# Patient Record
Sex: FEMALE | Race: WHITE | NOT HISPANIC OR LATINO | Employment: OTHER | URBAN - METROPOLITAN AREA
[De-identification: names, ages, dates, MRNs, and addresses within clinical notes are randomized per-mention and may not be internally consistent; named-entity substitution may affect disease eponyms.]

---

## 2017-03-03 ENCOUNTER — ALLSCRIPTS OFFICE VISIT (OUTPATIENT)
Dept: OTHER | Facility: OTHER | Age: 82
End: 2017-03-03

## 2017-03-03 DIAGNOSIS — E11.39 TYPE 2 DIABETES MELLITUS WITH OTHER DIABETIC OPHTHALMIC COMPLICATION (HCC): ICD-10-CM

## 2017-03-03 DIAGNOSIS — E11.65 TYPE 2 DIABETES MELLITUS WITH HYPERGLYCEMIA (HCC): ICD-10-CM

## 2017-03-04 ENCOUNTER — TRANSCRIBE ORDERS (OUTPATIENT)
Dept: LAB | Facility: CLINIC | Age: 82
End: 2017-03-04

## 2017-03-04 ENCOUNTER — APPOINTMENT (OUTPATIENT)
Dept: LAB | Facility: HOSPITAL | Age: 82
End: 2017-03-04
Payer: COMMERCIAL

## 2017-03-04 ENCOUNTER — TRANSCRIBE ORDERS (OUTPATIENT)
Dept: LAB | Facility: HOSPITAL | Age: 82
End: 2017-03-04

## 2017-03-04 ENCOUNTER — APPOINTMENT (OUTPATIENT)
Dept: LAB | Facility: CLINIC | Age: 82
End: 2017-03-04
Payer: COMMERCIAL

## 2017-03-04 DIAGNOSIS — E11.39 DIABETIC OCULOPATHY (HCC): ICD-10-CM

## 2017-03-04 DIAGNOSIS — E11.39 DIABETIC OCULOPATHY (HCC): Primary | ICD-10-CM

## 2017-03-04 DIAGNOSIS — E11.39 TYPE 2 DIABETES MELLITUS WITH OTHER DIABETIC OPHTHALMIC COMPLICATION (HCC): ICD-10-CM

## 2017-03-04 DIAGNOSIS — E11.65 TYPE 2 DIABETES MELLITUS WITH HYPERGLYCEMIA (HCC): ICD-10-CM

## 2017-03-04 DIAGNOSIS — I10 UNSPECIFIED ESSENTIAL HYPERTENSION: Primary | ICD-10-CM

## 2017-03-04 DIAGNOSIS — IMO0001 UNCONTROLLED DIABETES MELLITUS TYPE 2 WITHOUT COMPLICATIONS, UNSPECIFIED LONG TERM INSULIN USE STATUS: ICD-10-CM

## 2017-03-04 DIAGNOSIS — I10 UNSPECIFIED ESSENTIAL HYPERTENSION: ICD-10-CM

## 2017-03-04 LAB
ANION GAP SERPL CALCULATED.3IONS-SCNC: 5 MMOL/L (ref 4–13)
BUN SERPL-MCNC: 26 MG/DL (ref 5–25)
CALCIUM SERPL-MCNC: 8.8 MG/DL (ref 8.3–10.1)
CHLORIDE SERPL-SCNC: 105 MMOL/L (ref 100–108)
CHOLEST SERPL-MCNC: 111 MG/DL (ref 50–200)
CO2 SERPL-SCNC: 29 MMOL/L (ref 21–32)
CREAT SERPL-MCNC: 1.06 MG/DL (ref 0.6–1.3)
CREAT UR-MCNC: 74.3 MG/DL
EST. AVERAGE GLUCOSE BLD GHB EST-MCNC: 186 MG/DL
GFR SERPL CREATININE-BSD FRML MDRD: 50 ML/MIN/1.73SQ M
GLUCOSE SERPL-MCNC: 122 MG/DL (ref 65–140)
HBA1C MFR BLD: 8.1 % (ref 4.2–6.3)
HDLC SERPL-MCNC: 67 MG/DL (ref 40–60)
LDLC SERPL CALC-MCNC: 35 MG/DL (ref 0–100)
MICROALBUMIN UR-MCNC: 83 MG/L (ref 0–20)
MICROALBUMIN/CREAT 24H UR: 112 MG/G CREATININE (ref 0–30)
POTASSIUM SERPL-SCNC: 4.2 MMOL/L (ref 3.5–5.3)
SODIUM SERPL-SCNC: 139 MMOL/L (ref 136–145)
T4 FREE SERPL-MCNC: 1 NG/DL (ref 0.76–1.46)
TRIGL SERPL-MCNC: 44 MG/DL
TSH SERPL DL<=0.05 MIU/L-ACNC: 4.07 UIU/ML (ref 0.36–3.74)

## 2017-03-04 PROCEDURE — 84443 ASSAY THYROID STIM HORMONE: CPT

## 2017-03-04 PROCEDURE — 82043 UR ALBUMIN QUANTITATIVE: CPT | Performed by: PHYSICIAN ASSISTANT

## 2017-03-04 PROCEDURE — 82570 ASSAY OF URINE CREATININE: CPT | Performed by: PHYSICIAN ASSISTANT

## 2017-03-04 PROCEDURE — 80048 BASIC METABOLIC PNL TOTAL CA: CPT

## 2017-03-04 PROCEDURE — 83036 HEMOGLOBIN GLYCOSYLATED A1C: CPT

## 2017-03-04 PROCEDURE — 84439 ASSAY OF FREE THYROXINE: CPT

## 2017-03-04 PROCEDURE — 36415 COLL VENOUS BLD VENIPUNCTURE: CPT

## 2017-03-04 PROCEDURE — 80061 LIPID PANEL: CPT

## 2017-03-05 ENCOUNTER — GENERIC CONVERSION - ENCOUNTER (OUTPATIENT)
Dept: OTHER | Facility: OTHER | Age: 82
End: 2017-03-05

## 2017-03-06 ENCOUNTER — GENERIC CONVERSION - ENCOUNTER (OUTPATIENT)
Dept: OTHER | Facility: OTHER | Age: 82
End: 2017-03-06

## 2017-03-21 ENCOUNTER — ALLSCRIPTS OFFICE VISIT (OUTPATIENT)
Dept: OTHER | Facility: OTHER | Age: 82
End: 2017-03-21

## 2017-04-06 ENCOUNTER — ALLSCRIPTS OFFICE VISIT (OUTPATIENT)
Dept: OTHER | Facility: OTHER | Age: 82
End: 2017-04-06

## 2017-04-10 ENCOUNTER — GENERIC CONVERSION - ENCOUNTER (OUTPATIENT)
Dept: OTHER | Facility: OTHER | Age: 82
End: 2017-04-10

## 2017-05-04 ENCOUNTER — GENERIC CONVERSION - ENCOUNTER (OUTPATIENT)
Dept: FAMILY MEDICINE CLINIC | Facility: CLINIC | Age: 82
End: 2017-05-04

## 2017-05-04 LAB
LEFT EYE DIABETIC RETINOPATHY: NORMAL
RIGHT EYE DIABETIC RETINOPATHY: NORMAL

## 2017-05-08 DIAGNOSIS — E11.65 TYPE 2 DIABETES MELLITUS WITH HYPERGLYCEMIA (HCC): ICD-10-CM

## 2017-05-08 DIAGNOSIS — E11.39 TYPE 2 DIABETES MELLITUS WITH OTHER DIABETIC OPHTHALMIC COMPLICATION (HCC): ICD-10-CM

## 2017-05-16 ENCOUNTER — APPOINTMENT (OUTPATIENT)
Dept: LAB | Facility: MEDICAL CENTER | Age: 82
End: 2017-05-16
Payer: COMMERCIAL

## 2017-05-16 ENCOUNTER — HOSPITAL ENCOUNTER (OUTPATIENT)
Dept: BONE DENSITY | Facility: MEDICAL CENTER | Age: 82
Discharge: HOME | End: 2017-05-16
Payer: COMMERCIAL

## 2017-05-16 ENCOUNTER — TRANSCRIBE ORDERS (OUTPATIENT)
Dept: ADMINISTRATIVE | Facility: HOSPITAL | Age: 82
End: 2017-05-16

## 2017-05-16 DIAGNOSIS — Z13.820 ENCOUNTER FOR SCREENING FOR OSTEOPOROSIS: ICD-10-CM

## 2017-05-16 DIAGNOSIS — E11.39 TYPE 2 DIABETES MELLITUS WITH OTHER DIABETIC OPHTHALMIC COMPLICATION (HCC): ICD-10-CM

## 2017-05-16 DIAGNOSIS — E11.65 TYPE 2 DIABETES MELLITUS WITH HYPERGLYCEMIA (HCC): ICD-10-CM

## 2017-05-16 DIAGNOSIS — I10 ESSENTIAL (PRIMARY) HYPERTENSION: ICD-10-CM

## 2017-05-16 LAB
ALBUMIN SERPL BCP-MCNC: 3.6 G/DL (ref 3.5–5)
ALP SERPL-CCNC: 94 U/L (ref 46–116)
ALT SERPL W P-5'-P-CCNC: 26 U/L (ref 12–78)
ANION GAP SERPL CALCULATED.3IONS-SCNC: 5 MMOL/L (ref 4–13)
AST SERPL W P-5'-P-CCNC: 12 U/L (ref 5–45)
BILIRUB SERPL-MCNC: 0.36 MG/DL (ref 0.2–1)
BUN SERPL-MCNC: 31 MG/DL (ref 5–25)
CALCIUM SERPL-MCNC: 9.2 MG/DL (ref 8.3–10.1)
CHLORIDE SERPL-SCNC: 104 MMOL/L (ref 100–108)
CHOLEST SERPL-MCNC: 112 MG/DL (ref 50–200)
CK SERPL-CCNC: 104 U/L (ref 26–192)
CO2 SERPL-SCNC: 31 MMOL/L (ref 21–32)
CREAT SERPL-MCNC: 1.12 MG/DL (ref 0.6–1.3)
ERYTHROCYTE [DISTWIDTH] IN BLOOD BY AUTOMATED COUNT: 13.9 % (ref 11.6–15.1)
GFR SERPL CREATININE-BSD FRML MDRD: 46.9 ML/MIN/1.73SQ M
GLUCOSE P FAST SERPL-MCNC: 168 MG/DL (ref 65–99)
HCT VFR BLD AUTO: 40.7 % (ref 34.8–46.1)
HDLC SERPL-MCNC: 54 MG/DL (ref 40–60)
HGB BLD-MCNC: 13.5 G/DL (ref 11.5–15.4)
LDLC SERPL CALC-MCNC: 44 MG/DL (ref 0–100)
MCH RBC QN AUTO: 30.8 PG (ref 26.8–34.3)
MCHC RBC AUTO-ENTMCNC: 33.2 G/DL (ref 31.4–37.4)
MCV RBC AUTO: 93 FL (ref 82–98)
PLATELET # BLD AUTO: 177 THOUSANDS/UL (ref 149–390)
PMV BLD AUTO: 13 FL (ref 8.9–12.7)
POTASSIUM SERPL-SCNC: 4 MMOL/L (ref 3.5–5.3)
PROT SERPL-MCNC: 6.4 G/DL (ref 6.4–8.2)
RBC # BLD AUTO: 4.39 MILLION/UL (ref 3.81–5.12)
SODIUM SERPL-SCNC: 140 MMOL/L (ref 136–145)
T4 FREE SERPL-MCNC: 0.97 NG/DL (ref 0.76–1.46)
TRIGL SERPL-MCNC: 72 MG/DL
TSH SERPL DL<=0.05 MIU/L-ACNC: 3.98 UIU/ML (ref 0.36–3.74)
WBC # BLD AUTO: 6.53 THOUSAND/UL (ref 4.31–10.16)

## 2017-05-16 PROCEDURE — 80053 COMPREHEN METABOLIC PANEL: CPT

## 2017-05-16 PROCEDURE — 80061 LIPID PANEL: CPT

## 2017-05-16 PROCEDURE — 84439 ASSAY OF FREE THYROXINE: CPT

## 2017-05-16 PROCEDURE — 84443 ASSAY THYROID STIM HORMONE: CPT

## 2017-05-16 PROCEDURE — 85027 COMPLETE CBC AUTOMATED: CPT

## 2017-05-16 PROCEDURE — 77080 DXA BONE DENSITY AXIAL: CPT

## 2017-05-16 PROCEDURE — 36415 COLL VENOUS BLD VENIPUNCTURE: CPT

## 2017-05-16 PROCEDURE — 82550 ASSAY OF CK (CPK): CPT

## 2017-05-17 ENCOUNTER — GENERIC CONVERSION - ENCOUNTER (OUTPATIENT)
Dept: OTHER | Facility: OTHER | Age: 82
End: 2017-05-17

## 2017-05-18 ENCOUNTER — GENERIC CONVERSION - ENCOUNTER (OUTPATIENT)
Dept: OTHER | Facility: OTHER | Age: 82
End: 2017-05-18

## 2017-06-21 ENCOUNTER — ALLSCRIPTS OFFICE VISIT (OUTPATIENT)
Dept: OTHER | Facility: OTHER | Age: 82
End: 2017-06-21

## 2017-07-25 ENCOUNTER — ALLSCRIPTS OFFICE VISIT (OUTPATIENT)
Dept: OTHER | Facility: OTHER | Age: 82
End: 2017-07-25

## 2017-07-25 DIAGNOSIS — E11.65 TYPE 2 DIABETES MELLITUS WITH HYPERGLYCEMIA (HCC): ICD-10-CM

## 2017-07-25 DIAGNOSIS — E11.39 TYPE 2 DIABETES MELLITUS WITH OTHER DIABETIC OPHTHALMIC COMPLICATION (HCC): ICD-10-CM

## 2017-09-10 ENCOUNTER — HOSPITAL ENCOUNTER (EMERGENCY)
Facility: HOSPITAL | Age: 82
Discharge: HOME | End: 2017-09-10
Attending: EMERGENCY MEDICINE | Admitting: EMERGENCY MEDICINE
Payer: COMMERCIAL

## 2017-09-10 ENCOUNTER — APPOINTMENT (EMERGENCY)
Dept: RADIOLOGY | Facility: HOSPITAL | Age: 82
End: 2017-09-10
Payer: COMMERCIAL

## 2017-09-10 VITALS
TEMPERATURE: 97.8 F | RESPIRATION RATE: 18 BRPM | OXYGEN SATURATION: 100 % | WEIGHT: 190 LBS | SYSTOLIC BLOOD PRESSURE: 137 MMHG | HEART RATE: 62 BPM | DIASTOLIC BLOOD PRESSURE: 64 MMHG

## 2017-09-10 DIAGNOSIS — L03.032 CELLULITIS OF TOE OF LEFT FOOT: Primary | ICD-10-CM

## 2017-09-10 PROCEDURE — 73660 X-RAY EXAM OF TOE(S): CPT

## 2017-09-10 PROCEDURE — 99283 EMERGENCY DEPT VISIT LOW MDM: CPT

## 2017-09-10 RX ORDER — NITROGLYCERIN 0.4 MG/1
TABLET SUBLINGUAL
COMMUNITY
End: 2019-03-30 | Stop reason: HOSPADM

## 2017-09-10 RX ORDER — ISOSORBIDE MONONITRATE 60 MG/1
TABLET, EXTENDED RELEASE ORAL
COMMUNITY
Start: 2017-07-31 | End: 2018-10-26 | Stop reason: SDUPTHER

## 2017-09-10 RX ORDER — CEPHALEXIN 500 MG/1
500 CAPSULE ORAL 4 TIMES DAILY
Qty: 40 CAPSULE | Refills: 0 | Status: SHIPPED | OUTPATIENT
Start: 2017-09-10 | End: 2017-09-20

## 2017-09-10 RX ORDER — PANTOPRAZOLE SODIUM 40 MG/1
TABLET, DELAYED RELEASE ORAL
COMMUNITY
Start: 2017-07-31 | End: 2018-12-05 | Stop reason: ALTCHOICE

## 2017-09-10 RX ORDER — SULFAMETHOXAZOLE AND TRIMETHOPRIM 800; 160 MG/1; MG/1
1 TABLET ORAL 2 TIMES DAILY
Qty: 14 TABLET | Refills: 0 | Status: SHIPPED | OUTPATIENT
Start: 2017-09-10 | End: 2017-09-17

## 2017-09-10 RX ORDER — LOSARTAN POTASSIUM 100 MG/1
TABLET ORAL
COMMUNITY
End: 2019-04-08

## 2017-09-10 RX ORDER — SUB-Q INSULIN DEVICE, 40 UNIT
EACH MISCELLANEOUS
COMMUNITY
Start: 2016-01-14 | End: 2018-11-16 | Stop reason: SDUPTHER

## 2017-09-10 RX ORDER — ASPIRIN 325 MG
TABLET ORAL
COMMUNITY
Start: 2013-07-16 | End: 2018-04-28 | Stop reason: HOSPADM

## 2017-09-10 RX ORDER — AMLODIPINE BESYLATE 5 MG/1
TABLET ORAL
COMMUNITY
Start: 2014-05-17 | End: 2018-05-16 | Stop reason: SDUPTHER

## 2017-09-10 RX ORDER — ATORVASTATIN CALCIUM 40 MG/1
TABLET, FILM COATED ORAL
COMMUNITY
Start: 2011-12-07 | End: 2018-04-11 | Stop reason: SDUPTHER

## 2017-09-24 ENCOUNTER — HOSPITAL ENCOUNTER (EMERGENCY)
Facility: HOSPITAL | Age: 82
Discharge: HOME | End: 2017-09-24
Attending: EMERGENCY MEDICINE | Admitting: EMERGENCY MEDICINE
Payer: COMMERCIAL

## 2017-09-24 ENCOUNTER — APPOINTMENT (EMERGENCY)
Dept: RADIOLOGY | Facility: HOSPITAL | Age: 82
End: 2017-09-24
Payer: COMMERCIAL

## 2017-09-24 VITALS
HEIGHT: 67 IN | DIASTOLIC BLOOD PRESSURE: 81 MMHG | TEMPERATURE: 98.1 F | WEIGHT: 190 LBS | SYSTOLIC BLOOD PRESSURE: 180 MMHG | BODY MASS INDEX: 29.82 KG/M2 | HEART RATE: 75 BPM | RESPIRATION RATE: 18 BRPM | OXYGEN SATURATION: 97 %

## 2017-09-24 DIAGNOSIS — S09.90XA CLOSED HEAD INJURY, INITIAL ENCOUNTER: Primary | ICD-10-CM

## 2017-09-24 PROCEDURE — 70450 CT HEAD/BRAIN W/O DYE: CPT

## 2017-09-24 PROCEDURE — 99284 EMERGENCY DEPT VISIT MOD MDM: CPT

## 2017-09-24 PROCEDURE — 72125 CT NECK SPINE W/O DYE: CPT

## 2017-09-27 ENCOUNTER — ALLSCRIPTS OFFICE VISIT (OUTPATIENT)
Dept: OTHER | Facility: OTHER | Age: 82
End: 2017-09-27

## 2017-09-28 ENCOUNTER — APPOINTMENT (OUTPATIENT)
Dept: LAB | Facility: HOSPITAL | Age: 82
End: 2017-09-28
Payer: COMMERCIAL

## 2017-09-28 ENCOUNTER — GENERIC CONVERSION - ENCOUNTER (OUTPATIENT)
Dept: OTHER | Facility: OTHER | Age: 82
End: 2017-09-28

## 2017-09-28 ENCOUNTER — TRANSCRIBE ORDERS (OUTPATIENT)
Dept: ADMINISTRATIVE | Facility: HOSPITAL | Age: 82
End: 2017-09-28

## 2017-09-28 DIAGNOSIS — E11.65 TYPE 2 DIABETES MELLITUS WITH HYPERGLYCEMIA (HCC): ICD-10-CM

## 2017-09-28 DIAGNOSIS — E11.39 TYPE 2 DIABETES MELLITUS WITH OTHER DIABETIC OPHTHALMIC COMPLICATION (HCC): ICD-10-CM

## 2017-09-28 LAB
ALBUMIN SERPL BCP-MCNC: 3.5 G/DL (ref 3.5–5)
ALP SERPL-CCNC: 92 U/L (ref 46–116)
ALT SERPL W P-5'-P-CCNC: 27 U/L (ref 12–78)
ANION GAP SERPL CALCULATED.3IONS-SCNC: 5 MMOL/L (ref 4–13)
AST SERPL W P-5'-P-CCNC: 20 U/L (ref 5–45)
BILIRUB SERPL-MCNC: 0.45 MG/DL (ref 0.2–1)
BUN SERPL-MCNC: 34 MG/DL (ref 5–25)
CALCIUM SERPL-MCNC: 9.8 MG/DL (ref 8.3–10.1)
CHLORIDE SERPL-SCNC: 104 MMOL/L (ref 100–108)
CO2 SERPL-SCNC: 31 MMOL/L (ref 21–32)
CREAT SERPL-MCNC: 1.01 MG/DL (ref 0.6–1.3)
EST. AVERAGE GLUCOSE BLD GHB EST-MCNC: 169 MG/DL
GFR SERPL CREATININE-BSD FRML MDRD: 53 ML/MIN/1.73SQ M
GLUCOSE P FAST SERPL-MCNC: 108 MG/DL (ref 65–99)
HBA1C MFR BLD: 7.5 % (ref 4.2–6.3)
POTASSIUM SERPL-SCNC: 4.2 MMOL/L (ref 3.5–5.3)
PROT SERPL-MCNC: 7.3 G/DL (ref 6.4–8.2)
SODIUM SERPL-SCNC: 140 MMOL/L (ref 136–145)

## 2017-09-28 PROCEDURE — 36415 COLL VENOUS BLD VENIPUNCTURE: CPT

## 2017-09-28 PROCEDURE — 80053 COMPREHEN METABOLIC PANEL: CPT

## 2017-09-28 PROCEDURE — 83036 HEMOGLOBIN GLYCOSYLATED A1C: CPT

## 2017-10-31 ENCOUNTER — ALLSCRIPTS OFFICE VISIT (OUTPATIENT)
Dept: OTHER | Facility: OTHER | Age: 82
End: 2017-10-31

## 2017-10-31 DIAGNOSIS — R94.6 ABNORMAL RESULTS OF THYROID FUNCTION STUDIES: ICD-10-CM

## 2017-11-01 NOTE — PROGRESS NOTES
Assessment  1  DM (diabetes mellitus), type 2, uncontrolled w/ophthalmic complication (004 87)   (E11 39,E11 65)   2  Diabetic retinopathy (250 50,362 01) (E11 319)   3  Hypertension (401 9) (I10)   4  Hyperlipidemia (272 4) (E78 5)   5  Abnormal TSH (790 6) (R94 6)    Plan  3-vessel coronary artery disease    · Ranexa 500 MG Oral Tablet Extended Release 12 Hour   Rx By: Aby Leach; Dispense: 30 Days ; #:60 TAB; Refill: 5;For: 3-vessel coronary artery disease; SSUANA = N; Verified Transmission to [facility] #[id number]; Last Updated By: Maris Concepcion; 10/31/2017 10:47:53 AM  Abnormal TSH    · (1) T4, FREE; Status:Active; Requested [id number];    Perform:[facility]; [id number]; Ordered; For:Abnormal TSH; Ordered By:Edd Shankar;   · (1) TSH; Status:Active; Requested FPD:94TSE8850;    Perform:[facility]; CAITY:00QIA7631; Ordered; For:Abnormal TSH; Ordered By:Edd Shankar;  DM (diabetes mellitus), type 2, uncontrolled w/ophthalmic complication    · HumaLOG 100 UNIT/ML Subcutaneous Solution; USE AS DIRECTED   Rx By: Joon Grullon; Dispense: 0 Days ; #:1 X 10 ML Vial; Refill: 6;For: DM (diabetes mellitus), type 2, uncontrolled w/ophthalmic complication; SUSANA = N; Verified Transmission to [address] [place] [address],  [address]; Last Updated By: System, [employer]; 10/31/2017 10:57:40 AM   · Follow-up visit in 4 Months Evaluation and Treatment  Follow-up  Status: Hold For -  Scheduling  Requested for: 11PWX6131   Ordered; For: DM (diabetes mellitus), type 2, uncontrolled w/ophthalmic complication; Ordered By: Joon Grullon Performed:  Due: 13LKA9344   · Follow-Up With Advanced Practitioner Evaluation and Treatment  Follow-up  Status: Hold  For - Scheduling  Requested for: 06HNT6017   Ordered; For: DM (diabetes mellitus), type 2, uncontrolled w/ophthalmic complication; Ordered By: Joon Grullon Performed:  Due: 2018  DMII (diabetes mellitus, type 2)    · Insulin Syringe 30G X 5/16 1 ML Miscellaneous   Rx By: Kassie Devries; Dispense: 0 Days ; #:3 X 100 Miscellaneous Box; Refill: 1;For: DMII (diabetes mellitus, type 2); SUSANA = Y; Verified Transmission to [address] [facility],   [address]; Last Updated By: Jarod Lim; 10/31/2017 10:48:08 AM   · V-Go 20 KIT; use daily as directed   Rx By: Linh Jack; Dispense: 30 Days ; #:1 X 30 Kit Box; Refill: 11; For: DMII (diabetes mellitus, type 2); SUSANA = N; Verified Transmission to [address] [facility],   [address]; Last Updated By: Systeme-Tag; 10/31/2017 10:57:40 AM  Unlinked    · Nitrostat 0 4 MG Sublingual Tablet Sublingual (Nitroglycerin)   Dispense: 0 Days ; #:0 SUBL; Refill: 0; SUSANA = N; Record; Last Updated By: Jarod Lim; 10/31/2017 10:47:25 AM    Discussion/Summary  Discussion Summary:   1  Type 2 diabetes with hyperglycemia-the blood sugar is under reasonable control based on hemoglobin A1c  Continue current regimen  Abnormal TSH-check TSH and free T4  Hypertension-the blood pressure is at target  Hyperlipidemia-continue statin  Counseling Documentation With Imm: The patient, patient's caretaker was counseled regarding diagnostic results,-- instructions for management,-- impressions  Medication SE Review and Pt Understands Tx: The treatment plan was reviewed with the patient/guardian  The patient/guardian understands and agrees with the treatment plan      Chief Complaint  Chief Complaint Free Text Note Form: Follow Up      History of Present Illness  Diabetes: The patient is being seen for routine follow-up of Diabetes Mellitus 2  The HbA1c was 7 5% performed on 9-28-17  See Medication List for current medication(s)  See Medication List for dosage(s)  By report, there is good compliance with treatment,-- good tolerance of treatment-- and-- good symptom control  Current pertinent lifestyle factors include obesity,-- a sedentary lifestyle-- and-- inactivity  The patient is currently asymptomatic   Disease Course and Complications:  The last dilated fundus exam showed moderate nonproliferative retinopathy  Hyperlipidemia (Follow-Up): The patient states her hyperlipidemia has been stable since the last visit  Comorbid Illnesses: diabetes mellitus-- and-- hypertension  She has no significant interval events  Symptoms: The patient is currently asymptomatic  Associated symptoms include no focal neurologic deficits  Hypertension (Follow-Up): The patient presents for follow-up of essential hypertension  The patient states she has been doing well with her blood pressure control since the last visit  She has no comorbid illnesses  She has no significant interval events  Symptoms: The patient is currently asymptomatic  Review of Systems  ROS Reviewed:   ROS reviewed  Endo Adult ROS Female Established v2 Update - St ke:   Constitutional/General: no recent weight gain,-- no recent weight loss,-- no poor energy/fatigue,-- no increased energy level,-- no insomnia/sleep problems,-- no fever-- and-- no feeling weak  Breasts: no nipple discharge  Heart: no high blood pressure,-- no chest pain/tightness,-- no rapid/racing heart rate-- and-- no palpitations  Genitourinary - Urinary: no frequent urination,-- no excess urination-- and-- no urinating during the night  Eyes: no blurred vision,-- no double vision,-- no bulging eyes,-- no gritty/scratchy eyes-- and-- no excessive tearing  Mouth / Throat: no hoarseness-- and-- no difficulty swallowing  Neck: no lumps,-- no swollen glands,-- no neck pain,-- no neck stiffness-- and-- no enlarged thyroid  Respiratory: no wheezing,-- no asthma-- and-- no persistent cough  Musculoskeletal: no muscle aches/pain,-- no joint aches/pain-- and-- muscle weakness  Skin & Hair: no dry skin,-- no acne,-- the hair texture was not oily,-- no hair loss,-- excessive hair growth-- and-- Excess hair growth  Gastrointestinal: no constipation,-- no diarrhea,-- no waking at night to drink-- and-- no stomach ache     Neurological: no blackouts,-- no weakness-- and-- no tremors  Reproductive: no discomfort with periods,-- no excessive bleeding with periods-- and-- no mood swings--   regular periods are not applicable  Endocrine: no feeling hot frequently,-- no feeling cold frequently,-- no shifts between feeling hot and cold,-- no cold hands or feet,-- no excessive sweating,-- no thyroid problems,-- no blood sugar problems,-- no excessive thirst,-- no excessive hunger,-- no change in shoe size,-- no nausea or vomiting-- and-- no shaky hands  Active Problems  1  3-vessel coronary artery disease (414 00) (I25 10)   2  Ankle pain, left (719 47) (M25 572)   3  Asthma (493 90) (J45 909)   4  Cellulitis of ankle (682 6) (L03 119)   5  Cellulitis of foot, left (682 7) (L03 116)   6  Dementia without behavioral disturbance (294 20) (F03 90)   7  Depression (311) (F32 9)   8  Dermatitis (692 9) (L30 9)   9  Diabetic retinopathy (250 50,362 01) (E11 319)   10  DM (diabetes mellitus), type 2, uncontrolled w/ophthalmic complication (810 44)    (E11 39,E11 65)   11  Dysuria (788 1) (R30 0)   12  Gastroesophageal reflux disease with hiatal hernia (797 59,924  3) (K21 9,K44 9)   13  Glaucoma (365 9) (H40 9)   14  Hyperlipidemia (272 4) (E78 5)   15  Hypertension (401 9) (I10)   16  Hypoglycemia associated with diabetes (250 80) (E11 649)   17  Ischemic Stroke (434 91)   18  Left ankle swelling (719 07) (M25 472)   19  Medicare annual wellness visit, initial (V70 0) (Z00 00)   20  Memory loss (780 93) (R41 3)   21  Need for immunization against influenza (V04 81) (Z23)   22  Need for prophylactic vaccination and inoculation against influenza (V04 81) (Z23)   23  Need for vaccination with 13-polyvalent pneumococcal conjugate vaccine (V03 82) (Z23)   24  Obesity (278 00) (E66 9)   25  Obstructive sleep apnea (327 23) (G47 33)   26  Oral thrush (112 0) (B37 0)   27  Osteoarthritis of knee (715 36) (M17 10)   28   Screening for osteoporosis (V82 81) (T36 558)    Past Medical History  1  History of Acute myocardial infarction (410 90) (I21 9)   2  History of Arthritis (V13 4)   3  History of Cardiac Cath Procedures Performed   4  History of Diabetes Mellitus (250 00)   5  History of hyperlipidemia (V12 29) (Z86 39)   6  History of hypertension (V12 59) (Z86 79)   7  History of transient cerebral ischemia (V12 54) (Z86 73)   8  History of Peptic Ulcer (V12 71)  Active Problems And Past Medical History Reviewed: The active problems and past medical history were reviewed and updated today  Surgical History  1  History of Appendectomy   2  History of CABG   3  History of Cataract Surgery   4  History of Cath Stent Placement   5  History of  Section   6  History of Complete Colonoscopy   7  History of Dental Surgery   8  History of Diagnostic Esophagogastroduodenoscopy   9  History of Elbow Surgery   10  History of PTCA  Surgical History Reviewed: The surgical history was reviewed and updated today  Family History  Father    1  Family history of Cancer  Sister    2  Family history of Cancer   3  Family history of Cancer   4  Family history of Heart Disease (V17 49)   5  Family history of Thyroid Disorder (V18 19)  Family History    6  Family history of Colon Cancer (V16 0)   7  Family history of Diabetes Mellitus (V18 0)   8  Family history of Mitral Valve Disorder   9  Family history of Thyroid Cancer  Family History Reviewed: The family history was reviewed and updated today  Social History   · Denied: History of Alcohol Use (History)   · Caffeine Use   · Denied: History of Drug Use   · Marital History - Currently    · Never A Smoker  Social History Reviewed: The social history was reviewed and updated today  The social history was reviewed and is unchanged  Current Meds   1  AmLODIPine Besylate 5 MG Oral Tablet; Take 1 tablet twice daily;    Therapy: 79PGY3066 to (Evaluate:81Lci3009)  Requested for: 96OEQ9118; Last Rx:39Gjb8372 Ordered   2  Aspirin 325 MG Oral Tablet; Therapy: 02DTI9424 to Recorded   3  Atorvastatin Calcium 40 MG Oral Tablet; take 1 tablet by mouth every day; Therapy: 17DTL8819 to (Evaluate:04Apr2018)  Requested for: 34FOO4587; Last   Rx:86Yol7160 Ordered   4  Insulin Syringe 30G X 5/16 1 ML Miscellaneous; USE AS DIRECTED; Therapy: 52Pff4403 to (Last Rx:16Jun2015)  Requested for: 16Jun2015 Ordered   5  Isosorbide Mononitrate ER 60 MG Oral Tablet Extended Release 24 Hour; TAKE 1   TABLET BY MOUTH EVERY DAY AS DIRECTED; Therapy: 01CYS3304 to (Evaluate:27Jan2018)  Requested for: 94ONS7061; Last   Rx:52Mil4337 Ordered   6  Losartan Potassium 100 MG Oral Tablet; TAKE 1 TABLET DAILY; Therapy: (Recorded:12Iad7036) to Recorded   7  Metoprolol Tartrate 25 MG Oral Tablet; TAKE 1 TABLET TWICE DAILY PRN DEPENDING   ON BP;   Therapy: (Recorded:38Sxp1113) to Recorded   8  Nitrostat 0 4 MG Sublingual Tablet Sublingual;   Therapy: (Recorded:11Nov2014) to Recorded   9  OneTouch Ultra Blue In Vitro Strip; CHECK BLOOD SSUGAR 3 TIMES A DAY; Therapy: 16IXK7609 to (BTESADNV:40TNF6060)  Requested for: 75Fsl2865; Last   Rx:56For4933 Ordered   10  Pantoprazole Sodium 40 MG Oral Tablet Delayed Release; take one tablet by mouth    every day; Therapy: 32MIJ4811 to (BUUPGGKF:75ZIS8431)  Requested for: 32GQO2947; Last    Rx:42Inc0156 Ordered   11  Ranexa 500 MG Oral Tablet Extended Release 12 Hour; TAKE 2 TABLETS BY MOUTH    EVERY DAY; Therapy: 72ZPZ0684 to (Evaluate:63Znc2766)  Requested for: 34YEU6966; Last    Rx:25Nov2016 Ordered   12  V-Go 20 KIT; use daily as directed; Therapy: 02PNO3036 to 21 )  Requested for: 38RJX9749; Last    MT:09TCU6494 Ordered  Medication List Reviewed: The medication list was reviewed and updated today  Allergies  1  ACE Inhibitors   2  Darvon CAPS   3  Lisinopril TABS   4  Namenda   5  Penicillins   6  Prinivil TABS   7  Stadol SOLN   8  Thorazine SOLN   9   Zestril TABS  10  Latex    Vitals  Vital Signs    Recorded: 31Oct2017 10:45AM   Heart Rate 80   Systolic 369   Diastolic 78   Height 5 ft 5 in   Weight 196 lb 6 08 oz   BMI Calculated 32 68   BSA Calculated 1 97     Physical Exam    Constitutional   General appearance: No acute distress, well appearing and well nourished  Eyes   Conjunctiva and lids: No swelling, erythema, or discharge  Pupils: Equal, round and reactive to light  The sclera are anicteric  Extraocular movements are intact  Ears, Nose, Mouth, and Throat   External inspection of ears, nose and lips: Normal     Oropharynx: Normal with no erythema, edema, exudate or lesions  Exam of Head: The head is atraumatic and normocephalic  Neck: The neck is supple  The thyroid is normal in size with no palpable nodules  Pulmonary   Auscultation of lungs: Clear to auscultation bilaterally with normal chest expansion  Cardiovascular   Auscultation of heart: Normal rate and rhythm with no murmurs, gallops or rubs  Examination of pulses: Dorsalis pedal pulses are +2 and equal bilaterally  Examination of carotids: No bruit    Abdomen   Abdomen: Abdomen is soft, non-tender with normal bowel sounds  Lymphatic   Palpation of lymph nodes: No supraclavicular or suboccipital lymphadenopathy  Musculoskeletal   Inspection/palpation of joints, bones, and muscles: Muscle bulk and tone is normal     Skin   Skin and subcutaneous tissue: Normal skin temperature and color  Neurologic   Reflexes: 2+ and symmetric  Motor Strength: Strength is 5/5 bilaterally  Psychiatric   Orientation to person, place and time: Normal     Mood and affect: Affect and attention span are normal     Additional Exam:  Walks with a walker  Results/Data  (1) HEMOGLOBIN A1C 99Yco5708 08:25AM Amina Brown Order Number: [id number]     Test Name Result Flag Reference   HEMOGLOBIN A1C 7 5 % H 4 2-6 3   EST  AVG   GLUCOSE 169 mg/dl       (1) COMPREHENSIVE METABOLIC PANEL 92IXN9248 16:04NZ Yara Hwang Order Number: [id number]     Test Name Result Flag Reference   SODIUM 140 mmol/L  136-145   POTASSIUM 4 2 mmol/L  3 5-5 3   CHLORIDE 104 mmol/L  100-108   CARBON DIOXIDE 31 mmol/L  21-32   ANION GAP (CALC) 5 mmol/L  4-13   BLOOD UREA NITROGEN 34 mg/dL H 5-25   CREATININE 1 01 mg/dL  0 60-1 30   Standardized to IDMS reference method   CALCIUM 9 8 mg/dL  8 3-10 1   BILI, TOTAL 0 45 mg/dL  0 20-1 00   ALK PHOSPHATAS 92 U/L     ALT (SGPT) 27 U/L  12-78   Specimen collection should occur prior to Sulfasalazine and/or Sulfapyridine administration due to the potential for falsely depressed results  AST(SGOT) 20 U/L  5-45   Specimen collection should occur prior to Sulfasalazine administration due to the potential for falsely depressed results  ALBUMIN 3 5 g/dL  3 5-5 0   TOTAL PROTEIN 7 3 g/dL  6 4-8 2   eGFR 53 ml/min/1 73sq m     [facility] Disease Education Program recommendations are as follows:  GFR calculation is accurate only with a steady state creatinine  Chronic Kidney disease less than 60 ml/min/1 73 sq  meters  Kidney failure less than 15 ml/min/1 73 sq  meters  GLUCOSE FASTING 108 mg/dL H 65-99   Specimen collection should occur prior to Sulfasalazine administration due to the potential for falsely depressed results  Specimen collection should occur prior to Sulfapyridine administration due to the potential for falsely elevated results  Health Management  DMII (diabetes mellitus, type 2)   *VB - Eye Exam; every 1 year; Last 25Dny2564; Next Due: 63Ztd4911; Active  Health Maintenance   Medicare Annual Wellness Visit; every 1 year; Next Due: 59UWZ3750;  Overdue    Future Appointments    Date/Time Provider Specialty Site   11/29/2017 01:20 PM [facility]   12/12/2017 10:00 AM Cardiology, [facility]     Signatures   Electronically signed by JUAQUIN Becker ; Oct 31 2017 11:04AM EST                       (Author)

## 2017-11-13 ENCOUNTER — ALLSCRIPTS OFFICE VISIT (OUTPATIENT)
Dept: OTHER | Facility: OTHER | Age: 82
End: 2017-11-13

## 2017-11-24 ENCOUNTER — OFFICE VISIT (OUTPATIENT)
Dept: URGENT CARE | Facility: MEDICAL CENTER | Age: 82
End: 2017-11-24
Payer: COMMERCIAL

## 2017-11-24 PROCEDURE — S9088 SERVICES PROVIDED IN URGENT: HCPCS

## 2017-11-24 PROCEDURE — 99202 OFFICE O/P NEW SF 15 MIN: CPT

## 2017-11-29 ENCOUNTER — ALLSCRIPTS OFFICE VISIT (OUTPATIENT)
Dept: OTHER | Facility: OTHER | Age: 82
End: 2017-11-29

## 2017-12-05 NOTE — PROGRESS NOTES
Assessment    1  Acute bronchitis (466 0) (J20 9)    Plan  Acute bronchitis    · Benzonatate 200 MG Oral Capsule; TAKE 1 CAPSULE 3 TIMES DAILY AS NEEDED   · Doxycycline Hyclate 100 MG Oral Tablet; TAKE 1 TABLET EVERY 12 HOURS DAILY    Discussion/Summary  Discussion Summary:   Increase fluids and follow-up if symptoms increase or no better  5 days  Finish all antibiotic  Medication Side Effects Reviewed: Possible side effects of new medications were reviewed with the patient/guardian today  Understands and agrees with treatment plan: The treatment plan was reviewed with the patient/guardian  The patient/guardian understands and agrees with the treatment plan   Counseling Documentation With Imm: The patient, patient's caretaker was counseled regarding instructions for management, prognosis, patient and family education, impressions  Chief Complaint  Chief Complaint Free Text Note Form: Could sx with cough x 5 days  History of Present Illness  HPI: Patient with productive cough, greenish brown sputum over the last 5 days  Has a history of mild COPD as well as pneumonia in the past She has been taking cough medicine which has not helped  She states she feels warm and has had chills  She feels slightly weaker  No other complaints  She is up-to-date with her flu shot and her pneumonia shot   Hospital Based Practices Required Assessment:   Pain Assessment   the patient states they have pain  The pain is located in the right-sided chest w/coughing  (on a scale of 0 to 10, the patient rates the pain at 5 ) Reason DV Screen not done: not alone       Review of Systems  ROS Reviewed:   ROS reviewed  Active Problems    1  3-vessel coronary artery disease (414 00) (I25 10)   2  Abnormal TSH (790 6) (R94 6)   3  Ankle pain, left (719 47) (M25 572)   4  Asthma (493 90) (J45 909)   5  Cellulitis of ankle (682 6) (L03 119)   6  Cellulitis of foot, left (682 7) (L03 116)   7   Dementia without behavioral disturbance (294 20) (F03 90)   8  Depression (311) (F32 9)   9  Dermatitis (692 9) (L30 9)   10  Diabetic retinopathy (250 50,362 01) (E11 319)   11  DM (diabetes mellitus), type 2, uncontrolled w/ophthalmic complication (831 28)    (E11 39,E11 65)   12  Dysuria (788 1) (R30 0)   13  Gastroesophageal reflux disease with hiatal hernia (086 97,060  3) (K21 9,K44 9)   14  Glaucoma (365 9) (H40 9)   15  Hyperlipidemia (272 4) (E78 5)   16  Hypertension (401 9) (I10)   17  Hypoglycemia associated with diabetes (250 80) (E11 649)   18  Ischemic Stroke (434 91)   19  Left ankle swelling (719 07) (M25 472)   20  Medicare annual wellness visit, initial (V70 0) (Z00 00)   21  Memory loss (780 93) (R41 3)   22  Need for immunization against influenza (V04 81) (Z23)   23  Need for prophylactic vaccination and inoculation against influenza (V04 81) (Z23)   24  Need for vaccination with 13-polyvalent pneumococcal conjugate vaccine (V03 82) (Z23)   25  Obesity (278 00) (E66 9)   26  Obstructive sleep apnea (327 23) (G47 33)   27  Oral thrush (112 0) (B37 0)   28  Osteoarthritis of knee (715 36) (M17 10)   29  Screening for osteoporosis (V82 81) (Z13 820)   30  Urine incontinence (788 30) (R32)    Past Medical History    1  History of Acute myocardial infarction (410 90) (I21 9)   2  History of Arthritis (V13 4)   3  History of Cardiac Cath Procedures Performed   4  History of Diabetes Mellitus (250 00)   5  History of hyperlipidemia (V12 29) (Z86 39)   6  History of hypertension (V12 59) (Z86 79)   7  History of transient cerebral ischemia (V12 54) (Z86 73)   8  History of Peptic Ulcer (V12 71)    Family History  Father    1  Family history of Cancer  Sister    2  Family history of Cancer   3  Family history of Cancer   4  Family history of Heart Disease (V17 49)   5  Family history of Thyroid Disorder (V18 19)  Family History    6  Family history of Colon Cancer (V16 0)   7  Family history of Diabetes Mellitus (V18 0)   8   Family history of Mitral Valve Disorder   9  Family history of Thyroid Cancer    Social History    · Denied: History of Alcohol Use (History)   · Caffeine Use   · Denied: History of Drug Use   · Marital History - Currently    · Never A Smoker    Surgical History    1  History of Appendectomy   2  History of CABG   3  History of Cataract Surgery   4  History of Cath Stent Placement   5  History of  Section   6  History of Complete Colonoscopy   7  History of Dental Surgery   8  History of Diagnostic Esophagogastroduodenoscopy   9  History of Elbow Surgery   10  History of PTCA    Current Meds   1  AmLODIPine Besylate 5 MG Oral Tablet; Take 1 tablet twice daily; Therapy: 37DQZ6680 to (Evaluate:37Zkt8892)  Requested for: 51FAU6227; Last   Rx:2017 Ordered   2  Aspirin 325 MG Oral Tablet; Therapy: 05MHA7381 to Recorded   3  Atorvastatin Calcium 40 MG Oral Tablet; take 1 tablet by mouth every day; Therapy: 74SIB3590 to (Evaluate:2018)  Requested for: 10NMB0350; Last   Rx:17Sje2491 Ordered   4  Isosorbide Mononitrate ER 60 MG Oral Tablet Extended Release 24 Hour; TAKE 1   TABLET BY MOUTH EVERY DAY AS DIRECTED; Therapy: 13XJP7207 to (Evaluate:2018)  Requested for: 65YSF5699; Last   Rx:67Grf5785 Ordered   5  Losartan Potassium 100 MG Oral Tablet; TAKE 1 TABLET DAILY; Therapy: (Recorded:43Jvv5132) to Recorded   6  Metoprolol Tartrate 25 MG Oral Tablet; TAKE 1 TABLET TWICE DAILY PRN DEPENDING   ON BP;   Therapy: (Recorded:72Ila3087) to Recorded   7  NovoLOG 100 UNIT/ML Subcutaneous Solution; Use up to 40units daily in V-go as   directed  Requested for: 83XDL4111; Last Rx:2017 Ordered   8  OneTouch Ultra Blue In Vitro Strip; CHECK BLOOD SSUGAR 3 TIMES A DAY; Therapy: 26DKW5669 to (JVEZXGDB:24FBK4664)  Requested for: 24Div5970; Last   Rx:13Gvh9265 Ordered   9  Pantoprazole Sodium 40 MG Oral Tablet Delayed Release; take one tablet by mouth   every day;    Therapy: 77VIT4411 to (IUCPJHPM:46KDX6070)  Requested for: 83TND5503; Last   Rx:45Jgs4246 Ordered   10  V-Go 20 KIT; use daily as directed; Therapy: 15RWA2652 to [id number])  Requested for: 31Oct2017; Last    Rx:31Oct2017 Ordered    Allergies    1  ACE Inhibitors   2  Darvon CAPS   3  Lisinopril TABS   4  Namenda   5  Penicillins   6  Prinivil TABS   7  Stadol SOLN   8  Thorazine SOLN   9  Zestril TABS    10  Latex    Vitals  Signs   Recorded: 12GZD6625 07:05PM   Temperature: 97 4 F  Heart Rate: 64  Respiration: 20  Systolic: 890  Diastolic: 62  Height: 5 ft 5 in  Weight: 196 lb   BMI Calculated: 32 62  BSA Calculated: 1 96  O2 Saturation: 97  Pain Scale: 5    Physical Exam    Constitutional   General appearance: No acute distress, well appearing and well nourished  Ears, Nose, Mouth, and Throat   External inspection of ears and nose: Normal     Otoscopic examination: Tympanic membranes translucent with normal light reflex  Canals patent without erythema  Nasal mucosa, septum, and turbinates: Normal without edema or erythema  Oropharynx: Normal with no erythema, edema, exudate or lesions  Pulmonary   Respiratory effort: No increased work of breathing or signs of respiratory distress  Auscultation of lungs: Abnormal   rhonchi over the right base  Cardiovascular   Auscultation of heart: Normal rate and rhythm, normal S1 and S2, without murmurs  Lymphatic   Palpation of lymph nodes in neck: No lymphadenopathy  Future Appointments    Date/Time Provider Specialty Site   12/12/2017 10:00 AM Cardiology, [facility]   11/29/2017 01:20 PM [facility]   12/06/2017 09:30 AM JUAQUIN Ceja   Cardiology [facility]   02/27/2018 10:30 AM Lenord Hodgkins, [facility] Endocrinology [facility] ENDOCRINOLOGY     Signatures   Electronically signed by : Bettina Waldrop [facility]; Nov 24 2017  7:29PM EST (Author)    Electronically signed by : JUAQUIN Hansen ; Nov 30 2017 11:27AM EST                       (Co-author)

## 2017-12-06 ENCOUNTER — ALLSCRIPTS OFFICE VISIT (OUTPATIENT)
Dept: OTHER | Facility: OTHER | Age: 82
End: 2017-12-06

## 2017-12-07 NOTE — PROGRESS NOTES
Assessment  Assessed    1  3-vessel coronary artery disease (414 00) (I25 10)   2  Hyperlipidemia (272 4) (E78 5)   3  Hypertension (401 9) (I10)    Plan  Diabetic retinopathy, Hypertension    · (1) HEMOGLOBIN A1C; Status:Active; Requested for:08May2018; Perform:[facility]; CDY:07VTQ2804;OOAUVCP; For:Diabetic retinopathy, Hypertension; Ordered By:Giamber, Karolyn Lesch; Hypertension    · (1) CBC/ PLT (NO DIFF); Status:Active; Requested for:08May2018; Perform:[facility]; QQD:85EJQ3469;JVHFQKC;LZA:WIQWQHBDLLSJ; Ordered By:Giamber, Karolyn Lesch;   · (1) CK (CPK); Status:Active; Requested for:08May2018; Perform:[facility]; KTL:59SRV8465;CQAJMRJ;VCF:GEPDGRTQNELU; Ordered By:Giamber, Karolyn Lesch;   · (1) COMPREHENSIVE METABOLIC PANEL; Status:Active; Requested for:08May2018; Perform:[facility]; SATHISH:11XYJ3912;WSRTRRB;GRL:GHIKBYOZNOEG; Ordered By:Giamber, Karolyn Lesch;   · (1) LIPID PANEL, FASTING; Status:Active; Requested for:08May2018; Perform:[facility]; MYV:00SHE0993;GZRHKAH;SIWLHDU By:Giamber, Karolyn Lesch;   · Follow Up After Tests Complete Evaluation and Treatment  Follow-up  Status: Hold For -Scheduling  Requested for: 22YHY9671   Ordered; Hypertension; Ordered By: Porfirio Kate Performed:  Due: 55KLW3843    Discussion/Summary  Cardiology Discussion Summary Free Text Note Form [facility]:   From a cardiac standpoint she's been quite stable  Details are summarized in history of present illness above  As noted she had an evaluation an echo and stress test in June 2014  Her lipid levels are excellent as well  She is on a statin aspirin ACE inhibitor and beta blocker  She's also on Ranexa  Made no change in medication she'll have blood work in the spring 2015  seen May 5, 2015  She's been stable from a cardiac standpoint  We will see her next brain  I summarized her issues in the history of present illness note  Pacemaker is fine  Lipids are excellent   Diabetes is always a challenge  seen August 8, 2016  Diabetes is much better  Lab studies are noted above   seen December 6, 2017  The only changes a slight increase in beta-blocker  Diagnosis in the HPI  Very stable course  No angina  Chief Complaint  Chief Complaint Free Text Note Form: Patient presents for follow up  She offers no cardiac complaints today  History of Present Illness  HPI: Bypass surgery 1999stent in 2006 catheterization 2008  At that time the LIMA was open  Right coronary artery was open  She has diffuse disease in the circumflex and in the distal LAD distribution  She is on medical therapy  She may have mild angina  We discussed doing another stress test  The power of  will discuss that with the family said she is reluctant to any further procedures  done September 0262 she is diastolic dysfunction which he has well-preserved systolic functiondone in 0529 2011  His less than 50% stenosisLDL is 75  This was recently done i e  September 2013test on 2009 did not show any ischemiasurgery in the pastvery poorly controlled last hemoglobin A A1c is 11 5; she will see A  endocrinologist  patient was in the hospital in June 2014 he received a pacemaker for a notable 6 second pause in the history of syncope past history is summarized above  She basically had bypass surgery 1999 his stent in 2006  That was followed by catheter in 2008  The results of the catheterization 2008 is summarized in the first is followed the history of present illness above  she is in the hospital with syncope in June of 2014 she an echo and stress test  These both were essentially normal  Send extensive neurologic evaluation as well  Apparently she had a side reaction to one of her medication  Her LDL in June 2000 1450 to she's lost 31 pounds hemoglobin A is somewhat better than 11 5 she had in the past but is still not well controlled she is seeing an endocrinologist for that  seen May 5, 2015   Should bypass surgery 1999, stents in 2006, catheterization 2008, adequate left heart function on echo 2014, hyperlipidemia, pacemaker in June 2014  She has significant neuropathy  She uses a walker  April 2015 her bnp 43  Her LDL is 68 been asymptomatic from a cardiac standpoint  She is on Ranexa  I made no changes in her medication  seen August 8, 2016  She bypass surgery 99  Stent in 2006  Catheterization 2008  Hyperlipidemia  Shows pacemaker 2014  Her dementia seems somewhat progressive  has a new device to administer her insulin  Her hemoglobin A1c is come down from 11 57 8  Her LDL is 45  Her BNP P is normal  She's doing remarkably well  No change in medication  She is on a statin, ACE inhibitor, aspirin, and beta blocker  seen December 6, 2017diagnosis includes bypass surgery 1999 stent in 2006, catheterization 2008, hyperlipidemia, pacemaker 2014, dementia, preserved left ventricular function, and diabetic neuropathy had short burst of ventricular tachycardia pacer interrogation  Metoprolol to be increased to 37 5 in the morning and 25 in the p m  Review of Systems  Cardiology Female ROS:    Cardiac: rhythm problems-- and-- has swelling in the    Skin: No complaints of nonhealing sores or skin rash  Genitourinary: No complaints of recurrent urinary tract infections, frequent urination at night, difficult urination, blood in urine, kidney stones, loss of bladder control, kidney problems, denies any birth control or hormone replacement, is not post menopausal, not currently pregnant  Psychological: difficulty concentrating  General: changes in weight lbs  Respiratory: shortness of breath  Active Problems  Problems    1  3-vessel coronary artery disease (414 00) (I25 10)   2  Abnormal TSH (790 6) (R94 6)   3  Acute bronchitis (466 0) (J20 9)   4  Ankle pain, left (719 47) (M25 572)   5  Asthma (493 90) (J45 909)   6  Cellulitis of ankle (682 6) (L03 119)   7  Cellulitis of foot, left (682 7) (L03 116)   8   Dementia without behavioral disturbance (294 20) (F03 90)   9  Depression (311) (F32 9)   10  Dermatitis (692 9) (L30 9)   11  Diabetic retinopathy (250 50,362 01) (E11 319)   12  DM (diabetes mellitus), type 2, 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)  (D79 04,A26 61)   13  Dysuria (788 1) (R30 0)   14  Gastroesophageal reflux disease with hiatal hernia (609 01,854  3) (K21 9,K44 9)   15  Glaucoma (365 9) (H40 9)   16  Hyperlipidemia (272 4) (E78 5)   17  Hypertension (401 9) (I10)   18  Hypoglycemia associated with diabetes (250 80) (E11 649)   19  Ischemic Stroke (434 91)   20  Left ankle swelling (719 07) (M25 472)   21  Medicare annual wellness visit, initial (V70 0) (Z00 00)   22  Medicare annual wellness visit, subsequent (V70 0) (Z00 00)   23  Memory loss (780 93) (R41 3)   24  Need for immunization against influenza (V04 81) (Z23)   25  Need for prophylactic vaccination and inoculation against influenza (V04 81) (Z23)   26  Need for vaccination with 13-polyvalent pneumococcal conjugate vaccine (V03 82) (Z23)   27  Obesity (278 00) (E66 9)   28  Obstructive sleep apnea (327 23) (G47 33)   29  Oral thrush (112 0) (B37 0)   30  Osteoarthritis of knee (715 36) (M17 10)   31  Screening for osteoporosis (V82 81) (Z13 820)   32  Urine incontinence (788 30) (R32)    Past Medical History  Problems    1  History of Acute myocardial infarction (410 90) (I21 9)   2  History of Arthritis (V13 4)   3  History of Cardiac Cath Procedures Performed   4  History of Diabetes Mellitus (250 00)   5  History of hyperlipidemia (V12 29) (Z86 39)   6  History of hypertension (V12 59) (Z86 79)   7  History of transient cerebral ischemia (V12 54) (Z86 73)   8  History of Peptic Ulcer (V12 71)    Surgical History  Problems    1  History of Appendectomy   2  History of CABG   3  History of Cataract Surgery   4  History of Cath Stent Placement   5  History of  Section   6  History of Complete Colonoscopy   7  History of Dental Surgery   8  History of Diagnostic Esophagogastroduodenoscopy   9  History of Elbow Surgery   10  History of PTCA    Family History  Father    1  Family history of Cancer  Sister    2  Family history of Cancer   3  Family history of Cancer   4  Family history of Heart Disease (V17 49)   5  Family history of Thyroid Disorder (V18 19)  Family History    6  Family history of Colon Cancer (V16 0)   7  Family history of Diabetes Mellitus (V18 0)   8  Family history of Mitral Valve Disorder   9  Family history of Thyroid Cancer    Social History  Problems    · Denied: History of Alcohol Use (History)   · Caffeine Use   · Denied: History of Drug Use   · Marital History - Currently    · Never A Smoker  Social History Reviewed: The social history was reviewed and is unchanged  Current Meds   1  AmLODIPine Besylate 5 MG Oral Tablet; Take 1 tablet twice daily; Therapy: 48IZK9370 to (Evaluate:16Mds3122)  Requested for: 72PPO9495; Last Rx:14Nov2017 Ordered   2  Aspirin 325 MG Oral Tablet; Therapy: 54WGO9764 to Recorded   3  Atorvastatin Calcium 40 MG Oral Tablet; take 1 tablet by mouth every day; Therapy: 54QXF2001 to 21 )  Requested for: 44UEP9525; Last Rx:06Oct2017 Ordered   4  Benzonatate 200 MG Oral Capsule; TAKE 1 CAPSULE 3 TIMES DAILY AS NEEDED; Therapy: 12TDA6928 to (Evaluate:29Nov2017)  Requested for: 71VCR8717; Last Rx:24Nov2017 Ordered   5  Doxycycline Hyclate 100 MG Oral Tablet; TAKE 1 TABLET EVERY 12 HOURS DAILY; Therapy: 83DCC7570 to (Evaluate:07Yot4033)  Requested for: 87DKL9696; Last Rx:24Nov2017 Ordered   6  Isosorbide Mononitrate ER 60 MG Oral Tablet Extended Release 24 Hour; TAKE 1 TABLET BY MOUTH EVERY DAY AS DIRECTED; Therapy: 16ACU8204 to (Evaluate:27Jan2018)  Requested for: 92DQE5258; Last Rx:68Tay1283 Ordered   7  Losartan Potassium 100 MG Oral Tablet; TAKE 1 TABLET DAILY; Therapy: (Recorded:29Nov2017) to Recorded   8   Metoprolol Tartrate 25 MG Oral Tablet; TAKE 1 TABLET TWICE DAILY PRN DEPENDING ON BP; Therapy: (Recorded:29Nov2017) to Recorded   9  NovoLOG 100 UNIT/ML Subcutaneous Solution; Use up to 40units daily in V-go as directed  Requested for: 58CPQ6009; Last Rx:06Nov2017 Ordered   10  OneTouch Ultra Blue In Vitro Strip; CHECK BLOOD SSUGAR 3 TIMES A DAY; Therapy: 12VPK5856 to (ZUYLFIMU:29ZMG3864)  Requested for: 73Oif2030; Last  Rx:23Rwy7708 Ordered   11  Pantoprazole Sodium 40 MG Oral Tablet Delayed Release; take one tablet by mouth  every day; Therapy: 90HPW7433 to (Evaluate:27Jan2018)  Requested for: 34GJO0931; Last  Rx:95Jhy9544 Ordered   12  V-Go 20 KIT; use daily as directed; Therapy: 73NQG7857 to 0480 66 01 75)  Requested for: 85XYB9159; Last  Rx:81Umb0421 Ordered  Medication List Reviewed: The medication list was reviewed and updated today  Allergies  Medication    1  ACE Inhibitors   2  Darvon CAPS   3  Lisinopril TABS   4  Namenda   5  Penicillins   6  Prinivil TABS   7  Stadol SOLN   8  Thorazine SOLN   9  Zestril TABS  Non-Medication    10  Latex    Vitals  Vital Signs    Recorded: 06TJJ9108 09:36AM   Heart Rate 72   Systolic 583, RUE, Sitting   Diastolic 50, RUE, Sitting   BP CUFF SIZE Large   Height 5 ft 5 in   Weight 198 lb 3 oz   BMI Calculated 32 98   BSA Calculated 1 97   O2 Saturation 97       Physical Exam   Constitutional  General appearance: No acute distress, well appearing and well nourished  Pulmonary  Respiratory effort: No increased work of breathing or signs of respiratory distress  Cardiovascular  Palpation of heart: Normal PMI, no thrills  Auscultation of heart: Normal rate and rhythm, normal S1 and S2, no murmurs  Carotid pulses: Normal, 2+ bilaterally  Examination of extremities for edema and/or varicosities: Normal    Chest -  Sternum: Normal    Musculoskeletal Gait and station: Abnormal -- Uses a walker    Psychiatric - Orientation to person, place, and time: Normal -- Mood and affect: Abnormal       Health Management  DMII (diabetes mellitus, type 2)   *VB - Eye Exam; every 1 year; Last 04NSH0812; Next Due: 15QQW0771; Active  Health Maintenance   Medicare Annual Wellness Visit; every 1 year; Next Due: 10KCW4301;  Overdue    Future Appointments    Date/Time Provider Specialty Site   12/12/2017 10:00 AM Cardiology, 2021 Alex Chavez   02/27/2018 10:30 AM Sekou Montalvo [facility] Endocrinology [facility] ENDOCRINOLOGY       Signatures   Electronically signed by : JUAQUIN Figueroa ; Dec  6 2017  9:53AM EST                       (Author)

## 2017-12-21 ENCOUNTER — GENERIC CONVERSION - ENCOUNTER (OUTPATIENT)
Dept: OTHER | Facility: OTHER | Age: 82
End: 2017-12-21

## 2017-12-21 ENCOUNTER — ALLSCRIPTS OFFICE VISIT (OUTPATIENT)
Dept: OTHER | Facility: OTHER | Age: 82
End: 2017-12-21

## 2018-01-11 NOTE — RESULT NOTES
Discussion/Summary   TSH is stable with normal free T4  Continue to monitor over time  Verified Results  (1) T4, FREE 08FCO9548 08:26AM Michaele Runner Order Number: [id number]     Test Name Result Flag Reference   T4,FREE 0 97 ng/dL  0 76-1 46     (1) TSH 91CDQ7375 08:26AM Michaele Runner Order Number: [id number]     Test Name Result Flag Reference   TSH 3 980 uIU/mL H 0 358-3 740   - Patient Instructions: This bloodwork is non-fasting  Please drink two glasses of water morning of bloodwork  Patients undergoing fluorescein dye angiography may retain small amounts of fluorescein in the body for 48-72 hours post procedure  Samples containing fluorescein can produce falsely depressed TSH values  If the patient had this procedure,a specimen should be resubmitted post fluorescein clearance            The recommended reference ranges for TSH during pregnancy are as follows:  First trimester 0 1 to 2 5 uIU/mL  Second trimester  0 2 to 3 0 uIU/mL  Third trimester 0 3 to 3 0 uIU/m

## 2018-01-12 VITALS
RESPIRATION RATE: 16 BRPM | WEIGHT: 194.4 LBS | DIASTOLIC BLOOD PRESSURE: 94 MMHG | TEMPERATURE: 98.8 F | HEART RATE: 68 BPM | SYSTOLIC BLOOD PRESSURE: 162 MMHG | HEIGHT: 65 IN | BODY MASS INDEX: 32.39 KG/M2

## 2018-01-12 VITALS
HEIGHT: 65 IN | WEIGHT: 196.38 LBS | DIASTOLIC BLOOD PRESSURE: 78 MMHG | SYSTOLIC BLOOD PRESSURE: 120 MMHG | BODY MASS INDEX: 32.72 KG/M2 | HEART RATE: 80 BPM

## 2018-01-12 VITALS
HEART RATE: 68 BPM | HEIGHT: 65 IN | RESPIRATION RATE: 24 BRPM | SYSTOLIC BLOOD PRESSURE: 120 MMHG | OXYGEN SATURATION: 96 % | DIASTOLIC BLOOD PRESSURE: 78 MMHG | TEMPERATURE: 98.4 F

## 2018-01-12 NOTE — RESULT NOTES
Verified Results  DXA FOLLOW UP (NO CHARGE) 07KHC5511 09:07AM Shaista Khalil   TW Order Number: [id number]    - Patient Instructions: To schedule this appointment, please contact Central Scheduling at [phone]  TW Order Number: [id number]    - Patient Instructions: To schedule this appointment, please contact Central Scheduling at [phone]  Test Name Result Flag Reference   DXA FOLLOW UP (NO CHARGE) (Report)     CENTRAL DXA SCAN     CLINICAL HISTORY:  80year old post-menopausal  female risk factors include estrogen deficiency  TECHNIQUE: Bone densitometry was performed using a [employer]s W bone densitometer  Regions of interest appear properly placed  There are no obvious fractures or other confounding variables which could limit the study  Degenerative or    osteoarthritic changes appear to be present on the spine image falsely altering the T score result  COMPARISON: Baseline     RESULTS:    LUMBAR SPINE: L1-L4:   BMD 1 099 gm/cm2   T-score normal, 0 5   Z-score +3 1     LEFT TOTAL HIP:   BMD 0 808 gm/cm2   T-score below normal, -1 1   Z-score 0 9     LEFT FEMORAL NECK:   BMD 0 677 gm/cm2   T-score below normal, -1 5   Z-score 0 7     The forearm BMD is 0 688 T score normal, -0 1  The Z score is +3 0  IMPRESSION:   1  Based on the [facility] classification, this study identifies moderate femoral neck osteopenia and the patient is considered at low risk for fracture  2  A daily intake of calcium of at least 1200 mg and vitamin D, 800-1000 IU, as well as weight bearing and muscle strengthening exercise, fall prevention and avoidance of tobacco and excessive alcohol intake as basic preventive measures are recommended  3  Repeat DXA scan on the same equipment in 18-24 months as clinically indicated         The 10 year risk of hip fracture is 2 9%, with the 10 year risk of major osteoporotic fracture being 13%, as calculated by the [facility] fracture risk assessment tool (FRAX)  The current NOF guidelines recommend treating patients with FRAX 10 year risk score    of >3% for hip fracture and >20% for major osteoporotic fracture  WHO CLASSIFICATION:   Normal (a T-score of -1 0 or higher)   Low bone mineral density (a T-score of less than -1 0 but higher than -2 5)   Osteoporosis (a T-score of -2 5 or less)   Severe osteoporosis (a T-score of -2 5 or less with a fragility fracture)      Thank you for allowing us the opportunity to participate in your patient care  The expanded DEXA report will no longer be arriving in your mail  If you desire to view the full report please contact [address] [place] [address] or access the PACS system         Workstation performed: [id number]     Signed by:   Chava Mcleod MD   5/18/17

## 2018-01-12 NOTE — PROGRESS NOTES
Plan    1  DSMT/MNT Time Record; Status:Complete;   Done: 78PVT5726 03:21PM    Discussion/Summary    PATIENT EDUCATION RECORD   Taking Medication:   Discussed devices-syringe/pen  Method: Instruction  Response: [facility] [employer]   She uses V-Go 30 with Novolog  Placebo injection  Method: Instruction, Handout and Demonstration  Response: Verbalizes Instruction and Returns Demonstration  Discussed site selection and rotation of injections  Method: Instruction  Response: [facility]  Discussed medication storage  Method: Instruction  Response: [facility]  V-Go instruction provided per 's guidelines  Daughter return demonstrated filling, applying and removing device without difficulty  Patient had some dexterity issues with delivering boluses but may get better with practice  The patient reported she ran out of her 70/30 insulin last evening  No insulin today  Bg at 1:40PM was 365 mg/dl; at 3PM during visit 417  Conferred with Jose Enrique Santiago  Daughter filled and applied device on patient's abd  Pt gave 2 clicks (4 units) at 5:63JW per verbal order Ms Lord Jamie  She was instructed to give 3 clicks before each meal, and to test BG twice daily  Daughter plans to help her mother remove and apply a new device before supper each day  Sample Bard barrier wipes given for reported skin sensitivity to adhesives  Chief Complaint  68 yr-old T2DM patient accompanied by daughter, for V-Go instruction  Daughter is primary learner  V-Go 30 and vial of Novolog brought to appt        Future Appointments    Date/Time Provider Specialty Site   10/24/2016 10:00 AM Cardiology, 2021 Alex Benitakane Chavez   08/03/2016 11:00 AM Cardiology, Device Remote  [address]   10/19/2016 02:20 PM MD Cortes Nicoel   07/20/2016 01:15 PM Melanie Ortega Endocrinology [facility] ENDOCRINOLOGY   06/08/2016 10:00 JUAQUIN Oneal   Cardiology [facility] [address]     Signatures   Electronically signed by : Augusto Fox, ; May 17 2016  3:31PM EST                       (Author)    Electronically signed by : JUAQUIN Ghotra ; May 18 2016 10:26AM EST

## 2018-01-12 NOTE — RESULT NOTES
Message   A1c 10 8 not at goal, referral made for V-Go system  Verified Results  (1) HEMOGLOBIN A1C 89Csm8486 09:15AM Dorita Pagan    Order Number: [id number]      5 7-6 4% impaired fasting glucose  >=6 5% diagnosis of diabetes    Falsely low levels are seen in conditions linked to short RBC life span-  hemolytic anemia, and splenomegaly  Falsely elevated levels are seen in situations where there is an increased production of RBC- receipt of erythropoietin or blood transfusions  Adopted from ADA-Clinical Practice Recommendations     Test Name Result Flag Reference   HEMOGLOBIN A1C 10 8 % H 4 0-5 6   EST  AVG   GLUCOSE 263 mg/dl         Signatures   Electronically signed by : Nahed Ramsay, ; Apr 21 2016  5:46PM EST                       (Author)

## 2018-01-13 VITALS
WEIGHT: 200 LBS | BODY MASS INDEX: 33.32 KG/M2 | SYSTOLIC BLOOD PRESSURE: 124 MMHG | HEIGHT: 65 IN | HEART RATE: 72 BPM | DIASTOLIC BLOOD PRESSURE: 60 MMHG

## 2018-01-13 NOTE — RESULT NOTES
Message   A1c 7 8 has improved greatly and close to goal, send in a log, please make sure to eat 3 meals a day  TSH slightly elevated with normal free T4, repeat TSH, free T4 and TPO in 4 to 6 weeks  Verified Results  (1) COMPREHENSIVE METABOLIC PANEL 96RBV0830 97:66PP Kassandra tocario    Order Number: [id number]  TW Order Number: [id number] Order Number: [id number] Order Number: [id number] Patient Instructions: This bloodwork is non-fasting  Please drink two glasses of water morning of bloodwork  Test Name Result Flag Reference   GLUCOSE,RANDM 174 mg/dL H    If the patient is fasting, the ADA then defines impaired fasting glucose as > 100 mg/dL and diabetes as > or equal to 123 mg/dL  SODIUM 139 mmol/L  136-145   POTASSIUM 4 3 mmol/L  3 5-5 3   CHLORIDE 104 mmol/L  100-108   CARBON DIOXIDE 29 mmol/L  21-32   ANION GAP (CALC) 6 mmol/L  4-13   BLOOD UREA NITROGEN 38 mg/dL H 5-25   CREATININE 1 08 mg/dL  0 60-1 30   Standardized to IDMS reference method   CALCIUM 8 6 mg/dL  8 3-10 1   BILI, TOTAL 0 39 mg/dL  0 20-1 00   ALK PHOSPHATAS 81 U/L     ALT (SGPT) 35 U/L  12-78   AST(SGOT) 24 U/L  5-45   ALBUMIN 3 2 g/dL L 3 5-5 0   TOTAL PROTEIN 5 9 g/dL L 6 4-8 2   eGFR Non-African American 49 1 ml/min/1 73sq [facility] [employer] Disease Education Program recommendations are as follows:  GFR calculation is accurate only with a steady state creatinine  Chronic Kidney disease less than 60 ml/min/1 73 sq  meters  Kidney failure less than 15 ml/min/1 73 sq  meters  (1) HEMOGLOBIN A1C 20Zyi1196 09:19AM [facility]    Order Number: [id number]  TW Order Number: [id number]     Test Name Result Flag Reference   HEMOGLOBIN A1C 7 8 % H 4 2-6 3   EST  AVG   GLUCOSE 177 mg/dl       (1) TSH 05IRI5726 09:19AM Kassandra CAMPBELL Order Number: [id number]  TW Order Number: [id number] Order Number: [id number] Order Number: [id number] Patient Instructions: This bloodwork is non-fasting  Please drink two glasses of water morning of bloodwork  Test Name Result Flag Reference   TSH 4 210 uIU/mL H 0 358-3 740   - Patient Instructions: This bloodwork is non-fasting  Please drink two glasses of water morning of bloodwork  Patients undergoing fluorescein dye angiography may retain small amounts of fluorescein in the body for 48-72 hours post procedure  Samples containing fluorescein can produce falsely depressed TSH values  If the patient had this procedure,a specimen should be resubmitted post fluorescein clearance  The recommended reference ranges for TSH during pregnancy are as follows:  First trimester 0 1 to 2 5 uIU/mL  Second trimester  0 2 to 3 0 uIU/mL  Third trimester 0 3 to 3 0 uIU/m     (1) T4, FREE 03Gwt0271 09:19AM Gurpreet Shook    Order Number: [id number]  TW Order Number: [id number] Order Number: [id number] Order Number: [id number] Patient Instructions: This bloodwork is non-fasting  Please drink two glasses of water morning of bloodwork       Test Name Result Flag Reference   T4,FREE 0 88 ng/dL  0 76-1 46

## 2018-01-13 NOTE — RESULT NOTES
Message   TSH is slightly elevated with normal free T4  Follow with another TSH and free T4 in 8 weeks  Verified Results  (1) TSH 59OZP0806 09:00AM Winston Promeryl Order Number: [id number]     Test Name Result Flag Reference   TSH 4 070 uIU/mL H 0 358-3 740   - Patient Instructions: This bloodwork is non-fasting  Please drink two glasses of water morning of bloodwork  - Patient Instructions: This bloodwork is non-fasting  Please drink two glasses of water morning of bloodwork  Patients undergoing fluorescein dye angiography may retain small amounts of fluorescein in the body for 48-72 hours post procedure  Samples containing fluorescein can produce falsely depressed TSH values  If the patient had this procedure,a specimen should be resubmitted post fluorescein clearance  The recommended reference ranges for TSH during pregnancy are as follows:  First trimester 0 1 to 2 5 uIU/mL  Second trimester  0 2 to 3 0 uIU/mL  Third trimester 0 3 to 3 0 uIU/m     (1) T4, FREE 43WJU3754 09:00AM Winston Perez Order Number: [id number]     Test Name Result Flag Reference   T4,FREE 1 00 ng/dL  0 76-1 46   - Patient Instructions: This bloodwork is non-fasting  Please drink two glasses of water morning of bloodwork

## 2018-01-14 VITALS
DIASTOLIC BLOOD PRESSURE: 60 MMHG | SYSTOLIC BLOOD PRESSURE: 132 MMHG | BODY MASS INDEX: 32.88 KG/M2 | HEART RATE: 88 BPM | WEIGHT: 197.56 LBS

## 2018-01-14 VITALS
DIASTOLIC BLOOD PRESSURE: 80 MMHG | HEIGHT: 65 IN | WEIGHT: 195.56 LBS | BODY MASS INDEX: 32.58 KG/M2 | SYSTOLIC BLOOD PRESSURE: 140 MMHG | HEART RATE: 68 BPM

## 2018-01-15 NOTE — PROGRESS NOTES
Assessment    1  Medicare annual wellness visit, subsequent (V70 0) (Z00 00)   2  Hypertension (401 9) (I10)   3  Hyperlipidemia (272 4) (E78 5)   4  DM (diabetes mellitus), type 2, uncontrolled w/ophthalmic complication (149 32)   (E11 39,E11 65)   5  Asthma (493 90) (J45 909)   6  Obesity (278 00) (E66 9)    Plan  DM (diabetes mellitus), type 2, uncontrolled w/ophthalmic complication    · NovoLOG 100 UNIT/ML Subcutaneous Solution; Use up to 40units daily in  V-go as directed  DMII (diabetes mellitus, type 2)    · V-Go 20 KIT; use daily as directed  Gastroesophageal reflux disease with hiatal hernia    · Pantoprazole Sodium 40 MG Oral Tablet Delayed Release; take one tablet by  mouth every day  Hyperlipidemia    · AmLODIPine Besylate 5 MG Oral Tablet; Take 1 tablet twice daily   · Atorvastatin Calcium 40 MG Oral Tablet; take 1 tablet by mouth every day  Hypertension    · Isosorbide Mononitrate ER 60 MG Oral Tablet Extended Release 24 Hour;  TAKE 1 TABLET BY MOUTH EVERY DAY AS DIRECTED  Medicare annual wellness visit, subsequent    · *VB - Fall Risk Assessment  (Dx Z13 89 Screen for Neurologic Disorder);  Status:Complete;   Done: 05DSB5882 05:20PM   · *VB-Depression Screening; Status:Complete;   Done: 05KNO9730 05:20PM  Obesity    · Keep a diary of when and what you eat ; Status:Complete;   Done: 24MYE6821   · Some eating tips that can help you lose weight ; Status:Complete;   Done: 80HRK4339   · We recommend that you bring your body mass index down to 26 ; Status:Complete;    Done: 26SLZ2760  Unlinked    · Aspirin 325 MG Oral Tablet   · Losartan Potassium 100 MG Oral Tablet; TAKE 1 TABLET DAILY   · Metoprolol Tartrate 25 MG Oral Tablet; TAKE 1 TABLET TWICE DAILY PRN  DEPENDING ON BP    Discussion/Summary    MMSE 22/30 today  Give package of "advance directive" today  Impression: Subsequent Annual Wellness Visit       Cardiovascular screening and counseling: the risks and benefits of screening were discussed and screening is current  Diabetes screening and counseling: the risks and benefits of screening were discussed and screening is current  Immunizations: the risks and benefits of influenza vaccination were discussed with the patient, influenza vaccine is up to date this year, the risks and benefits of pneumococcal vaccination were discussed with the patient and the lifetime pneumococcal vaccine has been completed  Advance Directive Planning: not complete, paperwork and instructions were given to the patient  Patient Discussion: plan discussed with the patient  Chief Complaint  Patient presents for an annual wellness visit      History of Present Illness  Welcome to Medicare and Wellness Visits: The patient is being seen for the subsequent annual wellness visit  Medicare Screening and Risk Factors   Hospitalizations: no previous hospitalizations  Once per lifetime medicare screening tests: AAA screening [place] has not yet been done  Medicare Screening Tests Risk Questions   Osteoporosis risk assessment:  and female gender  Drug and Alcohol Use: The patient has never smoked cigarettes and has never used smokeless tobacco  The patient reports never drinking alcohol  She has never used illicit drugs  Diet and Physical Activity: Current diet includes well balanced meals  Exercise: walking  Mood Disorder and Cognitive Impairment Screening:   Depression screening score was 0     negative for symptoms  She denies feeling down, depressed, or hopeless over the past two weeks  She denies feeling little interest or pleasure in doing things over the past two weeks  Functional Ability/Level of Safety: Hearing is normal bilaterally  She does not use a hearing aid  The patient is currently able to participate in social activities with limitations and unable to drive, but able to do activities of daily living without limitations   Activities of daily living details: transportation help needed, needs help shopping, meal preparation help needed, needs help doing housework, needs help doing laundry, needs help managing medications and needs help managing money  Fall risk factors: The patient fell 1 times in the past 12 months  Injury History: urinary incontinence  Advance Directives: Advance directives: durable power of  for health care directives  Co-Managers and Medical Equipment/Suppliers: See Patient Care Team      Patient Care Team    Care Team Member Role Specialty Office Number   Shady Alfredo MD  Family Medicine [phone]   [facility] [address]  Cardiology [phone]   Anai [facility]LE[facility]  Cardiology [phone]   Mark Goins MD Specialist Neurology [phone]   Shirley [facility] Specialist Neurology [phone]   [address] [facility] Specialist Nurse Practitioner [phone]   Cardiology, [facility]        Review of Systems    Constitutional: negative  ENT: negative  Cardiovascular: negative  Respiratory: negative  Gastrointestinal: negative  Musculoskeletal: negative  Active Problems    1  3-vessel coronary artery disease (414 00) (I25 10)   2  Abnormal TSH (790 6) (R94 6)   3  Acute bronchitis (466 0) (J20 9)   4  Ankle pain, left (719 47) (M25 572)   5  Asthma (493 90) (J45 909)   6  Cellulitis of ankle (682 6) (L03 119)   7  Cellulitis of foot, left (682 7) (L03 116)   8  Dementia without behavioral disturbance (294 20) (F03 90)   9  Depression (311) (F32 9)   10  Dermatitis (692 9) (L30 9)   11  Diabetic retinopathy (250 50,362 01) (E11 319)   12  DM (diabetes mellitus), type 2, uncontrolled w/ophthalmic complication (192 85)    (E11 39,E11 65)   13  Dysuria (788 1) (R30 0)   14  Gastroesophageal reflux disease with hiatal hernia (238 43,841  3) (K21 9,K44 9)   15  Glaucoma (365 9) (H40 9)   16  Hyperlipidemia (272 4) (E78 5)   17  Hypertension (401 9) (I10)   18   Hypoglycemia associated with diabetes (250 80) (E11 649) 19  Ischemic Stroke (434 91)   20  Left ankle swelling (719 07) (M25 472)   21  Medicare annual wellness visit, initial (V70 0) (Z00 00)   22  Memory loss (780 93) (R41 3)   23  Need for immunization against influenza (V04 81) (Z23)   24  Need for prophylactic vaccination and inoculation against influenza (V04 81) (Z23)   25  Need for vaccination with 13-polyvalent pneumococcal conjugate vaccine (V03 82) (Z23)   26  Obesity (278 00) (E66 9)   27  Obstructive sleep apnea (327 23) (G47 33)   28  Oral thrush (112 0) (B37 0)   29  Osteoarthritis of knee (715 36) (M17 10)   30  Screening for osteoporosis (V82 81) (Z13 820)   31   Urine incontinence (788 30) (R32)    Past Medical History    · History of Acute myocardial infarction (410 90) (I21 9)   · History of Arthritis (V13 4)   · History of Cardiac Cath Procedures Performed   · History of Diabetes Mellitus (250 00)   · History of hyperlipidemia (V12 29) (Z86 39)   · History of hypertension (V12 59) (Z86 79)   · History of transient cerebral ischemia (V12 54) (Z86 73)   · History of Peptic Ulcer (V12 71)    Surgical History    · History of Appendectomy   · History of CABG   · History of Cataract Surgery   · History of Cath Stent Placement   · History of  Section   · History of Complete Colonoscopy   · History of Dental Surgery   · History of Diagnostic Esophagogastroduodenoscopy   · History of Elbow Surgery   · History of PTCA    Family History  Father    · Family history of Cancer  Sister    · Family history of Cancer   · Family history of Cancer   · Family history of Heart Disease (V17 49)   · Family history of Thyroid Disorder (V18 19)  Family History    · Family history of Colon Cancer (V16 0)   · Family history of Diabetes Mellitus (V18 0)   · Family history of Mitral Valve Disorder   · Family history of Thyroid Cancer    Social History    · Denied: History of Alcohol Use (History)   · Caffeine Use   · Denied: History of Drug Use   · Marital History - Currently    · Never A Smoker    Current Meds   1  AmLODIPine Besylate 5 MG Oral Tablet; Take 1 tablet twice daily; Therapy: 87DNL0854 to (Evaluate:13May2018)  Requested for: 33YNZ0527; Last   Rx:14Nov2017 Ordered   2  Aspirin 325 MG Oral Tablet; Therapy: 07QOL3425 to Recorded   3  Atorvastatin Calcium 40 MG Oral Tablet; take 1 tablet by mouth every day; Therapy: 52HQG9015 to (Evaluate:04Apr2018)  Requested for: 77STA5429; Last   Rx:06Oct2017 Ordered   4  Benzonatate 200 MG Oral Capsule; TAKE 1 CAPSULE 3 TIMES DAILY AS NEEDED; Therapy: 99BZO9519 to (Evaluate:29Nov2017)  Requested for: 34WLZ3937; Last   Rx:24Nov2017 Ordered   5  Doxycycline Hyclate 100 MG Oral Tablet; TAKE 1 TABLET EVERY 12 HOURS DAILY; Therapy: 47QBD3863 to (Evaluate:04Dec2017)  Requested for: 01PSY3643; Last   Rx:24Nov2017 Ordered   6  Isosorbide Mononitrate ER 60 MG Oral Tablet Extended Release 24 Hour; TAKE 1   TABLET BY MOUTH EVERY DAY AS DIRECTED; Therapy: 39WBE4369 to (Evaluate:27Jan2018)  Requested for: 72RUY1854; Last   Rx:16Wgr6136 Ordered   7  Losartan Potassium 100 MG Oral Tablet; TAKE 1 TABLET DAILY; Therapy: (Recorded:29Nov2017) to Recorded   8  Metoprolol Tartrate 25 MG Oral Tablet; TAKE 1 TABLET TWICE DAILY PRN DEPENDING   ON BP;   Therapy: (Recorded:29Nov2017) to Recorded   9  NovoLOG 100 UNIT/ML Subcutaneous Solution; Use up to 40units daily in V-go as   directed  Requested for: 64INL6822; Last Rx:06Nov2017 Ordered   10  OneTouch Ultra Blue In Vitro Strip; CHECK BLOOD SSUGAR 3 TIMES A DAY; Therapy: 29JTH6778 to (ILNTJRLI:84CEN7074)  Requested for: 56Drc6638; Last    Rx:52Ntd6960 Ordered   11  Pantoprazole Sodium 40 MG Oral Tablet Delayed Release; take one tablet by mouth    every day; Therapy: 82UHZ5667 to (AFJZGXXY:13WUV2658)  Requested for: 39AUL9456; Last    Rx:06Tcl7124 Ordered   12  V-Go 20 KIT; use daily as directed;     Therapy: 23MYW9187 to [phone])  Requested for: 42XKZ5025; Last Rx: 54RLT6324 Ordered    Allergies    1  ACE Inhibitors   2  Darvon CAPS   3  Lisinopril TABS   4  Namenda   5  Penicillins   6  Prinivil TABS   7  Stadol SOLN   8  Thorazine SOLN   9  Zestril TABS    10  Latex    Immunizations   ** Printed in Appendix #1 below  Vitals  Signs    Temperature: 98 4 F, Tympanic  Heart Rate: 68, L Radial  Pulse Quality: Normal, L Radial  Respiration Quality: Wheezing  Respiration: 24  Systolic: 818, LUE, Sitting  Diastolic: 78, LUE, Sitting  Height: 5 ft 5 in  Patient Refused Weight: Yes  Weight Unobtainable: Yes  O2 Saturation: 96  Pain Scale: 1-5  Patient Refused Weight: Yes    Results/Data  *VB - Fall Risk Assessment  (Dx Z13 89 Screen for Neurologic Disorder) 98GAP3139 05:20PM  [place]     Test Name Result Flag Reference   Falls Risk      No falls in the past year     *VB-Depression Screening 16JYZ5824 05:20PM  [place]     Test Name Result Flag Reference   Depression Scale Result      Depression Screen - Negative For Symptoms       Health Management  DMII (diabetes mellitus, type 2)   *VB - Eye Exam; every 1 year; Last 39ZUD8503; Next Due: 87KVL6993; Active  Health Maintenance   Medicare Annual Wellness Visit; every 1 year; Next Due: 99QRE7862; Overdue    Future Appointments    Date/Time Provider Specialty Site   2017 10:00 AM Cardiology,  Alex Chavez   2017 09:30 AM JUAQUIN Simons  Cardiology [facility]   2018 10:30 AM Gerber Lyons, [facility] Endocr[facility] ENDOCRINOLOGY     Signatures   Electronically signed by :  Abel Oliva MD; 2017  5:25PM EST                       (Author)    Appendix #1     Patient: Kenroy Gore ; : 1935; MRN: [id number]      1 2 3 4 5 6    Influenza  17-Dec-2012 23-Sep-2013 04-Nov-2014 03-Nov-2015 20-Oct-2016 27-Sep-2017    PCV  20-Oct-2016         PPSV  1997 2014        Td/DT

## 2018-01-15 NOTE — RESULT NOTES
Message   Normal     Verified Results  (1) TSH 53DVO3185 09:59AM [facility] Order Number: [id number]  TW Order Number: [id number]     Test Name Result Flag Reference   TSH 2 750 uIU/mL  0 358-3 740   - Patient Instructions: This bloodwork is non-fasting  Please drink two glasses of water morning of bloodwork  - Patient Instructions: This bloodwork is non-fasting  Please drink two glasses of water morning of bloodwork  - Patient Instructions: This bloodwork is non-fasting  Please drink two glasses of water morning of bloodwork  - Patient Instructions: This bloodwork is non-fasting  Please drink two glasses of water morning of bloodwork  Patients undergoing fluorescein dye angiography may retain small amounts of fluorescein in the body for 48-72 hours post procedure  Samples containing fluorescein can produce falsely depressed TSH values  If the patient had this procedure,a specimen should be resubmitted post fluorescein clearance  The recommended reference ranges for TSH during pregnancy are as follows:  First trimester 0 1 to 2 5 uIU/mL  Second trimester  0 2 to 3 0 uIU/mL  Third trimester 0 3 to 3 0 uIU/m     (1) T4, FREE 57PNK1430 09:59AM [facility] Order Number: [id number]  TW Order Number: [id number]     Test Name Result Flag Reference   T4,FREE 1 12 ng/dL  0 76-1 46   - Patient Instructions: This bloodwork is non-fasting  Please drink two glasses of water morning of bloodwork  - Patient Instructions: This bloodwork is non-fasting  Please drink two glasses of water morning of bloodwork       (1) THYROID MICROSOMAL ANTIBODY 06Oct2016 09:59AM Cellilia Drew     Test Name Result Flag Reference   THY MICROSOM AB 9 IU/mL  0 - 34   Performed at:  [address]  [id number]  : Tayler Glez MD, Phone:  [phone]

## 2018-01-15 NOTE — MISCELLANEOUS
Provider Comments  Provider Comments:   PATIENT NO SHOWED FOR 10- APPT  Signatures   Electronically signed by :  Marnie Ortiz [address] [place] [address] Gerri; Oct 20 2016 10:07AM EST                       (Author)

## 2018-01-16 NOTE — RESULT NOTES
Verified Results  (1) HEMOGLOBIN A1C 07Gwt6875 08:25AM [facility] Order Number: [id number]     Test Name Result Flag Reference   HEMOGLOBIN A1C 7 5 % H 4 2-6 3   EST  AVG  GLUCOSE 169 mg/dl       (1) COMPREHENSIVE METABOLIC PANEL 47HYX3855 83:21IO [facility] Order Number: [id number]     Test Name Result Flag Reference   SODIUM 140 mmol/L  136-145   POTASSIUM 4 2 mmol/L  3 5-5 3   CHLORIDE 104 mmol/L  100-108   CARBON DIOXIDE 31 mmol/L  21-32   ANION GAP (CALC) 5 mmol/L  4-13   BLOOD UREA NITROGEN 34 mg/dL H 5-25   CREATININE 1 01 mg/dL  0 60-1 30   Standardized to IDMS reference method   CALCIUM 9 8 mg/dL  8 3-10 1   BILI, TOTAL 0 45 mg/dL  0 20-1 00   ALK PHOSPHATAS 92 U/L     ALT (SGPT) 27 U/L  12-78   Specimen collection should occur prior to Sulfasalazine and/or Sulfapyridine administration due to the potential for falsely depressed results  AST(SGOT) 20 U/L  5-45   Specimen collection should occur prior to Sulfasalazine administration due to the potential for falsely depressed results  ALBUMIN 3 5 g/dL  3 5-5 0   TOTAL PROTEIN 7 3 g/dL  6 4-8 2   eGFR 53 ml/min/1 73sq m     [facility] Disease Education Program recommendations are as follows:  GFR calculation is accurate only with a steady state creatinine  Chronic Kidney disease less than 60 ml/min/1 73 sq  meters  Kidney failure less than 15 ml/min/1 73 sq  meters  GLUCOSE FASTING 108 mg/dL H 65-99   Specimen collection should occur prior to Sulfasalazine administration due to the potential for falsely depressed results  Specimen collection should occur prior to Sulfapyridine administration due to the potential for falsely elevated results

## 2018-01-17 NOTE — MISCELLANEOUS
Provider Comments  Provider Comments:   Pt's 1st no show with Neurology  LMOM for pt's daughter Jane Lowe)  Sending out 1st no show letter        Signatures   Electronically signed by : Gabi Powell, [facility]; Mar  1 2016  8:54AM EST                       (Author)    Electronically signed by : Salvador Albert MD; Mar  1 2016  9:06AM EST                       (Co-participant)

## 2018-01-22 VITALS
OXYGEN SATURATION: 97 % | WEIGHT: 198.19 LBS | DIASTOLIC BLOOD PRESSURE: 50 MMHG | SYSTOLIC BLOOD PRESSURE: 120 MMHG | BODY MASS INDEX: 33.02 KG/M2 | HEIGHT: 65 IN | HEART RATE: 72 BPM

## 2018-01-23 NOTE — CONSULTS
Chief Complaint  Patient presents for follow up  She offers no cardiac complaints today  History of Present Illness  Bypass surgery 1999  Cardiac stent in 2006 catheterization 2008  At that time the LIMA was open  Right coronary artery was open  She has diffuse disease in the circumflex and in the distal LAD distribution  She is on medical therapy  She may have mild angina  We discussed doing another stress test  The power of  will discuss that with the family said she is reluctant to any further procedures  Echocardiogram done September 1876 she is diastolic dysfunction which he has well-preserved systolic function  Carotids done in 2009 2011  His less than 50% stenosis  Her LDL is 75  This was recently done i e  September 2013  Stress test on 2009 did not show any ischemia  Knee surgery in the past  Diabetes very poorly controlled last hemoglobin A A1c is 11 5; she will see A  endocrinologist    The patient was in the hospital in June 2014 he received a pacemaker for a notable 6 second pause in the history of syncope past history is summarized above  She basically had bypass surgery 1999 his stent in 2006  That was followed by catheter in 2008  The results of the catheterization 2008 is summarized in the first is followed the history of present illness above  But she is in the hospital with syncope in June of 2014 she an echo and stress test  These both were essentially normal  Send extensive neurologic evaluation as well  Apparently she had a side reaction to one of her medication  Her LDL in June 2000 1450 to she's lost 31 pounds  Her hemoglobin A is somewhat better than 11 5 she had in the past but is still not well controlled she is seeing an endocrinologist for that    Patient seen May 5, 2015  Should bypass surgery 1999, stents in 2006, catheterization 2008, adequate left heart function on echo 2014, hyperlipidemia, pacemaker in June 2014  She has significant neuropathy   She uses a walker  In April 2015 her bnp 43  Her LDL is 68  She's been asymptomatic from a cardiac standpoint  She is on Ranexa  I made no changes in her medication  Patient seen August 8, 2016  She bypass surgery 99  Stent in 2006  Catheterization 2008  Hyperlipidemia  Shows pacemaker 2014  Her dementia seems somewhat progressive  She has a new device to administer her insulin  Her hemoglobin A1c is come down from 11 57 8  Her LDL is 45  Her BNP P is normal  She's doing remarkably well  No change in medication  She is on a statin, ACE inhibitor, aspirin, and beta blocker  Patient seen December 6, 2017  Her diagnosis includes bypass surgery 1999 stent in 2006, catheterization 2008, hyperlipidemia, pacemaker 2014, dementia, preserved left ventricular function, and diabetic neuropathy  She had short burst of ventricular tachycardia pacer interrogation  Metoprolol to be increased to 37 5 in the morning and 25 in the p m  Review of Systems      Cardiac: rhythm problems and has swelling in the     Skin: No complaints of nonhealing sores or skin rash  Genitourinary: No complaints of recurrent urinary tract infections, frequent urination at night, difficult urination, blood in urine, kidney stones, loss of bladder control, kidney problems, denies any birth control or hormone replacement, is not post menopausal, not currently pregnant  Psychological: difficulty concentrating  General: changes in weight lbs  Respiratory: shortness of breath  Active Problems    1  3-vessel coronary artery disease (414 00) (I25 10)   2  Abnormal TSH (790 6) (R94 6)   3  Acute bronchitis (466 0) (J20 9)   4  Ankle pain, left (719 47) (M25 572)   5  Asthma (493 90) (J45 909)   6  Cellulitis of ankle (682 6) (L03 119)   7  Cellulitis of foot, left (682 7) (L03 116)   8  Dementia without behavioral disturbance (294 20) (F03 90)   9  Depression (311) (F32 9)   10  Dermatitis (692 9) (L30 9)   11   Diabetic retinopathy (250 50,362 01) (E11 319)   12  DM (diabetes mellitus), type 2, 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)    (E11 39,E11 65)   13  Dysuria (788 1) (R30 0)   14  Gastroesophageal reflux disease with hiatal hernia (081 75,952  3) (K21 9,K44 9)   15  Glaucoma (365 9) (H40 9)   16  Hyperlipidemia (272 4) (E78 5)   17  Hypertension (401 9) (I10)   18  Hypoglycemia associated with diabetes (250 80) (E11 649)   19  Ischemic Stroke (434 91)   20  Left ankle swelling (719 07) (M25 472)   21  Medicare annual wellness visit, initial (V70 0) (Z00 00)   22  Medicare annual wellness visit, subsequent (V70 0) (Z00 00)   23  Memory loss (780 93) (R41 3)   24  Need for immunization against influenza (V04 81) (Z23)   25  Need for prophylactic vaccination and inoculation against influenza (V04 81) (Z23)   26  Need for vaccination with 13-polyvalent pneumococcal conjugate vaccine (V03 82) (Z23)   27  Obesity (278 00) (E66 9)   28  Obstructive sleep apnea (327 23) (G47 33)   29  Oral thrush (112 0) (B37 0)   30  Osteoarthritis of knee (715 36) (M17 10)   31  Screening for osteoporosis (V82 81) (Z13 820)   32   Urine incontinence (788 30) (R32)    Past Medical History    · History of Acute myocardial infarction (410 90) (I21 9)   · History of Arthritis (V13 4)   · History of Cardiac Cath Procedures Performed   · History of Diabetes Mellitus (250 00)   · History of hyperlipidemia (V12 29) (Z86 39)   · History of hypertension (V12 59) (Z86 79)   · History of transient cerebral ischemia (V12 54) (Z86 73)   · History of Peptic Ulcer (V12 71)    Surgical History    · History of Appendectomy   · History of CABG   · History of Cataract Surgery   · History of Cath Stent Placement   · History of  Section   · History of Complete Colonoscopy   · History of Dental Surgery   · History of Diagnostic Esophagogastroduodenoscopy   · History of Elbow Surgery   · History of PTCA    Family History    · Family history of Cancer    · Family history of Cancer   · Family history of Cancer   · Family history of Heart Disease (V17 49)   · Family history of Thyroid Disorder (V18 19)    · Family history of Colon Cancer (V16 0)   · Family history of Diabetes Mellitus (V18 0)   · Family history of Mitral Valve Disorder   · Family history of Thyroid Cancer    Social History    · Denied: History of Alcohol Use (History)   · Caffeine Use   · Denied: History of Drug Use   · Marital History - Currently    · Never A Smoker  The social history was reviewed and is unchanged  Current Meds   1  AmLODIPine Besylate 5 MG Oral Tablet; Take 1 tablet twice daily; Therapy: 80FYI3835 to (Evaluate:67Vsk5268)  Requested for: 52XDI9182; Last   Rx:14Nov2017 Ordered   2  Aspirin 325 MG Oral Tablet; Therapy: 71NIA7405 to Recorded   3  Atorvastatin Calcium 40 MG Oral Tablet; take 1 tablet by mouth every day; Therapy: 62BWQ0518 to 05 06 52 16 25)  Requested for: 53DRU3860; Last   Rx:06Oct2017 Ordered   4  Benzonatate 200 MG Oral Capsule; TAKE 1 CAPSULE 3 TIMES DAILY AS NEEDED; Therapy: 65CVU1223 to (Evaluate:29Nov2017)  Requested for: 51YLX0877; Last   Rx:24Nov2017 Ordered   5  Doxycycline Hyclate 100 MG Oral Tablet; TAKE 1 TABLET EVERY 12 HOURS DAILY; Therapy: 10IHH7432 to (Evaluate:82Pki6687)  Requested for: 44XYB6486; Last   Rx:24Nov2017 Ordered   6  Isosorbide Mononitrate ER 60 MG Oral Tablet Extended Release 24 Hour; TAKE 1   TABLET BY MOUTH EVERY DAY AS DIRECTED; Therapy: 64YHC5623 to (Evaluate:27Jan2018)  Requested for: 52AJY6546; Last   Rx:42Gbb5445 Ordered   7  Losartan Potassium 100 MG Oral Tablet; TAKE 1 TABLET DAILY; Therapy: (Recorded:29Nov2017) to Recorded   8  Metoprolol Tartrate 25 MG Oral Tablet; TAKE 1 TABLET TWICE DAILY PRN DEPENDING   ON BP;   Therapy: (Recorded:29Nov2017) to Recorded   9  NovoLOG 100 UNIT/ML Subcutaneous Solution; Use up to 40units daily in V-go as   directed  Requested for: 27PYX6169; Last Rx:06Nov2017 Ordered   10  OneTouch Ultra Blue In Vitro Strip; CHECK BLOOD SSUGAR 3 TIMES A DAY; Therapy: 68AUV0885 to (WYCYTGAV:54CMH0536)  Requested for: 49Bvg1954; Last    Rx:43Fml2444 Ordered   11  Pantoprazole Sodium 40 MG Oral Tablet Delayed Release; take one tablet by mouth    every day; Therapy: 51BQS2390 to (Evaluate:27Jan2018)  Requested for: 19UTA8369; Last    Rx:88Hcs6435 Ordered   12  V-Go 20 KIT; use daily as directed; Therapy: 63HDC9173 to (21 )  Requested for: 49IFW5419; Last    Rx:14Bkp7568 Ordered    The medication list was reviewed and updated today  Allergies    1  ACE Inhibitors   2  Darvon CAPS   3  Lisinopril TABS   4  Namenda   5  Penicillins   6  Prinivil TABS   7  Stadol SOLN   8  Thorazine SOLN   9  Zestril TABS    10  Latex    Vitals   Recorded: 67SHK8020 09:36AM   Heart Rate 72   Systolic 406, RUE, Sitting   Diastolic 50, RUE, Sitting   BP CUFF SIZE Large   Height 5 ft 5 in   Weight 198 lb 3 oz   BMI Calculated 32 98   BSA Calculated 1 97   O2 Saturation 97     Physical Exam    Constitutional   General appearance: No acute distress, well appearing and well nourished  Pulmonary   Respiratory effort: No increased work of breathing or signs of respiratory distress  Cardiovascular   Palpation of heart: Normal PMI, no thrills  Auscultation of heart: Normal rate and rhythm, normal S1 and S2, no murmurs  Carotid pulses: Normal, 2+ bilaterally  Examination of extremities for edema and/or varicosities: Normal     Chest -   Sternum: Normal     Musculoskeletal Gait and station: Abnormal  Uses a walker  Psychiatric - Orientation to person, place, and time: Normal  Mood and affect: Abnormal       Assessment    1  3-vessel coronary artery disease (414 00) (I25 10)   · Hx of CABG in 1999 and PTCA in 2006  2  Hyperlipidemia (272 4) (E78 5)   3  Hypertension (401 9) (I10)    Plan  Diabetic retinopathy, Hypertension    · (1) HEMOGLOBIN A1C; Status:Active;  Requested for:70Ert3712; Perform:[facility]; AJZ:09IVY9814;LATEDLV; For:Diabetic retinopathy, Hypertension; Ordered By:Fabrice Ferrari; Hypertension    · (1) CBC/ PLT (NO DIFF); Status:Active; Requested for:08May2018; Perform:[facility]; GLK:82AEJ2233;ZWPHRMV;  For:Hypertension; Ordered By:Fabrice Ferrari;   · (1) CK (CPK); Status:Active; Requested for:08May2018; Perform:[facility]; TTU:01GAU3310;BVXOLPM;  For:Hypertension; Ordered By:Fabrice Ferrari;   · (1) COMPREHENSIVE METABOLIC PANEL; Status:Active; Requested for:08May2018; Perform:[facility]; CYY:65HLA3868;BMYDWGP;  For:Hypertension; Ordered By:Fabrice Ferrari;   · (1) LIPID PANEL, FASTING; Status:Active; Requested for:08May2018; Perform:[facility]; LTM:15LMH5499;WKNHKDF;  For:Hypertension; Ordered By:Fabrice Ferrari;   · Follow Up After Tests Complete Evaluation and Treatment  Follow-up  Status: Hold For -  Scheduling  Requested for: 39AGL1982   Ordered; For: Hypertension; Ordered By: Anai Hernandez Performed:  Due: 52PPQ5301    Discussion/Summary    From a cardiac standpoint she's been quite stable  Details are summarized in history of present illness above  As noted she had an evaluation an echo and stress test in June 2014  Her lipid levels are excellent as well  She is on a statin aspirin ACE inhibitor and beta blocker  She's also on Ranexa  Made no change in medication she'll have blood work in the spring 2015    Patient seen May 5, 2015  She's been stable from a cardiac standpoint  We will see her next brain  I summarized her issues in the history of present illness note  Pacemaker is fine  Lipids are excellent  Diabetes is always a challenge    Patient seen August 8, 2016  Diabetes is much better  Lab studies are noted above  Patient seen December 6, 2017  The only changes a slight increase in beta-blocker  Diagnosis in the HPI  Very stable course  No angina        Future Appointments    Signatures Electronically signed by : Penelope Kocher, M D ; Dec  6 2017  9:53AM EST                       (Author)

## 2018-02-26 ENCOUNTER — TELEPHONE (OUTPATIENT)
Dept: FAMILY MEDICINE CLINIC | Facility: CLINIC | Age: 83
End: 2018-02-26

## 2018-02-27 ENCOUNTER — OFFICE VISIT (OUTPATIENT)
Dept: FAMILY MEDICINE CLINIC | Facility: CLINIC | Age: 83
End: 2018-02-27
Payer: COMMERCIAL

## 2018-02-27 VITALS
WEIGHT: 195 LBS | BODY MASS INDEX: 30.61 KG/M2 | DIASTOLIC BLOOD PRESSURE: 70 MMHG | TEMPERATURE: 98.6 F | OXYGEN SATURATION: 97 % | RESPIRATION RATE: 20 BRPM | SYSTOLIC BLOOD PRESSURE: 130 MMHG | HEART RATE: 72 BPM | HEIGHT: 67 IN

## 2018-02-27 DIAGNOSIS — J45.21 MILD INTERMITTENT ASTHMA WITH EXACERBATION: ICD-10-CM

## 2018-02-27 DIAGNOSIS — IMO0002 UNCONTROLLED TYPE 2 DIABETES MELLITUS WITH OTHER OPHTHALMIC COMPLICATION, WITH LONG-TERM CURRENT USE OF INSULIN: ICD-10-CM

## 2018-02-27 DIAGNOSIS — J40 BRONCHITIS: Primary | ICD-10-CM

## 2018-02-27 PROBLEM — I47.20 VENTRICULAR TACHYCARDIA: Status: ACTIVE | Noted: 2017-12-06

## 2018-02-27 PROBLEM — I47.2 VENTRICULAR TACHYCARDIA (HCC): Status: ACTIVE | Noted: 2017-12-06

## 2018-02-27 PROBLEM — R79.89 ABNORMAL TSH: Status: ACTIVE | Noted: 2017-10-31

## 2018-02-27 PROBLEM — R32 URINE INCONTINENCE: Status: ACTIVE | Noted: 2017-11-01

## 2018-02-27 PROCEDURE — 99214 OFFICE O/P EST MOD 30 MIN: CPT | Performed by: FAMILY MEDICINE

## 2018-02-27 PROCEDURE — 3725F SCREEN DEPRESSION PERFORMED: CPT | Performed by: FAMILY MEDICINE

## 2018-02-27 RX ORDER — PREDNISONE 10 MG/1
10 TABLET ORAL DAILY
Qty: 28 TABLET | Refills: 0 | Status: SHIPPED | OUTPATIENT
Start: 2018-02-27 | End: 2018-03-09

## 2018-02-27 RX ORDER — ALBUTEROL SULFATE 90 UG/1
2 AEROSOL, METERED RESPIRATORY (INHALATION) EVERY 6 HOURS PRN
Qty: 1 INHALER | Refills: 5 | Status: SHIPPED | OUTPATIENT
Start: 2018-02-27 | End: 2018-03-29

## 2018-02-27 RX ORDER — AZITHROMYCIN 250 MG/1
250 TABLET, FILM COATED ORAL EVERY 24 HOURS
Qty: 6 TABLET | Refills: 0 | Status: SHIPPED | OUTPATIENT
Start: 2018-02-27 | End: 2018-03-04

## 2018-02-27 RX ORDER — BENZONATATE 200 MG/1
1 CAPSULE ORAL 3 TIMES DAILY PRN
COMMUNITY
Start: 2017-11-24 | End: 2018-02-27 | Stop reason: SDUPTHER

## 2018-02-27 RX ORDER — BENZONATATE 200 MG/1
200 CAPSULE ORAL 3 TIMES DAILY PRN
Qty: 30 CAPSULE | Refills: 0 | Status: SHIPPED | OUTPATIENT
Start: 2018-02-27 | End: 2018-03-09

## 2018-02-27 NOTE — PROGRESS NOTES
Patient is here for a cough that produces green flem and wheezing  Assessment/Plan:    A lot of fluids and rest  Tylenol or motrin for fever or pain  Give zpack  Side effects educated patient  Give cough medication  Side effects educated patient  Give script of prednisone  If wheezing no improving, may use it  SE educated pt and her daughter  Call office if symptoms no improving or worse  Diagnoses and all orders for this visit:    Bronchitis  -     azithromycin (ZITHROMAX) 250 mg tablet; Take 1 tablet (250 mg total) by mouth every 24 hours for 5 days Take 2 tabs on day 1, then take 1 tab daily from day 2-day 5   -     albuterol (PROAIR HFA) 90 mcg/act inhaler; Inhale 2 puffs every 6 (six) hours as needed for wheezing for up to 30 days  -     benzonatate (TESSALON) 200 MG capsule; Take 1 capsule (200 mg total) by mouth 3 (three) times a day as needed for cough for up to 10 days    Mild intermittent asthma with exacerbation  -     predniSONE 10 mg tablet; Take 1 tablet (10 mg total) by mouth daily for 10 days Take 4 tabs for 4 days, then 3 tabs for 2 days, then 2 tabs for 2 days and then 1 tab for 2 day    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, with long-term current use of insulin ([facility])  Comments: [facility]  On insulin pump  Other orders  -     ONE TOUCH ULTRA TEST test strip;           Subjective:      Patient ID: Juan Carlos Hall is a [de-identified] y o  female  HPI  Pt is here with daughter  C/o cough with phlegm  Some wheezing  No Sob  No fever  Denies nausea, vomiting or abdominal pain  Tried mucinex but no help  Appetite is Ok  Feels tired  Hx of asthma  Does not use inhaler for a while  DM---10/2017 HgA1C 7 5 She is on insulin pump and follows endocrinology       The following portions of the patient's history were reviewed and updated as appropriate: allergies, current medications, past family history, past medical history, past social history, past surgical history and problem list     Review of Systems   Constitutional: Negative for appetite change, chills and fever  HENT: Negative for congestion, ear pain, sinus pain and sore throat  Eyes: Negative for discharge and itching  Respiratory: Positive for cough and wheezing  Negative for apnea, chest tightness and shortness of breath  Cardiovascular: Negative for chest pain, palpitations and leg swelling  Gastrointestinal: Negative for abdominal pain, anal bleeding, constipation, diarrhea, nausea and vomiting  Endocrine: Negative for cold intolerance, heat intolerance and polyuria  Genitourinary: Negative for difficulty urinating and dysuria  Musculoskeletal: Negative for arthralgias, back pain and myalgias  Skin: Negative for rash  Neurological: Negative for dizziness and headaches  Psychiatric/Behavioral: Negative for agitation  Objective:      /70   Pulse 72   Temp 98 6 °F (37 °C) (Tympanic)   Resp 20   Ht 5' 7" (1 702 m)   Wt 88 5 kg (195 lb)   SpO2 97%   BMI 30 54 kg/m²          Physical Exam   Constitutional: She appears well-developed  No distress  HENT:   Head: Normocephalic  Right Ear: External ear normal    Left Ear: External ear normal    Nose: Nose normal    Mouth/Throat: Oropharynx is clear and moist    Eyes: Conjunctivae are normal  Pupils are equal, round, and reactive to light  Right eye exhibits no discharge  Left eye exhibits no discharge  Neck: Normal range of motion  No thyromegaly present  Cardiovascular: Normal rate, regular rhythm and normal heart sounds  Exam reveals no gallop and no friction rub  No murmur heard  Pulmonary/Chest: Effort normal  No respiratory distress  She has no wheezes  She has no rales  She exhibits no tenderness  Abdominal: Soft  Bowel sounds are normal  She exhibits no distension and no mass  There is no tenderness  There is no rebound and no guarding     Musculoskeletal: Normal range of motion  She exhibits no edema, tenderness or deformity  Lymphadenopathy:     She has no cervical adenopathy  Neurological: She is alert  Psychiatric: She has a normal mood and affect

## 2018-03-05 DIAGNOSIS — Z79.4 TYPE 2 DIABETES MELLITUS WITH HYPERGLYCEMIA, WITH LONG-TERM CURRENT USE OF INSULIN (HCC): Primary | ICD-10-CM

## 2018-03-05 DIAGNOSIS — E11.65 TYPE 2 DIABETES MELLITUS WITH HYPERGLYCEMIA, WITH LONG-TERM CURRENT USE OF INSULIN (HCC): Primary | ICD-10-CM

## 2018-03-07 NOTE — PROGRESS NOTES
"  Discussion/Summary  Normal device function      Results/Data  Results   Cardiac Device Remote [id number] 02:19AM Britany Pals     Test Name Result Flag Reference   MISCELLANEOUS COMMENT      CARELINK TRANSMISSION: BATTERY VOLTAGE ADEQUATE (7 5 YRS)  AP-47%, <0 1%  NO SIGNIFICANT HIGH RATE EPISODES  ALL AVAILABLE LEAD PARAMETERS WITHIN NORMAL LIMITS  NORMAL DEVICE FUNCTION  GV   Cardiac Electrophysiology Report      gqmoaopb[id number]{[id number]}  pdf   DEVICE TYPE Pacemaker       Cardiac Electrophysiology Report [id number] 02:19AM Britany Pals     Test Name Result Flag Reference   Cardiac Electrophysiology Report      [id number] pdf     Signatures   Electronically signed by : Jany Zamora RN; May  3 2016  4:06PM EST                       (Author)    Electronically signed by : JUAQUIN Dee ; May  9 2016 10:25PM EST                       (Author)    "

## 2018-03-07 NOTE — PROGRESS NOTES
"  Discussion/Summary  Normal device function      Results/Data  Cardiac Device Remote [id number] 12:49AM Nilsa Godinez     Test Name Result Flag Reference   MISCELLANEOUS COMMENT      CARELINK TRANSMISSION: BATTERY STATUS "OK"  AP 65%  0%  ALL AVAILABLE LEAD PARAMETERS WITHIN NORMAL LIMITS  1 NSVT NOTED, APPROX 17 BEAT @ 176BPM  PT ON METO TART & EF 60% (2014)  DR Tyler Chiang MADE AWARE  NORMAL DEVICE FUNCTION  NC   Cardiac Electrophysiology Report      [id number]  pdf   DEVICE TYPE Pacemaker       Cardiac Electrophysiology Report [id number] 12:49AM Nilsa Godinez     Test Name Result Flag Reference   Cardiac Electrophysiology Report      [id number]  pdf     Signatures   Electronically signed by : Mayda Donohue, ; Nov 22 2017  1:24PM EST                       (Author)    Electronically signed by : Cam Wang DO; Nov 26 2017  5:17PM EST                       (Author)    "

## 2018-03-07 NOTE — PROGRESS NOTES
"  Discussion/Summary  Normal device function      Results/Data  Cardiac Device In Clinic [id number] 03:03PM Mo Getami     Test Name Result Flag Reference   MISCELLANEOUS COMMENT      DEVICE INTERROGATED IN THE [facility] OFFICE  BATTERY VOLTAGE ADEQUATE  (6 YRS)  AP 58%  <1%  ALL LEAD PARAMETERS WITHIN NORMAL LIMITS  NO SIGNIFICANT HIGH RATE EPISODES  NO PATIENT OR DEVICE ACTIVATED EPISODES  NORMAL DEVICE FUNCTION  ---JOHN   Cardiac Electrophysiology Report      [id number] [id number]  pdf   DEVICE TYPE Pacemaker       Cardiac Electrophysiology Report [id number] 03:03PM Mo BodeTreetami     Test Name Result Flag Reference   Cardiac Electrophysiology Report      [id number] pdf     Signatures   Electronically signed by : Atif Marte, ; Dec 21 2017 11:11AM EST                       (Author)    Electronically signed by : JUAQUIN Whitt ; Dec 21 2017  7:20PM EST                       (Author)    "

## 2018-03-07 NOTE — PROGRESS NOTES
"  Discussion/Summary  Normal device function      Results/Data  Cardiac Device Remote 21Jun2017 10:12AM Yazmin Santizo     Test Name Result Flag Reference   MISCELLANEOUS COMMENT      CARELINK TRANSMISSION: BATTERY VOLTAGE ADEQUATE (7 YRS)  AP-56%, -0 1%  ALL AVAILABLE LEAD PARAMETERS WITHIN NORMAL LIMITS  NO SIGNIFICANT HIGH RATE EPISODES  NORMAL DEVICE FUNCTION  GV   Cardiac Electrophysiology Report      [id number] Family Health [id number]  pdf   DEVICE TYPE Pacemaker       Cardiac Electrophysiology Report 21Jun2017 10:12AM Yazmin Santizo     Test Name Result Flag Reference   Cardiac Electrophysiology Report      [id number]  pdf     Signatures   Electronically signed by : Alannah Han RN; Jun 21 2017  2:21PM EST                       (Author)    Electronically signed by : JUAQUIN Purdy ; Jun 21 2017  5:10PM EST                       (Author)    "

## 2018-03-07 NOTE — PROGRESS NOTES
"  Discussion/Summary  Normal device function      Results/Data  Cardiac Device In Clinic [id number] 03:51PM Katy Malaika     Test Name Result Flag Reference   MISCELLANEOUS COMMENT      DEVICE INTERROGATED IN THE [facility] OFFICE  BATTERY VOLTAGE ADEQUATE  (7 5 YRS)  AP 43%  1%  ALL LEAD PARAMETERS WITHIN NORMAL LIMITS  NO SIGNIFICANT HIGH RATE EPISODES  NO PATIENT OR DEVICE ACTIVATED EPISODES  NORMAL DEVICE FUNCTION  ---JOHN   Cardiac Electrophysiology Report      [id number] [id number] [id number]_1  pdf   DEVICE TYPE Pacemaker       Cardiac Electrophysiology Report [id number] 03:51PM Katy Malaika     Test Name Result Flag Reference   Cardiac Electrophysiology Report      [id number]  pdf     Signatures   Electronically signed by : Migel Wu, ; Dec  9 2016  9:54AM EST                       (Author)    Electronically signed by : JUAQUIN Ortiz ; Dec  9 2016  5:20PM EST                       (Author)    "

## 2018-03-07 NOTE — PROGRESS NOTES
"  Discussion/Summary  Normal device function      Results/Data  Cardiac Device Remote 21Mar2017 05:03PM Damaris Hodgkins     Test Name Result Flag Reference   MISCELLANEOUS COMMENT      CARELINK TRANSMISSION: BATTERY VOLTAGE ADEQUATE (7 YRS)  AP-47%, -0 1%  ALL AVAILABLE LEAD PARAMETERS WITHIN NORMAL LIMITS  NO SIGNIFICANT HIGH RATE EPISODES  NORMAL DEVICE FUNCTION  GV   Cardiac Electrophysiology Report      [id number]_[id number]  pdf   DEVICE TYPE Pacemaker       Cardiac Electrophysiology Report 21Mar2017 05:03PM Damaris Hodgkins     Test Name Result Flag Reference   Cardiac Electrophysiology Report      [id number]  pdf     Signatures   Electronically signed by : Tanmay De La Paz RN; Mar 21 2017  3:48PM EST                       (Author)    Electronically signed by : JUAQUIN Walker ; Mar 23 2017  8:47PM EST                       (Author)    "

## 2018-03-07 NOTE — PROGRESS NOTES
"  Discussion/Summary  Normal device function      Results/Data  Results   Cardiac Device Remote 26Jan2016 08:24PM Sari Leyden     Test Name Result Flag Reference   MISCELLANEOUS COMMENT      CARELINK TRANSMISSION: BATTERY VOLTAGE ADEQUATE (8 YRS)  AP-52%, <0 1%  NO SIGNIFICANT HIGH RATE EPISODES  ALL AVAILABLE LEAD PARAMETERS WITHIN NORMAL LIMITS  NORMAL DEVICE FUNCTION  GV   Cardiac Electrophysiology Report      [id number]}  pdf   DEVICE TYPE Pacemaker       Cardiac Electrophysiology Report [id number] 08:24PM Sari Leyden     Test Name Result Flag Reference   Cardiac Electrophysiology Report      [id number]  pdf     Signatures   Electronically signed by : Josue Wise RN; Jan 28 2016  1:47PM EST                       (Author)    Electronically signed by : JUAQUIN Zapata ; Jan 29 2016  6:42AM EST                       (Author)    "

## 2018-03-19 ENCOUNTER — CLINICAL SUPPORT (OUTPATIENT)
Dept: CARDIOLOGY CLINIC | Facility: CLINIC | Age: 83
End: 2018-03-19
Payer: COMMERCIAL

## 2018-03-19 DIAGNOSIS — Z95.0 CARDIAC PACEMAKER IN SITU: ICD-10-CM

## 2018-03-19 DIAGNOSIS — I47.2 VENTRICULAR TACHYCARDIA (HCC): Primary | ICD-10-CM

## 2018-03-19 PROCEDURE — 93294 REM INTERROG EVL PM/LDLS PM: CPT | Performed by: INTERNAL MEDICINE

## 2018-03-19 PROCEDURE — 93296 REM INTERROG EVL PM/IDS: CPT | Performed by: INTERNAL MEDICINE

## 2018-03-20 NOTE — PROGRESS NOTES
CARELINK TRANSMISSION: BATTERY STATUS "OK"  AP 96%  0 3%  ALL AVAILABLE LEAD PARAMETERS WITHIN NORMAL LIMITS  NO SIGNIFICANT HIGH RATE EPISODES  NORMAL DEVICE FUNCTION   NC

## 2018-04-11 DIAGNOSIS — E78.5 HYPERLIPIDEMIA, UNSPECIFIED HYPERLIPIDEMIA TYPE: Primary | ICD-10-CM

## 2018-04-11 RX ORDER — ATORVASTATIN CALCIUM 40 MG/1
TABLET, FILM COATED ORAL
Qty: 30 TABLET | Refills: 11 | Status: SHIPPED | OUTPATIENT
Start: 2018-04-11 | End: 2019-04-08 | Stop reason: SDUPTHER

## 2018-04-22 ENCOUNTER — HOSPITAL ENCOUNTER (INPATIENT)
Facility: HOSPITAL | Age: 83
LOS: 5 days | Discharge: HOME HEALTH | DRG: 291 | End: 2018-04-28
Attending: EMERGENCY MEDICINE | Admitting: HOSPITALIST
Payer: COMMERCIAL

## 2018-04-22 ENCOUNTER — APPOINTMENT (EMERGENCY)
Dept: RADIOLOGY | Facility: HOSPITAL | Age: 83
DRG: 291 | End: 2018-04-22
Payer: COMMERCIAL

## 2018-04-22 DIAGNOSIS — I25.10 3-VESSEL CORONARY ARTERY DISEASE: ICD-10-CM

## 2018-04-22 DIAGNOSIS — J45.21 INTERMITTENT ASTHMA WITH ACUTE EXACERBATION, UNSPECIFIED ASTHMA SEVERITY: ICD-10-CM

## 2018-04-22 DIAGNOSIS — IMO0002 UNCONTROLLED TYPE 2 DIABETES MELLITUS WITH OTHER OPHTHALMIC COMPLICATION, WITH LONG-TERM CURRENT USE OF INSULIN: ICD-10-CM

## 2018-04-22 DIAGNOSIS — J45.909 ASTHMA: ICD-10-CM

## 2018-04-22 DIAGNOSIS — F03.90 DEMENTIA WITHOUT BEHAVIORAL DISTURBANCE, UNSPECIFIED DEMENTIA TYPE (HCC): Chronic | ICD-10-CM

## 2018-04-22 DIAGNOSIS — I50.33 ACUTE ON CHRONIC DIASTOLIC HEART FAILURE (HCC): ICD-10-CM

## 2018-04-22 DIAGNOSIS — J45.901 ASTHMA EXACERBATION: Primary | ICD-10-CM

## 2018-04-22 DIAGNOSIS — R33.9 URINE RETENTION: ICD-10-CM

## 2018-04-22 DIAGNOSIS — I47.2 VENTRICULAR TACHYCARDIA (HCC): ICD-10-CM

## 2018-04-22 DIAGNOSIS — R06.02 SHORTNESS OF BREATH: ICD-10-CM

## 2018-04-22 LAB
ALBUMIN SERPL BCP-MCNC: 3.9 G/DL (ref 3.5–5)
ALP SERPL-CCNC: 114 U/L (ref 46–116)
ALT SERPL W P-5'-P-CCNC: 27 U/L (ref 12–78)
ANION GAP SERPL CALCULATED.3IONS-SCNC: 6 MMOL/L (ref 4–13)
APTT PPP: 34 SECONDS (ref 23–35)
AST SERPL W P-5'-P-CCNC: 20 U/L (ref 5–45)
BASE EXCESS BLDA CALC-SCNC: 6 MMOL/L (ref -2–3)
BASOPHILS # BLD AUTO: 0.07 THOUSANDS/ΜL (ref 0–0.1)
BASOPHILS NFR BLD AUTO: 1 % (ref 0–1)
BILIRUB SERPL-MCNC: 0.69 MG/DL (ref 0.2–1)
BUN SERPL-MCNC: 29 MG/DL (ref 5–25)
CA-I BLD-SCNC: 1.06 MMOL/L (ref 1.12–1.32)
CALCIUM SERPL-MCNC: 9.4 MG/DL (ref 8.3–10.1)
CHLORIDE SERPL-SCNC: 105 MMOL/L (ref 100–108)
CO2 SERPL-SCNC: 28 MMOL/L (ref 21–32)
CREAT SERPL-MCNC: 1.2 MG/DL (ref 0.6–1.3)
EOSINOPHIL # BLD AUTO: 0.23 THOUSAND/ΜL (ref 0–0.61)
EOSINOPHIL NFR BLD AUTO: 3 % (ref 0–6)
ERYTHROCYTE [DISTWIDTH] IN BLOOD BY AUTOMATED COUNT: 15.5 % (ref 11.6–15.1)
GFR SERPL CREATININE-BSD FRML MDRD: 43 ML/MIN/1.73SQ M
GLUCOSE SERPL-MCNC: 249 MG/DL (ref 65–140)
GLUCOSE SERPL-MCNC: 267 MG/DL (ref 65–140)
HCO3 BLDA-SCNC: 27.8 MMOL/L (ref 24–30)
HCT VFR BLD AUTO: 38.5 % (ref 34.8–46.1)
HCT VFR BLD CALC: 39 % (ref 34.8–46.1)
HGB BLD-MCNC: 12.6 G/DL (ref 11.5–15.4)
HGB BLDA-MCNC: 13.3 G/DL (ref 11.5–15.4)
INR PPP: 1.05 (ref 0.86–1.16)
LACTATE SERPL-SCNC: 1.2 MMOL/L (ref 0.5–2)
LYMPHOCYTES # BLD AUTO: 1.59 THOUSANDS/ΜL (ref 0.6–4.47)
LYMPHOCYTES NFR BLD AUTO: 22 % (ref 14–44)
MCH RBC QN AUTO: 29 PG (ref 26.8–34.3)
MCHC RBC AUTO-ENTMCNC: 32.7 G/DL (ref 31.4–37.4)
MCV RBC AUTO: 89 FL (ref 82–98)
MONOCYTES # BLD AUTO: 0.63 THOUSAND/ΜL (ref 0.17–1.22)
MONOCYTES NFR BLD AUTO: 9 % (ref 4–12)
NEUTROPHILS # BLD AUTO: 4.59 THOUSANDS/ΜL (ref 1.85–7.62)
NEUTS SEG NFR BLD AUTO: 65 % (ref 43–75)
NRBC BLD AUTO-RTO: 0 /100 WBCS
PCO2 BLD: 29 MMOL/L (ref 21–32)
PCO2 BLD: 30.4 MM HG (ref 42–50)
PH BLD: 7.57 [PH] (ref 7.3–7.4)
PLATELET # BLD AUTO: 196 THOUSANDS/UL (ref 149–390)
PMV BLD AUTO: 11.1 FL (ref 8.9–12.7)
PO2 BLD: 70 MM HG (ref 35–45)
POTASSIUM BLD-SCNC: 5.8 MMOL/L (ref 3.5–5.3)
POTASSIUM SERPL-SCNC: 4.1 MMOL/L (ref 3.5–5.3)
PROT SERPL-MCNC: 7.3 G/DL (ref 6.4–8.2)
PROTHROMBIN TIME: 13.7 SECONDS (ref 12.1–14.4)
RBC # BLD AUTO: 4.35 MILLION/UL (ref 3.81–5.12)
SAO2 % BLD FROM PO2: 96 % (ref 95–98)
SODIUM BLD-SCNC: 138 MMOL/L (ref 136–145)
SODIUM SERPL-SCNC: 139 MMOL/L (ref 136–145)
SPECIMEN SOURCE: ABNORMAL
TROPONIN I SERPL-MCNC: <0.02 NG/ML
WBC # BLD AUTO: 7.12 THOUSAND/UL (ref 4.31–10.16)

## 2018-04-22 PROCEDURE — 85730 THROMBOPLASTIN TIME PARTIAL: CPT | Performed by: EMERGENCY MEDICINE

## 2018-04-22 PROCEDURE — 82803 BLOOD GASES ANY COMBINATION: CPT

## 2018-04-22 PROCEDURE — 71045 X-RAY EXAM CHEST 1 VIEW: CPT

## 2018-04-22 PROCEDURE — 84484 ASSAY OF TROPONIN QUANT: CPT | Performed by: EMERGENCY MEDICINE

## 2018-04-22 PROCEDURE — 94660 CPAP INITIATION&MGMT: CPT

## 2018-04-22 PROCEDURE — 85610 PROTHROMBIN TIME: CPT | Performed by: EMERGENCY MEDICINE

## 2018-04-22 PROCEDURE — 85014 HEMATOCRIT: CPT

## 2018-04-22 PROCEDURE — 93005 ELECTROCARDIOGRAM TRACING: CPT

## 2018-04-22 PROCEDURE — 84132 ASSAY OF SERUM POTASSIUM: CPT

## 2018-04-22 PROCEDURE — 94760 N-INVAS EAR/PLS OXIMETRY 1: CPT

## 2018-04-22 PROCEDURE — 80053 COMPREHEN METABOLIC PANEL: CPT | Performed by: EMERGENCY MEDICINE

## 2018-04-22 PROCEDURE — 94644 CONT INHLJ TX 1ST HOUR: CPT

## 2018-04-22 PROCEDURE — 85025 COMPLETE CBC W/AUTO DIFF WBC: CPT | Performed by: EMERGENCY MEDICINE

## 2018-04-22 PROCEDURE — 82947 ASSAY GLUCOSE BLOOD QUANT: CPT

## 2018-04-22 PROCEDURE — 36415 COLL VENOUS BLD VENIPUNCTURE: CPT | Performed by: EMERGENCY MEDICINE

## 2018-04-22 PROCEDURE — 84295 ASSAY OF SERUM SODIUM: CPT

## 2018-04-22 PROCEDURE — 83605 ASSAY OF LACTIC ACID: CPT | Performed by: EMERGENCY MEDICINE

## 2018-04-22 PROCEDURE — 82330 ASSAY OF CALCIUM: CPT

## 2018-04-22 PROCEDURE — 87040 BLOOD CULTURE FOR BACTERIA: CPT | Performed by: EMERGENCY MEDICINE

## 2018-04-22 PROCEDURE — 83880 ASSAY OF NATRIURETIC PEPTIDE: CPT | Performed by: EMERGENCY MEDICINE

## 2018-04-22 RX ORDER — ALBUTEROL SULFATE 2.5 MG/3ML
10 SOLUTION RESPIRATORY (INHALATION) ONCE
Status: COMPLETED | OUTPATIENT
Start: 2018-04-22 | End: 2018-04-22

## 2018-04-22 RX ORDER — EPINEPHRINE 1 MG/ML
INJECTION, SOLUTION, CONCENTRATE INTRAVENOUS
Status: COMPLETED
Start: 2018-04-22 | End: 2018-04-22

## 2018-04-22 RX ORDER — ALBUTEROL SULFATE 2.5 MG/3ML
SOLUTION RESPIRATORY (INHALATION)
Status: COMPLETED
Start: 2018-04-22 | End: 2018-04-22

## 2018-04-22 RX ORDER — EPINEPHRINE 1 MG/ML
0.5 INJECTION, SOLUTION, CONCENTRATE INTRAVENOUS ONCE
Status: COMPLETED | OUTPATIENT
Start: 2018-04-22 | End: 2018-04-22

## 2018-04-22 RX ORDER — SODIUM CHLORIDE FOR INHALATION 0.9 %
3 VIAL, NEBULIZER (ML) INHALATION ONCE
Status: COMPLETED | OUTPATIENT
Start: 2018-04-22 | End: 2018-04-22

## 2018-04-22 RX ORDER — 0.9 % SODIUM CHLORIDE 0.9 %
3 VIAL (ML) INJECTION AS NEEDED
Status: DISCONTINUED | OUTPATIENT
Start: 2018-04-22 | End: 2018-04-28 | Stop reason: HOSPADM

## 2018-04-22 RX ORDER — METHYLPREDNISOLONE SODIUM SUCCINATE 125 MG/2ML
125 INJECTION, POWDER, LYOPHILIZED, FOR SOLUTION INTRAMUSCULAR; INTRAVENOUS ONCE
Status: COMPLETED | OUTPATIENT
Start: 2018-04-22 | End: 2018-04-22

## 2018-04-22 RX ORDER — MAGNESIUM SULFATE HEPTAHYDRATE 40 MG/ML
2 INJECTION, SOLUTION INTRAVENOUS ONCE
Status: COMPLETED | OUTPATIENT
Start: 2018-04-22 | End: 2018-04-22

## 2018-04-22 RX ADMIN — EPINEPHRINE 0.5 MG: 1 INJECTION, SOLUTION, CONCENTRATE INTRAVENOUS at 23:13

## 2018-04-22 RX ADMIN — ISODIUM CHLORIDE 3 ML: 0.03 SOLUTION RESPIRATORY (INHALATION) at 23:21

## 2018-04-22 RX ADMIN — IPRATROPIUM BROMIDE 1 MG: 0.5 SOLUTION RESPIRATORY (INHALATION) at 23:21

## 2018-04-22 RX ADMIN — ALBUTEROL SULFATE 10 MG: 2.5 SOLUTION RESPIRATORY (INHALATION) at 23:21

## 2018-04-22 RX ADMIN — METHYLPREDNISOLONE SODIUM SUCCINATE 125 MG: 125 INJECTION, POWDER, FOR SOLUTION INTRAMUSCULAR; INTRAVENOUS at 23:21

## 2018-04-22 RX ADMIN — MAGNESIUM SULFATE HEPTAHYDRATE 2 G: 40 INJECTION, SOLUTION INTRAVENOUS at 23:22

## 2018-04-22 RX ADMIN — Medication 1 MG: at 23:21

## 2018-04-23 ENCOUNTER — APPOINTMENT (INPATIENT)
Dept: NON INVASIVE DIAGNOSTICS | Facility: HOSPITAL | Age: 83
DRG: 291 | End: 2018-04-23
Payer: COMMERCIAL

## 2018-04-23 PROBLEM — I10 ACCELERATED HYPERTENSION: Status: ACTIVE | Noted: 2018-04-23

## 2018-04-23 PROBLEM — E08.42: Status: ACTIVE | Noted: 2018-04-23

## 2018-04-23 PROBLEM — R93.89 ABNORMAL CHEST X-RAY: Status: ACTIVE | Noted: 2018-04-23

## 2018-04-23 PROBLEM — I50.33 ACUTE ON CHRONIC DIASTOLIC HEART FAILURE (HCC): Status: ACTIVE | Noted: 2018-04-23

## 2018-04-23 PROBLEM — J96.00 ACUTE RESPIRATORY FAILURE (HCC): Status: ACTIVE | Noted: 2018-04-23

## 2018-04-23 PROBLEM — E08.42: Chronic | Status: ACTIVE | Noted: 2018-04-23

## 2018-04-23 LAB
ANION GAP SERPL CALCULATED.3IONS-SCNC: 9 MMOL/L (ref 4–13)
ANISOCYTOSIS BLD QL SMEAR: PRESENT
ATRIAL RATE: 64 BPM
BASE EX.OXY STD BLDV CALC-SCNC: 94.9 % (ref 60–80)
BASE EXCESS BLDV CALC-SCNC: -2.3 MMOL/L
BASOPHILS # BLD MANUAL: 0 THOUSAND/UL (ref 0–0.1)
BASOPHILS NFR MAR MANUAL: 0 % (ref 0–1)
BODY TEMPERATURE: 98.5 DEGREES FEHRENHEIT
BUN SERPL-MCNC: 33 MG/DL (ref 5–25)
CALCIUM SERPL-MCNC: 8.9 MG/DL (ref 8.3–10.1)
CHLORIDE SERPL-SCNC: 106 MMOL/L (ref 100–108)
CO2 SERPL-SCNC: 24 MMOL/L (ref 21–32)
CREAT SERPL-MCNC: 1.23 MG/DL (ref 0.6–1.3)
EOSINOPHIL # BLD MANUAL: 0 THOUSAND/UL (ref 0–0.4)
EOSINOPHIL NFR BLD MANUAL: 0 % (ref 0–6)
ERYTHROCYTE [DISTWIDTH] IN BLOOD BY AUTOMATED COUNT: 15.6 % (ref 11.6–15.1)
EST. AVERAGE GLUCOSE BLD GHB EST-MCNC: 203 MG/DL
GFR SERPL CREATININE-BSD FRML MDRD: 42 ML/MIN/1.73SQ M
GLUCOSE SERPL-MCNC: 198 MG/DL (ref 65–140)
GLUCOSE SERPL-MCNC: 203 MG/DL (ref 65–140)
GLUCOSE SERPL-MCNC: 301 MG/DL (ref 65–140)
GLUCOSE SERPL-MCNC: 328 MG/DL (ref 65–140)
GLUCOSE SERPL-MCNC: 351 MG/DL (ref 65–140)
GLUCOSE SERPL-MCNC: 436 MG/DL (ref 65–140)
HBA1C MFR BLD: 8.7 % (ref 4.2–6.3)
HCO3 BLDV-SCNC: 21.7 MMOL/L (ref 24–30)
HCT VFR BLD AUTO: 37.8 % (ref 34.8–46.1)
HGB BLD-MCNC: 12.1 G/DL (ref 11.5–15.4)
IPAP: 10
LACTATE SERPL-SCNC: 1.8 MMOL/L (ref 0.5–2)
LYMPHOCYTES # BLD AUTO: 0.38 THOUSAND/UL (ref 0.6–4.47)
LYMPHOCYTES # BLD AUTO: 4 % (ref 14–44)
MACROCYTES BLD QL AUTO: PRESENT
MCH RBC QN AUTO: 29 PG (ref 26.8–34.3)
MCHC RBC AUTO-ENTMCNC: 32 G/DL (ref 31.4–37.4)
MCV RBC AUTO: 91 FL (ref 82–98)
MONOCYTES # BLD AUTO: 0 THOUSAND/UL (ref 0–1.22)
MONOCYTES NFR BLD: 0 % (ref 4–12)
NEUTROPHILS # BLD MANUAL: 9.01 THOUSAND/UL (ref 1.85–7.62)
NEUTS SEG NFR BLD AUTO: 96 % (ref 43–75)
NON VENT- BIPAP: ABNORMAL
NRBC BLD AUTO-RTO: 0 /100 WBCS
NT-PROBNP SERPL-MCNC: 1872 PG/ML
O2 CT BLDV-SCNC: 16.4 ML/DL
OVALOCYTES BLD QL SMEAR: PRESENT
P AXIS: -47 DEGREES
PCO2 BLDV: 34.8 MM HG (ref 42–50)
PEEP MAX SETTING VENT: 5 CM[H2O]
PH BLDV: 7.41 [PH] (ref 7.3–7.4)
PLATELET # BLD AUTO: 181 THOUSANDS/UL (ref 149–390)
PLATELET BLD QL SMEAR: ADEQUATE
PMV BLD AUTO: 11.5 FL (ref 8.9–12.7)
PO2 BLDV: 99.6 MM HG (ref 35–45)
POIKILOCYTOSIS BLD QL SMEAR: PRESENT
POTASSIUM SERPL-SCNC: 4.3 MMOL/L (ref 3.5–5.3)
PR INTERVAL: 104 MS
PROCALCITONIN SERPL-MCNC: <0.05 NG/ML
QRS AXIS: 224 DEGREES
QRSD INTERVAL: 54 MS
QT INTERVAL: 400 MS
QTC INTERVAL: 452 MS
RBC # BLD AUTO: 4.17 MILLION/UL (ref 3.81–5.12)
RBC MORPH BLD: PRESENT
SODIUM SERPL-SCNC: 139 MMOL/L (ref 136–145)
T WAVE AXIS: -36 DEGREES
TROPONIN I SERPL-MCNC: <0.02 NG/ML
VENT BIPAP FIO2: 40 %
VENTRICULAR RATE: 77 BPM
WBC # BLD AUTO: 9.39 THOUSAND/UL (ref 4.31–10.16)

## 2018-04-23 PROCEDURE — 85007 BL SMEAR W/DIFF WBC COUNT: CPT | Performed by: INTERNAL MEDICINE

## 2018-04-23 PROCEDURE — 85027 COMPLETE CBC AUTOMATED: CPT | Performed by: INTERNAL MEDICINE

## 2018-04-23 PROCEDURE — 99223 1ST HOSP IP/OBS HIGH 75: CPT | Performed by: HOSPITALIST

## 2018-04-23 PROCEDURE — 80048 BASIC METABOLIC PNL TOTAL CA: CPT | Performed by: HOSPITALIST

## 2018-04-23 PROCEDURE — 99222 1ST HOSP IP/OBS MODERATE 55: CPT | Performed by: INTERNAL MEDICINE

## 2018-04-23 PROCEDURE — 99221 1ST HOSP IP/OBS SF/LOW 40: CPT | Performed by: INTERNAL MEDICINE

## 2018-04-23 PROCEDURE — 84145 PROCALCITONIN (PCT): CPT | Performed by: INTERNAL MEDICINE

## 2018-04-23 PROCEDURE — 93306 TTE W/DOPPLER COMPLETE: CPT

## 2018-04-23 PROCEDURE — 93010 ELECTROCARDIOGRAM REPORT: CPT | Performed by: INTERNAL MEDICINE

## 2018-04-23 PROCEDURE — 84484 ASSAY OF TROPONIN QUANT: CPT | Performed by: HOSPITALIST

## 2018-04-23 PROCEDURE — 99285 EMERGENCY DEPT VISIT HI MDM: CPT

## 2018-04-23 PROCEDURE — 36415 COLL VENOUS BLD VENIPUNCTURE: CPT | Performed by: EMERGENCY MEDICINE

## 2018-04-23 PROCEDURE — 94640 AIRWAY INHALATION TREATMENT: CPT

## 2018-04-23 PROCEDURE — 82948 REAGENT STRIP/BLOOD GLUCOSE: CPT

## 2018-04-23 PROCEDURE — 93306 TTE W/DOPPLER COMPLETE: CPT | Performed by: INTERNAL MEDICINE

## 2018-04-23 PROCEDURE — 83036 HEMOGLOBIN GLYCOSYLATED A1C: CPT | Performed by: HOSPITALIST

## 2018-04-23 PROCEDURE — 83605 ASSAY OF LACTIC ACID: CPT | Performed by: EMERGENCY MEDICINE

## 2018-04-23 PROCEDURE — 82805 BLOOD GASES W/O2 SATURATION: CPT | Performed by: HOSPITALIST

## 2018-04-23 PROCEDURE — 94760 N-INVAS EAR/PLS OXIMETRY 1: CPT

## 2018-04-23 RX ORDER — B-COMPLEX WITH VITAMIN C
1 TABLET ORAL 2 TIMES DAILY WITH MEALS
Status: DISCONTINUED | OUTPATIENT
Start: 2018-04-23 | End: 2018-04-28 | Stop reason: HOSPADM

## 2018-04-23 RX ORDER — NITROGLYCERIN 0.4 MG/1
0.4 TABLET SUBLINGUAL
Status: DISCONTINUED | OUTPATIENT
Start: 2018-04-23 | End: 2018-04-28 | Stop reason: HOSPADM

## 2018-04-23 RX ORDER — LEVALBUTEROL 1.25 MG/.5ML
1.25 SOLUTION, CONCENTRATE RESPIRATORY (INHALATION)
Status: DISCONTINUED | OUTPATIENT
Start: 2018-04-24 | End: 2018-04-23

## 2018-04-23 RX ORDER — LEVOFLOXACIN 250 MG/1
250 TABLET ORAL EVERY 24 HOURS
Status: DISCONTINUED | OUTPATIENT
Start: 2018-04-24 | End: 2018-04-24

## 2018-04-23 RX ORDER — SODIUM CHLORIDE 9 MG/ML
75 INJECTION, SOLUTION INTRAVENOUS CONTINUOUS
Status: DISCONTINUED | OUTPATIENT
Start: 2018-04-23 | End: 2018-04-23

## 2018-04-23 RX ORDER — PANTOPRAZOLE SODIUM 40 MG/1
40 TABLET, DELAYED RELEASE ORAL
Status: DISCONTINUED | OUTPATIENT
Start: 2018-04-23 | End: 2018-04-28 | Stop reason: HOSPADM

## 2018-04-23 RX ORDER — INSULIN GLARGINE 100 [IU]/ML
20 INJECTION, SOLUTION SUBCUTANEOUS
Status: DISCONTINUED | OUTPATIENT
Start: 2018-04-23 | End: 2018-04-25

## 2018-04-23 RX ORDER — ATORVASTATIN CALCIUM 40 MG/1
40 TABLET, FILM COATED ORAL DAILY
Status: DISCONTINUED | OUTPATIENT
Start: 2018-04-23 | End: 2018-04-28 | Stop reason: HOSPADM

## 2018-04-23 RX ORDER — FUROSEMIDE 10 MG/ML
40 INJECTION INTRAMUSCULAR; INTRAVENOUS DAILY
Status: DISCONTINUED | OUTPATIENT
Start: 2018-04-23 | End: 2018-04-23

## 2018-04-23 RX ORDER — METHYLPREDNISOLONE SODIUM SUCCINATE 40 MG/ML
40 INJECTION, POWDER, LYOPHILIZED, FOR SOLUTION INTRAMUSCULAR; INTRAVENOUS EVERY 8 HOURS SCHEDULED
Status: DISCONTINUED | OUTPATIENT
Start: 2018-04-23 | End: 2018-04-25

## 2018-04-23 RX ORDER — GUAIFENESIN 600 MG
600 TABLET, EXTENDED RELEASE 12 HR ORAL EVERY 12 HOURS SCHEDULED
Status: DISCONTINUED | OUTPATIENT
Start: 2018-04-23 | End: 2018-04-28 | Stop reason: HOSPADM

## 2018-04-23 RX ORDER — ONDANSETRON 2 MG/ML
4 INJECTION INTRAMUSCULAR; INTRAVENOUS EVERY 6 HOURS PRN
Status: DISCONTINUED | OUTPATIENT
Start: 2018-04-23 | End: 2018-04-28 | Stop reason: HOSPADM

## 2018-04-23 RX ORDER — LEVOFLOXACIN 500 MG/1
500 TABLET, FILM COATED ORAL EVERY 24 HOURS
Status: DISCONTINUED | OUTPATIENT
Start: 2018-04-23 | End: 2018-04-23

## 2018-04-23 RX ORDER — LEVOFLOXACIN 500 MG/1
500 TABLET, FILM COATED ORAL ONCE
Status: COMPLETED | OUTPATIENT
Start: 2018-04-23 | End: 2018-04-23

## 2018-04-23 RX ORDER — FUROSEMIDE 10 MG/ML
20 INJECTION INTRAMUSCULAR; INTRAVENOUS ONCE
Status: COMPLETED | OUTPATIENT
Start: 2018-04-23 | End: 2018-04-23

## 2018-04-23 RX ORDER — METHYLPREDNISOLONE SODIUM SUCCINATE 40 MG/ML
40 INJECTION, POWDER, LYOPHILIZED, FOR SOLUTION INTRAMUSCULAR; INTRAVENOUS EVERY 12 HOURS SCHEDULED
Status: DISCONTINUED | OUTPATIENT
Start: 2018-04-23 | End: 2018-04-23

## 2018-04-23 RX ORDER — NITROGLYCERIN 20 MG/100ML
5-200 INJECTION INTRAVENOUS
Status: DISCONTINUED | OUTPATIENT
Start: 2018-04-23 | End: 2018-04-23

## 2018-04-23 RX ORDER — LEVOFLOXACIN 5 MG/ML
250 INJECTION, SOLUTION INTRAVENOUS EVERY 24 HOURS
Status: DISCONTINUED | OUTPATIENT
Start: 2018-04-24 | End: 2018-04-23

## 2018-04-23 RX ORDER — DOCUSATE SODIUM 100 MG/1
100 CAPSULE, LIQUID FILLED ORAL 2 TIMES DAILY
Status: DISCONTINUED | OUTPATIENT
Start: 2018-04-23 | End: 2018-04-28 | Stop reason: HOSPADM

## 2018-04-23 RX ORDER — LEVALBUTEROL 1.25 MG/.5ML
1.25 SOLUTION, CONCENTRATE RESPIRATORY (INHALATION)
Status: DISCONTINUED | OUTPATIENT
Start: 2018-04-23 | End: 2018-04-25

## 2018-04-23 RX ORDER — ISOSORBIDE MONONITRATE 60 MG/1
60 TABLET, EXTENDED RELEASE ORAL DAILY
Status: DISCONTINUED | OUTPATIENT
Start: 2018-04-23 | End: 2018-04-28 | Stop reason: HOSPADM

## 2018-04-23 RX ORDER — ASPIRIN 81 MG/1
81 TABLET, CHEWABLE ORAL DAILY
Status: DISCONTINUED | OUTPATIENT
Start: 2018-04-23 | End: 2018-04-28 | Stop reason: HOSPADM

## 2018-04-23 RX ORDER — ACETAMINOPHEN 325 MG/1
650 TABLET ORAL EVERY 6 HOURS PRN
Status: DISCONTINUED | OUTPATIENT
Start: 2018-04-23 | End: 2018-04-28 | Stop reason: HOSPADM

## 2018-04-23 RX ORDER — METHYLPREDNISOLONE SODIUM SUCCINATE 40 MG/ML
40 INJECTION, POWDER, LYOPHILIZED, FOR SOLUTION INTRAMUSCULAR; INTRAVENOUS EVERY 8 HOURS SCHEDULED
Status: DISCONTINUED | OUTPATIENT
Start: 2018-04-23 | End: 2018-04-23

## 2018-04-23 RX ORDER — SENNOSIDES 8.6 MG
1 TABLET ORAL DAILY
Status: DISCONTINUED | OUTPATIENT
Start: 2018-04-23 | End: 2018-04-28 | Stop reason: HOSPADM

## 2018-04-23 RX ORDER — AMLODIPINE BESYLATE 5 MG/1
5 TABLET ORAL DAILY
Status: DISCONTINUED | OUTPATIENT
Start: 2018-04-23 | End: 2018-04-28 | Stop reason: HOSPADM

## 2018-04-23 RX ORDER — LOSARTAN POTASSIUM 50 MG/1
100 TABLET ORAL DAILY
Status: DISCONTINUED | OUTPATIENT
Start: 2018-04-23 | End: 2018-04-24

## 2018-04-23 RX ORDER — FUROSEMIDE 10 MG/ML
40 INJECTION INTRAMUSCULAR; INTRAVENOUS DAILY
Status: DISCONTINUED | OUTPATIENT
Start: 2018-04-24 | End: 2018-04-26

## 2018-04-23 RX ORDER — POLYETHYLENE GLYCOL 3350 17 G/17G
17 POWDER, FOR SOLUTION ORAL DAILY PRN
Status: DISCONTINUED | OUTPATIENT
Start: 2018-04-23 | End: 2018-04-28 | Stop reason: HOSPADM

## 2018-04-23 RX ORDER — LEVALBUTEROL 1.25 MG/.5ML
SOLUTION, CONCENTRATE RESPIRATORY (INHALATION)
Status: COMPLETED
Start: 2018-04-23 | End: 2018-04-23

## 2018-04-23 RX ADMIN — IPRATROPIUM BROMIDE 0.5 MG: 0.5 SOLUTION RESPIRATORY (INHALATION) at 14:30

## 2018-04-23 RX ADMIN — CALCIUM CARBONATE 500 MG (1,250 MG)-VITAMIN D3 200 UNIT TABLET 1 TABLET: at 15:32

## 2018-04-23 RX ADMIN — IPRATROPIUM BROMIDE 0.5 MG: 0.5 SOLUTION RESPIRATORY (INHALATION) at 02:45

## 2018-04-23 RX ADMIN — DOCUSATE SODIUM 100 MG: 100 CAPSULE, LIQUID FILLED ORAL at 09:12

## 2018-04-23 RX ADMIN — LEVOFLOXACIN 500 MG: 500 TABLET, FILM COATED ORAL at 05:10

## 2018-04-23 RX ADMIN — FUROSEMIDE 20 MG: 10 INJECTION, SOLUTION INTRAMUSCULAR; INTRAVENOUS at 01:30

## 2018-04-23 RX ADMIN — LEVALBUTEROL HYDROCHLORIDE 1.25 MG: 1.25 SOLUTION, CONCENTRATE RESPIRATORY (INHALATION) at 08:20

## 2018-04-23 RX ADMIN — FUROSEMIDE 40 MG: 10 INJECTION, SOLUTION INTRAMUSCULAR; INTRAVENOUS at 09:12

## 2018-04-23 RX ADMIN — PANTOPRAZOLE SODIUM 40 MG: 40 TABLET, DELAYED RELEASE ORAL at 05:10

## 2018-04-23 RX ADMIN — INSULIN LISPRO 5 UNITS: 100 INJECTION, SOLUTION INTRAVENOUS; SUBCUTANEOUS at 21:40

## 2018-04-23 RX ADMIN — ASPIRIN 81 MG 81 MG: 81 TABLET ORAL at 09:12

## 2018-04-23 RX ADMIN — INSULIN GLARGINE 20 UNITS: 100 INJECTION, SOLUTION SUBCUTANEOUS at 21:37

## 2018-04-23 RX ADMIN — DOCUSATE SODIUM 100 MG: 100 CAPSULE, LIQUID FILLED ORAL at 15:32

## 2018-04-23 RX ADMIN — METOPROLOL TARTRATE 25 MG: 25 TABLET ORAL at 09:12

## 2018-04-23 RX ADMIN — LEVALBUTEROL HYDROCHLORIDE 1.25 MG: 1.25 SOLUTION, CONCENTRATE RESPIRATORY (INHALATION) at 02:45

## 2018-04-23 RX ADMIN — LEVALBUTEROL HYDROCHLORIDE 1.25 MG: 1.25 SOLUTION, CONCENTRATE RESPIRATORY (INHALATION) at 03:03

## 2018-04-23 RX ADMIN — ATORVASTATIN CALCIUM 40 MG: 40 TABLET, FILM COATED ORAL at 09:12

## 2018-04-23 RX ADMIN — NITROGLYCERIN 100 MCG/MIN: 20 INJECTION INTRAVENOUS at 00:22

## 2018-04-23 RX ADMIN — ISOSORBIDE MONONITRATE 60 MG: 60 TABLET, EXTENDED RELEASE ORAL at 09:12

## 2018-04-23 RX ADMIN — IPRATROPIUM BROMIDE 0.5 MG: 0.5 SOLUTION RESPIRATORY (INHALATION) at 19:55

## 2018-04-23 RX ADMIN — INSULIN LISPRO 5 UNITS: 100 INJECTION, SOLUTION INTRAVENOUS; SUBCUTANEOUS at 11:34

## 2018-04-23 RX ADMIN — LEVALBUTEROL HYDROCHLORIDE 1.25 MG: 1.25 SOLUTION, CONCENTRATE RESPIRATORY (INHALATION) at 19:55

## 2018-04-23 RX ADMIN — METHYLPREDNISOLONE SODIUM SUCCINATE 40 MG: 40 INJECTION, POWDER, FOR SOLUTION INTRAMUSCULAR; INTRAVENOUS at 05:10

## 2018-04-23 RX ADMIN — IPRATROPIUM BROMIDE 0.5 MG: 0.5 SOLUTION RESPIRATORY (INHALATION) at 08:20

## 2018-04-23 RX ADMIN — GUAIFENESIN 600 MG: 600 TABLET, EXTENDED RELEASE ORAL at 09:12

## 2018-04-23 RX ADMIN — INSULIN LISPRO 2 UNITS: 100 INJECTION, SOLUTION INTRAVENOUS; SUBCUTANEOUS at 06:40

## 2018-04-23 RX ADMIN — METOPROLOL TARTRATE 25 MG: 25 TABLET ORAL at 21:39

## 2018-04-23 RX ADMIN — LOSARTAN POTASSIUM 100 MG: 50 TABLET ORAL at 09:12

## 2018-04-23 RX ADMIN — LEVALBUTEROL HYDROCHLORIDE 1.25 MG: 1.25 SOLUTION, CONCENTRATE RESPIRATORY (INHALATION) at 14:30

## 2018-04-23 RX ADMIN — AMLODIPINE BESYLATE 5 MG: 5 TABLET ORAL at 09:12

## 2018-04-23 RX ADMIN — GUAIFENESIN 600 MG: 600 TABLET, EXTENDED RELEASE ORAL at 21:37

## 2018-04-23 RX ADMIN — INSULIN LISPRO 5 UNITS: 100 INJECTION, SOLUTION INTRAVENOUS; SUBCUTANEOUS at 17:14

## 2018-04-23 RX ADMIN — METHYLPREDNISOLONE SODIUM SUCCINATE 40 MG: 40 INJECTION, POWDER, FOR SOLUTION INTRAMUSCULAR; INTRAVENOUS at 21:37

## 2018-04-23 RX ADMIN — CALCIUM CARBONATE 500 MG (1,250 MG)-VITAMIN D3 200 UNIT TABLET 1 TABLET: at 11:38

## 2018-04-23 RX ADMIN — IPRATROPIUM BROMIDE 0.5 MG: 0.5 SOLUTION RESPIRATORY (INHALATION) at 03:03

## 2018-04-23 RX ADMIN — INSULIN LISPRO 8 UNITS: 100 INJECTION, SOLUTION INTRAVENOUS; SUBCUTANEOUS at 17:36

## 2018-04-23 RX ADMIN — METHYLPREDNISOLONE SODIUM SUCCINATE 40 MG: 40 INJECTION, POWDER, FOR SOLUTION INTRAMUSCULAR; INTRAVENOUS at 15:24

## 2018-04-23 RX ADMIN — INSULIN LISPRO 6 UNITS: 100 INJECTION, SOLUTION INTRAVENOUS; SUBCUTANEOUS at 17:14

## 2018-04-23 RX ADMIN — SENNOSIDES 8.6 MG: 8.6 TABLET ORAL at 09:12

## 2018-04-23 RX ADMIN — ENOXAPARIN SODIUM 40 MG: 40 INJECTION SUBCUTANEOUS at 09:11

## 2018-04-23 NOTE — CONSULTS
[address] [address] Duyen Child [de-identified] y o  female   YOB: 1935 MRN: [id number]  Unit/Bed#: [address] 627-75 Encounter: [id number]      Physician Requesting Consult: Isa Allen MD  Reason for Consult / Principal Problem: Shortness of breath    Assessment and Plan  1  Acute respiratory failure  2  Acute on chronic diastolic heart failure  3  CAD s/p CABG (1999), s/p stent (2006)  · Last cath - 2008 - LM normal, mLAD 99%, patent LIMA to LAD, diffuse atherosclerosis in distal LAD after LIMA touchdown, dLCx - small, 80%, stent in mRCA was patent, SVG chronically occluded, not reimaged  4  S/p Dual chamber PPM (2014)  5  Accelerated hypertension  6  Diabetes Mellitus Type 2 with neuropathy  7  Dementia    Outpatient cardiologist is Dr Sampson Carranza  · 2/2 URTI/Pneumonia + mild volume overload  · NT proBNP 1872, troponin negative x2  · Chest x-ray showed fluid under or infiltrate in right lower lobe  · UO 1 1 L  · Continue IV lasix for today, and likely switch to oral lasix tomorrow  · Possibly related to elevated BP in 170s at presentation  · Was on NTG drip @ 110 for a shirt while overnight  · Echo done, images reviewed  LV and RV function appears normal without any regional wall motion abnormalities, no significant valvular abnormalities  · Continue home aspirin, Imdur 60, atorvastatin 40, metoprolol 25 bid, losartan 100  · Bp in 150s, possibly uptitrate amlodipine to 10      History of Present Illness   HPI: Bren Freed is a [de-identified]y o  year old female who presents with cough and shortness of breath  82-year-old female who lives at home with her , and had been feeling unwell for the past 2 days of with excessive coughing with reduced expectation  She did not have any associated fevers or body aches    She tried using her inhaler at home without any significant benefit  When her daughter came to check on her yesterday, she noted that she was significantly short of breath and did not appear fell  As a result she decided to bring her into the ED for evaluation  In the ED, she was originally given IV steroids, nebulizers and magnesium and placed on BiPAP eventually due to tachypnea  She was eventually admitted to Medical step-down unit  On evaluation by medical team, she was found to be volume overloaded  A result she received IV Lasix 20 mg followed by IV Lasix 40 mg  She remained  on BiPAP all night, and eventually came off it this morning  , she is not acutely short At present of breath or hypoxic  No complains of chest pain, palpitations, dizziness or lightheadedness  Past Medical History:   Diagnosis Date    Asthma     CAD (coronary artery disease) of artery bypass graft     Cardiac disease     Dementia     Depression     Diabetes mellitus (HCC)     Hyperlipidemia     Hypertension     MI [facility] infarction) ([facility] [address] )     Neuropathy     BRANT (obstructive sleep apnea)      Past Surgical History:   Procedure Laterality Date    APPENDECTOMY      CATARACT EXTRACTION      EGD AND COLONOSCOPY       History reviewed  No pertinent family history    Meds/Allergies   all current active meds have been reviewed and current meds: Current Facility-Administered Medications   Medication Dose Route Frequency    acetaminophen (TYLENOL) tablet 650 mg  650 mg Oral Q6H PRN    amLODIPine (NORVASC) tablet 5 mg  5 mg Oral Daily    aspirin chewable tablet 81 mg  81 mg Oral Daily    atorvastatin (LIPITOR) tablet 40 mg  40 mg Oral Daily    calcium carbonate-vitamin D (OSCAL-D) 500 mg-200 units per tablet 1 tablet  1 tablet Oral BID With Meals    docusate sodium (COLACE) capsule 100 mg  100 mg Oral BID    enoxaparin (LOVENOX) subcutaneous injection 40 mg  40 mg Subcutaneous Daily    [START ON 4/24/2018] furosemide (LASIX) injection 40 mg 40 mg Intravenous Daily    guaiFENesin (MUCINEX) 12 hr tablet 600 mg  600 mg Oral Q12H [facility]    insulin glargine (LANTUS) subcutaneous injection 20 Units  20 Units Subcutaneous HS    insulin lispro (HumaLOG) 100 units/mL subcutaneous injection 1-6 Units  1-6 Units Subcutaneous TID AC    insulin lispro (HumaLOG) 100 units/mL subcutaneous injection 1-6 Units  1-6 Units Subcutaneous HS    insulin lispro (HumaLOG) 100 units/mL subcutaneous injection 5 Units  5 Units Subcutaneous TID With Meals    ipratropium (ATROVENT) 0 02 % inhalation solution 0 5 mg  0 5 mg Nebulization Q6H    isosorbide mononitrate (IMDUR) 24 hr tablet 60 mg  60 mg Oral Daily    levalbuterol (XOPENEX) inhalation solution 1 25 mg  1 25 mg Nebulization Q6H    [START ON 4/24/2018] levofloxacin (LEVAQUIN) tablet 250 mg  250 mg Oral Q24H    losartan (COZAAR) tablet 100 mg  100 mg Oral Daily    methylPREDNISolone sodium succinate (Solu-MEDROL) injection 40 mg  40 mg Intravenous Q8H [facility]    metoprolol tartrate (LOPRESSOR) tablet 25 mg  25 mg Oral Q12H [facility]    nitroglycerin (NITROSTAT) SL tablet 0 4 mg  0 4 mg Sublingual Q5 Min PRN    ondansetron (ZOFRAN) injection 4 mg  4 mg Intravenous Q6H PRN    pantoprazole (PROTONIX) EC tablet 40 mg  40 mg Oral Early Morning    polyethylene glycol (MIRALAX) packet 17 g  17 g Oral Daily PRN    senna (SENOKOT) tablet 8 6 mg  1 tablet Oral Daily    sodium chloride (PF) 0 9 % injection 3 mL  3 mL Intravenous PRN     Prescriptions Prior to Admission   Medication    amLODIPine (NORVASC) 5 mg tablet    aspirin 325 mg tablet    atorvastatin (LIPITOR) 40 mg tablet    Insulin Disposable Pump (V-GO 20) KIT    Insulin Lispro (HUMALOG) 100 UNIT/ML SOCT    isosorbide mononitrate (IMDUR) 60 mg 24 hr tablet    losartan (COZAAR) 100 MG tablet    metoprolol tartrate (LOPRESSOR) 25 mg tablet    ONE TOUCH ULTRA TEST test strip    pantoprazole (PROTONIX) 40 mg tablet    nitroglycerin (NITROSTAT) 0 4 mg SL tablet Allergies   Allergen Reactions    Ace Inhibitors     Chlorpromazine     Latex     Lisinopril     Namenda [Memantine]     Penicillins     Propoxyphene     Stadol [Butorphanol]      Social History     Social History    Marital status: /Civil Union     Spouse name: N/A    Number of children: N/A    Years of education: N/A     Social History Main Topics    Smoking status: Never Smoker    Smokeless tobacco: Never Used    Alcohol use No    Drug use: No    Sexual activity: Not Asked     Other Topics Concern    None     Social History Narrative    None         Review of Systems    No c/o chest pain, No c/o palpitations, c/o shortness of breath+, No c/o dizziness or light-headedness, No c/o abdominal pain, no c/o nausea/vomitting  All other systems negative    Vitals:    04/23/18 0930 04/23/18 1130 04/23/18 1136 04/23/18 1200   BP:    157/60   Pulse: 62 68  60   Resp: 19 21 22   Temp:   98 5 °F (36 9 °C)    TempSrc:   Oral    SpO2: 99% 98%  97%   Weight:       Height:         Orthostatic Blood Pressures    Flowsheet Row Most Recent Value   Blood Pressure  157/60 filed at 04/23/2018 1200   Patient Position - Orthostatic VS  Sitting filed at 04/22/2018 2327        Body mass index is 35 43 kg/m²  Wt Readings from Last 5 Encounters:   04/23/18 106 kg (233 lb 0 4 oz)   02/27/18 88 5 kg (195 lb)   12/06/17 89 9 kg (198 lb 3 oz)   10/31/17 89 1 kg (196 lb 6 1 oz)   09/27/17 88 2 kg (194 lb 6 4 oz)     I/O last 3 completed shifts:  In: -   Out: 200 [Urine:200]      Physical Exam    Constitutional:  [address] [place] [address] [place] appears well, alert and oriented x 3, pleasant and cooperative   No acute distress   HEENT:    Normocephalic, atraumatic   Neck:  Supple, Mild JVD   Lungs:  Right basal rales+, no wheezing or rhonchi   Chest Wall:  No tenderness or deformity    Heart::  Regular rate, Regular rhythm, S1 and S2 normal, no murmur, rub or gallop    Abdomen:  Soft, non-tender, non-distended   Neurological: Awake, alert, oriented x3  Non-focal exam    Extremities:  trace pedal edema, No calf tenderness         Labs:    Results from last 7 days  Lab Units 04/23/18  0651 04/22/18  2325 04/22/18  2315   WBC Thousand/uL 9 39  --  7 12   HEMOGLOBIN g/dL 12 1  --  12 6   I STAT HEMOGLOBIN g/dl  --  13 3  --    HEMATOCRIT % 37 8  --  38 5   RDW % 15 6*  --  15 5*   PLATELETS Thousands/uL 181  --  196       Results from last 7 days  Lab Units 04/23/18  0457 04/22/18  2325 04/22/18  2315   SODIUM mmol/L 139  --  139   POTASSIUM mmol/L 4 3  --  4 1   CHLORIDE mmol/L 106  --  105   CO2 mmol/L 24  --  28   BUN mg/dL 33*  --  29*   CREATININE mg/dL 1 23  --  1 20   CALCIUM mg/dL 8 9  --  9 4   TOTAL PROTEIN g/dL  --   --  7 3   BILIRUBIN TOTAL mg/dL  --   --  0 69   AST U/L  --   --  20   ALT U/L  --   --  27   ALK PHOS U/L  --   --  114   GLUCOSE RANDOM mg/dL 301*  --  249*   GLUCOSE, ISTAT mg/dl  --  267*  --        Results from last 7 days  Lab Units 04/23/18  0457 04/22/18  2315   TROPONIN I ng/mL <0 02 <0 02           Results from last 7 days  Lab Units 04/22/18  2315   INR  1 05       Results from last 7 days  Lab Units 04/23/18  0457   HEMOGLOBIN A1C % 8 7*       EKG: Intermittent A-pacing  Telemetry: intermittent A-paced rhythm,   Imaging: Xr Chest Portable    Result Date: 4/23/2018  Narrative: CHEST INDICATION:   sob  History of asthma  COMPARISON:  Chest radiographs June 2, 2014 EXAM PERFORMED/VIEWS:  XR CHEST PORTABLE FINDINGS: The heart is enlarged  Atherosclerotic changes in the aorta  Dual lead pacing device  Lung volumes diminished  Obscuration of the right hemidiaphragm and right heart border, secondary to fluid and/or infiltrate  Osseous structures appear within normal limits for patient age  Impression: Fluid and/or infiltrate right lower lobe  The study was marked in [facility] for immediate notification   Workstation performed: [id number]       Cardiac testing:   Results for orders placed in visit on 05/21/14 Echo complete with contrast if indicated    Narrative [address]   [facility] [address] [facility]   Phone: [phone]   TRANSTHORACIC ECHOCARDIOGRAM   2D, M-MODE, DOPPLER, AND COLOR DOPPLER   Study date:  25-May-2014   Patient: Dione Arreola   MR number: [id number]   Account number: [de-identified]   : 1935   Age: 70 years   Gender: Female   Status: Inpatient   Location: Bedside   Height: 67 in   Weight: 244 4 lb   BP: 152/ 67 mmHg   Indications: Other Altered consiousness; stroke   Diagnoses: 56 - CVA, 780 09 - OTHER ALTER CONSCIOUSNES   Sonographer:  Mya Graham   Primary Physician:  Abel Estrada   Referring Physician:  Lazaro Jacome DO   Group:  [facility]   Interpreting Physician:  Tabby Navarrete MD   SUMMARY   LEFT VENTRICLE:   Systolic function was normal  Ejection fraction was estimated to be 60 %  There were no regional wall motion abnormalities  Features were consistent with a pseudonormal left ventricular filling pattern,   with concomitant abnormal relaxation and increased filling pressure (grade 2   diastolic dysfunction)  LEFT ATRIUM:   The atrium was mildly dilated  TRICUSPID VALVE:   There was mild regurgitation  Pulmonary artery systolic pressure was at the upper limits of normal    PERICARDIUM:   There was no pericardial effusion  There is however a structure posterior to the left atrium which appears to be   compressing on the wall  Clinical correlation with CT chest recommended  HISTORY: PRIOR HISTORY: CAD S/P CABG; DM2; HTN; Dementia   TRANSTHORACIC ECHOCARDIOGRAM   Patient: Dione Arreola   MR number: [id number]    ------ Page 2   PROCEDURE: The procedure was performed at the bedside  This was a routine   study  The transthoracic approach was used  The study included complete 2D   imaging, M-mode, complete spectral Doppler, and color Doppler     Echocardiographic views were limited due to decreased penetration and lung   interference  This was a technically difficult study  LEFT VENTRICLE: Size was normal  Systolic function was normal  Ejection   fraction was estimated to be 60 %  There were no regional wall motion   abnormalities  Wall thickness was normal  DOPPLER: Features were consistent   with a pseudonormal left ventricular filling pattern, with concomitant    abnormal   relaxation and increased filling pressure (grade 2 diastolic dysfunction)  RIGHT VENTRICLE: The size was normal  Systolic function was normal  Wall   thickness was normal    LEFT ATRIUM: The atrium was mildly dilated  RIGHT ATRIUM: Size was normal    MITRAL VALVE: Valve structure was normal  There was normal leaflet separation  DOPPLER: The transmitral velocity was within the normal range  There was no   evidence for stenosis  There was no regurgitation  AORTIC VALVE: The valve was trileaflet  Leaflets exhibited normal cuspal   separation and sclerosis  DOPPLER: Transaortic velocity was within the normal   range  There was no evidence for stenosis  There was no regurgitation  TRICUSPID VALVE: The valve structure was normal  There was normal leaflet   separation  DOPPLER: The transtricuspid velocity was within the normal range  There was no evidence for stenosis  There was mild regurgitation  Pulmonary   artery systolic pressure was at the upper limits of normal    PULMONIC VALVE: Not well visualized  DOPPLER: There was no regurgitation  PERICARDIUM: There was no pericardial effusion  There is however a structure posterior to the left atrium which appears to be   compressing on the wall  Clinical correlation with CT chest recommended  AORTA: The root exhibited normal size     SYSTEM MEASUREMENT TABLES   2D   %FS: 25 55 %   AV Diam: 3 33 cm   EDV(Teich): 71 72 ml   EF(Teich): 50 86 %   ESV(Teich): 35 25 ml   IVSd: 1 16 cm   LA Area: 26 56 cm2   TRANSTHORACIC ECHOCARDIOGRAM   Patient: [address]   MR number: [id number]    ------ Page 3   LA Diam: 2 49 cm   LVEDV MOD A4C: 136 8 ml   LVEF MOD A4C: 66 02 %   LVESV MOD A4C: 46 48 ml   LVIDd: 4 04 cm   LVIDs: 3 01 cm   LVLd A4C: 9 38 cm   LVLs A4C: 6 76 cm   LVPWd: 1 23 cm   RA Area: 14 67 cm2   RVIDd: 2 84 cm   SV MOD A4C: 90 32 ml   SV(Teich): 36 48 ml   CW   RAP: 10 mmHg   TR Vmax: 2 41 m/s   TR maxP 25 mmHg   MM   TAPSE: 1 81 cm   PW   E': 0 08 m/s   E/E': 11 03   MV A [place]: 0 74 m/s   MV Dec [place]: 2 61 m/s2   MV DecT: 334 3 ms   MV E [place]: 0 87 m/s   MV E/A Ratio: 1 18   MV PHT: 96 95 ms   MVA By PHT: 2 27 cm2   RVSP: 33 25 mmHg   Inters\[facility]\"" Commission Accredited Echocardiography Laboratory   Prepared and electronically signed by   Donna Berry MD   Signed [id number] 14:46:11      No results found for this or any previous visit  No results found for this or any previous visit  Results for orders placed in visit on 14   NM myocardial perfusion spect (stress and/or rest)    Narrative PHARMACOLOGIC STRESS TEST, LEXISCAN          INDICATION-  Chest pain, shortness of breath  Prior history of CABG   and stent placement   COMPARISON- No prior studies,   TECHNIQUE-  Pharmacologic stress testing was performed utilizing   [employer]  SPECT myocardial perfusion images was performed  Dosages of   TC-99-mm Myoview were 10 6 mCi and 33 0 mCi IV  Both rest and stress   images were performed  Images were then reconstructed in the short,   vertical and horizontal long axes projections  Baseline heart rate 61 beats per minute  Peak heart of 78 beats per minute  Baseline blood pressure 134/77 mmHg  Peak blood pressure 145/71 mmHg  Symptoms-   Shortness of breath nausea chest pain recovery  Time to peak imaging for rest 55 minutes and stress 33 minutes  Please see cardiology report of physiologic data  FINDINGS-   OVERALL QUALITY-   Acceptable     ARTIFACT-  Breast attenuation   PERFUSION IMAGES-   SPECT images showed a large region of mildly diminished perfusion within the anterior wall which was normal at rest   compatible with a region of ischemia  There are no other abnormal   regions of diminished perfusion  Left ventricular cavity was normal in   size  It does appear to increase with stress  WALL MOTION-  Normal contractility   EJECTION FRACTION-  66 %   IMPRESSION-   1  Region of ischemia seen in the anterior wall   2  Left ventricular ejection fraction- 66%   ##imslh##imslh   Transcribed on- [id number]           - BYRON Hannah MD        Reading Radiologist- BYRON Hannah MD        Releasing Radiologist- BYRON Hannah MD        Released Date Time- 05/16/14 1527      ------------------------------------------------------------------------------   [address] 28        Counseling / Coordination of Care  Total floor / unit time spent today 40 minutes  Greater than 50% of total time was spent with the patient and / or family counseling and / or coordination of care  A description of the counseling / coordination of care: Obtained history, performed physical examination, discussed laboratory and imaging results, and explained further plan of care  Uli Rose

## 2018-04-23 NOTE — ASSESSMENT & PLAN NOTE
A1c is 8 7  Patient is presently just on insulin sliding scale  With hyperglycemia, likely exacerbated with steroids, I will have the patient on basal bolus insulin    Will adjust insulin doses accordingly

## 2018-04-23 NOTE — ED ATTENDING ATTESTATION
Kt Tom MD, saw and evaluated the patient  I have discussed the patient with the resident/non-physician practitioner and agree with the resident's/non-physician practitioner's findings, Plan of Care, and MDM as documented in the resident's/non-physician practitioner's note, except where noted  All available labs and Radiology studies were reviewed  At this point I agree with the current assessment done in the Emergency Department  I have conducted an independent evaluation of this patient a history and physical is as follows:     this is an 24-year-old female with history of asthma, pacemaker, presenting with shortness of breath  Patient has had progressive shortness of breath over the last several days, worse today  Patient received a neb at home, but did not have any improvement in her symptoms  On arrival to the emergency department, the patient is in significant respiratory distress  Physician staff was called immediately to the room  Patient cannot provide history due to her being in extremities  On primary survey, the patient is tachypneic with a respiratory rate of 40  She has audible wheezing and is tripodding and grunting  She is conscious and follows commands  She has intact pulses  Her blood pressure is high  On secondary survey, the patient has moist mucous membranes  Her face is symmetric  Her neck is supple with mild JVD  Her heart is regular without murmurs, rubs, or gallops  Her lungs are decreased throughout with inspiratory and expiratory wheezing and poor air movement  Her abdomen is soft and nontender  She has trace edema bilaterally which is symmetric with no palpable cords  She has no skin changes  She is neurologically intact  The patient was given heart neb on arrival, given magnesium, patient received IM epinephrine  Patient was placed on BiPAP, had improvement in respiratory status on BiPAP    Patient's chest x-ray with likely right-sided effusion, large heart  Impression:  Wheezing, respiratory distress may be secondary to CHF versus underlying asthma  Given significant hypertension, pacemaker, will place patient on nitroglycerin, will keep her on BiPAP as she is more comfortable  Will admit to the hospital   Patient required 30 minutes of bedside critical care time outside of separately billable procedures    Critical Care Time  CritCare Time    Procedures

## 2018-04-23 NOTE — ED NOTES
Nitro not titrated at 5 min intervals due to multiple pump stoppages/occlusions        Kacey Diaz RN  04/23/18 1099

## 2018-04-23 NOTE — ASSESSMENT & PLAN NOTE
· As per pulmonologist, there is a right lower lobe opacity that could represent pneumonia, given presentation  · Thus continue with IV Levaquin for now  · Would check sputum culture, and procalcitonin  Follow-up results

## 2018-04-23 NOTE — CASE MANAGEMENT
Initial Clinical Review    Admission: Date/Time/Statement: 4/23/18 @ 0019 Inpatient Written     Orders Placed This Encounter   Procedures    Inpatient Admission (expected length of stay for this patient is greater than two midnights)     Standing Status:   Standing     Number of Occurrences:   1     Order Specific Question:   Admitting Physician     Answer:   Genesis Ramos     Order Specific Question:   Level of Care     Answer:   Med Surg [16]     Order Specific Question:   Estimated length of stay     Answer:   More than 2 Midnights     Order Specific Question:   Certification     Answer:   I certify that inpatient services are medically necessary for this patient for a duration of greater than two midnights  See H&P and MD Progress Notes for additional information about the patient's course of treatment  ED: Date/Time/Mode of Arrival:   ED Arrival Information     Expected Arrival Acuity Means of Arrival Escorted By Service Admission Type    - 4/22/2018 23:01 Emergent Wheelchair Family Member General Medicine Emergency    Arrival Complaint    SOB          Chief Complaint:   Chief Complaint   Patient presents with    Shortness of Breath     family reports SOB  states she took her nebulizer PTA with mucinex       History of Illness:   this is an 61-year-old female with history of asthma, pacemaker, presenting with shortness of breath  Patient has had progressive shortness of breath over the last several days, worse today  Patient received a neb at home, but did not have any improvement in her symptoms  On arrival to the emergency department, the patient is in significant respiratory distress  Physician staff was called immediately to the room  On primary survey, the patient is tachypneic with a respiratory rate of 40  She has audible wheezing and is tripodding and grunting  She is conscious and follows commands  She has intact pulses  Her blood pressure is high   The patient was given heart neb on arrival, given magnesium, patient received IM epinephrine  Patient was placed on BiPAP, had improvement in respiratory status on BiPAP  ED Vital Signs:   ED Triage Vitals   Temperature Pulse Respirations Blood Pressure SpO2   04/22/18 2325 04/22/18 2305 04/22/18 2305 04/22/18 2305 04/22/18 2305   (!) 97 2 °F (36 2 °C) 94 (!) 38 (!) 169/105 97 %      Temp Source Heart Rate Source Patient Position - Orthostatic VS BP Location FiO2 (%)   04/22/18 2325 04/22/18 2305 04/22/18 2305 04/22/18 2305 --   Rectal Monitor Sitting Right arm       Pain Score       04/22/18 2305       No Pain        Wt Readings from Last 1 Encounters:   04/23/18 106 kg (233 lb 0 4 oz)       Vital Signs (abnormal): resps 26-39, /74    Abnormal Labs/Diagnostic Test Results:   BUN 29     Glucose 249       NT-proBNP 1,872       EKG:  paced    ED Treatment:   Medication Administration from 04/22/2018 2301 to 04/23/2018 0240       Date/Time Order Dose Route Action     04/22/2018 2313 EPINEPHrine PF (ADRENALIN) 1 mg/mL injection 0 5 mg 0 5 mg Intramuscular Given     04/22/2018 2322 magnesium sulfate 2 g/50 mL IVPB (premix) 2 g 2 g Intravenous New Bag     04/22/2018 2321 albuterol inhalation solution 10 mg 10 mg Nebulization Given      04/22/2018 2321 ipratropium (ATROVENT) 0 02 % inhalation solution 1 mg 1 mg Nebulization Given     04/22/2018 2321 sodium chloride 0 9 % inhalation solution 3 mL 3 mL Nebulization Given      04/22/2018 2321 methylPREDNISolone sodium succinate (Solu-MEDROL) injection 125 mg 125 mg Intravenous Given     04/23/2018 0022 nitroGLYcerin (TRIDIL) 50 mg in 250 mL infusion (premix) 100 mcg/min Intravenous New Bag     04/23/2018 0130 furosemide (LASIX) injection 20 mg 20 mg Intravenous Given          Past Medical/Surgical History:    Active Ambulatory Problems     Diagnosis Date Noted    3-vessel coronary artery disease 08/23/2012    Abnormal TSH 10/31/2017    Intermittent asthma with acute exacerbation 08/23/2012  Dementia without behavioral disturbance 11/13/2013    Depression 08/23/2012    Diabetic retinopathy ([place] [address] ) 08/23/2012    DM (diabetes mellitus), type 2, uncontrolled w/ophthalmic complication ([place] [address] ) 26/27/4933    Gastroesophageal reflux disease with hiatal hernia 06/27/2014    Glaucoma 08/23/2012    Hyperlipidemia 08/23/2012    Hypertension 08/23/2012    Cerebral artery occlusion with cerebral infarction ([place] [address] ) 03/05/2014    Obesity 08/23/2012    Obstructive sleep apnea 08/23/2012    Osteoarthritis of knee 08/23/2012    Urine incontinence 11/01/2017    Ventricular tachycardia ([place] [address] ) 12/06/2017     Resolved Ambulatory Problems     Diagnosis Date Noted    No Resolved Ambulatory Problems     Past Medical History:   Diagnosis Date    Asthma     Cardiac disease     Dementia     Diabetes mellitus ([place] [address] )     Hyperlipidemia     Hypertension        Admitting Diagnosis: Shortness of breath [R06 02]  SOB (shortness of breath) [R06 02]  Asthma [J45 909]  Asthma exacerbation [J45 901]    Age/Sex: [de-identified] y o  female    Assessment/Plan:  * Acute respiratory failure (HCC)   Assessment & Plan     Multifactorial secondary to acute asthma exacerbation acute on chronic diastolic heart failure and accelerated hypertension  Patient presented tachypneic with bilateral audible wheezes ,significantly elevated proBNP >1800 and blood pressure 188/74  Follow-up official imaging study report  Obtain sputum culture Gram stain  Her respiratory  distress required placement on BiPAP and step-down 1 level ( intensivist agreeable)  VBG showed respiratory alkalosis, will attempt to wean off BiPAP  Will continue short course of IV Lasix  Monitor daily weight intake output,HF diet  Respiratory protocol continue nebs Solu-Medrol   Getting worse quantity and quality of sputum will start on levofloxacin until Pulmonary evaluation  Continue expectorant   The last echocardiogram in 2014 reported ejection fraction to be 02% grade 2 diastolic dysfunction  Will update echocardiogram  Pulmonary consult  Serial cardiac enzymes          Acute on chronic diastolic heart failure (HCC)   Assessment & Plan     Management as above          Accelerated hypertension   Assessment & Plan     Management as above          Intermittent asthma with acute exacerbation   Assessment & Plan     Management as above          Dementia without behavioral disturbance   Assessment & Plan     Supportive care          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)   Assessment & Plan     Recheck hemoglobin A1c in a m  Continue Accu-Cheks insulin sliding scale ADA diet             VTE Prophylaxis: Enoxaparin (Lovenox)  / sequential compression device   Code Status: full  POLST: There is no POLST form on file for this patient (pre-hospital)  Discussion with family:  Patient daughter at bedside     Anticipated Length of Stay:  Patient will be admitted on an Inpatient basis with an anticipated length of stay of  > 2 midnights     Justification for Hospital Stay:  Multidisciplinary approach     Admission Orders:  Telemetry  OOB as maggie  Accuchecks QAC and QHS with coverage  Daily weights, I/O  Bipap  Echo  CXR  Pt, ot  Consult critical care, cardiology, pulmonology, cm  Sequential compression device  Blood cxs pending  Trop q3h    Scheduled Meds:   Current Facility-Administered Medications:  acetaminophen 650 mg Oral Q6H PRN   amLODIPine 5 mg Oral Daily   aspirin 81 mg Oral Daily   atorvastatin 40 mg Oral Daily   calcium carbonate-vitamin D 1 tablet Oral BID With Meals   docusate sodium 100 mg Oral BID   enoxaparin 40 mg Subcutaneous Daily   [START ON 4/24/2018] furosemide 40 mg Intravenous Daily   guaiFENesin 600 mg Oral Q12H [address]   insulin glargine 20 Units Subcutaneous HS   insulin lispro 1-6 Units Subcutaneous TID AC   insulin lispro 1-6 Units Subcutaneous HS   insulin lispro 5 Units Subcutaneous TID With Meals   [START ON 4/24/2018] ipratropium 0 5 mg Nebulization Q6H   isosorbide mononitrate 60 mg Oral Daily   [START ON 4/24/2018] levalbuterol 1 25 mg Nebulization Q6H   [START ON 4/24/2018] levofloxacin 250 mg Oral Q24H   losartan 100 mg Oral Daily   methylPREDNISolone sodium succinate 40 mg Intravenous Q8H [address]   metoprolol tartrate 25 mg Oral Q12H [address]   nitroglycerin 0 4 mg Sublingual Q5 Min PRN   ondansetron 4 mg Intravenous Q6H PRN   pantoprazole 40 mg Oral Early Morning   polyethylene glycol 17 g Oral Daily PRN   senna 1 tablet Oral Daily   sodium chloride (PF) 3 mL Intravenous PRN     Continuous Infusions:    PRN Meds:   acetaminophen    nitroglycerin    ondansetron    polyethylene glycol    sodium chloride (PF)    Thank you,  [address] [facility] in the [facility] by [employer] [facility] for 2017  Network Utilization Review Department  Phone: [phone]; Fax [phone]  ATTENTION: The Network Utilization Review Department is now centralized for our 7 Facilities  Make a note that we have a new phone and fax numbers for our Department  Please call with any questions or concerns to [phone] and carefully follow the prompts so that you are directed to the right person  All voicemails are confidential  Fax any determinations, approvals, denials, and requests for initial or continue stay review clinical to [phone]  Due to HIGH CALL volume, it would be easier if you could please send faxed requests to expedite your requests and in part, help us provide discharge notifications faster

## 2018-04-23 NOTE — RESPIRATORY THERAPY NOTE
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)      Home Pulmonary Medications:  Albuterol mdi       Past Medical History:   Diagnosis Date    Asthma     Cardiac disease     Dementia     [facility] mellitus [facility]     Hyperlipidemia     Hypertension      Social History     Social History    Marital status: /Civil Union     Spouse name: N/A    Number of children: N/A    Years of education: N/A     Social History Main Topics    Smoking status: Never Smoker    Smokeless tobacco: Never Used    Alcohol use No    Drug use: No    Sexual activity: Not Asked     Other Topics Concern    None     Social History Narrative    None       Subjective         Objective    Physical Exam:   Assessment Type: (P) During-treatment (Pt on bipap daughter at bedside she provided pul hx )  General Appearance: (P) Alert, Awake  Respiratory Pattern: (P) Assisted  Chest Assessment: (P) Chest expansion symmetrical  Bilateral Breath Sounds: (P) Expiratory wheezes    Vitals:  Blood pressure 130/62, pulse 60, temperature (!) 97 2 °F (36 2 °C), temperature source Rectal, resp  rate 14, height 5' 8" (1 727 m), weight 86 2 kg (190 lb), SpO2 97 %  Imaging and other studies: I have personally reviewed pertinent reports  Plan    Respiratory Plan: (P) Moderate/Severe Distress pathway        Resp Comments: Attempted pt off Bipap per SLIM request  Pt became extremely labored and  tachypneic RR 38pm  Pt placed back on bipap   Pt resp rate decreased to 18bpm

## 2018-04-23 NOTE — SOCIAL WORK
Met with the patient and her daughter, ΣΑΡΑΝΤΙ, to complete assessment and explain role of CM  The patient lives at home with her spouse, son, and two adult grandsons  Her son is not currently working and her one grandson works nights so family is able to provide assist  Daughter visits almost daily and has FMLA to transport for medical appointments  Her  uses a wc and has aides and family cares for him and performs homemaking  Prior to admission the patient used a wheeled walker and was able to dress herself  DME at home includes transport chair, tube bench, commode and wheeled walker  The ranch style home has ramped entrance  Patient has a  from [address] but receives no services  Her spouse is client of [facility]  Discussed follow up at [place] and [facility] referral was made to [facility] as patient's preference  Patient's has prescription coverage and uses [facility], [address]  Daughter reports patient has an advance directive and was requested to provide a copy  Family contact is Scot Ramírez, [phone]  CM reviewed d/c planning process including the following: identifying help at home, patient preference for d/c planning needs, Discharge Lounge, [facility] Meds to Bed program, availability of treatment team to discuss questions or concerns patient and/or family may have regarding understanding medications and recognizing signs and symptoms once discharged  CM also encouraged patient to follow up with all recommended appointments after discharge  Patient advised of importance for patient and family to participate in managing patients medical well being  Patient/caregiver received discharge checklist  Content reviewed  Patient/caregiver encouraged to participate in discharge plan of care prior to discharge home

## 2018-04-23 NOTE — ED NOTES
ECG completed at 2309  Viewed and signed by Dr Cass Sorensen, copy on chart       Jose Angel Lentz  04/22/18 7756

## 2018-04-23 NOTE — PROGRESS NOTES
Called and notified April Rojo of pt with XW=[id number] at 4pm and 300s at 11a  Pt on IV steroids and insulin pump at home  Pt rec'd SSIC of 6 units and 5 units of meal time coverage (11units total)  An additional 8units of lispro sc ordered to be given now and pt changed to receive 10units of lispro with meals  Will monitor

## 2018-04-23 NOTE — ED PROVIDER NOTES
History  Chief Complaint   Patient presents with    Shortness of Breath     family reports SOB  states she took her nebulizer PTA with mucinex     [de-identified]year old female presenting to the emergency department for evaluation of shortness of breath  She has a Hx of significant asthma  Her daughter is with her and states she has had some mild SOB and cough for the past 2 days  Today she has been using her inhaler with increaseing frequency to decreasing effect  She arrives in marked respiratory distress unable to provide a Hx  The majority of the history was obtained from her daughter  She states that she has had URI symptoms for the past several days  Otherwise no fevers or chills the cough is nonproductive  She has had a significant asthma exacerbation about a year ago but had never required intubation or mechanical ventilation  Prior to Admission Medications   Prescriptions Last Dose Informant Patient Reported? Taking?    Insulin Disposable Pump (V-GO 20) KIT 4/22/2018 at Unknown time Child Yes Yes   Sig: V-Go 20 KIT  Use  daily as directed   Quantity: 3;  Refills: 3      Irma Vazquez ;  Start 14-Jan-2016  Active  30 Kit Box   Insulin Lispro (HUMALOG) 100 UNIT/ML SOCT 4/22/2018 at Unknown time Child Yes Yes   Sig: Inject 20 Units under the skin   ONE TOUCH ULTRA TEST test strip 4/22/2018 at Unknown time Child Yes Yes   amLODIPine (NORVASC) 5 mg tablet 4/22/2018 at Unknown time Child Yes Yes   Sig: Take by mouth   aspirin 325 mg tablet 4/22/2018 at Unknown time Child Yes Yes   Sig: Take by mouth   atorvastatin (LIPITOR) 40 mg tablet 4/22/2018 at Unknown time Child No Yes   Sig: TAKE 1 TABLET BY MOUTH EVERY DAY   isosorbide mononitrate (IMDUR) 60 mg 24 hr tablet 4/22/2018 at Unknown time Child Yes Yes   Sig: Take by mouth   losartan (COZAAR) 100 MG tablet 4/22/2018 at Unknown time Child Yes Yes   Sig: Take by mouth   metoprolol tartrate (LOPRESSOR) 25 mg tablet 4/22/2018 at Unknown time Child Yes Yes Sig: Take by mouth   nitroglycerin (NITROSTAT) 0 4 mg SL tablet More than a month at Unknown time Child Yes No   Sig: Place under the tongue   pantoprazole (PROTONIX) 40 mg tablet 4/22/2018 at Unknown time Child Yes Yes   Sig: Take by mouth      Facility-Administered Medications: None       Past Medical History:   Diagnosis Date    Asthma     CAD (coronary artery disease) of artery bypass graft     Cardiac disease     Dementia     Depression     Diabetes mellitus (HCC)     Hyperlipidemia     Hypertension     MI [facility] infarction) [facility] [address] [facility]     Neuropathy     BRANT (obstructive sleep apnea)        Past Surgical History:   Procedure Laterality Date    APPENDECTOMY      CATARACT EXTRACTION      EGD AND COLONOSCOPY         History reviewed  No pertinent family history  I have reviewed and agree with the history as documented  Social History   Substance Use Topics    Smoking status: Never Smoker    Smokeless tobacco: Never Used    Alcohol use No        Review of Systems   Unable to perform ROS: Acuity of condition       Physical Exam  ED Triage Vitals   Temperature Pulse Respirations Blood Pressure SpO2   04/22/18 2325 04/22/18 2305 04/22/18 2305 04/22/18 2305 04/22/18 2305   (!) 97 2 °F (36 2 °C) 94 (!) 38 (!) 169/105 97 %      Temp Source Heart Rate Source Patient Position - Orthostatic VS BP Location FiO2 (%)   04/22/18 2325 04/22/18 2305 04/22/18 2305 04/22/18 2305 --   Rectal Monitor Sitting Right arm       Pain Score       04/22/18 2305       No Pain           Orthostatic Vital Signs  Vitals:    04/23/18 1130 04/23/18 1200 04/23/18 1430 04/23/18 1522   BP:  157/60  121/71   Pulse: 68 60 59 60   Patient Position - Orthostatic VS:    Lying       Physical Exam   Constitutional: She is oriented to person, place, and time  She appears well-developed and well-nourished  She appears distressed  HENT:   Head: Normocephalic and atraumatic     Mouth/Throat: Oropharynx is clear and moist    Eyes: EOM are normal  Pupils are equal, round, and reactive to light  Neck: No JVD present  No tracheal deviation present  Cardiovascular: Normal rate, regular rhythm, normal heart sounds and intact distal pulses  Exam reveals no gallop and no friction rub  No murmur heard  Pulmonary/Chest: Accessory muscle usage present  Tachypnea noted  She is in respiratory distress  She has decreased breath sounds  She has wheezes  She has no rales  Abdominal: Soft  Bowel sounds are normal  She exhibits no distension  There is no tenderness  There is no rebound and no guarding  Neurological: She is alert and oriented to person, place, and time  No cranial nerve deficit  She exhibits normal muscle tone  Skin: Skin is warm and dry  She is not diaphoretic  No pallor  Psychiatric: She has a normal mood and affect  Her behavior is normal    Nursing note and vitals reviewed        ED Medications  Medications   sodium chloride (PF) 0 9 % injection 3 mL (not administered)   amLODIPine (NORVASC) tablet 5 mg (5 mg Oral Given 4/23/18 0912)   atorvastatin (LIPITOR) tablet 40 mg (40 mg Oral Given 4/23/18 0912)   isosorbide mononitrate (IMDUR) 24 hr tablet 60 mg (60 mg Oral Given 4/23/18 0912)   losartan (COZAAR) tablet 100 mg (100 mg Oral Given 4/23/18 0912)   metoprolol tartrate (LOPRESSOR) tablet 25 mg (25 mg Oral Given 4/23/18 0912)   nitroglycerin (NITROSTAT) SL tablet 0 4 mg (not administered)   pantoprazole (PROTONIX) EC tablet 40 mg (40 mg Oral Given 4/23/18 0510)   docusate sodium (COLACE) capsule 100 mg (100 mg Oral Given 4/23/18 1532)   senna (SENOKOT) tablet 8 6 mg (8 6 mg Oral Given 4/23/18 0912)   polyethylene glycol (MIRALAX) packet 17 g (not administered)   ondansetron (ZOFRAN) injection 4 mg (not administered)   enoxaparin (LOVENOX) subcutaneous injection 40 mg (40 mg Subcutaneous Given 4/23/18 0911)   insulin lispro (HumaLOG) 100 units/mL subcutaneous injection 1-6 Units (5 Units Subcutaneous Given 4/23/18 0014) insulin lispro (HumaLOG) 100 units/mL subcutaneous injection 1-6 Units (1 Units Subcutaneous Not Given 4/23/18 0439)   acetaminophen (TYLENOL) tablet 650 mg (not administered)   levofloxacin (LEVAQUIN) tablet 250 mg (not administered)   guaiFENesin (MUCINEX) 12 hr tablet 600 mg (600 mg Oral Given 4/23/18 0912)   aspirin chewable tablet 81 mg (81 mg Oral Given 4/23/18 0912)   furosemide (LASIX) injection 40 mg (not administered)   methylPREDNISolone sodium succinate (Solu-MEDROL) injection 40 mg (40 mg Intravenous Given 4/23/18 1524)   calcium carbonate-vitamin D (OSCAL-D) 500 mg-200 units per tablet 1 tablet (1 tablet Oral Given 4/23/18 1532)   insulin glargine (LANTUS) subcutaneous injection 20 Units (not administered)   insulin lispro (HumaLOG) 100 units/mL subcutaneous injection 5 Units (5 Units Subcutaneous Given 4/23/18 1134)   ipratropium (ATROVENT) 0 02 % inhalation solution 0 5 mg (0 5 mg Nebulization Given 4/23/18 1430)   levalbuterol (XOPENEX) inhalation solution 1 25 mg (1 25 mg Nebulization Given 4/23/18 1430)   EPINEPHrine PF (ADRENALIN) 1 mg/mL injection 0 5 mg (0 5 mg Intramuscular Given by Other 4/22/18 2313)   magnesium sulfate 2 g/50 mL IVPB (premix) 2 g (0 g Intravenous Stopped 4/22/18 2349)   albuterol inhalation solution 10 mg (10 mg Nebulization Given by Other 4/22/18 2321)     And   ipratropium (ATROVENT) 0 02 % inhalation solution 1 mg (1 mg Nebulization Given by Other 4/22/18 2321)     And   sodium chloride 0 9 % inhalation solution 3 mL (3 mL Nebulization Given by Other 4/22/18 2321)   methylPREDNISolone sodium succinate (Solu-MEDROL) injection 125 mg (125 mg Intravenous Given 4/22/18 2321)   furosemide (LASIX) injection 20 mg (20 mg Intravenous Given 4/23/18 0130)   levofloxacin (LEVAQUIN) tablet 500 mg (500 mg Oral Given 4/23/18 0510)       Diagnostic Studies  Results Reviewed     Procedure Component Value Units Date/Time    Troponin I [[id number]]  (Normal) Collected:  04/23/18 0456 Lab Status:  Final result Specimen:  Blood from Hand, Right Updated:  04/23/18 0609     Troponin I <0 02 ng/mL     Narrative:         [employer] Chemistry analyzer 99% cutoff is > 0 04 ng/mL in network labs    o cTnI 99% cutoff is useful only when applied to patients in the clinical setting of myocardial ischemia  o cTnI 99% cutoff should be interpreted in the context of clinical history, ECG findings and possibly cardiac imaging to establish correct diagnosis  o cTnI 99% cutoff may be suggestive but clearly not indicative of a coronary event without the clinical setting of myocardial ischemia  Basic metabolic panel [[id number]]  (Abnormal) Collected:  04/23/18 0457    Lab Status:  Final result Specimen:  Blood from Hand, Right Updated:  04/23/18 8659     Sodium 139 mmol/L      Potassium 4 3 mmol/L      Chloride 106 mmol/L      CO2 24 mmol/L      Anion Gap 9 mmol/L      BUN 33 (H) mg/dL      Creatinine 1 23 mg/dL      Glucose 301 (H) mg/dL      Calcium 8 9 mg/dL      eGFR 42 ml/min/1 73sq m     Narrative:         National Kidney Disease Education Program recommendations are as follows:  GFR calculation is accurate only with a steady state creatinine  Chronic Kidney disease less than 60 ml/min/1 73 sq  meters  Kidney failure less than 15 ml/min/1 73 sq  meters      Hemoglobin A1C [[id number]]  (Abnormal) Collected:  04/23/18 0457    Lab Status:  Final result Specimen:  Blood from Hand, Right Updated:  04/23/18 0545     Hemoglobin A1C 8 7 (H) %       mg/dl     Blood gas, venous [[id number]]  (Abnormal) Collected:  04/23/18 0457    Lab Status:  Final result Specimen:  Blood from Hand, Right Updated:  04/23/18 0521     pH, Francis 7 413 (H)     pCO2, Francis 34 8 (L) mm Hg      pO2, Francis 99 6 (H) mm Hg      HCO3, Francis 21 7 (L) mmol/L      Base Excess, Francis -2 3 mmol/L      O2 Content, Francis 16 4 ml/dL      O2 HGB, VENOUS 94 9 (H) %      Temperature 98 5 Degrees Fehrenheit      Non Vent type BIPAP BIPAP     IPAP 10     EPAP 5 BIPAP fio2 40 %     CBC and differential [[id number]] Collected:  04/23/18 0510    Lab Status:  No result Specimen:  Blood from Arm, Left     Lactic Acid x2 [[id number]]  (Normal) Collected:  04/23/18 0120    Lab Status:  Final result Specimen:  Blood from Arm, Right Updated:  04/23/18 0149     LACTIC ACID 1 8 mmol/L     Narrative:         Result may be elevated if tourniquet was used during collection  Sputum culture and Gram stain [[id number]]     Lab Status:  No result Specimen:  Sputum     Platelet count [[id number]]     Lab Status:  No result Specimen:  Blood     NT-BNP PRO [[id number]]  (Abnormal) Collected:  04/22/18 2315    Lab Status:  Final result Specimen:  Blood from Arm, Right Updated:  04/23/18 0013     NT-proBNP 1,872 (H) pg/mL     Troponin I [[id number]]  (Normal) Collected:  04/22/18 2315    Lab Status:  Final result Specimen:  Blood from Arm, Right Updated:  04/22/18 2349     Troponin I <0 02 ng/mL     Narrative:         [employer] Chemistry analyzer 99% cutoff is > 0 04 ng/mL in network labs    o cTnI 99% cutoff is useful only when applied to patients in the clinical setting of myocardial ischemia  o cTnI 99% cutoff should be interpreted in the context of clinical history, ECG findings and possibly cardiac imaging to establish correct diagnosis  o cTnI 99% cutoff may be suggestive but clearly not indicative of a coronary event without the clinical setting of myocardial ischemia      Comprehensive metabolic panel [[id number]]  (Abnormal) Collected:  04/22/18 2315    Lab Status:  Final result Specimen:  Blood from Arm, Right Updated:  04/22/18 2348     Sodium 139 mmol/L      Potassium 4 1 mmol/L      Chloride 105 mmol/L      CO2 28 mmol/L      Anion Gap 6 mmol/L      BUN 29 (H) mg/dL      Creatinine 1 20 mg/dL      Glucose 249 (H) mg/dL      Calcium 9 4 mg/dL      AST 20 U/L      ALT 27 U/L      Alkaline Phosphatase 114 U/L      Total Protein 7 3 g/dL      Albumin 3 9 g/dL      Total Bilirubin 0 69 mg/dL eGFR 43 ml/min/1 73sq m     Narrative:         National Kidney Disease Education Program recommendations are as follows:  GFR calculation is accurate only with a steady state creatinine  Chronic Kidney disease less than 60 ml/min/1 73 sq  meters  Kidney failure less than 15 ml/min/1 73 sq  meters  Lactic Acid x2 [[id number]]  (Normal) Collected:  04/22/18 2315    Lab Status:  Final result Specimen:  Blood from Arm, Right Updated:  04/22/18 2347     LACTIC ACID 1 2 mmol/L     Narrative:         Result may be elevated if tourniquet was used during collection  Carlos Olivarez [[id number]]  (Normal) Collected:  04/22/18 2315    Lab Status:  Final result Specimen:  Blood from Arm, Right Updated:  04/22/18 2340     Protime 13 7 seconds      INR 1 05    APTT [[id number]]  (Normal) Collected:  04/22/18 2315    Lab Status:  Final result Specimen:  Blood from Arm, Right Updated:  04/22/18 2340     PTT 34 seconds     Blood culture #2 [[id number]] Collected:  04/22/18 2319    Lab Status:   In process Specimen:  Blood from Hand, Right Updated:  04/22/18 2332    POCT Blood Gas (CG8+) [[id number]]  (Abnormal) Collected:  04/22/18 2325    Lab Status:  Final result Updated:  04/22/18 2329     ph, Francis ISTAT 7 570 (HH)     pCO2, Francis i-STAT 30 4 (L) mm HG      pO2, Francis i-STAT 70 0 (H) mm HG      BE, i-STAT 6 (H) mmol/L      HCO3, Francis i-STAT 27 8 mmol/L      CO2, i-STAT 29 mmol/L      O2 Sat, i-STAT 96 %      SODIUM, I-STAT 138 mmol/l      Potassium, i-STAT 5 8 (H) mmol/L      Calcium, Ionized i-STAT 1 06 (L) mmol/L      Hct, i-STAT 39 %      Hgb, i-STAT 13 3 g/dl      Glucose, i-STAT 267 (H) mg/dl      Specimen Type VENOUS    CBC and differential [[id number]]  (Abnormal) Collected:  04/22/18 2315    Lab Status:  Final result Specimen:  Blood from Arm, Right Updated:  04/22/18 2327     WBC 7 12 Thousand/uL      RBC 4 35 Million/uL      Hemoglobin 12 6 g/dL      Hematocrit 38 5 %      MCV 89 fL      MCH 29 0 pg      MCHC 32 7 g/dL      RDW 15 5 (H) %      MPV 11 1 fL      Platelets 214 Thousands/uL      nRBC 0 /100 WBCs      Neutrophils Relative 65 %      Lymphocytes Relative 22 %      Monocytes Relative 9 %      Eosinophils Relative 3 %      Basophils Relative 1 %      Neutrophils Absolute 4 59 Thousands/µL      Lymphocytes Absolute 1 59 Thousands/µL      Monocytes Absolute 0 63 Thousand/µL      Eosinophils Absolute 0 23 Thousand/µL      Basophils Absolute 0 07 Thousands/µL     Blood culture #1 [[id number]] Collected:  04/22/18 2315    Lab Status: In process Specimen:  Blood from Arm, Right Updated:  04/22/18 2322                 XR chest portable   Final Result by Letha Loaiza DO (04/23 1014)   Fluid and/or infiltrate right lower lobe  The study was marked in [facility] for immediate notification  Workstation performed: [id number]               Procedures  Procedures      Phone Consults  ED Phone Contact    ED Course  ED Course as of Apr 23 1650   Mon Apr 23, 2018   0017 Spoke with on call medical critical care attending and discussed the details of the case with them  They agree that the patient is appropriate for step down 1 on the flood under SLIM            Identification of Seniors at [address] [place] [address] Most Recent Value   (ISAR) Identification of Seniors at Risk   Before the illness or injury that brought you to the Emergency, did you need someone to help you on a regular basis? 1 Filed at: 04/22/2018 2328   In the last 24 hours, have you needed more help than usual?  1 Filed at: 04/22/2018 2328   Have you been hospitalized for one or more nights during the past 6 months? 0 Filed at: 04/22/2018 2328   In general, do you see well? 1 Filed at: 04/22/2018 2328   In general, do you have serious problems with your memory? 0 Filed at: 04/22/2018 2328   Do you take more than three different medications every day?   1 Filed at: 04/22/2018 2328   ISAR Score  4 Filed at: 04/22/2018 2328                          MDM  Number of Diagnoses or Management Options  Asthma exacerbation:   Asthma:   Shortness of breath:   Diagnosis management comments: An 68-year-old female in marked respiratory distress secondary to presumed asthma exacerbation  Will give nebs, steroids, IM epinephrine, magnesium check cardiac labs and chest x-ray and anticipate admission for asthma exacerbation with possible underlying pneumonia or CHF    CritCare Time    Disposition  Final diagnoses:   Asthma exacerbation   Shortness of breath   Asthma     Time reflects when diagnosis was documented in both MDM as applicable and the Disposition within this note     Time User Action Codes Description Comment    4/23/2018 12:18 AM Chadd Arcos Add [J45 901] Asthma exacerbation     4/23/2018 12:18 AM Chadd Arcos Add [R06 02] Shortness of breath     4/23/2018 12:24 AM Chadd Arcos Add [J45 909] Asthma     4/23/2018 12:24 AM Chadd Arcos Modify [J45 909] Asthma     4/23/2018  9:21 AM Denys, Alpiniano B Add [I25 10] 3-vessel coronary artery disease     4/23/2018  9:21 AM Denys, Alpiniano B Add [I50 33] Acute on chronic diastolic heart failure ([address] )     4/23/2018  9:21 AM Denys, Alpiniano B Add [I47 2] Ventricular tachycardia [facility])       ED Disposition     ED Disposition Condition Comment    Admit  Case was discussed with KOKI and the patient's admission status was agreed to be Admission Status: inpatient status to the service of Dr Sally Degroot           Follow-up Information     Follow up With Specialties Details Why [facility]  Follow up nurse will call to schedule visit [address] [place] [address]  [phone]          Current Discharge Medication List      CONTINUE these medications which have NOT CHANGED    Details   amLODIPine (NORVASC) 5 mg tablet Take by mouth      aspirin 325 mg tablet Take by mouth      atorvastatin (LIPITOR) 40 mg tablet TAKE 1 TABLET BY MOUTH EVERY DAY  Qty: 30 tablet, Refills: 11    Associated Diagnoses: Hyperlipidemia, unspecified hyperlipidemia type      Insulin Disposable Pump (V-GO 20) KIT V-Go 20 KIT  Use  daily as directed   Quantity: 3;  Refills: 3      Irma Vazquez ;  Start 14-Jan-2016  Active  30 Kit Box      Insulin Lispro (HUMALOG) 100 UNIT/ML SOCT Inject 20 Units under the skin      isosorbide mononitrate (IMDUR) 60 mg 24 hr tablet Take by mouth      losartan (COZAAR) 100 MG tablet Take by mouth      metoprolol tartrate (LOPRESSOR) 25 mg tablet Take by mouth      ONE TOUCH ULTRA TEST test strip       pantoprazole (PROTONIX) 40 mg tablet Take by mouth      nitroglycerin (NITROSTAT) 0 4 mg SL tablet Place under the tongue           No discharge procedures on file  ED Provider  Attending physically available and evaluated Verna Luc ROSALES managed the patient along with the ED Attending      Electronically Signed by         Chase Grant MD  04/23/18 9254

## 2018-04-23 NOTE — ASSESSMENT & PLAN NOTE
· Acute hypoxic respiratory failure, multifactorial, with bronchospasm, with asthma exacerbation; acute congestive heart failure; and possible pneumonia  · Manage patient's bronchospasm/asthma,/heart failure and pneumonia  · Cardiology and pulmonology on board  · Continue cardiovascular heart failure medications  · Continue with nebulizations, Solu-Medrol, Mucinex and antibiotic  · Previously, patient was on a BiPAP and now on nasal cannula oxygen  · Continue oxygen

## 2018-04-23 NOTE — H&P
H&P- Ada Burgess 1935, [de-identified] y o  female MRN: [id number]    Unit/Bed#: [facility] 518-01 Encounter: [id number]    Primary Care Provider: Abel Oliva MD   Date and time admitted to hospital: 4/22/2018 11:03 PM        * Acute respiratory failure [facility])   Assessment & Plan    Multifactorial secondary to acute asthma exacerbation acute on chronic diastolic heart failure and accelerated hypertension  Patient presented tachypneic with bilateral audible wheezes ,significantly elevated proBNP >1800 and blood pressure 188/74  Follow-up official imaging study report  Obtain sputum culture Gram stain  Her respiratory  distress required placement on BiPAP and step-down 1 level ( intensivist agreeable)  VBG showed respiratory alkalosis, will attempt to wean off BiPAP  Will continue short course of IV Lasix  Monitor daily weight intake output,HF diet  Respiratory protocol continue nebs Solu-Medrol   Getting worse quantity and quality of sputum will start on levofloxacin until Pulmonary evaluation  Continue expectorant   The last echocardiogram in 2014 reported ejection fraction to be 88% grade 2 diastolic dysfunction  Will update echocardiogram  Pulmonary consult  Serial cardiac enzymes        Acute on chronic diastolic heart failure [facility])   Assessment & Plan    Management as above        Accelerated hypertension   Assessment & Plan    Management as above        Intermittent asthma with acute exacerbation   Assessment & Plan    Management as above        Dementia without behavioral disturbance   Assessment & Plan    Supportive care        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)   Assessment & Plan    Recheck hemoglobin A1c in a m    Continue Accu-Cheks insulin sliding scale ADA diet          VTE Prophylaxis: Enoxaparin (Lovenox)  / sequential compression device   Code Status: full  POLST: There is no POLST form on file for this patient (pre-hospital)  Discussion with family:  Patient daughter at bedside    Anticipated Length of Stay:  Patient will be admitted on an Inpatient basis with an anticipated length of stay of  > 2 midnights  Justification for Hospital Stay:  Multidisciplinary approach    Total Time for Visit, including Counseling / Coordination of Care: 45 minutes  Greater than 50% of this total time spent on direct patient counseling and coordination of care  Chief Complaint:   Productive cough, wheezes,hortness of breath    History of Present Illness:    Verna Calle is a [de-identified] y o  female with history of asthma, dual PPM,dHF,HTN, DM, 3 vessels CAD, bypass in 1999, stents in 2006 who presented from home accompanied by daughter for evaluation of above-mentioned symptoms  Patient presented with tachypnea tachycardia in obvious respiratory distress was placed on BiPAP most of the story provided by patient daughter at bedside  who reports that mother noted to have  productive greenish sputum cough over the past 2 days, was started Mucinex without significant relief ,usually she used albuterol 3 -4 times a day and today she increased frequency without significant relief of symptoms/wheezes  Patient was complaining on progressively getting worse shortness of breath which prompted to bring her to the ER  Patient daughter denies fever chills nausea vomiting chest pain abdominal pain diarrhea  VBG showed respiratory alkalosis  ProBNP>1800, troponin  Imaging study showed vascular congestion and effusions, official report pending  Patient afebrile WBC normal  We tried to wean her of BiPAP patient cannot tolerate nasal cannula, the need of continuous BiPAP required to upgrade her level of care to step-down 1( intensivist agreeable)    Review of Systems:    Review of Systems   Unable to perform ROS: Acuity of condition   Respiratory: Positive for shortness of breath          Past Medical and Surgical History:     Past Medical History:   Diagnosis Date    Asthma     Cardiac disease     Dementia  Diabetes mellitus ([facility] [address] )     Hyperlipidemia     Hypertension        History reviewed  No pertinent surgical history  Meds/Allergies:    Prior to Admission medications    Medication Sig Start Date End Date Taking? Authorizing Provider   amLODIPine (NORVASC) 5 mg tablet Take by mouth 5/17/14  Yes Historical Provider, MD   aspirin 325 mg tablet Take by mouth 7/16/13  Yes Historical Provider, MD   atorvastatin (LIPITOR) 40 mg tablet TAKE 1 TABLET BY MOUTH EVERY DAY 4/11/18  Yes Jerad Chen MD   Insulin Disposable Pump (V-GO 20) KIT V-Go 20 KIT  Use  daily as directed   Quantity: 3;  Refills: 3      Irma Vazquez ;  Start 14-Jan-2016  Active  30 Kit Box 1/14/16  Yes Historical Provider, MD   Insulin Lispro (HUMALOG) 100 UNIT/ML SOCT Inject 20 Units under the skin   Yes Historical Provider, MD   isosorbide mononitrate (IMDUR) 60 mg 24 hr tablet Take by mouth 7/31/17  Yes Historical Provider, MD   losartan (COZAAR) 100 MG tablet Take by mouth   Yes Historical Provider, MD   metoprolol tartrate (LOPRESSOR) 25 mg tablet Take by mouth   Yes Historical Provider, MD   ONE TOUCH ULTRA TEST test strip  1/16/18  Yes Historical Provider, MD   pantoprazole (PROTONIX) 40 mg tablet Take by mouth 7/31/17  Yes Historical Provider, MD   nitroglycerin (NITROSTAT) 0 4 mg SL tablet Place under the tongue    Historical Provider, MD     I have reviewed home medications with a medical source (PCP, Pharmacy, other)  Allergies:    Allergies   Allergen Reactions    Ace Inhibitors     Chlorpromazine     Latex     Lisinopril     Namenda [Memantine]     Penicillins     Propoxyphene     Stadol [Butorphanol]        Social History:     Marital Status: /Civil Union   Occupation: none  Patient Pre-hospital Living Situation: home  Patient Pre-hospital Level of Mobility:  Unknown  Patient Pre-hospital Diet Restrictions: reg  Substance Use History:   History   Alcohol Use No     History   Smoking Status    Never Smoker Smokeless Tobacco    Never Used     History   Drug Use No       Family History:    non-contributory    Physical Exam:     Vitals:   Blood Pressure: 125/72 (04/23/18 0300)  Pulse: 58 (04/23/18 0300)  Temperature: 98 3 °F (36 8 °C) (04/23/18 0300)  Temp Source: Oral (04/23/18 0300)  Respirations: 20 (04/23/18 0300)  Height: 5' 8" (172 7 cm) (04/22/18 2305)  Weight - Scale: 106 kg (233 lb 0 4 oz) (04/23/18 0110)  SpO2: 99 % (04/23/18 0300)    Physical Exam   HENT:   Head: Normocephalic  Eyes: Pupils are equal, round, and reactive to light  Neck: Normal range of motion  Cardiovascular: Regular rhythm  Pulmonary/Chest: She has wheezes  Abdominal: She exhibits no distension  There is no tenderness  Musculoskeletal: She exhibits edema  Neurological: She is alert  Skin: Skin is warm  She is diaphoretic  Psychiatric: She has a normal mood and affect  Additional Data:     Lab Results: I have personally reviewed pertinent reports  Results from last 7 days  Lab Units 04/22/18 2325 04/22/18 2315   WBC Thousand/uL  --  7 12   HEMOGLOBIN g/dL  --  12 6   I STAT HEMOGLOBIN g/dl 13 3  --    HEMATOCRIT %  --  38 5   PLATELETS Thousands/uL  --  196   NEUTROS PCT %  --  65   LYMPHS PCT %  --  22   MONOS PCT %  --  9   EOS PCT %  --  3       Results from last 7 days  Lab Units 04/22/18 2325 04/22/18 2315   SODIUM mmol/L  --  139   POTASSIUM mmol/L  --  4 1   CHLORIDE mmol/L  --  105   CO2 mmol/L  --  28   BUN mg/dL  --  29*   CREATININE mg/dL  --  1 20   CALCIUM mg/dL  --  9 4   TOTAL PROTEIN g/dL  --  7 3   BILIRUBIN TOTAL mg/dL  --  0 69   ALK PHOS U/L  --  114   ALT U/L  --  27   AST U/L  --  20   GLUCOSE RANDOM mg/dL  --  249*   GLUCOSE, ISTAT mg/dl 267*  --        Results from last 7 days  Lab Units 04/22/18  2315   INR  1 05       Imaging: I have personally reviewed pertinent reports        XR chest portable    (Results Pending)       EKG, Pathology, and Other Studies Reviewed on Admission: · EKG: paced    Allscripts / Epic Records Reviewed: Yes     ** Please Note: This note has been constructed using a voice recognition system   **

## 2018-04-23 NOTE — PLAN OF CARE
Problem: DISCHARGE PLANNING - CARE MANAGEMENT  Goal: Discharge to post-acute care or home with appropriate resources  INTERVENTIONS:  - Conduct assessment to determine patient/family and health care team treatment goals, and need for post-acute services based on payer coverage, community resources, and patient preferences, and barriers to discharge  - Address psychosocial, clinical, and financial barriers to discharge as identified in assessment in conjunction with the patient/family and health care team  - Arrange appropriate level of post-acute services according to patient's   needs and preference and payer coverage in collaboration with the physician and health care team  - Communicate with and update the patient/family, physician, and health care team regarding progress on the discharge plan  - Arrange appropriate transportation to post-acute venues  - Complete referral to [facility] for follow up  Outcome: Progressing

## 2018-04-23 NOTE — ASSESSMENT & PLAN NOTE
· Continue IV Lasix  · Continue cardiovascular/heart failure medications  · Cardiology on board    · Follow-up results of the echocardiogram

## 2018-04-23 NOTE — CONSULTS
Pulmonary Consultation   Parth Rodriguez [de-identified] y o  female MRN: [id number]  Unit/Bed#: [facility] 582-44 Encounter: [id number]      Reason for consultation: Asthma, shortness of breath    Requesting physician: Dr Sally Degroot    Impressions:   1  Acute hypoxic respiratory failure  2  Abnormal CXR -  RLL opacity could represent pneumonia given presentation, volume overload   3  Acute on chronic diastolic heart failure  4  Hx of ischemic cardiomyopathy and CAD s/p CABG  5  Bronchospasm - never smoker, mild asthma    Recommendations:  1  Continue lasix 40mg IV Daily, follow I/O   3  Continue Levaquin for now, Would check sputum culture, check procalcitonin as I am not convinced this is definitely pneumonia  4  Continue Solumedrol 40mg IV q8  4  Continue nebulizers every 6 hrs  5  Continue Mucinex 600 mg BID  6  Continue heart failure management by primary team         History of Present Illness   HPI:  Parth Rodriguez is a [de-identified] y o  female who lives at home with grandchildren with PMHx as below comes in for worsening shortness of breath, cough productive of green sputum, and subjective fever  She came in yesterday in respiratory distress requiring BiPAP  She was actively wheezing and was treated with albuterol with minimal improvement  She was found to be volume overloaded BNP > 1800 and CXR showed vascular congestion and effusion  Patient was afebrile and WBC normal      She was started on Levaquin, nebulizer, Lasix and solumedrol  As a result of this she has noted some clear improvement since admission  She is now off BiPAP on nasal cannula but feels her work of breathing and cough have significantly improved          ROS:   Review of Systems   Unable to perform ROS: Dementia     Historical Information   Past Medical History:   Diagnosis Date    Asthma     CAD (coronary artery disease) of artery bypass graft     Cardiac disease     Dementia     Depression     Diabetes mellitus ([facility] [address]     Hyperlipidemia     Hypertension     [facility] infarction[facility] ([facility] [address]     Neuropathy     BRANT (obstructive sleep apnea)      Past Surgical History:   Procedure Laterality Date    APPENDECTOMY      CATARACT EXTRACTION      EGD AND COLONOSCOPY       Family history:  Cancer, Diabetes, heart disease    Occupational History: retried RN     Social History     Social History    Marital status: /Civil Union     Spouse name: N/A    Number of children: N/A    Years of education: N/A     Social History Main Topics    Smoking status: Never Smoker    Smokeless tobacco: Never Used    Alcohol use No    Drug use: No    Sexual activity: Not Asked     Other Topics Concern    None     Social History Narrative    None         Meds/Allergies   Current Facility-Administered Medications   Medication Dose Route Frequency    acetaminophen (TYLENOL) tablet 650 mg  650 mg Oral Q6H PRN    amLODIPine (NORVASC) tablet 5 mg  5 mg Oral Daily    aspirin chewable tablet 81 mg  81 mg Oral Daily    atorvastatin (LIPITOR) tablet 40 mg  40 mg Oral Daily    calcium carbonate-vitamin D (OSCAL-D) 500 mg-200 units per tablet 1 tablet  1 tablet Oral BID With Meals    docusate sodium (COLACE) capsule 100 mg  100 mg Oral BID    enoxaparin (LOVENOX) subcutaneous injection 40 mg  40 mg Subcutaneous Daily    [START ON 4/24/2018] furosemide (LASIX) injection 40 mg  40 mg Intravenous Daily    guaiFENesin (MUCINEX) 12 hr tablet 600 mg  600 mg Oral Q12H [facility]    insulin glargine (LANTUS) subcutaneous injection 20 Units  20 Units Subcutaneous HS    insulin lispro (HumaLOG) 100 units/mL subcutaneous injection 1-6 Units  1-6 Units Subcutaneous TID AC    insulin lispro (HumaLOG) 100 units/mL subcutaneous injection 1-6 Units  1-6 Units Subcutaneous HS    insulin lispro (HumaLOG) 100 units/mL subcutaneous injection 5 Units  5 Units Subcutaneous TID With Meals    ipratropium (ATROVENT) 0 02 % inhalation solution 0 5 mg  0 5 mg Nebulization Q6H    isosorbide mononitrate (IMDUR) 24 hr tablet 60 mg  60 mg Oral Daily    levalbuterol (XOPENEX) inhalation solution 1 25 mg  1 25 mg Nebulization Q6H    [START ON 4/24/2018] levofloxacin (LEVAQUIN) tablet 250 mg  250 mg Oral Q24H    losartan (COZAAR) tablet 100 mg  100 mg Oral Daily    methylPREDNISolone sodium succinate (Solu-MEDROL) injection 40 mg  40 mg Intravenous Q8H [facility]    metoprolol tartrate (LOPRESSOR) tablet 25 mg  25 mg Oral Q12H [facility]    nitroglycerin (NITROSTAT) SL tablet 0 4 mg  0 4 mg Sublingual Q5 Min PRN    ondansetron (ZOFRAN) injection 4 mg  4 mg Intravenous Q6H PRN    pantoprazole (PROTONIX) EC tablet 40 mg  40 mg Oral Early Morning    polyethylene glycol (MIRALAX) packet 17 g  17 g Oral Daily PRN    senna (SENOKOT) tablet 8 6 mg  1 tablet Oral Daily    sodium chloride (PF) 0 9 % injection 3 mL  3 mL Intravenous PRN     Prescriptions Prior to Admission   Medication    amLODIPine (NORVASC) 5 mg tablet    aspirin 325 mg tablet    atorvastatin (LIPITOR) 40 mg tablet    Insulin Disposable Pump (V-GO 20) KIT    Insulin Lispro (HUMALOG) 100 UNIT/ML SOCT    isosorbide mononitrate (IMDUR) 60 mg 24 hr tablet    losartan (COZAAR) 100 MG tablet    metoprolol tartrate (LOPRESSOR) 25 mg tablet    ONE TOUCH ULTRA TEST test strip    pantoprazole (PROTONIX) 40 mg tablet    nitroglycerin (NITROSTAT) 0 4 mg SL tablet     Allergies   Allergen Reactions    Ace Inhibitors     Chlorpromazine     Latex     Lisinopril     Namenda [Memantine]     Penicillins     Propoxyphene     Stadol [Butorphanol]        Home medications:  Prior to Admission medications    Medication Sig Start Date End Date Taking?  Authorizing Provider   amLODIPine (NORVASC) 5 mg tablet Take by mouth 5/17/14  Yes Historical Provider, MD   aspirin 325 mg tablet Take by mouth 7/16/13  Yes Historical Provider, MD   atorvastatin (LIPITOR) 40 mg tablet TAKE 1 TABLET BY MOUTH EVERY DAY 4/11/18  Yes Sol Sun MD   Insulin Disposable Pump (V-GO 20) KIT V-Go 20 KIT  Use  daily as directed   Quantity: 3;  Refills: 3      Vonnie OLEARY [place] ;  Start 2016  Active  30 Kit Box 16  Yes Historical Provider, MD   Insulin Lispro (HUMALOG) 100 UNIT/ML SOCT Inject 20 Units under the skin   Yes Historical Provider, MD   isosorbide mononitrate (IMDUR) 60 mg 24 hr tablet Take by mouth 17  Yes Historical Provider, MD   losartan (COZAAR) 100 MG tablet Take by mouth   Yes Historical Provider, MD   metoprolol tartrate (LOPRESSOR) 25 mg tablet Take by mouth   Yes Historical Provider, MD   ONE TOUCH ULTRA TEST test strip  18  Yes Historical Provider, MD   pantoprazole (PROTONIX) 40 mg tablet Take by mouth 17  Yes Historical Provider, MD   nitroglycerin (NITROSTAT) 0 4 mg SL tablet Place under the tongue    Historical Provider, MD       Vitals: Tmax Temp (24hrs), Av 5 °F (36 4 °C), Min:97 1 °F (36 2 °C), Max:98 3 °F (36 8 °C)    Blood pressure 136/74, pulse 61, temperature (!) 97 1 °F (36 2 °C), temperature source Axillary, resp  rate (!) 6, height 5' 8" (1 727 m), weight 106 kg (233 lb 0 4 oz), SpO2 98 % , RA, Body mass index is 35 43 kg/m²        Intake/Output Summary (Last 24 hours) at 18 1109  Last data filed at 18 0800   Gross per 24 hour   Intake              120 ml   Output              525 ml   Net             -405 ml       Physical Exam  General: Morbid obesity, Awake alert and oriented x 3, conversant without conversational dyspnea, NAD, normal affect  HEENT:  PERRL, Sclera noninjected, nonicteric OU, Nares patent, no nasal flaring, no nasal drainage, Mucous membranes, moist, no oral lesions, normal dentition  NECK: Trachea midline, no accessory muscle use, no stridor, no cervical or supraclavicular adenopathy, JVP not elevated  CARDIAC: Reg, single s1/S2, no m/r/g  PULM: Crackles in lung bases bilaterally, expiratory wheezing, scattered rhonchi, no rales   CHEST: No gross deformities, equal chest expansion on inspiration bilaterally  ABD: Normoactive bowel sounds, soft nontender, nondistended, no rebound, no rigidity, no guarding  EXT: No cyanosis, no clubbing, 1+ lower extremity edema, normal capillary refill  SKIN:  ecchymotic lesions   NEURO: no focal neurologic deficits, AAOx3, moving all extremities appropriately    Labs: I have personally reviewed pertinent lab results      Results from last 7 days  Lab Units 04/23/18  0651 04/22/18  2325 04/22/18  2315   WBC Thousand/uL 9 39  --  7 12   HEMOGLOBIN g/dL 12 1  --  12 6   I STAT HEMOGLOBIN g/dl  --  13 3  --    HEMATOCRIT % 37 8  --  38 5   PLATELETS Thousands/uL 181  --  196   NEUTROS PCT %  --   --  65   MONOS PCT %  --   --  9   MONO PCT MAN % 0*  --   --         Results from last 7 days  Lab Units 04/23/18  0457 04/22/18 2325 04/22/18  2315   SODIUM mmol/L 139  --  139   POTASSIUM mmol/L 4 3  --  4 1   CHLORIDE mmol/L 106  --  105   CO2 mmol/L 24  --  28   BUN mg/dL 33*  --  29*   CREATININE mg/dL 1 23  --  1 20   CALCIUM mg/dL 8 9  --  9 4   TOTAL PROTEIN g/dL  --   --  7 3   BILIRUBIN TOTAL mg/dL  --   --  0 69   ALK PHOS U/L  --   --  114   ALT U/L  --   --  27   AST U/L  --   --  20   GLUCOSE RANDOM mg/dL 301*  --  249*   GLUCOSE, ISTAT mg/dl  --  267*  --                 Results from last 7 days  Lab Units 04/22/18  2315   INR  1 05   PTT seconds 34       Results from last 7 days  Lab Units 04/23/18  0120   LACTIC ACID mmol/L 1 8       0  Lab Value Date/Time   TROPONINI <0 02 04/23/2018 0457   TROPONINI <0 02 04/22/2018 2315   TROPONINI <0 04 06/02/2014 0619   TROPONINI <0 04 05/24/2014 0421   TROPONINI <0 04 05/23/2014 1913   TROPONINI <0 04 05/23/2014 1039   TROPONINI <0 04 05/22/2014 1909   TROPONINI 0 04 05/22/2014 0827   TROPONINI <0 04 05/21/2014 2335   TROPONINI <0 04 05/15/2014 0802   TROPONINI <0 04 05/14/2014 0847   TROPONINI <0 04 05/14/2014 0525   TROPONINI <0 04 05/13/2014 2348       Micro:  No results found for: Curtis King, WOUNDCULT, SPUTUMCULTUR    Imaging and other studies: I have personally reviewed pertinent reports  CXR - IMPRESSION:  Fluid and/or infiltrate right lower lobe  The study was marked in EPIC for immediate notification      Pulmonary function testing: None    EKG, Pathology, and Other Studies: I have personally reviewed pertinent reports      Echo - pending    Code Status: Level 1 - Full Code      DO [facility]

## 2018-04-23 NOTE — ASSESSMENT & PLAN NOTE
Multifactorial secondary to acute asthma exacerbation acute on chronic diastolic heart failure and accelerated hypertension  Patient presented tachypneic with bilateral audible wheezes ,significantly elevated proBNP >1800 and blood pressure 188/74  Follow-up official imaging study report  Obtain sputum culture Gram stain  Her respiratory  distress required placement on BiPAP and step-down 1 level ( intensivist agreeable)  VBG showed respiratory alkalosis, will attempt to wean off BiPAP  Will continue short course of IV Lasix  Monitor daily weight intake output,HF diet  Respiratory protocol continue nebs Solu-Medrol   Getting worse quantity and quality of sputum will start on levofloxacin until Pulmonary evaluation  Continue expectorant   The last echocardiogram in 2014 reported ejection fraction to be 24% grade 2 diastolic dysfunction    Will update echocardiogram  Pulmonary consult  Serial cardiac enzymes

## 2018-04-23 NOTE — PROGRESS NOTES
Progress Note - Ivelisse Yuen 1935, [de-identified] y o  female MRN: [id number]    Unit/Bed#: [facility] 197-04 Encounter: [id number]    Primary Care Provider: Ching Rabago MD   Date and time admitted to hospital: 4/22/2018 11:03 PM        * Acute respiratory failure (HCC)   Assessment & Plan    · Acute hypoxic respiratory failure, multifactorial, with bronchospasm, with asthma exacerbation; acute congestive heart failure; and possible pneumonia  · Manage patient's bronchospasm/asthma,/heart failure and pneumonia  · Cardiology and pulmonology on board  · Continue cardiovascular heart failure medications  · Continue with nebulizations, Solu-Medrol, Mucinex and antibiotic  · Previously, patient was on a BiPAP and now on nasal cannula oxygen  · Continue oxygen  Abnormal chest x-ray   Assessment & Plan    · As per pulmonologist, there is a right lower lobe opacity that could represent pneumonia, given presentation  · Thus continue with IV Levaquin for now  · Would check sputum culture, and procalcitonin  Follow-up results  Acute on chronic diastolic heart failure (HCC)   Assessment & Plan    · Continue IV Lasix  · Continue cardiovascular/heart failure medications  · Cardiology on board  · Follow-up results of the echocardiogram         Intermittent asthma with acute exacerbation   Assessment & Plan    · Pulmonologist on board  · Continue steroids  · Continue nebulizations  · Continue oxygen  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 ([facility] [address] )   Assessment & Plan    A1c is 8 7  Patient is presently just on insulin sliding scale  With hyperglycemia, likely exacerbated with steroids, I will have the patient on basal bolus insulin  Will adjust insulin doses accordingly          Accelerated hypertension   Assessment & Plan    · Resolved  · Continue blood pressure medications          Dementia without behavioral disturbance   Assessment & Plan    Supportive care              VTE Pharmacologic Prophylaxis:   Pharmacologic: Enoxaparin (Lovenox)  Mechanical VTE Prophylaxis in Place: Yes    Patient Centered Rounds: I have performed bedside rounds with nursing staff today  Discussions with Specialists or Other Care Team Provider:  Patient's nurse  Case management  Cardiologist     Education and Discussions with Family / Patient:  Patient  I spoke to the patient's daughter, at bedside and discussed with her our findings and plans  I answered all questions and concerns    Time Spent for Care: 30 minutes  More than 50% of total time spent on counseling and coordination of care as described above  Current Length of Stay: 0 day(s)    Current Patient Status: Inpatient   Certification Statement: The patient will continue to require additional inpatient hospital stay due to Above findings and plans  Discharge Plan:  None yet  Code Status: Level 1 - Full Code      Subjective:   Patient feels better  Less shortness of breath  Patient told me she has been having productive cough with "junky" phlegm  Patient denies any pains  No other complaints  Objective:     Vitals:   Temp (24hrs), Av 9 °F (36 6 °C), Min:97 1 °F (36 2 °C), Max:98 5 °F (36 9 °C)    HR:  [58-94] 60  Resp:  [6-39] 22  BP: (121-188)/() 121/71  SpO2:  [97 %-99 %] 98 %  Body mass index is 35 43 kg/m²  Input and Output Summary (last 24 hours): Intake/Output Summary (Last 24 hours) at 18 1608  Last data filed at 18 1536   Gross per 24 hour   Intake              420 ml   Output             1579 ml   Net            -1159 ml       Physical Exam:     Physical Exam   Constitutional: No distress  HENT:   Head: Normocephalic and atraumatic  Patient not on BiPAP anymore  Patient is on regular nasal cannula oxygen   Eyes: Right eye exhibits no discharge  Left eye exhibits no discharge  No scleral icterus  Neck: No JVD present  No tracheal deviation present     Cardiovascular: Normal rate, regular rhythm and normal heart sounds  Exam reveals no gallop and no friction rub  No murmur heard  Pulmonary/Chest: Effort normal  No stridor  No respiratory distress  She has wheezes  She has no rales  Positive for occasional wheezing bilaterally  Abdominal: Soft  Bowel sounds are normal  She exhibits no distension  There is no tenderness  There is no rebound and no guarding  Musculoskeletal: She exhibits edema  She exhibits no tenderness or deformity  Positive for bilateral lower extremity edema   Neurological: She is alert  No cranial nerve deficit  Patient's coherent  Skin: Skin is warm  No rash noted  She is not diaphoretic  No erythema  No pallor  Psychiatric: She has a normal mood and affect  Her behavior is normal  Thought content normal    Vitals reviewed  Additional Data:     Labs:      Results from last 7 days  Lab Units 04/23/18  0651  04/22/18  2315   WBC Thousand/uL 9 39  --  7 12   HEMOGLOBIN g/dL 12 1  --  12 6   I STAT HEMOGLOBIN   --   < >  --    HEMATOCRIT % 37 8  --  38 5   PLATELETS Thousands/uL 181  --  196   NEUTROS PCT %  --   --  65   LYMPHS PCT %  --   --  22   LYMPHO PCT % 4*  --   --    MONOS PCT %  --   --  9   MONO PCT MAN % 0*  --   --    EOS PCT %  --   --  3   EOSINO PCT MANUAL % 0  --   --    < > = values in this interval not displayed  Results from last 7 days  Lab Units 04/23/18  0457  04/22/18  2315   SODIUM mmol/L 139  --  139   POTASSIUM mmol/L 4 3  --  4 1   CHLORIDE mmol/L 106  --  105   CO2 mmol/L 24  --  28   BUN mg/dL 33*  --  29*   CREATININE mg/dL 1 23  --  1 20   CALCIUM mg/dL 8 9  --  9 4   TOTAL PROTEIN g/dL  --   --  7 3   BILIRUBIN TOTAL mg/dL  --   --  0 69   ALK PHOS U/L  --   --  114   ALT U/L  --   --  27   AST U/L  --   --  20   GLUCOSE RANDOM mg/dL 301*  --  249*   GLUCOSE, ISTAT   --   < >  --    < > = values in this interval not displayed      Results from last 7 days  Lab Units 04/22/18  2315   INR  1 05       * I Have Reviewed All Lab Data Listed Above  * Additional Pertinent Lab Tests Reviewed: [address] Admission Reviewed    Imaging:    Imaging Reports Reviewed Today Include:  Diagnostic imaging studies that were done on this admission  Imaging Personally Reviewed by Myself Includes:  None      Recent Cultures (last 7 days):           Last 24 Hours Medication List:     Current Facility-Administered Medications:  acetaminophen 650 mg Oral Q6H PRN Horace Sacks, MD   amLODIPine 5 mg Oral Daily Horace Sacks, MD   aspirin 81 mg Oral Daily Horace Sacks, MD   atorvastatin 40 mg Oral Daily Horace Sacks, MD   calcium carbonate-vitamin D 1 tablet Oral BID With Meals Brian Mcfarlane MD   docusate sodium 100 mg Oral BID Horace Sacks, MD   enoxaparin 40 mg Subcutaneous Daily Horace Sacks, MD Tiny Blotter ON 4/24/2018] furosemide 40 mg Intravenous Daily Brian Mcfarlane MD   guaiFENesin 600 mg Oral Q12H [address] Horace Sacks, MD   insulin glargine 20 Units Subcutaneous HS Brian Mcfarlane MD   insulin lispro 1-6 Units Subcutaneous TID AC Horace Sacks, MD   insulin lispro 1-6 Units Subcutaneous HS Horace Sacks, MD   insulin lispro 5 Units Subcutaneous TID With Meals Brian Mcfarlane MD   ipratropium 0 5 mg Nebulization Q6H Brian Mcfarlane MD   isosorbide mononitrate 60 mg Oral Daily Horace Sacks, MD   levalbuterol 1 25 mg Nebulization Q6H Brian Mcfarlane MD   [START ON 4/24/2018] levofloxacin 250 mg Oral Q24H Horace Sacks, MD   losartan 100 mg Oral Daily Horace Sacks, MD   methylPREDNISolone sodium succinate 40 mg Intravenous Q8H [address] Brian Mcfarlane MD   metoprolol tartrate 25 mg Oral Q12H [address] Horace Sacks, MD   nitroglycerin 0 4 mg Sublingual Q5 Min PRN Horace Sacks, MD   ondansetron 4 mg Intravenous Q6H PRN Horace Sacks, MD   pantoprazole 40 mg Oral Early Morning Horace Sacks, MD   polyethylene glycol 17 g Oral Daily PRN Horace Sacks, MD senna 1 tablet Oral Daily Ankit Wood MD   sodium chloride (PF) 3 mL Intravenous PRN Sara Carter MD        Today, Patient Was Seen By: Meredith Leach MD    ** Please Note: Dragon 360 Dictation voice to text software may have been used in the creation of this document   **

## 2018-04-24 PROBLEM — E87.5 HYPERKALEMIA: Status: ACTIVE | Noted: 2018-04-24

## 2018-04-24 LAB
ANION GAP SERPL CALCULATED.3IONS-SCNC: 8 MMOL/L (ref 4–13)
ANION GAP SERPL CALCULATED.3IONS-SCNC: 9 MMOL/L (ref 4–13)
BUN SERPL-MCNC: 39 MG/DL (ref 5–25)
BUN SERPL-MCNC: 41 MG/DL (ref 5–25)
CALCIUM SERPL-MCNC: 9.2 MG/DL (ref 8.3–10.1)
CALCIUM SERPL-MCNC: 9.5 MG/DL (ref 8.3–10.1)
CHLORIDE SERPL-SCNC: 101 MMOL/L (ref 100–108)
CHLORIDE SERPL-SCNC: 102 MMOL/L (ref 100–108)
CO2 SERPL-SCNC: 24 MMOL/L (ref 21–32)
CO2 SERPL-SCNC: 26 MMOL/L (ref 21–32)
CREAT SERPL-MCNC: 1.38 MG/DL (ref 0.6–1.3)
CREAT SERPL-MCNC: 1.54 MG/DL (ref 0.6–1.3)
ERYTHROCYTE [DISTWIDTH] IN BLOOD BY AUTOMATED COUNT: 15.4 % (ref 11.6–15.1)
GFR SERPL CREATININE-BSD FRML MDRD: 32 ML/MIN/1.73SQ M
GFR SERPL CREATININE-BSD FRML MDRD: 36 ML/MIN/1.73SQ M
GLUCOSE SERPL-MCNC: 210 MG/DL (ref 65–140)
GLUCOSE SERPL-MCNC: 236 MG/DL (ref 65–140)
GLUCOSE SERPL-MCNC: 255 MG/DL (ref 65–140)
GLUCOSE SERPL-MCNC: 274 MG/DL (ref 65–140)
GLUCOSE SERPL-MCNC: 276 MG/DL (ref 65–140)
GLUCOSE SERPL-MCNC: 284 MG/DL (ref 65–140)
HCT VFR BLD AUTO: 36.9 % (ref 34.8–46.1)
HGB BLD-MCNC: 12.5 G/DL (ref 11.5–15.4)
MCH RBC QN AUTO: 29.8 PG (ref 26.8–34.3)
MCHC RBC AUTO-ENTMCNC: 33.9 G/DL (ref 31.4–37.4)
MCV RBC AUTO: 88 FL (ref 82–98)
PLATELET # BLD AUTO: 205 THOUSANDS/UL (ref 149–390)
PMV BLD AUTO: 12.8 FL (ref 8.9–12.7)
POTASSIUM SERPL-SCNC: 4.2 MMOL/L (ref 3.5–5.3)
POTASSIUM SERPL-SCNC: 5.8 MMOL/L (ref 3.5–5.3)
RBC # BLD AUTO: 4.2 MILLION/UL (ref 3.81–5.12)
SODIUM SERPL-SCNC: 134 MMOL/L (ref 136–145)
SODIUM SERPL-SCNC: 136 MMOL/L (ref 136–145)
WBC # BLD AUTO: 10.19 THOUSAND/UL (ref 4.31–10.16)

## 2018-04-24 PROCEDURE — G8987 SELF CARE CURRENT STATUS: HCPCS

## 2018-04-24 PROCEDURE — 99232 SBSQ HOSP IP/OBS MODERATE 35: CPT | Performed by: INTERNAL MEDICINE

## 2018-04-24 PROCEDURE — 97167 OT EVAL HIGH COMPLEX 60 MIN: CPT

## 2018-04-24 PROCEDURE — 82948 REAGENT STRIP/BLOOD GLUCOSE: CPT

## 2018-04-24 PROCEDURE — 80048 BASIC METABOLIC PNL TOTAL CA: CPT | Performed by: GENERAL PRACTICE

## 2018-04-24 PROCEDURE — 85027 COMPLETE CBC AUTOMATED: CPT | Performed by: INTERNAL MEDICINE

## 2018-04-24 PROCEDURE — 97530 THERAPEUTIC ACTIVITIES: CPT

## 2018-04-24 PROCEDURE — G8988 SELF CARE GOAL STATUS: HCPCS

## 2018-04-24 PROCEDURE — 94760 N-INVAS EAR/PLS OXIMETRY 1: CPT

## 2018-04-24 PROCEDURE — 97163 PT EVAL HIGH COMPLEX 45 MIN: CPT

## 2018-04-24 PROCEDURE — G8978 MOBILITY CURRENT STATUS: HCPCS

## 2018-04-24 PROCEDURE — 99233 SBSQ HOSP IP/OBS HIGH 50: CPT | Performed by: GENERAL PRACTICE

## 2018-04-24 PROCEDURE — 80048 BASIC METABOLIC PNL TOTAL CA: CPT | Performed by: INTERNAL MEDICINE

## 2018-04-24 PROCEDURE — G8979 MOBILITY GOAL STATUS: HCPCS

## 2018-04-24 PROCEDURE — 94640 AIRWAY INHALATION TREATMENT: CPT

## 2018-04-24 RX ADMIN — LEVOFLOXACIN 250 MG: 250 TABLET, FILM COATED ORAL at 05:21

## 2018-04-24 RX ADMIN — INSULIN LISPRO 2 UNITS: 100 INJECTION, SOLUTION INTRAVENOUS; SUBCUTANEOUS at 21:56

## 2018-04-24 RX ADMIN — CALCIUM CARBONATE 500 MG (1,250 MG)-VITAMIN D3 200 UNIT TABLET 1 TABLET: at 17:08

## 2018-04-24 RX ADMIN — LEVALBUTEROL HYDROCHLORIDE 1.25 MG: 1.25 SOLUTION, CONCENTRATE RESPIRATORY (INHALATION) at 00:32

## 2018-04-24 RX ADMIN — METHYLPREDNISOLONE SODIUM SUCCINATE 40 MG: 40 INJECTION, POWDER, FOR SOLUTION INTRAMUSCULAR; INTRAVENOUS at 13:08

## 2018-04-24 RX ADMIN — ASPIRIN 81 MG 81 MG: 81 TABLET ORAL at 08:30

## 2018-04-24 RX ADMIN — DOCUSATE SODIUM 100 MG: 100 CAPSULE, LIQUID FILLED ORAL at 08:30

## 2018-04-24 RX ADMIN — ISOSORBIDE MONONITRATE 60 MG: 60 TABLET, EXTENDED RELEASE ORAL at 08:30

## 2018-04-24 RX ADMIN — SENNOSIDES 8.6 MG: 8.6 TABLET ORAL at 08:30

## 2018-04-24 RX ADMIN — IPRATROPIUM BROMIDE 0.5 MG: 0.5 SOLUTION RESPIRATORY (INHALATION) at 00:32

## 2018-04-24 RX ADMIN — PANTOPRAZOLE SODIUM 40 MG: 40 TABLET, DELAYED RELEASE ORAL at 05:21

## 2018-04-24 RX ADMIN — GUAIFENESIN 600 MG: 600 TABLET, EXTENDED RELEASE ORAL at 21:08

## 2018-04-24 RX ADMIN — FUROSEMIDE 40 MG: 10 INJECTION, SOLUTION INTRAMUSCULAR; INTRAVENOUS at 08:30

## 2018-04-24 RX ADMIN — ENOXAPARIN SODIUM 40 MG: 40 INJECTION SUBCUTANEOUS at 08:30

## 2018-04-24 RX ADMIN — ATORVASTATIN CALCIUM 40 MG: 40 TABLET, FILM COATED ORAL at 08:30

## 2018-04-24 RX ADMIN — INSULIN LISPRO 4 UNITS: 100 INJECTION, SOLUTION INTRAVENOUS; SUBCUTANEOUS at 06:57

## 2018-04-24 RX ADMIN — IPRATROPIUM BROMIDE 0.5 MG: 0.5 SOLUTION RESPIRATORY (INHALATION) at 08:09

## 2018-04-24 RX ADMIN — INSULIN GLARGINE 20 UNITS: 100 INJECTION, SOLUTION SUBCUTANEOUS at 21:08

## 2018-04-24 RX ADMIN — METOPROLOL TARTRATE 25 MG: 25 TABLET ORAL at 21:08

## 2018-04-24 RX ADMIN — AMLODIPINE BESYLATE 5 MG: 5 TABLET ORAL at 08:30

## 2018-04-24 RX ADMIN — LEVALBUTEROL HYDROCHLORIDE 1.25 MG: 1.25 SOLUTION, CONCENTRATE RESPIRATORY (INHALATION) at 08:09

## 2018-04-24 RX ADMIN — IPRATROPIUM BROMIDE 0.5 MG: 0.5 SOLUTION RESPIRATORY (INHALATION) at 19:16

## 2018-04-24 RX ADMIN — CALCIUM CARBONATE 500 MG (1,250 MG)-VITAMIN D3 200 UNIT TABLET 1 TABLET: at 06:56

## 2018-04-24 RX ADMIN — METHYLPREDNISOLONE SODIUM SUCCINATE 40 MG: 40 INJECTION, POWDER, FOR SOLUTION INTRAMUSCULAR; INTRAVENOUS at 05:21

## 2018-04-24 RX ADMIN — METHYLPREDNISOLONE SODIUM SUCCINATE 40 MG: 40 INJECTION, POWDER, FOR SOLUTION INTRAMUSCULAR; INTRAVENOUS at 21:08

## 2018-04-24 RX ADMIN — LEVALBUTEROL HYDROCHLORIDE 1.25 MG: 1.25 SOLUTION, CONCENTRATE RESPIRATORY (INHALATION) at 19:16

## 2018-04-24 RX ADMIN — GUAIFENESIN 600 MG: 600 TABLET, EXTENDED RELEASE ORAL at 08:30

## 2018-04-24 RX ADMIN — INSULIN LISPRO 4 UNITS: 100 INJECTION, SOLUTION INTRAVENOUS; SUBCUTANEOUS at 17:09

## 2018-04-24 RX ADMIN — METOPROLOL TARTRATE 25 MG: 25 TABLET ORAL at 08:30

## 2018-04-24 RX ADMIN — INSULIN LISPRO 4 UNITS: 100 INJECTION, SOLUTION INTRAVENOUS; SUBCUTANEOUS at 11:07

## 2018-04-24 NOTE — PLAN OF CARE
Problem: PHYSICAL THERAPY ADULT  Goal: Performs mobility at highest level of function for planned discharge setting  See evaluation for individualized goals  Treatment/Interventions: ADL retraining, Functional transfer training, LE strengthening/ROM, Elevations, Therapeutic exercise, Endurance training, Patient/family training, Bed mobility, Gait training, Spoke to nursing, Spoke to case management  Equipment Recommended: Casie Drake       See flowsheet documentation for full assessment, interventions and recommendations  Outcome: Progressing  Prognosis: Good  Problem List: Decreased endurance, Impaired balance, Decreased mobility  Assessment: Pt is [de-identified] y o  female seen for PT evaluation s/p admit to [facility] on 4/22/2018 w/ Acute respiratory failure ([facility] [address] )   PT consulted to assess pt's functional mobility and d/c needs  Order placed for PT eval and tx, w/ up w/ A order  Comorbidities affecting pt's physical performance at time of assessment include those as noted above  PTA, pt was ambulates community distances and elevations and ambulates household distances  Personal factors affecting pt at time of IE include: ambulating w/ assistive device, inability to ambulate household distances, inability to navigate community distances and hearing impairments  Please find objective findings from PT assessment regarding body systems outlined above with impairments and limitations including weakness, impaired balance, decreased endurance, gait deviations, decreased activity tolerance, decreased functional mobility tolerance, fall risk and SOB upon exertion  The following objective measures performed on IE also reveal limitations: Barthel Index: 35/100  Pt's clinical presentation is currently unstable/unpredictable seen in pt's presentation as indicated above based on skilled assessment   Pt to benefit from continued PT tx to address deficits as defined above and maximize level of functional independent mobility and consistency  From PT/mobility standpoint, recommendation at time of d/c would be Home PT with family support pending progress in order to facilitate return to PLOF  Recommendation: Home PT, Home with family support          See flowsheet documentation for full assessment

## 2018-04-24 NOTE — ASSESSMENT & PLAN NOTE
· Resolved  · Continue blood pressure medications    · 4/24 - hold losartan 2/2 rising Cr and hyperK

## 2018-04-24 NOTE — ASSESSMENT & PLAN NOTE
· Continue IV Lasix  · Continue cardiovascular/heart failure medications  · Cardiology on board    · Echocardiogram showed DHF and severe TR

## 2018-04-24 NOTE — PLAN OF CARE
Problem: OCCUPATIONAL THERAPY ADULT  Goal: Performs self-care activities at highest level of function for planned discharge setting  See evaluation for individualized goals  Treatment Interventions: ADL retraining, Functional transfer training, Cognitive reorientation, Patient/family training, Equipment evaluation/education, Endurance training, Compensatory technique education, Continued evaluation, Energy conservation, Activityengagement          See flowsheet documentation for full assessment, interventions and recommendations  Limitation: Decreased ADL status, Decreased Safe judgement during ADL, Decreased cognition, Decreased endurance, Decreased self-care trans, Decreased high-level ADLs (BALANCE, MEDICAL STATUS, WEAKNESS, DECONDITINOING)  Prognosis: Fair  Assessment: PT IS AN 81 YO F ADMIT W/ RESPIRATORY DISTRESS  PT NOTED W/ INCREASED SPUTUM, SOB, WHEEZING, TACHYCARDIA AND TACHYPNEA  BiPAP PLACED ON PT IN ED  PT CURRENTLY UNDERGOING W/U FOR ACUTE RESPIRATORY FAURE, ACUTE ON CHRONIC DIASTOLIC HEART FAILURE, ACCELERATED HTN, ACUTE ASTHMA EXACERBATION, PNA AND BRONCHOSPASM  PT NOW REQUIRING 1 5LO2  PT W/ PMH SIGNIFICANT FOR ASTHMA, PPM, CHF, HTN, DM, CAD, DEMENTIA, HLD  PT IS FROM HOME W/ SPOUSE, SON AND GRANDCHILDREN  PT REPORTS BASELINE INDEPENDENCE IN ADLS/LIGHT HOUSEHOLD IADLS WHILE FAMILY ASSISTS W/ ADD'L IADLS  PT W/ USE OF RW PTA AND REPORTS NO RECENT H/O FALLS (WILL NEED TO CLARIFY INFORMATION WITH FAMILY)  FAMILY IS HOME WITH PT 24/7 AND AT BASELINE SPOUSE REQUIRES ASSIST FROM [facility] AIDES FOR ADLS/IADLS  PT STATES THAT AIDES ARE ADD'L ABLE TO ASSIST HER PRN (WILL ADD'L NEED TO CLARIFY THIS INFORMATION WITH FAMILY)   CURRENTLY PT REQUIRING MIN A UB ADLS, MOD A LB ADLS, MIN A FUNCTIONAL TRANSFERS MIN A SHORT DISTANCE AMBULATION USING RW  PT APPEARS LIMITED 2* IMPAIRED BALANCE, ACTIVITY TOLERANCE, MEDICAL STATUS, WEAKNESS, DECONDITIONING, ADL IMPAIRMENTS, O2 REQUIREMENT AS WELL AS IMPAIRED ATTENTION, INSIGHT, SAFETY, RECALL, ORIENTATION AND JUDGEMENT  OT ANTICIPATES, PT MAY DEMONSTRATE ABILITIES TO RETURN TO HOME ENVIRONMENT W/ THE ADDITION OF HOME PT/OT SERVICES AND INCREASED FAMILY SUPPORT  HOWEVER, IF PT DOES NOT CONTINUE TO PROGRESS AND FAMILY IS UNABLE TO MANAGE PATIENT AT THIS LEVEL OF CARE, WILL NEED TO CONSIDER A SHORT TERM REHAB OPTION  WILL CONTINUE TO FOLLOW PT 3-5X/WEEK IN ORDER TO MEET THE BELOW DESCRIBED GOALS IN 10-14 DAYS  OT Discharge Recommendation: Home OT (W/ INCREASED FAMILY SUPPORT)  OT - OK to Discharge:  (PENDING [id number]   )

## 2018-04-24 NOTE — PROGRESS NOTES
Progress Note - Pulmonary   Tenna Bartoloat [de-identified] y o  female MRN: [id number]  Unit/Bed#: [facility] 518-01 Encounter: [id number]      Assessment:  1  Acute hypoxic respiratory failure  2  Abnormal CXR -  RLL opacity could represent pneumonia given presentation, volume overload   3  Acute on chronic diastolic heart failure  4  Hx of ischemic cardiomyopathy and CAD s/p CABG  5  Bronchospasm - never smoker, mild asthma     Plan:  1  Continue diuretics per primary team and cardiology  3  Continue Levaquin, complete 7 day course  4  Decreased Solumedrol 40mg IV q12  4  Continue nebulizers every 6 hrs  5  Continue Mucinex 600 mg BID    Ok to move to regular medical floor    Subjective:   Patient seen and examined  She states that she is feeling much better  Cough is improving  Objective:   Vitals: Blood pressure 125/59, pulse 60, temperature 98 4 °F (36 9 °C), temperature source Oral, resp  rate 19, height 5' 8" (1 727 m), weight 107 kg (236 lb 15 9 oz), SpO2 96 %  , 3L NC, Body mass index is 36 03 kg/m²  Intake/Output Summary (Last 24 hours) at 04/24/18 1230  Last data filed at 04/24/18 1219   Gross per 24 hour   Intake             1300 ml   Output             1133 ml   Net              167 ml       Physical Exam  Gen: Obese, Awake, alert, oriented x 3, no acute distress  HEENT: Mucous membranes moist, no oral lesions, no thrush  NECK: No accessory muscle use, JVP not elevated  Cardiac: Regular, single S1, single S2, no murmurs, no rubs, no gallops  Lungs: Significant improvement in lungs bilaterally, no crackles wheezing or rhonchi  Abdomen: normoactive bowel sounds, soft nontender, nondistended, no rebound or rigidity, no guarding  Extremities: no cyanosis, no clubbing, 1+ lower extremity edema    Labs: I have personally reviewed pertinent lab results      Results from last 7 days  Lab Units 04/24/18  0518 04/23/18  0651 04/22/18  2325 04/22/18  2315   WBC Thousand/uL 10 19* 9 39  --  7 12   HEMOGLOBIN g/dL 12 5 12 1  --  12 6   I STAT HEMOGLOBIN g/dl  --   --  13 3  --    HEMATOCRIT % 36 9 37 8  --  38 5   PLATELETS Thousands/uL 205 181  --  196   NEUTROS PCT %  --   --   --  65   MONOS PCT %  --   --   --  9   MONO PCT MAN %  --  0*  --   --       Results from last 7 days  Lab Units 04/24/18  0518 04/23/18  0457 04/22/18  2325 04/22/18  2315   SODIUM mmol/L 134* 139  --  139   POTASSIUM mmol/L 5 8* 4 3  --  4 1   CHLORIDE mmol/L 102 106  --  105   CO2 mmol/L 24 24  --  28   BUN mg/dL 39* 33*  --  29*   CREATININE mg/dL 1 38* 1 23  --  1 20   CALCIUM mg/dL 9 2 8 9  --  9 4   TOTAL PROTEIN g/dL  --   --   --  7 3   BILIRUBIN TOTAL mg/dL  --   --   --  0 69   ALK PHOS U/L  --   --   --  114   ALT U/L  --   --   --  27   AST U/L  --   --   --  20   GLUCOSE RANDOM mg/dL 236* 301*  --  249*   GLUCOSE, ISTAT mg/dl  --   --  267*  --                 Results from last 7 days  Lab Units 04/22/18  2315   INR  1 05   PTT seconds 34       Results from last 7 days  Lab Units 04/23/18  0120   LACTIC ACID mmol/L 1 8       0  Lab Value Date/Time   TROPONINI <0 02 04/23/2018 0457   TROPONINI <0 02 04/22/2018 2315   TROPONINI <0 04 06/02/2014 0619   TROPONINI <0 04 05/24/2014 0421   TROPONINI <0 04 05/23/2014 1913   TROPONINI <0 04 05/23/2014 1039   TROPONINI <0 04 05/22/2014 1909   TROPONINI 0 04 05/22/2014 0827   TROPONINI <0 04 05/21/2014 2335   TROPONINI <0 04 05/15/2014 0802   TROPONINI <0 04 05/14/2014 0847   TROPONINI <0 04 05/14/2014 0525   TROPONINI <0 04 05/13/2014 2348         Meds/Allergies   Current Facility-Administered Medications   Medication Dose Route Frequency    acetaminophen (TYLENOL) tablet 650 mg  650 mg Oral Q6H PRN    amLODIPine (NORVASC) tablet 5 mg  5 mg Oral Daily    aspirin chewable tablet 81 mg  81 mg Oral Daily    atorvastatin (LIPITOR) tablet 40 mg  40 mg Oral Daily    calcium carbonate-vitamin D (OSCAL-D) 500 mg-200 units per tablet 1 tablet  1 tablet Oral BID With Meals    docusate sodium (COLACE) capsule 100 mg  100 mg Oral BID    enoxaparin (LOVENOX) subcutaneous injection 40 mg  40 mg Subcutaneous Daily    furosemide (LASIX) injection 40 mg  40 mg Intravenous Daily    guaiFENesin (MUCINEX) 12 hr tablet 600 mg  600 mg Oral Q12H REJI    insulin glargine (LANTUS) subcutaneous injection 20 Units  20 Units Subcutaneous HS    insulin lispro (HumaLOG) 100 units/mL subcutaneous injection 1-6 Units  1-6 Units Subcutaneous TID AC    insulin lispro (HumaLOG) 100 units/mL subcutaneous injection 1-6 Units  1-6 Units Subcutaneous HS    insulin lispro (HumaLOG) 100 units/mL subcutaneous injection 10 Units  10 Units Subcutaneous TID With Meals    ipratropium (ATROVENT) 0 02 % inhalation solution 0 5 mg  0 5 mg Nebulization Q6H    isosorbide mononitrate (IMDUR) 24 hr tablet 60 mg  60 mg Oral Daily    levalbuterol (XOPENEX) inhalation solution 1 25 mg  1 25 mg Nebulization Q6H    methylPREDNISolone sodium succinate (Solu-MEDROL) injection 40 mg  40 mg Intravenous Q8H [address]    metoprolol tartrate (LOPRESSOR) tablet 25 mg  25 mg Oral Q12H REJI    nitroglycerin (NITROSTAT) SL tablet 0 4 mg  0 4 mg Sublingual Q5 Min PRN    ondansetron (ZOFRAN) injection 4 mg  4 mg Intravenous Q6H PRN    pantoprazole (PROTONIX) EC tablet 40 mg  40 mg Oral Early Morning    polyethylene glycol (MIRALAX) packet 17 g  17 g Oral Daily PRN    senna (SENOKOT) tablet 8 6 mg  1 tablet Oral Daily    sodium chloride (PF) 0 9 % injection 3 mL  3 mL Intravenous PRN     Prescriptions Prior to Admission   Medication    amLODIPine (NORVASC) 5 mg tablet    aspirin 325 mg tablet    atorvastatin (LIPITOR) 40 mg tablet    Insulin Disposable Pump (V-GO 20) KIT    Insulin Lispro (HUMALOG) 100 UNIT/ML SOCT    isosorbide mononitrate (IMDUR) 60 mg 24 hr tablet    losartan (COZAAR) 100 MG tablet    metoprolol tartrate (LOPRESSOR) 25 mg tablet    ONE TOUCH ULTRA TEST test strip    pantoprazole (PROTONIX) 40 mg tablet    nitroglycerin (NITROSTAT) 0 4 mg SL tablet         Microbiology:  Lab Results   Component Value Date    BLOODCX No Growth at 24 hrs  04/22/2018    BLOODCX No Growth at 24 hrs  04/22/2018       Imaging and other studies: I have personally reviewed pertinent reports          DO Frieda Arreola [facility]

## 2018-04-24 NOTE — ASSESSMENT & PLAN NOTE
· As per pulmonologist, there is a right lower lobe opacity that could represent pneumonia, given presentation  · IV Levaquin started on admission    · Would check sputum culture,   · procalcitonin < 0 05 - stopped Levaquin, defer to pulm if they want to continue this

## 2018-04-24 NOTE — PROGRESS NOTES
04/24/18 [address] Involvement Patient active with [facility]   Spiritual Beliefs/Perceptions   Relationship with [employer] [facility]   Support Systems Spouse/significant other;Children   Plan of Care   Comments Cultivated a relationship of care and support, explored emotional and relational needs and resources, explored spiritual needs and resources, facilitated story telling, consulted with interdisciplinary team, listened empathically, celebrated with pt , pt  expressed gratitude and humor     Assessment Completed by: Unit visit

## 2018-04-24 NOTE — PHYSICAL THERAPY NOTE
Physical Therapy Evaluation      Patient Active Problem List   Diagnosis    3-vessel coronary artery disease    Abnormal TSH    Intermittent asthma with acute exacerbation    Dementia without behavioral disturbance    Depression    Diabetic retinopathy ([place] [address] )    DM (diabetes mellitus), type 2, uncontrolled w/ophthalmic complication ([place] [address] )    Gastroesophageal reflux disease with hiatal hernia    Glaucoma    Hyperlipidemia    Hypertension    Cerebral artery occlusion with cerebral infarction ([place] [address] )    Obesity    Obstructive sleep apnea    Osteoarthritis of knee    Urine incontinence    Ventricular tachycardia ([place] [address] )    Acute respiratory failure (HCC)    Acute on chronic diastolic heart failure (HCC)    Accelerated hypertension    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 ([place] [address] )    Abnormal chest x-ray    Hyperkalemia       Past Medical History:   Diagnosis Date    Asthma     CAD (coronary artery disease) of artery bypass graft     Cardiac disease     Dementia     Depression     Diabetes mellitus (HCC)     Hyperlipidemia     Hypertension     MI (myocardial infarction) ([place] [address] )     Neuropathy     BRANT (obstructive sleep apnea)        Past Surgical History:   Procedure Laterality Date    APPENDECTOMY      CATARACT EXTRACTION      EGD AND COLONOSCOPY        04/24/18 0945   Note Type   Note type Eval/Treat   Pain Assessment   Pain Assessment No/denies pain   Pain Score No Pain   Home Living   Type of Home House  ([place] with basement)   Home Layout One level; Laundry in basement;Able to live on main level with bedroom/bathroom; Access; Ramped entrance  ( is in electric W/C)   Bathroom Shower/Tub Tub/shower unit   Bathroom Toilet Standard   Bathroom Equipment Shower chair;Commode   Bathroom Accessibility Accessible   Home Equipment Walker;Cane   Additional Comments as per daughter at bedside,  has HHA that can assist   Prior Function   Level of [place] Independent with ADLs and functional mobility   Lives With [facility] Help From Family   ADL Assistance Independent   IADLs Independent   Comments daughter reports there is constantly family in the home to assist    Restrictions/Precautions   Weight Bearing Precautions Per Order No   Other Precautions Cardiac/sternal;Cognitive;Multiple lines;Telemetry;O2;Fall Risk;Fluid restriction;Hard of hearing   General   Additional Pertinent History RN cleared pt for PT - pt received supine in bed, daughter at side, agreeable to therapy session   Family/Caregiver Present Yes  (daughter)   Cognition   Overall Cognitive Status Impaired   Arousal/Participation Alert   Orientation Level Oriented to person;Oriented to place;Oriented to situation   Following Commands Follows one step commands with increased time or repetition   Comments pt was pleasent and cooperative t/o session   RLE Assessment   RLE Assessment WFL   LLE Assessment   LLE Assessment WFL   Bed Mobility   Supine to Sit 3  Moderate assistance   Additional items Assist x 1; Increased time required   Additional Comments at end of session pt positioned OOB in bedside chair, daughter at side, no signs of distress, all needs in reach   Transfers   Sit to Stand 4  Minimal assistance   Additional items Assist x 2; Increased time required;HOB elevated   Stand to Sit 4  Minimal assistance   Additional items Assist x 2; Increased time required;Bed elevated   Stand pivot 4  Minimal assistance   Additional items Assist x 2; Increased time required;Verbal cues   Ambulation/Elevation   Gait pattern Narrow LISET; Forward Flexion; Shuffling; Short stride; Step to;Excessively slow   Gait Assistance 4  Minimal assist   Additional items Assist x 2   Assistive Device Rolling walker   Distance 10ft - bed to chair   Balance   Static Sitting Fair -   Dynamic Sitting Fair -   Static Standing Poor +   Dynamic Standing Poor +   Ambulatory Poor   Endurance Deficit   Endurance Deficit Yes   Endurance Deficit Description generalized deconditioning   Activity Tolerance   Activity Tolerance Patient limited by fatigue   Medical Staff Made Aware yes   Nurse Made Aware yes   Assessment   Prognosis Good   Problem List Decreased endurance; Impaired balance;Decreased mobility   Assessment Pt is [de-identified] y o  female seen for PT evaluation s/p admit to [facility] on 4/22/2018 w/ Acute respiratory failure ([facility] [address] )   PT consulted to assess pt's functional mobility and d/c needs  Order placed for PT eval and tx, w/ up w/ A order  Comorbidities affecting pt's physical performance at time of assessment include those as noted above  PTA, pt was ambulates community distances and elevations and ambulates household distances  Personal factors affecting pt at time of IE include: ambulating w/ assistive device, inability to ambulate household distances, inability to navigate community distances and hearing impairments  Please find objective findings from PT assessment regarding body systems outlined above with impairments and limitations including weakness, impaired balance, decreased endurance, gait deviations, decreased activity tolerance, decreased functional mobility tolerance, fall risk and SOB upon exertion  The following objective measures performed on IE also reveal limitations: Barthel Index: 35/100  Pt's clinical presentation is currently unstable/unpredictable seen in pt's presentation as indicated above based on skilled assessment  Pt to benefit from continued PT tx to address deficits as defined above and maximize level of functional independent mobility and consistency  From PT/mobility standpoint, recommendation at time of d/c would be Home PT with family support pending progress in order to facilitate return to PLOF     Goals   Patient Goals None stated   [facility] Expiration Date 05/04/18   Short Term Goal #1 In 7-10 days pt will amb 50ft mod I with RW and perform all transfer and bed mobility mod I   Treatment Day 1   Plan Treatment/Interventions ADL retraining;Functional transfer training;LE strengthening/ROM; Elevations; Therapeutic exercise; Endurance training;Patient/family training;Bed mobility;Gait training;Spoke to nursing;Spoke to case management   PT Frequency 5x/wk   Recommendation   Recommendation Home PT; Home with family support   Equipment Recommended Walker   Barthel Index   Feeding 10   Bathing 0   Grooming Score 5   Dressing Score 5   Bladder Score 0   Bowels Score 0   Toilet Use Score 5   Transfers (Bed/Chair) Score 10   Mobility (Level Surface) Score 0   Stairs Score 0   Barthel Index Score 35     Treatment session post eval from [id number] included extensive education re:safety with mobility, importance of OOB with staff vs  Remaining in bed, amb and transferring with AD as well as initiation of exercise program including greta BELL and Ap's  Pt and daughter demonstrated knowledge of education provided         Gabrielle Ayoub PT

## 2018-04-24 NOTE — PROGRESS NOTES
Cardiology Progress Note - [place] [de-identified] y o  female MRN: [id number]    Unit/Bed#: [facility] 757-97 Encounter: [id number]  Assessment and plan  1  Acute hypoxemic respiratory failure  2  Pneumonia  3  Acute on chronic diastolic congestive heart failure  4  Coronary artery disease history CABG stable without angina  5  Status post dual chamber pacer  6  Hypertension  7  Diabetes    Recommendations:  Continue treatment for pneumonia  Patient had slight signs of volume overload filling pressures were mildly elevated on echo continue gentle diuresis  Recheck creatinine and potassium levels which were elevated this morning  Blood pressure and heart rate have been acceptable  Subjective:    No significant events overnight  Feeling better today  ROS    Objective:   Vitals: Blood pressure 125/59, pulse 60, temperature 98 4 °F (36 9 °C), temperature source Oral, resp  rate 19, height 5' 8" (1 727 m), weight 107 kg (236 lb 15 9 oz), SpO2 96 %  , Body mass index is 36 03 kg/m² , Orthostatic Blood Pressures    Flowsheet Row Most Recent Value   Blood Pressure  125/59 filed at 04/24/2018 1100   Patient Position - Orthostatic VS  Lying filed at 04/23/2018 0810         Systolic (97EDY), PYN:400 , Min:118 , SYQ:797     Diastolic (24VFQ), IHA:75, Min:52, Max:71      Intake/Output Summary (Last 24 hours) at 04/24/18 1257  Last data filed at 04/24/18 1219   Gross per 24 hour   Intake             1300 ml   Output             1133 ml   Net              167 ml     Weight (last 2 days)     Date/Time   Weight    04/24/18 0600  107 (236 99)    04/23/18 0110  106 (233 03)    04/22/18 2305  86 2 (190)                Telemetry Review: No significant arrhythmias seen on telemetry review  EKG personally reviewed by Nancy Khoury DO  Physical Exam   Constitutional: She is oriented to person, place, and time  She appears well-nourished  No distress  HENT:   Head: Atraumatic     Eyes: Conjunctivae are normal  Pupils are equal, round, and reactive to light  Neck: Neck supple  Cardiovascular: Normal rate, regular rhythm and normal heart sounds  Exam reveals no friction rub  No murmur heard  Pulmonary/Chest: Effort normal  No respiratory distress  She has decreased breath sounds  She has no wheezes  She has rhonchi  She has no rales  Abdominal: Bowel sounds are normal  She exhibits no distension  There is no tenderness  There is no rebound  Musculoskeletal: She exhibits no edema  Neurological: She is alert and oriented to person, place, and time  No cranial nerve deficit  Skin: Skin is warm and dry  No erythema  Nursing note and vitals reviewed          Laboratory Results:    Results from last 7 days  Lab Units 04/23/18 0457 04/22/18 2315   TROPONIN I ng/mL <0 02 <0 02       CBC with diff:   Results from last 7 days  Lab Units 04/24/18 0518 04/23/18  0651 04/22/18 2325 04/22/18 2315   WBC Thousand/uL 10 19* 9 39  --  7 12   HEMOGLOBIN g/dL 12 5 12 1  --  12 6   I STAT HEMOGLOBIN g/dl  --   --  13 3  --    HEMATOCRIT % 36 9 37 8  --  38 5   MCV fL 88 91  --  89   PLATELETS Thousands/uL 205 181  --  196   MCH pg 29 8 29 0  --  29 0   MCHC g/dL 33 9 32 0  --  32 7   RDW % 15 4* 15 6*  --  15 5*   MPV fL 12 8* 11 5  --  11 1   NRBC AUTO /100 WBCs  --  0  --  0         CMP:  Results from last 7 days  Lab Units 04/24/18 0518 04/23/18 0457 04/22/18 2325 04/22/18  2315   SODIUM mmol/L 134* 139  --  139   POTASSIUM mmol/L 5 8* 4 3  --  4 1   CHLORIDE mmol/L 102 106  --  105   CO2 mmol/L 24 24  --  28   ANION GAP mmol/L 8 9  --  6   BUN mg/dL 39* 33*  --  29*   CREATININE mg/dL 1 38* 1 23  --  1 20   GLUCOSE RANDOM mg/dL 236* 301*  --  249*   GLUCOSE, ISTAT mg/dl  --   --  267*  --    CALCIUM mg/dL 9 2 8 9  --  9 4   AST U/L  --   --   --  20   ALT U/L  --   --   --  27   ALK PHOS U/L  --   --   --  114   TOTAL PROTEIN g/dL  --   --   --  7 3   BILIRUBIN TOTAL mg/dL  --   --   --  0 69   EGFR ml/min/1 73sq m 36 42 --  43         BMP:  Results from last 7 days  Lab Units 18  0518 18  0457  18  2315   SODIUM mmol/L 134* 139  --  139   POTASSIUM mmol/L 5 8* 4 3  --  4 1   CHLORIDE mmol/L 102 106  --  105   CO2 mmol/L 24 24  --  28   BUN mg/dL 39* 33*  --  29*   CREATININE mg/dL 1 38* 1 23  --  1 20   GLUCOSE RANDOM mg/dL 236* 301*  --  249*   GLUCOSE, ISTAT   --   --   < >  --    CALCIUM mg/dL 9 2 8 9  --  9 4   < > = values in this interval not displayed  BNP: No results for input(s): BNP in the last 72 hours  Magnesium:       Coags:   Results from last 7 days  Lab Units 18  2315   PTT seconds 34   INR  1 05       TSH:        Hemoglobin A1C   Results from last 7 days  Lab Units 18  045   HEMOGLOBIN A1C % 8 7*       Lipid Profile:       Cardiac testing:   Results for orders placed during the hospital encounter of 18   Echo complete with contrast if indicated    Narrative [facility] [address]  [facility], [address] [facility]  [phone]    Transthoracic Echocardiogram  2D, M-mode, Doppler, and Color Doppler    Study date:  2018    Patient: Andrés Collins  MR number: [id number]  Account number: [de-identified]  : 1935  Age: [de-identified] years  Gender: Female  Status: Inpatient  Location: Echo lab  Height: 68 in  Weight: 232 5 lb  BP: 130/ 62 mmHg    Indications: Dyspnea    Diagnoses: R06 00 - Dyspnea, unspecified    Primary Physician:  Elza Lynn MD  Referring Physician:  Darryl Guan MD  Group:  [facility]  Cardiology Fellow:  Dread Velazquez MD  Interpreting Physician:  Maria L Pena MD    SUMMARY    LEFT VENTRICLE:  Systolic function was normal  Ejection fraction was estimated to be 60 %  There were no regional wall motion abnormalities  There was no evidence of concentric hypertrophy    Features were consistent with a pseudonormal left ventricular filling pattern, with concomitant abnormal relaxation and increased filling pressure (grade 2 diastolic dysfunction)  RIGHT VENTRICLE:  The ventricle was mildly to moderately dilated  LEFT ATRIUM:  The atrium was mildly dilated  RIGHT ATRIUM:  The atrium was mildly to moderately dilated  TRICUSPID VALVE:  There was moderate to severe regurgitation  Regurgitation grade was 3-4+ on a scale of 0 to 4+  IVC, HEPATIC VEINS:  The inferior vena cava was dilated  Respirophasic changes were blunted (less than 50% variation)  There is systolic flow reversal in the hepatic vein consistent with severe tricuspid regurgitation  COMPARISONS:  Comparison was made with the previous study of 25-May-2014  Tricuspid regurgitation has worsened  HISTORY: PRIOR HISTORY: HTN, HLD, DM, Dementia    PROCEDURE: The procedure was performed in the echo lab  This was a routine study  The transthoracic approach was used  The study included complete 2D imaging, M-mode, complete spectral Doppler, and color Doppler  The heart rate was 63 bpm,  at the start of the study  Images were obtained from the parasternal, apical, subcostal, and suprasternal notch acoustic windows  Echocardiographic views were limited due to decreased penetration and lung interference  This was a  technically difficult study  LEFT VENTRICLE: Size was normal  Systolic function was normal  Ejection fraction was estimated to be 60 %  There were no regional wall motion abnormalities  Wall thickness was normal  There was no evidence of concentric hypertrophy  DOPPLER: Features were consistent with a pseudonormal left ventricular filling pattern, with concomitant abnormal relaxation and increased filling pressure (grade 2 diastolic dysfunction)  VENTRICULAR SEPTUM: There was systolic and diastolic flattening  These changes are consistent with RV volume and pressure overload  RIGHT VENTRICLE: The ventricle was mildly to moderately dilated  Systolic function was low normal  A pacing wire was present      LEFT ATRIUM: The atrium was mildly dilated  RIGHT ATRIUM: The atrium was mildly to moderately dilated  MITRAL VALVE: Valve structure was normal  There was normal leaflet separation  DOPPLER: The transmitral velocity was within the normal range  There was no evidence for stenosis  There was no significant regurgitation  AORTIC VALVE: The valve was trileaflet  Leaflets exhibited normal thickness, normal cuspal separation, and sclerosis  DOPPLER: Transaortic velocity was within the normal range  There was no evidence for stenosis  There was no  regurgitation  TRICUSPID VALVE: DOPPLER: The transtricuspid velocity was within the normal range  There was no evidence for stenosis  There was moderate to severe regurgitation  Regurgitation grade was 3-4+ on a scale of 0 to 4+  PULMONIC VALVE: DOPPLER: The transpulmonic velocity was within the normal range  There was no evidence for stenosis  There was trace regurgitation  PERICARDIUM: There was no pericardial effusion  AORTA: The root exhibited normal size  SYSTEMIC VEINS: IVC: The inferior vena cava was dilated  Respirophasic changes were blunted (less than 50% variation)  HEPATIC VEIN DOPPLER: There is systolic flow reversal in the hepatic vein consistent with severe tricuspid  regurgitation  SYSTEM MEASUREMENT TABLES    2D  %FS: 20 05 %  Ao Diam: 3 36 cm  EDV(Teich): 85 57 ml  EF(Teich): 41 31 %  ESV(Teich): 50 22 ml  IVSd: 1 03 cm  LA Area: 21 37 cm2  LA Diam: 3 37 cm  LVEDV MOD A4C: 68 81 ml  LVEF MOD A4C: 56 86 %  LVESV MOD A4C: 29 69 ml  LVIDd: 4 35 cm  LVIDs: 3 48 cm  LVLd A4C: 7 37 cm  LVLs A4C: 6 82 cm  LVOT Diam: 1 88 cm  LVPWd: 0 97 cm  RA Area: 21 18 cm2  RVIDd: 4 1 cm  SV MOD A4C: 39 12 ml  SV(Teich): 35 35 ml    CW  AV Env  Ti: 359 86 ms  AV VTI: 52 93 cm  AV Vmax: 2 03 m/s  AV Vmax: 2 24 m/s  AV Vmean: 1 47 m/s  AV maxP 42 mmHg  AV maxP 15 mmHg  AV meanPG: 10 27 mmHg  TR Vmax: 2 1 m/s  TR maxP 7 mmHg    MM  TAPSE: 1 73 cm    PW  NESTOR (VTI): 1 28 cm2  NESTOR Vmax: 1 31 cm2  NESTOR Vmax: 1 46 cm2  NESTOR Vmax, Pt: 1 21 cm2  NESTOR Vmax, Pt: 1 35 cm2  E': 0 08 m/s  E/E': 14 35  LVOT Env  Ti: 346 02 ms  LVOT VTI: 24 27 cm  LVOT Vmax: 0 98 m/s  LVOT Vmax: 1 06 m/s  LVOT Vmean: 0 7 m/s  LVOT maxPG: 3 84 mmHg  LVOT maxP 49 mmHg  LVOT meanP 32 mmHg  LVSI Dopp: 30 98 ml/m2  LVSV Dopp: 67 55 ml  MV A Carroll: 0 8 m/s  MV Dec Reeves: 4 21 m/s2  MV DecT: 261 58 ms  MV E Carroll: 1 1 m/s  MV E/A Ratio: 1 38  MV PHT: 75 86 ms  MVA By PHT: 2 9 cm2    [employer] Accredited Echocardiography Laboratory    Prepared and electronically signed by    Héctor Guerra MD  Signed 2018 17:07:00       No results found for this or any previous visit  No results found for this or any previous visit  Results for orders placed in visit on 14   NM myocardial perfusion spect (stress and/or rest)    Narrative PHARMACOLOGIC STRESS TEST, LEXISCAN          INDICATION-  Chest pain, shortness of breath  Prior history of CABG   and stent placement   COMPARISON- No prior studies,   TECHNIQUE-  Pharmacologic stress testing was performed utilizing   INCHRON  SPECT myocardial perfusion images was performed  Dosages of   TC-99-mm Myoview were 10 6 mCi and 33 0 mCi IV  Both rest and stress   images were performed  Images were then reconstructed in the short,   vertical and horizontal long axes projections  Baseline heart rate 61 beats per minute  Peak heart of 78 beats per minute  Baseline blood pressure 134/77 mmHg  Peak blood pressure 145/71 mmHg  Symptoms-   Shortness of breath nausea chest pain recovery  Time to peak imaging for rest 55 minutes and stress 33 minutes  Please see cardiology report of physiologic data  FINDINGS-   OVERALL QUALITY-   Acceptable     ARTIFACT-  Breast attenuation   PERFUSION IMAGES-   SPECT images showed a large region of mildly   diminished perfusion within the anterior wall which was normal at rest compatible with a region of ischemia  There are no other abnormal   regions of diminished perfusion  Left ventricular cavity was normal in   size  It does appear to increase with stress  WALL MOTION-  Normal contractility   EJECTION FRACTION-  66 %   IMPRESSION-   1  Region of ischemia seen in the anterior wall   2   Left ventricular ejection fraction- 66%   ##imslh##imslh   Transcribed on- [id number]           - BYRON Moser MD        Reading Radiologist- BYRON Moser MD        Releasing Radiologist- BYRON Moser MD        Released Date Time- 05/16/14 1527      ------------------------------------------------------------------------------   [facility]        Meds/Allergies     Prescriptions Prior to Admission   Medication    amLODIPine (NORVASC) 5 mg tablet    aspirin 325 mg tablet    atorvastatin (LIPITOR) 40 mg tablet    Insulin Disposable Pump (V-GO 20) KIT    Insulin Lispro (HUMALOG) 100 UNIT/ML SOCT    isosorbide mononitrate (IMDUR) 60 mg 24 hr tablet    losartan (COZAAR) 100 MG tablet    metoprolol tartrate (LOPRESSOR) 25 mg tablet    ONE TOUCH ULTRA TEST test strip    pantoprazole (PROTONIX) 40 mg tablet    nitroglycerin (NITROSTAT) 0 4 mg SL tablet          Assessment:  Principal Problem:    Acute respiratory failure (HCC)  Active Problems:    Intermittent asthma with acute exacerbation    Dementia without behavioral disturbance    Obstructive sleep apnea    Acute on chronic diastolic heart failure (HCC)    Accelerated hypertension    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

## 2018-04-24 NOTE — ASSESSMENT & PLAN NOTE
A1c is 8 7  Patient is on disposable pump  With hyperglycemia, likely exacerbated with steroids, patient on basal bolus insulin    I consulted endo with help in managing pump

## 2018-04-24 NOTE — PROGRESS NOTES
Progress Note - Serena Pastrana 1935, [de-identified] y o  female MRN: [id number]    Unit/Bed#: [facility] 503-01 Encounter: [id number]    Primary Care Provider: Carmen Cano MD   Date and time admitted to hospital: 4/22/2018 11:03 PM        * Acute respiratory failure (HCC)   Assessment & Plan    · Acute hypoxic respiratory failure, multifactorial, with bronchospasm, with asthma exacerbation; acute congestive heart failure; and possible pneumonia  · Manage patient's bronchospasm/asthma,/heart failure and pneumonia  · Cardiology and pulmonology on board  · Continue cardiovascular heart failure medications  · Continue with nebulizations, Solu-Medrol, Mucinex and antibiotic  · Previously, patient was on a BiPAP and now on nasal cannula oxygen  · Continue oxygen  Acute on chronic diastolic heart failure (HCC)   Assessment & Plan    · Continue IV Lasix  · Continue cardiovascular/heart failure medications  · Cardiology on board  · Echocardiogram showed DHF and severe TR        Hyperkalemia   Assessment & Plan    4/24 - BW moderately hemolyzed  Given IV Lasix  Recheck showed normal K        Abnormal chest x-ray   Assessment & Plan    · As per pulmonologist, there is a right lower lobe opacity that could represent pneumonia, given presentation  · IV Levaquin started on admission  · Would check sputum culture,   · procalcitonin < 0 05 - stopped Levaquin, defer to pulm if they want to continue this        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)   Assessment & Plan    A1c is 8 7  Patient is on disposable pump  With hyperglycemia, likely exacerbated with steroids, patient on basal bolus insulin  I consulted endo with help in managing pump        Accelerated hypertension   Assessment & Plan    · Resolved  · Continue blood pressure medications    · 4/24 - hold losartan 2/2 rising Cr and hyperK        Dementia without behavioral disturbance   Assessment & Plan    Supportive care Intermittent asthma with acute exacerbation   Assessment & Plan    · Pulmonologist on board  · Continue steroids  · Continue nebulizations  · Continue oxygen  VTE Pharmacologic Prophylaxis:   Pharmacologic: Enoxaparin (Lovenox)  Mechanical VTE Prophylaxis in Place: Yes    Patient Centered Rounds: I have performed bedside rounds with nursing staff today  Discussions with Specialists or Other Care Team Provider: pulm and cardio    Education and Discussions with Family / Patient: pt and dtr    Time Spent for Care: 30 minutes  More than 50% of total time spent on counseling and coordination of care as described above  Current Length of Stay: 1 day(s)    Current Patient Status: Inpatient   Certification Statement: The patient will continue to require additional inpatient hospital stay due to need for iv steroids and lasix    Discharge Plan: when off iv steroids and lasix    Code Status: Level 1 - Full Code      Subjective:   Feeling better  Objective:     Vitals:   Temp (24hrs), Av 3 °F (36 8 °C), Min:97 9 °F (36 6 °C), Max:98 5 °F (36 9 °C)    HR:  [58-63] 60  Resp:  [13-24] 19  BP: (118-135)/(57-64) 125/59  SpO2:  [95 %-98 %] 97 %  Body mass index is 36 03 kg/m²  Input and Output Summary (last 24 hours): Intake/Output Summary (Last 24 hours) at 18 1526  Last data filed at 18 1219   Gross per 24 hour   Intake             1080 ml   Output              711 ml   Net              369 ml       Physical Exam:     Physical Exam   Constitutional: She is oriented to person, place, and time  No distress  HENT:   Head: Normocephalic and atraumatic  Eyes: Conjunctivae and EOM are normal    Neck: Normal range of motion  Neck supple  Cardiovascular: Normal rate and regular rhythm  Pulmonary/Chest: Effort normal    rhonchi   Abdominal: Bowel sounds are normal    Musculoskeletal: Normal range of motion  She exhibits no edema     Neurological: She is alert and oriented to person, place, and time  Skin: Skin is warm and dry  She is not diaphoretic  Additional Data:     Labs:      Results from last 7 days  Lab Units 04/24/18  0518 04/23/18  0651  04/22/18 2315   WBC Thousand/uL 10 19* 9 39  --  7 12   HEMOGLOBIN g/dL 12 5 12 1  --  12 6   I STAT HEMOGLOBIN   --   --   < >  --    HEMATOCRIT % 36 9 37 8  --  38 5   PLATELETS Thousands/uL 205 181  --  196   NEUTROS PCT %  --   --   --  65   LYMPHS PCT %  --   --   --  22   LYMPHO PCT %  --  4*  --   --    MONOS PCT %  --   --   --  9   MONO PCT MAN %  --  0*  --   --    EOS PCT %  --   --   --  3   EOSINO PCT MANUAL %  --  0  --   --    < > = values in this interval not displayed  Results from last 7 days  Lab Units 04/24/18  1110  04/22/18 2315   SODIUM mmol/L 136  < > 139   POTASSIUM mmol/L 4 2  < > 4 1   CHLORIDE mmol/L 101  < > 105   CO2 mmol/L 26  < > 28   BUN mg/dL 41*  < > 29*   CREATININE mg/dL 1 54*  < > 1 20   CALCIUM mg/dL 9 5  < > 9 4   TOTAL PROTEIN g/dL  --   --  7 3   BILIRUBIN TOTAL mg/dL  --   --  0 69   ALK PHOS U/L  --   --  114   ALT U/L  --   --  27   AST U/L  --   --  20   GLUCOSE RANDOM mg/dL 255*  < > 249*   GLUCOSE, ISTAT   --   < >  --    < > = values in this interval not displayed  Results from last 7 days  Lab Units 04/22/18  2315   INR  1 05       * I Have Reviewed All Lab Data Listed Above  * Additional Pertinent Lab Tests Reviewed: Joe 66 Admission Reviewed        Recent Cultures (last 7 days):       Results from last 7 days  Lab Units 04/22/18  2319 04/22/18 2315   BLOOD CULTURE  No Growth at 24 hrs  No Growth at 24 hrs         Last 24 Hours Medication List:     Current Facility-Administered Medications:  acetaminophen 650 mg Oral Q6H PRN Mag Gonsalves MD   amLODIPine 5 mg Oral Daily Mag Gonsalves MD   aspirin 81 mg Oral Daily Mag Gonsalves MD   atorvastatin 40 mg Oral Daily Mag Gonsalves MD   calcium carbonate-vitamin D 1 tablet Oral BID With Meals Brian DAVID Mcfarlane MD   docusate sodium 100 mg Oral BID Dulce Lquue MD   enoxaparin 40 mg Subcutaneous Daily Dulce Luque MD   furosemide 40 mg Intravenous Daily Alpiniaaby Mcfarlane MD   guaiFENesin 600 mg Oral Q12H [facility] Dulce Luque MD   insulin glargine 20 Units Subcutaneous HS Alpiniaaby Mcfarlane MD   insulin lispro 1-6 Units Subcutaneous TID AC Dulce Luque MD   insulin lispro 1-6 Units Subcutaneous HS Dulce Luque MD   insulin lispro 10 Units Subcutaneous TID With Meals Lenny Garcia PA-C   ipratropium 0 5 mg Nebulization Q6H [facility] DAVID Mcfarlane MD   isosorbide mononitrate 60 mg Oral Daily Dulce Luque MD   levalbuterol 1 25 mg Nebulization Q6H [facility] MD Denys   methylPREDNISolone sodium succinate 40 mg Intravenous Q8H [facility] DAVID Mcfarlane MD   metoprolol tartrate 25 mg Oral Q12H [facility] Dulce Luque MD   nitroglycerin 0 4 mg Sublingual Q5 Min PRN Dulce Luque MD   ondansetron 4 mg Intravenous Q6H PRN Dulce Luque MD   pantoprazole 40 mg Oral Early Morning Dulce Luque MD   polyethylene glycol 17 g Oral Daily PRN Dulce Luque MD   senna 1 tablet Oral Daily Dulce Luque MD   sodium chloride (PF) 3 mL Intravenous PRN Anival Cook MD        Today, Patient Was Seen By: Susana Duff DO    ** Please Note: Dictation voice to text software may have been used in the creation of this document   **

## 2018-04-24 NOTE — OCCUPATIONAL THERAPY NOTE
[address] Evaluation     Patient Name: Tori Avelar  Today's Date: 4/24/2018  Problem List  Patient Active Problem List   Diagnosis    3-vessel coronary artery disease    Abnormal TSH    Intermittent asthma with acute exacerbation    Dementia without behavioral disturbance    Depression    [facility]opathy [facility]    DM (diabetes mellitus), type 2, uncontrolled w/[facility]    Gastroesophageal reflux disease with hiatal hernia    Glaucoma    Hyperlipidemia    Hypertension    Cerebral artery occlusion with cerebral infarction ([facility]    Obesity    Obstructive sleep apnea    Osteoarthritis of knee    Urine incontinence    Ventricular tachycardia (HCC)    Acute respiratory failure (HCC)    Acute on chronic diastolic heart failure (HCC)    Accelerated hypertension    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 ([facility]    Abnormal chest x-ray    Hyperkalemia     Past Medical History  Past Medical History:   Diagnosis Date    Asthma     CAD (coronary artery disease) of artery bypass graft     Cardiac disease     Dementia     Depression     Diabetes mellitus (HCC)     Hyperlipidemia     Hypertension     MI (myocardial infarction) ([facility]     Neuropathy     BRANT (obstructive sleep apnea)      Past Surgical History  Past Surgical History:   Procedure Laterality Date    APPENDECTOMY      CATARACT EXTRACTION      EGD AND COLONOSCOPY             04/24/18 1014   Note Type   Note type Eval/Treat   Restrictions/Precautions   Weight Bearing Precautions Per Order No   Other Precautions Fall Risk;Cognitive;Multiple lines;Telemetry;O2;Fluid restriction  (1 5 L O2)   Pain Assessment   Pain Assessment No/denies pain   Pain Score No Pain   Home Living   Type of [address] [place] [address] One level;Ramped entrance   Bathroom Shower/Tub Walk-in shower   Bathroom Toilet Standard   Bathroom Equipment Shower chair;Commode   Bathroom Accessibility Accessible Home Equipment Walker;Cane   Additional Comments PT W/ USE OF RW PTA  Prior Function   Level of [place] Independent with ADLs and functional mobility   Lives With [facility] Help From Family   ADL Assistance Independent   IADLs Independent  (RESPONSIBLE FOR LIGHT HOUSEHOLD IADLS, HAS ASSISTANCE)   Falls in the last 6 months 0   Vocational Retired   Lifestyle   [employer] PT [address]  PT W/ USE OF RW AND SC PTA  REPORTS NO RECENT H/O FALLS  Reciprocal Relationships PT RESIDES W/ SPOUSE, SON AND TWO GRANDSONS  THERE IS 24/7 SUPERVISION AVAILABLE IN HOME ENVIRONMENT  SPOUSE IS IN AN ELECTRIC WC AND REQUIRES ASSIST FROM [facility] AIDES FOR ADLS  Service to Others PT IS RETIRED  PREVIOUSLY WORKED AS AN LPN  Intrinsic Gratification PT ENJOYS GOING TO [employer] AND WORKING ON [employer]  Psychosocial   Psychosocial (WDL) WDL   Subjective   Subjective "I'VE BEEN HERE FOR A COUPLE OF WEEKS"   ADL   Where Assessed Chair   Eating Assistance 5  Supervision/Setup   Grooming Assistance 4  Minimal Assistance   UB Bathing Assistance 4  Minimal Assistance   LB Bathing Assistance 3  Moderate Assistance   [address] 4  Minimal Assistance   LB Dressing Assistance 3  Moderate [address]  4  Minimal Assistance   Bed Mobility   Supine to Sit Unable to assess   Sit to Supine Unable to assess   Additional Comments PT OOB IN CHAIR UPON ARRIVAL  PT LEFT IN ROOM CHAIR POST EVAL W/ UE'S ELEVATED  Transfers   Sit to Stand 4  Minimal assistance   Additional items Assist x 1; Increased time required;Verbal cues   Stand to Sit 4  Minimal assistance   Additional items Assist x 1; Increased time required;Verbal cues   Functional Mobility   Functional Mobility 4  Minimal assistance   Additional Comments PT AMBULATED SHORT DISTANCE W/IN ROOM USING RW THIS PM    Additional items Rolling walker   Balance   Static Sitting Fair   Dynamic Sitting Fair - Static Standing Fair -   Dynamic Standing Poor +   Ambulatory Poor +   Activity Tolerance   Activity Tolerance Patient limited by fatigue;Treatment limited secondary to medical complications (Comment)   Medical Staff Made Aware F/U W/ CM LARA   Nurse Made Aware OKAY TO SEE PER RN YOLANDA MONTALVO Assessment   RUE Assessment WFL   LUE Assessment   LUE Assessment WFL   Hand Function   Gross Motor Coordination Functional   Fine Motor Coordination Functional   Vision-Basic Assessment   Current Vision Wears glasses all the time   Cognition   Overall Cognitive Status Impaired   Arousal/Participation Alert   Attention Attends with cues to redirect   Orientation Level Oriented to person;Oriented to place; Disoriented to time;Disoriented to situation   Memory Decreased recall of precautions;Decreased recall of recent events;Decreased short term memory   Following Commands Follows one step commands with increased time or repetition   Comments PT ENGAGES IN CONVERSATION HOWEVER PT IS DISORIENTED TO TIME/SITUATION  PT STATES SHE HAS BEEN HOSPITALIZED FOR WEEKS AND IS NOT EASILY RE-ORIENTED TO FACT THAT SHE WAS ADMITTED WITHIN THE PAST 48 HOURS  PT W/ BASELINE DEMENTIA  ADD'L DEFICITS NOTED IN IMPAIRED SAFETY, INSIGHT, JUDGEMENT  WILL CONTINUE TO ASSESS  FAMILY DOES PROVIDE SUPERVISION TO PATIENT AT BASELINE  Assessment   Limitation Decreased ADL status; Decreased Safe judgement during ADL;Decreased cognition;Decreased endurance;Decreased self-care trans;Decreased high-level ADLs  (BALANCE, MEDICAL STATUS, WEAKNESS, DECONDITINOING)   Prognosis Fair   Assessment PT IS AN [de-identified] YO F ADMIT W/ RESPIRATORY DISTRESS  PT NOTED W/ INCREASED SPUTUM, SOB, WHEEZING, TACHYCARDIA AND TACHYPNEA  BiPAP PLACED ON PT IN ED  PT CURRENTLY UNDERGOING W/U FOR ACUTE RESPIRATORY FAURE, ACUTE ON CHRONIC DIASTOLIC HEART FAILURE, ACCELERATED HTN, ACUTE ASTHMA EXACERBATION, PNA AND BRONCHOSPASM  PT NOW REQUIRING 1 5LO2   PT W/ PMH SIGNIFICANT FOR ASTHMA, PPM, CHF, HTN, DM, CAD, DEMENTIA, HLD  PT IS FROM HOME W/ SPOUSE, SON AND GRANDCHILDREN  PT REPORTS BASELINE INDEPENDENCE IN ADLS/LIGHT HOUSEHOLD IADLS WHILE FAMILY ASSISTS W/ ADD'L IADLS  PT W/ USE OF RW PTA AND REPORTS NO RECENT H/O FALLS (WILL NEED TO CLARIFY INFORMATION WITH FAMILY)  FAMILY IS HOME WITH PT 24/7 AND AT BASELINE SPOUSE REQUIRES ASSIST FROM [facility] AIDES FOR ADLS/IADLS  PT STATES THAT AIDES ARE ADD'L ABLE TO ASSIST HER PRN (WILL ADD'L NEED TO CLARIFY THIS INFORMATION WITH FAMILY)  CURRENTLY PT REQUIRING MIN A UB ADLS, MOD A LB ADLS, MIN A FUNCTIONAL TRANSFERS MIN A SHORT DISTANCE AMBULATION USING RW  PT APPEARS LIMITED 2* IMPAIRED BALANCE, ACTIVITY TOLERANCE, MEDICAL STATUS, WEAKNESS, DECONDITIONING, ADL IMPAIRMENTS, O2 REQUIREMENT AS WELL AS IMPAIRED ATTENTION, INSIGHT, SAFETY, RECALL, ORIENTATION AND JUDGEMENT  OT ANTICIPATES, PT MAY DEMONSTRATE ABILITIES TO RETURN TO HOME ENVIRONMENT W/ THE ADDITION OF HOME PT/OT SERVICES AND INCREASED FAMILY SUPPORT  HOWEVER, IF PT DOES NOT CONTINUE TO PROGRESS AND FAMILY IS UNABLE TO MANAGE PATIENT AT THIS LEVEL OF CARE, WILL NEED TO CONSIDER A SHORT TERM REHAB OPTION  WILL CONTINUE TO FOLLOW PT 3-5X/WEEK IN ORDER TO MEET THE BELOW DESCRIBED GOALS IN 10-14 DAYS  Goals   Patient Goals PT WOULD LIKE TO GO HOME     LTG Time Frame 10-14   Long Term Goal #1 PLEASE SEE BELOW DESCRIBED GOALS  Plan   Treatment Interventions ADL retraining;Functional transfer training;Cognitive reorientation;Patient/family training;Equipment evaluation/education; Endurance training; Compensatory technique education;Continued evaluation; Energy conservation; Activityengagement   Goal Expiration Date 05/08/18   OT Frequency 3-5x/wk   Recommendation   OT Discharge Recommendation Home OT  (W/ INCREASED FAMILY SUPPORT)   OT - OK to Discharge (PENDING [address]   )   Barthel Index   Feeding 5   Bathing 0   Grooming Score 5   Dressing Score 0   Bladder Score 0   Bowels Score 5 Toilet Use Score 5   Transfers (Bed/Chair) Score 10   Mobility (Level Surface) Score 0   Stairs Score 0   Bart[place] Index Score 30   Modified [place] Scale   Modified [place] Scale 4     GOALS TO BE MET IN 10-14 DAYS:    1) Pt will increase bed mobility to SBA and transfer EOB to participate in functional activity with G tolerance and balance  2) Pt will improve functional transfers to SBA on/off all surfaces using DME PRN w/ G balance/safety including toileting  3) Pt will increase independence in all ADLS to SBA with G balance sitting upright in chair  4) Pt will complete toileting w/ SBA w/ G hygiene/thoroughness using DME PRN  5) Pt will improve activity tolerance to G for min 30 min txment sessions  6) Pt will participate in light grooming task with SBA using setup standing at sink ~3-5mins with G safety and balance  7) Pt will engage in ongoing cognitive assessment(S) w/ G participation to A w/ safe d/c planning/recommendations  8) Pt will follow 100% simple 2 step commands and be A&O x4 consistently with environmental cues to increase activity participation to G     9) Pt will demonstrate 100% carryover of learned E C  techniques s/p skilled education w/o cues t/o fx'l I/ADL/leisure interest tasks      DOCUMENTATION COMPLETED BY Vince Lim MS, OTR/L

## 2018-04-25 ENCOUNTER — APPOINTMENT (INPATIENT)
Dept: RADIOLOGY | Facility: HOSPITAL | Age: 83
DRG: 291 | End: 2018-04-25
Payer: COMMERCIAL

## 2018-04-25 LAB
ANION GAP SERPL CALCULATED.3IONS-SCNC: 9 MMOL/L (ref 4–13)
BUN SERPL-MCNC: 47 MG/DL (ref 5–25)
CALCIUM SERPL-MCNC: 8.9 MG/DL (ref 8.3–10.1)
CHLORIDE SERPL-SCNC: 107 MMOL/L (ref 100–108)
CO2 SERPL-SCNC: 24 MMOL/L (ref 21–32)
CREAT SERPL-MCNC: 1.31 MG/DL (ref 0.6–1.3)
GFR SERPL CREATININE-BSD FRML MDRD: 38 ML/MIN/1.73SQ M
GLUCOSE SERPL-MCNC: 102 MG/DL (ref 65–140)
GLUCOSE SERPL-MCNC: 133 MG/DL (ref 65–140)
GLUCOSE SERPL-MCNC: 183 MG/DL (ref 65–140)
GLUCOSE SERPL-MCNC: 237 MG/DL (ref 65–140)
GLUCOSE SERPL-MCNC: 247 MG/DL (ref 65–140)
MAGNESIUM SERPL-MCNC: 2.5 MG/DL (ref 1.6–2.6)
PHOSPHATE SERPL-MCNC: 3.9 MG/DL (ref 2.3–4.1)
POTASSIUM SERPL-SCNC: 4.2 MMOL/L (ref 3.5–5.3)
SODIUM SERPL-SCNC: 140 MMOL/L (ref 136–145)

## 2018-04-25 PROCEDURE — 99232 SBSQ HOSP IP/OBS MODERATE 35: CPT | Performed by: INTERNAL MEDICINE

## 2018-04-25 PROCEDURE — 82948 REAGENT STRIP/BLOOD GLUCOSE: CPT

## 2018-04-25 PROCEDURE — 94760 N-INVAS EAR/PLS OXIMETRY 1: CPT

## 2018-04-25 PROCEDURE — 83735 ASSAY OF MAGNESIUM: CPT | Performed by: GENERAL PRACTICE

## 2018-04-25 PROCEDURE — 84100 ASSAY OF PHOSPHORUS: CPT | Performed by: GENERAL PRACTICE

## 2018-04-25 PROCEDURE — 80048 BASIC METABOLIC PNL TOTAL CA: CPT | Performed by: GENERAL PRACTICE

## 2018-04-25 PROCEDURE — 99223 1ST HOSP IP/OBS HIGH 75: CPT | Performed by: INTERNAL MEDICINE

## 2018-04-25 PROCEDURE — 94640 AIRWAY INHALATION TREATMENT: CPT

## 2018-04-25 PROCEDURE — 71046 X-RAY EXAM CHEST 2 VIEWS: CPT

## 2018-04-25 PROCEDURE — 99232 SBSQ HOSP IP/OBS MODERATE 35: CPT | Performed by: PHYSICIAN ASSISTANT

## 2018-04-25 PROCEDURE — 99232 SBSQ HOSP IP/OBS MODERATE 35: CPT | Performed by: GENERAL PRACTICE

## 2018-04-25 RX ORDER — INSULIN GLARGINE 100 [IU]/ML
30 INJECTION, SOLUTION SUBCUTANEOUS
Status: DISCONTINUED | OUTPATIENT
Start: 2018-04-25 | End: 2018-04-26

## 2018-04-25 RX ORDER — PREDNISONE 20 MG/1
40 TABLET ORAL DAILY
Status: DISCONTINUED | OUTPATIENT
Start: 2018-04-26 | End: 2018-04-28 | Stop reason: HOSPADM

## 2018-04-25 RX ORDER — LEVALBUTEROL 1.25 MG/.5ML
1.25 SOLUTION, CONCENTRATE RESPIRATORY (INHALATION) EVERY 6 HOURS PRN
Status: DISCONTINUED | OUTPATIENT
Start: 2018-04-25 | End: 2018-04-28 | Stop reason: HOSPADM

## 2018-04-25 RX ADMIN — LEVALBUTEROL HYDROCHLORIDE 1.25 MG: 1.25 SOLUTION, CONCENTRATE RESPIRATORY (INHALATION) at 07:29

## 2018-04-25 RX ADMIN — CALCIUM CARBONATE 500 MG (1,250 MG)-VITAMIN D3 200 UNIT TABLET 1 TABLET: at 17:32

## 2018-04-25 RX ADMIN — ENOXAPARIN SODIUM 40 MG: 40 INJECTION SUBCUTANEOUS at 08:07

## 2018-04-25 RX ADMIN — DOCUSATE SODIUM 100 MG: 100 CAPSULE, LIQUID FILLED ORAL at 17:33

## 2018-04-25 RX ADMIN — AMLODIPINE BESYLATE 5 MG: 5 TABLET ORAL at 08:07

## 2018-04-25 RX ADMIN — GUAIFENESIN 600 MG: 600 TABLET, EXTENDED RELEASE ORAL at 21:18

## 2018-04-25 RX ADMIN — METOPROLOL TARTRATE 25 MG: 25 TABLET ORAL at 08:06

## 2018-04-25 RX ADMIN — INSULIN GLARGINE 30 UNITS: 100 INJECTION, SOLUTION SUBCUTANEOUS at 21:20

## 2018-04-25 RX ADMIN — INSULIN LISPRO 3 UNITS: 100 INJECTION, SOLUTION INTRAVENOUS; SUBCUTANEOUS at 11:50

## 2018-04-25 RX ADMIN — PANTOPRAZOLE SODIUM 40 MG: 40 TABLET, DELAYED RELEASE ORAL at 05:20

## 2018-04-25 RX ADMIN — METOPROLOL TARTRATE 25 MG: 25 TABLET ORAL at 21:18

## 2018-04-25 RX ADMIN — ASPIRIN 81 MG 81 MG: 81 TABLET ORAL at 08:06

## 2018-04-25 RX ADMIN — IPRATROPIUM BROMIDE 0.5 MG: 0.5 SOLUTION RESPIRATORY (INHALATION) at 07:29

## 2018-04-25 RX ADMIN — ISOSORBIDE MONONITRATE 60 MG: 60 TABLET, EXTENDED RELEASE ORAL at 08:06

## 2018-04-25 RX ADMIN — LEVALBUTEROL HYDROCHLORIDE 1.25 MG: 1.25 SOLUTION, CONCENTRATE RESPIRATORY (INHALATION) at 00:22

## 2018-04-25 RX ADMIN — METHYLPREDNISOLONE SODIUM SUCCINATE 40 MG: 40 INJECTION, POWDER, FOR SOLUTION INTRAMUSCULAR; INTRAVENOUS at 05:19

## 2018-04-25 RX ADMIN — GUAIFENESIN 600 MG: 600 TABLET, EXTENDED RELEASE ORAL at 08:06

## 2018-04-25 RX ADMIN — ATORVASTATIN CALCIUM 40 MG: 40 TABLET, FILM COATED ORAL at 08:06

## 2018-04-25 RX ADMIN — FUROSEMIDE 40 MG: 10 INJECTION, SOLUTION INTRAMUSCULAR; INTRAVENOUS at 08:07

## 2018-04-25 RX ADMIN — CALCIUM CARBONATE 500 MG (1,250 MG)-VITAMIN D3 200 UNIT TABLET 1 TABLET: at 07:56

## 2018-04-25 RX ADMIN — IPRATROPIUM BROMIDE 0.5 MG: 0.5 SOLUTION RESPIRATORY (INHALATION) at 00:22

## 2018-04-25 RX ADMIN — INSULIN LISPRO 3 UNITS: 100 INJECTION, SOLUTION INTRAVENOUS; SUBCUTANEOUS at 07:57

## 2018-04-25 NOTE — CONSULTS
Consultation - Romayne El [de-identified] y o  female MRN: [id number]    Unit/Bed#: [facility] 503-01 Encounter: [id number]      Assessment/Plan     Assessment: This is a [de-identified]y o -year-old female with diabetes with hyperglycemia and acute respiratory failure mainly due to pneumonia  Plan:  1  Type 2 diabetes with hyperglycemia-her blood sugars are elevated due to steroids  Her hemoglobin A1c is above target which is between 7 5 to 8%  He Increase Lantus to 30 units  Continue to monitor blood sugar over time and make adjustments to the regimen if necessary  When she has discharge, she can resume her [employer] insulin delivery system  She should follow up in the office in about 2 to 3 weeks  Check urine for microalbumin  2   Acute respiratory failure mainly due to pneumonia-she is currently on steroids  Management per Pulmonary  CC: Diabetes Consult    History of Present Illness     HPI: Romayne El is a [de-identified]y o  year old female with type 2 diabetes for over 20 years  She is on insulin at home  She denies any polyuria, polydipsia, nocturia and blurry vision  She denies nephropathy, retinopathy, heart attack, stroke and claudication but does admit to neuropathy  She denies any hypoglycemia  Inpatient consult to Endocrinology  Consult performed by: Chandni Morales  Consult ordered by: Marcelle Franklin          Review of Systems   Constitutional: Negative for chills and fever  Respiratory: Negative for shortness of breath  Cardiovascular: Negative for chest pain  Gastrointestinal: Negative for constipation, diarrhea, nausea and vomiting  Endocrine: Negative for polydipsia and polyuria  Neurological: Negative for dizziness  All other systems reviewed and are negative        Historical Information   Past Medical History:   Diagnosis Date    Asthma     CAD (coronary artery disease) of artery bypass graft     Cardiac disease     Dementia     Depression     [facility] mellitus [facility] [address] [facility]     Hyperlipidemia     Hypertension     [facility] [address] [facility]     Neuropathy     BRANT (obstructive sleep apnea)      Past Surgical History:   Procedure Laterality Date    APPENDECTOMY      CATARACT EXTRACTION      EGD AND COLONOSCOPY       Social History   History   Alcohol Use No     History   Drug Use No     History   Smoking Status    Never Smoker   Smokeless Tobacco    Never Used     Family History: History reviewed  No pertinent family history      Meds/Allergies   Current Facility-Administered Medications   Medication Dose Route Frequency Provider Last Rate Last Dose    acetaminophen (TYLENOL) tablet 650 mg  650 mg Oral Q6H PRN Shannon Bennett MD        amLODIPine (NORVASC) tablet 5 mg  5 mg Oral Daily Shannon Bennett MD   5 mg at 04/25/18 8257    aspirin chewable tablet 81 mg  81 mg Oral Daily Shannon Bennett MD   81 mg at 04/25/18 0806    atorvastatin (LIPITOR) tablet 40 mg  40 mg Oral Daily Shannon Bennett MD   40 mg at 04/25/18 0806    calcium carbonate-vitamin D (OSCAL-D) 500 mg-200 units per tablet 1 tablet  1 tablet Oral BID With Meals Brian Mcfarlane MD   1 tablet at 04/25/18 0756    docusate sodium (COLACE) capsule 100 mg  100 mg Oral BID Shannon Bennett MD   100 mg at 04/24/18 0830    enoxaparin (LOVENOX) subcutaneous injection 40 mg  40 mg Subcutaneous Daily Shannon Bennett MD   40 mg at 04/25/18 7254    furosemide (LASIX) injection 40 mg  40 mg Intravenous Daily Brian Mcfarlane MD   40 mg at 04/25/18 0807    guaiFENesin (MUCINEX) 12 hr tablet 600 mg  600 mg Oral Q12H [facility] Shannon Bennett MD   600 mg at 04/25/18 0806    insulin glargine (LANTUS) subcutaneous injection 20 Units  20 Units Subcutaneous HS Brian Mcfarlane MD   20 Units at 04/24/18 2108    insulin lispro (HumaLOG) 100 units/mL subcutaneous injection 1-6 Units  1-6 Units Subcutaneous TID [facility] [place] Shannon Bennett MD   3 Units at 04/25/18 1150    insulin lispro (HumaLOG) 100 units/mL subcutaneous injection 1-6 Units  1-6 Units Subcutaneous HS Debbie Saucedo MD   2 Units at 04/24/18 2156    insulin lispro (HumaLOG) 100 units/mL subcutaneous injection 10 Units  10 Units Subcutaneous TID With Meals Annalisa Garcia PA-C   10 Units at 04/25/18 1149    isosorbide mononitrate (IMDUR) 24 hr tablet 60 mg  60 mg Oral Daily Debbie Saucedo MD   60 mg at 04/25/18 0806    levalbuterol (XOPENEX) inhalation solution 1 25 mg  1 25 mg Nebulization Q6H PRN Nick Vera PA-C        metoprolol tartrate (LOPRESSOR) tablet 25 mg  25 mg Oral Q12H [address] Debbie Saucedo MD   25 mg at 04/25/18 0806    nitroglycerin (NITROSTAT) SL tablet 0 4 mg  0 4 mg Sublingual Q5 Min PRN Debbie Saucedo MD        [facility] injection 4 mg  4 mg Intravenous Q6H PRN Debbie Saucedo MD        pantoprazole (PROTONIX) EC tablet 40 mg  40 mg Oral Early Morning Debbie Saucedo MD   40 mg at 04/25/18 0520    polyethylene glycol (MIRALAX) packet 17 g  17 g Oral Daily PRN Debbie Suacedo MD        [START ON 4/26/2018] predniSONE tablet 40 mg  40 mg Oral Daily Nick Vera PA-C        senna (SENOKOT) tablet 8 6 mg  1 tablet Oral Daily Debbie Saucedo MD   8 6 mg at 04/24/18 0830    sodium chloride (PF) 0 9 % injection 3 mL  3 mL Intravenous PRN Yanni Friday, MD         Allergies   Allergen Reactions    Ace Inhibitors     Chlorpromazine     Latex     Lisinopril     Namenda [Memantine]     Penicillins     Propoxyphene     Stadol [Butorphanol]        Objective   Vitals: Blood pressure 129/61, pulse 61, temperature 98 6 °F (37 °C), temperature source Oral, resp  rate 18, height 5' 8" (1 727 m), weight 107 kg (236 lb 5 3 oz), SpO2 95 %      Intake/Output Summary (Last 24 hours) at 04/25/18 1536  Last data filed at 04/25/18 1419   Gross per 24 hour   Intake              485 ml   Output             2069 ml   Net            -1584 ml     Invasive Devices     Peripheral Intravenous Line Peripheral IV 04/24/18 Right Hand 1 day                Physical Exam   Constitutional: She appears well-developed and well-nourished  No distress  Obese woman in no apparent distress  HENT:   Head: Normocephalic and atraumatic  Mouth/Throat: Oropharynx is clear and moist and mucous membranes are normal  No oropharyngeal exudate  Eyes: Conjunctivae, EOM and lids are normal  Right eye exhibits no discharge  Left eye exhibits no discharge  No scleral icterus  Neck: Neck supple  No thyromegaly present  Cardiovascular: Normal rate, regular rhythm and normal heart sounds  Exam reveals no gallop and no friction rub  No murmur heard  Pulmonary/Chest: Effort normal and breath sounds normal  No respiratory distress  She has no wheezes  Abdominal: Soft  Bowel sounds are normal  She exhibits no distension  There is no tenderness  Abdomen is obese  Musculoskeletal: Normal range of motion  She exhibits no edema, tenderness or deformity  Lymphadenopathy:        Head (right side): No occipital adenopathy present  Head (left side): No occipital adenopathy present  Right: No supraclavicular adenopathy present  Left: No supraclavicular adenopathy present  Neurological: She is alert  No cranial nerve deficit  Skin: Skin is warm and intact  No rash noted  She is not diaphoretic  No erythema  Psychiatric: She has a normal mood and affect  Vitals reviewed  The history was obtained from the review of the chart, patient      Lab Results:     Results from last 7 days  Lab Units 04/23/18  0457   HEMOGLOBIN A1C % 8 7*     Lab Results   Component Value Date    WBC 10 19 (H) 04/24/2018    HGB 12 5 04/24/2018    HCT 36 9 04/24/2018    MCV 88 04/24/2018     04/24/2018     Lab Results   Component Value Date/Time    BUN 47 (H) 04/25/2018 04:33 AM    BUN 27 (H) 12/17/2015 05:07 PM     04/25/2018 04:33 AM     12/17/2015 05:07 PM    K 4 2 04/25/2018 04:33 AM    K 4 0 12/17/2015 05:07 PM     04/25/2018 04:33 AM     12/17/2015 05:07 PM    CO2 24 04/25/2018 04:33 AM    CO2 30 12/17/2015 05:07 PM    CREATININE 1 31 (H) 04/25/2018 04:33 AM    CREATININE 1 25 12/17/2015 05:07 PM    AST 20 04/22/2018 11:15 PM    AST 10 12/17/2015 05:07 PM    ALT 27 04/22/2018 11:15 PM    ALT 25 12/17/2015 05:07 PM    ALB 3 9 04/22/2018 11:15 PM    ALB 3 7 12/17/2015 05:07 PM     No results for input(s): CHOL, HDL, LDL, TRIG, VLDL in the last 72 hours  No results found for: Dany Pena  POC Glucose   Date Value   04/25/2018 247 mg/dl (H)   04/25/2018 237 mg/dl (H)   04/24/2018 210 mg/dl (H)   04/24/2018 274 mg/dl (H)   04/24/2018 276 mg/dl (H)   04/24/2018 284 mg/dl (H)   04/23/2018 328 mg/dl (H)   04/23/2018 436 mg/dl (H)   04/23/2018 351 mg/dl (H)   04/23/2018 203 mg/dl (H)   02/27/2015 264 mg/dL (H)   06/04/2014 280 mg/dL (H)   05/24/2014 308 mg/dL (H)   05/22/2014 278 mg/dL (H)       Imaging Studies: I have personally reviewed pertinent reports  Portions of the record may have been created with voice recognition software

## 2018-04-25 NOTE — PROGRESS NOTES
Progress Note - Pulmonary   Violette Lutz [de-identified] y o  female MRN: [id number]  Unit/Bed#: [facility] 503-01 Encounter: [id number]      Assessment/Plan:  1  Acute hypoxic respiratory failure        *  Resolved and no room air        *  Check ambulatory room air saturation        *  Incentive spirometry Q1hr, OOB as able, increase activity as able  2  Abnormal CXR        *  Possible opacity as well as volume overload        *  IV lasix per cardiology        *  Received 2 days levaquin --> procalcitonin normal        *  Repeat CXR to ensure improvement with diuresis   3  Bronchospasm        *  Resolved         *  Transition to prednisone in AM with taper by 10mg every 2 days        *  Decrease nebulizers  4  Acute on chronic diastolic HF with ICM & CAD        *  IV lasix per cardiology    ~Discussed with RN    Subjective:   Mrs Ebony Baltazar is seen sitting out of bed in the chair  She is currently being checked on room air  She is doing well and feels about back to her baseline  She is hoping to be able to go home soon  She tells me she has a lot help at home  She notes great improvement in her cough  She denies chest pain, shortness of breath, fevers or bronchospasm  Objective:     Vitals: Blood pressure 129/61, pulse 61, temperature (!) 96 3 °F (35 7 °C), temperature source Axillary, resp  rate 18, height 5' 8" (1 727 m), weight 107 kg (236 lb 5 3 oz), SpO2 95 % , RA, Body mass index is 35 93 kg/m²        Intake/Output Summary (Last 24 hours) at 04/25/18 1227  Last data filed at 04/25/18 1035   Gross per 24 hour   Intake              365 ml   Output             1388 ml   Net            -1023 ml         Physical Exam  Gen: Awake, alert, oriented x 3, no acute distress  HEENT: Mucous membranes moist, no oral lesions, no thrush  NECK: No accessory muscle use, JVP not elevated  Cardiac: Regular, single S1, single S2, no murmurs, no rubs, no gallops  Lungs: Clear to ausculation bilaterally without wheezes, rhonchi or rales noted  Abdomen: normoactive bowel sounds, soft nontender, nondistended, no rebound or rigidity, no guarding  Extremities: no cyanosis, no clubbing  Trace - +1 LE edema bilaterally    Labs: I have personally reviewed pertinent lab results  , ABG: No results found for: PHART, UJP5FOM, PO2ART, MGX1DUY, D4UWKFGY, BEART, SOURCE, BNP: No results found for: BNP, CBC: No results found for: WBC, HGB, HCT, MCV, PLT, ADJUSTEDWBC, MCH, MCHC, RDW, MPV, NRBC, CMP:   Lab Results   Component Value Date     04/25/2018    K 4 2 04/25/2018     04/25/2018    CO2 24 04/25/2018    ANIONGAP 9 04/25/2018    BUN 47 (H) 04/25/2018    CREATININE 1 31 (H) 04/25/2018    GLUCOSE 183 (H) 04/25/2018    CALCIUM 8 9 04/25/2018    EGFR 38 04/25/2018   , PT/INR: No results found for: PT, INR, Troponin: No results found for: TROPONINI     Imaging and other studies: I have personally reviewed pertinent films in PACS    PCXR 4/22/18   FINDINGS:     The heart is enlarged  Atherosclerotic changes in the aorta  Dual lead pacing device      Lung volumes diminished  Obscuration of the right hemidiaphragm and right heart border, secondary to fluid and/or infiltrate      Osseous structures appear within normal limits for patient age      IMPRESSION:  Fluid and/or infiltrate right lower lobe      Kristyn Cabrales PA-C

## 2018-04-25 NOTE — PLAN OF CARE
Problem: Potential for Falls  Goal: Patient will remain free of falls  INTERVENTIONS:  - Assess patient frequently for physical needs  -  Identify cognitive and physical deficits and behaviors that affect risk of falls    -  [place] fall precautions as indicated by assessment   - Educate patient/family on patient safety including physical limitations  - Instruct patient to call for assistance with activity based on assessment  - Modify environment to reduce risk of injury  - Consider OT/PT consult to assist with strengthening/mobility    Outcome: Progressing      Problem: Prexisting or High Potential for Compromised Skin Integrity  Goal: Skin integrity is maintained or improved  INTERVENTIONS:  - Identify patients at risk for skin breakdown  - Assess and monitor skin integrity  - Assess and monitor nutrition and hydration status  - Monitor labs (i e  albumin)  - Assess for incontinence   - Turn and reposition patient  - Assist with mobility/ambulation  - Relieve pressure over bony prominences  - Avoid friction and shearing  - Provide appropriate hygiene as needed including keeping skin clean and dry  - Evaluate need for skin moisturizer/barrier cream  - Collaborate with interdisciplinary team (i e  Nutrition, Rehabilitation, etc )   - Patient/family teaching   Outcome: Progressing      Problem: PAIN - ADULT  Goal: Verbalizes/displays adequate comfort level or baseline comfort level  Interventions:  - Encourage patient to monitor pain and request assistance  - Assess pain using appropriate pain scale  - Administer analgesics based on type and severity of pain and evaluate response  - Implement non-pharmacological measures as appropriate and evaluate response  - Consider cultural and social influences on pain and pain management  - Notify physician/advanced practitioner if interventions unsuccessful or patient reports new pain   Outcome: Progressing      Problem: INFECTION - ADULT  Goal: Absence or prevention of progression during hospitalization  INTERVENTIONS:  - Assess and monitor for signs and symptoms of infection  - Monitor lab/diagnostic results  - Monitor all insertion sites, i e  indwelling lines, tubes, and drains  - Monitor endotracheal (as able) and nasal secretions for changes in amount and color  - [place] appropriate cooling/warming therapies per order  - Administer medications as ordered  - Instruct and encourage patient and family to use good hand hygiene technique  - Identify and instruct in appropriate isolation precautions for identified infection/condition   Outcome: Progressing    Goal: Absence of fever/infection during neutropenic period  INTERVENTIONS:  - Monitor WBC  - Implement neutropenic guidelines   Outcome: Progressing      Problem: SAFETY ADULT  Goal: Maintain or return to baseline ADL function  INTERVENTIONS:  -  Assess patient's ability to carry out ADLs; assess patient's baseline for ADL function and identify physical deficits which impact ability to perform ADLs (bathing, care of mouth/teeth, toileting, grooming, dressing, etc )  - Assess/evaluate cause of self-care deficits   - Assess range of motion  - Assess patient's mobility; develop plan if impaired  - Assess patient's need for assistive devices and provide as appropriate  - Encourage maximum independence but intervene and supervise when necessary  ¯ Involve family in performance of ADLs  ¯ Assess for home care needs following discharge   ¯ Request OT consult to assist with ADL evaluation and planning for discharge  ¯ Provide patient education as appropriate   Outcome: Progressing    Goal: Maintain or return mobility status to optimal level  INTERVENTIONS:  - Assess patient's baseline mobility status (ambulation, transfers, stairs, etc )    - Identify cognitive and physical deficits and behaviors that affect mobility  - Identify mobility aids required to assist with transfers and/or ambulation (gait belt, sit-to-stand, lift, walker, cane, etc )  - [place] fall precautions as indicated by assessment  - Record patient progress and toleration of activity level on Mobility SBAR; progress patient to next Phase/Stage  - Instruct patient to call for assistance with activity based on assessment  - Request Rehabilitation consult to assist with strengthening/weightbearing, etc    Outcome: Progressing    Goal: Patient will remain free of falls  INTERVENTIONS:  - Assess patient frequently for physical needs  -  Identify cognitive and physical deficits and behaviors that affect risk of falls  -  [place] fall precautions as indicated by assessment   - Educate patient/family on patient safety including physical limitations  - Instruct patient to call for assistance with activity based on assessment  - Modify environment to reduce risk of injury  - Consider OT/PT consult to assist with strengthening/mobility    Outcome: Progressing      Problem: DISCHARGE PLANNING  Goal: Discharge to home or other facility with appropriate resources  INTERVENTIONS:  - Identify barriers to discharge w/patient and caregiver  - Arrange for needed discharge resources and transportation as appropriate  - Identify discharge learning needs (meds, wound care, etc )  - Arrange for interpretive services to assist at discharge as needed  - Refer to Case Management Department for coordinating discharge planning if the patient needs post-hospital services based on physician/advanced practitioner order or complex needs related to functional status, cognitive ability, or social support system   Outcome: Progressing      Problem: Knowledge Deficit  Goal: Patient/family/caregiver demonstrates understanding of disease process, treatment plan, medications, and discharge instructions  Complete learning assessment and assess knowledge base    Interventions:  - Provide teaching at level of understanding  - Provide teaching via preferred learning methods   Outcome: Progressing      Problem: Nutrition/Hydration-ADULT  Goal: Nutrient/Hydration intake appropriate for improving, restoring or maintaining nutritional needs  Monitor and assess patient's nutrition/hydration status for malnutrition (ex- brittle hair, bruises, dry skin, pale skin and conjunctiva, muscle wasting, smooth red tongue, and disorientation)  Collaborate with interdisciplinary team and initiate plan and interventions as ordered  Monitor patient's weight and dietary intake as ordered or per policy  Utilize nutrition screening tool and intervene per policy  Determine patient's food preferences and provide high-protein, high-caloric foods as appropriate       INTERVENTIONS:  - Monitor oral intake, urinary output, labs, and treatment plans  - Assess nutrition and hydration status and recommend course of action  - Evaluate amount of meals eaten  - Assist patient with eating if necessary   - Allow adequate time for meals  - Recommend/ encourage appropriate diets, oral nutritional supplements, and vitamin/mineral supplements  - Order, calculate, and assess calorie counts as needed  - Recommend, monitor, and adjust tube feedings and TPN/PPN based on assessed needs  - Assess need for intravenous fluids  - Provide specific nutrition/hydration education as appropriate  - Include patient/family/caregiver in decisions related to nutrition   Outcome: Progressing      Problem: DISCHARGE PLANNING - CARE MANAGEMENT  Goal: Discharge to post-acute care or home with appropriate resources  INTERVENTIONS:  - Conduct assessment to determine patient/family and health care team treatment goals, and need for post-acute services based on payer coverage, community resources, and patient preferences, and barriers to discharge  - Address psychosocial, clinical, and financial barriers to discharge as identified in assessment in conjunction with the patient/family and health care team  - Arrange appropriate level of post-acute services according to patient's   needs and preference and payer coverage in collaboration with the physician and health care team  - Communicate with and update the patient/family, physician, and health care team regarding progress on the discharge plan  - Arrange appropriate transportation to post-acute venues  - Complete referral to [facility] for follow up   Outcome: Progressing

## 2018-04-25 NOTE — ASSESSMENT & PLAN NOTE
· Acute hypoxic respiratory failure, multifactorial, with bronchospasm, with asthma exacerbation; acute congestive heart failure; and possible pneumonia  · Manage patient's bronchospasm/asthma,/heart failure and pneumonia  · Cardiology and pulmonology on board  · Continue cardiovascular heart failure medications  · Continue with nebulizations, steroids, Mucinex    · Previously, patient was on a BiPAP and now on RA

## 2018-04-25 NOTE — ASSESSMENT & PLAN NOTE
A1c is 8 7  Patient is on disposable pump  With hyperglycemia, likely exacerbated with steroids, patient on basal bolus insulin    Endo appreciated - recs c/w SQ insulin while inpt and d/c on pump

## 2018-04-25 NOTE — ASSESSMENT & PLAN NOTE
· Pulmonologist on board  · Continue steroids  · Continue nebulizations  · Continue oxygen    · 4/26 - IV steroids -> PO

## 2018-04-25 NOTE — RESTORATIVE TECHNICIAN NOTE
Restorative Specialist Mobility Note       Activity: Chair, Dangle, Stand at bedside, Turn, Ambulate in room, Ambulate in torre     Assistive Device: Front wheel walker     Ambulation Response:  Tolerated fairly well  Repositioned: Supine (pt on stretcher going to x-ray)

## 2018-04-25 NOTE — PROGRESS NOTES
Progress Note - Bren Freed 1935, [de-identified] y o  female MRN: [id number]    Unit/Bed#: [facility] 503-01 Encounter: [id number]    Primary Care Provider: Alma Vivar MD   Date and time admitted to hospital: 4/22/2018 11:03 PM        * Acute respiratory failure (HCC)   Assessment & Plan    · Acute hypoxic respiratory failure, multifactorial, with bronchospasm, with asthma exacerbation; acute congestive heart failure; and possible pneumonia  · Manage patient's bronchospasm/asthma,/heart failure and pneumonia  · Cardiology and pulmonology on board  · Continue cardiovascular heart failure medications  · Continue with nebulizations, steroids, Mucinex  · Previously, patient was on a BiPAP and now on RA          Acute on chronic diastolic heart failure (HCC)   Assessment & Plan    · Continue IV Lasix  · Continue cardiovascular/heart failure medications  · Cardiology on board  · Echocardiogram showed DHF and severe TR        Hyperkalemia   Assessment & Plan    4/24 - BW moderately hemolyzed  Given IV Lasix  Recheck showed normal K        Abnormal chest x-ray   Assessment & Plan    · As per pulmonologist, there is a right lower lobe opacity that could represent pneumonia, given presentation  · IV Levaquin started on admission  · Would check sputum culture,   · 4/24 - procalcitonin < 0 05 - stopped Levaquin        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)   Assessment & Plan    A1c is 8 7  Patient is on disposable pump  With hyperglycemia, likely exacerbated with steroids, patient on basal bolus insulin  Endo appreciated - recs c/w SQ insulin while inpt and d/c on pump        Accelerated hypertension   Assessment & Plan    · Resolved  · Continue blood pressure medications    · 4/24 - hold losartan 2/2 rising Cr and hyperK        Dementia without behavioral disturbance   Assessment & Plan    Supportive care        Intermittent asthma with acute exacerbation   Assessment & Plan · Pulmonologist on board  · Continue steroids  · Continue nebulizations  · Continue oxygen  ·  - IV steroids -> PO          VTE Pharmacologic Prophylaxis:   Pharmacologic: Enoxaparin (Lovenox)  Mechanical VTE Prophylaxis in Place: Yes    Patient Centered Rounds: I have performed bedside rounds with nursing staff today  Discussions with Specialists or Other Care Team Provider: pulm and cardio    Education and Discussions with Family / Patient: pt  Left vm with dtr    Time Spent for Care: 30 minutes  More than 50% of total time spent on counseling and coordination of care as described above  Current Length of Stay: 2 day(s)    Current Patient Status: Inpatient   Certification Statement: The patient will continue to require additional inpatient hospital stay due to need for iv lasix    Discharge Plan: pending resolution of CHF    Code Status: Level 1 - Full Code      Subjective:   Feels much better  Objective:     Vitals:   Temp (24hrs), Av °F (36 7 °C), Min:97 5 °F (36 4 °C), Max:98 6 °F (37 °C)    HR:  [53-68] 59  Resp:  [18-20] 18  BP: (129-142)/(58-64) 132/63  SpO2:  [92 %-96 %] 92 %  Body mass index is 35 93 kg/m²  Input and Output Summary (last 24 hours): Intake/Output Summary (Last 24 hours) at 18 1934  Last data filed at 18 1753   Gross per 24 hour   Intake              485 ml   Output             2099 ml   Net            -1614 ml       Physical Exam:     Physical Exam   Constitutional: She is oriented to person, place, and time  No distress  HENT:   Head: Normocephalic and atraumatic  Eyes: Conjunctivae and EOM are normal    Neck: Normal range of motion  Neck supple  Cardiovascular: Normal rate and regular rhythm  Pulmonary/Chest: Effort normal and breath sounds normal  She has no wheezes  She has no rales  Abdominal: Soft  Bowel sounds are normal  She exhibits no distension  There is no tenderness  Musculoskeletal: Normal range of motion   She exhibits no edema  Neurological: She is alert and oriented to person, place, and time  Skin: Skin is warm and dry  She is not diaphoretic  Additional Data:     Labs:      Results from last 7 days  Lab Units 04/24/18  0518 04/23/18  0651  04/22/18 2315   WBC Thousand/uL 10 19* 9 39  --  7 12   HEMOGLOBIN g/dL 12 5 12 1  --  12 6   I STAT HEMOGLOBIN   --   --   < >  --    HEMATOCRIT % 36 9 37 8  --  38 5   PLATELETS Thousands/uL 205 181  --  196   NEUTROS PCT %  --   --   --  65   LYMPHS PCT %  --   --   --  22   LYMPHO PCT %  --  4*  --   --    MONOS PCT %  --   --   --  9   MONO PCT MAN %  --  0*  --   --    EOS PCT %  --   --   --  3   EOSINO PCT MANUAL %  --  0  --   --    < > = values in this interval not displayed  Results from last 7 days  Lab Units 04/25/18  0433  04/22/18 2315   SODIUM mmol/L 140  < > 139   POTASSIUM mmol/L 4 2  < > 4 1   CHLORIDE mmol/L 107  < > 105   CO2 mmol/L 24  < > 28   BUN mg/dL 47*  < > 29*   CREATININE mg/dL 1 31*  < > 1 20   CALCIUM mg/dL 8 9  < > 9 4   TOTAL PROTEIN g/dL  --   --  7 3   BILIRUBIN TOTAL mg/dL  --   --  0 69   ALK PHOS U/L  --   --  114   ALT U/L  --   --  27   AST U/L  --   --  20   GLUCOSE RANDOM mg/dL 183*  < > 249*   GLUCOSE, ISTAT   --   < >  --    < > = values in this interval not displayed  Results from last 7 days  Lab Units 04/22/18  2315   INR  1 05       * I Have Reviewed All Lab Data Listed Above  * Additional Pertinent Lab Tests Reviewed: Joe 66 Admission Reviewed        Recent Cultures (last 7 days):       Results from last 7 days  Lab Units 04/22/18 2319 04/22/18 2315   BLOOD CULTURE  No Growth at 48 hrs  No Growth at 48 hrs         Last 24 Hours Medication List:     Current Facility-Administered Medications:  acetaminophen 650 mg Oral Q6H PRN Rhean Arroyo MD   amLODIPine 5 mg Oral Daily Rehan Arroyo MD   aspirin 81 mg Oral Daily Rehan Arroyo MD   atorvastatin 40 mg Oral Daily Rehan Arroyo MD calcium carbonate-vitamin D 1 tablet Oral BID With Meals Brian Mcfarlane MD   docusate sodium 100 mg Oral BID Mia Sexton MD   enoxaparin 40 mg Subcutaneous Daily Mia Sexton MD   furosemide 40 mg Intravenous Daily Brian Mcfarlane MD   guaiFENesin 600 mg Oral Q12H [address] Mia Sexton MD   insulin glargine 30 Units Subcutaneous HS Edd Shankar MD   insulin lispro 1-6 Units Subcutaneous TID AC Mia Sexton MD   insulin lispro 1-6 Units Subcutaneous HS Mia Sexton MD   insulin lispro 10 Units Subcutaneous TID With Meals Lenny Garcia PA-C   isosorbide mononitrate 60 mg Oral Daily Mia Sexton MD   levalbuterol 1 25 mg Nebulization Q6H PRN Desiree Morataya PA-C   metoprolol tartrate 25 mg Oral Q12H [address] Mia Sexton MD   nitroglycerin 0 4 mg Sublingual Q5 Min PRN Mia Sexton MD   ondansetron 4 mg Intravenous Q6H PRN Mia Sexton MD   pantoprazole 40 mg Oral Early Morning Mia Sexton MD   polyethylene glycol 17 g Oral Daily PRN Mia Sexton MD   [START ON 4/26/2018] predniSONE 40 mg Oral Daily Desireetray Morataya, NUNU   senna 1 tablet Oral Daily Mia Sexton MD   sodium chloride (PF) 3 mL Intravenous PRN Cristofer Morgan MD        Today, Patient Was Seen By: Mignon Cowden, DO    ** Please Note: Dictation voice to text software may have been used in the creation of this document   **

## 2018-04-25 NOTE — CASE MANAGEMENT
Continued Stay Review    Date: 04/24/18    Vital Signs: /61 (BP Location: Right arm)   Pulse 61   Temp (!) 96 3 °F (35 7 °C) (Axillary)   Resp 18   Ht 5' 8" (1 727 m)   Wt 107 kg (236 lb 5 3 oz)   SpO2 95%   BMI 35 93 kg/m²     Medications:   Scheduled Meds:   Current Facility-Administered Medications:  acetaminophen 650 mg Oral Q6H PRN   amLODIPine 5 mg Oral Daily   aspirin 81 mg Oral Daily   atorvastatin 40 mg Oral Daily   calcium carbonate-vitamin D 1 tablet Oral BID With Meals   docusate sodium 100 mg Oral BID   enoxaparin 40 mg Subcutaneous Daily   furosemide 40 mg Intravenous Daily   guaiFENesin 600 mg Oral Q12H REJI   insulin glargine 20 Units Subcutaneous HS   insulin lispro 1-6 Units Subcutaneous TID AC   insulin lispro 1-6 Units Subcutaneous HS   insulin lispro 10 Units Subcutaneous TID With Meals   ipratropium 0 5 mg Nebulization Q6H   isosorbide mononitrate 60 mg Oral Daily   levalbuterol 1 25 mg Nebulization Q6H   methylPREDNISolone sodium succinate 40 mg Intravenous Q8H [facility]   metoprolol tartrate 25 mg Oral Q12H REJI   nitroglycerin 0 4 mg Sublingual Q5 Min PRN   ondansetron 4 mg Intravenous Q6H PRN   pantoprazole 40 mg Oral Early Morning   polyethylene glycol 17 g Oral Daily PRN   senna 1 tablet Oral Daily   sodium chloride (PF) 3 mL Intravenous PRN     Continuous Infusions:    PRN Meds:   acetaminophen    nitroglycerin    ondansetron    polyethylene glycol    sodium chloride (PF)    Abnormal Labs/Diagnostic Results:   WBC 10 19*     BUN 41*   CREATININE 1 54*   GLUCOSE RANDOM 255*     Age/Sex: [de-identified] y o  female     Assessment/Plan:   * Acute respiratory failure (HCC)   Assessment & Plan     · Acute hypoxic respiratory failure, multifactorial, with bronchospasm, with asthma exacerbation; acute congestive heart failure; and possible pneumonia  · Manage patient's bronchospasm/asthma,/heart failure and pneumonia    · Cardiology and pulmonology on board  · Continue cardiovascular heart failure medications  · Continue with nebulizations, Solu-Medrol, Mucinex and antibiotic  · Previously, patient was on a BiPAP and now on nasal cannula oxygen  · Continue oxygen           Acute on chronic diastolic heart failure (HCC)   Assessment & Plan     · Continue IV Lasix  · Continue cardiovascular/heart failure medications  · Cardiology on board  · Echocardiogram showed DHF and severe TR          Hyperkalemia   Assessment & Plan     4/24 - BW moderately hemolyzed  Given IV Lasix  Recheck showed normal K          Abnormal chest x-ray   Assessment & Plan     · As per pulmonologist, there is a right lower lobe opacity that could represent pneumonia, given presentation  · IV Levaquin started on admission  · Would check sputum culture,   · procalcitonin < 0 05 - stopped Levaquin, defer to pulm if they want to continue this          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)   Assessment & Plan     A1c is 8 7  Patient is on disposable pump  With hyperglycemia, likely exacerbated with steroids, patient on basal bolus insulin  I consulted endo with help in managing pump          Accelerated hypertension   Assessment & Plan     · Resolved  · Continue blood pressure medications  · 4/24 - hold losartan 2/2 rising Cr and hyperK          Dementia without behavioral disturbance   Assessment & Plan     Supportive care          Intermittent asthma with acute exacerbation   Assessment & Plan     · Pulmonologist on board  · Continue steroids  · Continue nebulizations  · Continue oxygen              VTE Pharmacologic Prophylaxis:   Pharmacologic: Enoxaparin (Lovenox)  Mechanical VTE Prophylaxis in Place:  Yes     Current Patient Status: Inpatient   Certification Statement: The patient will continue to require additional inpatient hospital stay due to need for iv steroids and lasix     Discharge Plan: when off iv steroids and lasix      Thank you,  [address] [facility]  [address] \[facility] [place] [facility]\"" in [facility] by [employer] Tapia for 2017  Network Utilization Review Department  Phone: [phone]; Fax [phone]  ATTENTION: The Network Utilization Review Department is now centralized for our 7 Facilities  Make a note that we have a new phone and fax numbers for our Department  Please call with any questions or concerns to [phone] and carefully follow the prompts so that you are directed to the right person  All voicemails are confidential  Fax any determinations, approvals, denials, and requests for initial or continue stay review clinical to [phone]  Due to HIGH CALL volume, it would be easier if you could please send faxed requests to expedite your requests and in part, help us provide discharge notifications faster

## 2018-04-25 NOTE — PROGRESS NOTES
Occupational Therapy Cancel Note:  Chart reviewed  Attempted OT  treat, pt off floor at XR  Will reattempt as able    PIERO Weiner/ROSI

## 2018-04-25 NOTE — ASSESSMENT & PLAN NOTE
· As per pulmonologist, there is a right lower lobe opacity that could represent pneumonia, given presentation  · IV Levaquin started on admission    · Would check sputum culture,   · 4/24 - procalcitonin < 0 05 - stopped Levaquin

## 2018-04-26 PROBLEM — R33.9 URINE RETENTION: Status: ACTIVE | Noted: 2018-04-26

## 2018-04-26 LAB
ANION GAP SERPL CALCULATED.3IONS-SCNC: 7 MMOL/L (ref 4–13)
BUN SERPL-MCNC: 45 MG/DL (ref 5–25)
CALCIUM SERPL-MCNC: 8.5 MG/DL (ref 8.3–10.1)
CHLORIDE SERPL-SCNC: 104 MMOL/L (ref 100–108)
CO2 SERPL-SCNC: 29 MMOL/L (ref 21–32)
CREAT SERPL-MCNC: 1.14 MG/DL (ref 0.6–1.3)
GFR SERPL CREATININE-BSD FRML MDRD: 46 ML/MIN/1.73SQ M
GLUCOSE SERPL-MCNC: 120 MG/DL (ref 65–140)
GLUCOSE SERPL-MCNC: 129 MG/DL (ref 65–140)
GLUCOSE SERPL-MCNC: 140 MG/DL (ref 65–140)
GLUCOSE SERPL-MCNC: 166 MG/DL (ref 65–140)
GLUCOSE SERPL-MCNC: 88 MG/DL (ref 65–140)
POTASSIUM SERPL-SCNC: 3.7 MMOL/L (ref 3.5–5.3)
SODIUM SERPL-SCNC: 140 MMOL/L (ref 136–145)

## 2018-04-26 PROCEDURE — 82948 REAGENT STRIP/BLOOD GLUCOSE: CPT

## 2018-04-26 PROCEDURE — 97530 THERAPEUTIC ACTIVITIES: CPT

## 2018-04-26 PROCEDURE — 99232 SBSQ HOSP IP/OBS MODERATE 35: CPT | Performed by: INTERNAL MEDICINE

## 2018-04-26 PROCEDURE — 97116 GAIT TRAINING THERAPY: CPT

## 2018-04-26 PROCEDURE — 97110 THERAPEUTIC EXERCISES: CPT

## 2018-04-26 PROCEDURE — 99233 SBSQ HOSP IP/OBS HIGH 50: CPT | Performed by: GENERAL PRACTICE

## 2018-04-26 PROCEDURE — 80048 BASIC METABOLIC PNL TOTAL CA: CPT | Performed by: GENERAL PRACTICE

## 2018-04-26 PROCEDURE — 97535 SELF CARE MNGMENT TRAINING: CPT

## 2018-04-26 RX ORDER — FUROSEMIDE 40 MG/1
40 TABLET ORAL DAILY
Status: DISCONTINUED | OUTPATIENT
Start: 2018-04-27 | End: 2018-04-28 | Stop reason: HOSPADM

## 2018-04-26 RX ORDER — INSULIN GLARGINE 100 [IU]/ML
20 INJECTION, SOLUTION SUBCUTANEOUS
Status: DISCONTINUED | OUTPATIENT
Start: 2018-04-26 | End: 2018-04-28 | Stop reason: HOSPADM

## 2018-04-26 RX ORDER — POTASSIUM CHLORIDE 20 MEQ/1
40 TABLET, EXTENDED RELEASE ORAL ONCE
Status: COMPLETED | OUTPATIENT
Start: 2018-04-26 | End: 2018-04-26

## 2018-04-26 RX ADMIN — GUAIFENESIN 600 MG: 600 TABLET, EXTENDED RELEASE ORAL at 08:57

## 2018-04-26 RX ADMIN — DOCUSATE SODIUM 100 MG: 100 CAPSULE, LIQUID FILLED ORAL at 08:58

## 2018-04-26 RX ADMIN — ISOSORBIDE MONONITRATE 60 MG: 60 TABLET, EXTENDED RELEASE ORAL at 08:58

## 2018-04-26 RX ADMIN — PANTOPRAZOLE SODIUM 40 MG: 40 TABLET, DELAYED RELEASE ORAL at 05:45

## 2018-04-26 RX ADMIN — POTASSIUM CHLORIDE 40 MEQ: 1500 TABLET, EXTENDED RELEASE ORAL at 11:51

## 2018-04-26 RX ADMIN — AMLODIPINE BESYLATE 5 MG: 5 TABLET ORAL at 08:58

## 2018-04-26 RX ADMIN — CALCIUM CARBONATE 500 MG (1,250 MG)-VITAMIN D3 200 UNIT TABLET 1 TABLET: at 08:57

## 2018-04-26 RX ADMIN — GUAIFENESIN 600 MG: 600 TABLET, EXTENDED RELEASE ORAL at 21:54

## 2018-04-26 RX ADMIN — INSULIN LISPRO 1 UNITS: 100 INJECTION, SOLUTION INTRAVENOUS; SUBCUTANEOUS at 21:54

## 2018-04-26 RX ADMIN — ENOXAPARIN SODIUM 40 MG: 40 INJECTION SUBCUTANEOUS at 08:57

## 2018-04-26 RX ADMIN — CALCIUM CARBONATE 500 MG (1,250 MG)-VITAMIN D3 200 UNIT TABLET 1 TABLET: at 16:51

## 2018-04-26 RX ADMIN — METOPROLOL TARTRATE 25 MG: 25 TABLET ORAL at 08:57

## 2018-04-26 RX ADMIN — ASPIRIN 81 MG 81 MG: 81 TABLET ORAL at 08:58

## 2018-04-26 RX ADMIN — INSULIN GLARGINE 20 UNITS: 100 INJECTION, SOLUTION SUBCUTANEOUS at 21:54

## 2018-04-26 RX ADMIN — DOCUSATE SODIUM 100 MG: 100 CAPSULE, LIQUID FILLED ORAL at 20:25

## 2018-04-26 RX ADMIN — ATORVASTATIN CALCIUM 40 MG: 40 TABLET, FILM COATED ORAL at 08:58

## 2018-04-26 RX ADMIN — METOPROLOL TARTRATE 25 MG: 25 TABLET ORAL at 21:54

## 2018-04-26 RX ADMIN — POLYETHYLENE GLYCOL 3350 17 G: 17 POWDER, FOR SOLUTION ORAL at 08:58

## 2018-04-26 RX ADMIN — ACETAMINOPHEN 650 MG: 325 TABLET, FILM COATED ORAL at 00:14

## 2018-04-26 RX ADMIN — SENNOSIDES 8.6 MG: 8.6 TABLET ORAL at 08:58

## 2018-04-26 RX ADMIN — PREDNISONE 40 MG: 20 TABLET ORAL at 08:57

## 2018-04-26 RX ADMIN — FUROSEMIDE 40 MG: 10 INJECTION, SOLUTION INTRAMUSCULAR; INTRAVENOUS at 08:57

## 2018-04-26 NOTE — PROGRESS NOTES
Progress Note - Marifer Vitale 1935, [de-identified] y o  female MRN: [id number]    Unit/Bed#: [facility] 503-01 Encounter: [id number]    Primary Care Provider: Hardik Segura MD   Date and time admitted to hospital: 4/22/2018 11:03 PM        * Acute respiratory failure (HCC)   Assessment & Plan    · Acute hypoxic respiratory failure, multifactorial, with bronchospasm, with asthma exacerbation; acute congestive heart failure; and possible pneumonia  · Manage patient's bronchospasm/asthma,/heart failure and pneumonia  · Cardiology and pulmonology on board  · Continue cardiovascular heart failure medications  · Continue with nebulizations, steroids, Mucinex  · Previously, patient was on a BiPAP and now on RA          Acute on chronic diastolic heart failure (HCC)   Assessment & Plan    · Was on IV Lasix  · Continue cardiovascular/heart failure medications  · Cardiology on board  · Echocardiogram showed DHF and severe TR        Urine retention   Assessment & Plan    Pt refused straight cath o/n  Pt states if PVR high tonight, she will agree to straight cath  c/w urine retention protocol        Hyperkalemia   Assessment & Plan    4/24 - BW moderately hemolyzed  Given IV Lasix  Recheck showed normal K        Abnormal chest x-ray   Assessment & Plan    · As per pulmonologist, there is a right lower lobe opacity that could represent pneumonia, given presentation  · IV Levaquin started on admission  · Would check sputum culture,   · 4/24 - procalcitonin < 0 05 - stopped Levaquin        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)   Assessment & Plan    A1c is 8 7  Patient is on disposable pump  With hyperglycemia, likely exacerbated with steroids, patient on basal bolus insulin  Endo appreciated - recs c/w SQ insulin while inpt and d/c on pump  4/26 - decrease insulin 2/2 lower blood sugars        Accelerated hypertension   Assessment & Plan    · Resolved      · Continue blood pressure medications  ·  - hold losartan /2 rising Cr and hyperK        Dementia without behavioral disturbance   Assessment & Plan    Supportive care        Intermittent asthma with acute exacerbation   Assessment & Plan    · Pulmonologist on board  · Continue steroids  · Continue nebulizations  · Continue oxygen  ·  - IV steroids -> PO          VTE Pharmacologic Prophylaxis:   Pharmacologic: Enoxaparin (Lovenox)  Mechanical VTE Prophylaxis in Place: Yes    Patient Centered Rounds: I have performed bedside rounds with nursing staff today  Discussions with Specialists or Other Care Team Provider: no    Education and Discussions with Family / Patient: pt and dtr    Time Spent for Care: 45 minutes  More than 50% of total time spent on counseling and coordination of care as described above  Current Length of Stay: 3 day(s)    Current Patient Status: Inpatient   Certification Statement: The patient will continue to require additional inpatient hospital stay due to when she passes urinary retention protocol    Discharge Plan: when she has PVR < 300 x 2    Code Status: Level 1 - Full Code      Subjective:   PT had urine retention o/n and refused straight cath    Objective:     Vitals:   Temp (24hrs), Av °F (36 7 °C), Min:97 4 °F (36 3 °C), Max:98 5 °F (36 9 °C)    HR:  [52-63] 60  Resp:  [16-18] 16  BP: (124-177)/(63-96) 124/71  SpO2:  [93 %-96 %] 95 %  Body mass index is 35 13 kg/m²  Input and Output Summary (last 24 hours): Intake/Output Summary (Last 24 hours) at 18 1650  Last data filed at 18 1604   Gross per 24 hour   Intake              740 ml   Output             3053 ml   Net            -2313 ml       Physical Exam:     Physical Exam   Constitutional: She is oriented to person, place, and time  No distress  HENT:   Head: Normocephalic and atraumatic  Eyes: Conjunctivae and EOM are normal    Neck: Normal range of motion  Neck supple     Cardiovascular: Normal rate and regular rhythm  Pulmonary/Chest: Effort normal and breath sounds normal  She has no wheezes  She has no rales  Abdominal: Soft  Bowel sounds are normal  She exhibits no distension  There is no tenderness  Musculoskeletal: She exhibits edema  Neurological: She is alert and oriented to person, place, and time  Skin: Skin is warm and dry  She is not diaphoretic  Additional Data:     Labs:      Results from last 7 days  Lab Units 04/24/18  0518 04/23/18  0651  04/22/18 2315   WBC Thousand/uL 10 19* 9 39  --  7 12   HEMOGLOBIN g/dL 12 5 12 1  --  12 6   I STAT HEMOGLOBIN   --   --   < >  --    HEMATOCRIT % 36 9 37 8  --  38 5   PLATELETS Thousands/uL 205 181  --  196   NEUTROS PCT %  --   --   --  65   LYMPHS PCT %  --   --   --  22   LYMPHO PCT %  --  4*  --   --    MONOS PCT %  --   --   --  9   MONO PCT MAN %  --  0*  --   --    EOS PCT %  --   --   --  3   EOSINO PCT MANUAL %  --  0  --   --    < > = values in this interval not displayed  Results from last 7 days  Lab Units 04/26/18  0530  04/22/18 2315   SODIUM mmol/L 140  < > 139   POTASSIUM mmol/L 3 7  < > 4 1   CHLORIDE mmol/L 104  < > 105   CO2 mmol/L 29  < > 28   BUN mg/dL 45*  < > 29*   CREATININE mg/dL 1 14  < > 1 20   CALCIUM mg/dL 8 5  < > 9 4   TOTAL PROTEIN g/dL  --   --  7 3   BILIRUBIN TOTAL mg/dL  --   --  0 69   ALK PHOS U/L  --   --  114   ALT U/L  --   --  27   AST U/L  --   --  20   GLUCOSE RANDOM mg/dL 140  < > 249*   GLUCOSE, ISTAT   --   < >  --    < > = values in this interval not displayed  Results from last 7 days  Lab Units 04/22/18  2315   INR  1 05       * I Have Reviewed All Lab Data Listed Above  * Additional Pertinent Lab Tests Reviewed: [address] 68 Admission Reviewed        Recent Cultures (last 7 days):       Results from last 7 days  Lab Units 04/22/18  2319 04/22/18 2315   BLOOD CULTURE  No Growth at 72 hrs  No Growth at 72 hrs         Last 24 Hours Medication List:     Current Facility-Administered Medications:  acetaminophen 650 mg Oral Q6H PRN Kim Wang MD   amLODIPine 5 mg Oral Daily Kim Wang MD   aspirin 81 mg Oral Daily Kim Wang MD   atorvastatin 40 mg Oral Daily Kim Wang MD   calcium carbonate-vitamin D 1 tablet Oral BID With Meals Brian Mcfarlane MD   docusate sodium 100 mg Oral BID Kim Wang MD   enoxaparin 40 mg Subcutaneous Daily Kim Wang MD   [START ON 4/27/2018] furosemide 40 mg Oral Daily Chuy Rao DO   guaiFENesin 600 mg Oral Q12H [address] Kim Wang MD   insulin glargine 20 Units Subcutaneous HS Edd Shankar MD   insulin lispro 1-6 Units Subcutaneous TID AC Kim Wang MD   insulin lispro 1-6 Units Subcutaneous HS Kim Wang MD   insulin lispro 5 Units Subcutaneous TID With Meals Ventura Moffett MD   isosorbide mononitrate 60 mg Oral Daily Kim Wang MD   levalbuterol 1 25 mg Nebulization Q6H PRN TADEO AdrianC   metoprolol tartrate 25 mg Oral Q12H [address] Kim Wang MD   nitroglycerin 0 4 mg Sublingual Q5 Min PRN Kim Wang MD   ondansetron 4 mg Intravenous Q6H PRN Kim Wang MD   pantoprazole 40 mg Oral Early Morning Kim Wang MD   polyethylene glycol 17 g Oral Daily PRN Kim Wang MD   predniSONE 40 mg Oral Daily Marleny Matos PA-C   senna 1 tablet Oral Daily Kim Wang MD   sodium chloride (PF) 3 mL Intravenous PRN Christi Christianson MD        Today, Patient Was Seen By: Chuy Rao DO    ** Please Note: Dictation voice to text software may have been used in the creation of this document   **

## 2018-04-26 NOTE — ASSESSMENT & PLAN NOTE
A1c is 8 7  Patient is on disposable pump  With hyperglycemia, likely exacerbated with steroids, patient on basal bolus insulin    Endo appreciated - recs c/w SQ insulin while inpt and d/c on pump  4/26 - decrease insulin 2/2 lower blood sugars

## 2018-04-26 NOTE — PLAN OF CARE
Problem: Potential for Falls  Goal: Patient will remain free of falls  INTERVENTIONS:  - Assess patient frequently for physical needs  -  Identify cognitive and physical deficits and behaviors that affect risk of falls    -  [place] fall precautions as indicated by assessment   - Educate patient/family on patient safety including physical limitations  - Instruct patient to call for assistance with activity based on assessment  - Modify environment to reduce risk of injury  - Consider OT/PT consult to assist with strengthening/mobility    Outcome: Progressing      Problem: Prexisting or High Potential for Compromised Skin Integrity  Goal: Skin integrity is maintained or improved  INTERVENTIONS:  - Identify patients at risk for skin breakdown  - Assess and monitor skin integrity  - Assess and monitor nutrition and hydration status  - Monitor labs (i e  albumin)  - Assess for incontinence   - Turn and reposition patient  - Assist with mobility/ambulation  - Relieve pressure over bony prominences  - Avoid friction and shearing  - Provide appropriate hygiene as needed including keeping skin clean and dry  - Evaluate need for skin moisturizer/barrier cream  - Collaborate with interdisciplinary team (i e  Nutrition, Rehabilitation, etc )   - Patient/family teaching   Outcome: Progressing      Problem: PAIN - ADULT  Goal: Verbalizes/displays adequate comfort level or baseline comfort level  Interventions:  - Encourage patient to monitor pain and request assistance  - Assess pain using appropriate pain scale  - Administer analgesics based on type and severity of pain and evaluate response  - Implement non-pharmacological measures as appropriate and evaluate response  - Consider cultural and social influences on pain and pain management  - Notify physician/advanced practitioner if interventions unsuccessful or patient reports new pain   Outcome: Progressing      Problem: INFECTION - ADULT  Goal: Absence or prevention of progression during hospitalization  INTERVENTIONS:  - Assess and monitor for signs and symptoms of infection  - Monitor lab/diagnostic results  - Monitor all insertion sites, i e  indwelling lines, tubes, and drains  - Monitor endotracheal (as able) and nasal secretions for changes in amount and color  - [place] appropriate cooling/warming therapies per order  - Administer medications as ordered  - Instruct and encourage patient and family to use good hand hygiene technique  - Identify and instruct in appropriate isolation precautions for identified infection/condition   Outcome: Progressing    Goal: Absence of fever/infection during neutropenic period  INTERVENTIONS:  - Monitor WBC  - Implement neutropenic guidelines   Outcome: Progressing      Problem: SAFETY ADULT  Goal: Maintain or return to baseline ADL function  INTERVENTIONS:  -  Assess patient's ability to carry out ADLs; assess patient's baseline for ADL function and identify physical deficits which impact ability to perform ADLs (bathing, care of mouth/teeth, toileting, grooming, dressing, etc )  - Assess/evaluate cause of self-care deficits   - Assess range of motion  - Assess patient's mobility; develop plan if impaired  - Assess patient's need for assistive devices and provide as appropriate  - Encourage maximum independence but intervene and supervise when necessary  ¯ Involve family in performance of ADLs  ¯ Assess for home care needs following discharge   ¯ Request OT consult to assist with ADL evaluation and planning for discharge  ¯ Provide patient education as appropriate   Outcome: Progressing    Goal: Maintain or return mobility status to optimal level  INTERVENTIONS:  - Assess patient's baseline mobility status (ambulation, transfers, stairs, etc )    - Identify cognitive and physical deficits and behaviors that affect mobility  - Identify mobility aids required to assist with transfers and/or ambulation (gait belt, sit-to-stand, lift, walker, cane, etc )  - [employer] fall precautions as indicated by assessment  - Record patient progress and toleration of activity level on Mobility SBAR; progress patient to next Phase/Stage  - Instruct patient to call for assistance with activity based on assessment  - Request Rehabilitation consult to assist with strengthening/weightbearing, etc    Outcome: Progressing    Goal: Patient will remain free of falls  INTERVENTIONS:  - Assess patient frequently for physical needs  -  Identify cognitive and physical deficits and behaviors that affect risk of falls  -  [place] fall precautions as indicated by assessment   - Educate patient/family on patient safety including physical limitations  - Instruct patient to call for assistance with activity based on assessment  - Modify environment to reduce risk of injury  - Consider OT/PT consult to assist with strengthening/mobility    Outcome: Progressing      Problem: DISCHARGE PLANNING  Goal: Discharge to home or other facility with appropriate resources  INTERVENTIONS:  - Identify barriers to discharge w/patient and caregiver  - Arrange for needed discharge resources and transportation as appropriate  - Identify discharge learning needs (meds, wound care, etc )  - Arrange for interpretive services to assist at discharge as needed  - Refer to Case Management Department for coordinating discharge planning if the patient needs post-hospital services based on physician/advanced practitioner order or complex needs related to functional status, cognitive ability, or social support system   Outcome: Progressing      Problem: Knowledge Deficit  Goal: Patient/family/caregiver demonstrates understanding of disease process, treatment plan, medications, and discharge instructions  Complete learning assessment and assess knowledge base    Interventions:  - Provide teaching at level of understanding  - Provide teaching via preferred learning methods   Outcome: Progressing      Problem: Nutrition/Hydration-ADULT  Goal: Nutrient/Hydration intake appropriate for improving, restoring or maintaining nutritional needs  Monitor and assess patient's nutrition/hydration status for malnutrition (ex- brittle hair, bruises, dry skin, pale skin and conjunctiva, muscle wasting, smooth red tongue, and disorientation)  Collaborate with interdisciplinary team and initiate plan and interventions as ordered  Monitor patient's weight and dietary intake as ordered or per policy  Utilize nutrition screening tool and intervene per policy  Determine patient's food preferences and provide high-protein, high-caloric foods as appropriate       INTERVENTIONS:  - Monitor oral intake, urinary output, labs, and treatment plans  - Assess nutrition and hydration status and recommend course of action  - Evaluate amount of meals eaten  - Assist patient with eating if necessary   - Allow adequate time for meals  - Recommend/ encourage appropriate diets, oral nutritional supplements, and vitamin/mineral supplements  - Order, calculate, and assess calorie counts as needed  - Recommend, monitor, and adjust tube feedings and TPN/PPN based on assessed needs  - Assess need for intravenous fluids  - Provide specific nutrition/hydration education as appropriate  - Include patient/family/caregiver in decisions related to nutrition   Outcome: Progressing      Problem: DISCHARGE PLANNING - CARE MANAGEMENT  Goal: Discharge to post-acute care or home with appropriate resources  INTERVENTIONS:  - Conduct assessment to determine patient/family and health care team treatment goals, and need for post-acute services based on payer coverage, community resources, and patient preferences, and barriers to discharge  - Address psychosocial, clinical, and financial barriers to discharge as identified in assessment in conjunction with the patient/family and health care team  - Arrange appropriate level of post-acute services according to patient's   needs and preference and payer coverage in collaboration with the physician and health care team  - Communicate with and update the patient/family, physician, and health care team regarding progress on the discharge plan  - Arrange appropriate transportation to post-acute venues  - Complete referral to [facility] for follow up   Outcome: Progressing

## 2018-04-26 NOTE — ASSESSMENT & PLAN NOTE
Pt refused straight cath o/n    Pt states if PVR high tonight, she will agree to straight cath  c/w urine retention protocol

## 2018-04-26 NOTE — PROGRESS NOTES
Paged SLIM-PA and notified Giovanna Calderon of patient complaining of RUQ pain while getting into bed  Patient has also been frequently going to the bathroom, but not emptying bladder  Giovanna Calderon will be up to assess patient and order for urinary retention protocol  to start

## 2018-04-26 NOTE — PLAN OF CARE
Problem: PHYSICAL THERAPY ADULT  Goal: Performs mobility at highest level of function for planned discharge setting  See evaluation for individualized goals  Treatment/Interventions: ADL retraining, Functional transfer training, LE strengthening/ROM, Elevations, Therapeutic exercise, Endurance training, Patient/family training, Bed mobility, Gait training, Spoke to nursing, Spoke to case management  Equipment Recommended: Veronique Easley       See flowsheet documentation for full assessment, interventions and recommendations  Outcome: Progressing  Prognosis: Fair  Problem List: Decreased strength, Decreased range of motion, Decreased endurance, Impaired balance, Decreased mobility, Impaired judgement, Decreased safety awareness, Obesity  Assessment: PT SEEN FOR SKILLED PT SESSION W/ FOCUS ON GAIT AND TRANSFERS AS WELL AS LE THEREX TO INCREASE INDEPENDENCE W/ SAME  PT REPORTS HAVING MULTIPLE FAMILY MEMBER AT HOME PTA- BUT DOES NOT NORMALLY REQUIRE ANY PHYSICAL ASSISTANCE- STATES HER SPOUSE IS IN A MOTORIZED W/C AT BASELINE  OPTA WAS AMBUALTING SHORT HOUSEHOLD DISTANCE W/ RW~ CURRENTLY PT CONTINUES TO REQUIRE INCREASED TIME; MULTIPLE AND FREQUENT CUES FOR SAFETY AND CARRYOVER THROUGHOUT EXTENDED TREATMENT SESSION ( HAND PLACEMENT ON CHAIR OR WALKER DURING TRANSFERS) AS WELL AS MIN/MODA ASSIT W/ SIT<>STAND TRANSITIONS AND FOR GAIT W/ RW 15' X3- PT CONTINUES TO BE LIMITED BY FATIGUE AND REQUIRES FREQUENT SEATED REST THROUGHOUT SESSION  PT IS A HIGH FALL RISK AND REQUIRED MODA FOR SIT<>STAND FROM STANDARD TOILET W/ EPISODE OF URINARY INCONTINENCE PRIOR  3IN 1 WAS PLACED OVER TOILET FOR IMPROVED XFERS- HOWEVER, SHE CONTINUES TO REQUIRE SEAN EVEN W/ HEIGHTENED SURFACE  GIVEN PT''S NEED FOR ONGOING PHYSICAL ASSIST W/ MOBILITY  PT IS RECOMMENDING IDEAL D/C TO INPT REHAB- THIS WAS DISCUSSED W/ PT AT LENGTH AND PT CONTINUED TO DECLINE DESPITE EDUCATION AND RECOMMENDATION   PT VERBALIZING STRONG DESIRE TO D/C HOME W/ VNA/ HOME PT- CM MADE AWARE  WILL CONT TO PROGRESS AND FOLLOW PER PT EVAL AND POC  Barriers to Discharge: Decreased caregiver support     Recommendation: Post acute IP rehab (PT [id number] PT )          See flowsheet documentation for full assessment

## 2018-04-26 NOTE — PLAN OF CARE
Problem: OCCUPATIONAL THERAPY ADULT  Goal: Performs self-care activities at highest level of function for planned discharge setting  See evaluation for individualized goals  Treatment Interventions: ADL retraining, Functional transfer training, Cognitive reorientation, Patient/family training, Equipment evaluation/education, Endurance training, Compensatory technique education, Continued evaluation, Energy conservation, Activityengagement          See flowsheet documentation for full assessment, interventions and recommendations  Outcome: Progressing  Limitation: Decreased ADL status, Decreased cognition, Decreased endurance, Decreased self-care trans, Decreased high-level ADLs  Prognosis: Fair  Assessment: Patient participated in Skilled OT session this date with interventions consisting of ADL re training with the use of correct body mechanics, energy conservation techniques and safety awareness and fall prevention techniques  Patient agreeable to OT treatment session, upon arrival patient was found seated OOB to Recliner  In comparison to previous session, patient with improvements in ADL status, orientation, and standing balance  Patient requiring verbal cues for safety w/ RW  Educated pt on energy conservation and pt verbalized understanding  Pt currently S for UB ADLs, mod A for LB ADLs, and S-min A for transfers w/ RW  Patient continues to be functioning below baseline level, occupational performance remains limited secondary to factors listed above and increased risk for falls and injury  From OT standpoint, recommendation at time of d/c would be home w/ increased support and home therapy services  Patient to benefit from continued Occupational Therapy treatment while in the hospital to address deficits as defined above and maximize level of functional independence with ADLs and functional mobility        OT Discharge Recommendation: Home OT  OT - OK to Discharge:  ([facility] STABILITY   )

## 2018-04-26 NOTE — PHYSICAL THERAPY NOTE
Physical Therapy TREATMENT     Patient Name: Cailin Flower    Today's Date: 4/26/2018     Problem List  Patient Active Problem List   Diagnosis    3-vessel coronary artery disease    Abnormal TSH    Intermittent asthma with acute exacerbation    Dementia without behavioral disturbance    Depression    [facility]    DM (diabetes mellitus), type 2, uncontrolled w/ophthalmic complication ([facility]    Gastroesophageal reflux disease with hiatal hernia    Glaucoma    Hyperlipidemia    Hypertension    Cerebral artery occlusion with cerebral infarction ([facility]    Obesity    Obstructive sleep apnea    Osteoarthritis of knee    Urine incontinence    Ventricular tachycardia [facility]    Acute respiratory failure (HCC)    Acute on chronic diastolic heart failure ([facility]    Accelerated hypertension    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 ([facility]    Abnormal chest x-ray    Hyperkalemia        Past Medical History  Past Medical History:   Diagnosis Date    Asthma     CAD (coronary artery disease) of artery bypass graft     Cardiac disease     Dementia     Depression     Diabetes mellitus [facility]     Hyperlipidemia     Hypertension     MI (myocardial infarction) ([facility]     Neuropathy     BRANT (obstructive sleep apnea)         Past Surgical History  Past Surgical History:   Procedure Laterality Date    APPENDECTOMY      CATARACT EXTRACTION      EGD AND COLONOSCOPY             04/26/18 1027   Pain Assessment   Pain Assessment No/denies pain   Pain Score No Pain   Restrictions/Precautions   Weight Bearing Precautions Per Order No   Other Precautions Chair Alarm; Bed Alarm; Fall Risk;Cognitive   General   Chart Reviewed Yes   Family/Caregiver Present No   Cognition   Overall Cognitive Status Impaired   Arousal/Participation Alert; Responsive; Cooperative   Attention Attends with cues to redirect   Orientation Level Oriented X4   Memory Decreased recall of precautions;Decreased recall of recent events   Following Commands Follows one step commands without difficulty   Subjective   Subjective pt received up and in bathroom using toilet- just finishing and requesting  assist- agreeable to PT session  Bed Mobility   Additional Comments OOB to chair post session    Transfers   Sit to Stand 4  Minimal assistance   Additional items Assist x 1   Stand to Sit 4  Minimal assistance   Additional items Assist x 1; Increased time required;Verbal cues  (repeated cues for hand placement on chair - poor carryover )   Stand pivot 4  Minimal assistance   Additional items Assist x 1; Increased time required;Verbal cues   Toilet transfer 3  Moderate assistance   Additional items Assist x 1; Increased time required;Verbal cues;Standard toilet  (modA for sit>stand from standard toilet  )   Additional Comments dispite multiple attempts pt unable to perfom sit>stnad from toilet for hygine - pt requiring modA to come to stand- reports has 3in 1 commmode at home- obtained 3in 1 following initial toilet xfer and pt requires Roberto   Ambulation/Elevation   Gait pattern Improper Weight shift; Forward Flexion;Decreased foot clearance; Step to;Short stride; Excessively slow   Gait Assistance 4  Minimal assist   Additional items Assist x 1;Verbal cues; Tactile cues   Assistive Device Rolling walker   Distance 15' x3 from commode to chair then additional 15' x2 additional trials- pt unable to ambulate further 2* fatigue and LE weakness     (RECOMMEND CHAIR FOLLOW FOR INC DISTNACES FOR SAFETY)   Balance   Static Sitting Fair +   Dynamic Sitting Fair   Static Standing Poor +   Dynamic Standing Poor +   Ambulatory Poor +  (RW)   Endurance Deficit   Endurance Deficit Yes   Endurance Deficit Description weakness; fatigue and limited gait distances    Activity Tolerance   Activity Tolerance Patient limited by fatigue  (REQUIRED FREQUENT REST BREAKS )   Nurse Made Aware CLEARED FOR SESSION BY RN - ULISES    Exercises   Hip Flexion Sitting;15 reps   Hip Abduction Sitting;15 reps;Right;Left;AROM  (LE ELEVATED IN CHAIR )   Hip Adduction Sitting;15 reps  (LE'S ELEVATED IN CHAIR )   Knee AROM Long Arc Quad Sitting;15 reps  (X2)   Ankle Pumps Sitting;25 reps   Balance training  SIT<>STAND X 3 FROM CHAIR    Assessment   Prognosis Fair   Problem List Decreased strength;Decreased range of motion;Decreased endurance; Impaired balance;Decreased mobility; Impaired judgement;Decreased safety awareness; Obesity   Assessment PT SEEN FOR SKILLED PT SESSION W/ FOCUS ON GAIT AND TRANSFERS AS WELL AS LE THEREX TO INCREASE INDEPENDENCE W/ SAME  PT REPORTS HAVING MULTIPLE FAMILY MEMBER AT HOME PTA- BUT DOES NOT NORMALLY REQUIRE ANY PHYSICAL ASSISTANCE- STATES HER SPOUSE IS IN A MOTORIZED W/C AT BASELINE  OPTA WAS AMBUALTING SHORT HOUSEHOLD DISTANCE W/ RW~ CURRENTLY PT CONTINUES TO REQUIRE INCREASED TIME; MULTIPLE AND FREQUENT CUES FOR SAFETY AND CARRYOVER THROUGHOUT EXTENDED TREATMENT SESSION ( HAND PLACEMENT ON CHAIR OR WALKER DURING TRANSFERS) AS WELL AS MIN/MODA ASSIT W/ SIT<>STAND TRANSITIONS AND FOR GAIT W/ RW 15' X3- PT CONTINUES TO BE LIMITED BY FATIGUE AND REQUIRES FREQUENT SEATED REST THROUGHOUT SESSION  PT IS A HIGH FALL RISK AND REQUIRED MODA FOR SIT<>STAND FROM STANDARD TOILET W/ EPISODE OF URINARY INCONTINENCE PRIOR  3IN 1 WAS PLACED OVER TOILET FOR IMPROVED XFERS- HOWEVER, SHE CONTINUES TO REQUIRE SEAN EVEN W/ HEIGHTENED SURFACE  GIVEN PT''S NEED FOR ONGOING PHYSICAL ASSIST W/ MOBILITY  PT IS RECOMMENDING IDEAL D/C TO INPT REHAB- THIS WAS DISCUSSED W/ PT AT LENGTH AND PT CONTINUED TO DECLINE DESPITE EDUCATION AND RECOMMENDATION  PT VERBALIZING STRONG DESIRE TO D/C HOME W/ VNA/ HOME PT- CM MADE AWARE  WILL CONT TO PROGRESS AND FOLLOW PER PT EVAL AND POC      Barriers to Discharge Decreased caregiver support   Goals   Patient Goals GO HOME    STG Expiration Date 05/04/18   Treatment Day 2   Plan Treatment/Interventions ADL retraining;Functional transfer training;LE strengthening/ROM; Therapeutic exercise; Endurance training;Patient/family training;Equipment eval/education; Bed mobility;Gait training;Spoke to nursing;Spoke to case management;OT   PT Frequency 5x/wk   Recommendation   Recommendation Post acute IP rehab  (PT [address] PT )   Equipment Recommended Walker  (WIDE )   Additional Comments PT UP IN CHAIR AND ALARM ACTIVATED POST SESSION- ALL NEEDS IN REACH      Mela Montes, PT

## 2018-04-26 NOTE — PROGRESS NOTES
Paged SLIM-PA and spoke with [facility] about patient retaining urine  Bladder scan of 548mL  Patient is refusing to be straight cath  Pt is stating that she pees fine when she is at home and it is the toilet here  PCA offered BSC or a BSC to place over toilet  Patient refused either option to help her void better  Per Rosibel she will go over risks of refusing being straight cathed

## 2018-04-26 NOTE — PROGRESS NOTES
Notified by RN that patient refusing straight cath  Patient voiding small amounts of urine at a time  Patient voided about 40cc of urine, and was then bladder scanned for 548cc with inability to void  Explained complications/risks of urinary retention to patient including renal failure, damage to the bladder, infection, incontinence  Explained that treatment for acute urinary retention includes inserting a catheter in order to empty the bladder of urine  Patient still refusing to be straight cathed despite education provided  Will attempt to re-educate in AM if patient continues to retain urine  Monitor BMP

## 2018-04-26 NOTE — PROGRESS NOTES
Progress Note - Pulmonary   Janeth Enriquez [de-identified] y o  female MRN: [id number]  Unit/Bed#: [facility] 503-01 Encounter: [id number]      Assessment:  1   Acute hypoxic respiratory failure  2   Abnormal CXR -  RLL opacity could represent pneumonia given presentation, volume overload   3   Acute on chronic diastolic heart failure  4   Hx of ischemic cardiomyopathy and CAD s/p CABG  5   Bronchospasm which is now resolved - never smoker, mild asthma     Plan:  1   Continue diuretics per primary team and cardiology  3   Doing well off levaquin, no antibiotics necessary  4   Wean prednisone by 10mg every 3 days   4   Continue nebulizers every 6 hrs  5   Continue Mucinex 600 mg BID     Subjective:   Patient seen and examined  She states that she is feeling much better and wants to go home  She denies any chest discomfort or wheezing  Objective:   Vitals: Blood pressure 124/71, pulse 60, temperature 97 6 °F (36 4 °C), temperature source Oral, resp  rate 16, height 5' 8" (1 727 m), weight 105 kg (231 lb 0 7 oz), SpO2 95 % , RA, Body mass index is 35 13 kg/m²  Intake/Output Summary (Last 24 hours) at 04/26/18 1512  Last data filed at 04/26/18 1209   Gross per 24 hour   Intake              740 ml   Output             2165 ml   Net            -1425 ml       Physical Exam  Gen: Morbid obesity, Awake, alert, oriented x 3, no acute distress  HEENT: Mucous membranes moist, no oral lesions, no thrush  NECK: No accessory muscle use, JVP not elevated  Cardiac: Regular, single S1, single S2, no murmurs, no rubs, no gallops  Lungs: Mild crackles in bases bilaterally resolved, CTA bilaterally, no rhonchi or rales  Abdomen: normoactive bowel sounds, soft nontender, nondistended, no rebound or rigidity, no guarding  Extremities: no cyanosis, no clubbing, no edema    Labs: I have personally reviewed pertinent lab results      Results from last 7 days  Lab Units 04/24/18  0518 04/23/18  0651 04/22/18  2325 04/22/18  2315   WBC Thousand/uL 10 19* 9 39  --  7 12   HEMOGLOBIN g/dL 12 5 12 1  --  12 6   I STAT HEMOGLOBIN g/dl  --   --  13 3  --    HEMATOCRIT % 36 9 37 8  --  38 5   PLATELETS Thousands/uL 205 181  --  196   NEUTROS PCT %  --   --   --  65   MONOS PCT %  --   --   --  9   MONO PCT MAN %  --  0*  --   --       Results from last 7 days  Lab Units 04/26/18  0530 04/25/18  0433 04/24/18  1110  04/22/18  2315   SODIUM mmol/L 140 140 136  < > 139   POTASSIUM mmol/L 3 7 4 2 4 2  < > 4 1   CHLORIDE mmol/L 104 107 101  < > 105   CO2 mmol/L 29 24 26  < > 28   BUN mg/dL 45* 47* 41*  < > 29*   CREATININE mg/dL 1 14 1 31* 1 54*  < > 1 20   CALCIUM mg/dL 8 5 8 9 9 5  < > 9 4   TOTAL PROTEIN g/dL  --   --   --   --  7 3   BILIRUBIN TOTAL mg/dL  --   --   --   --  0 69   ALK PHOS U/L  --   --   --   --  114   ALT U/L  --   --   --   --  27   AST U/L  --   --   --   --  20   GLUCOSE RANDOM mg/dL 140 183* 255*  < > 249*   GLUCOSE, ISTAT   --   --   --   < >  --    < > = values in this interval not displayed      Results from last 7 days  Lab Units 04/25/18  0433   MAGNESIUM mg/dL 2 5       Results from last 7 days  Lab Units 04/25/18  0433   PHOSPHORUS mg/dL 3 9        Results from last 7 days  Lab Units 04/22/18  2315   INR  1 05   PTT seconds 34       Results from last 7 days  Lab Units 04/23/18  0120   LACTIC ACID mmol/L 1 8       0  Lab Value Date/Time   TROPONINI <0 02 04/23/2018 0457   TROPONINI <0 02 04/22/2018 2315   TROPONINI <0 04 06/02/2014 0619   TROPONINI <0 04 05/24/2014 0421   TROPONINI <0 04 05/23/2014 1913   TROPONINI <0 04 05/23/2014 1039   TROPONINI <0 04 05/22/2014 1909   TROPONINI 0 04 05/22/2014 0827   TROPONINI <0 04 05/21/2014 2335   TROPONINI <0 04 05/15/2014 0802   TROPONINI <0 04 05/14/2014 0847   TROPONINI <0 04 05/14/2014 0525   TROPONINI <0 04 05/13/2014 2348         Meds/Allergies   Current Facility-Administered Medications   Medication Dose Route Frequency    acetaminophen (TYLENOL) tablet 650 mg  650 mg Oral Q6H PRN    amLODIPine (NORVASC) tablet 5 mg  5 mg Oral Daily    aspirin chewable tablet 81 mg  81 mg Oral Daily    atorvastatin (LIPITOR) tablet 40 mg  40 mg Oral Daily    calcium carbonate-vitamin D (OSCAL-D) 500 mg-200 units per tablet 1 tablet  1 tablet Oral BID With Meals    docusate sodium (COLACE) capsule 100 mg  100 mg Oral BID    enoxaparin (LOVENOX) subcutaneous injection 40 mg  40 mg Subcutaneous Daily    furosemide (LASIX) injection 40 mg  40 mg Intravenous Daily    guaiFENesin (MUCINEX) 12 hr tablet 600 mg  600 mg Oral Q12H REJI    insulin glargine (LANTUS) subcutaneous injection 30 Units  30 Units Subcutaneous HS    insulin lispro (HumaLOG) 100 units/mL subcutaneous injection 1-6 Units  1-6 Units Subcutaneous TID AC    insulin lispro (HumaLOG) 100 units/mL subcutaneous injection 1-6 Units  1-6 Units Subcutaneous HS    insulin lispro (HumaLOG) 100 units/mL subcutaneous injection 10 Units  10 Units Subcutaneous TID With Meals    isosorbide mononitrate (IMDUR) 24 hr tablet 60 mg  60 mg Oral Daily    levalbuterol (XOPENEX) inhalation solution 1 25 mg  1 25 mg Nebulization Q6H PRN    metoprolol tartrate (LOPRESSOR) tablet 25 mg  25 mg Oral Q12H REJI    nitroglycerin (NITROSTAT) SL tablet 0 4 mg  0 4 mg Sublingual Q5 Min PRN    ondansetron (ZOFRAN) injection 4 mg  4 mg Intravenous Q6H PRN    pantoprazole (PROTONIX) EC tablet 40 mg  40 mg Oral Early Morning    polyethylene glycol (MIRALAX) packet 17 g  17 g Oral Daily PRN    predniSONE tablet 40 mg  40 mg Oral Daily    senna (SENOKOT) tablet 8 6 mg  1 tablet Oral Daily    sodium chloride (PF) 0 9 % injection 3 mL  3 mL Intravenous PRN     Prescriptions Prior to Admission   Medication    amLODIPine (NORVASC) 5 mg tablet    aspirin 325 mg tablet    atorvastatin (LIPITOR) 40 mg tablet    Insulin Disposable Pump (V-GO 20) KIT    Insulin Lispro (HUMALOG) 100 UNIT/ML SOCT    isosorbide mononitrate (IMDUR) 60 mg 24 hr tablet    losartan (COZAAR) 100 MG tablet    metoprolol tartrate (LOPRESSOR) 25 mg tablet    ONE TOUCH ULTRA TEST test strip    pantoprazole (PROTONIX) 40 mg tablet    nitroglycerin (NITROSTAT) 0 4 mg SL tablet         Microbiology:  Lab Results   Component Value Date    BLOODCX No Growth at 72 hrs  04/22/2018    BLOODCX No Growth at 72 hrs  04/22/2018       Imaging and other studies: I have personally reviewed pertinent reports  IMPRESSION:  Small bilateral pleural effusions, with underlying right basilar atelectasis or pneumonia      DO Frieda Wilks [facility]

## 2018-04-26 NOTE — PROGRESS NOTES
Progress Note - Luz Elliott [de-identified] y o  female MRN: [id number]    Unit/Bed#: [facility] 503-01 Encounter: [id number]      CC: diabetes f/u    Subjective:   Luz Elliott is a [de-identified]y o  year old female with type 2 diabetes  Feels well  No complaints  No hypoglycemia  Objective:     Vitals: Blood pressure 124/71, pulse 60, temperature 97 6 °F (36 4 °C), temperature source Oral, resp  rate 16, height 5' 8" (1 727 m), weight 105 kg (231 lb 0 7 oz), SpO2 95 %  ,Body mass index is 35 13 kg/m²  Intake/Output Summary (Last 24 hours) at 04/26/18 1627  Last data filed at 04/26/18 1604   Gross per 24 hour   Intake              740 ml   Output             3053 ml   Net            -2313 ml       Physical Exam:  General Appearance: awake, appears stated age and cooperative  Head: Normocephalic, without obvious abnormality, atraumatic  Extremities: moves all extremities  Skin: Skin color and temperature normal    Pulm: no labored breathing    Lab, Imaging and other studies: I have personally reviewed pertinent reports  POC Glucose   Date Value   04/26/2018 120 mg/dl   04/26/2018 88 mg/dl   04/26/2018 129 mg/dl   04/25/2018 102 mg/dl   04/25/2018 133 mg/dl   04/25/2018 247 mg/dl (H)   04/25/2018 237 mg/dl (H)   04/24/2018 210 mg/dl (H)   04/24/2018 274 mg/dl (H)   04/24/2018 276 mg/dl (H)   02/27/2015 264 mg/dL (H)   06/04/2014 280 mg/dL (H)   05/24/2014 308 mg/dL (H)   05/22/2014 278 mg/dL (H)       Assessment:  diabetes with hyperglycemia and acute hypoxic respiratory failure     Plan:  1  Type 2 diabetes with hyperglycemia-her blood sugars are running below target  Decrease Lantus to 20 units and Humalog to 5 units with meals  Continue to monitor blood sugar over time and make adjustments to the regimen if necessary  2   Acute hypoxic respiratory failure is improving  Portions of the record may have been created with voice recognition software

## 2018-04-26 NOTE — OCCUPATIONAL THERAPY NOTE
[address] Progress Note     Patient Name: Dennis Carcamo  Today's Date: 4/26/2018  Problem List  Patient Active Problem List   Diagnosis    3-vessel coronary artery disease    Abnormal TSH    Intermittent asthma with acute exacerbation    Dementia without behavioral disturbance    Depression    Diabetic retinopathy ([facility] )    DM (diabetes mellitus), type 2, uncontrolled w/ophthalmic complication ([place] [address] )    Gastroesophageal reflux disease with hiatal hernia    Glaucoma    Hyperlipidemia    Hypertension    Cerebral artery occlusion with cerebral infarction ([facility] )    Obesity    Obstructive sleep apnea    Osteoarthritis of knee    Urine incontinence    Ventricular tachycardia ([place] [address] )    Acute respiratory failure (HCC)    Acute on chronic diastolic heart failure ([place] [address] )    Accelerated hypertension    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 ([place] [address] )    Abnormal chest x-ray    Hyperkalemia           04/26/18 0945   Note Type   Note type Progress   Restrictions/Precautions   Weight Bearing Precautions Per Order No   Other Precautions Cognitive; Fall Risk   Pain Assessment   Pain Assessment No/denies pain   Pain Score No Pain   ADL   Where Assessed Chair   UB Dressing Assistance 5  Supervision/Setup   UB Dressing Deficit Increased time to complete   LB Dressing Assistance 3  Moderate Assistance   LB Dressing Deficit Don/doff L sock; Thread LLE into pants   Toileting Assistance  4  Minimal Assistance   Toileting Deficit Grab bar use   Additional Comments Pt seen for dressing ADL while seated in recliner  Pt able to don pull over shirt w/ S for safety/cuing  Pt able to thread R LE into pants and don R sock, however she required assist to thread LLE into pants and don L sock  Pt incontinent of urine while amb w/ RW to toilet  She required min assist for toilet transfer      Bed Mobility   Additional Comments Pt seated in chair upon arrival    Transfers   Sit to Stand 4  Minimal assistance   Additional items Assist x 1   Stand to Sit 4  Minimal assistance   Additional items Assist x 1   Toilet transfer 4  Minimal assistance   Additional items (grab bar use)   Functional Mobility   Functional Mobility 5  Supervision   Additional Comments Pt amb to closet to retrieve clothing items for dressing and amb to bathroom  Additional items Rolling walker   Balance   Static Sitting Fair +   Dynamic Sitting Fair   Static Standing Poor +   Dynamic Standing Poor +   Activity Tolerance   Activity Tolerance Patient tolerated treatment well   Nurse Made Aware Okay to see per Cecilio LEONARDO Alter   Overall Cognitive Status Impaired   Arousal/Participation Alert; Responsive; Cooperative   Attention Attends with cues to redirect   Orientation Level Oriented X4   Memory Decreased recall of precautions;Decreased recall of recent events;Decreased short term memory   Following Commands Follows one step commands with increased time or repetition   Comments Pt is pleasant and agreeable to session  She was incontinent of urine during session  She required increased time to complete tasks and repeats herself  Pt able to recall PLOF and was oriented x4 during session  Assessment   Limitation Decreased ADL status; Decreased cognition;Decreased endurance;Decreased self-care trans;Decreased high-level ADLs   Prognosis Fair   Assessment Patient participated in Skilled OT session this date with interventions consisting of ADL re training with the use of correct body mechanics, energy conservation techniques and safety awareness and fall prevention techniques  Patient agreeable to OT treatment session, upon arrival patient was found seated OOB to Recliner  In comparison to previous session, patient with improvements in ADL status, orientation, and standing balance  Patient requiring verbal cues for safety w/ RW  Educated pt on energy conservation and pt verbalized understanding   Pt currently S for UB ADLs, mod A for LB ADLs, and S-min A for transfers w/ RW  Patient continues to be functioning below baseline level, occupational performance remains limited secondary to factors listed above and increased risk for falls and injury  From OT standpoint, recommendation at time of d/c would be home w/ increased support and home therapy services  Patient to benefit from continued Occupational Therapy treatment while in the hospital to address deficits as defined above and maximize level of functional independence with ADLs and functional mobility  Goals   Patient Goals none expressed   Plan   Treatment Interventions ADL retraining;Functional transfer training; Endurance training;Patient/family training;Equipment evaluation/education; Compensatory technique education; Energy conservation   Goal Expiration Date 05/08/18   Treatment Day 1   OT Frequency 3-5x/wk   Recommendation   OT Discharge Recommendation Home OT   Barthel Index   Feeding 10   Bathing 0   Grooming Score 5   Dressing Score 5   Bladder Score 0   Bowels Score 5   Toilet Use Score 5   Transfers (Bed/Chair) Score 10   Mobility (Level Surface) Score 0   Stairs Score 0   Barthel Index Score 40   Modified [place] Scale   Modified [place] Scale 4       Charmaine Zayas OTR/L

## 2018-04-26 NOTE — ASSESSMENT & PLAN NOTE
· Was on IV Lasix  · Continue cardiovascular/heart failure medications  · Cardiology on board    · Echocardiogram showed DHF and severe TR

## 2018-04-26 NOTE — PROGRESS NOTES
Cardiology Progress Note - [place] [de-identified] y o  female MRN: [id number]    Unit/Bed#: [facility] 503-01 Encounter: [id number]  Assessment and plan  1  Acute hypoxemic respiratory failure  2  Pneumonia  3  Acute on chronic diastolic congestive heart failure  4  Coronary artery disease history CABG stable without angina  5  Status post dual chamber pacer  6  Hypertension  7  Diabetes    Recommendations:  Continue treatment for pneumonia  Change to oral Lasix tomorrow can do 40 mg daily  Creatinine continues to improve  Blood pressure and heart rate have been acceptable  Subjective:    No significant events overnight  Shortness of breath resolved  Lower extremity edema improved  ROS      Objective:   Vitals: Blood pressure 128/63, pulse 61, temperature (!) 97 4 °F (36 3 °C), temperature source Oral, resp  rate 18, height 5' 8" (1 727 m), weight 105 kg (231 lb 0 7 oz), SpO2 96 %  , Body mass index is 35 13 kg/m² ,   Orthostatic Blood Pressures      Most Recent Value   Blood Pressure  128/63 filed at 04/26/2018 1045   Patient Position - Orthostatic VS  Lying filed at 04/26/2018 9218         Systolic (12TAU), FOC:916 , Min:126 , PPE:909     Diastolic (16GTP), QPG:15, Min:63, Max:96      Intake/Output Summary (Last 24 hours) at 04/26/18 1356  Last data filed at 04/26/18 1209   Gross per 24 hour   Intake              740 ml   Output             2027 ml   Net            -1287 ml     Weight (last 2 days)     Date/Time   Weight    04/26/18 0550  105 (231 04)    04/25/18 0448  107 (236 33)    04/24/18 0600  107 (236 99)                Telemetry Review: No significant arrhythmias seen on telemetry review  EKG personally reviewed by Jose Montiel DO  Physical Exam   Constitutional: She is oriented to person, place, and time  She appears well-nourished  No distress  HENT:   Head: Atraumatic  Eyes: Conjunctivae are normal  Pupils are equal, round, and reactive to light  Neck: Neck supple  Cardiovascular: Normal rate, regular rhythm and normal heart sounds  Exam reveals no friction rub  No murmur heard  Pulmonary/Chest: Effort normal  No respiratory distress  She has no decreased breath sounds  She has no wheezes  She has no rhonchi  She has no rales  Abdominal: Bowel sounds are normal  She exhibits no distension  There is no tenderness  There is no rebound  Musculoskeletal: She exhibits edema  Neurological: She is alert and oriented to person, place, and time  No cranial nerve deficit  Skin: Skin is warm and dry  No erythema  Nursing note and vitals reviewed          Laboratory Results:    Results from last 7 days  Lab Units 04/23/18  0457 04/22/18  2315   TROPONIN I ng/mL <0 02 <0 02       CBC with diff:     Results from last 7 days  Lab Units 04/24/18  0518 04/23/18  0651 04/22/18  2325 04/22/18  2315   WBC Thousand/uL 10 19* 9 39  --  7 12   HEMOGLOBIN g/dL 12 5 12 1  --  12 6   I STAT HEMOGLOBIN g/dl  --   --  13 3  --    HEMATOCRIT % 36 9 37 8  --  38 5   MCV fL 88 91  --  89   PLATELETS Thousands/uL 205 181  --  196   MCH pg 29 8 29 0  --  29 0   MCHC g/dL 33 9 32 0  --  32 7   RDW % 15 4* 15 6*  --  15 5*   MPV fL 12 8* 11 5  --  11 1   NRBC AUTO /100 WBCs  --  0  --  0         CMP:    Results from last 7 days  Lab Units 04/26/18  0530 04/25/18  0433 04/24/18  1110 04/24/18  0518 04/23/18  0457 04/22/18  2325 04/22/18  2315   SODIUM mmol/L 140 140 136 134* 139  --  139   POTASSIUM mmol/L 3 7 4 2 4 2 5 8* 4 3  --  4 1   CHLORIDE mmol/L 104 107 101 102 106  --  105   CO2 mmol/L 29 24 26 24 24  --  28   ANION GAP mmol/L 7 9 9 8 9  --  6   BUN mg/dL 45* 47* 41* 39* 33*  --  29*   CREATININE mg/dL 1 14 1 31* 1 54* 1 38* 1 23  --  1 20   GLUCOSE RANDOM mg/dL 140 183* 255* 236* 301*  --  249*   GLUCOSE, ISTAT mg/dl  --   --   --   --   --  267*  --    CALCIUM mg/dL 8 5 8 9 9 5 9 2 8 9  --  9 4   AST U/L  --   --   --   --   --   --  20   ALT U/L  --   --   --   --   --   --  27   ALK PHOS U/L  --   --   --   --   --   --  114   TOTAL PROTEIN g/dL  --   --   --   --   --   --  7 3   BILIRUBIN TOTAL mg/dL  --   --   --   --   --   --  0 69   EGFR ml/min/1 73sq m 46 38 32 36 42  --  43         BMP:  Results from last 7 days  Lab Units 18  0530 18  0433 18  1110 18  0518 18  0457  18  2315   SODIUM mmol/L 140 140 136 134* 139  --  139   POTASSIUM mmol/L 3 7 4 2 4 2 5 8* 4 3  --  4 1   CHLORIDE mmol/L 104 107 101 102 106  --  105   CO2 mmol/L 29 24 26 24 24  --  28   BUN mg/dL 45* 47* 41* 39* 33*  --  29*   CREATININE mg/dL 1 14 1 31* 1 54* 1 38* 1 23  --  1 20   GLUCOSE RANDOM mg/dL 140 183* 255* 236* 301*  --  249*   GLUCOSE, ISTAT   --   --   --   --   --   < >  --    CALCIUM mg/dL 8 5 8 9 9 5 9 2 8 9  --  9 4   < > = values in this interval not displayed  BNP: No results for input(s): BNP in the last 72 hours      Magnesium:     Results from last 7 days  Lab Units 18  0433   MAGNESIUM mg/dL 2 5       Coags:     Results from last 7 days  Lab Units 18  2315   PTT seconds 34   INR  1 05       TSH:        Hemoglobin A1C     Results from last 7 days  Lab Units 18  0457   HEMOGLOBIN A1C % 8 7*       Lipid Profile:       Cardiac testing:   Results for orders placed during the hospital encounter of 18   Echo complete with contrast if indicated    Narrative [facility]  [facility]  [phone]    Transthoracic Echocardiogram  2D, M-mode, Doppler, and Color Doppler    Study date:  2018    Patient: Ashwin Malin  MR number: [id number]  Account number: [de-identified]  : 1935  Age: [de-identified] years  Gender: Female  Status: Inpatient  Location: Echo lab  Height: 68 in  Weight: 232 5 lb  BP: 130/ 62 mmHg    Indications: Dyspnea    Diagnoses: R06 00 - Dyspnea, unspecified    Primary Physician:  Manpreet Mota MD  Referring Physician:  Ollen Homans, MD  Group:  [facility]  Cardiology Fellow:  Agustín Gordon MD  Interpreting Physician:  Nevaeh Brian MD    SUMMARY    LEFT VENTRICLE:  Systolic function was normal  Ejection fraction was estimated to be 60 %  There were no regional wall motion abnormalities  There was no evidence of concentric hypertrophy  Features were consistent with a pseudonormal left ventricular filling pattern, with concomitant abnormal relaxation and increased filling pressure (grade 2 diastolic dysfunction)  RIGHT VENTRICLE:  The ventricle was mildly to moderately dilated  LEFT ATRIUM:  The atrium was mildly dilated  RIGHT ATRIUM:  The atrium was mildly to moderately dilated  TRICUSPID VALVE:  There was moderate to severe regurgitation  Regurgitation grade was 3-4+ on a scale of 0 to 4+  IVC, HEPATIC VEINS:  The inferior vena cava was dilated  Respirophasic changes were blunted (less than 50% variation)  There is systolic flow reversal in the hepatic vein consistent with severe tricuspid regurgitation  COMPARISONS:  Comparison was made with the previous study of 25-May-2014  Tricuspid regurgitation has worsened  HISTORY: PRIOR HISTORY: HTN, HLD, DM, Dementia    PROCEDURE: The procedure was performed in the echo lab  This was a routine study  The transthoracic approach was used  The study included complete 2D imaging, M-mode, complete spectral Doppler, and color Doppler  The heart rate was 63 bpm,  at the start of the study  Images were obtained from the parasternal, apical, subcostal, and suprasternal notch acoustic windows  Echocardiographic views were limited due to decreased penetration and lung interference  This was a  technically difficult study  LEFT VENTRICLE: Size was normal  Systolic function was normal  Ejection fraction was estimated to be 60 %  There were no regional wall motion abnormalities  Wall thickness was normal  There was no evidence of concentric hypertrophy    DOPPLER: Features were consistent with a pseudonormal left ventricular filling pattern, with concomitant abnormal relaxation and increased filling pressure (grade 2 diastolic dysfunction)  VENTRICULAR SEPTUM: There was systolic and diastolic flattening  These changes are consistent with RV volume and pressure overload  RIGHT VENTRICLE: The ventricle was mildly to moderately dilated  Systolic function was low normal  A pacing wire was present  LEFT ATRIUM: The atrium was mildly dilated  RIGHT ATRIUM: The atrium was mildly to moderately dilated  MITRAL VALVE: Valve structure was normal  There was normal leaflet separation  DOPPLER: The transmitral velocity was within the normal range  There was no evidence for stenosis  There was no significant regurgitation  AORTIC VALVE: The valve was trileaflet  Leaflets exhibited normal thickness, normal cuspal separation, and sclerosis  DOPPLER: Transaortic velocity was within the normal range  There was no evidence for stenosis  There was no  regurgitation  TRICUSPID VALVE: DOPPLER: The transtricuspid velocity was within the normal range  There was no evidence for stenosis  There was moderate to severe regurgitation  Regurgitation grade was 3-4+ on a scale of 0 to 4+  PULMONIC VALVE: DOPPLER: The transpulmonic velocity was within the normal range  There was no evidence for stenosis  There was trace regurgitation  PERICARDIUM: There was no pericardial effusion  AORTA: The root exhibited normal size  SYSTEMIC VEINS: IVC: The inferior vena cava was dilated  Respirophasic changes were blunted (less than 50% variation)  HEPATIC VEIN DOPPLER: There is systolic flow reversal in the hepatic vein consistent with severe tricuspid  regurgitation      SYSTEM MEASUREMENT TABLES    2D  %FS: 20 05 %  Ao Diam: 3 36 cm  EDV(Teich): 85 57 ml  EF(Teich): 41 31 %  ESV(Teich): 50 22 ml  IVSd: 1 03 cm  LA Area: 21 37 cm2  LA Diam: 3 37 cm  LVEDV MOD A4C: 68 81 ml  LVEF MOD A4C: 56 86 %  LVESV MOD A4C: 29 69 ml  LVIDd: 4 35 cm  LVIDs: 3 48 cm  LVLd A4C: 7 37 cm  LVLs A4C: 6 82 cm  LVOT Diam: 1 88 cm  LVPWd: 0 97 cm  RA Area: 21 18 cm2  RVIDd: 4 1 cm  SV MOD A4C: 39 12 ml  SV(Teich): 35 35 ml    CW  AV Env  Ti: 359 86 ms  AV VTI: 52 93 cm  AV Vmax: 2 03 m/s  AV Vmax: 2 24 m/s  AV Vmean: 1 47 m/s  AV maxP 42 mmHg  AV maxP 15 mmHg  AV meanPG: 10 27 mmHg  TR Vmax: 2 1 m/s  TR maxP 7 mmHg    MM  TAPSE: 1 73 cm    PW  NESTOR (VTI): 1 28 cm2  NESTOR Vmax: 1 31 cm2  NESTOR Vmax: 1 46 cm2  NESTOR Vmax, Pt: 1 21 cm2  NESTOR Vmax, Pt: 1 35 cm2  E': 0 08 m/s  E/E': 14 35  LVOT Env  Ti: 346 02 ms  LVOT VTI: 24 27 cm  LVOT Vmax: 0 98 m/s  LVOT Vmax: 1 06 m/s  LVOT Vmean: 0 7 m/s  LVOT maxPG: 3 84 mmHg  LVOT maxP 49 mmHg  LVOT meanP 32 mmHg  LVSI Dopp: 30 98 ml/m2  LVSV Dopp: 67 55 ml  MV A Carroll: 0 8 m/s  MV Dec [place]: 4 21 m/s2  MV DecT: 261 58 ms  MV E Carroll: 1 1 m/s  MV E/A Ratio: 1 38  MV PHT: 75 86 ms  MVA By PHT: 2 9 cm2    [employer] Accredited Echocardiography Laboratory    Prepared and electronically signed by    Guanaco Boggs MD  Signed 2018 17:07:00       No results found for this or any previous visit  No results found for this or any previous visit  Results for orders placed in visit on 14   NM myocardial perfusion spect (stress and/or rest)    Narrative PHARMACOLOGIC STRESS TEST, LEXISCAN          INDICATION-  Chest pain, shortness of breath  Prior history of CABG   and stent placement   COMPARISON- No prior studies,   TECHNIQUE-  Pharmacologic stress testing was performed utilizing   [employer]  SPECT myocardial perfusion images was performed  Dosages of   TC-99-mm Myoview were 10 6 mCi and 33 0 mCi IV  Both rest and stress   images were performed  Images were then reconstructed in the short,   vertical and horizontal long axes projections  Baseline heart rate 61 beats per minute  Peak heart of 78 beats per minute  Baseline blood pressure 134/77 mmHg  Peak blood pressure 145/71 mmHg  Symptoms-   Shortness of breath nausea chest pain recovery  Time to peak imaging for rest 55 minutes and stress 33 minutes  Please see cardiology report of physiologic data  FINDINGS-   OVERALL QUALITY-   Acceptable  ARTIFACT-  Breast attenuation   PERFUSION IMAGES-   SPECT images showed a large region of mildly   diminished perfusion within the anterior wall which was normal at rest   compatible with a region of ischemia  There are no other abnormal   regions of diminished perfusion  Left ventricular cavity was normal in   size  It does appear to increase with stress  WALL MOTION-  Normal contractility   EJECTION FRACTION-  66 %   IMPRESSION-   1  Region of ischemia seen in the anterior wall   2   Left ventricular ejection fraction- 66%   ##imslh##imslh   Transcribed on- [id number]           - BYRON Perales MD        Reading Radiologist- BYRON Perales MD        Releasing Radiologist- BYRON Perales MD        Released Date Time- 05/16/14 1527      ------------------------------------------------------------------------------   [facility] [employer]        Meds/Allergies     Prescriptions Prior to Admission   Medication    amLODIPine (NORVASC) 5 mg tablet    aspirin 325 mg tablet    atorvastatin (LIPITOR) 40 mg tablet    Insulin Disposable Pump (V-GO 20) KIT    Insulin Lispro (HUMALOG) 100 UNIT/ML SOCT    isosorbide mononitrate (IMDUR) 60 mg 24 hr tablet    losartan (COZAAR) 100 MG tablet    metoprolol tartrate (LOPRESSOR) 25 mg tablet    ONE TOUCH ULTRA TEST test strip    pantoprazole (PROTONIX) 40 mg tablet    nitroglycerin (NITROSTAT) 0 4 mg SL tablet          Assessment:  Principal Problem:    Acute respiratory failure (HCC)  Active Problems:    Intermittent asthma with acute exacerbation    Dementia without behavioral disturbance    Obstructive sleep apnea    Acute on chronic diastolic heart failure ([facility] [address] )    Accelerated hypertension    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

## 2018-04-26 NOTE — SOCIAL WORK
CM met with pt regarding PT recommendation for rehab  Pt refused rehab wants to go home  Pt is currently open to [facility]

## 2018-04-26 NOTE — PROGRESS NOTES
Notified by RN that patient with new complaint of RUQ pain  HR 52, /79, Temp 97 6, RR 17, SpO2 94%    Upon evaluation, pt is resting comfortably in bed  States she was experiencing RUQ pain when she moved from her chair to the bed  Reports the bed/chair is uncomfortable as it is different from her furniture at home  States it was sharp in nature, non-radiating, 10/10 pain initially, however has now subsided to a dull ache, 2/10 pain rating  She states it resolved on its own as she is now resting in bed  Per nursing, pt was tender upon first complaints of abdominal pain and appeared uncomfortable however this has now resolved  Denies N/V, chest pain, SOB, fevers, sweats, chills  General: NAD, resting in bed  Cardiac: RRR  Pulmonary: Effort normal, no accessory muscle use, lungs CTA B/L  Abdomen: Obese  Soft, non-tender, no guarding, no rebound     A/P: RUQ pain appears consistent with positional change, musculoskeletal, ?constipation as pt has had only two small BMs in the last three days  Would continue with current bowel regimen  Pain resolved spontaneously within minutes, afebrile, no acute complaints, NAD, no tenderness  Would not pursue further work-up at this time, will re-assess if recurrence/new sx  Continue with tylenol PRN for mild pain

## 2018-04-26 NOTE — RESTORATIVE TECHNICIAN NOTE
Restorative Specialist Mobility Note       Activity: Chair, Dangle, Stand at bedside, Turn, Ambulate in room, Ambulate in torre     Assistive Device: Front wheel walker     Ambulation Response:  Tolerated fairly well  Repositioned: Sitting, Up in chair

## 2018-04-27 ENCOUNTER — TELEPHONE (OUTPATIENT)
Dept: FAMILY MEDICINE CLINIC | Facility: CLINIC | Age: 83
End: 2018-04-27

## 2018-04-27 ENCOUNTER — TRANSITIONAL CARE MANAGEMENT (OUTPATIENT)
Dept: FAMILY MEDICINE CLINIC | Facility: CLINIC | Age: 83
End: 2018-04-27

## 2018-04-27 LAB
ANION GAP SERPL CALCULATED.3IONS-SCNC: 5 MMOL/L (ref 4–13)
BUN SERPL-MCNC: 40 MG/DL (ref 5–25)
CALCIUM SERPL-MCNC: 8.7 MG/DL (ref 8.3–10.1)
CHLORIDE SERPL-SCNC: 102 MMOL/L (ref 100–108)
CO2 SERPL-SCNC: 32 MMOL/L (ref 21–32)
CREAT SERPL-MCNC: 1.26 MG/DL (ref 0.6–1.3)
GFR SERPL CREATININE-BSD FRML MDRD: 40 ML/MIN/1.73SQ M
GLUCOSE SERPL-MCNC: 107 MG/DL (ref 65–140)
GLUCOSE SERPL-MCNC: 112 MG/DL (ref 65–140)
GLUCOSE SERPL-MCNC: 126 MG/DL (ref 65–140)
GLUCOSE SERPL-MCNC: 129 MG/DL (ref 65–140)
GLUCOSE SERPL-MCNC: 256 MG/DL (ref 65–140)
POTASSIUM SERPL-SCNC: 3.9 MMOL/L (ref 3.5–5.3)
SODIUM SERPL-SCNC: 139 MMOL/L (ref 136–145)

## 2018-04-27 PROCEDURE — 80048 BASIC METABOLIC PNL TOTAL CA: CPT | Performed by: GENERAL PRACTICE

## 2018-04-27 PROCEDURE — 94760 N-INVAS EAR/PLS OXIMETRY 1: CPT

## 2018-04-27 PROCEDURE — 82948 REAGENT STRIP/BLOOD GLUCOSE: CPT

## 2018-04-27 PROCEDURE — 99233 SBSQ HOSP IP/OBS HIGH 50: CPT | Performed by: GENERAL PRACTICE

## 2018-04-27 PROCEDURE — 99232 SBSQ HOSP IP/OBS MODERATE 35: CPT | Performed by: INTERNAL MEDICINE

## 2018-04-27 RX ORDER — LOSARTAN POTASSIUM 50 MG/1
100 TABLET ORAL DAILY
Status: DISCONTINUED | OUTPATIENT
Start: 2018-04-27 | End: 2018-04-28 | Stop reason: HOSPADM

## 2018-04-27 RX ORDER — POTASSIUM CHLORIDE 20 MEQ/1
20 TABLET, EXTENDED RELEASE ORAL DAILY
Status: DISCONTINUED | OUTPATIENT
Start: 2018-04-27 | End: 2018-04-28 | Stop reason: HOSPADM

## 2018-04-27 RX ADMIN — DOCUSATE SODIUM 100 MG: 100 CAPSULE, LIQUID FILLED ORAL at 17:11

## 2018-04-27 RX ADMIN — ATORVASTATIN CALCIUM 40 MG: 40 TABLET, FILM COATED ORAL at 08:02

## 2018-04-27 RX ADMIN — METOPROLOL TARTRATE 25 MG: 25 TABLET ORAL at 21:59

## 2018-04-27 RX ADMIN — LOSARTAN POTASSIUM 100 MG: 50 TABLET ORAL at 12:07

## 2018-04-27 RX ADMIN — ISOSORBIDE MONONITRATE 60 MG: 60 TABLET, EXTENDED RELEASE ORAL at 08:02

## 2018-04-27 RX ADMIN — INSULIN GLARGINE 20 UNITS: 100 INJECTION, SOLUTION SUBCUTANEOUS at 22:00

## 2018-04-27 RX ADMIN — CALCIUM CARBONATE 500 MG (1,250 MG)-VITAMIN D3 200 UNIT TABLET 1 TABLET: at 17:11

## 2018-04-27 RX ADMIN — GUAIFENESIN 600 MG: 600 TABLET, EXTENDED RELEASE ORAL at 08:02

## 2018-04-27 RX ADMIN — POTASSIUM CHLORIDE 20 MEQ: 1500 TABLET, EXTENDED RELEASE ORAL at 12:07

## 2018-04-27 RX ADMIN — CALCIUM CARBONATE 500 MG (1,250 MG)-VITAMIN D3 200 UNIT TABLET 1 TABLET: at 07:56

## 2018-04-27 RX ADMIN — PREDNISONE 40 MG: 20 TABLET ORAL at 08:02

## 2018-04-27 RX ADMIN — GUAIFENESIN 600 MG: 600 TABLET, EXTENDED RELEASE ORAL at 21:59

## 2018-04-27 RX ADMIN — INSULIN LISPRO 3 UNITS: 100 INJECTION, SOLUTION INTRAVENOUS; SUBCUTANEOUS at 22:00

## 2018-04-27 RX ADMIN — ENOXAPARIN SODIUM 40 MG: 40 INJECTION SUBCUTANEOUS at 08:02

## 2018-04-27 RX ADMIN — FUROSEMIDE 40 MG: 40 TABLET ORAL at 08:02

## 2018-04-27 RX ADMIN — PANTOPRAZOLE SODIUM 40 MG: 40 TABLET, DELAYED RELEASE ORAL at 05:04

## 2018-04-27 RX ADMIN — AMLODIPINE BESYLATE 5 MG: 5 TABLET ORAL at 08:02

## 2018-04-27 RX ADMIN — METOPROLOL TARTRATE 25 MG: 25 TABLET ORAL at 08:02

## 2018-04-27 RX ADMIN — ASPIRIN 81 MG 81 MG: 81 TABLET ORAL at 08:02

## 2018-04-27 NOTE — PROGRESS NOTES
Pt urinated multiple times  Bladder scanned for PVR of 354ml  Notified Dr Annette Hyde  Pt still refusing santiago  Was told to re scan patient in a couple hours

## 2018-04-27 NOTE — PROGRESS NOTES
Rounded with Dr Denny Payton  Pt having issues with urinary retention  Straight cath's x2  Pt refusing santiago Cathter if would need one as has already been straight cath's twice   Plan to check PVR 4 hours from last straight cath which was at 10am

## 2018-04-27 NOTE — PROGRESS NOTES
Progress Note - Pulmonary   Janeth Bonner Sensor [de-identified] y o  female MRN: [id number]  Unit/Bed#: [facility] 503-01 Encounter: [id number]      Assessment:  1   Acute hypoxic respiratory failure  2   Abnormal CXR -  RLL opacity could represent pneumonia given presentation, volume overload   3   Acute on chronic diastolic heart failure  4   Hx of ischemic cardiomyopathy and CAD s/p CABG  5   Bronchospasm which is now resolved - never smoker, mild asthma     Plan:  1   Continue diuretics per primary team and cardiology  3   Procalcitonin negative, leukocytosis likely steroid induced, continue to wean prednisone  4   Wean prednisone by 10mg every 3 days   4   Continue nebulizers every 6 hrs  5   Continue Mucinex 600 mg BID    Will sign off, call with questions    Subjective:   Patient seen and examined  She states that she feels great and wants to go home  No significant dyspnea or cough  Objective:   Vitals: Blood pressure (!) 173/76, pulse 63, temperature 98 °F (36 7 °C), temperature source Oral, resp  rate 18, height 5' 8" (1 727 m), weight 102 kg (224 lb 10 4 oz), SpO2 94 % , RA, Body mass index is 34 16 kg/m²  Intake/Output Summary (Last 24 hours) at 04/27/18 0921  Last data filed at 04/27/18 0818   Gross per 24 hour   Intake              760 ml   Output             2848 ml   Net            -2088 ml         Physical Exam  Gen: Awake, alert, oriented x 3, no acute distress  HEENT: Mucous membranes moist, no oral lesions, no thrush  NECK: No accessory muscle use, JVP not elevated  Cardiac: Regular, single S1, single S2, no murmurs, no rubs, no gallops  Lungs: CTA bilaterally, no wheezing, rhonchi or rales   Abdomen: normoactive bowel sounds, soft nontender, nondistended, no rebound or rigidity, no guarding  Extremities: no cyanosis, no clubbing, no edema    Labs: I have personally reviewed pertinent lab results      Results from last 7 days  Lab Units 04/24/18  0518 04/23/18  0651 04/22/18  2325 04/22/18  2315   WBC Thousand/uL 10 19* 9 39  --  7 12   HEMOGLOBIN g/dL 12 5 12 1  --  12 6   I STAT HEMOGLOBIN g/dl  --   --  13 3  --    HEMATOCRIT % 36 9 37 8  --  38 5   PLATELETS Thousands/uL 205 181  --  196   NEUTROS PCT %  --   --   --  65   MONOS PCT %  --   --   --  9   MONO PCT MAN %  --  0*  --   --       Results from last 7 days  Lab Units 04/27/18  0601 04/26/18  0530 04/25/18  0433  04/22/18  2315   SODIUM mmol/L 139 140 140  < > 139   POTASSIUM mmol/L 3 9 3 7 4 2  < > 4 1   CHLORIDE mmol/L 102 104 107  < > 105   CO2 mmol/L 32 29 24  < > 28   BUN mg/dL 40* 45* 47*  < > 29*   CREATININE mg/dL 1 26 1 14 1 31*  < > 1 20   CALCIUM mg/dL 8 7 8 5 8 9  < > 9 4   TOTAL PROTEIN g/dL  --   --   --   --  7 3   BILIRUBIN TOTAL mg/dL  --   --   --   --  0 69   ALK PHOS U/L  --   --   --   --  114   ALT U/L  --   --   --   --  27   AST U/L  --   --   --   --  20   GLUCOSE RANDOM mg/dL 126 140 183*  < > 249*   GLUCOSE, ISTAT   --   --   --   < >  --    < > = values in this interval not displayed      Results from last 7 days  Lab Units 04/25/18  0433   MAGNESIUM mg/dL 2 5       Results from last 7 days  Lab Units 04/25/18  0433   PHOSPHORUS mg/dL 3 9        Results from last 7 days  Lab Units 04/22/18  2315   INR  1 05   PTT seconds 34       Results from last 7 days  Lab Units 04/23/18  0120   LACTIC ACID mmol/L 1 8       0  Lab Value Date/Time   TROPONINI <0 02 04/23/2018 0457   TROPONINI <0 02 04/22/2018 2315   TROPONINI <0 04 06/02/2014 0619   TROPONINI <0 04 05/24/2014 0421   TROPONINI <0 04 05/23/2014 1913   TROPONINI <0 04 05/23/2014 1039   TROPONINI <0 04 05/22/2014 1909   TROPONINI 0 04 05/22/2014 0827   TROPONINI <0 04 05/21/2014 2335   TROPONINI <0 04 05/15/2014 0802   TROPONINI <0 04 05/14/2014 0847   TROPONINI <0 04 05/14/2014 0525   TROPONINI <0 04 05/13/2014 2348         Meds/Allergies   Current Facility-Administered Medications   Medication Dose Route Frequency    acetaminophen (TYLENOL) tablet 650 mg  650 mg Oral Q6H PRN    amLODIPine (NORVASC) tablet 5 mg  5 mg Oral Daily    aspirin chewable tablet 81 mg  81 mg Oral Daily    atorvastatin (LIPITOR) tablet 40 mg  40 mg Oral Daily    calcium carbonate-vitamin D (OSCAL-D) 500 mg-200 units per tablet 1 tablet  1 tablet Oral BID With Meals    docusate sodium (COLACE) capsule 100 mg  100 mg Oral BID    enoxaparin (LOVENOX) subcutaneous injection 40 mg  40 mg Subcutaneous Daily    furosemide (LASIX) tablet 40 mg  40 mg Oral Daily    guaiFENesin (MUCINEX) 12 hr tablet 600 mg  600 mg Oral Q12H REJI    insulin glargine (LANTUS) subcutaneous injection 20 Units 0 2 mL  20 Units Subcutaneous HS    insulin lispro (HumaLOG) 100 units/mL subcutaneous injection 1-6 Units  1-6 Units Subcutaneous TID AC    insulin lispro (HumaLOG) 100 units/mL subcutaneous injection 1-6 Units  1-6 Units Subcutaneous HS    insulin lispro (HumaLOG) 100 units/mL subcutaneous injection 5 Units  5 Units Subcutaneous TID With Meals    isosorbide mononitrate (IMDUR) 24 hr tablet 60 mg  60 mg Oral Daily    levalbuterol (XOPENEX) inhalation solution 1 25 mg  1 25 mg Nebulization Q6H PRN    metoprolol tartrate (LOPRESSOR) tablet 25 mg  25 mg Oral Q12H REJI    nitroglycerin (NITROSTAT) SL tablet 0 4 mg  0 4 mg Sublingual Q5 Min PRN    ondansetron (ZOFRAN) injection 4 mg  4 mg Intravenous Q6H PRN    pantoprazole (PROTONIX) EC tablet 40 mg  40 mg Oral Early Morning    polyethylene glycol (MIRALAX) packet 17 g  17 g Oral Daily PRN    predniSONE tablet 40 mg  40 mg Oral Daily    senna (SENOKOT) tablet 8 6 mg  1 tablet Oral Daily    sodium chloride (PF) 0 9 % injection 3 mL  3 mL Intravenous PRN     Prescriptions Prior to Admission   Medication    amLODIPine (NORVASC) 5 mg tablet    aspirin 325 mg tablet    atorvastatin (LIPITOR) 40 mg tablet    Insulin Disposable Pump (V-GO 20) KIT    Insulin Lispro (HUMALOG) 100 UNIT/ML SOCT    isosorbide mononitrate (IMDUR) 60 mg 24 hr tablet    losartan (COZAAR) 100 MG tablet    metoprolol tartrate (LOPRESSOR) 25 mg tablet    ONE TOUCH ULTRA TEST test strip    pantoprazole (PROTONIX) 40 mg tablet    nitroglycerin (NITROSTAT) 0 4 mg SL tablet         Microbiology:  Lab Results   Component Value Date    BLOODCX No Growth After 4 Days  04/22/2018    BLOODCX No Growth After 4 Days  04/22/2018     Imaging and other studies: I have personally reviewed pertinent reports  4/25/18  IMPRESSION:  Small bilateral pleural effusions, with underlying right basilar atelectasis or pneumonia      DO Frieda Fischer [facility]

## 2018-04-27 NOTE — CASE MANAGEMENT
Thank you,  [facility] in the [facility] by Chemo Tapia for 2017  Network Utilization Review Department  Phone: [phone]; Fax [phone]  ATTENTION: The Network Utilization Review Department is now centralized for our 7 Facilities  Make a note that we have a new phone and fax numbers for our Department  Please call with any questions or concerns to [phone] and carefully follow the prompts so that you are directed to the right person  All voicemails are confidential  Fax any determinations, approvals, denials, and requests for initial or continue stay review clinical to [phone]  Due to HIGH CALL volume, it would be easier if you could please send faxed requests to expedite your requests and in part, help us provide discharge notifications faster  Continued Stay Review    Date:   18 ACUTE MED SURG LEVEL OF CARE    Vital Signs: /72   Pulse 67   Temp 97 6 °F (36 4 °C) (Oral)   Resp 16   Ht 5' 8" (1 727 m)   Wt 102 kg (224 lb 10 4 oz)   SpO2 96%   BMI 34 16 kg/m²      Vitals:   Temp (24hrs), Av °F (36 7 °C), Min:97 4 °F (36 3 °C), Max:98 5 °F (36 9 °C)   HR:  [52-63] 60  Resp:  [16-18] 16  BP: (124-177)/(63-96) 124/71  SpO2:  [93 %-96 %] 95 %  Body mass index is 35 13 kg/m²       Input and Output Summary (last 24 hours):   Last data filed at 18 1604    Gross per 24 hour   Intake              740 ml   Output             3053 ml   Net            -2313 ml       Diet [place]/CHO Controlled; Consistent Carbohydrate Diet Level 1 (4 carb servings/60 grams CHO/meal);  Sodium 2 Gm           IV ACCESS      Medications:   Scheduled Meds:   Current Facility-Administered Medications:  acetaminophen 650 mg Oral Q6H PRN Dulce Luque MD   amLODIPine 5 mg Oral Daily Dulce Luque MD   aspirin 81 mg Oral Daily Dulce Luque MD   atorvastatin 40 mg Oral Daily Dulce Luque MD   calcium carbonate-vitamin D 1 tablet Oral BID With Meals Brian Mcfarlane MD   docusate sodium 100 mg Oral BID Delmis Valdivia MD   enoxaparin 40 mg Subcutaneous Daily Delmis Valdivia MD   furosemide 40 mg Oral Daily Cindy Sciscott, DO   guaiFENesin 600 mg Oral Q12H [address] Delmis Valdivia MD   insulin glargine 20 Units Subcutaneous HS Edd Shankar MD   insulin lispro 1-6 Units Subcutaneous TID AC Delmis Valdivia MD   insulin lispro 1-6 Units Subcutaneous HS Delmis Valdivia MD   insulin lispro 5 Units Subcutaneous TID With Meals Avelina Pichardo MD   isosorbide mononitrate 60 mg Oral Daily Delmis Valdivia MD   levalbuterol 1 25 mg Nebulization Q6H PRN Griselda Colace, PA-C   losartan 100 mg Oral Daily Cindy Sciscott,    metoprolol tartrate 25 mg Oral Q12H [address] Delmis Valdivia MD   nitroglycerin 0 4 mg Sublingual Q5 Min PRN Delmis Valdivia MD   ondansetron 4 mg Intravenous Q6H PRN Delmis Valdivia MD   pantoprazole 40 mg Oral Early Morning Delmis Valdivia MD   polyethylene glycol 17 g Oral Daily PRN Delmis Valdivia MD   potassium chloride 20 mEq Oral Daily Cindy Sciscott,    predniSONE 40 mg Oral Daily Griselda Colace, NUNU   senna 1 tablet Oral Daily Delmis Valdivia MD   sodium chloride (PF) 3 mL Intravenous PRN Nely Reyes MD       PRN Meds:     Acetaminophen 650 mg q6hrs prn given x 1/ 24 hrs    levalbuterol    nitroglycerin    ondansetron    polyethylene glycol 17 Gms po qDay prn given x 1    sodium chloride (PF)      LABS/Diagnostic Results:    Results from last 7 days  Lab Units 04/24/18  0518 04/23/18  0651   04/22/18  2315   WBC Thousand/uL 10 19* 9 39  --  7 12   HEMOGLOBIN g/dL 12 5 12 1  --  12 6   I STAT HEMOGLOBIN    --   --   < >  --    HEMATOCRIT % 36 9 37 8  --  38 5   PLATELETS Thousands/uL 205 181  --  196   NEUTROS PCT %  --   --   --  65   LYMPHS PCT %  --   --   --  22   LYMPHO PCT %  --  4*  --   --    MONOS PCT %  --   --   --  9   MONO PCT MAN %  --  0*  --   --    EOS PCT %  --   --   --  3 EOSINO PCT MANUAL %  --  0  --   --       Results from last 7 days  Lab Units 04/26/18  0530   04/22/18  2315   SODIUM mmol/L 140  < > 139   POTASSIUM mmol/L 3 7  < > 4 1   CHLORIDE mmol/L 104  < > 105   CO2 mmol/L 29  < > 28   BUN mg/dL 45*  < > 29*   CREATININE mg/dL 1 14  < > 1 20   CALCIUM mg/dL 8 5  < > 9 4   TOTAL PROTEIN g/dL  --   --  7 3   BILIRUBIN TOTAL mg/dL  --   --  0 69   ALK PHOS U/L  --   --  114   ALT U/L  --   --  27   AST U/L  --   --  20   GLUCOSE RANDOM mg/dL 140  < > 249*   GLUCOSE, ISTAT    --   < >  --       Results from last 7 days  Lab Units 04/22/18  2315   INR   1 05        Results from last 7 days  Lab Units 04/22/18  2319 04/22/18  2315   BLOOD CULTURE   No Growth at 72 hrs  No Growth at 72 hrs  CHEST X RAY -  Small bilateral pleural effusions, with underlying right basilar atelectasis or pneumonia  Age/Sex: [de-identified] y o  female       Assessment/Plan:        * Acute respiratory failure (HCC)   Assessment & Plan     · Acute hypoxic respiratory failure, multifactorial, with bronchospasm, with asthma exacerbation; acute congestive heart failure; and possible pneumonia  · Manage patient's bronchospasm/asthma,/heart failure and pneumonia  · Cardiology and pulmonology on board  · Continue cardiovascular heart failure medications  · Continue with nebulizations, steroids, Mucinex  · Previously, patient was on a BiPAP and now on RA             Acute on chronic diastolic heart failure (HCC)   Assessment & Plan     · Was on IV Lasix  · Continue cardiovascular/heart failure medications  · Cardiology on board  · Echocardiogram showed DHF and severe TR          Urine retention   Assessment & Plan     Pt refused straight cath o/n  Pt states if PVR high tonight, she will agree to straight cath  c/w urine retention protocol          Hyperkalemia   Assessment & Plan     4/24 - BW moderately hemolyzed  Given IV Lasix    Recheck showed normal K          Abnormal chest x-ray   Assessment & Plan   · As per pulmonologist, there is a right lower lobe opacity that could represent pneumonia, given presentation  · IV Levaquin started on admission  · Would check sputum culture,   · 4/24 - procalcitonin < 0 05 - stopped Levaquin          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)   Assessment & Plan     A1c is 8 7  Patient is on disposable pump  With hyperglycemia, likely exacerbated with steroids, patient on basal bolus insulin  Endo appreciated - recs c/w SQ insulin while inpt and d/c on pump  4/26 - decrease insulin 2/2 lower blood sugars          Accelerated hypertension   Assessment & Plan     · Resolved  · Continue blood pressure medications  · 4/24 - hold losartan 2/2 rising Cr and hyperK          Dementia without behavioral disturbance   Assessment & Plan     Supportive care          Intermittent asthma with acute exacerbation   Assessment & Plan     · Pulmonologist on board  · Continue steroids  · Continue nebulizations  · Continue oxygen  · 4/26 - IV steroids -> PO             VTE Pharmacologic Prophylaxis:   Pharmacologic: Enoxaparin (Lovenox)  Mechanical VTE Prophylaxis in Place: Yes     Current Patient Status: Inpatient   Certification Statement: The patient will continue to require additional inpatient hospital stay           Discharge Plan:   TO BE DETERMINED - INPATIENT REHAB VERSUS HOME WITH HHS  **PRESENTLY DECLINING INPT REHAB     PER PT 4/26/18  Plan   Treatment/Interventions ADL retraining;Functional transfer training;LE strengthening/ROM; Therapeutic exercise; Endurance training;Patient/family training;Equipment eval/education; Bed mobility;Gait training;Spoke to nursing;Spoke to case management;OT   PT Frequency 5x/wk   Recommendation   Recommendation Post acute IP rehab  (PT [facility]ER[facility] DECLINING- IF TO HOME- HOME PT )       CASE MANAGEMENT FOLLOWING CLOSELY FOR ALL DISCHARGE NEEDS

## 2018-04-27 NOTE — PROGRESS NOTES
Pt bladder scanned for 477ml or urine  Notified pt that protocol is to insert santiago catheter  Orders already obtained  Went in 10 minutes later to insert catheter and pt crying and refusing catheter  Pt says that I have to call her daughter ST SANTOS and speak with her  Called pt daughter ST SANTOS, explained the situation  ST WESTBROOKJoyceALYCIA says that pt has some underlying dementia and gets emotional when things do not go her way  ST KAUREASTMICHAEL says she will be in soon to convince pt to allow santiago placement   Will await Lydia's arrival

## 2018-04-27 NOTE — PROGRESS NOTES
Progress Note - Serena Pastrana 1935, [de-identified] y o  female MRN: [id number]    Unit/Bed#: [facility] 503-01 Encounter: [id number]    Primary Care Provider: Carmen Cano MD   Date and time admitted to hospital: 4/22/2018 11:03 PM        * Acute respiratory failure (HCC)   Assessment & Plan    · Acute hypoxic respiratory failure, multifactorial, with bronchospasm, with asthma exacerbation; acute congestive heart failure; and possible pneumonia  · Manage patient's bronchospasm/asthma,/heart failure and pneumonia  · Cardiology and pulmonology on board  · Continue cardiovascular heart failure medications  · Continue with nebulizations, steroids, Mucinex  · Previously, patient was on a BiPAP and now on RA          Acute on chronic diastolic heart failure (HCC)   Assessment & Plan    · Was on IV Lasix  · Continue cardiovascular/heart failure medications  · Cardiology on board  · Echocardiogram showed DHF and severe TR        Urine retention   Assessment & Plan    Pt originally reluctant to have straight cath, but agreed and had UOP > 600 mL x 2     4/27 - PVR in afternoon > 300 mL  Encouraged ambulation  Consult urology  Pt reluctant to have santiago, but explained that retaining > 600 mL urine puts her at high risk of infection  If next PVR > 300 mL, will place santiago  Await urology recs  Suspect pt will need to be d/c with santiago, but will defer to urology  Hyperkalemia   Assessment & Plan    4/24 - BW moderately hemolyzed  Given IV Lasix  Recheck showed normal K        Abnormal chest x-ray   Assessment & Plan    · As per pulmonologist, there is a right lower lobe opacity that could represent pneumonia, given presentation  · IV Levaquin started on admission  · Would check sputum culture,   · 4/24 - procalcitonin < 0 05 - stopped Levaquin        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)   Assessment & Plan    A1c is 8 7    Patient is on disposable pump  With hyperglycemia, likely exacerbated with steroids, patient on basal bolus insulin  Endo appreciated - recs c/w SQ insulin while inpt and d/c on pump   - decrease insulin 2/2 lower blood sugars        Accelerated hypertension   Assessment & Plan    · Resolved  · Continue blood pressure medications  ·  - hold losartan 2/2 rising Cr and hyperK        Dementia without behavioral disturbance   Assessment & Plan    Supportive care        Intermittent asthma with acute exacerbation   Assessment & Plan    · Pulmonologist on board  · Continue steroids  · Continue nebulizations  · Continue oxygen  ·  - IV steroids -> PO          VTE Pharmacologic Prophylaxis:   Pharmacologic: Enoxaparin (Lovenox)  Mechanical VTE Prophylaxis in Place: Yes    Patient Centered Rounds: I have performed bedside rounds with nursing staff today  Discussions with Specialists or Other Care Team Provider: pulm and cardio    Education and Discussions with Family / Patient: pt and dtr    Time Spent for Care: 30 minutes  More than 50% of total time spent on counseling and coordination of care as described above  Current Length of Stay: 4 day(s)    Current Patient Status: Inpatient   Certification Statement: The patient will continue to require additional inpatient hospital stay due to need to see urology    Discharge Plan: pending urology clearance    Code Status: Level 1 - Full Code      Subjective:   Pt incontinent of urine, but also has high PVRs  Objective:     Vitals:   Temp (24hrs), Av °F (36 7 °C), Min:97 5 °F (36 4 °C), Max:98 4 °F (36 9 °C)    HR:  [55-67] 61  Resp:  [15-18] 18  BP: (135-186)/(63-80) 144/66  SpO2:  [93 %-96 %] 93 %  Body mass index is 34 16 kg/m²  Input and Output Summary (last 24 hours):        Intake/Output Summary (Last 24 hours) at 18 1619  Last data filed at 18 1601   Gross per 24 hour   Intake              580 ml   Output             2584 ml   Net            -2004 ml       Physical Exam: Physical Exam   Constitutional: She is oriented to person, place, and time  No distress  HENT:   Head: Normocephalic and atraumatic  Eyes: Conjunctivae and EOM are normal    Neck: Normal range of motion  Neck supple  Cardiovascular: Normal rate and regular rhythm  Pulmonary/Chest: Effort normal and breath sounds normal  She has no wheezes  She has no rales  Abdominal: Soft  Bowel sounds are normal  She exhibits no distension  There is no tenderness  Musculoskeletal: Normal range of motion  She exhibits no edema  Neurological: She is alert and oriented to person, place, and time  Skin: Skin is warm and dry  She is not diaphoretic  Additional Data:     Labs:      Results from last 7 days  Lab Units 04/24/18  0518 04/23/18  0651  04/22/18  2315   WBC Thousand/uL 10 19* 9 39  --  7 12   HEMOGLOBIN g/dL 12 5 12 1  --  12 6   I STAT HEMOGLOBIN   --   --   < >  --    HEMATOCRIT % 36 9 37 8  --  38 5   PLATELETS Thousands/uL 205 181  --  196   NEUTROS PCT %  --   --   --  65   LYMPHS PCT %  --   --   --  22   LYMPHO PCT %  --  4*  --   --    MONOS PCT %  --   --   --  9   MONO PCT MAN %  --  0*  --   --    EOS PCT %  --   --   --  3   EOSINO PCT MANUAL %  --  0  --   --    < > = values in this interval not displayed  Results from last 7 days  Lab Units 04/27/18  0601  04/22/18  2315   SODIUM mmol/L 139  < > 139   POTASSIUM mmol/L 3 9  < > 4 1   CHLORIDE mmol/L 102  < > 105   CO2 mmol/L 32  < > 28   BUN mg/dL 40*  < > 29*   CREATININE mg/dL 1 26  < > 1 20   CALCIUM mg/dL 8 7  < > 9 4   TOTAL PROTEIN g/dL  --   --  7 3   BILIRUBIN TOTAL mg/dL  --   --  0 69   ALK PHOS U/L  --   --  114   ALT U/L  --   --  27   AST U/L  --   --  20   GLUCOSE RANDOM mg/dL 126  < > 249*   GLUCOSE, ISTAT   --   < >  --    < > = values in this interval not displayed  Results from last 7 days  Lab Units 04/22/18  2315   INR  1 05       * I Have Reviewed All Lab Data Listed Above    * Additional Pertinent Lab Tests Reviewed: Joe 66 Admission Reviewed        Recent Cultures (last 7 days):       Results from last 7 days  Lab Units 04/22/18  2319 04/22/18  2315   BLOOD CULTURE  No Growth After 4 Days  No Growth After 4 Days  Last 24 Hours Medication List:     Current Facility-Administered Medications:  acetaminophen 650 mg Oral Q6H PRN Ollen Homans, MD   amLODIPine 5 mg Oral Daily Ollen Homans, MD   aspirin 81 mg Oral Daily Ollen Homans, MD   atorvastatin 40 mg Oral Daily Ollen Homans, MD   calcium carbonate-vitamin D 1 tablet Oral BID With Meals Brian Mcfarlane MD   docusate sodium 100 mg Oral BID Ollen Homans, MD   enoxaparin 40 mg Subcutaneous Daily Ollen Homans, MD   furosemide 40 mg Oral Daily Rodney Sosa DO   guaiFENesin 600 mg Oral Q12H [address] Ollen Homans, MD   insulin glargine 20 Units Subcutaneous HS Edd Shankar MD   insulin lispro 1-6 Units Subcutaneous TID AC Ollen Homans, MD   insulin lispro 1-6 Units Subcutaneous HS Ollen Homans, MD   isosorbide mononitrate 60 mg Oral Daily Ollen Homans, MD   levalbuterol 1 25 mg Nebulization Q6H PRN Melody Lombardi PA-C   losartan 100 mg Oral Daily Rodney Sosa DO   metoprolol tartrate 25 mg Oral Q12H [address] Ollen Homans, MD   nitroglycerin 0 4 mg Sublingual Q5 Min PRN Ollen Homans, MD   ondansetron 4 mg Intravenous Q6H PRN Ollen Homans, MD   pantoprazole 40 mg Oral Early Morning Ollen Homans, MD   polyethylene glycol 17 g Oral Daily PRN Ollen Homans, MD   potassium chloride 20 mEq Oral Daily Rodney Sosa DO   predniSONE 40 mg Oral Daily Melody Lombardi PA-C   senna 1 tablet Oral Daily Ollen Homans, MD   sodium chloride (PF) 3 mL Intravenous PRN Akbar Rivas MD        Today, Patient Was Seen By: Rodney Sosa DO    ** Please Note: Dictation voice to text software may have been used in the creation of this document   **

## 2018-04-27 NOTE — ASSESSMENT & PLAN NOTE
Pt originally reluctant to have straight cath, but agreed and had UOP > 600 mL x 2     4/27 - PVR in afternoon > 300 mL  Encouraged ambulation  Consult urology  Pt reluctant to have santiago, but explained that retaining > 600 mL urine puts her at high risk of infection  If next PVR > 300 mL, will place santiago  Await urology recs  Suspect pt will need to be d/c with santiago, but will defer to urology

## 2018-04-27 NOTE — PROGRESS NOTES
Progress Note - Jose Francisco Cano [de-identified] y o  female MRN: [id number]    Unit/Bed#: [facility] 503-01 Encounter: [id number]      CC: diabetes f/u    Subjective:   Jose Francisco Cano is a [de-identified]y o  year old female with type 2 diabetes  Feels well  No complaints  No hypoglycemia  Objective:     Vitals: Blood pressure 156/72, pulse 67, temperature 97 6 °F (36 4 °C), temperature source Oral, resp  rate 16, height 5' 8" (1 727 m), weight 102 kg (224 lb 10 4 oz), SpO2 96 %  ,Body mass index is 34 16 kg/m²  Intake/Output Summary (Last 24 hours) at 04/27/18 1526  Last data filed at 04/27/18 1401   Gross per 24 hour   Intake              580 ml   Output             2926 ml   Net            -2346 ml       Physical Exam:  General Appearance: awake, appears stated age and cooperative  Head: Normocephalic, without obvious abnormality, atraumatic  Extremities: moves all extremities  Skin: Skin color and temperature normal    Pulm: no labored breathing    Lab, Imaging and other studies: I have personally reviewed pertinent reports  POC Glucose   Date Value   04/27/2018 107 mg/dl   04/27/2018 112 mg/dl   04/26/2018 166 mg/dl (H)   04/26/2018 120 mg/dl   04/26/2018 88 mg/dl   04/26/2018 129 mg/dl   04/25/2018 102 mg/dl   04/25/2018 133 mg/dl   04/25/2018 247 mg/dl (H)   04/25/2018 237 mg/dl (H)   02/27/2015 264 mg/dL (H)   06/04/2014 280 mg/dL (H)   05/24/2014 308 mg/dL (H)   05/22/2014 278 mg/dL (H)       Assessment:  diabetes with hyperglycemia and acute hypoxic respiratory failure    Plan:  1  Type 2 diabetes with hyperglycemia-her blood sugars are improving with decrease in her prednisone  Stop mealtime insulin  Continue basal insulin  Continue to monitor blood sugar over time and make adjustments to the regimen if necessary  2   Acute hypoxic respiratory failure-this is slowly improving  Portions of the record may have been created with voice recognition software

## 2018-04-28 VITALS
BODY MASS INDEX: 32.94 KG/M2 | RESPIRATION RATE: 18 BRPM | WEIGHT: 217.37 LBS | HEART RATE: 61 BPM | TEMPERATURE: 98.9 F | HEIGHT: 68 IN | OXYGEN SATURATION: 95 % | SYSTOLIC BLOOD PRESSURE: 159 MMHG | DIASTOLIC BLOOD PRESSURE: 68 MMHG

## 2018-04-28 PROBLEM — I10 ACCELERATED HYPERTENSION: Status: RESOLVED | Noted: 2018-04-23 | Resolved: 2018-04-28

## 2018-04-28 PROBLEM — J96.00 ACUTE RESPIRATORY FAILURE (HCC): Status: RESOLVED | Noted: 2018-04-23 | Resolved: 2018-04-28

## 2018-04-28 PROBLEM — E87.5 HYPERKALEMIA: Status: RESOLVED | Noted: 2018-04-24 | Resolved: 2018-04-28

## 2018-04-28 PROBLEM — I50.33 ACUTE ON CHRONIC DIASTOLIC HEART FAILURE (HCC): Status: RESOLVED | Noted: 2018-04-23 | Resolved: 2018-04-28

## 2018-04-28 LAB
ANION GAP SERPL CALCULATED.3IONS-SCNC: 6 MMOL/L (ref 4–13)
BACTERIA BLD CULT: NORMAL
BACTERIA BLD CULT: NORMAL
BACTERIA UR QL AUTO: ABNORMAL /HPF
BILIRUB UR QL STRIP: NEGATIVE
BUN SERPL-MCNC: 38 MG/DL (ref 5–25)
CALCIUM SERPL-MCNC: 8.6 MG/DL (ref 8.3–10.1)
CHLORIDE SERPL-SCNC: 99 MMOL/L (ref 100–108)
CLARITY UR: CLEAR
CO2 SERPL-SCNC: 33 MMOL/L (ref 21–32)
COLOR UR: YELLOW
CREAT SERPL-MCNC: 1.08 MG/DL (ref 0.6–1.3)
GFR SERPL CREATININE-BSD FRML MDRD: 49 ML/MIN/1.73SQ M
GLUCOSE SERPL-MCNC: 186 MG/DL (ref 65–140)
GLUCOSE SERPL-MCNC: 199 MG/DL (ref 65–140)
GLUCOSE SERPL-MCNC: 244 MG/DL (ref 65–140)
GLUCOSE UR STRIP-MCNC: NEGATIVE MG/DL
HGB UR QL STRIP.AUTO: ABNORMAL
HYALINE CASTS #/AREA URNS LPF: ABNORMAL /LPF
KETONES UR STRIP-MCNC: NEGATIVE MG/DL
LEUKOCYTE ESTERASE UR QL STRIP: ABNORMAL
NITRITE UR QL STRIP: NEGATIVE
NON-SQ EPI CELLS URNS QL MICRO: ABNORMAL /HPF
PH UR STRIP.AUTO: 5.5 [PH] (ref 4.5–8)
POTASSIUM SERPL-SCNC: 4 MMOL/L (ref 3.5–5.3)
PROT UR STRIP-MCNC: NEGATIVE MG/DL
RBC #/AREA URNS AUTO: ABNORMAL /HPF
SODIUM SERPL-SCNC: 138 MMOL/L (ref 136–145)
SP GR UR STRIP.AUTO: 1.01 (ref 1–1.03)
UROBILINOGEN UR QL STRIP.AUTO: 0.2 E.U./DL
WBC #/AREA URNS AUTO: ABNORMAL /HPF

## 2018-04-28 PROCEDURE — 87086 URINE CULTURE/COLONY COUNT: CPT | Performed by: UROLOGY

## 2018-04-28 PROCEDURE — 99231 SBSQ HOSP IP/OBS SF/LOW 25: CPT | Performed by: INTERNAL MEDICINE

## 2018-04-28 PROCEDURE — 99222 1ST HOSP IP/OBS MODERATE 55: CPT | Performed by: UROLOGY

## 2018-04-28 PROCEDURE — 82948 REAGENT STRIP/BLOOD GLUCOSE: CPT

## 2018-04-28 PROCEDURE — 99239 HOSP IP/OBS DSCHRG MGMT >30: CPT | Performed by: GENERAL PRACTICE

## 2018-04-28 PROCEDURE — 81001 URINALYSIS AUTO W/SCOPE: CPT | Performed by: UROLOGY

## 2018-04-28 PROCEDURE — 80048 BASIC METABOLIC PNL TOTAL CA: CPT | Performed by: GENERAL PRACTICE

## 2018-04-28 PROCEDURE — 0T9B70Z DRAINAGE OF BLADDER WITH DRAINAGE DEVICE, VIA NATURAL OR ARTIFICIAL OPENING: ICD-10-PCS | Performed by: UROLOGY

## 2018-04-28 PROCEDURE — 97110 THERAPEUTIC EXERCISES: CPT

## 2018-04-28 PROCEDURE — 97116 GAIT TRAINING THERAPY: CPT

## 2018-04-28 RX ORDER — FUROSEMIDE 40 MG/1
40 TABLET ORAL DAILY
Qty: 30 TABLET | Refills: 0 | Status: SHIPPED | OUTPATIENT
Start: 2018-04-29 | End: 2018-05-15 | Stop reason: SDUPTHER

## 2018-04-28 RX ORDER — B-COMPLEX WITH VITAMIN C
1 TABLET ORAL 2 TIMES DAILY WITH MEALS
Qty: 60 TABLET | Refills: 0
Start: 2018-04-28

## 2018-04-28 RX ORDER — DOCUSATE SODIUM 100 MG/1
100 CAPSULE, LIQUID FILLED ORAL 2 TIMES DAILY
Qty: 10 CAPSULE | Refills: 0
Start: 2018-04-28

## 2018-04-28 RX ORDER — ASPIRIN 81 MG/1
81 TABLET, CHEWABLE ORAL DAILY
Qty: 30 TABLET | Refills: 0
Start: 2018-04-29 | End: 2020-06-15

## 2018-04-28 RX ORDER — ALBUTEROL SULFATE 90 UG/1
2 AEROSOL, METERED RESPIRATORY (INHALATION) EVERY 4 HOURS PRN
Qty: 1 INHALER | Refills: 0 | Status: SHIPPED | OUTPATIENT
Start: 2018-04-28 | End: 2019-05-09 | Stop reason: HOSPADM

## 2018-04-28 RX ORDER — PREDNISONE 10 MG/1
TABLET ORAL
Qty: 18 TABLET | Refills: 0 | Status: SHIPPED | OUTPATIENT
Start: 2018-04-28 | End: 2018-05-09 | Stop reason: ALTCHOICE

## 2018-04-28 RX ORDER — SENNOSIDES 8.6 MG
1 TABLET ORAL DAILY
Qty: 120 EACH | Refills: 0
Start: 2018-04-29

## 2018-04-28 RX ADMIN — LOSARTAN POTASSIUM 100 MG: 50 TABLET ORAL at 08:54

## 2018-04-28 RX ADMIN — FUROSEMIDE 40 MG: 40 TABLET ORAL at 08:53

## 2018-04-28 RX ADMIN — POTASSIUM CHLORIDE 20 MEQ: 1500 TABLET, EXTENDED RELEASE ORAL at 08:53

## 2018-04-28 RX ADMIN — ISOSORBIDE MONONITRATE 60 MG: 60 TABLET, EXTENDED RELEASE ORAL at 08:53

## 2018-04-28 RX ADMIN — METOPROLOL TARTRATE 25 MG: 25 TABLET ORAL at 08:54

## 2018-04-28 RX ADMIN — PREDNISONE 40 MG: 20 TABLET ORAL at 08:54

## 2018-04-28 RX ADMIN — INSULIN LISPRO 1 UNITS: 100 INJECTION, SOLUTION INTRAVENOUS; SUBCUTANEOUS at 08:52

## 2018-04-28 RX ADMIN — ATORVASTATIN CALCIUM 40 MG: 40 TABLET, FILM COATED ORAL at 08:53

## 2018-04-28 RX ADMIN — INSULIN LISPRO 3 UNITS: 100 INJECTION, SOLUTION INTRAVENOUS; SUBCUTANEOUS at 12:29

## 2018-04-28 RX ADMIN — AMLODIPINE BESYLATE 5 MG: 5 TABLET ORAL at 08:53

## 2018-04-28 RX ADMIN — CALCIUM CARBONATE 500 MG (1,250 MG)-VITAMIN D3 200 UNIT TABLET 1 TABLET: at 08:52

## 2018-04-28 RX ADMIN — PANTOPRAZOLE SODIUM 40 MG: 40 TABLET, DELAYED RELEASE ORAL at 06:21

## 2018-04-28 RX ADMIN — ENOXAPARIN SODIUM 40 MG: 40 INJECTION SUBCUTANEOUS at 08:52

## 2018-04-28 RX ADMIN — ASPIRIN 81 MG 81 MG: 81 TABLET ORAL at 08:53

## 2018-04-28 NOTE — PROGRESS NOTES
Progress Note - Cailin Flower [de-identified] y o  female MRN: [id number]    [facility] Encounter: [id number]      CC: diabetes f/u    Subjective:   Cailin Flower is a [de-identified]y o  year old female with type 2 diabetes  Feels well  No complaints  No hypoglycemia  Blood sugars climb on prednisone to 200s during the day  She is on VGO 20 units daily at home  Objective:     Vitals: Blood pressure 159/68, pulse 61, temperature 98 9 °F (37 2 °C), temperature source Oral, resp  rate 18, height 5' 8" (1 727 m), weight 98 6 kg (217 lb 6 oz), SpO2 95 %  ,Body mass index is 33 05 kg/m²  Intake/Output Summary (Last 24 hours) at 04/28/18 1300  Last data filed at 04/28/18 1100   Gross per 24 hour   Intake              860 ml   Output             3160 ml   Net            -2300 ml       Physical Exam:  General Appearance: awake, appears stated age and cooperative  Head: Normocephalic, without obvious abnormality, atraumatic  Extremities: moves all extremities  Skin: Skin color and temperature normal    Pulm: no labored breathing, lungs clear  Car: heart regular rate and rhythm, no edema of feet  POC Glucose   Date Value   04/28/2018 244 mg/dl (H)   04/28/2018 186 mg/dl (H)   04/27/2018 256 mg/dl (H)   04/27/2018 129 mg/dl   04/27/2018 107 mg/dl   04/27/2018 112 mg/dl   04/26/2018 166 mg/dl (H)   04/26/2018 120 mg/dl   04/26/2018 88 mg/dl   04/26/2018 129 mg/dl   02/27/2015 264 mg/dL (H)   06/04/2014 280 mg/dL (H)   05/24/2014 308 mg/dL (H)   05/22/2014 278 mg/dL (H)       Assessment:  diabetes with hyperglycemia   She is on VGO 20 units daily at home and will be discharged on 9 day prednisone taper  Plan:  Would recommend  discharging on her VGo 20 units daily  She should check her sugars 4 times a day over the next few days and contact her endocrinologist if the sugars are high  Portions of the record may have been created with voice recognition software

## 2018-04-28 NOTE — PLAN OF CARE
Problem: PHYSICAL THERAPY ADULT  Goal: Performs mobility at highest level of function for planned discharge setting  See evaluation for individualized goals  Treatment/Interventions: ADL retraining, Functional transfer training, LE strengthening/ROM, Elevations, Therapeutic exercise, Endurance training, Patient/family training, Bed mobility, Gait training, Spoke to nursing, Spoke to case management  Equipment Recommended: Hassan Shone       See flowsheet documentation for full assessment, interventions and recommendations  Outcome: Progressing  Prognosis: Good  Problem List: Decreased strength, Decreased endurance, Impaired balance, Decreased mobility  Assessment: Patient was minimal assistance/CG for sit to stand transfers from chair, with cues required or safety and handplacement when transferring  Patient able to ambulate increased distances this session, minimal assist/CG with RW  Patient had reports of fatigue throughout ambulation, ambulating with decreased foot clearance,excessively slow, requiring occasional standing rest breaks  Patient verbalizing wanting to transfer to home  Patient was able to ambulate household distances  Patient would continue to benefit from physical therapy to improve functional mobility until medically cleared  Barriers to Discharge: Decreased caregiver support     Recommendation: Home PT, Home with family support          See flowsheet documentation for full assessment

## 2018-04-28 NOTE — CONSULTS
CONSULT    Patient Name: Parth Rodriguez  Patient MRN: [id number]  Date of Service: 4/28/2018   Date / Time Note Created: 4/28/2018 11:23 AM   Referring Provider: Inga Cornelius DO  Provider Creating Note: Sarahy Little MD    PCP: Shellie Pantoja  Attending Provider:  Inga Cornelius DO    Reason for Consult: Urinary Retention    HPI     Elderly woman admitted with acute exacerbation of her asthma and chronic diastolic heart failure  Patient bladder scanned for 477 cc of urine and Santiago placed  Patient denies prior history of voiding symptoms or urologic problems  Source:  Patient and chart           Impressions  Urinary Retention (Multi-factorial) likely secondary to possible diabetic neurogenic bladder, but also associated factors include dementia, recumbency and constipation  Recommendations  1  Maintain santiago catheter  2  Do not remove  Not Nsg managed 3  Send urine for analysis and culture  4   In interim, increase ambulation, hydrate and treat constipation  5  Void trial to be determined by our group when patient is clinically stronger  6  Patient may require santiago catheter at home or short term rehab if patient remains sub-optimal  Will follow  Past Medical History:   Diagnosis Date    Asthma     CAD (coronary artery disease) of artery bypass graft     Cardiac disease     Dementia     Depression     Diabetes mellitus (HCC)     Hyperlipidemia     Hypertension     MI [facility]ocardial infarction) [facility] [address] )     Neuropathy     BRANT (obstructive sleep apnea)        Past Surgical History:   Procedure Laterality Date    APPENDECTOMY      CATARACT EXTRACTION      EGD AND COLONOSCOPY         History reviewed  No pertinent family history  Social History     Social History    Marital status: /Civil Union     Spouse name: N/A    Number of children: N/A    Years of education: N/A     Occupational History    Not on file       Social History Main Topics    Smoking status: Never Smoker    Smokeless tobacco: Never Used    Alcohol use No    Drug use: No    Sexual activity: Not on file     Other Topics Concern    Not on file     Social History Narrative    No narrative on file       Allergies   Allergen Reactions    Ace Inhibitors     Chlorpromazine     Latex     Lisinopril     Namenda [Memantine]     Penicillins     Propoxyphene     Stadol [Butorphanol]        Review of Systems  Constitutional:  Negative for chills and fever  Respiratory:  Some shortness of breath associated with her asthma  Cardiovascular:  No chest pain or loss of consciousness  Neuro:  No dizziness  :  No hematuria or incontinence  All other systems reviewed and are negative    Chart Review   Allergies, current medications, history, problem list    Vital Signs   Vital signs stable    Physical Exam  Obese woman in no acute distress  Neck:  supple no adenopathy  Lungs:  Clear  Abdomen:  Soft bowel, sounds are normal, nontender, no masses  :  Fisher catheter indwelling  Extremities:  Pulses intact, normal range of motion, no edema or deformity  Neuro:  No gross deficits     Laboratory Studies  Lab Results   Component Value Date    HGBA1C 8 7 (H) 04/23/2018    HGBA1C 10 4 (H) 12/17/2015     04/28/2018     12/17/2015    K 4 0 04/28/2018    K 4 0 12/17/2015    CL 99 (L) 04/28/2018     12/17/2015    CO2 33 (H) 04/28/2018    CO2 30 12/17/2015    GLUCOSE 199 (H) 04/28/2018    GLUCOSE 267 (H) 04/22/2018    GLUCOSE 191 (H) 12/17/2015    CREATININE 1 08 04/28/2018    CREATININE 1 25 12/17/2015    BUN 38 (H) 04/28/2018    BUN 27 (H) 12/17/2015    MG 2 5 04/25/2018    MG 2 3 06/02/2014    PHOS 3 9 04/25/2018    PHOS 3 4 06/02/2014    No urinalysis or culture for review    Imaging and Other Studies  )Xr Chest Portable    Result Date: 4/23/2018  Narrative: CHEST INDICATION:   sob  History of asthma   COMPARISON:  Chest radiographs June 2, 2014 EXAM PERFORMED/VIEWS:  XR CHEST PORTABLE FINDINGS: The heart is enlarged  Atherosclerotic changes in the aorta  Dual lead pacing device  Lung volumes diminished  Obscuration of the right hemidiaphragm and right heart border, secondary to fluid and/or infiltrate  Osseous structures appear within normal limits for patient age  Impression: Fluid and/or infiltrate right lower lobe  The study was marked in [facility] for immediate notification  Workstation performed: [id number]     Xr Chest Pa & Lateral    Result Date: 4/25/2018  Narrative: CHEST INDICATION:   Follow-up opacity, edema  COMPARISON:  4/22/2018 EXAM PERFORMED/VIEWS:  XR CHEST PA & LATERAL FINDINGS:  Left-sided chest wall pacemaker is identified  Pacemaker leads are intact  Heart shadow is obscured by adjacent opacity  There are small bilateral pleural effusions, with underlying right basilar consolidation on the basis of atelectasis or pneumonia  Sternal wires are present  Visualized osseous structures appear otherwise unremarkable for the patient's age  Impression: Small bilateral pleural effusions, with underlying right basilar atelectasis or pneumonia  Workstation performed: [id number]         Medications    Reviewed    Total time spent with patient 40 minutes, >50% spent counseling and/or coordination of care           )Ofelia Salazar MD

## 2018-04-28 NOTE — DISCHARGE INSTRUCTIONS
VNA should pull santiago catheter on Monday and do void trial   PCP should check BMP at follow-up visit  Acute Urinary Retention in Women   WHAT YOU NEED TO KNOW:   Acute urinary retention (AUR) is when your bladder is full, but you cannot urinate  This condition happens suddenly, gets worse quickly, and lasts a short time  DISCHARGE INSTRUCTIONS:   Medicines:   · Antibiotics  help treat or prevent a bacterial infection  · Take your medicine as directed  Contact your healthcare provider if you think your medicine is not helping or if you have side effects  Tell him if you are allergic to any medicine  Keep a list of the medicines, vitamins, and herbs you take  Include the amounts, and when and why you take them  Bring the list or the pill bottles to follow-up visits  Carry your medicine list with you in case of an emergency  Santiago catheter care: You may need a Santiago catheter while you are at home  Healthcare providers will give you a smaller leg bag to collect urine  Keep the bag below your waist  This will prevent urine from flowing back into your bladder and causing an infection or other problems  Also, keep the tube free of kinks so the urine will drain properly  Do not pull on the catheter  This can cause pain and bleeding, and may cause the catheter to come out  Ask your healthcare provider for more information on Santiago catheter care  Follow up with your healthcare provider as directed:  Write down your questions so you remember to ask them during your visits  Contact your healthcare provider if:   · You have a fever  · You have pain when you urinate  · You see blood in your urine  · You have problems with your catheter  · You have questions or concerns about your condition or care  Return to the emergency department if:   · You have severe abdominal pain      · You are breathing faster than usual     · Your heartbeat is faster than usual     · Your face, hands, feet, or ankles are swollen  © 2017 [employer] Information is for End User's use only and may not be sold, redistributed or otherwise used for commercial purposes  All illustrations and images included in CareNotes® are the copyrighted property of A D A M , Inc  or [employer]  The above information is an  only  It is not intended as medical advice for individual conditions or treatments  Talk to your doctor, nurse or pharmacist before following any medical regimen to see if it is safe and effective for you

## 2018-04-28 NOTE — NURSING NOTE
AVS reviewed with daughter, Malachi Butler  No questions or concerns at this time  Informed daughter about plan for VNA to remove santiago catheter on Monday and that patient is to follow up with urology

## 2018-04-28 NOTE — PHYSICAL THERAPY NOTE
Physical Therapy Progress Note     04/28/18 0894   Pain Assessment   Pain Assessment No/denies pain   Pain Score No Pain   Restrictions/Precautions   Weight Bearing Precautions Per Order No   Other Precautions Fall Risk;Hard of hearing;Cognitive;Multiple lines   General   Chart Reviewed Yes   Family/Caregiver Present No   Cognition   Overall Cognitive Status WFL   Arousal/Participation Alert; Responsive; Cooperative   Attention Within functional limits   Orientation Level Oriented X4   Memory Within functional limits   Following Commands Follows one step commands with increased time or repetition   Subjective   Subjective Patient sitting in chair, just finished breakfast  Patient agreeable to physical therapy stating "I am ready to walk "    Transfers   Sit to Stand 4  Minimal assistance  (CG)   Additional items Assist x 1; Increased time required;Verbal cues   Stand to Sit 4  Minimal assistance  (CG)   Additional items Assist x 1; Increased time required;Verbal cues   Stand pivot 4  Minimal assistance  (CG)   Additional items Assist x 1; Increased time required;Verbal cues   Ambulation/Elevation   Gait pattern Narrow LISET; Decreased foot clearance; Forward Flexion; Shuffling;Excessively slow; Step to   Gait Assistance 4  Minimal assist  (CG)   Additional items Assist x 1;Verbal cues   Assistive Device Rolling walker   Distance 120 ft x2    Balance   Static Sitting Fair +   Dynamic Sitting Fair   Static Standing Fair -   Dynamic Standing Poor +   Ambulatory Poor +   Endurance Deficit   Endurance Deficit Yes   Endurance Deficit Description generalized deconditioning   Activity Tolerance   Activity Tolerance Patient tolerated treatment well;Patient limited by fatigue   Nurse Made Aware appropriate to see   Exercises   Quad Sets Sitting;20 reps;AROM; Bilateral  (legs elevated in chair)   Hip Flexion Sitting;20 reps;AROM; Bilateral   Hip Abduction Sitting;20 reps;AROM; Bilateral   Hip Adduction Sitting;20 reps;AROM; Bilateral Knee AROM Long Arc Quad Sitting;15 reps;AROM; Bilateral   Ankle Pumps Sitting;20 reps;AROM; Bilateral   Assessment   Prognosis Good   Problem List Decreased strength;Decreased endurance; Impaired balance;Decreased mobility   Assessment Patient was minimal assistance/CG for sit to stand transfers from chair, with cues required or safety and handplacement when transferring  Patient able to ambulate increased distances this session, minimal assist/CG with RW  Patient had reports of fatigue throughout ambulation, ambulating with decreased foot clearance,excessively slow, requiring occasional standing rest breaks  Patient verbalizing wanting to transfer to home  Patient was able to ambulate household distances  Patient would continue to benefit from physical therapy to improve functional mobility until medically cleared  Goals   Patient Goals to go home   STG Expiration Date 05/04/18   Treatment Day 3   Plan   Treatment/Interventions Functional transfer training;LE strengthening/ROM; Therapeutic exercise; Endurance training;Gait training   Progress Progressing toward goals   PT Frequency 5x/wk   Recommendation   Recommendation Home PT; Home with family support   Equipment Recommended Wander Lucas PTA

## 2018-04-29 LAB — BACTERIA UR CULT: NORMAL

## 2018-04-29 NOTE — ASSESSMENT & PLAN NOTE
· Acute hypoxic respiratory failure, multifactorial, with bronchospasm, with asthma exacerbation and acute congestive heart failure  · Cardiology and pulmonology consulted  · Continue with nebulizations, steroids, Mucinex    · When admitted, patient was on a BiPAP and was weaned to RA

## 2018-04-29 NOTE — ASSESSMENT & PLAN NOTE
Pt originally reluctant to have straight cath, but agreed and had UOP > 600 mL x 2     4/27 - PVR in afternoon > 300 mL  Encouraged ambulation  Consult urology  Pt reluctant to have santiago, but explained that retaining > 600 mL urine puts her at high risk of infection    Pt did end up agreeing to santiago  4/28 - urology eval pt and rec sending her home with santiago with plan for VNA to do void trial Monday

## 2018-04-29 NOTE — ASSESSMENT & PLAN NOTE
· Resolved  · Continue blood pressure medications    · 4/24 - held losartan 2/2 rising Cr and hyperK  · 4/27 - restart losartan as BP high

## 2018-04-29 NOTE — ASSESSMENT & PLAN NOTE
· Was on IV Lasix and transitioned to PO by d/c  · Continue cardiovascular/heart failure medications  · Cardiology on board    · Echocardiogram showed DHF and severe TR

## 2018-04-29 NOTE — DISCHARGE SUMMARY
Discharge- Yissel Clipper 1935, [de-identified] y o  female MRN: [id number]    Unit/Bed#: [facility] 503-01 Encounter: [id number]    Primary Care Provider: Cintia Sevilla MD   Date and time admitted to hospital: 4/22/2018 11:03 PM        * Acute respiratory failure (HCC)resolved as of 4/28/2018   Assessment & Plan    · Acute hypoxic respiratory failure, multifactorial, with bronchospasm, with asthma exacerbation and acute congestive heart failure  · Cardiology and pulmonology consulted  · Continue with nebulizations, steroids, Mucinex  · When admitted, patient was on a BiPAP and was weaned to RA          Acute on chronic diastolic heart failure (HCC)resolved as of 4/28/2018   Assessment & Plan    · Was on IV Lasix and transitioned to PO by d/c  · Continue cardiovascular/heart failure medications  · Cardiology on board  · Echocardiogram showed DHF and severe TR        Urine retention   Assessment & Plan    Pt originally reluctant to have straight cath, but agreed and had UOP > 600 mL x 2     4/27 - PVR in afternoon > 300 mL  Encouraged ambulation  Consult urology  Pt reluctant to have santiago, but explained that retaining > 600 mL urine puts her at high risk of infection  Pt did end up agreeing to santiago  4/28 - urology eval pt and rec sending her home with santiago with plan for VNA to do void trial Monday        Abnormal chest x-ray   Assessment & Plan    · As per pulmonologist, there is a right lower lobe opacity that could represent pneumonia, given presentation  · IV Levaquin started on admission  · 4/24 - procalcitonin < 0 05 - stopped Levaquin        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)   Assessment & Plan    A1c is 8 7  Patient is on disposable pump  With hyperglycemia, likely exacerbated with steroids, patient on basal bolus insulin    Endo appreciated - recs c/w SQ insulin while inpt and d/c on pump  4/26 - decrease insulin 2/2 lower blood sugars        Dementia without behavioral disturbance   Assessment & Plan    Supportive care        Intermittent asthma with acute exacerbation   Assessment & Plan    · Pulmonologist appreciated  · Was on IV steroids  · 4/26 - IV steroids -> PO        Hyperkalemiaresolved as of 4/28/2018   Assessment & Plan    4/24 - BW moderately hemolyzed  Given IV Lasix  Recheck showed normal K        Accelerated hypertensionresolved as of 4/28/2018   Assessment & Plan    · Resolved  · Continue blood pressure medications    · 4/24 - held losartan 2/2 rising Cr and hyperK  · 4/27 - restart losartan as BP high            Discharging Physician / Practitioner: Maureen Guardado DO  PCP: Chad Thornton MD  Admission Date:   Admission Orders     Ordered        04/23/18 0108  Inpatient Admission  Once         04/23/18 0019  Inpatient Admission (expected length of stay for this patient is greater than two midnights)  Once             Discharge Date: 04/28/18    Resolved Problems  Date Reviewed: 4/28/2018          Resolved    * (Principal)Acute respiratory failure ([facility] [address] ) 4/28/2018     Resolved by  Maureenregulo Guardado, DO    Acute on chronic diastolic heart failure ([facility] [address] ) 4/28/2018     Resolved by  Maureen Guardado, DO    Accelerated hypertension 4/28/2018     Resolved by  Maureen Clear, DO    Hyperkalemia 4/28/2018     Resolved by  Maureen Guardado, DO          Consultations During Hospital Stay:  · Dr Karina Schulte - pulm  · Dr Divya Jacobs - cardio  · Dr Severa Copes  · Dr Victoria Hannah - urology    Procedures Performed:     · Fisher inserted day before d/c    Significant Findings / Test Results:     · Procal <  05    Incidental Findings:   · none     Test Results Pending at Discharge (will require follow up):   · U Cx - urology will follow up on this     Outpatient Tests Requested:  · PCP should order BMP at f/u visit  · VNA should do void trial Monday    Complications:  none    Reason for Admission: acute resp fail    Hospital Course:     Jessi Bustamante is a [de-identified] y o  female patient who originally presented to the hospital on 4/22/2018 due to acute resp fail 2/2 asthma exac and acute chf  She was on iv steroids and iv lasix  She was converted to PO Lasix and steroids before d/c  Pt noted to be retaining urine  She failed urine retention protocol and had santiago placed  Pt d/c with santiago  Please see above list of diagnoses and related plan for additional information  Condition at Discharge: stable     Discharge Day Visit / Exam:     Subjective:  No acute complaints  Vitals: Blood Pressure: 159/68 (04/28/18 1100)  Pulse: 61 (04/28/18 1100)  Temperature: 98 9 °F (37 2 °C) (04/28/18 1100)  Temp Source: Oral (04/28/18 1100)  Respirations: 18 (04/28/18 1100)  Height: 5' 8" (172 7 cm) (04/22/18 2305)  Weight - Scale: 98 6 kg (217 lb 6 oz) (04/28/18 0700)  SpO2: 95 % (04/28/18 0704)  Exam:   Physical Exam   Constitutional: She is oriented to person, place, and time  No distress  HENT:   Head: Normocephalic and atraumatic  Eyes: Conjunctivae and EOM are normal    Neck: Normal range of motion  Neck supple  Cardiovascular: Normal rate and regular rhythm  Pulmonary/Chest: Effort normal and breath sounds normal  She has no wheezes  She has no rales  Abdominal: Soft  Bowel sounds are normal  She exhibits no distension  There is no tenderness  Musculoskeletal: Normal range of motion  She exhibits no edema  Neurological: She is alert and oriented to person, place, and time  Skin: Skin is warm and dry  She is not diaphoretic  Discussion with Family: yes    Discharge instructions/Information to patient and family:   See after visit summary for information provided to patient and family  Provisions for Follow-Up Care:  See after visit summary for information related to follow-up care and any pertinent home health orders        Disposition:     Home with VNA Services (Reminder: Complete face to face encounter)    For Discharges to [address] [facility]:   · Not Applicable to this Patient - Not Applicable to this Patient    Planned Readmission: no     Discharge Statement:  I spent 35 minutes discharging the patient  This time was spent on the day of discharge  I had direct contact with the patient on the day of discharge  Greater than 50% of the total time was spent examining patient, answering all patient questions, arranging and discussing plan of care with patient as well as directly providing post-discharge instructions  Additional time then spent on discharge activities  Discharge Medications:  See after visit summary for reconciled discharge medications provided to patient and family        ** Please Note: This note has been constructed using a voice recognition system **

## 2018-05-02 ENCOUNTER — OFFICE VISIT (OUTPATIENT)
Dept: FAMILY MEDICINE CLINIC | Facility: CLINIC | Age: 83
End: 2018-05-02
Payer: COMMERCIAL

## 2018-05-02 VITALS
HEART RATE: 68 BPM | TEMPERATURE: 99 F | SYSTOLIC BLOOD PRESSURE: 124 MMHG | DIASTOLIC BLOOD PRESSURE: 70 MMHG | OXYGEN SATURATION: 95 % | BODY MASS INDEX: 31.07 KG/M2 | RESPIRATION RATE: 16 BRPM | HEIGHT: 68 IN | WEIGHT: 205 LBS

## 2018-05-02 DIAGNOSIS — K21.9 GASTROESOPHAGEAL REFLUX DISEASE WITH HIATAL HERNIA: ICD-10-CM

## 2018-05-02 DIAGNOSIS — I50.30 CONGESTIVE HEART FAILURE WITH LV DIASTOLIC DYSFUNCTION, NYHA CLASS 2 (HCC): ICD-10-CM

## 2018-05-02 DIAGNOSIS — Z09 HOSPITAL DISCHARGE FOLLOW-UP: Primary | ICD-10-CM

## 2018-05-02 DIAGNOSIS — IMO0002 UNCONTROLLED TYPE 2 DIABETES MELLITUS WITH OTHER OPHTHALMIC COMPLICATION, WITH LONG-TERM CURRENT USE OF INSULIN: ICD-10-CM

## 2018-05-02 DIAGNOSIS — K44.9 GASTROESOPHAGEAL REFLUX DISEASE WITH HIATAL HERNIA: ICD-10-CM

## 2018-05-02 DIAGNOSIS — J45.21 INTERMITTENT ASTHMA WITH ACUTE EXACERBATION, UNSPECIFIED ASTHMA SEVERITY: ICD-10-CM

## 2018-05-02 DIAGNOSIS — R33.9 URINE RETENTION: ICD-10-CM

## 2018-05-02 PROCEDURE — 99496 TRANSJ CARE MGMT HIGH F2F 7D: CPT | Performed by: FAMILY MEDICINE

## 2018-05-02 PROCEDURE — 1111F DSCHRG MED/CURRENT MED MERGE: CPT | Performed by: FAMILY MEDICINE

## 2018-05-02 NOTE — PROGRESS NOTES
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, with long-term current use of insulin (HCC)  Comments:  Continue insulin  FU endocrinology  Gastroesophageal reflux disease with hiatal hernia  Comments:  Side effects eduated pt with long term PPI  Wean off pantoprazole  Intermittent asthma with acute exacerbation, unspecified asthma severity  Comments:  Continue predisone as scheduled  Subjective:     Patient ID: Parth Rodriguez is a [de-identified] y o  female  HPI  Pt is here with daughter  Was in hospital 4/22-4/28/2018 for "acute respiratory failure, acute on chronic CHF, urine retention"  Pt got Bipap and weaned it off  Got lasix which helped a lot  Discharged home with santiago  VNA visited her and removed santiago 2 days ago  She can urinate, no problems  Will see urology next week  She is on prednisone now  No wheezing, does not use rescue inhaler  Denies fever, SOB, CP, n/v/abd pain  Wt 205 lb today stable  HTN---She is on amlodipine 5mg QD, imdur 60mg QD, losartan 100mg QD, metoprolol 25mg daily  FU cardiology Dr Sarah Hernandez for pacemaker 5/2014, CAD s/p bypass 1999  Next appt will be 6/2018  DM---4/2018 HgA1C 8 7 FU endocrinology for DM Q 3 months  She is on V-Go 20    FU neurology Dr Michael Askew for memory loss and hx of stroke  Not on donepezil or namenda because of side effect  Not follow any more  FU opthalmology [facility] yearly  No diabetic change  Hyperlipidemia---on atorvastatin 40mg qhs  Denies side effects  Lives with   Does all ADL's   Walks with walker, sometimes uses wheelchair  Denies recent falls  Denies depression  Review of Systems   Constitutional: Negative for appetite change, chills and fever  HENT: Negative for congestion, ear pain, sinus pain and sore throat  Eyes: Negative for discharge and itching  Respiratory: Negative for apnea, cough, chest tightness, shortness of breath and wheezing  Cardiovascular: Negative for chest pain, palpitations and leg swelling  Gastrointestinal: Negative for abdominal pain, anal bleeding, constipation, diarrhea, nausea and vomiting  Endocrine: Negative for cold intolerance, heat intolerance and polyuria  Genitourinary: Negative for difficulty urinating and dysuria  Musculoskeletal: Negative for arthralgias, back pain and myalgias  Skin: Negative for rash  Neurological: Negative for dizziness and headaches  Psychiatric/Behavioral: Negative for agitation  Objective:     Physical Exam   Constitutional: She appears well-developed  No distress  Neck: Normal range of motion  No thyromegaly present  Cardiovascular: Normal rate, regular rhythm and normal heart sounds  Exam reveals no gallop and no friction rub  No murmur heard  Pulmonary/Chest: Effort normal and breath sounds normal  No respiratory distress  She has no wheezes  She has no rales  She exhibits no tenderness  Abdominal: Soft  Bowel sounds are normal    Musculoskeletal:   Use walker   Lymphadenopathy:     She has no cervical adenopathy  Neurological: She is alert  Psychiatric: She has a normal mood and affect  Vitals:    05/02/18 1123   BP: 124/70   Pulse: 68   Resp: 16   Temp: 99 °F (37 2 °C)   TempSrc: Tympanic   SpO2: 95%   Weight: 93 kg (205 lb)   Height: 5' 8" (1 727 m)       Transitional Care Management Review:  Gini Gu is a [de-identified] y o  female here for TCM follow up       During the TCM phone call patient stated:    Date and time hospital follow up call was made:  4/28/2018 10:52 AM  Hospital care reviewed:  Records reviewed  Patient was hopsitalized at:  [facility]  Date of admission:  4/22/18  Date of discharge:  4/27/18  Diagnosis:  Acute respiratory failure   Were the patients medicaitons reviewed and updated:  No  Scheduled for follow up?:  Yes  I have advised the patient to call PCP with any new or worsening symptoms (please type in name along with any credentials):  JOSE Patterson  Interperter language line required?:  No  Counseling:  Caregiver  Counseling topics:  Importance of RX compliance             Abel Oliva MD

## 2018-05-08 DIAGNOSIS — E11.319 TYPE 2 DIABETES MELLITUS WITH RETINOPATHY WITHOUT MACULAR EDEMA (HCC): ICD-10-CM

## 2018-05-08 DIAGNOSIS — I10 ESSENTIAL (PRIMARY) HYPERTENSION: ICD-10-CM

## 2018-05-09 ENCOUNTER — OFFICE VISIT (OUTPATIENT)
Dept: ENDOCRINOLOGY | Facility: CLINIC | Age: 83
End: 2018-05-09
Payer: COMMERCIAL

## 2018-05-09 VITALS
DIASTOLIC BLOOD PRESSURE: 58 MMHG | SYSTOLIC BLOOD PRESSURE: 118 MMHG | WEIGHT: 198 LBS | BODY MASS INDEX: 30.01 KG/M2 | HEART RATE: 66 BPM | HEIGHT: 68 IN

## 2018-05-09 DIAGNOSIS — I10 HYPERTENSION, UNSPECIFIED TYPE: ICD-10-CM

## 2018-05-09 DIAGNOSIS — R79.89 ABNORMAL TSH: ICD-10-CM

## 2018-05-09 DIAGNOSIS — IMO0002 UNCONTROLLED TYPE 2 DIABETES MELLITUS WITH OTHER OPHTHALMIC COMPLICATION, WITH LONG-TERM CURRENT USE OF INSULIN: Primary | ICD-10-CM

## 2018-05-09 DIAGNOSIS — E78.5 HYPERLIPIDEMIA, UNSPECIFIED HYPERLIPIDEMIA TYPE: ICD-10-CM

## 2018-05-09 PROCEDURE — 99215 OFFICE O/P EST HI 40 MIN: CPT | Performed by: PHYSICIAN ASSISTANT

## 2018-05-09 NOTE — LETTER
May 9, 2018     Alexshari Dupree, [address]    Patient: Akbar Mitchell   YOB: 1935   Date of Visit: 5/9/2018       Dear Dr Iman Montes De Oca: Thank you for referring Marycruz Diaz to me for evaluation  Below are my notes for this consultation  If you have questions, please do not hesitate to call me  I look forward to following your patient along with you  Sincerely,        Manish Peraza PA-C        CC: No Recipients  Manish Peraza PA-C  5/9/2018  4:27 PM  Sign at close encounter      Established Patient Progress Note      Chief Complaint   Patient presents with    Diabetes Type 2    Hyperlipidemia    Hypertension          History of Present Illness:   Akbar Mitchell is a [de-identified] y o  female with a history of type 2 diabetes with long term use of insulin for about 30 years  She has been in the hospital and sick with bronchitis and has been treated with steroids  Due to this, blood sugars have been very high  Did not bring log or meter today  Saw Dr Shankar and Elayne Beasley while in hospital 4/28/18 due to hyperglycemia  Prior to illness was doing pretty well with care of diabetes  Current regimen:   V-Go 20:   4 clicks (8 units) with each meal with another 4 clicks at bedtime because blood sugar 400  Using Humalog insulin  Last dose yesterday of prednisone  Today 90 in the morning 20    Last Eye Exam: August 2018, had laser  Last Foot Exam: every 3 months dr Yaa Velásquez       Has hypertension: Taking amlodipine and losartan imdur and lopressor  Has hyperlipidemia: Taking atorvastatin    Thyroid disorders: hx abnormal tsh, slightly above normal at 3 980 in 5/2017    Patient Active Problem List   Diagnosis    3-vessel coronary artery disease    Abnormal TSH    Intermittent asthma with acute exacerbation    Dementia without behavioral disturbance    Depression    Diabetic retinopathy ([facility] [address] [facility]    DM (diabetes mellitus), type 2, uncontrolled w/ophthalmic complication ([place] [address] )    Gastroesophageal reflux disease with hiatal hernia    Glaucoma    Hyperlipidemia    Hypertension    Cerebral artery occlusion with cerebral infarction ([place] [address] )    Obesity    Obstructive sleep apnea    Osteoarthritis of knee    Urine incontinence    Ventricular tachycardia ([place] [address] )    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)    Abnormal chest x-ray    Urine retention    Congestive heart failure with LV diastolic dysfunction, NYHA class 2 ([facility])      Past Medical History:   Diagnosis Date    Asthma     CAD (coronary artery disease) of artery bypass graft     Cardiac disease     Dementia     Depression     Diabetes mellitus ([place] [address] )     Hyperlipidemia     Hypertension     MI (myocardial infarction) ([place] [address] )     Neuropathy     BRANT (obstructive sleep apnea)       Past Surgical History:   Procedure Laterality Date    APPENDECTOMY      CATARACT EXTRACTION      EGD AND COLONOSCOPY        History reviewed  No pertinent family history    Social History   Substance Use Topics    Smoking status: Never Smoker    Smokeless tobacco: Never Used    Alcohol use No     Allergies   Allergen Reactions    Ace Inhibitors     Chlorpromazine     Latex     Lisinopril     Namenda [Memantine]     Penicillins     Propoxyphene     Stadol [Butorphanol]          Current Outpatient Prescriptions:     albuterol (PROVENTIL HFA,VENTOLIN HFA) 90 mcg/act inhaler, Inhale 2 puffs every 4 (four) hours as needed for wheezing or shortness of breath, Disp: 1 Inhaler, Rfl: 0    amLODIPine (NORVASC) 5 mg tablet, Take by mouth, Disp: , Rfl:     aspirin 81 mg chewable tablet, Chew 1 tablet (81 mg total) daily, Disp: 30 tablet, Rfl: 0    atorvastatin (LIPITOR) 40 mg tablet, TAKE 1 TABLET BY MOUTH EVERY DAY, Disp: 30 tablet, Rfl: 11    calcium carbonate-vitamin D (OSCAL-D) 500 mg-200 units per tablet, Take 1 tablet by mouth 2 (two) times a day with meals, Disp: 60 tablet, Rfl: 0   docusate sodium (COLACE) 100 mg capsule, Take 1 capsule (100 mg total) by mouth 2 (two) times a day, Disp: 10 capsule, Rfl: 0    furosemide (LASIX) 40 mg tablet, Take 1 tablet (40 mg total) by mouth daily, Disp: 30 tablet, Rfl: 0    Insulin Disposable Pump (V-GO 20) KIT, V-Go 20 KIT Use  daily as directed  Quantity: 3;  Refills: 3   Irma Vazquez ;  Start 14-Jan-2016 Active 30 Kit Box, Disp: , Rfl:     Insulin Lispro (HUMALOG) 100 UNIT/ML SOCT, Inject 20 Units under the skin, Disp: , Rfl:     isosorbide mononitrate (IMDUR) 60 mg 24 hr tablet, Take by mouth, Disp: , Rfl:     losartan (COZAAR) 100 MG tablet, Take by mouth, Disp: , Rfl:     metoprolol tartrate (LOPRESSOR) 25 mg tablet, Take by mouth, Disp: , Rfl:     nitroglycerin (NITROSTAT) 0 4 mg SL tablet, Place under the tongue, Disp: , Rfl:     ONE TOUCH ULTRA TEST test strip, , Disp: , Rfl:     senna (SENOKOT) 8 6 mg, Take 1 tablet (8 6 mg total) by mouth daily, Disp: 120 each, Rfl: 0    pantoprazole (PROTONIX) 40 mg tablet, Take by mouth, Disp: , Rfl:     Review of Systems   Constitutional: Negative for activity change, appetite change, chills, diaphoresis, fatigue, fever and unexpected weight change  HENT: Negative for trouble swallowing and voice change  Eyes: Negative for visual disturbance  Respiratory: Negative for shortness of breath  Cardiovascular: Negative for chest pain and palpitations  Gastrointestinal: Negative for abdominal pain, constipation and diarrhea  Endocrine: Negative for cold intolerance, heat intolerance, polydipsia, polyphagia and polyuria  Genitourinary: Negative for frequency and menstrual problem  Musculoskeletal: Negative for arthralgias and myalgias  Skin: Negative for rash  Allergic/Immunologic: Negative for food allergies  Neurological: Negative for dizziness and tremors  Hematological: Negative for adenopathy  Psychiatric/Behavioral: Negative for sleep disturbance     All other systems reviewed and are negative  Physical Exam:  Body mass index is 30 11 kg/m²  /58   Pulse 66   Ht 5' 8" (1 727 m)   Wt 89 8 kg (198 lb)   BMI 30 11 kg/m²     Wt Readings from Last 3 Encounters:   05/09/18 89 8 kg (198 lb)   05/02/18 93 kg (205 lb)   04/28/18 98 6 kg (217 lb 6 oz)       Physical Exam   Constitutional: She is oriented to person, place, and time  She appears well-developed and well-nourished  No distress  HENT:   Head: Normocephalic and atraumatic  Eyes: Conjunctivae are normal  Pupils are equal, round, and reactive to light  Neck: Normal range of motion  Neck supple  No thyromegaly present  Cardiovascular: Normal rate, regular rhythm and normal heart sounds  Pulmonary/Chest: Effort normal and breath sounds normal  No respiratory distress  She has no wheezes  She has no rales  Abdominal: Soft  Bowel sounds are normal  She exhibits no distension  There is no tenderness  Musculoskeletal: Normal range of motion  She exhibits no edema  Neurological: She is alert and oriented to person, place, and time  Skin: Skin is warm and dry  Psychiatric: She has a normal mood and affect  Vitals reviewed      Diabetic Foot Exam    Labs:   Component      Latest Ref Rng & Units 3/4/2017 5/16/2017 9/28/2017   Sodium      136 - 145 mmol/L   140   Potassium      3 5 - 5 3 mmol/L   4 2   Chloride      100 - 108 mmol/L   104   CO2      21 - 32 mmol/L   31   Anion Gap      4 - 13 mmol/L   5   BUN      5 - 25 mg/dL   34 (H)   Creatinine      0 60 - 1 30 mg/dL   1 01   GLUCOSE FASTING      65 - 99 mg/dL   108 (H)   Calcium      8 3 - 10 1 mg/dL   9 8   AST      5 - 45 U/L   20   ALT      12 - 78 U/L   27   Alkaline Phosphatase      46 - 116 U/L   92   Total Protein      6 4 - 8 2 g/dL   7 3   Albumin      3 5 - 5 0 g/dL   3 5   Total Bilirubin      0 20 - 1 00 mg/dL   0 45   eGFR      ml/min/1 73sq m   53   Glucose      65 - 140 mg/dL      Cholesterol      50 - 200 mg/dL  112    Triglycerides <=150 mg/dL  72    HDL      40 - 60 mg/dL  54    LDL Calculated      0 - 100 mg/dL  44    EXT Creatinine Urine      mg/dL 74 3     MICROALBUM ,U,RANDOM      0 0 - 20 0 mg/L 83 0 (H)     MICROALBUMIN/CREATININE RATIO      0 - 30 mg/g creatinine 112 (H)     Hemoglobin A1C      4 2 - 6 3 %   7 5 (H)   EAG      mg/dl   169   Free T4      0 76 - 1 46 ng/dL  0 97    TSH 3RD GENERATON      0 358 - 3 740 uIU/mL  3 980 (H)      Component      Latest Ref Rng & Units 4/23/2018   Sodium      136 - 145 mmol/L 139   Potassium      3 5 - 5 3 mmol/L 4 3   Chloride      100 - 108 mmol/L 106   CO2      21 - 32 mmol/L 24   Anion Gap      4 - 13 mmol/L 9   BUN      5 - 25 mg/dL 33 (H)   Creatinine      0 60 - 1 30 mg/dL 1 23   GLUCOSE FASTING      65 - 99 mg/dL    Calcium      8 3 - 10 1 mg/dL 8 9   AST      5 - 45 U/L    ALT      12 - 78 U/L    Alkaline Phosphatase      46 - 116 U/L    Total Protein      6 4 - 8 2 g/dL    Albumin      3 5 - 5 0 g/dL    Total Bilirubin      0 20 - 1 00 mg/dL    eGFR      ml/min/1 73sq m 42   Glucose      65 - 140 mg/dL 301 (H)   Cholesterol      50 - 200 mg/dL    Triglycerides      <=150 mg/dL    HDL      40 - 60 mg/dL    LDL Calculated      0 - 100 mg/dL    EXT Creatinine Urine      mg/dL    MICROALBUM ,U,RANDOM      0 0 - 20 0 mg/L    MICROALBUMIN/CREATININE RATIO      0 - 30 mg/g creatinine    Hemoglobin A1C      4 2 - 6 3 % 8 7 (H)   EAG      mg/dl 203   Free T4      0 76 - 1 46 ng/dL    TSH 3RD GENERATON      0 358 - 3 740 uIU/mL      Impression & Plan:    Problem List Items Addressed This Visit     Abnormal TSH     Check thyroid function testing  Relevant Orders    TSH, 3rd generation    T4, free    DM (diabetes mellitus), type 2, uncontrolled w/ophthalmic complication ([facility] [address] ) - Primary     Poorly Controlled/not at goal with recent hyperglycemia due to steroid use/illness  For now, increase V-Go to 5 clicks (10 units) before each meal   Check BG 3-4x per day and send log in two weeks  Now that she is off steroids, blood sugars should begin to improve  IF blood sugars less than 608 can resume 4 clicks before meals   Avoid Giving bolus dose/clicks at bedtime which may result in severe overnight hypoglycemia  Due to being on intensive insulin therapy,  she at high risk of severe hypoglycemia  Severe hypoglycemia can result in falls, injury, coma, seizure, or death  Relevant Orders    HEMOGLOBIN A1C W/ EAG ESTIMATION    Microalbumin / creatinine urine ratio    Hyperlipidemia     Continue statin  Check Lipid panel  Relevant Orders    Comprehensive metabolic panel    Lipid Panel with Direct LDL reflex    Hypertension     Stable on current regimen  Orders Placed This Encounter   Procedures    HEMOGLOBIN A1C W/ EAG ESTIMATION     Standing Status:   Future     Standing Expiration Date:   5/9/2019    Comprehensive metabolic panel     This is a patient instruction: Patient fasting for 8 hours or longer recommended  Standing Status:   Future     Standing Expiration Date:   5/9/2019    Lipid Panel with Direct LDL reflex     This is a patient instruction: This test requires patient fasting for 10-12 hours or longer  Drinking of black coffee or black tea is acceptable  Standing Status:   Future     Standing Expiration Date:   5/9/2019    Microalbumin / creatinine urine ratio     Standing Status:   Future     Standing Expiration Date:   5/9/2019    TSH, 3rd generation     This is a patient instruction: This test is non-fasting  Please drink two glasses of water morning of bloodwork  Standing Status:   Future     Standing Expiration Date:   5/9/2019    T4, free     Standing Status:   Future     Standing Expiration Date:   5/9/2019       There are no Patient Instructions on file for this visit  Discussed with the patient and all questioned fully answered  She will call me if any problems arise  Follow-up appointment in 3 months       Counseled patient on diagnostic results, prognosis, risk and benefit of treatment options, instruction for management, importance of treatment compliance, Risk  factor reduction and impressions      Robert Hernández PA-C

## 2018-05-09 NOTE — PATIENT INSTRUCTIONS
Advised to increase from 4 clicks to 5 clicks before each meal   If blood sugars lower (less than 130) can reduce back to 4 clicks once prednisone out of system  Check BG 3-4x per day and send log in two weeks

## 2018-05-09 NOTE — PROGRESS NOTES
Established Patient Progress Note      Chief Complaint   Patient presents with    Diabetes Type 2    Hyperlipidemia    Hypertension          History of Present Illness:   Ada Burgess is a [de-identified] y o  female with a history of type 2 diabetes with long term use of insulin for about 30 years  She has been in the hospital and sick with bronchitis and has been treated with steroids  Due to this, blood sugars have been very high  Did not bring log or meter today  Saw Dr Shankar and Juan Manuel Gallego while in hospital 4/28/18 due to hyperglycemia  Prior to illness was doing pretty well with care of diabetes  Current regimen:   V-Go 20:   4 clicks (8 units) with each meal with another 4 clicks at bedtime because blood sugar 400  Using Humalog insulin  Last dose yesterday of prednisone  Today 90 in the morning 20    Last Eye Exam: August 2018, had laser  Last Foot Exam: every 3 months dr Marcos Bedoya       Has hypertension: Taking amlodipine and losartan imdur and lopressor  Has hyperlipidemia: Taking atorvastatin    Thyroid disorders: hx abnormal tsh, slightly above normal at 3 980 in 5/2017    Patient Active Problem List   Diagnosis    3-vessel coronary artery disease    Abnormal TSH    Intermittent asthma with acute exacerbation    Dementia without behavioral disturbance    Depression    Diabetic retinopathy ([address]    DM (diabetes mellitus), type 2, uncontrolled w/ophthalmic complication ([address] )    Gastroesophageal reflux disease with hiatal hernia    Glaucoma    Hyperlipidemia    Hypertension    Cerebral artery occlusion with cerebral infarction ([address]    Obesity    Obstructive sleep apnea    Osteoarthritis of knee    Urine incontinence    Ventricular tachycardia ([address]    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)    Abnormal chest x-ray    Urine retention    Congestive heart failure with LV diastolic dysfunction, NYHA class 2 ([address] )      Past Medical History:   Diagnosis Date    Asthma     CAD (coronary artery disease) of artery bypass graft     Cardiac disease     Dementia     Depression     Diabetes mellitus (HCC)     Hyperlipidemia     Hypertension     MI [facility]my[facility] infarction) ([facility] [address] [facility]     Neuropathy     BRANT (obstructive sleep apnea)       Past Surgical History:   Procedure Laterality Date    APPENDECTOMY      CATARACT EXTRACTION      EGD AND COLONOSCOPY        History reviewed  No pertinent family history    Social History   Substance Use Topics    Smoking status: Never Smoker    Smokeless tobacco: Never Used    Alcohol use No     Allergies   Allergen Reactions    Ace Inhibitors     Chlorpromazine     Latex     Lisinopril     Namenda [Memantine]     Penicillins     Propoxyphene     Stadol [Butorphanol]          Current Outpatient Prescriptions:     albuterol (PROVENTIL HFA,VENTOLIN HFA) 90 mcg/act inhaler, Inhale 2 puffs every 4 (four) hours as needed for wheezing or shortness of breath, Disp: 1 Inhaler, Rfl: 0    amLODIPine (NORVASC) 5 mg tablet, Take by mouth, Disp: , Rfl:     aspirin 81 mg chewable tablet, Chew 1 tablet (81 mg total) daily, Disp: 30 tablet, Rfl: 0    atorvastatin (LIPITOR) 40 mg tablet, TAKE 1 TABLET BY MOUTH EVERY DAY, Disp: 30 tablet, Rfl: 11    calcium carbonate-vitamin D (OSCAL-D) 500 mg-200 units per tablet, Take 1 tablet by mouth 2 (two) times a day with meals, Disp: 60 tablet, Rfl: 0    docusate sodium (COLACE) 100 mg capsule, Take 1 capsule (100 mg total) by mouth 2 (two) times a day, Disp: 10 capsule, Rfl: 0    furosemide (LASIX) 40 mg tablet, Take 1 tablet (40 mg total) by mouth daily, Disp: 30 tablet, Rfl: 0    Insulin Disposable Pump (V-GO 20) KIT, V-Go 20 KIT Use  daily as directed  Quantity: 3;  Refills: 3   Irma Vazquez ;  Start 14-Jan-2016 Active 30 Kit Box, Disp: , Rfl:     Insulin Lispro (HUMALOG) 100 UNIT/ML SOCT, Inject 20 Units under the skin, Disp: , Rfl:     isosorbide mononitrate (IMDUR) 60 mg 24 hr tablet, Take by mouth, Disp: , Rfl:     losartan (COZAAR) 100 MG tablet, Take by mouth, Disp: , Rfl:     metoprolol tartrate (LOPRESSOR) 25 mg tablet, Take by mouth, Disp: , Rfl:     nitroglycerin (NITROSTAT) 0 4 mg SL tablet, Place under the tongue, Disp: , Rfl:     ONE TOUCH ULTRA TEST test strip, , Disp: , Rfl:     senna (SENOKOT) 8 6 mg, Take 1 tablet (8 6 mg total) by mouth daily, Disp: 120 each, Rfl: 0    pantoprazole (PROTONIX) 40 mg tablet, Take by mouth, Disp: , Rfl:     Review of Systems   Constitutional: Negative for activity change, appetite change, chills, diaphoresis, fatigue, fever and unexpected weight change  HENT: Negative for trouble swallowing and voice change  Eyes: Negative for visual disturbance  Respiratory: Negative for shortness of breath  Cardiovascular: Negative for chest pain and palpitations  Gastrointestinal: Negative for abdominal pain, constipation and diarrhea  Endocrine: Negative for cold intolerance, heat intolerance, polydipsia, polyphagia and polyuria  Genitourinary: Negative for frequency and menstrual problem  Musculoskeletal: Negative for arthralgias and myalgias  Skin: Negative for rash  Allergic/Immunologic: Negative for food allergies  Neurological: Negative for dizziness and tremors  Hematological: Negative for adenopathy  Psychiatric/Behavioral: Negative for sleep disturbance  All other systems reviewed and are negative  Physical Exam:  Body mass index is 30 11 kg/m²  /58   Pulse 66   Ht 5' 8" (1 727 m)   Wt 89 8 kg (198 lb)   BMI 30 11 kg/m²    Wt Readings from Last 3 Encounters:   05/09/18 89 8 kg (198 lb)   05/02/18 93 kg (205 lb)   04/28/18 98 6 kg (217 lb 6 oz)       Physical Exam   Constitutional: She is oriented to person, place, and time  She appears well-developed and well-nourished  No distress  HENT:   Head: Normocephalic and atraumatic     Eyes: Conjunctivae are normal  Pupils are equal, round, and reactive to light  Neck: Normal range of motion  Neck supple  No thyromegaly present  Cardiovascular: Normal rate, regular rhythm and normal heart sounds  Pulmonary/Chest: Effort normal and breath sounds normal  No respiratory distress  She has no wheezes  She has no rales  Abdominal: Soft  Bowel sounds are normal  She exhibits no distension  There is no tenderness  Musculoskeletal: Normal range of motion  She exhibits no edema  Neurological: She is alert and oriented to person, place, and time  Skin: Skin is warm and dry  Psychiatric: She has a normal mood and affect  Vitals reviewed      Diabetic Foot Exam    Labs:   Component      Latest Ref Rng & Units 3/4/2017 5/16/2017 9/28/2017   Sodium      136 - 145 mmol/L   140   Potassium      3 5 - 5 3 mmol/L   4 2   Chloride      100 - 108 mmol/L   104   CO2      21 - 32 mmol/L   31   Anion Gap      4 - 13 mmol/L   5   BUN      5 - 25 mg/dL   34 (H)   Creatinine      0 60 - 1 30 mg/dL   1 01   GLUCOSE FASTING      65 - 99 mg/dL   108 (H)   Calcium      8 3 - 10 1 mg/dL   9 8   AST      5 - 45 U/L   20   ALT      12 - 78 U/L   27   Alkaline Phosphatase      46 - 116 U/L   92   Total Protein      6 4 - 8 2 g/dL   7 3   Albumin      3 5 - 5 0 g/dL   3 5   Total Bilirubin      0 20 - 1 00 mg/dL   0 45   eGFR      ml/min/1 73sq m   53   Glucose      65 - 140 mg/dL      Cholesterol      50 - 200 mg/dL  112    Triglycerides      <=150 mg/dL  72    HDL      40 - 60 mg/dL  54    LDL Calculated      0 - 100 mg/dL  44    EXT Creatinine Urine      mg/dL 74 3     MICROALBUM ,U,RANDOM      0 0 - 20 0 mg/L 83 0 (H)     MICROALBUMIN/CREATININE RATIO      0 - 30 mg/g creatinine 112 (H)     Hemoglobin A1C      4 2 - 6 3 %   7 5 (H)   EAG      mg/dl   169   Free T4      0 76 - 1 46 ng/dL  0 97    TSH 3RD GENERATON      0 358 - 3 740 uIU/mL  3 980 (H)      Component      Latest Ref Rng & Units 4/23/2018   Sodium      136 - 145 mmol/L 139   Potassium      3 5 - 5 3 mmol/L 4 3 Chloride      100 - 108 mmol/L 106   CO2      21 - 32 mmol/L 24   Anion Gap      4 - 13 mmol/L 9   BUN      5 - 25 mg/dL 33 (H)   Creatinine      0 60 - 1 30 mg/dL 1 23   GLUCOSE FASTING      65 - 99 mg/dL    Calcium      8 3 - 10 1 mg/dL 8 9   AST      5 - 45 U/L    ALT      12 - 78 U/L    Alkaline Phosphatase      46 - 116 U/L    Total Protein      6 4 - 8 2 g/dL    Albumin      3 5 - 5 0 g/dL    Total Bilirubin      0 20 - 1 00 mg/dL    eGFR      ml/min/1 73sq m 42   Glucose      65 - 140 mg/dL 301 (H)   Cholesterol      50 - 200 mg/dL    Triglycerides      <=150 mg/dL    HDL      40 - 60 mg/dL    LDL Calculated      0 - 100 mg/dL    EXT Creatinine Urine      mg/dL    MICROALBUM ,U,RANDOM      0 0 - 20 0 mg/L    MICROALBUMIN/CREATININE RATIO      0 - 30 mg/g creatinine    Hemoglobin A1C      4 2 - 6 3 % 8 7 (H)   EAG      mg/dl 203   Free T4      0 76 - 1 46 ng/dL    TSH 3RD GENERATON      0 358 - 3 740 uIU/mL      Impression & Plan:    Problem List Items Addressed This Visit     Abnormal TSH     Check thyroid function testing  Relevant Orders    TSH, 3rd generation    T4, free    DM (diabetes mellitus), type 2, uncontrolled w/ophthalmic complication ([facility] [address] ) - Primary     Poorly Controlled/not at goal with recent hyperglycemia due to steroid use/illness  For now, increase V-Go to 5 clicks (10 units) before each meal   Check BG 3-4x per day and send log in two weeks  Now that she is off steroids, blood sugars should begin to improve  IF blood sugars less than 348 can resume 4 clicks before meals   Avoid Giving bolus dose/clicks at bedtime which may result in severe overnight hypoglycemia  Due to being on intensive insulin therapy,  she at high risk of severe hypoglycemia  Severe hypoglycemia can result in falls, injury, coma, seizure, or death  Relevant Orders    HEMOGLOBIN A1C W/ EAG ESTIMATION    Microalbumin / creatinine urine ratio    Hyperlipidemia     Continue statin   Check Lipid panel           Relevant Orders    Comprehensive metabolic panel    Lipid Panel with Direct LDL reflex    Hypertension     Stable on current regimen  Orders Placed This Encounter   Procedures    HEMOGLOBIN A1C W/ EAG ESTIMATION     Standing Status:   Future     Standing Expiration Date:   5/9/2019    Comprehensive metabolic panel     This is a patient instruction: Patient fasting for 8 hours or longer recommended  Standing Status:   Future     Standing Expiration Date:   5/9/2019    Lipid Panel with Direct LDL reflex     This is a patient instruction: This test requires patient fasting for 10-12 hours or longer  Drinking of black coffee or black tea is acceptable  Standing Status:   Future     Standing Expiration Date:   5/9/2019    Microalbumin / creatinine urine ratio     Standing Status:   Future     Standing Expiration Date:   5/9/2019    TSH, 3rd generation     This is a patient instruction: This test is non-fasting  Please drink two glasses of water morning of bloodwork  Standing Status:   Future     Standing Expiration Date:   5/9/2019    T4, free     Standing Status:   Future     Standing Expiration Date:   5/9/2019       There are no Patient Instructions on file for this visit  Discussed with the patient and all questioned fully answered  She will call me if any problems arise  Follow-up appointment in 3 months       Counseled patient on diagnostic results, prognosis, risk and benefit of treatment options, instruction for management, importance of treatment compliance, Risk  factor reduction and impressions      Ankit Zuleta PA-C

## 2018-05-15 DIAGNOSIS — I50.33 ACUTE ON CHRONIC DIASTOLIC HEART FAILURE (HCC): ICD-10-CM

## 2018-05-15 RX ORDER — FUROSEMIDE 40 MG/1
60 TABLET ORAL DAILY
Qty: 45 TABLET | Refills: 0 | Status: SHIPPED | OUTPATIENT
Start: 2018-05-15 | End: 2018-06-30 | Stop reason: SDUPTHER

## 2018-05-16 DIAGNOSIS — I10 ESSENTIAL HYPERTENSION: Primary | ICD-10-CM

## 2018-05-16 RX ORDER — AMLODIPINE BESYLATE 5 MG/1
TABLET ORAL
Qty: 180 TABLET | Refills: 3 | Status: SHIPPED | OUTPATIENT
Start: 2018-05-16 | End: 2019-04-08

## 2018-05-24 ENCOUNTER — OFFICE VISIT (OUTPATIENT)
Dept: UROLOGY | Facility: AMBULATORY SURGERY CENTER | Age: 83
End: 2018-05-24
Payer: COMMERCIAL

## 2018-05-24 VITALS
WEIGHT: 200 LBS | SYSTOLIC BLOOD PRESSURE: 128 MMHG | DIASTOLIC BLOOD PRESSURE: 76 MMHG | BODY MASS INDEX: 30.31 KG/M2 | HEIGHT: 68 IN | HEART RATE: 70 BPM

## 2018-05-24 DIAGNOSIS — R33.9 URINARY RETENTION: Primary | ICD-10-CM

## 2018-05-24 PROCEDURE — 99213 OFFICE O/P EST LOW 20 MIN: CPT | Performed by: UROLOGY

## 2018-05-25 DIAGNOSIS — Z79.4 TYPE 2 DIABETES MELLITUS WITH HYPERGLYCEMIA, WITH LONG-TERM CURRENT USE OF INSULIN (HCC): Primary | ICD-10-CM

## 2018-05-25 DIAGNOSIS — E11.65 TYPE 2 DIABETES MELLITUS WITH HYPERGLYCEMIA, WITH LONG-TERM CURRENT USE OF INSULIN (HCC): Primary | ICD-10-CM

## 2018-06-06 ENCOUNTER — OFFICE VISIT (OUTPATIENT)
Dept: CARDIOLOGY CLINIC | Facility: CLINIC | Age: 83
End: 2018-06-06
Payer: COMMERCIAL

## 2018-06-06 VITALS
OXYGEN SATURATION: 97 % | HEIGHT: 65 IN | HEART RATE: 62 BPM | BODY MASS INDEX: 33.72 KG/M2 | SYSTOLIC BLOOD PRESSURE: 110 MMHG | DIASTOLIC BLOOD PRESSURE: 52 MMHG | WEIGHT: 202.4 LBS

## 2018-06-06 DIAGNOSIS — E78.2 MIXED HYPERLIPIDEMIA: ICD-10-CM

## 2018-06-06 DIAGNOSIS — Z95.1 HX OF CABG: ICD-10-CM

## 2018-06-06 DIAGNOSIS — I25.10 3-VESSEL CORONARY ARTERY DISEASE: Primary | ICD-10-CM

## 2018-06-06 DIAGNOSIS — Z95.5 HISTORY OF HEART ARTERY STENT: ICD-10-CM

## 2018-06-06 DIAGNOSIS — I07.1 TRICUSPID VALVE INSUFFICIENCY, UNSPECIFIED ETIOLOGY: ICD-10-CM

## 2018-06-06 DIAGNOSIS — I50.30 CONGESTIVE HEART FAILURE WITH LV DIASTOLIC DYSFUNCTION, NYHA CLASS 2 (HCC): ICD-10-CM

## 2018-06-06 DIAGNOSIS — I10 ESSENTIAL HYPERTENSION: ICD-10-CM

## 2018-06-06 PROCEDURE — 99214 OFFICE O/P EST MOD 30 MIN: CPT | Performed by: INTERNAL MEDICINE

## 2018-06-06 NOTE — PROGRESS NOTES
Follow-up - Cardiology   Cedric Luna [de-identified] y o  female MRN: [id number]        Problems    Problem List Items Addressed This Visit     3-vessel coronary artery disease - Primary    Hyperlipidemia    Hypertension    Congestive heart failure with LV diastolic dysfunction, NYHA class 2 ([address] )      Other Visit Diagnoses     History of heart artery stent        Hx of CABG        Tricuspid valve insufficiency, unspecified etiology                Plan apparently that had increase her Lasix to 60 daily does of fluid accumulation  We will watch her labs carefully on that dose  HPI: Cedric Luna is a [de-identified]y o  year old female History of Present Illness  Bypass surgery 1999  Cardiac stent in 2006 catheterization 2008  At that time the LIMA was open  Right coronary artery was open  She has diffuse disease in the circumflex and in the distal LAD distribution  She is on medical therapy  She may have mild angina  We discussed doing another stress test  The power of  will discuss that with the family said she is reluctant to any further procedures  Echocardiogram done September 0654 she is diastolic dysfunction which he has well-preserved systolic function  Carotids done in 2009 2011  His less than 50% stenosis  Her LDL is 75  This was recently done i e  September 2013  Stress test on 2009 did not show any ischemia  Knee surgery in the past  Diabetes very poorly controlled last hemoglobin A A1c is 11 5; she will see A  endocrinologist     The patient was in the hospital in June 2014 he received a pacemaker for a notable 6 second pause in the history of syncope past history is summarized above  She basically had bypass surgery 1999 his stent in 2006  That was followed by catheter in 2008  The results of the catheterization 2008 is summarized in the first is followed the history of present illness above    But she is in the hospital with syncope in June of 2014 she an echo and stress test  These both were essentially normal  Send extensive neurologic evaluation as well  Apparently she had a side reaction to one of her medication  Her LDL in June 2000 1450 to she's lost 31 pounds  Her hemoglobin A is somewhat better than 11 5 she had in the past but is still not well controlled she is seeing an endocrinologist for that     Patient seen May 5, 2015  Should bypass surgery 1999, stents in 2006, catheterization 2008, adequate left heart function on echo 2014, hyperlipidemia, pacemaker in June 2014  She has significant neuropathy  She uses a walker  In April 2015 her bnp 43  Her LDL is 68  She's been asymptomatic from a cardiac standpoint  She is on Ranexa  I made no changes in her medication      Patient seen August 8, 2016  She bypass surgery 99  Stent in 2006  Catheterization 2008  Hyperlipidemia  Shows pacemaker 2014  Her dementia seems somewhat progressive  She has a new device to administer her insulin  Her hemoglobin A1c is come down from 11 57 8  Her LDL is 45  Her BNP P is normal  She's doing remarkably well  No change in medication  She is on a statin, ACE inhibitor, aspirin, and beta blocker      Patient seen December 6, 2017  Her diagnosis includes bypass surgery 1999 stent in 2006, catheterization 2008, hyperlipidemia, pacemaker 2014, dementia, preserved left ventricular function, and diabetic neuropathy  She had short burst of ventricular tachycardia pacer interrogation  Metoprolol to be increased to 37 5 in the morning and 25 in the p m  Patient seen June 6, 2018  All scripts HPI noted above  First epic visit  Issues are as follows  Bypass surgery 1999  Stent in 0 6  Catheterization 0 8 without intervention  Hyperlipidemia  Pacemaker 2014  Diabetes  Left ventricular function well preserved  Diabetes  Past history ventricular tachycardia  Recent admission with edema and she was given a diagnosis of congestive heart failure    Appears to me when I review the record is primarily pulmonary issue with lot of bronchospasm  She is treated both diuretics and steroids as well as bronchodilators  Right bundle branch block pattern with blood relatively wide QRS  Echocardiogram shows the following-increased right ventricle-+3 tricuspid regurgitation-large right atrium-dilated inferior vena cava  Her NT was Dewitte Handler  He is presently on 60 of Lasix which was an increase done by hand visiting nurse  We will check her labs carefully  She is edema free at this time and her lungs are clear                 Review of Systems   Constitutional: Negative  Respiratory: Negative  Hematological: Negative  Psychiatric/Behavioral: Negative  Past Medical History:   Diagnosis Date    Asthma     CAD (coronary artery disease) of artery bypass graft     Cardiac disease     Dementia     Depression     Diabetes mellitus (HCC)     Hyperlipidemia     Hypertension     [facility] [address] [facility]     Neuropathy     BRANT (obstructive sleep apnea)      History   Alcohol Use No     History   Drug Use No     History   Smoking Status    Never Smoker   Smokeless Tobacco    Never Used       Allergies:   Allergies   Allergen Reactions    Ace Inhibitors     Chlorpromazine     Latex     Lisinopril     Namenda [Memantine]     Penicillins     Propoxyphene     Stadol [Butorphanol]        Medications:     Current Outpatient Prescriptions:     albuterol (PROVENTIL HFA,VENTOLIN HFA) 90 mcg/act inhaler, Inhale 2 puffs every 4 (four) hours as needed for wheezing or shortness of breath, Disp: 1 Inhaler, Rfl: 0    amLODIPine (NORVASC) 5 mg tablet, TAKE 1 TABLET TWICE DAILY, Disp: 180 tablet, Rfl: 3    aspirin 81 mg chewable tablet, Chew 1 tablet (81 mg total) daily, Disp: 30 tablet, Rfl: 0    atorvastatin (LIPITOR) 40 mg tablet, TAKE 1 TABLET BY MOUTH EVERY DAY, Disp: 30 tablet, Rfl: 11    calcium carbonate-vitamin D (OSCAL-D) 500 mg-200 units per tablet, Take 1 tablet by mouth 2 (two) times a day with meals, Disp: 60 tablet, Rfl: 0    docusate sodium (COLACE) 100 mg capsule, Take 1 capsule (100 mg total) by mouth 2 (two) times a day, Disp: 10 capsule, Rfl: 0    furosemide (LASIX) 40 mg tablet, Take 1 5 tablets (60 mg total) by mouth daily for 30 days, Disp: 45 tablet, Rfl: 0    Insulin Disposable Pump (V-GO 20) KIT, V-Go 20 KIT Use  daily as directed  Quantity: 3;  Refills: 3   Irma Vazquez ;  Start 14-Jan-2016 Active 30 Kit Box, Disp: , Rfl:     isosorbide mononitrate (IMDUR) 60 mg 24 hr tablet, Take by mouth, Disp: , Rfl:     losartan (COZAAR) 100 MG tablet, Take by mouth, Disp: , Rfl:     metoprolol tartrate (LOPRESSOR) 25 mg tablet, Take by mouth, Disp: , Rfl:     nitroglycerin (NITROSTAT) 0 4 mg SL tablet, Place under the tongue, Disp: , Rfl:     NOVOLOG 100 UNIT/ML injection, INJECT 100 UNITS DAILY AS DIRECTED, Disp: 30 mL, Rfl: 4    ONE TOUCH ULTRA TEST test strip, , Disp: , Rfl:     pantoprazole (PROTONIX) 40 mg tablet, Take by mouth, Disp: , Rfl:     senna (SENOKOT) 8 6 mg, Take 1 tablet (8 6 mg total) by mouth daily, Disp: 120 each, Rfl: 0      Physical Exam   Constitutional: She is oriented to person, place, and time  She appears well-developed  Cardiovascular: Regular rhythm  Known tricuspid regurgitation   Musculoskeletal: She exhibits no edema  Neurological: She is oriented to person, place, and time           Laboratory Studies:  CMP:    NT-proBNP:   Lab Results   Component Value Date    NTBNP 1,872 (H) 04/22/2018      Coags:    Lipid Profile:   Lab Results   Component Value Date    CHOL 112 05/16/2017     Lab Results   Component Value Date    HDL 54 05/16/2017     Lab Results   Component Value Date    LDLCALC 44 05/16/2017     Lab Results   Component Value Date    TRIG 72 05/16/2017       Cardiac testing:     EKG reviewed personally:     Results for orders placed during the hospital encounter of 04/22/18   Echo complete with contrast if indicated    [address]  [phone]    Transthoracic Echocardiogram  2D, M-mode, Doppler, and Color Doppler    Study date:  2018    Patient: Leanna Webber  MR number: [id number]  Account number: [de-identified]  : 1935  Age: [de-identified] years  Gender: Female  Status: Inpatient  Location: Echo lab  Height: 68 in  Weight: 232 5 lb  BP: 130/ 62 mmHg    Indications: Dyspnea    Diagnoses: R06 00 - Dyspnea, unspecified    Primary Physician:  Hradik Segura MD  Referring Physician:  Kim Wang MD  Group:  [id number]  Cardiology Fellow:  Terrance Cavanaugh MD  Interpreting Physician:  Jalen Rubio MD    SUMMARY    LEFT VENTRICLE:  Systolic function was normal  Ejection fraction was estimated to be 60 %  There were no regional wall motion abnormalities  There was no evidence of concentric hypertrophy  Features were consistent with a pseudonormal left ventricular filling pattern, with concomitant abnormal relaxation and increased filling pressure (grade 2 diastolic dysfunction)  RIGHT VENTRICLE:  The ventricle was mildly to moderately dilated  LEFT ATRIUM:  The atrium was mildly dilated  RIGHT ATRIUM:  The atrium was mildly to moderately dilated  TRICUSPID VALVE:  There was moderate to severe regurgitation  Regurgitation grade was 3-4+ on a scale of 0 to 4+  IVC, HEPATIC VEINS:  The inferior vena cava was dilated  Respirophasic changes were blunted (less than 50% variation)  There is systolic flow reversal in the hepatic vein consistent with severe tricuspid regurgitation  COMPARISONS:  Comparison was made with the previous study of 25-May-2014  Tricuspid regurgitation has worsened  HISTORY: PRIOR HISTORY: HTN, HLD, DM, Dementia    PROCEDURE: The procedure was performed in the echo lab  This was a routine study  The transthoracic approach was used   The study included complete 2D imaging, M-mode, complete spectral Doppler, and color Doppler  The heart rate was 63 bpm,  at the start of the study  Images were obtained from the parasternal, apical, subcostal, and suprasternal notch acoustic windows  Echocardiographic views were limited due to decreased penetration and lung interference  This was a  technically difficult study  LEFT VENTRICLE: Size was normal  Systolic function was normal  Ejection fraction was estimated to be 60 %  There were no regional wall motion abnormalities  Wall thickness was normal  There was no evidence of concentric hypertrophy  DOPPLER: Features were consistent with a pseudonormal left ventricular filling pattern, with concomitant abnormal relaxation and increased filling pressure (grade 2 diastolic dysfunction)  VENTRICULAR SEPTUM: There was systolic and diastolic flattening  These changes are consistent with RV volume and pressure overload  RIGHT VENTRICLE: The ventricle was mildly to moderately dilated  Systolic function was low normal  A pacing wire was present  LEFT ATRIUM: The atrium was mildly dilated  RIGHT ATRIUM: The atrium was mildly to moderately dilated  MITRAL VALVE: Valve structure was normal  There was normal leaflet separation  DOPPLER: The transmitral velocity was within the normal range  There was no evidence for stenosis  There was no significant regurgitation  AORTIC VALVE: The valve was trileaflet  Leaflets exhibited normal thickness, normal cuspal separation, and sclerosis  DOPPLER: Transaortic velocity was within the normal range  There was no evidence for stenosis  There was no  regurgitation  TRICUSPID VALVE: DOPPLER: The transtricuspid velocity was within the normal range  There was no evidence for stenosis  There was moderate to severe regurgitation  Regurgitation grade was 3-4+ on a scale of 0 to 4+  PULMONIC VALVE: DOPPLER: The transpulmonic velocity was within the normal range  There was no evidence for stenosis   There was trace regurgitation  PERICARDIUM: There was no pericardial effusion  AORTA: The root exhibited normal size  SYSTEMIC VEINS: IVC: The inferior vena cava was dilated  Respirophasic changes were blunted (less than 50% variation)  HEPATIC VEIN DOPPLER: There is systolic flow reversal in the hepatic vein consistent with severe tricuspid  regurgitation  SYSTEM MEASUREMENT TABLES    2D  %FS: 20 05 %  Ao Diam: 3 36 cm  EDV(Teich): 85 57 ml  EF(Teich): 41 31 %  ESV(Teich): 50 22 ml  IVSd: 1 03 cm  LA Area: 21 37 cm2  LA Diam: 3 37 cm  LVEDV MOD A4C: 68 81 ml  LVEF MOD A4C: 56 86 %  LVESV MOD A4C: 29 69 ml  LVIDd: 4 35 cm  LVIDs: 3 48 cm  LVLd A4C: 7 37 cm  LVLs A4C: 6 82 cm  LVOT Diam: 1 88 cm  LVPWd: 0 97 cm  RA Area: 21 18 cm2  RVIDd: 4 1 cm  SV MOD A4C: 39 12 ml  SV(Teich): 35 35 ml    CW  AV Env  Ti: 359 86 ms  AV VTI: 52 93 cm  AV Vmax: 2 03 m/s  AV Vmax: 2 24 m/s  AV Vmean: 1 47 m/s  AV maxP 42 mmHg  AV maxP 15 mmHg  AV meanPG: 10 27 mmHg  TR Vmax: 2 1 m/s  TR maxP 7 mmHg    MM  TAPSE: 1 73 cm    PW  NESTOR (VTI): 1 28 cm2  NESTOR Vmax: 1 31 cm2  NESTOR Vmax: 1 46 cm2  NESTOR Vmax, Pt: 1 21 cm2  NESTOR Vmax, Pt: 1 35 cm2  E': 0 08 m/s  E/E': 14 35  LVOT Env  Ti: 346 02 ms  LVOT VTI: 24 27 cm  LVOT Vmax: 0 98 m/s  LVOT Vmax: 1 06 m/s  LVOT Vmean: 0 7 m/s  LVOT maxPG: 3 84 mmHg  LVOT maxP 49 mmHg  LVOT meanP 32 mmHg  LVSI Dopp: 30 98 ml/m2  LVSV Dopp: 67 55 ml  MV A [place]: 0 8 m/s  MV Dec [place]: 4 21 m/s2  MV DecT: 261 58 ms  MV E [place]: 1 1 m/s  MV E/A Ratio: 1 38  MV PHT: 75 86 ms  MVA By PHT: 2 9 cm2    [facility] Accredited Echocardiography Laboratory    Prepared and electronically signed by    Flako Bill MD  Signed 2018 17:07:00           Results for orders placed in visit on 14   NM myocardial perfusion spect (stress and/or rest)    Narrative PHARMACOLOGIC STRESS TEST, LEXISCAN          INDICATION-  Chest pain, shortness of breath    Prior history of CABG   and stent placement   COMPARISON- No prior studies,   TECHNIQUE-  Pharmacologic stress testing was performed utilizing   [employer]  SPECT myocardial perfusion images was performed  Dosages of   TC-99-mm Myoview were 10 6 mCi and 33 0 mCi IV  Both rest and stress   images were performed  Images were then reconstructed in the short,   vertical and horizontal long axes projections  Baseline heart rate 61 beats per minute  Peak heart of 78 beats per minute  Baseline blood pressure 134/77 mmHg  Peak blood pressure 145/71 mmHg  Symptoms-   Shortness of breath nausea chest pain recovery  Time to peak imaging for rest 55 minutes and stress 33 minutes  Please see cardiology report of physiologic data  FINDINGS-   OVERALL QUALITY-   Acceptable  ARTIFACT-  Breast attenuation   PERFUSION IMAGES-   SPECT images showed a large region of mildly   diminished perfusion within the anterior wall which was normal at rest   compatible with a region of ischemia  There are no other abnormal   regions of diminished perfusion  Left ventricular cavity was normal in   size  It does appear to increase with stress  WALL MOTION-  Normal contractility   EJECTION FRACTION-  66 %   IMPRESSION-   1  Region of ischemia seen in the anterior wall   2   Left ventricular ejection fraction- 66%   ##imslh##imslh   Transcribed on- [id number]           - BYRON Fregoso MD        Reading Radiologist- BYRON Fregoso MD        Releasing Radiologist- BYRON Fregoso MD        Released Date Time- 05/16/14 1527      ------------------------------------------------------------------------------   Kobe Estrada MD    Portions of the record may have been created with voice recognition software   Occasional wrong word or "sound a like" substitutions may have occurred due to the inherent limitations of voice recognition software   Read the chart carefully and recognize, using context, where substitutions have occurred

## 2018-06-30 DIAGNOSIS — I50.33 ACUTE ON CHRONIC DIASTOLIC HEART FAILURE (HCC): ICD-10-CM

## 2018-07-02 RX ORDER — FUROSEMIDE 40 MG/1
60 TABLET ORAL DAILY
Qty: 45 TABLET | Refills: 11 | Status: SHIPPED | OUTPATIENT
Start: 2018-07-02 | End: 2018-12-05 | Stop reason: SDUPTHER

## 2018-07-21 ENCOUNTER — APPOINTMENT (OUTPATIENT)
Dept: LAB | Facility: HOSPITAL | Age: 83
End: 2018-07-21
Payer: COMMERCIAL

## 2018-07-21 ENCOUNTER — TRANSCRIBE ORDERS (OUTPATIENT)
Dept: LAB | Facility: HOSPITAL | Age: 83
End: 2018-07-21

## 2018-07-21 ENCOUNTER — LAB (OUTPATIENT)
Dept: LAB | Facility: HOSPITAL | Age: 83
End: 2018-07-21
Payer: COMMERCIAL

## 2018-07-21 DIAGNOSIS — IMO0002 UNCONTROLLED TYPE 2 DIABETES MELLITUS WITH OTHER OPHTHALMIC COMPLICATION, WITH LONG-TERM CURRENT USE OF INSULIN: ICD-10-CM

## 2018-07-21 DIAGNOSIS — I25.10 TRIPLE VESSEL CORONARY ARTERY DISEASE: ICD-10-CM

## 2018-07-21 DIAGNOSIS — R79.89 ABNORMAL TSH: ICD-10-CM

## 2018-07-21 DIAGNOSIS — I07.1: ICD-10-CM

## 2018-07-21 DIAGNOSIS — I25.119 ATHEROSCLEROSIS OF NATIVE CORONARY ARTERY WITH ANGINA PECTORIS, UNSPECIFIED WHETHER NATIVE OR TRANSPLANTED HEART (HCC): Primary | ICD-10-CM

## 2018-07-21 DIAGNOSIS — I25.10 3-VESSEL CORONARY ARTERY DISEASE: ICD-10-CM

## 2018-07-21 DIAGNOSIS — I07.1 TRICUSPID VALVE INSUFFICIENCY, UNSPECIFIED ETIOLOGY: ICD-10-CM

## 2018-07-21 DIAGNOSIS — E78.5 HYPERLIPIDEMIA, UNSPECIFIED HYPERLIPIDEMIA TYPE: ICD-10-CM

## 2018-07-21 LAB
ALBUMIN SERPL BCP-MCNC: 3.7 G/DL (ref 3.5–5)
ALP SERPL-CCNC: 89 U/L (ref 46–116)
ALT SERPL W P-5'-P-CCNC: 27 U/L (ref 12–78)
ANION GAP SERPL CALCULATED.3IONS-SCNC: 5 MMOL/L (ref 4–13)
AST SERPL W P-5'-P-CCNC: 16 U/L (ref 5–45)
BASOPHILS # BLD AUTO: 0.08 THOUSANDS/ΜL (ref 0–0.1)
BASOPHILS NFR BLD AUTO: 1 % (ref 0–1)
BILIRUB SERPL-MCNC: 0.55 MG/DL (ref 0.2–1)
BUN SERPL-MCNC: 30 MG/DL (ref 5–25)
CALCIUM SERPL-MCNC: 9.4 MG/DL (ref 8.3–10.1)
CHLORIDE SERPL-SCNC: 101 MMOL/L (ref 100–108)
CHOLEST SERPL-MCNC: 97 MG/DL (ref 50–200)
CK SERPL-CCNC: 94 U/L (ref 26–192)
CO2 SERPL-SCNC: 32 MMOL/L (ref 21–32)
CREAT SERPL-MCNC: 1.01 MG/DL (ref 0.6–1.3)
CREAT UR-MCNC: 90.9 MG/DL
EOSINOPHIL # BLD AUTO: 0.24 THOUSAND/ΜL (ref 0–0.61)
EOSINOPHIL NFR BLD AUTO: 4 % (ref 0–6)
ERYTHROCYTE [DISTWIDTH] IN BLOOD BY AUTOMATED COUNT: 15 % (ref 11.6–15.1)
EST. AVERAGE GLUCOSE BLD GHB EST-MCNC: 192 MG/DL
GFR SERPL CREATININE-BSD FRML MDRD: 53 ML/MIN/1.73SQ M
GLUCOSE P FAST SERPL-MCNC: 200 MG/DL (ref 65–99)
HBA1C MFR BLD: 8.3 % (ref 4.2–6.3)
HCT VFR BLD AUTO: 41.8 % (ref 34.8–46.1)
HDLC SERPL-MCNC: 64 MG/DL (ref 40–60)
HGB BLD-MCNC: 13.4 G/DL (ref 11.5–15.4)
IMM GRANULOCYTES # BLD AUTO: 0.01 THOUSAND/UL (ref 0–0.2)
IMM GRANULOCYTES NFR BLD AUTO: 0 % (ref 0–2)
LDLC SERPL CALC-MCNC: 25 MG/DL (ref 0–100)
LYMPHOCYTES # BLD AUTO: 1.67 THOUSANDS/ΜL (ref 0.6–4.47)
LYMPHOCYTES NFR BLD AUTO: 29 % (ref 14–44)
MCH RBC QN AUTO: 29.2 PG (ref 26.8–34.3)
MCHC RBC AUTO-ENTMCNC: 32.1 G/DL (ref 31.4–37.4)
MCV RBC AUTO: 91 FL (ref 82–98)
MICROALBUMIN UR-MCNC: 38.3 MG/L (ref 0–20)
MICROALBUMIN/CREAT 24H UR: 42 MG/G CREATININE (ref 0–30)
MONOCYTES # BLD AUTO: 0.48 THOUSAND/ΜL (ref 0.17–1.22)
MONOCYTES NFR BLD AUTO: 8 % (ref 4–12)
NEUTROPHILS # BLD AUTO: 3.26 THOUSANDS/ΜL (ref 1.85–7.62)
NEUTS SEG NFR BLD AUTO: 58 % (ref 43–75)
NRBC BLD AUTO-RTO: 0 /100 WBCS
NT-PROBNP SERPL-MCNC: 595 PG/ML
PLATELET # BLD AUTO: 162 THOUSANDS/UL (ref 149–390)
PMV BLD AUTO: 12.2 FL (ref 8.9–12.7)
POTASSIUM SERPL-SCNC: 3.8 MMOL/L (ref 3.5–5.3)
PROT SERPL-MCNC: 6.9 G/DL (ref 6.4–8.2)
RBC # BLD AUTO: 4.59 MILLION/UL (ref 3.81–5.12)
SODIUM SERPL-SCNC: 138 MMOL/L (ref 136–145)
T4 FREE SERPL-MCNC: 0.98 NG/DL (ref 0.76–1.46)
TRIGL SERPL-MCNC: 38 MG/DL
TSH SERPL DL<=0.05 MIU/L-ACNC: 5.73 UIU/ML (ref 0.36–3.74)
WBC # BLD AUTO: 5.74 THOUSAND/UL (ref 4.31–10.16)

## 2018-07-21 PROCEDURE — 36415 COLL VENOUS BLD VENIPUNCTURE: CPT

## 2018-07-21 PROCEDURE — 80053 COMPREHEN METABOLIC PANEL: CPT

## 2018-07-21 PROCEDURE — 84443 ASSAY THYROID STIM HORMONE: CPT

## 2018-07-21 PROCEDURE — 82043 UR ALBUMIN QUANTITATIVE: CPT

## 2018-07-21 PROCEDURE — 83880 ASSAY OF NATRIURETIC PEPTIDE: CPT

## 2018-07-21 PROCEDURE — 82570 ASSAY OF URINE CREATININE: CPT

## 2018-07-21 PROCEDURE — 82550 ASSAY OF CK (CPK): CPT

## 2018-07-21 PROCEDURE — 84439 ASSAY OF FREE THYROXINE: CPT

## 2018-07-21 PROCEDURE — 85025 COMPLETE CBC W/AUTO DIFF WBC: CPT

## 2018-07-21 PROCEDURE — 80061 LIPID PANEL: CPT

## 2018-07-21 PROCEDURE — 83036 HEMOGLOBIN GLYCOSYLATED A1C: CPT

## 2018-07-25 ENCOUNTER — TELEPHONE (OUTPATIENT)
Dept: ENDOCRINOLOGY | Facility: CLINIC | Age: 83
End: 2018-07-25

## 2018-07-25 NOTE — PROGRESS NOTES
Please call the patient regarding her abnormal result  Urine microalbumin remains elevated but is improved  Hemoglobin A1c is slightly improved to 8 3  On comprehensive metabolic panel, BUN is elevated along with fasting glucose  Please make sure she is drinking extra water and staying hydrated  Fasting lipid panel is stable

## 2018-07-25 NOTE — TELEPHONE ENCOUNTER
----- Message from [employer] sent at 7/25/2018  5:17 AM EDT -----  Please call the patient regarding her abnormal result  Urine microalbumin remains elevated but is improved  Hemoglobin A1c is slightly improved to 8 3  On comprehensive metabolic panel, BUN is elevated along with fasting glucose  Please make sure she is drinking extra water and staying hydrated  Fasting lipid panel is stable

## 2018-08-01 ENCOUNTER — OFFICE VISIT (OUTPATIENT)
Dept: CARDIOLOGY CLINIC | Facility: CLINIC | Age: 83
End: 2018-08-01
Payer: COMMERCIAL

## 2018-08-01 VITALS
HEART RATE: 63 BPM | WEIGHT: 205.6 LBS | DIASTOLIC BLOOD PRESSURE: 50 MMHG | OXYGEN SATURATION: 97 % | BODY MASS INDEX: 34.26 KG/M2 | SYSTOLIC BLOOD PRESSURE: 100 MMHG | HEIGHT: 65 IN

## 2018-08-01 DIAGNOSIS — I50.30 CONGESTIVE HEART FAILURE WITH LV DIASTOLIC DYSFUNCTION, NYHA CLASS 2 (HCC): ICD-10-CM

## 2018-08-01 DIAGNOSIS — Z95.1 HX OF CABG: ICD-10-CM

## 2018-08-01 DIAGNOSIS — I10 ESSENTIAL HYPERTENSION: ICD-10-CM

## 2018-08-01 DIAGNOSIS — E78.2 MIXED HYPERLIPIDEMIA: ICD-10-CM

## 2018-08-01 DIAGNOSIS — I07.1 TRICUSPID VALVE INSUFFICIENCY, UNSPECIFIED ETIOLOGY: ICD-10-CM

## 2018-08-01 DIAGNOSIS — Z95.5 HISTORY OF HEART ARTERY STENT: ICD-10-CM

## 2018-08-01 DIAGNOSIS — Z95.0 CARDIAC PACEMAKER IN SITU: ICD-10-CM

## 2018-08-01 DIAGNOSIS — I25.10 3-VESSEL CORONARY ARTERY DISEASE: Primary | ICD-10-CM

## 2018-08-01 PROCEDURE — 99214 OFFICE O/P EST MOD 30 MIN: CPT | Performed by: INTERNAL MEDICINE

## 2018-08-01 NOTE — PROGRESS NOTES
Follow-up - Cardiology   Rozanne Councilman [de-identified] y o  female MRN: [id number]        Problems    Problem List Items Addressed This Visit     3-vessel coronary artery disease - Primary    Hypertension    Congestive heart failure with LV diastolic dysfunction, NYHA class 2 ([address] )      Other Visit Diagnoses     Mixed hyperlipidemia        History of heart artery stent        Hx of CABG        Tricuspid valve insufficiency, unspecified etiology        Cardiac pacemaker in situ                Plan continue to follow for edema secondary tricuspid regurgitation  She has been very stable  No change in medication          HPI: Rozanne Councilman is a [de-identified]y o  year old female Patient seen May 5, 2015  Should bypass surgery 1999, stents in 2006, catheterization 2008, adequate left heart function on echo 2014, hyperlipidemia, pacemaker in June 2014  She has significant neuropathy  She uses a walker  In April 2015 her bnp 43  Her LDL is 68  She's been asymptomatic from a cardiac standpoint  She is on Ranexa  I made no changes in her medication      Patient seen August 8, 2016  She bypass surgery 99  Stent in 2006  Catheterization 2008  Hyperlipidemia  Shows pacemaker 2014  Her dementia seems somewhat progressive  She has a new device to administer her insulin  Her hemoglobin A1c is come down from 11 57 8  Her LDL is 45  Her BNP P is normal  She's doing remarkably well  No change in medication  She is on a statin, ACE inhibitor, aspirin, and beta blocker      Patient seen December 6, 2017  Her diagnosis includes bypass surgery 1999 stent in 2006, catheterization 2008, hyperlipidemia, pacemaker 2014, dementia, preserved left ventricular function, and diabetic neuropathy  She had short burst of ventricular tachycardia pacer interrogation   Metoprolol to be increased to 37 5 in the morning and 25 in the p m         Patient seen June 6, 2018  All scripts HPI noted above  First epic visit  Issues are as follows  Bypass surgery 1999  Stent in 0 6  Catheterization 0 8 without intervention  Hyperlipidemia  Pacemaker 2014  Diabetes  Left ventricular function well preserved  Diabetes  Past history ventricular tachycardia  Recent admission with edema and she was given a diagnosis of congestive heart failure  Appears to me when I review the record is primarily pulmonary issue with lot of bronchospasm  She is treated both diuretics and steroids as well as bronchodilators  Right bundle branch block pattern with blood relatively wide QRS  Echocardiogram shows the following-increased right ventricle-+3 tricuspid regurgitation-large right atrium-dilated inferior vena cava  Her NT was Melinasethdillan Resendiz  He is presently on 60 of Lasix which was an increase done by hand visiting nurse  We will check her labs carefully  She is edema free at this time and her lungs are clear      Patient seen August 1, 2018  Diagnosis above  LDL is 44  A1c is 8 3  She has no edema  She has no bronchospasm  From cardiac standpoint she is asymptomatic  No change in medication            Review of Systems   Constitutional: Negative  Respiratory:        History of bronchospasm   Hematological: Negative  Psychiatric/Behavioral: Negative  Past Medical History:   Diagnosis Date    Arthritis     Asthma     CAD (coronary artery disease) of artery bypass graft     Cardiac disease     Dementia     Depression     Diabetes mellitus [facility] [address]     Heart attack ([facility] [address] [facility]     Hyperlipidemia     Hypertension     MI [facility]myocardial infarction) [facility] [address] [facility]     Neuropathy     BRANT (obstructive sleep apnea)     Peptic ulcer     was + for H pylori    Transient cerebral ischemia 11/2011    with neg CUS and ECHO     History   Alcohol Use No     History   Drug Use No     History   Smoking Status    Never Smoker   Smokeless Tobacco    Never Used       Allergies:   Allergies   Allergen Reactions    Ace Inhibitors     Chlorpromazine     Latex     Lisinopril     Namenda [Memantine]     Penicillins     Propoxyphene     Stadol [Butorphanol]        Medications:     Current Outpatient Prescriptions:     albuterol (PROVENTIL HFA,VENTOLIN HFA) 90 mcg/act inhaler, Inhale 2 puffs every 4 (four) hours as needed for wheezing or shortness of breath, Disp: 1 Inhaler, Rfl: 0    amLODIPine (NORVASC) 5 mg tablet, TAKE 1 TABLET TWICE DAILY, Disp: 180 tablet, Rfl: 3    aspirin 81 mg chewable tablet, Chew 1 tablet (81 mg total) daily, Disp: 30 tablet, Rfl: 0    atorvastatin (LIPITOR) 40 mg tablet, TAKE 1 TABLET BY MOUTH EVERY DAY, Disp: 30 tablet, Rfl: 11    calcium carbonate-vitamin D (OSCAL-D) 500 mg-200 units per tablet, Take 1 tablet by mouth 2 (two) times a day with meals, Disp: 60 tablet, Rfl: 0    docusate sodium (COLACE) 100 mg capsule, Take 1 capsule (100 mg total) by mouth 2 (two) times a day, Disp: 10 capsule, Rfl: 0    furosemide (LASIX) 40 mg tablet, TAKE 1 5 TABLETS (60 MG TOTAL) BY MOUTH DAILY FOR 30 DAYS, Disp: 45 tablet, Rfl: 11    Insulin Disposable Pump (V-GO 20) KIT, V-Go 20 KIT Use  daily as directed  Quantity: 3;  Refills: 3   Irma Vazquez ;  Start 14-Jan-2016 Active 30 Kit Box, Disp: , Rfl:     isosorbide mononitrate (IMDUR) 60 mg 24 hr tablet, Take by mouth, Disp: , Rfl:     losartan (COZAAR) 100 MG tablet, Take by mouth, Disp: , Rfl:     metoprolol tartrate (LOPRESSOR) 25 mg tablet, Take by mouth, Disp: , Rfl:     nitroglycerin (NITROSTAT) 0 4 mg SL tablet, Place under the tongue, Disp: , Rfl:     NOVOLOG 100 UNIT/ML injection, INJECT 100 UNITS DAILY AS DIRECTED, Disp: 30 mL, Rfl: 4    ONE TOUCH ULTRA TEST test strip, , Disp: , Rfl:     pantoprazole (PROTONIX) 40 mg tablet, Take by mouth, Disp: , Rfl:     senna (SENOKOT) 8 6 mg, Take 1 tablet (8 6 mg total) by mouth daily, Disp: 120 each, Rfl: 0      Physical Exam   Constitutional: She appears well-developed  Cardiovascular: Regular rhythm  No murmur heard    Pulmonary/Chest:   Rare minor wheeze Musculoskeletal: She exhibits no edema  Laboratory Studies:  CMP:    NT-proBNP:   Lab Results   Component Value Date    NTBNP 595 (H) 2018      Coags:    Lipid Profile:   Lab Results   Component Value Date    CHOL 97 2018     Lab Results   Component Value Date    HDL 64 (H) 2018     Lab Results   Component Value Date    LDLCALC 25 2018     Lab Results   Component Value Date    TRIG 38 2018       Cardiac testing:     EKG reviewed personally:     Results for orders placed during the hospital encounter of 18   Echo complete with contrast if indicated    Narrative [address]  [facility], [address] [facility]  [phone]    Transthoracic Echocardiogram  2D, M-mode, Doppler, and Color Doppler    Study date:  2018    Patient: Selma Lopez  MR number: [id number]  Account number: [de-identified]  : 1935  Age: [de-identified] years  Gender: Female  Status: Inpatient  Location: Echo lab  Height: 68 in  Weight: 232 5 lb  BP: 130/ 62 mmHg    Indications: Dyspnea    Diagnoses: R06 00 - Dyspnea, unspecified    Primary Physician:  Joanna Denise MD  Referring Physician:  Chan Bowman MD  Group:  [id number]  Cardiology Fellow:  Bernardino Carney MD  Interpreting Physician:  Ana María Morse MD    SUMMARY    LEFT VENTRICLE:  Systolic function was normal  Ejection fraction was estimated to be 60 %  There were no regional wall motion abnormalities  There was no evidence of concentric hypertrophy  Features were consistent with a pseudonormal left ventricular filling pattern, with concomitant abnormal relaxation and increased filling pressure (grade 2 diastolic dysfunction)  RIGHT VENTRICLE:  The ventricle was mildly to moderately dilated  LEFT ATRIUM:  The atrium was mildly dilated  RIGHT ATRIUM:  The atrium was mildly to moderately dilated      TRICUSPID VALVE:  There was moderate to severe regurgitation  Regurgitation grade was 3-4+ on a scale of 0 to 4+  IVC, HEPATIC VEINS:  The inferior vena cava was dilated  Respirophasic changes were blunted (less than 50% variation)  There is systolic flow reversal in the hepatic vein consistent with severe tricuspid regurgitation  COMPARISONS:  Comparison was made with the previous study of 25-May-2014  Tricuspid regurgitation has worsened  HISTORY: PRIOR HISTORY: HTN, HLD, DM, Dementia    PROCEDURE: The procedure was performed in the echo lab  This was a routine study  The transthoracic approach was used  The study included complete 2D imaging, M-mode, complete spectral Doppler, and color Doppler  The heart rate was 63 bpm,  at the start of the study  Images were obtained from the parasternal, apical, subcostal, and suprasternal notch acoustic windows  Echocardiographic views were limited due to decreased penetration and lung interference  This was a  technically difficult study  LEFT VENTRICLE: Size was normal  Systolic function was normal  Ejection fraction was estimated to be 60 %  There were no regional wall motion abnormalities  Wall thickness was normal  There was no evidence of concentric hypertrophy  DOPPLER: Features were consistent with a pseudonormal left ventricular filling pattern, with concomitant abnormal relaxation and increased filling pressure (grade 2 diastolic dysfunction)  VENTRICULAR SEPTUM: There was systolic and diastolic flattening  These changes are consistent with RV volume and pressure overload  RIGHT VENTRICLE: The ventricle was mildly to moderately dilated  Systolic function was low normal  A pacing wire was present  LEFT ATRIUM: The atrium was mildly dilated  RIGHT ATRIUM: The atrium was mildly to moderately dilated  MITRAL VALVE: Valve structure was normal  There was normal leaflet separation  DOPPLER: The transmitral velocity was within the normal range  There was no evidence for stenosis   There was no significant regurgitation  AORTIC VALVE: The valve was trileaflet  Leaflets exhibited normal thickness, normal cuspal separation, and sclerosis  DOPPLER: Transaortic velocity was within the normal range  There was no evidence for stenosis  There was no  regurgitation  TRICUSPID VALVE: DOPPLER: The transtricuspid velocity was within the normal range  There was no evidence for stenosis  There was moderate to severe regurgitation  Regurgitation grade was 3-4+ on a scale of 0 to 4+  PULMONIC VALVE: DOPPLER: The transpulmonic velocity was within the normal range  There was no evidence for stenosis  There was trace regurgitation  PERICARDIUM: There was no pericardial effusion  AORTA: The root exhibited normal size  SYSTEMIC VEINS: IVC: The inferior vena cava was dilated  Respirophasic changes were blunted (less than 50% variation)  HEPATIC VEIN DOPPLER: There is systolic flow reversal in the hepatic vein consistent with severe tricuspid  regurgitation  SYSTEM MEASUREMENT TABLES    2D  %FS: 20 05 %  Ao Diam: 3 36 cm  EDV(Teich): 85 57 ml  EF(Teich): 41 31 %  ESV(Teich): 50 22 ml  IVSd: 1 03 cm  LA Area: 21 37 cm2  LA Diam: 3 37 cm  LVEDV MOD A4C: 68 81 ml  LVEF MOD A4C: 56 86 %  LVESV MOD A4C: 29 69 ml  LVIDd: 4 35 cm  LVIDs: 3 48 cm  LVLd A4C: 7 37 cm  LVLs A4C: 6 82 cm  LVOT Diam: 1 88 cm  LVPWd: 0 97 cm  RA Area: 21 18 cm2  RVIDd: 4 1 cm  SV MOD A4C: 39 12 ml  SV(Teich): 35 35 ml    CW  AV Env  Ti: 359 86 ms  AV VTI: 52 93 cm  AV Vmax: 2 03 m/s  AV Vmax: 2 24 m/s  AV Vmean: 1 47 m/s  AV maxP 42 mmHg  AV maxP 15 mmHg  AV meanPG: 10 27 mmHg  TR Vmax: 2 1 m/s  TR maxP 7 mmHg    MM  TAPSE: 1 73 cm    PW  NESTOR (VTI): 1 28 cm2  NESTOR Vmax: 1 31 cm2  NESTOR Vmax: 1 46 cm2  NESTOR Vmax, Pt: 1 21 cm2  NESTOR Vmax, Pt: 1 35 cm2  E': 0 08 m/s  E/E': 14 35  LVOT Env  Ti: 346 02 ms  LVOT VTI: 24 27 cm  LVOT Vmax: 0 98 m/s  LVOT Vmax: 1 06 m/s  LVOT Vmean: 0 7 m/s  LVOT maxPG: 3 84 mmHg  LVOT maxP 49 mmHg  LVOT meanP 32 mmHg  LVSI Dopp: 30 98 ml/m2  LVSV Dopp: 67 55 ml  MV A Carroll: 0 8 m/s  MV Dec [place]: 4 21 m/s2  MV DecT: 261 58 ms  MV E Carroll: 1 1 m/s  MV E/A Ratio: 1 38  MV PHT: 75 86 ms  MVA By PHT: 2 9 cm2    [employer] Accredited Echocardiography Laboratory    Prepared and electronically signed by    Neville William MD  Signed 2018 17:07:00       No results found for this or any previous visit  No results found for this or any previous visit  Results for orders placed in visit on 14   NM myocardial perfusion spect (stress and/or rest)    Narrative PHARMACOLOGIC STRESS TEST, LEXISCAN          INDICATION-  Chest pain, shortness of breath  Prior history of CABG   and stent placement   COMPARISON- No prior studies,   TECHNIQUE-  Pharmacologic stress testing was performed utilizing   [employer]  SPECT myocardial perfusion images was performed  Dosages of   TC-99-mm Myoview were 10 6 mCi and 33 0 mCi IV  Both rest and stress   images were performed  Images were then reconstructed in the short,   vertical and horizontal long axes projections  Baseline heart rate 61 beats per minute  Peak heart of 78 beats per minute  Baseline blood pressure 134/77 mmHg  Peak blood pressure 145/71 mmHg  Symptoms-   Shortness of breath nausea chest pain recovery  Time to peak imaging for rest 55 minutes and stress 33 minutes  Please see cardiology report of physiologic data  FINDINGS-   OVERALL QUALITY-   Acceptable  ARTIFACT-  Breast attenuation   PERFUSION IMAGES-   SPECT images showed a large region of mildly   diminished perfusion within the anterior wall which was normal at rest   compatible with a region of ischemia  There are no other abnormal   regions of diminished perfusion  Left ventricular cavity was normal in   size  It does appear to increase with stress  WALL MOTION-  Normal contractility   EJECTION FRACTION-  66 %   IMPRESSION-   1  Region of ischemia seen in the anterior wall   2  Left ventricular ejection fraction- 66%   ##imslh##imslh   Transcribed on- [id number]           - BYRON Cohen MD        Reading RadiologistJoyce Salas, RAD MD        Releasing Radiologist- BYRON Cohen MD        Released Date Time- 05/16/14 1527      ------------------------------------------------------------------------------   Kelsea Diaz MD    Portions of the record may have been created with voice recognition software   Occasional wrong word or "sound a like" substitutions may have occurred due to the inherent limitations of voice recognition software   Read the chart carefully and recognize, using context, where substitutions have occurred

## 2018-09-01 DIAGNOSIS — E11.65 TYPE 2 DIABETES MELLITUS WITH HYPERGLYCEMIA, WITH LONG-TERM CURRENT USE OF INSULIN (HCC): Primary | ICD-10-CM

## 2018-09-01 DIAGNOSIS — Z79.4 TYPE 2 DIABETES MELLITUS WITH HYPERGLYCEMIA, WITH LONG-TERM CURRENT USE OF INSULIN (HCC): Primary | ICD-10-CM

## 2018-10-20 DIAGNOSIS — I10 ESSENTIAL HYPERTENSION: Primary | ICD-10-CM

## 2018-10-26 DIAGNOSIS — I10 ESSENTIAL HYPERTENSION: Primary | ICD-10-CM

## 2018-10-29 RX ORDER — ISOSORBIDE MONONITRATE 60 MG/1
TABLET, EXTENDED RELEASE ORAL
Qty: 90 TABLET | Refills: 3 | Status: SHIPPED | OUTPATIENT
Start: 2018-10-29 | End: 2019-10-31 | Stop reason: SDUPTHER

## 2018-11-16 DIAGNOSIS — E11.65 UNCONTROLLED TYPE 2 DIABETES MELLITUS WITH HYPERGLYCEMIA (HCC): Primary | ICD-10-CM

## 2018-11-20 DIAGNOSIS — E11.65 UNCONTROLLED TYPE 2 DIABETES MELLITUS WITH HYPERGLYCEMIA (HCC): ICD-10-CM

## 2018-11-20 RX ORDER — SUB-Q INSULIN DEVICE, 40 UNIT
EACH MISCELLANEOUS
Qty: 1 KIT | Refills: 3 | Status: SHIPPED | OUTPATIENT
Start: 2018-11-20 | End: 2018-11-20 | Stop reason: SDUPTHER

## 2018-11-20 RX ORDER — SUB-Q INSULIN DEVICE, 40 UNIT
EACH MISCELLANEOUS DAILY
Qty: 30 KIT | Refills: 1 | Status: SHIPPED | OUTPATIENT
Start: 2018-11-20 | End: 2019-08-15 | Stop reason: SDUPTHER

## 2018-12-05 ENCOUNTER — OFFICE VISIT (OUTPATIENT)
Dept: FAMILY MEDICINE CLINIC | Facility: CLINIC | Age: 83
End: 2018-12-05
Payer: COMMERCIAL

## 2018-12-05 ENCOUNTER — HOSPITAL ENCOUNTER (OUTPATIENT)
Dept: RADIOLOGY | Facility: HOSPITAL | Age: 83
Discharge: HOME | End: 2018-12-05
Payer: COMMERCIAL

## 2018-12-05 ENCOUNTER — TRANSCRIBE ORDERS (OUTPATIENT)
Dept: RADIOLOGY | Facility: HOSPITAL | Age: 83
End: 2018-12-05

## 2018-12-05 ENCOUNTER — APPOINTMENT (OUTPATIENT)
Dept: LAB | Facility: HOSPITAL | Age: 83
End: 2018-12-05
Payer: COMMERCIAL

## 2018-12-05 VITALS
OXYGEN SATURATION: 98 % | WEIGHT: 217 LBS | HEIGHT: 65 IN | TEMPERATURE: 97.7 F | BODY MASS INDEX: 36.15 KG/M2 | DIASTOLIC BLOOD PRESSURE: 82 MMHG | HEART RATE: 72 BPM | SYSTOLIC BLOOD PRESSURE: 110 MMHG

## 2018-12-05 DIAGNOSIS — J45.31 MILD PERSISTENT ASTHMA WITH ACUTE EXACERBATION: ICD-10-CM

## 2018-12-05 DIAGNOSIS — R06.02 SOB (SHORTNESS OF BREATH): ICD-10-CM

## 2018-12-05 DIAGNOSIS — I50.33 ACUTE ON CHRONIC DIASTOLIC HEART FAILURE (HCC): ICD-10-CM

## 2018-12-05 DIAGNOSIS — R06.02 SOB (SHORTNESS OF BREATH): Primary | ICD-10-CM

## 2018-12-05 LAB
ALBUMIN SERPL BCP-MCNC: 3.4 G/DL (ref 3.5–5)
ALP SERPL-CCNC: 88 U/L (ref 46–116)
ALT SERPL W P-5'-P-CCNC: 39 U/L (ref 12–78)
ANION GAP SERPL CALCULATED.3IONS-SCNC: 3 MMOL/L (ref 4–13)
AST SERPL W P-5'-P-CCNC: 17 U/L (ref 5–45)
BASOPHILS # BLD AUTO: 0.07 THOUSANDS/ΜL (ref 0–0.1)
BASOPHILS NFR BLD AUTO: 1 % (ref 0–1)
BILIRUB SERPL-MCNC: 0.58 MG/DL (ref 0.2–1)
BUN SERPL-MCNC: 28 MG/DL (ref 5–25)
CALCIUM SERPL-MCNC: 9.8 MG/DL (ref 8.3–10.1)
CHLORIDE SERPL-SCNC: 97 MMOL/L (ref 100–108)
CO2 SERPL-SCNC: 34 MMOL/L (ref 21–32)
CREAT SERPL-MCNC: 1.05 MG/DL (ref 0.6–1.3)
EOSINOPHIL # BLD AUTO: 0.18 THOUSAND/ΜL (ref 0–0.61)
EOSINOPHIL NFR BLD AUTO: 3 % (ref 0–6)
ERYTHROCYTE [DISTWIDTH] IN BLOOD BY AUTOMATED COUNT: 14.5 % (ref 11.6–15.1)
GFR SERPL CREATININE-BSD FRML MDRD: 50 ML/MIN/1.73SQ M
GLUCOSE P FAST SERPL-MCNC: 170 MG/DL (ref 65–99)
HCT VFR BLD AUTO: 38.9 % (ref 34.8–46.1)
HGB BLD-MCNC: 12.4 G/DL (ref 11.5–15.4)
IMM GRANULOCYTES # BLD AUTO: 0.01 THOUSAND/UL (ref 0–0.2)
IMM GRANULOCYTES NFR BLD AUTO: 0 % (ref 0–2)
LYMPHOCYTES # BLD AUTO: 1.06 THOUSANDS/ΜL (ref 0.6–4.47)
LYMPHOCYTES NFR BLD AUTO: 16 % (ref 14–44)
MCH RBC QN AUTO: 29.7 PG (ref 26.8–34.3)
MCHC RBC AUTO-ENTMCNC: 31.9 G/DL (ref 31.4–37.4)
MCV RBC AUTO: 93 FL (ref 82–98)
MONOCYTES # BLD AUTO: 0.61 THOUSAND/ΜL (ref 0.17–1.22)
MONOCYTES NFR BLD AUTO: 9 % (ref 4–12)
NEUTROPHILS # BLD AUTO: 4.8 THOUSANDS/ΜL (ref 1.85–7.62)
NEUTS SEG NFR BLD AUTO: 71 % (ref 43–75)
NRBC BLD AUTO-RTO: 0 /100 WBCS
NT-PROBNP SERPL-MCNC: 875 PG/ML
PLATELET # BLD AUTO: 168 THOUSANDS/UL (ref 149–390)
PMV BLD AUTO: 11.9 FL (ref 8.9–12.7)
POTASSIUM SERPL-SCNC: 3.6 MMOL/L (ref 3.5–5.3)
PROT SERPL-MCNC: 6.2 G/DL (ref 6.4–8.2)
RBC # BLD AUTO: 4.18 MILLION/UL (ref 3.81–5.12)
SODIUM SERPL-SCNC: 134 MMOL/L (ref 136–145)
WBC # BLD AUTO: 6.73 THOUSAND/UL (ref 4.31–10.16)

## 2018-12-05 PROCEDURE — 36415 COLL VENOUS BLD VENIPUNCTURE: CPT

## 2018-12-05 PROCEDURE — 83880 ASSAY OF NATRIURETIC PEPTIDE: CPT

## 2018-12-05 PROCEDURE — 3008F BODY MASS INDEX DOCD: CPT | Performed by: FAMILY MEDICINE

## 2018-12-05 PROCEDURE — 85025 COMPLETE CBC W/AUTO DIFF WBC: CPT

## 2018-12-05 PROCEDURE — 71046 X-RAY EXAM CHEST 2 VIEWS: CPT

## 2018-12-05 PROCEDURE — 1160F RVW MEDS BY RX/DR IN RCRD: CPT | Performed by: FAMILY MEDICINE

## 2018-12-05 PROCEDURE — 80053 COMPREHEN METABOLIC PANEL: CPT

## 2018-12-05 PROCEDURE — 99214 OFFICE O/P EST MOD 30 MIN: CPT | Performed by: FAMILY MEDICINE

## 2018-12-05 RX ORDER — AZITHROMYCIN 250 MG/1
TABLET, FILM COATED ORAL
Qty: 6 TABLET | Refills: 0 | Status: SHIPPED | OUTPATIENT
Start: 2018-12-05 | End: 2018-12-09

## 2018-12-05 RX ORDER — FUROSEMIDE 40 MG/1
TABLET ORAL
Qty: 60 TABLET | Refills: 0 | Status: ON HOLD | OUTPATIENT
Start: 2018-12-05 | End: 2019-03-04 | Stop reason: SDUPTHER

## 2018-12-05 RX ORDER — PREDNISONE 10 MG/1
TABLET ORAL
Qty: 28 TABLET | Refills: 0 | Status: SHIPPED | OUTPATIENT
Start: 2018-12-05 | End: 2019-01-09

## 2018-12-05 NOTE — PROGRESS NOTES
Chief Complaint   Patient presents with    Shortness of Breath     2 days shortness of breath  Health Maintenance   Topic Date Due    SLP PLAN OF CARE  1935    DTaP,Tdap,and Td Vaccines (1 - Tdap) 01/02/1995    Diabetic Foot Exam  08/31/2017    DM Eye Exam  05/04/2018    INFLUENZA VACCINE  07/01/2018    Fall Risk  11/29/2018    Medicare Annual Wellness Visit (AWV)  11/29/2018    HEMOGLOBIN A1C  01/21/2019    Urinary Incontinence Screening  05/24/2019    URINE MICROALBUMIN  07/21/2019    DXA SCAN  05/16/2022    Pneumococcal PPSV23/PCV13 65+ Years / Low and Medium Risk  Completed     Assessment/Plan:    SOB---will check labs and CXR  Asthma exacerbation---Give prednisone and zpack  Use ventolin Q 4-6 hours as needed  Acute on chronic CHF---increase lasix to 80mg daily  Go to ER if Sob no improving or worse  Daughter understood  Diagnoses and all orders for this visit:    SOB (shortness of breath)  -     XR chest pa & lateral; Future  -     CBC and differential; Future  -     Comprehensive metabolic panel; Future  -     NT-BNP PRO; Future    Mild persistent asthma with acute exacerbation  -     predniSONE 10 mg tablet; Take 4 tabs for 4 days, then 3 tabs for 2 days, then 2 tabs for 2 days and then 1 tab for 2 day  -     azithromycin (ZITHROMAX) 250 mg tablet; Take 2 tabs on day 1, then take 1 tab daily from day 2-day 5  Acute on chronic diastolic heart failure (HCC)  -     furosemide (LASIX) 40 mg tablet; Take 2 tabs daily          Subjective:      Patient ID: Aracely Menchaca is a 82 y o  female  HPI  Pt is here with her daughter  C/o cough, wheezing and SOB for 2 days  She used ventolin Q 4-6 hours  Denies fever, chest pain, n/v/abd pain  Hx of asthma  Does not use maintenance inhaler  Usually she does not need ventolin  HTN---She is on amlodipine 5mg QD, imdur 60mg QD, losartan 100mg QD, metoprolol 25mg daily  CHF---She is on lasix 60mg daily   Did not check wt recently  Gain 12lbs since 4 months ago  FU cardiology Dr Enedina Muñiz for pacemaker 5/2014, CAD s/p bypass 1999  DM---7/2018 HgA1C 8 3 FU endocrinology for DM Q 3 months  She is on V-Go 20  Hyperlipidemia---on atorvastatin 40mg qhs  Denies side effects  The following portions of the patient's history were reviewed and updated as appropriate: allergies, current medications, past family history, past medical history, past social history, past surgical history and problem list     Review of Systems   Constitutional: Negative for appetite change, chills and fever  HENT: Negative for congestion, ear pain, sinus pain and sore throat  Eyes: Negative for discharge and itching  Respiratory: Positive for shortness of breath and wheezing  Negative for apnea, cough and chest tightness  Cardiovascular: Negative for chest pain, palpitations and leg swelling  Gastrointestinal: Negative for abdominal pain, anal bleeding, constipation, diarrhea, nausea and vomiting  Endocrine: Negative for cold intolerance, heat intolerance and polyuria  Genitourinary: Negative for difficulty urinating and dysuria  Musculoskeletal: Negative for arthralgias, back pain and myalgias  Skin: Negative for rash  Neurological: Negative for dizziness and headaches  Psychiatric/Behavioral: Negative for agitation  Objective:      /82 (BP Location: Left arm, Patient Position: Sitting, Cuff Size: Large)   Pulse 72   Temp 97 7 °F (36 5 °C) (Tympanic)   SpO2 98%          Physical Exam   Constitutional: She appears well-developed  No distress  HENT:   Head: Normocephalic  Right Ear: External ear normal    Left Ear: External ear normal    Nose: Nose normal    Mouth/Throat: Oropharynx is clear and moist    Eyes: Pupils are equal, round, and reactive to light  Conjunctivae are normal  Right eye exhibits no discharge  Left eye exhibits no discharge  Neck: Normal range of motion     Cardiovascular: Normal rate, regular rhythm and normal heart sounds  Exam reveals no gallop and no friction rub  No murmur heard  Pulmonary/Chest: Effort normal and breath sounds normal  No respiratory distress  She has no wheezes  She has no rales  She exhibits no tenderness  Abdominal: Soft  Bowel sounds are normal    Musculoskeletal: She exhibits edema  On wheelchair   Lymphadenopathy:     She has no cervical adenopathy  Neurological: She is alert  Psychiatric: She has a normal mood and affect

## 2018-12-11 NOTE — PROGRESS NOTES
12/12/2018      Chief Complaint   Patient presents with    Urinary Retention       Assessment and Plan    82 y o  female managed by Dr Cynthia Sexton    1  Resolved urinary retention  - PVR 0 mL   - FU 1 year with PVR at visit, if low FU PRN       History of Present Illness  Sarah Garcia is a 82 y o  female here for follow up evaluation of urinary retention  The patient initially went into retention during a hospitalization in April  She presented 1 month later for a void trial which she passed  She presents today with no lower urinary tract symptoms that are bothersome to her  These are listed as below  Her postvoid residual today is 0mL  Review of Systems   Constitutional: Negative for activity change, chills and fever  Gastrointestinal: Negative for abdominal distention and abdominal pain  Musculoskeletal: Negative for back pain and gait problem  Psychiatric/Behavioral: Negative for behavioral problems and confusion  Urinary Incontinence Screening      Most Recent Value   Urinary Incontinence   Urinary Incontinence? Yes [due to water pill]   Incomplete emptying? No   Urinary frequency? No   Urinary urgency? No   Urinary hesitancy? No   Dysuria (painful difficult urination)? No   Nocturia (waking up to use the bathroom)? No   Straining (having to push to go)? No   Weak stream?  No   Intermittent stream?  Yes   Post void dribbling?   No          Past Medical History  Past Medical History:   Diagnosis Date    Arthritis     Asthma     CAD (coronary artery disease) of artery bypass graft     Cardiac disease     Dementia     Depression     Diabetes mellitus (HCC)     [facility] [address]     Hyperlipidemia     Hypertension     [facility] infarction) [facility] [address] [facility]     Neuropathy     BRANT (obstructive sleep apnea)     Peptic ulcer     was + for H pylori    Transient cerebral ischemia 11/2011    with neg CUS and ECHO       Past Social History  Past Surgical History:   Procedure Laterality Date    APPENDECTOMY      CATARACT EXTRACTION       SECTION      COLONOSCOPY  2004    CORONARY ANGIOPLASTY  2006    CORONARY ANGIOPLASTY WITH STENT PLACEMENT      CORONARY ARTERY BYPASS GRAFT      DENTAL SURGERY      EGD AND COLONOSCOPY      ELBOW SURGERY      resolved     ESOPHAGOGASTRODUODENOSCOPY  2004     History   Smoking Status    Never Smoker   Smokeless Tobacco    Never Used       Past Family History  Family History   Problem Relation Age of Onset    Diabetes Mother     Cancer Sister     Heart disease Sister     Thyroid disease Sister     Cancer Brother     Cancer Father     Colon cancer Family     Diabetes Family     Mitral valve prolapse Family     Thyroid cancer Family        Past Social history  Social History     Social History    Marital status: /Civil Union     Spouse name: N/A    Number of children: N/A    Years of education: N/A     Occupational History    Not on file       Social History Main Topics    Smoking status: Never Smoker    Smokeless tobacco: Never Used    Alcohol use No    Drug use: No    Sexual activity: Not on file     Other Topics Concern    Not on file     Social History Narrative    Caffeine use           Current Medications  Current Outpatient Prescriptions   Medication Sig Dispense Refill    albuterol (PROVENTIL HFA,VENTOLIN HFA) 90 mcg/act inhaler Inhale 2 puffs every 4 (four) hours as needed for wheezing or shortness of breath 1 Inhaler 0    amLODIPine (NORVASC) 5 mg tablet TAKE 1 TABLET TWICE DAILY 180 tablet 3    aspirin 81 mg chewable tablet Chew 1 tablet (81 mg total) daily 30 tablet 0    atorvastatin (LIPITOR) 40 mg tablet TAKE 1 TABLET BY MOUTH EVERY DAY 30 tablet 11    calcium carbonate-vitamin D (OSCAL-D) 500 mg-200 units per tablet Take 1 tablet by mouth 2 (two) times a day with meals 60 tablet 0    docusate sodium (COLACE) 100 mg capsule Take 1 capsule (100 mg total) by mouth 2 (two) times a day 10 capsule 0    furosemide (LASIX) 40 mg tablet Take 2 tabs daily 60 tablet 0    Insulin Disposable Pump (V-GO 20) KIT Inject under the skin daily for 90 days Use 1 device per day 30 kit 1    isosorbide mononitrate (IMDUR) 60 mg 24 hr tablet TAKE 1 TABLET BY MOUTH EVERY DAY AS DIRECTED 90 tablet 3    losartan (COZAAR) 100 MG tablet Take by mouth      metoprolol tartrate (LOPRESSOR) 25 mg tablet TAKE 1 & 1/2 TABLETS IN THE MORNING, AND 1 TABLET IN THE EVENING 225 tablet 3    nitroglycerin (NITROSTAT) 0 4 mg SL tablet Place under the tongue      NOVOLOG 100 UNIT/ML injection INJECT 100 UNITS DAILY AS DIRECTED 30 mL 4    ONE TOUCH ULTRA TEST test strip CHECK BLOOD SUGAR 3 TIMES A  each 3    predniSONE 10 mg tablet Take 4 tabs for 4 days, then 3 tabs for 2 days, then 2 tabs for 2 days and then 1 tab for 2 day 28 tablet 0    senna (SENOKOT) 8 6 mg Take 1 tablet (8 6 mg total) by mouth daily 120 each 0     No current facility-administered medications for this visit  Allergies  Allergies   Allergen Reactions    Ace Inhibitors     Chlorpromazine     Latex     Lisinopril     Namenda [Memantine]     Penicillins     Propoxyphene     Stadol [Butorphanol]          The following portions of the patient's history were reviewed and updated as appropriate: allergies, current medications, past medical history, past social history, past surgical history and problem list       Vitals  Vitals:    12/12/18 1507   BP: 110/62   BP Location: Left arm   Patient Position: Sitting   Cuff Size: Standard   Pulse: 71   Weight: 98 4 kg (217 lb)   Height: 5' 5" (1 651 m)           Physical Exam  Constitutional   General appearance: Patient is seated and in no acute distress, well appearing and well nourished  Head and Face   Head and face: Normal     Eyes   Conjunctiva and lids: No erythema, swelling or discharge      Ears, Nose, Mouth, and Throat   Hearing: Normal     Pulmonary   Respiratory effort: No increased work of breathing or signs of respiratory distress  Cardiovascular   Examination of extremities for edema and/or varicosities: Normal     Abdomen   Abdomen: Non-tender, no masses  Musculoskeletal   Gait and station:   Wheelchair-bound  Skin   Skin and subcutaneous tissue: Warm, dry, and intact  No visible lesions or rashes    Psychiatric   Judgment and insight: Normal  Recent and remote memory:  Normal  Mood and affect: Normal        Results  Recent Results (from the past 1 hour(s))   POCT Measure PVR    Collection Time: 12/12/18  3:12 PM   Result Value Ref Range    POST-VOID RESIDUAL VOLUME, ML POC 0 mL   ]  No results found for: PSA  Lab Results   Component Value Date    GLUCOSE 267 (H) 04/22/2018    CALCIUM 9 8 12/05/2018     12/17/2015    K 3 6 12/05/2018    CO2 34 (H) 12/05/2018    CL 97 (L) 12/05/2018    BUN 28 (H) 12/05/2018    CREATININE 1 05 12/05/2018     Lab Results   Component Value Date    WBC 6 73 12/05/2018    HGB 12 4 12/05/2018    HCT 38 9 12/05/2018    MCV 93 12/05/2018     12/05/2018           Orders  Orders Placed This Encounter   Procedures    POCT Measure PVR

## 2018-12-12 ENCOUNTER — OFFICE VISIT (OUTPATIENT)
Dept: UROLOGY | Facility: AMBULATORY SURGERY CENTER | Age: 83
End: 2018-12-12
Payer: COMMERCIAL

## 2018-12-12 VITALS
BODY MASS INDEX: 36.15 KG/M2 | SYSTOLIC BLOOD PRESSURE: 110 MMHG | DIASTOLIC BLOOD PRESSURE: 62 MMHG | HEART RATE: 71 BPM | HEIGHT: 65 IN | WEIGHT: 217 LBS

## 2018-12-12 DIAGNOSIS — R33.9 URINE RETENTION: Primary | ICD-10-CM

## 2018-12-12 LAB — POST-VOID RESIDUAL VOLUME, ML POC: 0 ML

## 2018-12-12 PROCEDURE — 99213 OFFICE O/P EST LOW 20 MIN: CPT | Performed by: PHYSICIAN ASSISTANT

## 2018-12-12 PROCEDURE — 4040F PNEUMOC VAC/ADMIN/RCVD: CPT | Performed by: PHYSICIAN ASSISTANT

## 2018-12-12 PROCEDURE — 51798 US URINE CAPACITY MEASURE: CPT | Performed by: PHYSICIAN ASSISTANT

## 2018-12-18 ENCOUNTER — IN-CLINIC DEVICE VISIT (OUTPATIENT)
Dept: CARDIOLOGY CLINIC | Facility: CLINIC | Age: 83
End: 2018-12-18
Payer: COMMERCIAL

## 2018-12-18 DIAGNOSIS — I44.2 AV BLOCK, COMPLETE (HCC): ICD-10-CM

## 2018-12-18 DIAGNOSIS — Z95.0 CARDIAC PACEMAKER IN SITU: ICD-10-CM

## 2018-12-18 DIAGNOSIS — Z45.018 ADJUSTMENT AND MANAGEMENT OF CARDIAC PACEMAKER: ICD-10-CM

## 2018-12-18 DIAGNOSIS — I49.5 SICK SINUS SYNDROME (HCC): Primary | ICD-10-CM

## 2018-12-18 PROCEDURE — 93280 PM DEVICE PROGR EVAL DUAL: CPT | Performed by: INTERNAL MEDICINE

## 2018-12-18 NOTE — PROGRESS NOTES
Results for orders placed or performed in visit on 12/18/18   Cardiac EP device report    Narrative    MDT-DUAL-PM  DEVICE INTERROGATED IN THE [facility] OFFICE  BATTERY VOLTAGE ADEQUATE (5 5 YRS)  AP-92%, -0 4%  ALL LEAD PARAMETERS WITHIN NORMAL LIMITS  1 NSVT EPISODE ON 4/6/18 FOR 6 BEATS, AVG CL~375MS  EF-60% (ECHO 4/23/18)  57 AF EPISODES MAX DURATION 74 MINS; LAST EPISODE ON 8/23/18  AF BURDEN-0 2%  NO PREVIOUS AF NOTED  PT ON ASA 81MG & METOPROLOL  REVIEWED W/ DR ESTEVEZ- APPT TO BE SCHEDULED  NORMAL DEVICE FUNCTION   GV

## 2019-01-09 ENCOUNTER — OFFICE VISIT (OUTPATIENT)
Dept: CARDIOLOGY CLINIC | Facility: CLINIC | Age: 84
End: 2019-01-09
Payer: COMMERCIAL

## 2019-01-09 VITALS
WEIGHT: 221.9 LBS | HEART RATE: 69 BPM | SYSTOLIC BLOOD PRESSURE: 126 MMHG | DIASTOLIC BLOOD PRESSURE: 62 MMHG | HEIGHT: 65 IN | BODY MASS INDEX: 36.97 KG/M2 | OXYGEN SATURATION: 98 %

## 2019-01-09 DIAGNOSIS — Z95.0 CARDIAC PACEMAKER IN SITU: ICD-10-CM

## 2019-01-09 DIAGNOSIS — Z95.1 HX OF CABG: ICD-10-CM

## 2019-01-09 DIAGNOSIS — I07.1 TRICUSPID VALVE INSUFFICIENCY, UNSPECIFIED ETIOLOGY: ICD-10-CM

## 2019-01-09 DIAGNOSIS — I50.30 CONGESTIVE HEART FAILURE WITH LV DIASTOLIC DYSFUNCTION, NYHA CLASS 2 (HCC): ICD-10-CM

## 2019-01-09 DIAGNOSIS — Z95.5 HISTORY OF HEART ARTERY STENT: ICD-10-CM

## 2019-01-09 DIAGNOSIS — I47.2 VENTRICULAR TACHYCARDIA (HCC): ICD-10-CM

## 2019-01-09 DIAGNOSIS — I49.5 SICK SINUS SYNDROME (HCC): Primary | ICD-10-CM

## 2019-01-09 DIAGNOSIS — I10 ESSENTIAL HYPERTENSION: ICD-10-CM

## 2019-01-09 DIAGNOSIS — I25.10 3-VESSEL CORONARY ARTERY DISEASE: ICD-10-CM

## 2019-01-09 DIAGNOSIS — I44.2 AV BLOCK, COMPLETE (HCC): ICD-10-CM

## 2019-01-09 PROCEDURE — 99214 OFFICE O/P EST MOD 30 MIN: CPT | Performed by: INTERNAL MEDICINE

## 2019-03-03 ENCOUNTER — APPOINTMENT (EMERGENCY)
Dept: RADIOLOGY | Facility: HOSPITAL | Age: 84
End: 2019-03-03
Payer: COMMERCIAL

## 2019-03-03 ENCOUNTER — HOSPITAL ENCOUNTER (OUTPATIENT)
Facility: HOSPITAL | Age: 84
Setting detail: OBSERVATION
Discharge: HOME | End: 2019-03-04
Attending: EMERGENCY MEDICINE | Admitting: INTERNAL MEDICINE
Payer: COMMERCIAL

## 2019-03-03 DIAGNOSIS — R06.02 SOB (SHORTNESS OF BREATH) ON EXERTION: Primary | ICD-10-CM

## 2019-03-03 DIAGNOSIS — I50.33 ACUTE ON CHRONIC DIASTOLIC HEART FAILURE (HCC): ICD-10-CM

## 2019-03-03 DIAGNOSIS — R07.9 CHEST PAIN, EXERTIONAL: ICD-10-CM

## 2019-03-03 LAB
ALBUMIN SERPL BCP-MCNC: 3.9 G/DL (ref 3.5–5)
ALP SERPL-CCNC: 113 U/L (ref 46–116)
ALT SERPL W P-5'-P-CCNC: 35 U/L (ref 12–78)
ANION GAP SERPL CALCULATED.3IONS-SCNC: 10 MMOL/L (ref 4–13)
AST SERPL W P-5'-P-CCNC: 22 U/L (ref 5–45)
ATRIAL RATE: 96 BPM
BASOPHILS # BLD AUTO: 0.08 THOUSANDS/ΜL (ref 0–0.1)
BASOPHILS NFR BLD AUTO: 1 % (ref 0–1)
BILIRUB SERPL-MCNC: 0.8 MG/DL (ref 0.2–1)
BUN SERPL-MCNC: 28 MG/DL (ref 5–25)
CALCIUM SERPL-MCNC: 9.7 MG/DL (ref 8.3–10.1)
CHLORIDE SERPL-SCNC: 99 MMOL/L (ref 100–108)
CO2 SERPL-SCNC: 30 MMOL/L (ref 21–32)
CREAT SERPL-MCNC: 1.2 MG/DL (ref 0.6–1.3)
EOSINOPHIL # BLD AUTO: 0.17 THOUSAND/ΜL (ref 0–0.61)
EOSINOPHIL NFR BLD AUTO: 3 % (ref 0–6)
ERYTHROCYTE [DISTWIDTH] IN BLOOD BY AUTOMATED COUNT: 13.5 % (ref 11.6–15.1)
GFR SERPL CREATININE-BSD FRML MDRD: 42 ML/MIN/1.73SQ M
GLUCOSE SERPL-MCNC: 381 MG/DL (ref 65–140)
GLUCOSE SERPL-MCNC: 431 MG/DL (ref 65–140)
GLUCOSE SERPL-MCNC: 485 MG/DL (ref 65–140)
GLUCOSE SERPL-MCNC: 492 MG/DL (ref 65–140)
HCT VFR BLD AUTO: 40.9 % (ref 34.8–46.1)
HGB BLD-MCNC: 13.4 G/DL (ref 11.5–15.4)
IMM GRANULOCYTES # BLD AUTO: 0.01 THOUSAND/UL (ref 0–0.2)
IMM GRANULOCYTES NFR BLD AUTO: 0 % (ref 0–2)
LYMPHOCYTES # BLD AUTO: 1.5 THOUSANDS/ΜL (ref 0.6–4.47)
LYMPHOCYTES NFR BLD AUTO: 23 % (ref 14–44)
MCH RBC QN AUTO: 29.9 PG (ref 26.8–34.3)
MCHC RBC AUTO-ENTMCNC: 32.8 G/DL (ref 31.4–37.4)
MCV RBC AUTO: 91 FL (ref 82–98)
MONOCYTES # BLD AUTO: 0.48 THOUSAND/ΜL (ref 0.17–1.22)
MONOCYTES NFR BLD AUTO: 7 % (ref 4–12)
NEUTROPHILS # BLD AUTO: 4.37 THOUSANDS/ΜL (ref 1.85–7.62)
NEUTS SEG NFR BLD AUTO: 66 % (ref 43–75)
NRBC BLD AUTO-RTO: 0 /100 WBCS
NT-PROBNP SERPL-MCNC: 2516 PG/ML
PLATELET # BLD AUTO: 164 THOUSANDS/UL (ref 149–390)
PLATELET # BLD AUTO: 178 THOUSANDS/UL (ref 149–390)
PMV BLD AUTO: 11.5 FL (ref 8.9–12.7)
PMV BLD AUTO: 11.6 FL (ref 8.9–12.7)
POTASSIUM SERPL-SCNC: 4.3 MMOL/L (ref 3.5–5.3)
PROT SERPL-MCNC: 7 G/DL (ref 6.4–8.2)
QRS AXIS: 200 DEGREES
QRSD INTERVAL: 124 MS
QT INTERVAL: 388 MS
QTC INTERVAL: 437 MS
RBC # BLD AUTO: 4.48 MILLION/UL (ref 3.81–5.12)
SODIUM SERPL-SCNC: 139 MMOL/L (ref 136–145)
T WAVE AXIS: 112 DEGREES
TROPONIN I SERPL-MCNC: <0.02 NG/ML
VENTRICULAR RATE: 76 BPM
WBC # BLD AUTO: 6.61 THOUSAND/UL (ref 4.31–10.16)

## 2019-03-03 PROCEDURE — 83036 HEMOGLOBIN GLYCOSYLATED A1C: CPT | Performed by: INTERNAL MEDICINE

## 2019-03-03 PROCEDURE — 87631 RESP VIRUS 3-5 TARGETS: CPT | Performed by: INTERNAL MEDICINE

## 2019-03-03 PROCEDURE — 94640 AIRWAY INHALATION TREATMENT: CPT

## 2019-03-03 PROCEDURE — 71046 X-RAY EXAM CHEST 2 VIEWS: CPT

## 2019-03-03 PROCEDURE — 80053 COMPREHEN METABOLIC PANEL: CPT | Performed by: EMERGENCY MEDICINE

## 2019-03-03 PROCEDURE — 93010 ELECTROCARDIOGRAM REPORT: CPT | Performed by: INTERNAL MEDICINE

## 2019-03-03 PROCEDURE — 84484 ASSAY OF TROPONIN QUANT: CPT | Performed by: INTERNAL MEDICINE

## 2019-03-03 PROCEDURE — 85049 AUTOMATED PLATELET COUNT: CPT | Performed by: INTERNAL MEDICINE

## 2019-03-03 PROCEDURE — 93005 ELECTROCARDIOGRAM TRACING: CPT

## 2019-03-03 PROCEDURE — 99285 EMERGENCY DEPT VISIT HI MDM: CPT

## 2019-03-03 PROCEDURE — 84484 ASSAY OF TROPONIN QUANT: CPT | Performed by: EMERGENCY MEDICINE

## 2019-03-03 PROCEDURE — 36415 COLL VENOUS BLD VENIPUNCTURE: CPT | Performed by: EMERGENCY MEDICINE

## 2019-03-03 PROCEDURE — 94760 N-INVAS EAR/PLS OXIMETRY 1: CPT

## 2019-03-03 PROCEDURE — 82948 REAGENT STRIP/BLOOD GLUCOSE: CPT

## 2019-03-03 PROCEDURE — 83880 ASSAY OF NATRIURETIC PEPTIDE: CPT | Performed by: EMERGENCY MEDICINE

## 2019-03-03 PROCEDURE — 99220 PR INITIAL OBSERVATION CARE/DAY 70 MINUTES: CPT | Performed by: INTERNAL MEDICINE

## 2019-03-03 PROCEDURE — 85025 COMPLETE CBC W/AUTO DIFF WBC: CPT | Performed by: EMERGENCY MEDICINE

## 2019-03-03 RX ORDER — LABETALOL 20 MG/4 ML (5 MG/ML) INTRAVENOUS SYRINGE
10 ONCE
Status: DISCONTINUED | OUTPATIENT
Start: 2019-03-03 | End: 2019-03-04 | Stop reason: HOSPADM

## 2019-03-03 RX ORDER — INSULIN GLARGINE 100 [IU]/ML
15 INJECTION, SOLUTION SUBCUTANEOUS
Status: DISCONTINUED | OUTPATIENT
Start: 2019-03-03 | End: 2019-03-04 | Stop reason: HOSPADM

## 2019-03-03 RX ORDER — ISOSORBIDE MONONITRATE 60 MG/1
60 TABLET, EXTENDED RELEASE ORAL DAILY
Status: DISCONTINUED | OUTPATIENT
Start: 2019-03-04 | End: 2019-03-04 | Stop reason: HOSPADM

## 2019-03-03 RX ORDER — NITROGLYCERIN 0.4 MG/1
0.4 TABLET SUBLINGUAL
Status: DISCONTINUED | OUTPATIENT
Start: 2019-03-03 | End: 2019-03-04 | Stop reason: HOSPADM

## 2019-03-03 RX ORDER — LOSARTAN POTASSIUM 50 MG/1
100 TABLET ORAL DAILY
Status: DISCONTINUED | OUTPATIENT
Start: 2019-03-04 | End: 2019-03-04 | Stop reason: HOSPADM

## 2019-03-03 RX ORDER — DOCUSATE SODIUM 100 MG/1
100 CAPSULE, LIQUID FILLED ORAL 2 TIMES DAILY PRN
Status: DISCONTINUED | OUTPATIENT
Start: 2019-03-03 | End: 2019-03-04 | Stop reason: HOSPADM

## 2019-03-03 RX ORDER — SENNOSIDES 8.6 MG
1 TABLET ORAL DAILY
Status: DISCONTINUED | OUTPATIENT
Start: 2019-03-04 | End: 2019-03-04 | Stop reason: HOSPADM

## 2019-03-03 RX ORDER — CALCIUM CARBONATE 200(500)MG
1000 TABLET,CHEWABLE ORAL DAILY PRN
Status: DISCONTINUED | OUTPATIENT
Start: 2019-03-03 | End: 2019-03-04 | Stop reason: HOSPADM

## 2019-03-03 RX ORDER — ASPIRIN 81 MG/1
324 TABLET, CHEWABLE ORAL ONCE
Status: COMPLETED | OUTPATIENT
Start: 2019-03-03 | End: 2019-03-03

## 2019-03-03 RX ORDER — ALBUTEROL SULFATE 2.5 MG/3ML
5 SOLUTION RESPIRATORY (INHALATION) ONCE
Status: COMPLETED | OUTPATIENT
Start: 2019-03-03 | End: 2019-03-03

## 2019-03-03 RX ORDER — FUROSEMIDE 10 MG/ML
40 INJECTION INTRAMUSCULAR; INTRAVENOUS ONCE
Status: COMPLETED | OUTPATIENT
Start: 2019-03-03 | End: 2019-03-03

## 2019-03-03 RX ORDER — FUROSEMIDE 10 MG/ML
40 INJECTION INTRAMUSCULAR; INTRAVENOUS
Status: DISCONTINUED | OUTPATIENT
Start: 2019-03-03 | End: 2019-03-04 | Stop reason: HOSPADM

## 2019-03-03 RX ORDER — ASPIRIN 81 MG/1
81 TABLET, CHEWABLE ORAL DAILY
Status: DISCONTINUED | OUTPATIENT
Start: 2019-03-04 | End: 2019-03-04 | Stop reason: HOSPADM

## 2019-03-03 RX ORDER — ALBUTEROL SULFATE 2.5 MG/3ML
2.5 SOLUTION RESPIRATORY (INHALATION) EVERY 4 HOURS PRN
Status: DISCONTINUED | OUTPATIENT
Start: 2019-03-03 | End: 2019-03-04 | Stop reason: HOSPADM

## 2019-03-03 RX ORDER — LOSARTAN POTASSIUM 50 MG/1
50 TABLET ORAL DAILY
Status: DISCONTINUED | OUTPATIENT
Start: 2019-03-04 | End: 2019-03-03

## 2019-03-03 RX ORDER — ATORVASTATIN CALCIUM 40 MG/1
40 TABLET, FILM COATED ORAL DAILY
Status: DISCONTINUED | OUTPATIENT
Start: 2019-03-04 | End: 2019-03-04 | Stop reason: HOSPADM

## 2019-03-03 RX ORDER — ONDANSETRON 2 MG/ML
4 INJECTION INTRAMUSCULAR; INTRAVENOUS EVERY 6 HOURS PRN
Status: DISCONTINUED | OUTPATIENT
Start: 2019-03-03 | End: 2019-03-04 | Stop reason: HOSPADM

## 2019-03-03 RX ADMIN — INSULIN LISPRO 6 UNITS: 100 INJECTION, SOLUTION INTRAVENOUS; SUBCUTANEOUS at 22:42

## 2019-03-03 RX ADMIN — ALBUTEROL SULFATE 5 MG: 2.5 SOLUTION RESPIRATORY (INHALATION) at 14:48

## 2019-03-03 RX ADMIN — INSULIN GLARGINE 15 UNITS: 100 INJECTION, SOLUTION SUBCUTANEOUS at 21:01

## 2019-03-03 RX ADMIN — METOPROLOL TARTRATE 25 MG: 25 TABLET, FILM COATED ORAL at 21:01

## 2019-03-03 RX ADMIN — ASPIRIN 81 MG 324 MG: 81 TABLET ORAL at 15:18

## 2019-03-03 RX ADMIN — FUROSEMIDE 40 MG: 10 INJECTION, SOLUTION INTRAMUSCULAR; INTRAVENOUS at 16:34

## 2019-03-03 RX ADMIN — ALBUTEROL SULFATE 2.5 MG: 2.5 SOLUTION RESPIRATORY (INHALATION) at 22:44

## 2019-03-03 RX ADMIN — IPRATROPIUM BROMIDE 0.5 MG: 0.5 SOLUTION RESPIRATORY (INHALATION) at 14:48

## 2019-03-03 NOTE — ASSESSMENT & PLAN NOTE
Lab Results   Component Value Date    HGBA1C 8 3 (H) 07/21/2018       No results for input(s): POCGLU in the last 72 hours      Blood Sugar Average: Last 72 hrs:

## 2019-03-03 NOTE — ASSESSMENT & PLAN NOTE
· Exercise tolerance limited  · No active symptoms of CAD  · Reports chest pain after repeated coughing since yesterday  · Check troponins x3  · Continue aspirin, Imdur and metoprolol

## 2019-03-03 NOTE — ASSESSMENT & PLAN NOTE
Lab Results   Component Value Date    HGBA1C 8 3 (H) 07/21/2018       No results for input(s): POCGLU in the last 72 hours      Blood Sugar Average: Last 72 hrs:   patient has disposable insulin pump which runs out of meds and tonight  Does not remember her settings  Will place on subcu insulin sliding scale with NovoLog and Lantus

## 2019-03-03 NOTE — ED NOTES
Entered room to revitalize, pt [employer] on exhalation      Rashid Willams RN  03/03/19 6735

## 2019-03-03 NOTE — ASSESSMENT & PLAN NOTE
· Likely multifactorial due to CHF/obesity/asthma  · Symptoms improved after Lasix and nebs in ED  · Continue IV Lasix  · Respiratory protocol and nebulized albuterol and ipratropium/  · Titrate oxygen  · Check influenza and RSV PCR

## 2019-03-03 NOTE — H&P
H&P- Aracely Menchaca 1935, 82 y o  female MRN: [id number]    Unit/Bed#: [facility] Encounter: [id number]    Primary Care Provider: Kareem Lau MD   Date and time admitted to hospital: 3/3/2019  2:40 PM    * SOB (shortness of breath)  Assessment & Plan  · Likely multifactorial due to CHF/obesity/asthma  · Symptoms improved after Lasix and nebs in ED  · Continue IV Lasix  · Respiratory protocol and nebulized albuterol and ipratropium/  · Titrate oxygen  · Check influenza and RSV PCR    3-vessel coronary artery disease  Assessment & Plan  · Exercise tolerance limited  · No active symptoms of CAD  · Reports chest pain after repeated coughing since yesterday  · Check troponins x3  · Continue aspirin, Imdur and metoprolol    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 [facility])  Assessment & Plan  Lab Results   Component Value Date    HGBA1C 8 3 (H) 07/21/2018       No results for input(s): POCGLU in the last 72 hours  Blood Sugar Average: Last 72 hrs:   patient has disposable insulin pump which runs out of meds and tonight  Does not remember her settings  Will place on subcu insulin sliding scale with NovoLog and Lantus    Hypertension  Assessment & Plan  · Blood pressure in /87  · Improved after labetalol and Lasix  · Resume outpatient regimen monitor    DM (diabetes mellitus), type 2, uncontrolled w/ophthalmic complication [facility])  Assessment & Plan  Lab Results   Component Value Date    HGBA1C 8 3 (H) 07/21/2018       No results for input(s): POCGLU in the last 72 hours  Blood Sugar Average: Last 72 hrs:        VTE Prophylaxis: Enoxaparin (Lovenox)  / sequential compression device   Code Status:  Full  POLST: There is no POLST form on file for this patient (pre-hospital)  Discussion with family:  Daughter at bedside    Anticipated Length of Stay:  Patient will be admitted on an Observation basis with an anticipated length of stay of  < 2 midnights     Justification for Hospital Stay: Shortness of breath    Total Time for Visit, including Counseling / Coordination of Care: 70 minutes  Greater than 50% of this total time spent on direct patient counseling and coordination of care  Chief Complaint:   Shortness of breath    History of Present Illness:    Grant Baker is a 82 y o  female with past medical significant for coronary artery disease, diabetes mellitus, hypertension and chronic diastolic CHF who presents with increasing shortness of breath since yesterday  Patient also reports cough with mucoid sputum  Denies any fever chills or rigors  She experienced some chest pain after coughing which currently resolved  Denies any exertional chest pain  She reports compliance with her Lasix  Her daughter reports that her leg swelling beers more than usual for her  Upon evaluation in the emergency department it was reported that she had wheezing which resolved after albuterol nebs    Review of systems positive for headache, which also resolved after Tylenol      Review of Systems:    Review of Systems     Twelve point review systems otherwise negative    Past Medical and Surgical History:     Past Medical History:   Diagnosis Date    Arthritis     Asthma     CAD (coronary artery disease) of artery bypass graft     Cardiac disease     Dementia     Depression     [facility] mellitus ([facility] [address] [facility]     [facility] attack ([facility] [address] [facility]     Hyperlipidemia     Hypertension     MI (myocardial infarction) ([facility] [address] [facility]     Neuropathy     BRANT (obstructive sleep apnea)     Peptic ulcer     was + for H pylori    Transient cerebral ischemia 2011    with neg CUS and ECHO       Past Surgical History:   Procedure Laterality Date    APPENDECTOMY      CATARACT EXTRACTION       SECTION      COLONOSCOPY  2004    CORONARY ANGIOPLASTY  2006    CORONARY ANGIOPLASTY WITH STENT PLACEMENT      CORONARY ARTERY BYPASS GRAFT      DENTAL SURGERY      EGD AND COLONOSCOPY      ELBOW SURGERY resolved 2000    ESOPHAGOGASTRODUODENOSCOPY  2004       Meds/Allergies:    Prior to Admission medications    Medication Sig Start Date End Date Taking?  Authorizing Provider   docusate sodium (COLACE) 100 mg capsule Take 1 capsule (100 mg total) by mouth 2 (two) times a day 4/28/18  Yes Nataly Crawford DO   albuterol (PROVENTIL HFA,VENTOLIN HFA) 90 mcg/act inhaler Inhale 2 puffs every 4 (four) hours as needed for wheezing or shortness of breath 4/28/18   Nataly Crawford DO   amLODIPine (NORVASC) 5 mg tablet TAKE 1 TABLET TWICE DAILY 5/16/18 1/9/19  Chasity Crowley MD   aspirin 81 mg chewable tablet Chew 1 tablet (81 mg total) daily 4/29/18   Nataly Crawford DO   atorvastatin (LIPITOR) 40 mg tablet TAKE 1 TABLET BY MOUTH EVERY DAY 4/11/18   Chasity Crowley MD   calcium carbonate-vitamin D (OSCAL-D) 500 mg-200 units per tablet Take 1 tablet by mouth 2 (two) times a day with meals 4/28/18   Nataly Crawford DO   furosemide (LASIX) 40 mg tablet Take 2 tabs daily  Patient taking differently: Take 1 1/2 tabs daily  12/5/18   Chasity Crowley MD   Insulin Disposable Pump (V-GO 20) KIT Inject under the skin daily for 90 days Use 1 device per day 11/20/18 2/18/19  Joyce Carlos PA-C   isosorbide mononitrate (IMDUR) 60 mg 24 hr tablet TAKE 1 TABLET BY MOUTH EVERY DAY AS DIRECTED 10/29/18   Chasity Crowley MD   losartan (COZAAR) 100 MG tablet Take by mouth    Historical Provider, MD   metoprolol tartrate (LOPRESSOR) 25 mg tablet TAKE 1 & 1/2 TABLETS IN THE MORNING, AND 1 TABLET IN THE EVENING 10/22/18   Susan Mott MD   nitroglycerin (NITROSTAT) 0 4 mg SL tablet Place under the tongue    Historical Provider, MD   NOVOLOG 100 UNIT/ML injection INJECT 100 UNITS DAILY AS DIRECTED 5/26/18   Theo Vasquez MD   ONE TOUCH ULTRA TEST test strip CHECK BLOOD SUGAR 3 TIMES A DAY 9/4/18   Theo Vasquez MD   senna (SENOKOT) 8 6 mg Take 1 tablet (8 6 mg total) by mouth daily 4/29/18   Nataly Crawford,      I have reviewed home medications with patient family member  Allergies: Allergies   Allergen Reactions    Ace Inhibitors     Chlorpromazine     Latex     Lisinopril     Namenda [Memantine]     Penicillins     Propoxyphene     Stadol [Butorphanol]        Social History:     Marital Status: /Civil Union   Occupation:  Retired  Patient Pre-hospital Living Situation:  Lives with family  Patient Pre-hospital Level of Mobility:  Uses walker  Patient Pre-hospital Diet Restrictions:  Diabetic  Substance Use History:   Social History     Substance and Sexual Activity   Alcohol Use No     Social History     Tobacco Use   Smoking Status Never Smoker   Smokeless Tobacco Never Used     Social History     Substance and Sexual Activity   Drug Use No       Family History:    non-contributory    Physical Exam:     Vitals:   Blood Pressure: 154/62 (03/03/19 1611)  Pulse: 72 (03/03/19 1611)  Temperature: 97 7 °F (36 5 °C) (03/03/19 1442)  Temp Source: Oral (03/03/19 1442)  Respirations: 18 (03/03/19 1611)  Weight - Scale: 115 kg (253 lb 12 oz) (03/03/19 1442)  SpO2: 96 % (03/03/19 1611)    Physical Exam     Gen -Patient comfortable at rest  Neck-short thick neck Supple  No thyromegaly or lymphadenopathy  Lungs-clear bilaterally without any wheeze  Heart S1-S2, regular rate and rhythm, systolic murmur present  Abdomen-soft nontender, no organomegaly  Bowel sounds present  Extremities-no cyanosi,  clubbing ; bilateral 3+ pitting edema  Skin- no rash  Neuro-awake alert and oriented x2    Additional Data:     Lab Results: I have personally reviewed pertinent reports        Results from last 7 days   Lab Units 03/03/19  1502   WBC Thousand/uL 6 61   HEMOGLOBIN g/dL 13 4   HEMATOCRIT % 40 9   PLATELETS Thousands/uL 178   NEUTROS PCT % 66   LYMPHS PCT % 23   MONOS PCT % 7   EOS PCT % 3     Results from last 7 days   Lab Units 03/03/19  1500   SODIUM mmol/L 139   POTASSIUM mmol/L 4 3   CHLORIDE mmol/L 99*   CO2 mmol/L 30   BUN mg/dL 28*   CREATININE mg/dL 1 20   ANION GAP mmol/L 10   CALCIUM mg/dL 9 7   ALBUMIN g/dL 3 9   TOTAL BILIRUBIN mg/dL 0 80   ALK PHOS U/L 113   ALT U/L 35   AST U/L 22   GLUCOSE RANDOM mg/dL 431*                       Imaging: I have personally reviewed pertinent reports  X-ray chest 2 views   ED Interpretation by Keaton Leonard DO (03/03 1516)   Cardiomegaly, perihilar vascular congestion      Final Result by Sridevi Hood MD (03/03 1515)      Cardiomegaly with tiny pleural effusions  Workstation performed: [id number]             EKG, Pathology, and Other Studies Reviewed on Admission:   · EKG:  Paced rhythm    Allscripts / Epic Records Reviewed: Yes     ** Please Note: This note has been constructed using a voice recognition system   **

## 2019-03-03 NOTE — ED PROVIDER NOTES
History  Chief Complaint   Patient presents with    Breathing Difficulty     Pt started with SOB a couple of days ago  Headache started last night, hx of asthma CHF  History provided by:  Patient and relative  Shortness of Breath   Severity:  Moderate  Onset quality:  Gradual  Duration:  2 days  Timing:  Intermittent  Progression:  Waxing and waning  Chronicity:  New  Context: activity    Relieved by:  Rest  Worsened by: Activity and exertion  Ineffective treatments:  None tried  Associated symptoms: chest pain    Associated symptoms: no abdominal pain, no cough, no diaphoresis, no fever, no headaches, no neck pain, no rash, no sore throat, no vomiting and no wheezing    Chest pain:     Quality: aching and tightness      Severity:  Moderate    Onset quality:  Gradual    Duration:  2 hours    Timing:  Intermittent    Progression:  Waxing and waning    Chronicity:  New      Prior to Admission Medications   Prescriptions Last Dose Informant Patient Reported? Taking?    Insulin Disposable Pump (V-GO 20) KIT  Child No No   Sig: Inject under the skin daily for 90 days Use 1 device per day   NOVOLOG 100 UNIT/ML injection  Child No No   Sig: INJECT 100 UNITS DAILY AS DIRECTED   ONE TOUCH ULTRA TEST test strip  Child No No   Sig: CHECK BLOOD SUGAR 3 TIMES A DAY   albuterol (PROVENTIL HFA,VENTOLIN HFA) 90 mcg/act inhaler  Child No No   Sig: Inhale 2 puffs every 4 (four) hours as needed for wheezing or shortness of breath   amLODIPine (NORVASC) 5 mg tablet  Child No No   Sig: TAKE 1 TABLET TWICE DAILY   aspirin 81 mg chewable tablet  Child No No   Sig: Chew 1 tablet (81 mg total) daily   atorvastatin (LIPITOR) 40 mg tablet  Child No No   Sig: TAKE 1 TABLET BY MOUTH EVERY DAY   calcium carbonate-vitamin D (OSCAL-D) 500 mg-200 units per tablet  Child No No   Sig: Take 1 tablet by mouth 2 (two) times a day with meals   docusate sodium (COLACE) 100 mg capsule  Child No No   Sig: Take 1 capsule (100 mg total) by mouth 2 (two) times a day   furosemide (LASIX) 40 mg tablet  Child No No   Sig: Take 2 tabs daily   Patient taking differently: Take 1 1/2 tabs daily    isosorbide mononitrate (IMDUR) 60 mg 24 hr tablet  Child No No   Sig: TAKE 1 TABLET BY MOUTH EVERY DAY AS DIRECTED   losartan (COZAAR) 100 MG tablet  Child Yes No   Sig: Take by mouth   metoprolol tartrate (LOPRESSOR) 25 mg tablet  Child No No   Sig: TAKE 1 & 1/2 TABLETS IN THE MORNING, AND 1 TABLET IN THE EVENING   nitroglycerin (NITROSTAT) 0 4 mg SL tablet  Child Yes No   Sig: Place under the tongue   senna (SENOKOT) 8 6 mg  Child No No   Sig: Take 1 tablet (8 6 mg total) by mouth daily      Facility-Administered Medications: None       Past Medical History:   Diagnosis Date    Arthritis     Asthma     CAD (coronary artery disease) of artery bypass graft     Cardiac disease     Dementia     Depression     [facility] [address] [address] [facility]     Heart attack [facility] [address] [address] [facility]     Hyperlipidemia     Hypertension     MI [facility]myocardial infarction) [facility] [address] [facility]     Neuropathy     BRANT (obstructive sleep apnea)     Peptic ulcer     was + for H pylori    Transient cerebral ischemia 2011    with neg CUS and ECHO       Past Surgical History:   Procedure Laterality Date    APPENDECTOMY      CATARACT EXTRACTION       SECTION      COLONOSCOPY  2004    CORONARY ANGIOPLASTY  2006    CORONARY ANGIOPLASTY WITH STENT PLACEMENT      CORONARY ARTERY BYPASS GRAFT      DENTAL SURGERY      EGD AND COLONOSCOPY      ELBOW SURGERY      resolved     ESOPHAGOGASTRODUODENOSCOPY  2004       Family History   Problem Relation Age of Onset    Diabetes Mother     Cancer Sister     Heart disease Sister     Thyroid disease Sister     Cancer Brother     Cancer Father     Colon cancer Family     Diabetes Family     Mitral valve prolapse Family     Thyroid cancer Family      I have reviewed and agree with the history as documented      Social History     Tobacco Use    Smoking status: Never Smoker    Smokeless tobacco: Never Used   Substance Use Topics    Alcohol use: No    Drug use: No        Review of Systems   Constitutional: Negative for activity change, chills, diaphoresis and fever  HENT: Negative for congestion, sinus pressure and sore throat  Eyes: Negative for pain and visual disturbance  Respiratory: Positive for shortness of breath  Negative for cough, chest tightness, wheezing and stridor  Cardiovascular: Positive for chest pain  Negative for palpitations  Gastrointestinal: Negative for abdominal distention, abdominal pain, constipation, diarrhea, nausea and vomiting  Genitourinary: Negative for dysuria and frequency  Musculoskeletal: Negative for neck pain and neck stiffness  Skin: Negative for rash  Neurological: Negative for dizziness, speech difficulty, light-headedness, numbness and headaches  Physical Exam  Physical Exam   Constitutional: She is oriented to person, place, and time  She appears well-developed  No distress  HENT:   Head: Normocephalic and atraumatic  Eyes: Pupils are equal, round, and reactive to light  Neck: Normal range of motion  Neck supple  No tracheal deviation present  Cardiovascular: Normal rate, regular rhythm, normal heart sounds and intact distal pulses  No murmur heard  Pulmonary/Chest: Effort normal and breath sounds normal  No stridor  No respiratory distress  Abdominal: Soft  She exhibits no distension  There is no tenderness  There is no rebound and no guarding  Musculoskeletal: Normal range of motion  Neurological: She is alert and oriented to person, place, and time  Skin: Skin is warm and dry  She is not diaphoretic  No erythema  No pallor  Psychiatric: She has a normal mood and affect  Vitals reviewed        Vital Signs  ED Triage Vitals   Temperature Pulse Respirations Blood Pressure SpO2   03/03/19 1442 03/03/19 1442 03/03/19 1442 03/03/19 1442 03/03/19 1442   97 7 °F (36 5 °C) 83 (!) 27 (!) 201/87 94 %      Temp Source Heart Rate Source Patient Position - Orthostatic VS BP Location FiO2 (%)   03/03/19 1442 03/03/19 1442 03/03/19 1516 03/03/19 1442 --   Oral Monitor Lying Right arm       Pain Score       03/03/19 1442       No Pain           Vitals:    03/03/19 1442 03/03/19 1516 03/03/19 1530 03/03/19 1611   BP: (!) 201/87 153/82 150/87 154/62   Pulse: 83 67 72 72   Patient Position - Orthostatic VS:  Lying Lying Lying       Visual Acuity      ED Medications  Medications   Labetalol HCl (NORMODYNE) injection 10 mg (10 mg Intravenous Not Given 3/3/19 1538)   furosemide (LASIX) injection 40 mg (has no administration in time range)   albuterol inhalation solution 5 mg (5 mg Nebulization Given 3/3/19 1448)   ipratropium (ATROVENT) 0 02 % inhalation solution 0 5 mg (0 5 mg Nebulization Given 3/3/19 1448)   aspirin chewable tablet 324 mg (324 mg Oral Given 3/3/19 1518)       Diagnostic Studies  Results Reviewed     Procedure Component Value Units Date/Time    B-type natriuretic peptide [[id number]]  (Abnormal) Collected:  03/03/19 1503    Lab Status:  Final result Specimen:  Blood Updated:  03/03/19 1544     NT-proBNP 2,516 pg/mL     Comprehensive metabolic panel [[id number]]  (Abnormal) Collected:  03/03/19 1500    Lab Status:  Final result Specimen:  Blood Updated:  03/03/19 1540     Sodium 139 mmol/L      Potassium 4 3 mmol/L      Chloride 99 mmol/L      CO2 30 mmol/L      ANION GAP 10 mmol/L      BUN 28 mg/dL      Creatinine 1 20 mg/dL      Glucose 431 mg/dL      Calcium 9 7 mg/dL      AST 22 U/L      ALT 35 U/L      Alkaline Phosphatase 113 U/L      Total Protein 7 0 g/dL      Albumin 3 9 g/dL      Total Bilirubin 0 80 mg/dL      eGFR 42 ml/min/1 73sq m     Narrative:       National Kidney Disease Education Program recommendations are as follows:  GFR calculation is accurate only with a steady state creatinine  Chronic Kidney disease less than 60 ml/min/1 73 sq  meters  Kidney failure less than 15 ml/min/1 73 sq  meters  Troponin I [[id number]]  (Normal) Collected:  03/03/19 1500    Lab Status:  Final result Specimen:  Blood Updated:  03/03/19 1540     Troponin I <0 02 ng/mL     CBC and differential [[id number]] Resulted:  03/03/19 1502    Lab Status:  Final result Specimen:  Blood Updated:  03/03/19 1502     WBC 6 61 Thousand/uL      RBC 4 48 Million/uL      Hemoglobin 13 4 g/dL      Hematocrit 40 9 %      MCV 91 fL      MCH 29 9 pg      MCHC 32 8 g/dL      RDW 13 5 %      MPV 11 5 fL      Platelets 721 Thousands/uL      nRBC 0 /100 WBCs      Neutrophils Relative 66 %      Immat GRANS % 0 %      Lymphocytes Relative 23 %      Monocytes Relative 7 %      Eosinophils Relative 3 %      Basophils Relative 1 %      Neutrophils Absolute 4 37 Thousands/µL      Immature Grans Absolute 0 01 Thousand/uL      Lymphocytes Absolute 1 50 Thousands/µL      Monocytes Absolute 0 48 Thousand/µL      Eosinophils Absolute 0 17 Thousand/µL      Basophils Absolute 0 08 Thousands/µL                  X-ray chest 2 views   ED Interpretation by Roshni Pearson DO (03/03 1516)   Cardiomegaly, perihilar vascular congestion      Final Result by London Bellamy MD (03/03 1515)      Cardiomegaly with tiny pleural effusions              Workstation performed: [id number]                    Procedures  ECG 12 Lead Documentation  Date/Time: 3/3/2019 4:11 PM  Performed by: Roshni Pearson DO  Authorized by: Roshni Pearson DO     ECG reviewed by me, the ED Provider: yes    Patient location:  ED  Interpretation:     Interpretation: non-specific    Rate:     ECG rate:  76    ECG rate assessment: normal    Rhythm:     Rhythm: atrial fibrillation    Ectopy:     Ectopy: none    QRS:     QRS axis:  Right    QRS intervals:  Normal  Conduction:     Conduction: abnormal      Abnormal conduction: non-specific intraventricular conduction delay    ST segments:     ST segments:  Non-specific  T waves:     T waves: non-specific             Phone Contacts  ED Phone Contact    ED Course         HEART Risk Score      Most Recent Value   History  1 Filed at: 03/03/2019 1622   ECG  1 Filed at: 03/03/2019 1622   Age  2 Filed at: 03/03/2019 1622   Risk Factors  2 Filed at: 03/03/2019 1622   Troponin  0 Filed at: 03/03/2019 1622   Heart Score Risk Calculator   History  1 Filed at: 03/03/2019 1622   ECG  1 Filed at: 03/03/2019 1622   Age  2 Filed at: 03/03/2019 1622   Risk Factors  2 Filed at: 03/03/2019 1622   Troponin  0 Filed at: 03/03/2019 1622   HEART Score  6 Filed at: 03/03/2019 1622   HEART Score  6 Filed at: 03/03/2019 1622                            MDM  Number of Diagnoses or Management Options  Chest pain, exertional: new and requires workup  SOB (shortness of breath) on exertion: new and requires workup     Amount and/or Complexity of Data Reviewed  Clinical lab tests: ordered and reviewed  Tests in the radiology section of CPT®: ordered and reviewed  Decide to obtain previous medical records or to obtain history from someone other than the patient: yes  Obtain history from someone other than the patient: yes  Review and summarize past medical records: yes  Discuss the patient with other providers: yes  Independent visualization of images, tracings, or specimens: yes        Disposition  Final diagnoses:   SOB (shortness of breath) on exertion   Chest pain, exertional     Time reflects when diagnosis was documented in both MDM as applicable and the Disposition within this note     Time User Action Codes Description Comment    3/3/2019  4:04 PM Mo Blevins Add [R06 02] SOB (shortness of breath) on exertion     3/3/2019  4:04 PM Mo Blevins Add [R07 9] Chest pain, exertional       ED Disposition     ED Disposition Condition Date/Time Comment    Admit Stable Sun Mar 3, 2019  4:04 PM Case was discussed with Dr Clarice Ceron and the patient's admission status was agreed to be Admission Status: inpatient status to the service of Dr Clarice Ceron   Follow-up Information    None         Patient's Medications   Discharge Prescriptions    No medications on file     No discharge procedures on file      ED Provider  Electronically Signed by           Gurvinder Bonds DO  03/03/19 DO [place]  03/03/19 6223

## 2019-03-04 VITALS
OXYGEN SATURATION: 94 % | SYSTOLIC BLOOD PRESSURE: 159 MMHG | RESPIRATION RATE: 18 BRPM | HEART RATE: 76 BPM | HEIGHT: 68 IN | TEMPERATURE: 97.9 F | BODY MASS INDEX: 36.19 KG/M2 | DIASTOLIC BLOOD PRESSURE: 79 MMHG | WEIGHT: 238.76 LBS

## 2019-03-04 LAB
ANION GAP SERPL CALCULATED.3IONS-SCNC: 9 MMOL/L (ref 4–13)
BUN SERPL-MCNC: 29 MG/DL (ref 5–25)
CALCIUM SERPL-MCNC: 9.3 MG/DL (ref 8.3–10.1)
CHLORIDE SERPL-SCNC: 101 MMOL/L (ref 100–108)
CO2 SERPL-SCNC: 29 MMOL/L (ref 21–32)
CREAT SERPL-MCNC: 1.21 MG/DL (ref 0.6–1.3)
ERYTHROCYTE [DISTWIDTH] IN BLOOD BY AUTOMATED COUNT: 13.6 % (ref 11.6–15.1)
EST. AVERAGE GLUCOSE BLD GHB EST-MCNC: 243 MG/DL
FLUAV AG SPEC QL: NOT DETECTED
FLUBV AG SPEC QL: NOT DETECTED
GFR SERPL CREATININE-BSD FRML MDRD: 42 ML/MIN/1.73SQ M
GLUCOSE P FAST SERPL-MCNC: 196 MG/DL (ref 65–99)
GLUCOSE SERPL-MCNC: 179 MG/DL (ref 65–140)
GLUCOSE SERPL-MCNC: 181 MG/DL (ref 65–140)
GLUCOSE SERPL-MCNC: 196 MG/DL (ref 65–140)
HBA1C MFR BLD: 10.1 % (ref 4.2–6.3)
HCT VFR BLD AUTO: 37.2 % (ref 34.8–46.1)
HGB BLD-MCNC: 12.2 G/DL (ref 11.5–15.4)
MAGNESIUM SERPL-MCNC: 2 MG/DL (ref 1.6–2.6)
MCH RBC QN AUTO: 29.8 PG (ref 26.8–34.3)
MCHC RBC AUTO-ENTMCNC: 32.8 G/DL (ref 31.4–37.4)
MCV RBC AUTO: 91 FL (ref 82–98)
PLATELET # BLD AUTO: 154 THOUSANDS/UL (ref 149–390)
PMV BLD AUTO: 12 FL (ref 8.9–12.7)
POTASSIUM SERPL-SCNC: 3.8 MMOL/L (ref 3.5–5.3)
RBC # BLD AUTO: 4.09 MILLION/UL (ref 3.81–5.12)
RSV B RNA SPEC QL NAA+PROBE: NOT DETECTED
SODIUM SERPL-SCNC: 139 MMOL/L (ref 136–145)
WBC # BLD AUTO: 6.29 THOUSAND/UL (ref 4.31–10.16)

## 2019-03-04 PROCEDURE — 97110 THERAPEUTIC EXERCISES: CPT

## 2019-03-04 PROCEDURE — 80048 BASIC METABOLIC PNL TOTAL CA: CPT | Performed by: INTERNAL MEDICINE

## 2019-03-04 PROCEDURE — G8979 MOBILITY GOAL STATUS: HCPCS

## 2019-03-04 PROCEDURE — G8978 MOBILITY CURRENT STATUS: HCPCS

## 2019-03-04 PROCEDURE — 83735 ASSAY OF MAGNESIUM: CPT | Performed by: INTERNAL MEDICINE

## 2019-03-04 PROCEDURE — 85027 COMPLETE CBC AUTOMATED: CPT | Performed by: INTERNAL MEDICINE

## 2019-03-04 PROCEDURE — 97163 PT EVAL HIGH COMPLEX 45 MIN: CPT

## 2019-03-04 PROCEDURE — 82948 REAGENT STRIP/BLOOD GLUCOSE: CPT

## 2019-03-04 PROCEDURE — 99217 PR OBSERVATION CARE DISCHARGE MANAGEMENT: CPT | Performed by: INTERNAL MEDICINE

## 2019-03-04 RX ORDER — FUROSEMIDE 40 MG/1
40 TABLET ORAL 2 TIMES DAILY
Qty: 60 TABLET | Refills: 0 | Status: SHIPPED | OUTPATIENT
Start: 2019-03-04 | End: 2019-03-27 | Stop reason: ALTCHOICE

## 2019-03-04 RX ADMIN — INSULIN LISPRO 10 UNITS: 100 INJECTION, SOLUTION INTRAVENOUS; SUBCUTANEOUS at 09:51

## 2019-03-04 RX ADMIN — SENNOSIDES 8.6 MG: 8.6 TABLET, FILM COATED ORAL at 09:48

## 2019-03-04 RX ADMIN — FUROSEMIDE 40 MG: 10 INJECTION, SOLUTION INTRAMUSCULAR; INTRAVENOUS at 09:48

## 2019-03-04 RX ADMIN — ASPIRIN 81 MG 81 MG: 81 TABLET ORAL at 09:48

## 2019-03-04 RX ADMIN — INSULIN LISPRO 1 UNITS: 100 INJECTION, SOLUTION INTRAVENOUS; SUBCUTANEOUS at 09:51

## 2019-03-04 RX ADMIN — ATORVASTATIN CALCIUM 40 MG: 40 TABLET, FILM COATED ORAL at 09:48

## 2019-03-04 RX ADMIN — ENOXAPARIN SODIUM 40 MG: 40 INJECTION SUBCUTANEOUS at 09:48

## 2019-03-04 RX ADMIN — LOSARTAN POTASSIUM 100 MG: 50 TABLET, FILM COATED ORAL at 09:48

## 2019-03-04 RX ADMIN — METOPROLOL TARTRATE 37.5 MG: 25 TABLET ORAL at 09:48

## 2019-03-04 RX ADMIN — ISOSORBIDE MONONITRATE 60 MG: 60 TABLET, EXTENDED RELEASE ORAL at 09:48

## 2019-03-04 NOTE — PLAN OF CARE
Problem: PHYSICAL THERAPY ADULT  Goal: Performs mobility at highest level of function for planned discharge setting  See evaluation for individualized goals  Description  Treatment/Interventions: Functional transfer training, LE strengthening/ROM, Therapeutic exercise, Endurance training, Patient/family training, Equipment eval/education, Bed mobility, Gait training, Cognitive reorientation, Spoke to nursing, Spoke to case management  Equipment Recommended: Renetta Brewer       See flowsheet documentation for full assessment, interventions and recommendations  Note:   Prognosis: Good  Problem List: Decreased strength, Decreased endurance, Impaired balance, Decreased mobility, Decreased safety awareness, Impaired vision, Obesity  Assessment: Pt  is an 82 to F who presents with increasing SOB  Dx: SOB order placed for PT eval and tx, w/ activity order of up w/ A  pt presents w/ comorbidities of  CAD, DM2, Htn, Abnormal TSH, Intermittnet Astma, Dementia, Depression, Glaucoma, HLD, BRANT, V-Tach, CHF and personal factors of advanced age, inaccessible home environment, mobilizing w/ assistive device, inability to navigate level surfaces w/o external assistance, unable to perform dynamic tasks in community, decreased cognition, visual impairments, unable to perform physical activity, limited insight into impairments, inability to perform IADLs, inability to perform ADLs and inability to live alone  pt presents w/ weakness, decreased endurance, impaired cognition, impaired balance, gait deviations, visual impairment, decreased safety awareness, fall risk and LE edema   these impairments are evident in findings from physical examination (weakness and edema of extremities), mobility assessment (need for supervision assist w/ all phases of mobility when usually mobilizing independently, tolerance to only 150 feet of ambulation and need for cueing for mobility technique), and Barthel Index: 45/100  pt needed input for task focus and mobility technique  pt is at risk for falls due to physical and safety awareness deficits  pt's clinical presentation is unstable/unpredictable (evident in need for assist w/ all phases of mobility when usually mobilizing independently, tolerance to only 150 feet of ambulation, need for input for mobility technique, need for input for task focus and mobility technique and need for input for mobility technique/safety)  pt needs inpatient PT tx to improve mobility deficits  discharge recommendation is for home PT to reduce fall risk and maximize level of functional independence  Recommendation: Home PT, Home with family support     PT - OK to Discharge: Yes    See flowsheet documentation for full assessment

## 2019-03-04 NOTE — DISCHARGE SUMMARY
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 ([facility] )    Hypertension    DM (diabetes mellitus), type 2, uncontrolled w/ophthalmic complication ([place] [address] )  Resolved Problems:    * No resolved hospital problems  *      Consultations During Hospital Stay:  · None    Procedures Performed:     · Chest x-ray showed cardiomegaly and mild pleural effusions    Significant Findings / Test Results:     · None    Incidental Findings:   · None    Test Results Pending at Discharge (will require follow up): · None     Outpatient Tests Requested:  · None    Complications:  None    Hospital Course:     Kori Kilgore is a 82 y o  female with past medical significant for coronary artery disease, chronic diastolic CHF, hypertension and uncontrolled diabetes mellitus patient who originally presented to the hospital on 3/3/2019 due to shortness of breath  Upon evaluation in the emergency department patient noted to have mild wheezing and lower extremity edema  She received IV diuretics and albuterol nebs  Her symptoms have resolved  She is stable on room air  Being discharged home in stable condition  Patient requested to increase her diuretic dosage to Lasix 40 mg b i d  At home she was on 60 mg once daily  Recommended fluid restriction and dietary modifications  Home physical therapy and VNA services were offered at home, but patient and family declined      Condition at Discharge: good     Discharge Day Visit / Exam:     Subjective:  Feeling better today  Off oxygen  Ambulating with physical therapy  Vitals: Blood Pressure: 159/79 (03/04/19 0822)  Pulse: 76 (03/04/19 0822)  Temperature: 97 9 °F (36 6 °C) (03/04/19 0822)  Temp Source: Oral (03/04/19 5995)  Respirations: 18 (03/04/19 0822)  Height: 5' 8" (172 7 cm) (03/03/19 1659)  Weight - Scale: 108 kg (238 lb 12 1 oz) (03/04/19 0600)  SpO2: 94 % (03/04/19 9403)  Exam:   Physical Exam     Gen -Patient comfortable   Neck- Supple  No thyromegaly or lymphadenopathy  Lungs-Clear bilaterally without any wheeze or rales   Heart S1-S2, regular rate and rhythm, no murmurs  Abdomen-soft nontender, no organomegaly  Bowel sounds present  Extremities-no cyanosi,  clubbing or edema  Skin- no rash  Neuro-nonfocal       Discussion with Family:  Daughter    Discharge instructions/Information to patient and family:   See after visit summary for information provided to patient and family  Provisions for Follow-Up Care:  See after visit summary for information related to follow-up care and any pertinent home health orders  Planned Readmission:  No     Discharge Statement:  I spent 35 minutes discharging the patient  This time was spent on the day of discharge  I had direct contact with the patient on the day of discharge  Greater than 50% of the total time was spent examining patient, answering all patient questions, arranging and discussing plan of care with patient as well as directly providing post-discharge instructions  Additional time then spent on discharge activities  Discharge Medications:  See after visit summary for reconciled discharge medications provided to patient and family        ** Please Note: This note has been constructed using a voice recognition system **

## 2019-03-04 NOTE — UTILIZATION REVIEW
Initial Clinical Review    ADMIT  OBS  On  3/3  @  1603    Orders Placed This Encounter   Procedures    Place in Observation (expected length of stay for this patient is less than two midnights)     Standing Status:   Standing     Number of Occurrences:   1     Order Specific Question:   Admitting Physician     Answer:   Luan Mejia     Order Specific Question:   Level of Care     Answer:   Med Surg [16]     ED: Date/Time/Mode of Arrival:   ED Arrival Information     Expected Arrival Acuity Means of Arrival Escorted By Service Admission Type    - 3/3/2019 14:24 Emergent Wheelchair Family Member General Medicine Emergency    Arrival Complaint    Wheezing/Difficulty Breathing        Chief Complaint:   Chief Complaint   Patient presents with    Breathing Difficulty     Pt started with SOB a couple of days ago  Headache started last night, hx of asthma CHF  History of Illness:  81 yo female presents w/increasing SOB since yesterday,  Productive cough    Reports compliance with her lasix,  Daughter states LE edema is "more than usual"   Upon exam,  Audible wheezing noted -  Neb therapy initiated        ED Vital Signs:   ED Triage Vitals   Temperature Pulse Respirations Blood Pressure SpO2   03/03/19 1442 03/03/19 1442 03/03/19 1442 03/03/19 1442 03/03/19 1442   97 7 °F (36 5 °C) 83 (!) 27 (!) 201/87 94 %      Temp Source Heart Rate Source Patient Position - Orthostatic VS BP Location FiO2 (%)   03/03/19 1442 03/03/19 1442 03/03/19 1516 03/03/19 1442 --   Oral Monitor Lying Right arm       Pain Score       03/03/19 1442       No Pain        Wt Readings from Last 1 Encounters:   03/04/19 108 kg (238 lb 12 1 oz)     03/03/19 1516    67  20  153/82  97 %  None (Room air)  Lying       Pertinent Labs/Diagnostic Test Results:   bnp  2516  Bun  28  crt  1 20  gfr  42  Wbc  6 61  Serum glucose  431  cxr -  Cardiomegaly , perihilar vascular congestion,  Tiny pleural effusions   ekg -  PACED rhythm,  rate 76, Non specific ST wave changes    ED Treatment:   Medication Administration from 03/03/2019 1424 to 03/03/2019 1653       Date/Time Order Dose Route Action Action by Comments     03/03/2019 1448 albuterol inhalation solution 5 mg 5 mg Nebulization Given Elton Berger RN      03/03/2019 1448 ipratropium (ATROVENT) 0 02 % inhalation solution 0 5 mg 0 5 mg Nebulization Given Elton Berger RN                 03/03/2019 1518 aspirin chewable tablet 324 mg 324 mg Oral Given Tony Maciel RN      03/03/2019 1634 furosemide (LASIX) injection 40 mg 40 mg Intravenous Given Tony Maciel RN         Past Medical/Surgical History:    Active Ambulatory Problems     Diagnosis Date Noted    3-vessel coronary artery disease 08/23/2012    Abnormal TSH 10/31/2017    Intermittent asthma with acute exacerbation 08/23/2012    Dementia without behavioral disturbance 11/13/2013    Depression 08/23/2012    Di[facility] 08/23/2012    DM (diabetes mellitus), type 2, uncontrolled w/ophthalmic complication ([facility] ) 14/89/0332    Gastroesophageal reflux disease with hiatal hernia 06/27/2014    Glaucoma 08/23/2012    Hyperlipidemia 08/23/2012    Hypertension 08/23/2012    Cerebral artery occlusion with cerebral infarction [facility] ) 03/05/2014    Obesity 08/23/2012    Obstructive sleep apnea 08/23/2012    Osteoarthritis of knee 08/23/2012    Urine incontinence 11/01/2017    Ventricular tachycardia ([facility] ) 12/06/2017    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 ([facility] ) 04/23/2018    Abnormal chest x-ray 04/23/2018    Urine retention 04/26/2018    Congestive heart failure with LV diastolic dysfunction, NYHA class 2 ([facility] ) 05/02/2018     Resolved Ambulatory Problems     Diagnosis Date Noted    Acute respiratory failure ([facility] ) 04/23/2018    Acute on chronic diastolic heart failure ([facility] ) 04/23/2018    Accelerated hypertension 04/23/2018    Hyperkalemia 04/24/2018     Past Medical History: Diagnosis Date    Arthritis     Asthma     CAD (coronary artery disease) of artery bypass graft     Cardiac disease     Dementia     Depression     Diabetes mellitus [facility]     Heart attack ([facility] )     Hyperlipidemia     Hypertension     MI (myocardial infarction) ([facility] )     Neuropathy     BRANT (obstructive sleep apnea)     Peptic ulcer     Transient cerebral ischemia 11/2011     Admitting Diagnosis: Difficulty breathing [R06 89]  Chest pain, exertional [R07 9]  SOB (shortness of breath) on exertion [R06 02]    Assessment/Plan:  SOB, likely multifactorial due to CHF/obesity/asthma,  Improved after lasix and nebs in ed   Cont IV lasix,  Reps protocol/duo nebs,  Titrate oxygen,  Ck influenza and RSV/PCR  3 vessel CAD,  Exercise toleratnce limited   Ck trops x3,  Cont asa, imdur and metoprolol    DM, pt has insulin pump but per pt, out of insulin and does not remember her settings   Dc insulin pump; Initiate accucks qid w/sliding scale insulin  HTN - improved after labetalol and lasix, resume home meds      Admission Orders:  Admit telemetry w/spot  pulse ox   Oxygen prn ;  resp protocol  accucks qid w/sliding scale insulin   PT/OT eval and treat   Fluid restriction 1500 ml/ day;  Daily wgt;  I/o q shift; up w/assist    3/4 -      97 9  76   18   159/79   94% ra     3/4/2019  DC SUMMARY   Upon evaluation in the emergency department patient noted to have mild wheezing and lower extremity edema  She received IV diuretics and albuterol nebs  Her symptoms have resolved  She is stable on room air  Being discharged home in stable condition  Patient requested to increase her diuretic dosage to Lasix 40 mg b i d  At home she was on 60 mg once daily  Recommended fluid restriction and dietary modifications  Home physical therapy and VNA services were offered at home, but patient and family declined

## 2019-03-04 NOTE — PHYSICAL THERAPY NOTE
PHYSICAL THERAPY NOTE    Patient Name: Ligia Greene  TCUXP'U Date: 3/4/2019     AGE:   82 y o  Mrn:   [id number]  ADMIT DX:  Difficulty breathing [R06 89]  Chest pain, exertional [R07 9]  SOB (shortness of breath) on exertion [R06 02]    Past Medical History:   Diagnosis Date    Arthritis     Asthma     CAD (coronary artery disease) of artery bypass graft     Cardiac disease     Dementia     Depression     Diabetes mellitus [facility] )     Heart attack ([facility] )     Hyperlipidemia     Hypertension     MI (myocardial infarction) ([facility] )     Neuropathy     BRANT (obstructive sleep apnea)     Peptic ulcer     was + for H pylori    Transient cerebral ischemia 11/2011    with neg CUS and ECHO     Length Of Stay: 0  PHYSICAL THERAPY EVALUATION :    03/04/19 0845   Note Type   Note type Eval/Treat   Pain Assessment   Pain Assessment No/denies pain   Pain Score No Pain   Home Living   Type of [address] [place] [address] One level; Laundry in basement;Able to live on main level with bedroom/bathroom  (0 AB, )   Bathroom Shower/Tub Walk-in shower   Bathroom Toilet Standard   Bathroom Equipment Grab bars in shower; Shower chair   [address] Walker   Additional Comments Pt  lives with family in a [facility] with 0 AB full flight to basement for laundry however recieves assistance with this task  Merlin Sovereign as AD at baseline   Prior Function   Level of [place] Independent with ADLs and functional mobility; Needs assistance with IADLs   Lives With Family   Receives Help From Family   ADL Assistance Needs assistance   IADLs Needs assistance   Falls in the last 6 months 0   Vocational Retired  (LPN)   Comments PTA, Pt  reports INDEP with ADLs, IADLs, and functional mobility with RW as AD   Restrictions/Precautions   Other Precautions Cognitive; Chair Alarm; Bed Alarm;Multiple lines; Fall Risk;Visual impairment General   Additional Pertinent History Pt  is an 82 to F who presents with increasing SOB  Dx: SOB, Comorbidities CAD, DM2, Htn, Abnormal TSH, Intermittnet Astma, Dementia, Depression, Glaucoma, HLD, BRANT, V-Tach, CHF    Family/Caregiver Present No   Cognition   Overall Cognitive Status Impaired   Arousal/Participation Cooperative   Orientation Level Oriented X4   Memory Decreased long term memory;Decreased short term memory   Following Commands Follows all commands and directions without difficulty   Comments Pt  was identified with full name and birthdate, verified via ID bracelet   RUE Assessment   RUE Assessment WFL   LUE Assessment   LUE Assessment WFL   RLE Assessment   RLE Assessment WFL  (grossly 4-/5)   LLE Assessment   LLE Assessment WFL  (grossly 4-/5)   Coordination   Movements are Fluid and Coordinated 1   Sensation WFL   Light Touch   RLE Light Touch Grossly intact   LLE Light Touch Grossly intact   Bed Mobility   Supine to Sit 5  Supervision   Additional items HOB elevated; Bedrails; Increased time required   Sit to Supine Unable to assess   Additional Comments Pt  seated OOB in recliner following PT session   Transfers   Sit to Stand 5  Supervision   Additional items Assist x 1; Armrests; Increased time required;Verbal cues  (for hand placement and sequencing)   Stand to Sit 5  Supervision   Additional items Assist x 1; Impulsive;Verbal cues;Armrests  (to reach back for controlled descent)   Ambulation/Elevation   Gait pattern Improper Weight shift; Wide LISET; Forward Flexion;Decreased foot clearance;Shuffling; Short stride;Redundant gait at times   Gait Assistance 5  Supervision   Additional items Assist x 1;Verbal cues  (for posture and gait mechanics)   Assistive Device Rolling walker   Distance 150'x1   Balance   Static Sitting Fair +   Dynamic Sitting Fair   Static Standing Fair -   Dynamic Standing Fair -   Ambulatory Fair -   Endurance Deficit   Endurance Deficit Yes   Endurance Deficit Description postural and gait degradation with fatigue   Activity Tolerance   Activity Tolerance Patient tolerated treatment well;Patient limited by fatigue   Medical Staff Made Aware Spoke to WILLIAM Erickson   Nurse Made Aware Spoke to Shakira Novak RN   Assessment   Prognosis Good   Problem List Decreased strength;Decreased endurance; Impaired balance;Decreased mobility; Decreased safety awareness; Impaired vision;Obesity   Assessment Pt  is an 82 to F who presents with increasing SOB  Dx: SOB order placed for PT eval and tx, w/ activity order of up w/ A  pt presents w/ comorbidities of  CAD, DM2, Htn, Abnormal TSH, Intermittnet Astma, Dementia, Depression, Glaucoma, HLD, BRANT, V-Tach, CHF and personal factors of advanced age, inaccessible home environment, mobilizing w/ assistive device, inability to navigate level surfaces w/o external assistance, unable to perform dynamic tasks in community, decreased cognition, visual impairments, unable to perform physical activity, limited insight into impairments, inability to perform IADLs, inability to perform ADLs and inability to live alone  pt presents w/ weakness, decreased endurance, impaired cognition, impaired balance, gait deviations, visual impairment, decreased safety awareness, fall risk and LE edema  these impairments are evident in findings from physical examination (weakness and edema of extremities), mobility assessment (need for supervision assist w/ all phases of mobility when usually mobilizing independently, tolerance to only 150 feet of ambulation and need for cueing for mobility technique), and Barthel Index: 45/100  pt needed input for task focus and mobility technique  pt is at risk for falls due to physical and safety awareness deficits   pt's clinical presentation is unstable/unpredictable (evident in need for assist w/ all phases of mobility when usually mobilizing independently, tolerance to only 150 feet of ambulation, need for input for mobility technique, need for input for task focus and mobility technique and need for input for mobility technique/safety)  pt needs inpatient PT tx to improve mobility deficits  discharge recommendation is for home PT to reduce fall risk and maximize level of functional independence  Goals   Patient Goals to get out of here and get home! STG Expiration Date 03/14/19   Short Term Goal #1 pt will: Increase bilateral LE strength 1/2 grade to facilitate independent mobility, Perform all bed mobility tasks independently to decrease fall risk factors, Perform all transfers independently to improve independence, Ambulate 350 ft  with roller walker modified independent w/o LOB, Increase all balance 1/2 grade to decrease risk for falls and Improve Barthel Index score to 70 or greater to facilitate independence   Treatment Day 1   Plan   Treatment/Interventions Functional transfer training;LE strengthening/ROM; Therapeutic exercise; Endurance training;Patient/family training;Equipment eval/education; Bed mobility;Gait training;Cognitive reorientation;Spoke to nursing;Spoke to case management   PT Frequency 5x/wk   Recommendation   Recommendation Home PT; Home with family support   Equipment Recommended Walker   PT - OK to Discharge Yes   Additional Comments when medically appropriate   Barthel Index   Feeding 5   Bathing 0   Grooming Score 5   Dressing Score 5   Bladder Score 0   Bowels Score 0   Toilet Use Score 5   Transfers (Bed/Chair) Score 10   Mobility (Level Surface) Score 10   Stairs Score 5   Barthel Index Score 45     Skilled PT recommended while in hospital and upon DC to progress pt toward treatment goals  [id number] Treatment Note    S: Pt  Agreeable to Continued PT session for LE therEx    O: Pt  Was educated in seated LE strengthening exercises include seated marching, LAQ and Ankle Pumps 2x15 BLEs   Pt  Demonstrated appropriate technique, and time was provided with utilization of teachback to demonstrated understanding of education provided  Pt  Was provided time for all questions to be answered and left with all needs met, and call bell with in reach  A: Pt  Continues to demonstrate activity limitations as outlined above and will benefit from continued skilled therapy to increase functional mobility and decrease fall risk, aiding patient in ability to return to PLOF       P: Continued with PT POC as outlined above    Harriet Magallon PT 3/4/2019

## 2019-03-04 NOTE — SOCIAL WORK
CM met with Pt at bedside to discuss OBS status  CM discussed recommendation of Home PT, Pt states she does not need VNA  Pt states she can get around the house with no issues and she has support at home from her son and grandsons  WILLIAM also spoke with Pt's daughter Grisel Foley via phone and Grisel Foley is in agreement with Pt's choice of not having VNA  Grisel Foley is on her way back to the hospital and will transport Pt home at discharge

## 2019-03-05 ENCOUNTER — TRANSITIONAL CARE MANAGEMENT (OUTPATIENT)
Dept: FAMILY MEDICINE CLINIC | Facility: CLINIC | Age: 84
End: 2019-03-05

## 2019-03-07 ENCOUNTER — OFFICE VISIT (OUTPATIENT)
Dept: FAMILY MEDICINE CLINIC | Facility: CLINIC | Age: 84
End: 2019-03-07
Payer: COMMERCIAL

## 2019-03-07 VITALS
WEIGHT: 239 LBS | HEART RATE: 68 BPM | DIASTOLIC BLOOD PRESSURE: 84 MMHG | RESPIRATION RATE: 20 BRPM | OXYGEN SATURATION: 98 % | SYSTOLIC BLOOD PRESSURE: 114 MMHG | BODY MASS INDEX: 39.82 KG/M2 | TEMPERATURE: 97.8 F | HEIGHT: 65 IN

## 2019-03-07 DIAGNOSIS — J45.21 MILD INTERMITTENT ASTHMA WITH ACUTE EXACERBATION: ICD-10-CM

## 2019-03-07 DIAGNOSIS — IMO0002 DM (DIABETES MELLITUS), TYPE 2, UNCONTROLLED W/OPHTHALMIC COMPLICATION: ICD-10-CM

## 2019-03-07 DIAGNOSIS — Z09 HOSPITAL DISCHARGE FOLLOW-UP: Primary | ICD-10-CM

## 2019-03-07 DIAGNOSIS — I10 ESSENTIAL HYPERTENSION: ICD-10-CM

## 2019-03-07 DIAGNOSIS — I50.30 CONGESTIVE HEART FAILURE WITH LV DIASTOLIC DYSFUNCTION, NYHA CLASS 2 (HCC): ICD-10-CM

## 2019-03-07 DIAGNOSIS — I25.10 3-VESSEL CORONARY ARTERY DISEASE: ICD-10-CM

## 2019-03-07 DIAGNOSIS — F43.21 GRIEVING: ICD-10-CM

## 2019-03-07 PROCEDURE — 99496 TRANSJ CARE MGMT HIGH F2F 7D: CPT | Performed by: FAMILY MEDICINE

## 2019-03-07 PROCEDURE — 1111F DSCHRG MED/CURRENT MED MERGE: CPT | Performed by: FAMILY MEDICINE

## 2019-03-07 PROCEDURE — 1160F RVW MEDS BY RX/DR IN RCRD: CPT | Performed by: FAMILY MEDICINE

## 2019-03-07 RX ORDER — ALBUTEROL SULFATE 2.5 MG/3ML
2.5 SOLUTION RESPIRATORY (INHALATION) EVERY 6 HOURS PRN
Qty: 60 VIAL | Refills: 1 | Status: SHIPPED | OUTPATIENT
Start: 2019-03-07 | End: 2019-05-09 | Stop reason: HOSPADM

## 2019-03-07 NOTE — PROGRESS NOTES
Chief Complaint   Patient presents with    Transition of Care Management     TCM 03/04/19  Health Maintenance   Topic Date Due    SLP PLAN OF CARE  1935    BMI: Followup Plan  09/17/1955    HEPATITIS B VACCINES (1 of 3 - Risk 3-dose series) 09/17/1956    DTaP,Tdap,and Td Vaccines (1 - Tdap) 01/02/1995    Diabetic Foot Exam  08/31/2017    DM Eye Exam  05/04/2018    INFLUENZA VACCINE  07/01/2018    Fall Risk  11/29/2018    Medicare Annual Wellness Visit (AWV)  11/29/2018    URINE MICROALBUMIN  07/21/2019    HEMOGLOBIN A1C  09/03/2019    Urinary Incontinence Screening  12/12/2019    BMI: Adult  03/04/2020    DXA SCAN  05/16/2022    Pneumococcal PPSV23/PCV13 65+ Years / Low and Medium Risk  Completed     Assessment/Plan:     Reviewed hospital records  CHF---advised pt to take lasix 40mg bid as instructed  Low salt diet  Watch weight  Will recheck BMP and BNP in 1 week  If worse, follow up with cardiology  HTN/CAD---controlled  Continue amlodipine, imdur, metoprolol and losartan  Asthma---Give nebulizer script  Use Q 4-6 hours as needed  DM---3/2019 hgA1C 10 1 uncontrolled  FU endocrinology  Daughter states she will call to make an appt  RTO in 3 months  Diagnoses and all orders for this visit:    Hospital discharge follow-up    Congestive heart failure with LV diastolic dysfunction, NYHA class 2 ([facility] )  -     Basic metabolic panel; Future  -     NT-BNP PRO; Future    Essential hypertension    3-vessel coronary artery disease    Mild intermittent asthma with acute exacerbation  -     Nebulizer  -     Nebulizer Supplies  -     albuterol (2 5 mg/3 mL) 0 083 % nebulizer solution; Take 1 vial (2 5 mg total) by nebulization every 6 (six) hours as needed for wheezing or shortness of breath    DM (diabetes mellitus), type 2, uncontrolled w/ophthalmic complication (HCC)         Subjective:     Patient ID: Oni Philippe is a 82 y o  female  HPI  Pt is here with daughter  Was in hospital 3/3-3/4/2019 for SOB, CHF  Labs showed BNP 2516  CXR showed no pneumonia, tiny pleural effusions  Pt got IV diuretics and albuterol nebs which helped a lot  Discharged home with lasix 40mg bid  Daughter states pt only took lasix 40mg QD in last several days  SOB is better but not as good as before  Denies fever, chest pain, n/v/abd pain  Did not check weight  Tried to follow low salt diet  HTN---She is on amlodipine 5mg QD, imdur 60mg QD, losartan 100mg QD, metoprolol 25mg daily  FU cardiology Dr Cm Arabia regularly for pacemaker 5/2014, CAD s/p bypass 1999  Asthma---Pt uses albuterol inhaler as needed, daughter states nebulizer works better for her  Would like script       DM---3/2019 HgA1C 10 1 FU endocrinology  She is on V-Go 20      Hyperlipidemia---on atorvastatin 40mg qhs  Denies side effects  Lives with her niece  Daughter helped her with her medications   passed away last year  She missed her  a lot  Review of Systems   Constitutional: Negative for appetite change, chills and fever  HENT: Negative for congestion, ear pain, sinus pain and sore throat  Eyes: Negative for discharge and itching  Respiratory: Negative for apnea, cough, chest tightness, shortness of breath and wheezing  Cardiovascular: Negative for chest pain, palpitations and leg swelling  Gastrointestinal: Negative for abdominal pain, anal bleeding, constipation, diarrhea, nausea and vomiting  Endocrine: Negative for cold intolerance, heat intolerance and polyuria  Genitourinary: Negative for difficulty urinating and dysuria  Musculoskeletal: Negative for arthralgias, back pain and myalgias  Skin: Negative for rash  Neurological: Negative for dizziness and headaches  Psychiatric/Behavioral: Negative for agitation  Objective:     Physical Exam   Constitutional: She appears well-developed  No distress  HENT:   Head: Normocephalic     Eyes: Pupils are equal, round, and reactive to light  Conjunctivae are normal  Right eye exhibits no discharge  Left eye exhibits no discharge  Neck: Normal range of motion  No thyromegaly present  Cardiovascular: Normal rate, regular rhythm and normal heart sounds  Exam reveals no gallop and no friction rub  No murmur heard  Pulmonary/Chest: Effort normal and breath sounds normal  No respiratory distress  She has no wheezes  She has no rales  She exhibits no tenderness  Abdominal: Soft  Bowel sounds are normal    Musculoskeletal: She exhibits edema  Use walker  Lower extremities 1+ edema   Lymphadenopathy:     She has no cervical adenopathy  Neurological: She is alert  Psychiatric: She has a normal mood and affect  Vitals:    03/07/19 1022   BP: 114/84   BP Location: Left arm   Patient Position: Sitting   Cuff Size: Large   Pulse: 68   Resp: 20   Temp: 97 8 °F (36 6 °C)   TempSrc: Tympanic   SpO2: 98%   Weight: 108 kg (239 lb)   Height: 5' 5" (1 651 m)       Transitional Care Management Review:  Tyler Lima is a 82 y o  female here for TCM follow up  During the TCM phone call patient stated:    TCM Call (since 2/4/2019)     Date and time call was made  3/5/2019 11:21 AM    Hospital care reviewed  Records reviewed    Patient was hospitialized at  [facility]    Date of Admission  03/03/19    Date of discharge  03/04/19    Diagnosis  SOB (shortness of breath)    Disposition  Home    Were the patients medications reviewed and updated  Yes    Current Symptoms  Shortness of breath    Shortness of breath severity  Mild      TCM Call (since 2/4/2019)     Post hospital issues  None    Should patient be enrolled in anticoag monitoring? Yes Aspirin    Scheduled for follow up?   Yes    Patients specialists  Cardiologist    Did you obtain your prescribed medications  No    Do you need help managing your prescriptions or medications  No    Is transportation to your appointment needed  No    I have advised the patient to call PCP with any new or worsening symptoms  [address]; Family members    [address]; Family    The type of support provided  Emotional; Physical    Do you have social support  Yes, as much as I need    Are you recieving any outpatient services  No    Are you recieving home care services  No    Are you using any community resources  No    Current waiver services  No    Have you fallen in the last 12 months  No    Interperter language line needed  No    Counseling  Family    Counseling topics  instructions for management          [place], [place]

## 2019-03-20 ENCOUNTER — APPOINTMENT (OUTPATIENT)
Dept: LAB | Facility: MEDICAL CENTER | Age: 84
End: 2019-03-20
Payer: COMMERCIAL

## 2019-03-20 DIAGNOSIS — I50.30 CONGESTIVE HEART FAILURE WITH LV DIASTOLIC DYSFUNCTION, NYHA CLASS 2 (HCC): ICD-10-CM

## 2019-03-20 LAB
ANION GAP SERPL CALCULATED.3IONS-SCNC: 4 MMOL/L (ref 4–13)
BUN SERPL-MCNC: 28 MG/DL (ref 5–25)
CALCIUM SERPL-MCNC: 8.8 MG/DL (ref 8.3–10.1)
CHLORIDE SERPL-SCNC: 102 MMOL/L (ref 100–108)
CO2 SERPL-SCNC: 32 MMOL/L (ref 21–32)
CREAT SERPL-MCNC: 1.02 MG/DL (ref 0.6–1.3)
GFR SERPL CREATININE-BSD FRML MDRD: 52 ML/MIN/1.73SQ M
GLUCOSE SERPL-MCNC: 181 MG/DL (ref 65–140)
NT-PROBNP SERPL-MCNC: 2154 PG/ML
POTASSIUM SERPL-SCNC: 4.2 MMOL/L (ref 3.5–5.3)
SODIUM SERPL-SCNC: 138 MMOL/L (ref 136–145)

## 2019-03-20 PROCEDURE — 83880 ASSAY OF NATRIURETIC PEPTIDE: CPT

## 2019-03-20 PROCEDURE — 36415 COLL VENOUS BLD VENIPUNCTURE: CPT

## 2019-03-20 PROCEDURE — 80048 BASIC METABOLIC PNL TOTAL CA: CPT

## 2019-03-22 ENCOUNTER — REMOTE DEVICE CLINIC VISIT (OUTPATIENT)
Dept: CARDIOLOGY CLINIC | Facility: CLINIC | Age: 84
End: 2019-03-22
Payer: COMMERCIAL

## 2019-03-22 DIAGNOSIS — I49.5 SICK SINUS SYNDROME (HCC): Primary | ICD-10-CM

## 2019-03-22 DIAGNOSIS — Z95.0 PRESENCE OF PERMANENT CARDIAC PACEMAKER: ICD-10-CM

## 2019-03-22 DIAGNOSIS — I44.2 COMPLETE ATRIOVENTRICULAR BLOCK (HCC): ICD-10-CM

## 2019-03-22 PROCEDURE — 93296 REM INTERROG EVL PM/IDS: CPT | Performed by: INTERNAL MEDICINE

## 2019-03-22 PROCEDURE — 93294 REM INTERROG EVL PM/LDLS PM: CPT | Performed by: INTERNAL MEDICINE

## 2019-03-25 NOTE — PROGRESS NOTES
Results for orders placed or performed in visit on 03/22/19   Cardiac EP device report    Narrative    MDT-DUAL-PM  CARELINK TRANSMISSION: BATTERY VOLTAGE ADEQUATE  (4 5 YRS)  AP 46%  32%  ALL AVAILABLE LEAD PARAMETERS WITHIN NORMAL LIMITS  24 AT/AF EPISODES DETECTED (47% OF TIME)  PATIENT CURRENTLY IN AF SINCE 03/20  PATIENT IS ONLY ON ASA  MADE DR Liya Coombs  NORMAL DEVICE FUNCTION  ---JOHN

## 2019-03-27 ENCOUNTER — OFFICE VISIT (OUTPATIENT)
Dept: CARDIOLOGY CLINIC | Facility: CLINIC | Age: 84
End: 2019-03-27
Payer: COMMERCIAL

## 2019-03-27 VITALS
SYSTOLIC BLOOD PRESSURE: 100 MMHG | BODY MASS INDEX: 40.92 KG/M2 | OXYGEN SATURATION: 96 % | HEART RATE: 74 BPM | WEIGHT: 245.6 LBS | DIASTOLIC BLOOD PRESSURE: 58 MMHG | HEIGHT: 65 IN

## 2019-03-27 DIAGNOSIS — I50.30 CONGESTIVE HEART FAILURE WITH LV DIASTOLIC DYSFUNCTION, NYHA CLASS 2 (HCC): ICD-10-CM

## 2019-03-27 DIAGNOSIS — I07.1 TRICUSPID VALVE INSUFFICIENCY, UNSPECIFIED ETIOLOGY: ICD-10-CM

## 2019-03-27 DIAGNOSIS — I49.5 SICK SINUS SYNDROME (HCC): Primary | ICD-10-CM

## 2019-03-27 DIAGNOSIS — Z95.5 HISTORY OF HEART ARTERY STENT: ICD-10-CM

## 2019-03-27 DIAGNOSIS — R06.02 SOB (SHORTNESS OF BREATH) ON EXERTION: ICD-10-CM

## 2019-03-27 DIAGNOSIS — R60.9 EDEMA, UNSPECIFIED TYPE: Primary | ICD-10-CM

## 2019-03-27 DIAGNOSIS — Z95.0 PRESENCE OF PERMANENT CARDIAC PACEMAKER: ICD-10-CM

## 2019-03-27 DIAGNOSIS — I44.2 COMPLETE ATRIOVENTRICULAR BLOCK (HCC): ICD-10-CM

## 2019-03-27 DIAGNOSIS — I25.10 3-VESSEL CORONARY ARTERY DISEASE: ICD-10-CM

## 2019-03-27 DIAGNOSIS — I48.0 PAROXYSMAL ATRIAL FIBRILLATION (HCC): ICD-10-CM

## 2019-03-27 DIAGNOSIS — Z95.1 HX OF CABG: ICD-10-CM

## 2019-03-27 DIAGNOSIS — I50.33 ACUTE ON CHRONIC DIASTOLIC HEART FAILURE (HCC): ICD-10-CM

## 2019-03-27 PROCEDURE — 99214 OFFICE O/P EST MOD 30 MIN: CPT | Performed by: INTERNAL MEDICINE

## 2019-03-27 RX ORDER — TORSEMIDE 20 MG/1
20 TABLET ORAL 2 TIMES DAILY
COMMUNITY
End: 2019-03-27 | Stop reason: SDUPTHER

## 2019-03-27 RX ORDER — TORSEMIDE 20 MG/1
20 TABLET ORAL 2 TIMES DAILY
Qty: 60 TABLET | Refills: 11 | Status: SHIPPED | OUTPATIENT
Start: 2019-03-27 | End: 2020-03-11 | Stop reason: DRUGHIGH

## 2019-03-27 NOTE — PROGRESS NOTES
Follow-up - Cardiology   Con Wilkerson 82 y o  female MRN: [id number]        Problems    Problem List Items Addressed This Visit        Cardiovascular and Mediastinum    3-vessel coronary artery disease    Congestive heart failure with LV diastolic dysfunction, NYHA class 2 ([facility] )      Other Visit Diagnoses     Sick sinus syndrome ([facility] )    -  Primary    Complete atrioventricular block ([facility] )        Presence of permanent cardiac pacemaker        SOB (shortness of breath) on exertion        Acute on chronic diastolic heart failure ([facility] )        History of heart artery stent        Hx of CABG        Tricuspid valve insufficiency, unspecified etiology        Paroxysmal atrial fibrillation [facility])                Plan patient seen March 27, 2019  History is as follows  Bypass surgery 1999-no angina  Stent in 2006  Catheterization 2008 without intervention  Pacemaker 2014  Poorly controlled diabetes  Left ventricular function on echocardiogram in April 2018 60%-however she has +3 possibly even +4 tricuspid regurgitation  Skin significant amount of edema  Recent admission with pleural effusions shortness of breath and during that admission she had she received IV diuretics  Ventricular tachycardia in the past   Hyperlipidemia  Biatrial enlargement  Since I last saw her she has gained over 20 lb  She has +4 edema  He is on Lasix 40 b i d  But it does not appear to be working well  There AR on able to wear at home does have difficulty standing on the scale  I am going to make some changes  Her Norvasc will be decreased to 5 daily          HPI: Con Wilkerson is a 82y o  year old female    Patient seen December 6, 2017  Her diagnosis includes bypass surgery 1999 stent in 2006, catheterization 2008, hyperlipidemia, pacemaker 2014, dementia, preserved left ventricular function, and diabetic neuropathy  She had short burst of ventricular tachycardia pacer interrogation   Metoprolol to be increased to 37 5 in the morning and 25 in the p m         Patient seen 2018  All scripts HPI noted above  First epic visit  Issues are as follows  Bypass surgery   Stent in 0 6  Catheterization 0 8 without intervention  Hyperlipidemia  Pacemaker   Diabetes  Left ventricular function well preserved  Diabetes  Past history ventricular tachycardia  Recent admission with edema and she was given a diagnosis of congestive heart failure   Appears to me when I review the record is primarily pulmonary issue with lot of bronchospasm   She is treated both diuretics and steroids as well as bronchodilators  Right bundle branch block pattern with blood relatively wide QRS  Echocardiogram shows the following-increased right ventricle-+3 tricuspid regurgitation-large right atrium-dilated inferior vena cava  Her NT was Irene Elizondo  He is presently on 60 of Lasix which was an increase done by hand visiting nurse  We will check her labs carefully   She is edema free at this time and her lungs are clear        Patient seen 2018  Diagnosis above  LDL is 44  A1c is 8 3  She has no edema   She has no bronchospasm   From cardiac standpoint she is asymptomatic  No change in medication         Patient seen 2019  Diagnosis above  The issues are as follows  Her  has  in the past several months  She has short episode of atrial fibrillation pacemaker interrogation  This has to be followed  She is not on anticoagulation  Her edema is well controlled  Will check her hemoglobin A1c        Patient seen May 27, 2019  Refer to my plan above  Eliquis will be started because of pacemaker interrogation showing significant amount of atrial fibrillation and she has deteriorated with regard to increasing edema  She may need to be admitted on the next visit if my plan does not work        Review of Systems   Constitutional: Positive for activity change and fatigue  Respiratory: Positive for shortness of breath  Cardiovascular: Positive for palpitations and leg swelling  Past Medical History:   Diagnosis Date    Arthritis     Asthma     CAD (coronary artery disease) of artery bypass graft     Cardiac disease     Dementia     Depression     Diabetes mellitus ([facility] )     Heart attack ([facility] )     Hyperlipidemia     Hypertension     [facility]     Neuropathy     BRANT (obstructive sleep apnea)     Peptic ulcer     was + for H pylori    Transient cerebral ischemia 11/2011    with neg CUS and ECHO     Social History     Substance and Sexual Activity   Alcohol Use No     Social History     Substance and Sexual Activity   Drug Use No     Social History     Tobacco Use   Smoking Status Never Smoker   Smokeless Tobacco Never Used       Allergies:   Allergies   Allergen Reactions    Ace Inhibitors     Chlorpromazine     Latex     Lisinopril     Namenda [Memantine]     Penicillins     Propoxyphene     Stadol [Butorphanol]        Medications:     Current Outpatient Medications:     albuterol (2 5 mg/3 mL) 0 083 % nebulizer solution, Take 1 vial (2 5 mg total) by nebulization every 6 (six) hours as needed for wheezing or shortness of breath, Disp: 60 vial, Rfl: 1    albuterol (PROVENTIL HFA,VENTOLIN HFA) 90 mcg/act inhaler, Inhale 2 puffs every 4 (four) hours as needed for wheezing or shortness of breath, Disp: 1 Inhaler, Rfl: 0    aspirin 81 mg chewable tablet, Chew 1 tablet (81 mg total) daily, Disp: 30 tablet, Rfl: 0    atorvastatin (LIPITOR) 40 mg tablet, TAKE 1 TABLET BY MOUTH EVERY DAY, Disp: 30 tablet, Rfl: 11    calcium carbonate-vitamin D (OSCAL-D) 500 mg-200 units per tablet, Take 1 tablet by mouth 2 (two) times a day with meals, Disp: 60 tablet, Rfl: 0    docusate sodium (COLACE) 100 mg capsule, Take 1 capsule (100 mg total) by mouth 2 (two) times a day, Disp: 10 capsule, Rfl: 0    furosemide (LASIX) 40 mg tablet, Take 1 tablet (40 mg total) by mouth 2 (two) times a day, Disp: 60 tablet, Rfl: 0    isosorbide mononitrate (IMDUR) 60 mg 24 hr tablet, TAKE 1 TABLET BY MOUTH EVERY DAY AS DIRECTED, Disp: 90 tablet, Rfl: 3    losartan (COZAAR) 100 MG tablet, Take by mouth, Disp: , Rfl:     metoprolol tartrate (LOPRESSOR) 25 mg tablet, TAKE 1 & 1/2 TABLETS IN THE MORNING, AND 1 TABLET IN THE EVENING, Disp: 225 tablet, Rfl: 3    nitroglycerin (NITROSTAT) 0 4 mg SL tablet, Place under the tongue, Disp: , Rfl:     NOVOLOG 100 UNIT/ML injection, INJECT 100 UNITS DAILY AS DIRECTED, Disp: 30 mL, Rfl: 4    ONE TOUCH ULTRA TEST test strip, CHECK BLOOD SUGAR 3 TIMES A DAY, Disp: 300 each, Rfl: 3    senna (SENOKOT) 8 6 mg, Take 1 tablet (8 6 mg total) by mouth daily, Disp: 120 each, Rfl: 0    amLODIPine (NORVASC) 5 mg tablet, TAKE 1 TABLET TWICE DAILY, Disp: 180 tablet, Rfl: 3    Insulin Disposable Pump (V-GO 20) KIT, Inject under the skin daily for 90 days Use 1 device per day, Disp: 30 kit, Rfl: 1      Physical Exam   Constitutional: She is oriented to person, place, and time  She appears well-developed and well-nourished  No distress  Cardiovascular: Normal rate, regular rhythm and intact distal pulses  Exam reveals no gallop and no friction rub  Murmur heard  Known significant tricuspid regurgitation   Pulmonary/Chest: Effort normal  No stridor  No respiratory distress  She has no wheezes  She has no rales  She exhibits no tenderness  May have basilar decreased breath sounds rule out pleural effusions   Musculoskeletal: She exhibits edema, tenderness and deformity  Edema up to her knees   Neurological: She is oriented to person, place, and time  Skin: She is not diaphoretic           Laboratory Studies:  CMP:  Results from last 7 days   Lab Units 03/20/19  1108   POTASSIUM mmol/L 4 2   CHLORIDE mmol/L 102   CO2 mmol/L 32   BUN mg/dL 28*   CREATININE mg/dL 1 02   CALCIUM mg/dL 8 8   EGFR ml/min/1 73sq m 52     NT-proBNP:   Lab Results   Component Value Date    NTBNP 2,154 (H) 2019      Coags:    Lipid Profile:   Lab Results   Component Value Date    CHOL 144 2015     Lab Results   Component Value Date    HDL 64 (H) 2018     Lab Results   Component Value Date    LDLCALC 25 2018     Lab Results   Component Value Date    TRIG 38 2018       Cardiac testing:     EKG reviewed personally:     Results for orders placed during the hospital encounter of 18   Echo complete with contrast if indicated    Narrative [facility] [address] [place] [address]  [phone]    Transthoracic Echocardiogram  2D, M-mode, Doppler, and Color Doppler    Study date:  2018    Patient: Dorian Gallardo  MR number: [id number]  Account number: [de-identified]  : 1935  Age: [de-identified] years  Gender: Female  Status: Inpatient  Location: Echo lab  Height: 68 in  Weight: 232 5 lb  BP: 130/ 62 mmHg    Indications: Dyspnea    Diagnoses: R06 00 - Dyspnea, unspecified    Primary Physician:  Bob Carreon MD  Referring Physician:  Kita Blackwood MD  Group:  [facility]  Cardiology Fellow:  Julia Marie MD  Interpreting Physician:  David Lawler MD    SUMMARY    LEFT VENTRICLE:  Systolic function was normal  Ejection fraction was estimated to be 60 %  There were no regional wall motion abnormalities  There was no evidence of concentric hypertrophy  Features were consistent with a pseudonormal left ventricular filling pattern, with concomitant abnormal relaxation and increased filling pressure (grade 2 diastolic dysfunction)  RIGHT VENTRICLE:  The ventricle was mildly to moderately dilated  LEFT ATRIUM:  The atrium was mildly dilated  RIGHT ATRIUM:  The atrium was mildly to moderately dilated  TRICUSPID VALVE:  There was moderate to severe regurgitation  Regurgitation grade was 3-4+ on a scale of 0 to 4+  IVC, HEPATIC VEINS:  The inferior vena cava was dilated    Respirophasic changes were blunted (less than 50% variation)  There is systolic flow reversal in the hepatic vein consistent with severe tricuspid regurgitation  COMPARISONS:  Comparison was made with the previous study of 25-May-2014  Tricuspid regurgitation has worsened  HISTORY: PRIOR HISTORY: HTN, HLD, DM, Dementia    PROCEDURE: The procedure was performed in the echo lab  This was a routine study  The transthoracic approach was used  The study included complete 2D imaging, M-mode, complete spectral Doppler, and color Doppler  The heart rate was 63 bpm,  at the start of the study  Images were obtained from the parasternal, apical, subcostal, and suprasternal notch acoustic windows  Echocardiographic views were limited due to decreased penetration and lung interference  This was a  technically difficult study  LEFT VENTRICLE: Size was normal  Systolic function was normal  Ejection fraction was estimated to be 60 %  There were no regional wall motion abnormalities  Wall thickness was normal  There was no evidence of concentric hypertrophy  DOPPLER: Features were consistent with a pseudonormal left ventricular filling pattern, with concomitant abnormal relaxation and increased filling pressure (grade 2 diastolic dysfunction)  VENTRICULAR SEPTUM: There was systolic and diastolic flattening  These changes are consistent with RV volume and pressure overload  RIGHT VENTRICLE: The ventricle was mildly to moderately dilated  Systolic function was low normal  A pacing wire was present  LEFT ATRIUM: The atrium was mildly dilated  RIGHT ATRIUM: The atrium was mildly to moderately dilated  MITRAL VALVE: Valve structure was normal  There was normal leaflet separation  DOPPLER: The transmitral velocity was within the normal range  There was no evidence for stenosis  There was no significant regurgitation  AORTIC VALVE: The valve was trileaflet  Leaflets exhibited normal thickness, normal cuspal separation, and sclerosis  DOPPLER: Transaortic velocity was within the normal range  There was no evidence for stenosis  There was no  regurgitation  TRICUSPID VALVE: DOPPLER: The transtricuspid velocity was within the normal range  There was no evidence for stenosis  There was moderate to severe regurgitation  Regurgitation grade was 3-4+ on a scale of 0 to 4+  PULMONIC VALVE: DOPPLER: The transpulmonic velocity was within the normal range  There was no evidence for stenosis  There was trace regurgitation  PERICARDIUM: There was no pericardial effusion  AORTA: The root exhibited normal size  SYSTEMIC VEINS: IVC: The inferior vena cava was dilated  Respirophasic changes were blunted (less than 50% variation)  HEPATIC VEIN DOPPLER: There is systolic flow reversal in the hepatic vein consistent with severe tricuspid  regurgitation  SYSTEM MEASUREMENT TABLES    2D  %FS: 20 05 %  Ao Diam: 3 36 cm  EDV(Teich): 85 57 ml  EF(Teich): 41 31 %  ESV(Teich): 50 22 ml  IVSd: 1 03 cm  LA Area: 21 37 cm2  LA Diam: 3 37 cm  LVEDV MOD A4C: 68 81 ml  LVEF MOD A4C: 56 86 %  LVESV MOD A4C: 29 69 ml  LVIDd: 4 35 cm  LVIDs: 3 48 cm  LVLd A4C: 7 37 cm  LVLs A4C: 6 82 cm  LVOT Diam: 1 88 cm  LVPWd: 0 97 cm  RA Area: 21 18 cm2  RVIDd: 4 1 cm  SV MOD A4C: 39 12 ml  SV(Teich): 35 35 ml    CW  AV Env  Ti: 359 86 ms  AV VTI: 52 93 cm  AV Vmax: 2 03 m/s  AV Vmax: 2 24 m/s  AV Vmean: 1 47 m/s  AV maxP 42 mmHg  AV maxP 15 mmHg  AV meanPG: 10 27 mmHg  TR Vmax: 2 1 m/s  TR maxP 7 mmHg    MM  TAPSE: 1 73 cm    PW  NESTOR (VTI): 1 28 cm2  NESTOR Vmax: 1 31 cm2  NESTOR Vmax: 1 46 cm2  NESTOR Vmax, Pt: 1 21 cm2  NESTOR Vmax, Pt: 1 35 cm2  E': 0 08 m/s  E/E': 14 35  LVOT Env  Ti: 346 02 ms  LVOT VTI: 24 27 cm  LVOT Vmax: 0 98 m/s  LVOT Vmax: 1 06 m/s  LVOT Vmean: 0 7 m/s  LVOT maxPG: 3 84 mmHg  LVOT maxP 49 mmHg  LVOT meanP 32 mmHg  LVSI Dopp: 30 98 ml/m2  LVSV Dopp: 67 55 ml  MV A Carroll: 0 8 m/s  MV Dec Sully: 4 21 m/s2  MV DecT: 261 58 ms  MV E Carroll: 1 1 m/s  MV E/A Ratio: 1 38  MV PHT: 75 86 ms  MVA By PHT: 2 9 cm2    [employer] Accredited Echocardiography Laboratory    Prepared and electronically signed by    Elza Pereyra MD  Signed 23-Apr-2018 17:07:00       No results found for this or any previous visit  No results found for this or any previous visit  No results found for this or any previous visit  Susan Mott MD    Portions of the record may have been created with voice recognition software   Occasional wrong word or "sound a like" substitutions may have occurred due to the inherent limitations of voice recognition software   Read the chart carefully and recognize, using context, where substitutions have occurred

## 2019-03-28 ENCOUNTER — HOSPITAL ENCOUNTER (OUTPATIENT)
Facility: HOSPITAL | Age: 84
Setting detail: OBSERVATION
Discharge: HOME HEALTH | End: 2019-03-30
Attending: EMERGENCY MEDICINE | Admitting: HOSPITALIST
Payer: COMMERCIAL

## 2019-03-28 ENCOUNTER — APPOINTMENT (OUTPATIENT)
Dept: NON INVASIVE DIAGNOSTICS | Facility: HOSPITAL | Age: 84
End: 2019-03-28
Payer: COMMERCIAL

## 2019-03-28 ENCOUNTER — APPOINTMENT (EMERGENCY)
Dept: RADIOLOGY | Facility: HOSPITAL | Age: 84
End: 2019-03-28
Payer: COMMERCIAL

## 2019-03-28 DIAGNOSIS — I50.9 CHF EXACERBATION (HCC): Primary | ICD-10-CM

## 2019-03-28 DIAGNOSIS — Z96.41 INSULIN PUMP IN PLACE: ICD-10-CM

## 2019-03-28 DIAGNOSIS — I48.0 PAF (PAROXYSMAL ATRIAL FIBRILLATION) (HCC): ICD-10-CM

## 2019-03-28 DIAGNOSIS — R06.00 DYSPNEA: ICD-10-CM

## 2019-03-28 DIAGNOSIS — R06.00 DYSPNEA ON MINIMAL EXERTION: ICD-10-CM

## 2019-03-28 DIAGNOSIS — I50.43 ACUTE ON CHRONIC COMBINED SYSTOLIC (CONGESTIVE) AND DIASTOLIC (CONGESTIVE) HEART FAILURE (HCC): ICD-10-CM

## 2019-03-28 PROBLEM — R06.09 DYSPNEA ON MINIMAL EXERTION: Status: ACTIVE | Noted: 2019-03-03

## 2019-03-28 PROBLEM — I49.5 SSS (SICK SINUS SYNDROME) (HCC): Status: ACTIVE | Noted: 2019-03-28

## 2019-03-28 PROBLEM — I49.5 SSS (SICK SINUS SYNDROME) (HCC): Chronic | Status: ACTIVE | Noted: 2019-03-28

## 2019-03-28 LAB
ALBUMIN SERPL BCP-MCNC: 3.9 G/DL (ref 3.5–5)
ALP SERPL-CCNC: 124 U/L (ref 46–116)
ALT SERPL W P-5'-P-CCNC: 32 U/L (ref 12–78)
ANION GAP SERPL CALCULATED.3IONS-SCNC: 4 MMOL/L (ref 4–13)
ANION GAP SERPL CALCULATED.3IONS-SCNC: 6 MMOL/L (ref 4–13)
AST SERPL W P-5'-P-CCNC: 20 U/L (ref 5–45)
BASOPHILS # BLD AUTO: 0.08 THOUSANDS/ΜL (ref 0–0.1)
BASOPHILS NFR BLD AUTO: 1 % (ref 0–1)
BILIRUB SERPL-MCNC: 0.71 MG/DL (ref 0.2–1)
BUN SERPL-MCNC: 29 MG/DL (ref 5–25)
BUN SERPL-MCNC: 30 MG/DL (ref 5–25)
CALCIUM SERPL-MCNC: 8.8 MG/DL (ref 8.3–10.1)
CALCIUM SERPL-MCNC: 9.1 MG/DL (ref 8.3–10.1)
CHLORIDE SERPL-SCNC: 101 MMOL/L (ref 100–108)
CHLORIDE SERPL-SCNC: 98 MMOL/L (ref 100–108)
CO2 SERPL-SCNC: 32 MMOL/L (ref 21–32)
CO2 SERPL-SCNC: 32 MMOL/L (ref 21–32)
CREAT SERPL-MCNC: 0.99 MG/DL (ref 0.6–1.3)
CREAT SERPL-MCNC: 1.16 MG/DL (ref 0.6–1.3)
EOSINOPHIL # BLD AUTO: 0.26 THOUSAND/ΜL (ref 0–0.61)
EOSINOPHIL NFR BLD AUTO: 3 % (ref 0–6)
ERYTHROCYTE [DISTWIDTH] IN BLOOD BY AUTOMATED COUNT: 13.5 % (ref 11.6–15.1)
GFR SERPL CREATININE-BSD FRML MDRD: 44 ML/MIN/1.73SQ M
GFR SERPL CREATININE-BSD FRML MDRD: 54 ML/MIN/1.73SQ M
GLUCOSE SERPL-MCNC: 167 MG/DL (ref 65–140)
GLUCOSE SERPL-MCNC: 194 MG/DL (ref 65–140)
GLUCOSE SERPL-MCNC: 195 MG/DL (ref 65–140)
GLUCOSE SERPL-MCNC: 212 MG/DL (ref 65–140)
GLUCOSE SERPL-MCNC: 215 MG/DL (ref 65–140)
GLUCOSE SERPL-MCNC: 222 MG/DL (ref 65–140)
GLUCOSE SERPL-MCNC: 254 MG/DL (ref 65–140)
GLUCOSE SERPL-MCNC: 333 MG/DL (ref 65–140)
HCT VFR BLD AUTO: 39.7 % (ref 34.8–46.1)
HGB BLD-MCNC: 12.6 G/DL (ref 11.5–15.4)
IMM GRANULOCYTES # BLD AUTO: 0.03 THOUSAND/UL (ref 0–0.2)
IMM GRANULOCYTES NFR BLD AUTO: 0 % (ref 0–2)
LYMPHOCYTES # BLD AUTO: 1.63 THOUSANDS/ΜL (ref 0.6–4.47)
LYMPHOCYTES NFR BLD AUTO: 19 % (ref 14–44)
MCH RBC QN AUTO: 28.8 PG (ref 26.8–34.3)
MCHC RBC AUTO-ENTMCNC: 31.7 G/DL (ref 31.4–37.4)
MCV RBC AUTO: 91 FL (ref 82–98)
MONOCYTES # BLD AUTO: 0.71 THOUSAND/ΜL (ref 0.17–1.22)
MONOCYTES NFR BLD AUTO: 8 % (ref 4–12)
NEUTROPHILS # BLD AUTO: 5.82 THOUSANDS/ΜL (ref 1.85–7.62)
NEUTS SEG NFR BLD AUTO: 69 % (ref 43–75)
NRBC BLD AUTO-RTO: 0 /100 WBCS
NT-PROBNP SERPL-MCNC: 2217 PG/ML
PLATELET # BLD AUTO: 181 THOUSANDS/UL (ref 149–390)
PMV BLD AUTO: 11.6 FL (ref 8.9–12.7)
POTASSIUM SERPL-SCNC: 3.4 MMOL/L (ref 3.5–5.3)
POTASSIUM SERPL-SCNC: 3.6 MMOL/L (ref 3.5–5.3)
PROT SERPL-MCNC: 7.1 G/DL (ref 6.4–8.2)
RBC # BLD AUTO: 4.37 MILLION/UL (ref 3.81–5.12)
SODIUM SERPL-SCNC: 136 MMOL/L (ref 136–145)
SODIUM SERPL-SCNC: 137 MMOL/L (ref 136–145)
TROPONIN I SERPL-MCNC: <0.02 NG/ML
WBC # BLD AUTO: 8.53 THOUSAND/UL (ref 4.31–10.16)

## 2019-03-28 PROCEDURE — 93005 ELECTROCARDIOGRAM TRACING: CPT

## 2019-03-28 PROCEDURE — G8978 MOBILITY CURRENT STATUS: HCPCS | Performed by: PHYSICAL THERAPIST

## 2019-03-28 PROCEDURE — 80053 COMPREHEN METABOLIC PANEL: CPT | Performed by: EMERGENCY MEDICINE

## 2019-03-28 PROCEDURE — 82948 REAGENT STRIP/BLOOD GLUCOSE: CPT

## 2019-03-28 PROCEDURE — G8979 MOBILITY GOAL STATUS: HCPCS | Performed by: PHYSICAL THERAPIST

## 2019-03-28 PROCEDURE — 80048 BASIC METABOLIC PNL TOTAL CA: CPT | Performed by: HOSPITALIST

## 2019-03-28 PROCEDURE — 99285 EMERGENCY DEPT VISIT HI MDM: CPT

## 2019-03-28 PROCEDURE — 94760 N-INVAS EAR/PLS OXIMETRY 1: CPT

## 2019-03-28 PROCEDURE — 71046 X-RAY EXAM CHEST 2 VIEWS: CPT

## 2019-03-28 PROCEDURE — 83880 ASSAY OF NATRIURETIC PEPTIDE: CPT | Performed by: EMERGENCY MEDICINE

## 2019-03-28 PROCEDURE — 96374 THER/PROPH/DIAG INJ IV PUSH: CPT

## 2019-03-28 PROCEDURE — 99204 OFFICE O/P NEW MOD 45 MIN: CPT | Performed by: INTERNAL MEDICINE

## 2019-03-28 PROCEDURE — G8987 SELF CARE CURRENT STATUS: HCPCS

## 2019-03-28 PROCEDURE — 93306 TTE W/DOPPLER COMPLETE: CPT | Performed by: INTERNAL MEDICINE

## 2019-03-28 PROCEDURE — 99225 PR SBSQ OBSERVATION CARE/DAY 25 MINUTES: CPT | Performed by: INTERNAL MEDICINE

## 2019-03-28 PROCEDURE — G8988 SELF CARE GOAL STATUS: HCPCS

## 2019-03-28 PROCEDURE — 84484 ASSAY OF TROPONIN QUANT: CPT | Performed by: HOSPITALIST

## 2019-03-28 PROCEDURE — 93306 TTE W/DOPPLER COMPLETE: CPT

## 2019-03-28 PROCEDURE — 85025 COMPLETE CBC W/AUTO DIFF WBC: CPT | Performed by: EMERGENCY MEDICINE

## 2019-03-28 PROCEDURE — 99220 PR INITIAL OBSERVATION CARE/DAY 70 MINUTES: CPT | Performed by: HOSPITALIST

## 2019-03-28 PROCEDURE — 97163 PT EVAL HIGH COMPLEX 45 MIN: CPT | Performed by: PHYSICAL THERAPIST

## 2019-03-28 PROCEDURE — 84484 ASSAY OF TROPONIN QUANT: CPT | Performed by: EMERGENCY MEDICINE

## 2019-03-28 PROCEDURE — 36415 COLL VENOUS BLD VENIPUNCTURE: CPT

## 2019-03-28 PROCEDURE — 99215 OFFICE O/P EST HI 40 MIN: CPT | Performed by: INTERNAL MEDICINE

## 2019-03-28 PROCEDURE — 97167 OT EVAL HIGH COMPLEX 60 MIN: CPT

## 2019-03-28 RX ORDER — NITROGLYCERIN 0.4 MG/1
0.4 TABLET SUBLINGUAL
Status: DISCONTINUED | OUTPATIENT
Start: 2019-03-28 | End: 2019-03-30 | Stop reason: HOSPADM

## 2019-03-28 RX ORDER — ALBUTEROL SULFATE 2.5 MG/3ML
2.5 SOLUTION RESPIRATORY (INHALATION) EVERY 6 HOURS PRN
Status: DISCONTINUED | OUTPATIENT
Start: 2019-03-28 | End: 2019-03-30 | Stop reason: HOSPADM

## 2019-03-28 RX ORDER — B-COMPLEX WITH VITAMIN C
1 TABLET ORAL 2 TIMES DAILY WITH MEALS
Status: DISCONTINUED | OUTPATIENT
Start: 2019-03-28 | End: 2019-03-30 | Stop reason: HOSPADM

## 2019-03-28 RX ORDER — NYSTATIN 100000 [USP'U]/G
POWDER TOPICAL 2 TIMES DAILY
Status: DISCONTINUED | OUTPATIENT
Start: 2019-03-28 | End: 2019-03-30 | Stop reason: HOSPADM

## 2019-03-28 RX ORDER — NITROGLYCERIN 0.4 MG/1
0.4 TABLET SUBLINGUAL ONCE
Status: COMPLETED | OUTPATIENT
Start: 2019-03-28 | End: 2019-03-28

## 2019-03-28 RX ORDER — ASPIRIN 81 MG/1
81 TABLET, CHEWABLE ORAL DAILY
Status: DISCONTINUED | OUTPATIENT
Start: 2019-03-28 | End: 2019-03-30 | Stop reason: HOSPADM

## 2019-03-28 RX ORDER — ACETAMINOPHEN 325 MG/1
975 TABLET ORAL ONCE
Status: COMPLETED | OUTPATIENT
Start: 2019-03-28 | End: 2019-03-28

## 2019-03-28 RX ORDER — INSULIN GLARGINE 100 [IU]/ML
15 INJECTION, SOLUTION SUBCUTANEOUS EVERY MORNING
Status: DISCONTINUED | OUTPATIENT
Start: 2019-03-29 | End: 2019-03-29

## 2019-03-28 RX ORDER — ISOSORBIDE MONONITRATE 60 MG/1
60 TABLET, EXTENDED RELEASE ORAL DAILY
Status: DISCONTINUED | OUTPATIENT
Start: 2019-03-28 | End: 2019-03-30 | Stop reason: HOSPADM

## 2019-03-28 RX ORDER — DOCUSATE SODIUM 100 MG/1
100 CAPSULE, LIQUID FILLED ORAL 2 TIMES DAILY
Status: DISCONTINUED | OUTPATIENT
Start: 2019-03-28 | End: 2019-03-30 | Stop reason: HOSPADM

## 2019-03-28 RX ORDER — ACETAMINOPHEN 325 MG/1
650 TABLET ORAL EVERY 6 HOURS PRN
Status: DISCONTINUED | OUTPATIENT
Start: 2019-03-28 | End: 2019-03-30 | Stop reason: HOSPADM

## 2019-03-28 RX ORDER — FUROSEMIDE 10 MG/ML
40 INJECTION INTRAMUSCULAR; INTRAVENOUS ONCE
Status: COMPLETED | OUTPATIENT
Start: 2019-03-28 | End: 2019-03-28

## 2019-03-28 RX ORDER — LOSARTAN POTASSIUM 25 MG/1
25 TABLET ORAL DAILY
Status: DISCONTINUED | OUTPATIENT
Start: 2019-03-28 | End: 2019-03-30 | Stop reason: HOSPADM

## 2019-03-28 RX ORDER — INSULIN GLARGINE 100 [IU]/ML
15 INJECTION, SOLUTION SUBCUTANEOUS EVERY 12 HOURS SCHEDULED
Status: DISCONTINUED | OUTPATIENT
Start: 2019-03-28 | End: 2019-03-28

## 2019-03-28 RX ORDER — ONDANSETRON 2 MG/ML
4 INJECTION INTRAMUSCULAR; INTRAVENOUS EVERY 6 HOURS PRN
Status: DISCONTINUED | OUTPATIENT
Start: 2019-03-28 | End: 2019-03-30 | Stop reason: HOSPADM

## 2019-03-28 RX ORDER — SENNOSIDES 8.6 MG
1 TABLET ORAL DAILY
Status: DISCONTINUED | OUTPATIENT
Start: 2019-03-28 | End: 2019-03-30 | Stop reason: HOSPADM

## 2019-03-28 RX ORDER — POLYETHYLENE GLYCOL 3350 17 G/17G
17 POWDER, FOR SOLUTION ORAL DAILY PRN
Status: DISCONTINUED | OUTPATIENT
Start: 2019-03-28 | End: 2019-03-30 | Stop reason: HOSPADM

## 2019-03-28 RX ORDER — POTASSIUM CHLORIDE 14.9 MG/ML
20 INJECTION INTRAVENOUS
Status: COMPLETED | OUTPATIENT
Start: 2019-03-28 | End: 2019-03-28

## 2019-03-28 RX ORDER — ATORVASTATIN CALCIUM 40 MG/1
40 TABLET, FILM COATED ORAL
Status: DISCONTINUED | OUTPATIENT
Start: 2019-03-28 | End: 2019-03-30 | Stop reason: HOSPADM

## 2019-03-28 RX ORDER — FUROSEMIDE 10 MG/ML
60 INJECTION INTRAMUSCULAR; INTRAVENOUS
Status: DISCONTINUED | OUTPATIENT
Start: 2019-03-28 | End: 2019-03-29

## 2019-03-28 RX ORDER — ALBUTEROL SULFATE 90 UG/1
2 AEROSOL, METERED RESPIRATORY (INHALATION) EVERY 4 HOURS PRN
Status: DISCONTINUED | OUTPATIENT
Start: 2019-03-28 | End: 2019-03-30 | Stop reason: HOSPADM

## 2019-03-28 RX ORDER — AMLODIPINE BESYLATE 5 MG/1
5 TABLET ORAL DAILY
Status: DISCONTINUED | OUTPATIENT
Start: 2019-03-28 | End: 2019-03-30 | Stop reason: HOSPADM

## 2019-03-28 RX ADMIN — NYSTATIN 1 APPLICATION: 100000 POWDER TOPICAL at 09:47

## 2019-03-28 RX ADMIN — APIXABAN 5 MG: 5 TABLET, FILM COATED ORAL at 09:39

## 2019-03-28 RX ADMIN — LOSARTAN POTASSIUM 25 MG: 25 TABLET, FILM COATED ORAL at 09:38

## 2019-03-28 RX ADMIN — AMLODIPINE BESYLATE 5 MG: 5 TABLET ORAL at 09:38

## 2019-03-28 RX ADMIN — FUROSEMIDE 60 MG: 10 INJECTION, SOLUTION INTRAMUSCULAR; INTRAVENOUS at 16:13

## 2019-03-28 RX ADMIN — POTASSIUM CHLORIDE 20 MEQ: 200 INJECTION, SOLUTION INTRAVENOUS at 12:14

## 2019-03-28 RX ADMIN — APIXABAN 5 MG: 5 TABLET, FILM COATED ORAL at 18:05

## 2019-03-28 RX ADMIN — FUROSEMIDE 60 MG: 10 INJECTION, SOLUTION INTRAMUSCULAR; INTRAVENOUS at 09:40

## 2019-03-28 RX ADMIN — DOCUSATE SODIUM 100 MG: 100 CAPSULE, LIQUID FILLED ORAL at 09:38

## 2019-03-28 RX ADMIN — CALCIUM CARBONATE-VITAMIN D TAB 500 MG-200 UNIT 1 TABLET: 500-200 TAB at 09:40

## 2019-03-28 RX ADMIN — INSULIN LISPRO 2 UNITS: 100 INJECTION, SOLUTION INTRAVENOUS; SUBCUTANEOUS at 12:16

## 2019-03-28 RX ADMIN — INSULIN GLARGINE 15 UNITS: 100 INJECTION, SOLUTION SUBCUTANEOUS at 09:53

## 2019-03-28 RX ADMIN — INSULIN LISPRO 5 UNITS: 100 INJECTION, SOLUTION INTRAVENOUS; SUBCUTANEOUS at 16:41

## 2019-03-28 RX ADMIN — ISOSORBIDE MONONITRATE 60 MG: 60 TABLET, EXTENDED RELEASE ORAL at 09:39

## 2019-03-28 RX ADMIN — INSULIN LISPRO 2 UNITS: 100 INJECTION, SOLUTION INTRAVENOUS; SUBCUTANEOUS at 16:41

## 2019-03-28 RX ADMIN — METOPROLOL TARTRATE 37.5 MG: 25 TABLET ORAL at 09:38

## 2019-03-28 RX ADMIN — NITROGLYCERIN 0.4 MG: 0.4 TABLET SUBLINGUAL at 01:04

## 2019-03-28 RX ADMIN — POTASSIUM CHLORIDE 20 MEQ: 200 INJECTION, SOLUTION INTRAVENOUS at 10:12

## 2019-03-28 RX ADMIN — FUROSEMIDE 40 MG: 10 INJECTION, SOLUTION INTRAMUSCULAR; INTRAVENOUS at 01:06

## 2019-03-28 RX ADMIN — SENNOSIDES 8.6 MG: 8.6 TABLET, FILM COATED ORAL at 09:39

## 2019-03-28 RX ADMIN — NYSTATIN 1 APPLICATION: 100000 POWDER TOPICAL at 16:36

## 2019-03-28 RX ADMIN — ATORVASTATIN CALCIUM 40 MG: 40 TABLET, FILM COATED ORAL at 09:39

## 2019-03-28 RX ADMIN — ASPIRIN 81 MG 81 MG: 81 TABLET ORAL at 09:39

## 2019-03-28 RX ADMIN — METOPROLOL TARTRATE 25 MG: 25 TABLET, FILM COATED ORAL at 20:20

## 2019-03-28 RX ADMIN — INSULIN LISPRO 2 UNITS: 100 INJECTION, SOLUTION INTRAVENOUS; SUBCUTANEOUS at 09:13

## 2019-03-28 RX ADMIN — ACETAMINOPHEN 975 MG: 325 TABLET ORAL at 01:04

## 2019-03-28 RX ADMIN — CALCIUM CARBONATE-VITAMIN D TAB 500 MG-200 UNIT 1 TABLET: 500-200 TAB at 16:13

## 2019-03-28 RX ADMIN — NITROGLYCERIN 0.4 MG: 0.4 TABLET SUBLINGUAL at 03:03

## 2019-03-29 LAB
ANION GAP SERPL CALCULATED.3IONS-SCNC: 5 MMOL/L (ref 4–13)
ATRIAL RATE: 73 BPM
BASOPHILS # BLD AUTO: 0.1 THOUSANDS/ΜL (ref 0–0.1)
BASOPHILS NFR BLD AUTO: 1 % (ref 0–1)
BUN SERPL-MCNC: 29 MG/DL (ref 5–25)
CALCIUM SERPL-MCNC: 9.1 MG/DL (ref 8.3–10.1)
CHLORIDE SERPL-SCNC: 99 MMOL/L (ref 100–108)
CO2 SERPL-SCNC: 32 MMOL/L (ref 21–32)
CREAT SERPL-MCNC: 1.18 MG/DL (ref 0.6–1.3)
EOSINOPHIL # BLD AUTO: 0.3 THOUSAND/ΜL (ref 0–0.61)
EOSINOPHIL NFR BLD AUTO: 4 % (ref 0–6)
ERYTHROCYTE [DISTWIDTH] IN BLOOD BY AUTOMATED COUNT: 13.6 % (ref 11.6–15.1)
GFR SERPL CREATININE-BSD FRML MDRD: 43 ML/MIN/1.73SQ M
GLUCOSE SERPL-MCNC: 116 MG/DL (ref 65–140)
GLUCOSE SERPL-MCNC: 190 MG/DL (ref 65–140)
GLUCOSE SERPL-MCNC: 203 MG/DL (ref 65–140)
GLUCOSE SERPL-MCNC: 218 MG/DL (ref 65–140)
GLUCOSE SERPL-MCNC: 229 MG/DL (ref 65–140)
GLUCOSE SERPL-MCNC: 56 MG/DL (ref 65–140)
GLUCOSE SERPL-MCNC: 61 MG/DL (ref 65–140)
GLUCOSE SERPL-MCNC: 62 MG/DL (ref 65–140)
HCT VFR BLD AUTO: 40.1 % (ref 34.8–46.1)
HGB BLD-MCNC: 13 G/DL (ref 11.5–15.4)
IMM GRANULOCYTES # BLD AUTO: 0.02 THOUSAND/UL (ref 0–0.2)
IMM GRANULOCYTES NFR BLD AUTO: 0 % (ref 0–2)
LYMPHOCYTES # BLD AUTO: 1.82 THOUSANDS/ΜL (ref 0.6–4.47)
LYMPHOCYTES NFR BLD AUTO: 24 % (ref 14–44)
MCH RBC QN AUTO: 29.7 PG (ref 26.8–34.3)
MCHC RBC AUTO-ENTMCNC: 32.4 G/DL (ref 31.4–37.4)
MCV RBC AUTO: 92 FL (ref 82–98)
MONOCYTES # BLD AUTO: 0.58 THOUSAND/ΜL (ref 0.17–1.22)
MONOCYTES NFR BLD AUTO: 8 % (ref 4–12)
NEUTROPHILS # BLD AUTO: 4.73 THOUSANDS/ΜL (ref 1.85–7.62)
NEUTS SEG NFR BLD AUTO: 63 % (ref 43–75)
NRBC BLD AUTO-RTO: 0 /100 WBCS
P AXIS: -45 DEGREES
PLATELET # BLD AUTO: 167 THOUSANDS/UL (ref 149–390)
PMV BLD AUTO: 12.4 FL (ref 8.9–12.7)
POTASSIUM SERPL-SCNC: 3.6 MMOL/L (ref 3.5–5.3)
QRS AXIS: 210 DEGREES
QRSD INTERVAL: 68 MS
QT INTERVAL: 374 MS
QTC INTERVAL: 412 MS
RBC # BLD AUTO: 4.38 MILLION/UL (ref 3.81–5.12)
SODIUM SERPL-SCNC: 136 MMOL/L (ref 136–145)
T WAVE AXIS: -26 DEGREES
VENTRICULAR RATE: 73 BPM
WBC # BLD AUTO: 7.55 THOUSAND/UL (ref 4.31–10.16)

## 2019-03-29 PROCEDURE — 93010 ELECTROCARDIOGRAM REPORT: CPT | Performed by: INTERNAL MEDICINE

## 2019-03-29 PROCEDURE — 85025 COMPLETE CBC W/AUTO DIFF WBC: CPT | Performed by: INTERNAL MEDICINE

## 2019-03-29 PROCEDURE — 82948 REAGENT STRIP/BLOOD GLUCOSE: CPT

## 2019-03-29 PROCEDURE — 99225 PR SBSQ OBSERVATION CARE/DAY 25 MINUTES: CPT | Performed by: INTERNAL MEDICINE

## 2019-03-29 PROCEDURE — 99214 OFFICE O/P EST MOD 30 MIN: CPT | Performed by: INTERNAL MEDICINE

## 2019-03-29 PROCEDURE — 80048 BASIC METABOLIC PNL TOTAL CA: CPT | Performed by: INTERNAL MEDICINE

## 2019-03-29 PROCEDURE — 97530 THERAPEUTIC ACTIVITIES: CPT

## 2019-03-29 PROCEDURE — 97110 THERAPEUTIC EXERCISES: CPT

## 2019-03-29 PROCEDURE — 97116 GAIT TRAINING THERAPY: CPT

## 2019-03-29 RX ORDER — DIAPER,BRIEF,INFANT-TODD,DISP
EACH MISCELLANEOUS DAILY PRN
Status: DISCONTINUED | OUTPATIENT
Start: 2019-03-29 | End: 2019-03-30 | Stop reason: HOSPADM

## 2019-03-29 RX ORDER — INSULIN GLARGINE 100 [IU]/ML
14 INJECTION, SOLUTION SUBCUTANEOUS EVERY MORNING
Status: DISCONTINUED | OUTPATIENT
Start: 2019-03-30 | End: 2019-03-29

## 2019-03-29 RX ORDER — FUROSEMIDE 10 MG/ML
60 INJECTION INTRAMUSCULAR; INTRAVENOUS
Status: DISCONTINUED | OUTPATIENT
Start: 2019-03-29 | End: 2019-03-30

## 2019-03-29 RX ORDER — DEXTROSE MONOHYDRATE 25 G/50ML
INJECTION, SOLUTION INTRAVENOUS
Status: COMPLETED
Start: 2019-03-29 | End: 2019-03-29

## 2019-03-29 RX ORDER — INSULIN GLARGINE 100 [IU]/ML
12 INJECTION, SOLUTION SUBCUTANEOUS EVERY MORNING
Status: DISCONTINUED | OUTPATIENT
Start: 2019-03-30 | End: 2019-03-30 | Stop reason: HOSPADM

## 2019-03-29 RX ADMIN — Medication 4 G: at 16:11

## 2019-03-29 RX ADMIN — LOSARTAN POTASSIUM 25 MG: 25 TABLET, FILM COATED ORAL at 08:49

## 2019-03-29 RX ADMIN — INSULIN LISPRO 5 UNITS: 100 INJECTION, SOLUTION INTRAVENOUS; SUBCUTANEOUS at 08:51

## 2019-03-29 RX ADMIN — FUROSEMIDE 60 MG: 10 INJECTION, SOLUTION INTRAMUSCULAR; INTRAVENOUS at 17:30

## 2019-03-29 RX ADMIN — DOCUSATE SODIUM 100 MG: 100 CAPSULE, LIQUID FILLED ORAL at 17:35

## 2019-03-29 RX ADMIN — SENNOSIDES 8.6 MG: 8.6 TABLET, FILM COATED ORAL at 08:50

## 2019-03-29 RX ADMIN — APIXABAN 5 MG: 5 TABLET, FILM COATED ORAL at 17:34

## 2019-03-29 RX ADMIN — ISOSORBIDE MONONITRATE 60 MG: 60 TABLET, EXTENDED RELEASE ORAL at 08:50

## 2019-03-29 RX ADMIN — FUROSEMIDE 60 MG: 10 INJECTION, SOLUTION INTRAMUSCULAR; INTRAVENOUS at 08:50

## 2019-03-29 RX ADMIN — INSULIN LISPRO 2 UNITS: 100 INJECTION, SOLUTION INTRAVENOUS; SUBCUTANEOUS at 08:51

## 2019-03-29 RX ADMIN — CALCIUM CARBONATE-VITAMIN D TAB 500 MG-200 UNIT 1 TABLET: 500-200 TAB at 17:34

## 2019-03-29 RX ADMIN — DOCUSATE SODIUM 100 MG: 100 CAPSULE, LIQUID FILLED ORAL at 08:50

## 2019-03-29 RX ADMIN — CALCIUM CARBONATE-VITAMIN D TAB 500 MG-200 UNIT 1 TABLET: 500-200 TAB at 08:50

## 2019-03-29 RX ADMIN — INSULIN LISPRO 5 UNITS: 100 INJECTION, SOLUTION INTRAVENOUS; SUBCUTANEOUS at 12:00

## 2019-03-29 RX ADMIN — INSULIN GLARGINE 15 UNITS: 100 INJECTION, SOLUTION SUBCUTANEOUS at 08:50

## 2019-03-29 RX ADMIN — METOPROLOL TARTRATE 37.5 MG: 25 TABLET ORAL at 08:49

## 2019-03-29 RX ADMIN — METOPROLOL TARTRATE 25 MG: 25 TABLET, FILM COATED ORAL at 20:29

## 2019-03-29 RX ADMIN — AMLODIPINE BESYLATE 5 MG: 5 TABLET ORAL at 08:50

## 2019-03-29 RX ADMIN — NYSTATIN 1 APPLICATION: 100000 POWDER TOPICAL at 17:37

## 2019-03-29 RX ADMIN — APIXABAN 5 MG: 5 TABLET, FILM COATED ORAL at 08:50

## 2019-03-29 RX ADMIN — DEXTROSE MONOHYDRATE 25 ML: 25 INJECTION, SOLUTION INTRAVENOUS at 16:40

## 2019-03-29 RX ADMIN — ASPIRIN 81 MG 81 MG: 81 TABLET ORAL at 08:52

## 2019-03-29 RX ADMIN — NYSTATIN 1 APPLICATION: 100000 POWDER TOPICAL at 08:52

## 2019-03-29 RX ADMIN — ATORVASTATIN CALCIUM 40 MG: 40 TABLET, FILM COATED ORAL at 08:50

## 2019-03-29 RX ADMIN — INSULIN LISPRO 3 UNITS: 100 INJECTION, SOLUTION INTRAVENOUS; SUBCUTANEOUS at 12:00

## 2019-03-30 VITALS
WEIGHT: 236.11 LBS | OXYGEN SATURATION: 96 % | HEART RATE: 71 BPM | TEMPERATURE: 98 F | RESPIRATION RATE: 16 BRPM | BODY MASS INDEX: 35.78 KG/M2 | SYSTOLIC BLOOD PRESSURE: 136 MMHG | DIASTOLIC BLOOD PRESSURE: 61 MMHG | HEIGHT: 68 IN

## 2019-03-30 LAB
ANION GAP SERPL CALCULATED.3IONS-SCNC: 6 MMOL/L (ref 4–13)
BUN SERPL-MCNC: 33 MG/DL (ref 5–25)
CALCIUM SERPL-MCNC: 9 MG/DL (ref 8.3–10.1)
CHLORIDE SERPL-SCNC: 102 MMOL/L (ref 100–108)
CO2 SERPL-SCNC: 32 MMOL/L (ref 21–32)
CREAT SERPL-MCNC: 1.02 MG/DL (ref 0.6–1.3)
GFR SERPL CREATININE-BSD FRML MDRD: 52 ML/MIN/1.73SQ M
GLUCOSE SERPL-MCNC: 133 MG/DL (ref 65–140)
GLUCOSE SERPL-MCNC: 140 MG/DL (ref 65–140)
GLUCOSE SERPL-MCNC: 210 MG/DL (ref 65–140)
POTASSIUM SERPL-SCNC: 3.6 MMOL/L (ref 3.5–5.3)
SODIUM SERPL-SCNC: 140 MMOL/L (ref 136–145)

## 2019-03-30 PROCEDURE — 80048 BASIC METABOLIC PNL TOTAL CA: CPT | Performed by: NURSE PRACTITIONER

## 2019-03-30 PROCEDURE — 99214 OFFICE O/P EST MOD 30 MIN: CPT | Performed by: INTERNAL MEDICINE

## 2019-03-30 PROCEDURE — 82948 REAGENT STRIP/BLOOD GLUCOSE: CPT

## 2019-03-30 PROCEDURE — 99217 PR OBSERVATION CARE DISCHARGE MANAGEMENT: CPT | Performed by: INTERNAL MEDICINE

## 2019-03-30 RX ORDER — POTASSIUM CHLORIDE 750 MG/1
10 TABLET, EXTENDED RELEASE ORAL DAILY
Status: DISCONTINUED | OUTPATIENT
Start: 2019-03-30 | End: 2019-03-30 | Stop reason: HOSPADM

## 2019-03-30 RX ORDER — TORSEMIDE 20 MG/1
20 TABLET ORAL 2 TIMES DAILY
Status: DISCONTINUED | OUTPATIENT
Start: 2019-03-30 | End: 2019-03-30 | Stop reason: HOSPADM

## 2019-03-30 RX ORDER — POTASSIUM CHLORIDE 750 MG/1
10 TABLET, EXTENDED RELEASE ORAL DAILY
Qty: 30 TABLET | Refills: 0 | Status: SHIPPED | OUTPATIENT
Start: 2019-03-31 | End: 2019-04-24 | Stop reason: SDUPTHER

## 2019-03-30 RX ADMIN — POTASSIUM CHLORIDE 10 MEQ: 750 TABLET, EXTENDED RELEASE ORAL at 11:30

## 2019-03-30 RX ADMIN — LOSARTAN POTASSIUM 25 MG: 25 TABLET, FILM COATED ORAL at 08:00

## 2019-03-30 RX ADMIN — APIXABAN 5 MG: 5 TABLET, FILM COATED ORAL at 08:01

## 2019-03-30 RX ADMIN — SENNOSIDES 8.6 MG: 8.6 TABLET, FILM COATED ORAL at 08:01

## 2019-03-30 RX ADMIN — METOPROLOL TARTRATE 37.5 MG: 25 TABLET ORAL at 08:01

## 2019-03-30 RX ADMIN — DOCUSATE SODIUM 100 MG: 100 CAPSULE, LIQUID FILLED ORAL at 08:01

## 2019-03-30 RX ADMIN — INSULIN GLARGINE 12 UNITS: 100 INJECTION, SOLUTION SUBCUTANEOUS at 09:04

## 2019-03-30 RX ADMIN — ASPIRIN 81 MG 81 MG: 81 TABLET ORAL at 08:01

## 2019-03-30 RX ADMIN — INSULIN LISPRO 2 UNITS: 100 INJECTION, SOLUTION INTRAVENOUS; SUBCUTANEOUS at 12:00

## 2019-03-30 RX ADMIN — ATORVASTATIN CALCIUM 40 MG: 40 TABLET, FILM COATED ORAL at 07:59

## 2019-03-30 RX ADMIN — FUROSEMIDE 60 MG: 10 INJECTION, SOLUTION INTRAMUSCULAR; INTRAVENOUS at 07:56

## 2019-03-30 RX ADMIN — CALCIUM CARBONATE-VITAMIN D TAB 500 MG-200 UNIT 1 TABLET: 500-200 TAB at 07:59

## 2019-03-30 RX ADMIN — ISOSORBIDE MONONITRATE 60 MG: 60 TABLET, EXTENDED RELEASE ORAL at 08:01

## 2019-03-30 RX ADMIN — NYSTATIN: 100000 POWDER TOPICAL at 08:02

## 2019-03-30 RX ADMIN — AMLODIPINE BESYLATE 5 MG: 5 TABLET ORAL at 08:00

## 2019-04-01 ENCOUNTER — TRANSITIONAL CARE MANAGEMENT (OUTPATIENT)
Dept: FAMILY MEDICINE CLINIC | Facility: CLINIC | Age: 84
End: 2019-04-01

## 2019-04-02 ENCOUNTER — TELEPHONE (OUTPATIENT)
Dept: CARDIOLOGY CLINIC | Facility: CLINIC | Age: 84
End: 2019-04-02

## 2019-04-02 DIAGNOSIS — Z71.89 COMPLEX CARE COORDINATION: Primary | ICD-10-CM

## 2019-04-03 ENCOUNTER — PATIENT OUTREACH (OUTPATIENT)
Dept: FAMILY MEDICINE CLINIC | Facility: CLINIC | Age: 84
End: 2019-04-03

## 2019-04-04 ENCOUNTER — PATIENT OUTREACH (OUTPATIENT)
Dept: FAMILY MEDICINE CLINIC | Facility: CLINIC | Age: 84
End: 2019-04-04

## 2019-04-05 ENCOUNTER — LAB REQUISITION (OUTPATIENT)
Dept: LAB | Facility: HOSPITAL | Age: 84
End: 2019-04-05
Payer: COMMERCIAL

## 2019-04-05 DIAGNOSIS — Z95.5 PRESENCE OF CORONARY ANGIOPLASTY IMPLANT AND GRAFT: ICD-10-CM

## 2019-04-05 DIAGNOSIS — Z95.1 PRESENCE OF AORTOCORONARY BYPASS GRAFT: ICD-10-CM

## 2019-04-05 DIAGNOSIS — I50.43 ACUTE ON CHRONIC COMBINED SYSTOLIC AND DIASTOLIC CONGESTIVE HEART FAILURE (HCC): ICD-10-CM

## 2019-04-05 DIAGNOSIS — I25.10 ATHEROSCLEROTIC HEART DISEASE OF NATIVE CORONARY ARTERY WITHOUT ANGINA PECTORIS: ICD-10-CM

## 2019-04-05 LAB
ALBUMIN SERPL BCP-MCNC: 4 G/DL (ref 3–5.2)
ALP SERPL-CCNC: 92 U/L (ref 43–122)
ALT SERPL W P-5'-P-CCNC: 32 U/L (ref 9–52)
ANION GAP SERPL CALCULATED.3IONS-SCNC: 6 MMOL/L (ref 5–14)
AST SERPL W P-5'-P-CCNC: 44 U/L (ref 14–36)
BASOPHILS # BLD AUTO: 0.1 THOUSANDS/ΜL (ref 0–0.1)
BASOPHILS NFR BLD AUTO: 1 % (ref 0–1)
BILIRUB SERPL-MCNC: 0.8 MG/DL
BUN SERPL-MCNC: 32 MG/DL (ref 5–25)
CALCIUM SERPL-MCNC: 9.3 MG/DL (ref 8.4–10.2)
CHLORIDE SERPL-SCNC: 97 MMOL/L (ref 97–108)
CO2 SERPL-SCNC: 33 MMOL/L (ref 22–30)
CREAT SERPL-MCNC: 0.93 MG/DL (ref 0.6–1.2)
EOSINOPHIL # BLD AUTO: 0.3 THOUSAND/ΜL (ref 0–0.4)
EOSINOPHIL NFR BLD AUTO: 5 % (ref 0–6)
ERYTHROCYTE [DISTWIDTH] IN BLOOD BY AUTOMATED COUNT: 14.3 %
GFR SERPL CREATININE-BSD FRML MDRD: 58 ML/MIN/1.73SQ M
GLUCOSE SERPL-MCNC: 117 MG/DL (ref 70–99)
HCT VFR BLD AUTO: 43.5 % (ref 36–46)
HGB BLD-MCNC: 14 G/DL (ref 12–16)
LYMPHOCYTES # BLD AUTO: 1.5 THOUSANDS/ΜL (ref 0.5–4)
LYMPHOCYTES NFR BLD AUTO: 22 % (ref 25–45)
MCH RBC QN AUTO: 29 PG (ref 26–34)
MCHC RBC AUTO-ENTMCNC: 32.1 G/DL (ref 31–36)
MCV RBC AUTO: 90 FL (ref 80–100)
MONOCYTES # BLD AUTO: 0.5 THOUSAND/ΜL (ref 0.2–0.9)
MONOCYTES NFR BLD AUTO: 7 % (ref 1–10)
NEUTROPHILS # BLD AUTO: 4.4 THOUSANDS/ΜL (ref 1.8–7.8)
NEUTS SEG NFR BLD AUTO: 65 % (ref 45–65)
NT-PROBNP SERPL-MCNC: 2210 PG/ML (ref 0–299)
PLATELET # BLD AUTO: 168 THOUSANDS/UL (ref 150–450)
PMV BLD AUTO: 10.3 FL (ref 8.9–12.7)
POTASSIUM SERPL-SCNC: 4.1 MMOL/L (ref 3.6–5)
PROT SERPL-MCNC: 6.5 G/DL (ref 5.9–8.4)
RBC # BLD AUTO: 4.82 MILLION/UL (ref 4–5.2)
SODIUM SERPL-SCNC: 136 MMOL/L (ref 137–147)
WBC # BLD AUTO: 6.7 THOUSAND/UL (ref 4.5–11)

## 2019-04-05 PROCEDURE — 85025 COMPLETE CBC W/AUTO DIFF WBC: CPT | Performed by: INTERNAL MEDICINE

## 2019-04-05 PROCEDURE — 83880 ASSAY OF NATRIURETIC PEPTIDE: CPT | Performed by: INTERNAL MEDICINE

## 2019-04-05 PROCEDURE — 80053 COMPREHEN METABOLIC PANEL: CPT | Performed by: INTERNAL MEDICINE

## 2019-04-08 ENCOUNTER — OFFICE VISIT (OUTPATIENT)
Dept: CARDIOLOGY CLINIC | Facility: CLINIC | Age: 84
End: 2019-04-08
Payer: COMMERCIAL

## 2019-04-08 VITALS
DIASTOLIC BLOOD PRESSURE: 56 MMHG | WEIGHT: 227.9 LBS | SYSTOLIC BLOOD PRESSURE: 90 MMHG | OXYGEN SATURATION: 98 % | HEART RATE: 62 BPM | BODY MASS INDEX: 37.97 KG/M2 | HEIGHT: 65 IN

## 2019-04-08 DIAGNOSIS — I07.1 TRICUSPID VALVE INSUFFICIENCY, UNSPECIFIED ETIOLOGY: ICD-10-CM

## 2019-04-08 DIAGNOSIS — R60.9 EDEMA, UNSPECIFIED TYPE: Primary | ICD-10-CM

## 2019-04-08 DIAGNOSIS — E78.2 MIXED HYPERLIPIDEMIA: ICD-10-CM

## 2019-04-08 DIAGNOSIS — E78.5 HYPERLIPIDEMIA, UNSPECIFIED HYPERLIPIDEMIA TYPE: ICD-10-CM

## 2019-04-08 DIAGNOSIS — I49.5 SICK SINUS SYNDROME (HCC): ICD-10-CM

## 2019-04-08 DIAGNOSIS — I47.2 VENTRICULAR TACHYCARDIA (HCC): ICD-10-CM

## 2019-04-08 DIAGNOSIS — I25.10 3-VESSEL CORONARY ARTERY DISEASE: ICD-10-CM

## 2019-04-08 DIAGNOSIS — I44.2 AV BLOCK, COMPLETE (HCC): ICD-10-CM

## 2019-04-08 DIAGNOSIS — I50.30 CONGESTIVE HEART FAILURE WITH LV DIASTOLIC DYSFUNCTION, NYHA CLASS 2 (HCC): ICD-10-CM

## 2019-04-08 DIAGNOSIS — R06.02 SHORTNESS OF BREATH: ICD-10-CM

## 2019-04-08 DIAGNOSIS — Z95.1 HX OF CABG: ICD-10-CM

## 2019-04-08 PROCEDURE — 99214 OFFICE O/P EST MOD 30 MIN: CPT | Performed by: INTERNAL MEDICINE

## 2019-04-08 PROCEDURE — 1111F DSCHRG MED/CURRENT MED MERGE: CPT | Performed by: INTERNAL MEDICINE

## 2019-04-08 RX ORDER — AMLODIPINE BESYLATE 5 MG/1
5 TABLET ORAL DAILY
COMMUNITY
End: 2019-05-07 | Stop reason: SDUPTHER

## 2019-04-08 RX ORDER — ATORVASTATIN CALCIUM 40 MG/1
TABLET, FILM COATED ORAL
Qty: 30 TABLET | Refills: 11 | Status: SHIPPED | OUTPATIENT
Start: 2019-04-08 | End: 2020-04-06

## 2019-04-24 DIAGNOSIS — I48.0 PAF (PAROXYSMAL ATRIAL FIBRILLATION) (HCC): ICD-10-CM

## 2019-04-24 RX ORDER — POTASSIUM CHLORIDE 750 MG/1
10 TABLET, EXTENDED RELEASE ORAL DAILY
Qty: 30 TABLET | Refills: 5 | Status: SHIPPED | OUTPATIENT
Start: 2019-04-24 | End: 2019-09-30 | Stop reason: SDUPTHER

## 2019-05-07 ENCOUNTER — APPOINTMENT (EMERGENCY)
Dept: RADIOLOGY | Facility: HOSPITAL | Age: 84
End: 2019-05-07
Payer: COMMERCIAL

## 2019-05-07 ENCOUNTER — HOSPITAL ENCOUNTER (OUTPATIENT)
Facility: HOSPITAL | Age: 84
Setting detail: OBSERVATION
Discharge: HOME HEALTH | End: 2019-05-09
Attending: EMERGENCY MEDICINE | Admitting: INTERNAL MEDICINE
Payer: COMMERCIAL

## 2019-05-07 DIAGNOSIS — N17.9 AKI (ACUTE KIDNEY INJURY) (HCC): ICD-10-CM

## 2019-05-07 DIAGNOSIS — J45.901 ASTHMA EXACERBATION: ICD-10-CM

## 2019-05-07 DIAGNOSIS — J45.901 REACTIVE AIRWAY DISEASE WITH ACUTE EXACERBATION: ICD-10-CM

## 2019-05-07 DIAGNOSIS — I10 ESSENTIAL HYPERTENSION: ICD-10-CM

## 2019-05-07 DIAGNOSIS — IMO0002 DM (DIABETES MELLITUS), TYPE 2, UNCONTROLLED W/OPHTHALMIC COMPLICATION: Primary | ICD-10-CM

## 2019-05-07 LAB
ALBUMIN SERPL BCP-MCNC: 3.9 G/DL (ref 3.5–5)
ALP SERPL-CCNC: 124 U/L (ref 46–116)
ALT SERPL W P-5'-P-CCNC: 38 U/L (ref 12–78)
ANION GAP SERPL CALCULATED.3IONS-SCNC: 6 MMOL/L (ref 4–13)
AST SERPL W P-5'-P-CCNC: 25 U/L (ref 5–45)
BASOPHILS # BLD AUTO: 0.08 THOUSANDS/ΜL (ref 0–0.1)
BASOPHILS NFR BLD AUTO: 1 % (ref 0–1)
BILIRUB SERPL-MCNC: 0.76 MG/DL (ref 0.2–1)
BUN SERPL-MCNC: 37 MG/DL (ref 5–25)
CALCIUM SERPL-MCNC: 9.3 MG/DL (ref 8.3–10.1)
CHLORIDE SERPL-SCNC: 90 MMOL/L (ref 100–108)
CO2 SERPL-SCNC: 34 MMOL/L (ref 21–32)
CREAT SERPL-MCNC: 1.57 MG/DL (ref 0.6–1.3)
DEPRECATED D DIMER PPP: 370 NG/ML (FEU)
EOSINOPHIL # BLD AUTO: 0.29 THOUSAND/ΜL (ref 0–0.61)
EOSINOPHIL NFR BLD AUTO: 4 % (ref 0–6)
ERYTHROCYTE [DISTWIDTH] IN BLOOD BY AUTOMATED COUNT: 13.6 % (ref 11.6–15.1)
GFR SERPL CREATININE-BSD FRML MDRD: 31 ML/MIN/1.73SQ M
GLUCOSE SERPL-MCNC: 410 MG/DL (ref 65–140)
HCT VFR BLD AUTO: 45 % (ref 34.8–46.1)
HGB BLD-MCNC: 15 G/DL (ref 11.5–15.4)
IMM GRANULOCYTES # BLD AUTO: 0.01 THOUSAND/UL (ref 0–0.2)
IMM GRANULOCYTES NFR BLD AUTO: 0 % (ref 0–2)
LYMPHOCYTES # BLD AUTO: 1.14 THOUSANDS/ΜL (ref 0.6–4.47)
LYMPHOCYTES NFR BLD AUTO: 17 % (ref 14–44)
MCH RBC QN AUTO: 29.2 PG (ref 26.8–34.3)
MCHC RBC AUTO-ENTMCNC: 33.3 G/DL (ref 31.4–37.4)
MCV RBC AUTO: 88 FL (ref 82–98)
MONOCYTES # BLD AUTO: 0.7 THOUSAND/ΜL (ref 0.17–1.22)
MONOCYTES NFR BLD AUTO: 10 % (ref 4–12)
NEUTROPHILS # BLD AUTO: 4.49 THOUSANDS/ΜL (ref 1.85–7.62)
NEUTS SEG NFR BLD AUTO: 68 % (ref 43–75)
NRBC BLD AUTO-RTO: 0 /100 WBCS
PLATELET # BLD AUTO: 169 THOUSANDS/UL (ref 149–390)
PMV BLD AUTO: 12.3 FL (ref 8.9–12.7)
POTASSIUM SERPL-SCNC: 3.5 MMOL/L (ref 3.5–5.3)
PROT SERPL-MCNC: 7.6 G/DL (ref 6.4–8.2)
RBC # BLD AUTO: 5.13 MILLION/UL (ref 3.81–5.12)
SODIUM SERPL-SCNC: 130 MMOL/L (ref 136–145)
TROPONIN I SERPL-MCNC: <0.02 NG/ML
WBC # BLD AUTO: 6.71 THOUSAND/UL (ref 4.31–10.16)

## 2019-05-07 PROCEDURE — 36415 COLL VENOUS BLD VENIPUNCTURE: CPT

## 2019-05-07 PROCEDURE — 94644 CONT INHLJ TX 1ST HOUR: CPT

## 2019-05-07 PROCEDURE — 96375 TX/PRO/DX INJ NEW DRUG ADDON: CPT

## 2019-05-07 PROCEDURE — 82948 REAGENT STRIP/BLOOD GLUCOSE: CPT

## 2019-05-07 PROCEDURE — 99285 EMERGENCY DEPT VISIT HI MDM: CPT | Performed by: EMERGENCY MEDICINE

## 2019-05-07 PROCEDURE — 80053 COMPREHEN METABOLIC PANEL: CPT | Performed by: EMERGENCY MEDICINE

## 2019-05-07 PROCEDURE — 71045 X-RAY EXAM CHEST 1 VIEW: CPT

## 2019-05-07 PROCEDURE — 99291 CRITICAL CARE FIRST HOUR: CPT

## 2019-05-07 PROCEDURE — 99220 PR INITIAL OBSERVATION CARE/DAY 70 MINUTES: CPT | Performed by: INTERNAL MEDICINE

## 2019-05-07 PROCEDURE — 93005 ELECTROCARDIOGRAM TRACING: CPT

## 2019-05-07 PROCEDURE — 85025 COMPLETE CBC W/AUTO DIFF WBC: CPT | Performed by: EMERGENCY MEDICINE

## 2019-05-07 PROCEDURE — 84484 ASSAY OF TROPONIN QUANT: CPT | Performed by: EMERGENCY MEDICINE

## 2019-05-07 PROCEDURE — 96365 THER/PROPH/DIAG IV INF INIT: CPT

## 2019-05-07 PROCEDURE — 85379 FIBRIN DEGRADATION QUANT: CPT | Performed by: EMERGENCY MEDICINE

## 2019-05-07 PROCEDURE — 94760 N-INVAS EAR/PLS OXIMETRY 1: CPT

## 2019-05-07 RX ORDER — METHYLPREDNISOLONE SODIUM SUCCINATE 40 MG/ML
40 INJECTION, POWDER, LYOPHILIZED, FOR SOLUTION INTRAMUSCULAR; INTRAVENOUS EVERY 8 HOURS SCHEDULED
Status: DISCONTINUED | OUTPATIENT
Start: 2019-05-08 | End: 2019-05-08

## 2019-05-07 RX ORDER — ISOSORBIDE MONONITRATE 60 MG/1
60 TABLET, EXTENDED RELEASE ORAL DAILY
Status: DISCONTINUED | OUTPATIENT
Start: 2019-05-08 | End: 2019-05-09 | Stop reason: HOSPADM

## 2019-05-07 RX ORDER — SODIUM CHLORIDE FOR INHALATION 0.9 %
3 VIAL, NEBULIZER (ML) INHALATION ONCE
Status: COMPLETED | OUTPATIENT
Start: 2019-05-07 | End: 2019-05-07

## 2019-05-07 RX ORDER — ACETAMINOPHEN 325 MG/1
650 TABLET ORAL EVERY 6 HOURS PRN
Status: DISCONTINUED | OUTPATIENT
Start: 2019-05-07 | End: 2019-05-09 | Stop reason: HOSPADM

## 2019-05-07 RX ORDER — POTASSIUM CHLORIDE 750 MG/1
10 TABLET, EXTENDED RELEASE ORAL DAILY
Status: DISCONTINUED | OUTPATIENT
Start: 2019-05-08 | End: 2019-05-09 | Stop reason: HOSPADM

## 2019-05-07 RX ORDER — AMLODIPINE BESYLATE 5 MG/1
TABLET ORAL
Qty: 180 TABLET | Refills: 3 | Status: SHIPPED | OUTPATIENT
Start: 2019-05-07 | End: 2019-09-23

## 2019-05-07 RX ORDER — AMLODIPINE BESYLATE 5 MG/1
5 TABLET ORAL 2 TIMES DAILY
Status: DISCONTINUED | OUTPATIENT
Start: 2019-05-08 | End: 2019-05-09 | Stop reason: HOSPADM

## 2019-05-07 RX ORDER — ASPIRIN 81 MG/1
81 TABLET, CHEWABLE ORAL DAILY
Status: DISCONTINUED | OUTPATIENT
Start: 2019-05-08 | End: 2019-05-09 | Stop reason: HOSPADM

## 2019-05-07 RX ORDER — ALBUTEROL SULFATE 90 UG/1
2 AEROSOL, METERED RESPIRATORY (INHALATION) EVERY 4 HOURS PRN
Status: DISCONTINUED | OUTPATIENT
Start: 2019-05-07 | End: 2019-05-09 | Stop reason: HOSPADM

## 2019-05-07 RX ORDER — ATORVASTATIN CALCIUM 40 MG/1
40 TABLET, FILM COATED ORAL
Status: DISCONTINUED | OUTPATIENT
Start: 2019-05-08 | End: 2019-05-09 | Stop reason: HOSPADM

## 2019-05-07 RX ORDER — SENNOSIDES 8.6 MG
1 TABLET ORAL DAILY
Status: DISCONTINUED | OUTPATIENT
Start: 2019-05-08 | End: 2019-05-09 | Stop reason: HOSPADM

## 2019-05-07 RX ORDER — MAGNESIUM SULFATE HEPTAHYDRATE 40 MG/ML
2 INJECTION, SOLUTION INTRAVENOUS ONCE
Status: COMPLETED | OUTPATIENT
Start: 2019-05-07 | End: 2019-05-07

## 2019-05-07 RX ORDER — DOCUSATE SODIUM 100 MG/1
100 CAPSULE, LIQUID FILLED ORAL 2 TIMES DAILY
Status: DISCONTINUED | OUTPATIENT
Start: 2019-05-08 | End: 2019-05-09 | Stop reason: HOSPADM

## 2019-05-07 RX ORDER — MAGNESIUM HYDROXIDE/ALUMINUM HYDROXICE/SIMETHICONE 120; 1200; 1200 MG/30ML; MG/30ML; MG/30ML
15 SUSPENSION ORAL EVERY 6 HOURS PRN
Status: DISCONTINUED | OUTPATIENT
Start: 2019-05-07 | End: 2019-05-09 | Stop reason: HOSPADM

## 2019-05-07 RX ORDER — METHYLPREDNISOLONE SODIUM SUCCINATE 125 MG/2ML
125 INJECTION, POWDER, LYOPHILIZED, FOR SOLUTION INTRAMUSCULAR; INTRAVENOUS ONCE
Status: COMPLETED | OUTPATIENT
Start: 2019-05-07 | End: 2019-05-07

## 2019-05-07 RX ORDER — B-COMPLEX WITH VITAMIN C
1 TABLET ORAL 2 TIMES DAILY WITH MEALS
Status: DISCONTINUED | OUTPATIENT
Start: 2019-05-08 | End: 2019-05-09 | Stop reason: HOSPADM

## 2019-05-07 RX ORDER — GUAIFENESIN 600 MG
600 TABLET, EXTENDED RELEASE 12 HR ORAL 2 TIMES DAILY
Status: DISCONTINUED | OUTPATIENT
Start: 2019-05-08 | End: 2019-05-09 | Stop reason: HOSPADM

## 2019-05-07 RX ORDER — ONDANSETRON 2 MG/ML
4 INJECTION INTRAMUSCULAR; INTRAVENOUS EVERY 6 HOURS PRN
Status: DISCONTINUED | OUTPATIENT
Start: 2019-05-07 | End: 2019-05-09 | Stop reason: HOSPADM

## 2019-05-07 RX ORDER — ALBUTEROL SULFATE 2.5 MG/3ML
2.5 SOLUTION RESPIRATORY (INHALATION) EVERY 4 HOURS PRN
Status: DISCONTINUED | OUTPATIENT
Start: 2019-05-07 | End: 2019-05-09 | Stop reason: HOSPADM

## 2019-05-07 RX ADMIN — IPRATROPIUM BROMIDE 1 MG: 0.5 SOLUTION RESPIRATORY (INHALATION) at 20:00

## 2019-05-07 RX ADMIN — METHYLPREDNISOLONE SODIUM SUCCINATE 125 MG: 125 INJECTION, POWDER, FOR SOLUTION INTRAMUSCULAR; INTRAVENOUS at 19:43

## 2019-05-07 RX ADMIN — MAGNESIUM SULFATE HEPTAHYDRATE 2 G: 40 INJECTION, SOLUTION INTRAVENOUS at 20:42

## 2019-05-07 RX ADMIN — ALBUTEROL SULFATE 10 MG: 2.5 SOLUTION RESPIRATORY (INHALATION) at 19:59

## 2019-05-07 RX ADMIN — ISODIUM CHLORIDE 3 ML: 0.03 SOLUTION RESPIRATORY (INHALATION) at 20:00

## 2019-05-07 RX ADMIN — SODIUM CHLORIDE 500 ML: 0.9 INJECTION, SOLUTION INTRAVENOUS at 20:42

## 2019-05-07 RX ADMIN — METOPROLOL TARTRATE 25 MG: 25 TABLET ORAL at 23:59

## 2019-05-08 LAB
ANION GAP SERPL CALCULATED.3IONS-SCNC: 10 MMOL/L (ref 4–13)
ATRIAL RATE: 76 BPM
BASE EX.OXY STD BLDV CALC-SCNC: 88.1 % (ref 60–80)
BASE EXCESS BLDV CALC-SCNC: 2.1 MMOL/L
BUN SERPL-MCNC: 43 MG/DL (ref 5–25)
CALCIUM SERPL-MCNC: 8.9 MG/DL (ref 8.3–10.1)
CHLORIDE SERPL-SCNC: 93 MMOL/L (ref 100–108)
CO2 SERPL-SCNC: 31 MMOL/L (ref 21–32)
CREAT SERPL-MCNC: 1.67 MG/DL (ref 0.6–1.3)
ERYTHROCYTE [DISTWIDTH] IN BLOOD BY AUTOMATED COUNT: 13.6 % (ref 11.6–15.1)
EST. AVERAGE GLUCOSE BLD GHB EST-MCNC: 232 MG/DL
GFR SERPL CREATININE-BSD FRML MDRD: 28 ML/MIN/1.73SQ M
GLUCOSE SERPL-MCNC: 161 MG/DL (ref 65–140)
GLUCOSE SERPL-MCNC: 176 MG/DL (ref 65–140)
GLUCOSE SERPL-MCNC: 179 MG/DL (ref 65–140)
GLUCOSE SERPL-MCNC: 203 MG/DL (ref 65–140)
GLUCOSE SERPL-MCNC: 211 MG/DL (ref 65–140)
GLUCOSE SERPL-MCNC: 219 MG/DL (ref 65–140)
GLUCOSE SERPL-MCNC: 231 MG/DL (ref 65–140)
GLUCOSE SERPL-MCNC: 303 MG/DL (ref 65–140)
GLUCOSE SERPL-MCNC: 451 MG/DL (ref 65–140)
GLUCOSE SERPL-MCNC: 471 MG/DL (ref 65–140)
GLUCOSE SERPL-MCNC: 494 MG/DL (ref 65–140)
GLUCOSE SERPL-MCNC: 496 MG/DL (ref 65–140)
GLUCOSE SERPL-MCNC: >500 MG/DL (ref 65–140)
GLUCOSE SERPL-MCNC: >500 MG/DL (ref 65–140)
HBA1C MFR BLD: 9.7 % (ref 4.2–6.3)
HCO3 BLDV-SCNC: 27.4 MMOL/L (ref 24–30)
HCT VFR BLD AUTO: 42.3 % (ref 34.8–46.1)
HGB BLD-MCNC: 14.2 G/DL (ref 11.5–15.4)
MAGNESIUM SERPL-MCNC: 2.4 MG/DL (ref 1.6–2.6)
MCH RBC QN AUTO: 29.5 PG (ref 26.8–34.3)
MCHC RBC AUTO-ENTMCNC: 33.6 G/DL (ref 31.4–37.4)
MCV RBC AUTO: 88 FL (ref 82–98)
O2 CT BLDV-SCNC: 17.7 ML/DL
PCO2 BLDV: 45.2 MM HG (ref 42–50)
PH BLDV: 7.4 [PH] (ref 7.3–7.4)
PLATELET # BLD AUTO: 154 THOUSANDS/UL (ref 149–390)
PMV BLD AUTO: 12.4 FL (ref 8.9–12.7)
PO2 BLDV: 61.2 MM HG (ref 35–45)
POTASSIUM SERPL-SCNC: 3.5 MMOL/L (ref 3.5–5.3)
QRS AXIS: 154 DEGREES
QRSD INTERVAL: 144 MS
QT INTERVAL: 406 MS
QTC INTERVAL: 431 MS
RBC # BLD AUTO: 4.82 MILLION/UL (ref 3.81–5.12)
SODIUM SERPL-SCNC: 134 MMOL/L (ref 136–145)
T WAVE AXIS: -34 DEGREES
VENTRICULAR RATE: 68 BPM
WBC # BLD AUTO: 5.14 THOUSAND/UL (ref 4.31–10.16)

## 2019-05-08 PROCEDURE — 80048 BASIC METABOLIC PNL TOTAL CA: CPT | Performed by: PHYSICIAN ASSISTANT

## 2019-05-08 PROCEDURE — 82948 REAGENT STRIP/BLOOD GLUCOSE: CPT

## 2019-05-08 PROCEDURE — 99226 PR SBSQ OBSERVATION CARE/DAY 35 MINUTES: CPT | Performed by: NURSE PRACTITIONER

## 2019-05-08 PROCEDURE — 99215 OFFICE O/P EST HI 40 MIN: CPT | Performed by: INTERNAL MEDICINE

## 2019-05-08 PROCEDURE — 93010 ELECTROCARDIOGRAM REPORT: CPT | Performed by: INTERNAL MEDICINE

## 2019-05-08 PROCEDURE — 85027 COMPLETE CBC AUTOMATED: CPT | Performed by: PHYSICIAN ASSISTANT

## 2019-05-08 PROCEDURE — 83735 ASSAY OF MAGNESIUM: CPT | Performed by: PHYSICIAN ASSISTANT

## 2019-05-08 PROCEDURE — 94760 N-INVAS EAR/PLS OXIMETRY 1: CPT

## 2019-05-08 PROCEDURE — 94640 AIRWAY INHALATION TREATMENT: CPT

## 2019-05-08 PROCEDURE — 83036 HEMOGLOBIN GLYCOSYLATED A1C: CPT | Performed by: PHYSICIAN ASSISTANT

## 2019-05-08 PROCEDURE — 94660 CPAP INITIATION&MGMT: CPT

## 2019-05-08 PROCEDURE — 82805 BLOOD GASES W/O2 SATURATION: CPT | Performed by: INTERNAL MEDICINE

## 2019-05-08 RX ORDER — LEVALBUTEROL 1.25 MG/.5ML
1.25 SOLUTION, CONCENTRATE RESPIRATORY (INHALATION)
Status: DISCONTINUED | OUTPATIENT
Start: 2019-05-08 | End: 2019-05-08

## 2019-05-08 RX ORDER — LEVALBUTEROL 1.25 MG/.5ML
1.25 SOLUTION, CONCENTRATE RESPIRATORY (INHALATION)
Status: DISCONTINUED | OUTPATIENT
Start: 2019-05-08 | End: 2019-05-09 | Stop reason: HOSPADM

## 2019-05-08 RX ORDER — METHYLPREDNISOLONE SODIUM SUCCINATE 40 MG/ML
40 INJECTION, POWDER, LYOPHILIZED, FOR SOLUTION INTRAMUSCULAR; INTRAVENOUS EVERY 12 HOURS SCHEDULED
Status: DISCONTINUED | OUTPATIENT
Start: 2019-05-08 | End: 2019-05-08

## 2019-05-08 RX ORDER — METHYLPREDNISOLONE SODIUM SUCCINATE 40 MG/ML
40 INJECTION, POWDER, LYOPHILIZED, FOR SOLUTION INTRAMUSCULAR; INTRAVENOUS EVERY 12 HOURS SCHEDULED
Status: COMPLETED | OUTPATIENT
Start: 2019-05-08 | End: 2019-05-08

## 2019-05-08 RX ADMIN — METHYLPREDNISOLONE SODIUM SUCCINATE 40 MG: 40 INJECTION, POWDER, FOR SOLUTION INTRAMUSCULAR; INTRAVENOUS at 05:28

## 2019-05-08 RX ADMIN — ISOSORBIDE MONONITRATE 60 MG: 60 TABLET, EXTENDED RELEASE ORAL at 09:14

## 2019-05-08 RX ADMIN — METHYLPREDNISOLONE SODIUM SUCCINATE 40 MG: 40 INJECTION, POWDER, FOR SOLUTION INTRAMUSCULAR; INTRAVENOUS at 20:13

## 2019-05-08 RX ADMIN — IPRATROPIUM BROMIDE 0.5 MG: 0.5 SOLUTION RESPIRATORY (INHALATION) at 21:29

## 2019-05-08 RX ADMIN — INSULIN LISPRO 10 UNITS: 100 INJECTION, SOLUTION INTRAVENOUS; SUBCUTANEOUS at 04:03

## 2019-05-08 RX ADMIN — LEVALBUTEROL HYDROCHLORIDE 1.25 MG: 1.25 SOLUTION, CONCENTRATE RESPIRATORY (INHALATION) at 08:27

## 2019-05-08 RX ADMIN — AMLODIPINE BESYLATE 5 MG: 5 TABLET ORAL at 20:14

## 2019-05-08 RX ADMIN — APIXABAN 2.5 MG: 2.5 TABLET, FILM COATED ORAL at 17:17

## 2019-05-08 RX ADMIN — ATORVASTATIN CALCIUM 40 MG: 40 TABLET, FILM COATED ORAL at 17:17

## 2019-05-08 RX ADMIN — IPRATROPIUM BROMIDE 0.5 MG: 0.5 SOLUTION RESPIRATORY (INHALATION) at 08:27

## 2019-05-08 RX ADMIN — SODIUM CHLORIDE 10 UNITS/HR: 9 INJECTION, SOLUTION INTRAVENOUS at 06:42

## 2019-05-08 RX ADMIN — ASPIRIN 81 MG 81 MG: 81 TABLET ORAL at 09:15

## 2019-05-08 RX ADMIN — APIXABAN 2.5 MG: 2.5 TABLET, FILM COATED ORAL at 09:15

## 2019-05-08 RX ADMIN — CALCIUM CARBONATE-VITAMIN D TAB 500 MG-200 UNIT 1 TABLET: 500-200 TAB at 09:15

## 2019-05-08 RX ADMIN — GUAIFENESIN 600 MG: 600 TABLET, EXTENDED RELEASE ORAL at 09:15

## 2019-05-08 RX ADMIN — AMLODIPINE BESYLATE 5 MG: 5 TABLET ORAL at 09:15

## 2019-05-08 RX ADMIN — LEVALBUTEROL HYDROCHLORIDE 1.25 MG: 1.25 SOLUTION, CONCENTRATE RESPIRATORY (INHALATION) at 21:29

## 2019-05-08 RX ADMIN — LEVALBUTEROL HYDROCHLORIDE 1.25 MG: 1.25 SOLUTION, CONCENTRATE RESPIRATORY (INHALATION) at 14:38

## 2019-05-08 RX ADMIN — METOPROLOL TARTRATE 25 MG: 25 TABLET ORAL at 20:14

## 2019-05-08 RX ADMIN — CALCIUM CARBONATE-VITAMIN D TAB 500 MG-200 UNIT 1 TABLET: 500-200 TAB at 17:17

## 2019-05-08 RX ADMIN — ALBUTEROL SULFATE 2.5 MG: 2.5 SOLUTION RESPIRATORY (INHALATION) at 02:02

## 2019-05-08 RX ADMIN — ALBUTEROL SULFATE 2 PUFF: 90 AEROSOL, METERED RESPIRATORY (INHALATION) at 08:27

## 2019-05-08 RX ADMIN — GUAIFENESIN 600 MG: 600 TABLET, EXTENDED RELEASE ORAL at 20:14

## 2019-05-08 RX ADMIN — ALBUTEROL SULFATE 2.5 MG: 2.5 SOLUTION RESPIRATORY (INHALATION) at 00:15

## 2019-05-08 RX ADMIN — METOPROLOL TARTRATE 37.5 MG: 25 TABLET ORAL at 09:14

## 2019-05-08 RX ADMIN — SODIUM CHLORIDE 3 UNITS/HR: 9 INJECTION, SOLUTION INTRAVENOUS at 18:46

## 2019-05-08 RX ADMIN — INSULIN LISPRO 5 UNITS: 100 INJECTION, SOLUTION INTRAVENOUS; SUBCUTANEOUS at 00:53

## 2019-05-08 RX ADMIN — POTASSIUM CHLORIDE 10 MEQ: 750 TABLET, EXTENDED RELEASE ORAL at 09:14

## 2019-05-08 RX ADMIN — SODIUM CHLORIDE 250 ML: 0.9 INJECTION, SOLUTION INTRAVENOUS at 03:40

## 2019-05-08 RX ADMIN — IPRATROPIUM BROMIDE 0.5 MG: 0.5 SOLUTION RESPIRATORY (INHALATION) at 14:38

## 2019-05-09 VITALS
OXYGEN SATURATION: 92 % | TEMPERATURE: 98.5 F | RESPIRATION RATE: 18 BRPM | DIASTOLIC BLOOD PRESSURE: 78 MMHG | HEART RATE: 67 BPM | SYSTOLIC BLOOD PRESSURE: 132 MMHG

## 2019-05-09 PROBLEM — N17.9 AKI (ACUTE KIDNEY INJURY) (HCC): Status: ACTIVE | Noted: 2019-05-09

## 2019-05-09 LAB
ANION GAP SERPL CALCULATED.3IONS-SCNC: 6 MMOL/L (ref 4–13)
BUN SERPL-MCNC: 58 MG/DL (ref 5–25)
CALCIUM SERPL-MCNC: 9.3 MG/DL (ref 8.3–10.1)
CHLORIDE SERPL-SCNC: 99 MMOL/L (ref 100–108)
CO2 SERPL-SCNC: 33 MMOL/L (ref 21–32)
CREAT SERPL-MCNC: 1.6 MG/DL (ref 0.6–1.3)
GFR SERPL CREATININE-BSD FRML MDRD: 30 ML/MIN/1.73SQ M
GLUCOSE SERPL-MCNC: 107 MG/DL (ref 65–140)
GLUCOSE SERPL-MCNC: 121 MG/DL (ref 65–140)
GLUCOSE SERPL-MCNC: 124 MG/DL (ref 65–140)
GLUCOSE SERPL-MCNC: 140 MG/DL (ref 65–140)
GLUCOSE SERPL-MCNC: 154 MG/DL (ref 65–140)
GLUCOSE SERPL-MCNC: 191 MG/DL (ref 65–140)
GLUCOSE SERPL-MCNC: 195 MG/DL (ref 65–140)
GLUCOSE SERPL-MCNC: 268 MG/DL (ref 65–140)
GLUCOSE SERPL-MCNC: 72 MG/DL (ref 65–140)
GLUCOSE SERPL-MCNC: 74 MG/DL (ref 65–140)
POTASSIUM SERPL-SCNC: 4 MMOL/L (ref 3.5–5.3)
SODIUM SERPL-SCNC: 138 MMOL/L (ref 136–145)

## 2019-05-09 PROCEDURE — 80048 BASIC METABOLIC PNL TOTAL CA: CPT | Performed by: NURSE PRACTITIONER

## 2019-05-09 PROCEDURE — 99225 PR SBSQ OBSERVATION CARE/DAY 25 MINUTES: CPT | Performed by: INTERNAL MEDICINE

## 2019-05-09 PROCEDURE — 99217 PR OBSERVATION CARE DISCHARGE MANAGEMENT: CPT | Performed by: PHYSICIAN ASSISTANT

## 2019-05-09 PROCEDURE — 94760 N-INVAS EAR/PLS OXIMETRY 1: CPT

## 2019-05-09 PROCEDURE — 94640 AIRWAY INHALATION TREATMENT: CPT

## 2019-05-09 PROCEDURE — 82948 REAGENT STRIP/BLOOD GLUCOSE: CPT

## 2019-05-09 RX ORDER — PREDNISONE 10 MG/1
TABLET ORAL
Qty: 16 TABLET | Refills: 0 | Status: SHIPPED | OUTPATIENT
Start: 2019-05-09 | End: 2019-06-06 | Stop reason: ALTCHOICE

## 2019-05-09 RX ORDER — PREDNISONE 10 MG/1
TABLET ORAL
Refills: 0 | OUTPATIENT
Start: 2019-05-10

## 2019-05-09 RX ORDER — SYRING-NEEDL,DISP,INSUL,0.3 ML 31 G X1/4"
SYRINGE, EMPTY DISPOSABLE MISCELLANEOUS 4 TIMES DAILY
Qty: 100 EACH | Refills: 0 | Status: SHIPPED | OUTPATIENT
Start: 2019-05-09 | End: 2019-10-23 | Stop reason: ALTCHOICE

## 2019-05-09 RX ORDER — INSULIN GLARGINE 100 [IU]/ML
45 INJECTION, SOLUTION SUBCUTANEOUS EVERY 24 HOURS
Status: DISCONTINUED | OUTPATIENT
Start: 2019-05-09 | End: 2019-05-09 | Stop reason: HOSPADM

## 2019-05-09 RX ADMIN — APIXABAN 2.5 MG: 2.5 TABLET, FILM COATED ORAL at 08:13

## 2019-05-09 RX ADMIN — APIXABAN 2.5 MG: 2.5 TABLET, FILM COATED ORAL at 17:36

## 2019-05-09 RX ADMIN — CALCIUM CARBONATE-VITAMIN D TAB 500 MG-200 UNIT 1 TABLET: 500-200 TAB at 16:17

## 2019-05-09 RX ADMIN — IPRATROPIUM BROMIDE 0.5 MG: 0.5 SOLUTION RESPIRATORY (INHALATION) at 01:24

## 2019-05-09 RX ADMIN — AMLODIPINE BESYLATE 5 MG: 5 TABLET ORAL at 08:14

## 2019-05-09 RX ADMIN — PREDNISONE 50 MG: 20 TABLET ORAL at 08:24

## 2019-05-09 RX ADMIN — IPRATROPIUM BROMIDE 0.5 MG: 0.5 SOLUTION RESPIRATORY (INHALATION) at 07:23

## 2019-05-09 RX ADMIN — ASPIRIN 81 MG 81 MG: 81 TABLET ORAL at 08:15

## 2019-05-09 RX ADMIN — LEVALBUTEROL HYDROCHLORIDE 1.25 MG: 1.25 SOLUTION, CONCENTRATE RESPIRATORY (INHALATION) at 13:07

## 2019-05-09 RX ADMIN — POTASSIUM CHLORIDE 10 MEQ: 750 TABLET, EXTENDED RELEASE ORAL at 08:14

## 2019-05-09 RX ADMIN — GUAIFENESIN 600 MG: 600 TABLET, EXTENDED RELEASE ORAL at 08:14

## 2019-05-09 RX ADMIN — LEVALBUTEROL HYDROCHLORIDE 1.25 MG: 1.25 SOLUTION, CONCENTRATE RESPIRATORY (INHALATION) at 07:23

## 2019-05-09 RX ADMIN — SODIUM CHLORIDE 9 UNITS/HR: 9 INJECTION, SOLUTION INTRAVENOUS at 12:12

## 2019-05-09 RX ADMIN — METOPROLOL TARTRATE 37.5 MG: 25 TABLET ORAL at 08:13

## 2019-05-09 RX ADMIN — IPRATROPIUM BROMIDE 0.5 MG: 0.5 SOLUTION RESPIRATORY (INHALATION) at 13:07

## 2019-05-09 RX ADMIN — SENNOSIDES 8.6 MG: 8.6 TABLET, FILM COATED ORAL at 08:14

## 2019-05-09 RX ADMIN — INSULIN GLARGINE 45 UNITS: 100 INJECTION, SOLUTION SUBCUTANEOUS at 15:08

## 2019-05-09 RX ADMIN — CALCIUM CARBONATE-VITAMIN D TAB 500 MG-200 UNIT 1 TABLET: 500-200 TAB at 08:13

## 2019-05-09 RX ADMIN — LEVALBUTEROL HYDROCHLORIDE 1.25 MG: 1.25 SOLUTION, CONCENTRATE RESPIRATORY (INHALATION) at 01:24

## 2019-05-09 RX ADMIN — DOCUSATE SODIUM 100 MG: 100 CAPSULE, LIQUID FILLED ORAL at 08:14

## 2019-05-09 RX ADMIN — ISOSORBIDE MONONITRATE 60 MG: 60 TABLET, EXTENDED RELEASE ORAL at 08:14

## 2019-05-09 RX ADMIN — ATORVASTATIN CALCIUM 40 MG: 40 TABLET, FILM COATED ORAL at 17:36

## 2019-05-10 ENCOUNTER — TELEPHONE (OUTPATIENT)
Dept: ENDOCRINOLOGY | Facility: CLINIC | Age: 84
End: 2019-05-10

## 2019-05-10 ENCOUNTER — TRANSITIONAL CARE MANAGEMENT (OUTPATIENT)
Dept: FAMILY MEDICINE CLINIC | Facility: CLINIC | Age: 84
End: 2019-05-10

## 2019-05-10 DIAGNOSIS — IMO0002 DM (DIABETES MELLITUS), TYPE 2, UNCONTROLLED W/OPHTHALMIC COMPLICATION: ICD-10-CM

## 2019-05-13 ENCOUNTER — LAB REQUISITION (OUTPATIENT)
Dept: LAB | Facility: HOSPITAL | Age: 84
End: 2019-05-13
Payer: COMMERCIAL

## 2019-05-13 ENCOUNTER — TELEPHONE (OUTPATIENT)
Dept: FAMILY MEDICINE CLINIC | Facility: CLINIC | Age: 84
End: 2019-05-13

## 2019-05-13 DIAGNOSIS — N17.9 ACUTE KIDNEY FAILURE (HCC): ICD-10-CM

## 2019-05-13 DIAGNOSIS — R05.9 COUGH: Primary | ICD-10-CM

## 2019-05-13 LAB
ANION GAP SERPL CALCULATED.3IONS-SCNC: 7 MMOL/L (ref 4–13)
BUN SERPL-MCNC: 50 MG/DL (ref 5–25)
CALCIUM SERPL-MCNC: 9 MG/DL (ref 8.3–10.1)
CHLORIDE SERPL-SCNC: 94 MMOL/L (ref 100–108)
CO2 SERPL-SCNC: 35 MMOL/L (ref 21–32)
CREAT SERPL-MCNC: 1.64 MG/DL (ref 0.6–1.3)
GFR SERPL CREATININE-BSD FRML MDRD: 29 ML/MIN/1.73SQ M
GLUCOSE SERPL-MCNC: 294 MG/DL (ref 65–140)
POTASSIUM SERPL-SCNC: 4.5 MMOL/L (ref 3.5–5.3)
SODIUM SERPL-SCNC: 136 MMOL/L (ref 136–145)

## 2019-05-13 PROCEDURE — 80048 BASIC METABOLIC PNL TOTAL CA: CPT | Performed by: FAMILY MEDICINE

## 2019-05-13 RX ORDER — BENZONATATE 200 MG/1
200 CAPSULE ORAL 3 TIMES DAILY PRN
Qty: 20 CAPSULE | Refills: 0 | Status: SHIPPED | OUTPATIENT
Start: 2019-05-13 | End: 2019-06-06 | Stop reason: ALTCHOICE

## 2019-05-14 ENCOUNTER — OFFICE VISIT (OUTPATIENT)
Dept: ENDOCRINOLOGY | Facility: CLINIC | Age: 84
End: 2019-05-14
Payer: COMMERCIAL

## 2019-05-14 ENCOUNTER — OFFICE VISIT (OUTPATIENT)
Dept: FAMILY MEDICINE CLINIC | Facility: CLINIC | Age: 84
End: 2019-05-14
Payer: COMMERCIAL

## 2019-05-14 VITALS
HEART RATE: 70 BPM | HEIGHT: 65 IN | SYSTOLIC BLOOD PRESSURE: 110 MMHG | DIASTOLIC BLOOD PRESSURE: 58 MMHG | BODY MASS INDEX: 35.82 KG/M2 | WEIGHT: 215 LBS

## 2019-05-14 VITALS
SYSTOLIC BLOOD PRESSURE: 110 MMHG | TEMPERATURE: 98 F | HEART RATE: 64 BPM | DIASTOLIC BLOOD PRESSURE: 72 MMHG | OXYGEN SATURATION: 94 %

## 2019-05-14 DIAGNOSIS — IMO0002 DM (DIABETES MELLITUS), TYPE 2, UNCONTROLLED W/OPHTHALMIC COMPLICATION: ICD-10-CM

## 2019-05-14 DIAGNOSIS — J45.21 MILD INTERMITTENT REACTIVE AIRWAY DISEASE WITH ACUTE EXACERBATION: ICD-10-CM

## 2019-05-14 DIAGNOSIS — R79.89 ABNORMAL TSH: ICD-10-CM

## 2019-05-14 DIAGNOSIS — N17.9 AKI (ACUTE KIDNEY INJURY) (HCC): ICD-10-CM

## 2019-05-14 DIAGNOSIS — I10 ESSENTIAL HYPERTENSION: Chronic | ICD-10-CM

## 2019-05-14 DIAGNOSIS — Z09 HOSPITAL DISCHARGE FOLLOW-UP: Primary | ICD-10-CM

## 2019-05-14 DIAGNOSIS — E78.5 HYPERLIPIDEMIA, UNSPECIFIED HYPERLIPIDEMIA TYPE: ICD-10-CM

## 2019-05-14 DIAGNOSIS — IMO0002 DM (DIABETES MELLITUS), TYPE 2, UNCONTROLLED W/OPHTHALMIC COMPLICATION: Primary | ICD-10-CM

## 2019-05-14 PROCEDURE — 99496 TRANSJ CARE MGMT HIGH F2F 7D: CPT | Performed by: FAMILY MEDICINE

## 2019-05-14 PROCEDURE — 99215 OFFICE O/P EST HI 40 MIN: CPT | Performed by: PHYSICIAN ASSISTANT

## 2019-05-14 PROCEDURE — 1111F DSCHRG MED/CURRENT MED MERGE: CPT | Performed by: FAMILY MEDICINE

## 2019-05-14 PROCEDURE — 1111F DSCHRG MED/CURRENT MED MERGE: CPT | Performed by: PHYSICIAN ASSISTANT

## 2019-05-14 PROCEDURE — 1160F RVW MEDS BY RX/DR IN RCRD: CPT | Performed by: FAMILY MEDICINE

## 2019-05-14 RX ORDER — AZITHROMYCIN 250 MG/1
TABLET, FILM COATED ORAL
Qty: 6 TABLET | Refills: 0 | Status: SHIPPED | OUTPATIENT
Start: 2019-05-14 | End: 2019-05-18

## 2019-05-15 ENCOUNTER — OFFICE VISIT (OUTPATIENT)
Dept: CARDIOLOGY CLINIC | Facility: CLINIC | Age: 84
End: 2019-05-15
Payer: COMMERCIAL

## 2019-05-15 VITALS
HEART RATE: 69 BPM | SYSTOLIC BLOOD PRESSURE: 144 MMHG | DIASTOLIC BLOOD PRESSURE: 60 MMHG | BODY MASS INDEX: 35.65 KG/M2 | OXYGEN SATURATION: 91 % | HEIGHT: 65 IN | WEIGHT: 214 LBS

## 2019-05-15 DIAGNOSIS — I48.0 PAF (PAROXYSMAL ATRIAL FIBRILLATION) (HCC): ICD-10-CM

## 2019-05-15 DIAGNOSIS — I25.10 3-VESSEL CORONARY ARTERY DISEASE: ICD-10-CM

## 2019-05-15 DIAGNOSIS — Z95.1 HX OF CABG: ICD-10-CM

## 2019-05-15 DIAGNOSIS — I49.5 SICK SINUS SYNDROME (HCC): ICD-10-CM

## 2019-05-15 DIAGNOSIS — E78.2 MIXED HYPERLIPIDEMIA: ICD-10-CM

## 2019-05-15 DIAGNOSIS — Z95.5 HISTORY OF HEART ARTERY STENT: ICD-10-CM

## 2019-05-15 DIAGNOSIS — I47.2 VENTRICULAR TACHYCARDIA (HCC): ICD-10-CM

## 2019-05-15 DIAGNOSIS — I44.2 AV BLOCK, COMPLETE (HCC): ICD-10-CM

## 2019-05-15 DIAGNOSIS — R06.02 SHORTNESS OF BREATH: ICD-10-CM

## 2019-05-15 DIAGNOSIS — I07.1 TRICUSPID VALVE INSUFFICIENCY, UNSPECIFIED ETIOLOGY: ICD-10-CM

## 2019-05-15 DIAGNOSIS — I50.30 CONGESTIVE HEART FAILURE WITH LV DIASTOLIC DYSFUNCTION, NYHA CLASS 2 (HCC): ICD-10-CM

## 2019-05-15 DIAGNOSIS — R60.9 EDEMA, UNSPECIFIED TYPE: Primary | ICD-10-CM

## 2019-05-15 DIAGNOSIS — Z95.0 PRESENCE OF PERMANENT CARDIAC PACEMAKER: ICD-10-CM

## 2019-05-15 PROCEDURE — 99214 OFFICE O/P EST MOD 30 MIN: CPT | Performed by: INTERNAL MEDICINE

## 2019-05-17 ENCOUNTER — LAB REQUISITION (OUTPATIENT)
Dept: LAB | Facility: HOSPITAL | Age: 84
End: 2019-05-17
Payer: COMMERCIAL

## 2019-05-17 DIAGNOSIS — N17.9 ACUTE KIDNEY FAILURE (HCC): ICD-10-CM

## 2019-05-17 LAB
ANION GAP SERPL CALCULATED.3IONS-SCNC: 3 MMOL/L (ref 4–13)
BUN SERPL-MCNC: 23 MG/DL (ref 5–25)
CALCIUM SERPL-MCNC: 9.1 MG/DL (ref 8.3–10.1)
CHLORIDE SERPL-SCNC: 96 MMOL/L (ref 100–108)
CO2 SERPL-SCNC: 35 MMOL/L (ref 21–32)
CREAT SERPL-MCNC: 1.11 MG/DL (ref 0.6–1.3)
GFR SERPL CREATININE-BSD FRML MDRD: 47 ML/MIN/1.73SQ M
GLUCOSE SERPL-MCNC: 204 MG/DL (ref 65–140)
POTASSIUM SERPL-SCNC: 3.8 MMOL/L (ref 3.5–5.3)
SODIUM SERPL-SCNC: 134 MMOL/L (ref 136–145)

## 2019-05-17 PROCEDURE — 80048 BASIC METABOLIC PNL TOTAL CA: CPT | Performed by: FAMILY MEDICINE

## 2019-05-20 ENCOUNTER — TELEPHONE (OUTPATIENT)
Dept: FAMILY MEDICINE CLINIC | Facility: CLINIC | Age: 84
End: 2019-05-20

## 2019-05-29 DIAGNOSIS — E11.65 TYPE 2 DIABETES MELLITUS WITH HYPERGLYCEMIA, WITH LONG-TERM CURRENT USE OF INSULIN (HCC): ICD-10-CM

## 2019-05-29 DIAGNOSIS — Z79.4 TYPE 2 DIABETES MELLITUS WITH HYPERGLYCEMIA, WITH LONG-TERM CURRENT USE OF INSULIN (HCC): ICD-10-CM

## 2019-06-06 ENCOUNTER — OFFICE VISIT (OUTPATIENT)
Dept: FAMILY MEDICINE CLINIC | Facility: CLINIC | Age: 84
End: 2019-06-06
Payer: COMMERCIAL

## 2019-06-06 VITALS
BODY MASS INDEX: 35.79 KG/M2 | RESPIRATION RATE: 20 BRPM | SYSTOLIC BLOOD PRESSURE: 90 MMHG | TEMPERATURE: 98.9 F | DIASTOLIC BLOOD PRESSURE: 60 MMHG | OXYGEN SATURATION: 97 % | WEIGHT: 214.8 LBS | HEIGHT: 65 IN | HEART RATE: 64 BPM

## 2019-06-06 DIAGNOSIS — F02.80 LATE ONSET ALZHEIMER'S DISEASE WITHOUT BEHAVIORAL DISTURBANCE (HCC): Chronic | ICD-10-CM

## 2019-06-06 DIAGNOSIS — I50.30 CONGESTIVE HEART FAILURE WITH LV DIASTOLIC DYSFUNCTION, NYHA CLASS 2 (HCC): ICD-10-CM

## 2019-06-06 DIAGNOSIS — E78.2 MIXED HYPERLIPIDEMIA: ICD-10-CM

## 2019-06-06 DIAGNOSIS — G30.1 LATE ONSET ALZHEIMER'S DISEASE WITHOUT BEHAVIORAL DISTURBANCE (HCC): Chronic | ICD-10-CM

## 2019-06-06 DIAGNOSIS — J45.20 MILD INTERMITTENT ASTHMA WITHOUT COMPLICATION: ICD-10-CM

## 2019-06-06 DIAGNOSIS — I10 ESSENTIAL HYPERTENSION: Primary | Chronic | ICD-10-CM

## 2019-06-06 DIAGNOSIS — Z00.00 MEDICARE ANNUAL WELLNESS VISIT, SUBSEQUENT: ICD-10-CM

## 2019-06-06 DIAGNOSIS — I48.0 PAF (PAROXYSMAL ATRIAL FIBRILLATION) (HCC): ICD-10-CM

## 2019-06-06 DIAGNOSIS — IMO0002 DM (DIABETES MELLITUS), TYPE 2, UNCONTROLLED W/OPHTHALMIC COMPLICATION: ICD-10-CM

## 2019-06-06 PROBLEM — N17.9 AKI (ACUTE KIDNEY INJURY) (HCC): Status: RESOLVED | Noted: 2019-05-09 | Resolved: 2019-06-06

## 2019-06-06 PROBLEM — J45.909 REACTIVE AIRWAY DISEASE WITHOUT COMPLICATION: Status: ACTIVE | Noted: 2019-05-07

## 2019-06-06 PROCEDURE — G0439 PPPS, SUBSEQ VISIT: HCPCS | Performed by: FAMILY MEDICINE

## 2019-06-06 PROCEDURE — 1125F AMNT PAIN NOTED PAIN PRSNT: CPT | Performed by: FAMILY MEDICINE

## 2019-06-06 PROCEDURE — 99214 OFFICE O/P EST MOD 30 MIN: CPT | Performed by: FAMILY MEDICINE

## 2019-06-06 PROCEDURE — 1101F PT FALLS ASSESS-DOCD LE1/YR: CPT | Performed by: FAMILY MEDICINE

## 2019-06-06 PROCEDURE — 1170F FXNL STATUS ASSESSED: CPT | Performed by: FAMILY MEDICINE

## 2019-06-06 PROCEDURE — 1036F TOBACCO NON-USER: CPT | Performed by: FAMILY MEDICINE

## 2019-06-17 DIAGNOSIS — I48.91 ATRIAL FIBRILLATION, UNSPECIFIED TYPE (HCC): Primary | ICD-10-CM

## 2019-08-15 DIAGNOSIS — E11.65 UNCONTROLLED TYPE 2 DIABETES MELLITUS WITH HYPERGLYCEMIA (HCC): ICD-10-CM

## 2019-08-15 RX ORDER — SUB-Q INSULIN DEVICE, 40 UNIT
EACH MISCELLANEOUS DAILY
Qty: 30 KIT | Refills: 2 | Status: SHIPPED | OUTPATIENT
Start: 2019-08-15 | End: 2019-11-12 | Stop reason: SDUPTHER

## 2019-09-04 NOTE — ED NOTES
Pt enroute to josué [place], [place]  03/03/19 1010 connective tissue diseases or Vasculitis. History of pulmonary tuberculosis in the past: No  Recent exposure to any patients with tuberculosis:No  Recent travel to endemic places of tuberculosis: No    He is currently not using any oxygen supplementation at rest, exercise or during sleep/at night time. No recent hospitalizations or emergency room visits. He denies any lung cancer in first-degree relative; exposure to asbestos, radon, or uranium. PMHx   Past Medical History      Diagnosis Date    Allergic rhinitis     Bronchitis     CKD (chronic kidney disease) stage 3, GFR 30-59 ml/min ([facility]     Depression     Glucose intolerance (impaired glucose tolerance)     Headache(784.0)     HTN (hypertension)     Hypercholesterolemia     Hypothyroidism     NSVT (nonsustained ventricular tachycardia) ([facility] 2/27/2013    Short runs of NSVT noted on pacer interrogation. (10-13 beats).  Pacemaker     Pre-op evaluation: Prior to possible spinal stenosis surgery  9/18/2014    S/P PTCA: 8/18/2015: Stenting of PDA w Xience 2.25X23 mm, post-dilated to 2.73 mm proximally and mid segment 8/18/2015 8/18/2015: Stenting of PDA w Xience 2.25X23 mm, post-dilated to 2.73 mm proximally and mid segment. Dr. Irina Merrill Sleep apnea     Ureteral calculus       Past Surgical History        Procedure Laterality Date    ANKLE SURGERY      right    CARDIAC CATHETERIZATION  2-06-12    RV pacemaker lead revision d/t dislodgement of the tip of the lead. Successful    CARDIAC CATHETERIZATION  5-01-12    Very short LM. LAD w/ mild luminal irregularities in the mid segment. Nondominant LCX w/ moderate luminal irregularities in the mid segment w/o lesion exceeding 20-30%. Large dominant RCA w/ PDA lesion, estimated angiographically at 60%. FFR assessment of the PDA lesions revealed a non-flow limiting value of 0.91.     CARDIAC CATHETERIZATION  8-18-15    one stent placed.      CARDIOVASCULAR STRESS TEST  1-18-12    Good exercise tolerance. Quiles treadmill score of 11. Exercise EKG stress test is note suggestive for cardiac ischemia. Reasonable chronotropic response w/ heart rate went up from 43 bpm to 128 bpm. Proper augmentation of LV contractility to exercise. At level of exercise the patient achieved which was 82% of maximum predicted heart rate, exercise stress echo is not suggestive for cardiac ischemia,    CARDIOVASCULAR STRESS TEST  1-18-12    Holter: basically has mild abnormality for bradycardiac average heart rate and this Holter monitor is abnormal for occasional ventricular ectopic beats and very frequent supraventricular ectopic beats. Marked sinus bradycardia w/ blunted heart rate response to usual/ordinary activity. Frequent nonsustained SVT, frequent PACs and atrial bigeminies. Dizziness w/ lower heart rates.  CERVICAL DISCECTOMY  12/2002    C6-C7    CHOLECYSTECTOMY  11/2006    HERNIA REPAIR Right     inguinal x2    LUMBAR FUSION  1989    LUMBAR LAMINECTOMY  10/6/14    PACEMAKER INSERTION  1-26-12    Successful placement of dual chamber pacemaker implantation.  TRANSTHORACIC ECHOCARDIOGRAM  1-18-12    LV size and systolic function normal. EF 55-65%. LA and RA mildly to moderately dilated. Mild MR and TR, trivial AR.  TRANSTHORACIC ECHOCARDIOGRAM  2-06-12    LV size and systolic function normal. EF 55-60%. RV pacing wire present.  TRANSTHORACIC ECHOCARDIOGRAM  4-20-12    LV size and systolic function normal. EF 50-55%. RV pacing wire present in ventricular cavity. RA pacing wire present in atrial cavity. Trivial AR. Mild TR. Small pericardial effusion identified circumferential to the heart. Fluid had no internal echoes.      VASECTOMY       Meds    Current Medications    gabapentin  600 mg Oral BID    latanoprost  1 drop Ophthalmic Nightly    sodium chloride flush  10 mL Intravenous 2 times per day    enoxaparin  40 mg Subcutaneous Q24H    albuterol  2.5 mg Nebulization Q4H [place]    aspirin

## 2019-09-20 ENCOUNTER — APPOINTMENT (OUTPATIENT)
Dept: LAB | Facility: CLINIC | Age: 84
End: 2019-09-20
Payer: COMMERCIAL

## 2019-09-20 DIAGNOSIS — IMO0002 DM (DIABETES MELLITUS), TYPE 2, UNCONTROLLED W/OPHTHALMIC COMPLICATION: ICD-10-CM

## 2019-09-20 DIAGNOSIS — R79.89 ABNORMAL TSH: ICD-10-CM

## 2019-09-20 DIAGNOSIS — E78.5 HYPERLIPIDEMIA, UNSPECIFIED HYPERLIPIDEMIA TYPE: ICD-10-CM

## 2019-09-20 DIAGNOSIS — N17.9 AKI (ACUTE KIDNEY INJURY) (HCC): ICD-10-CM

## 2019-09-20 LAB
ALBUMIN SERPL BCP-MCNC: 3.8 G/DL (ref 3.5–5)
ALP SERPL-CCNC: 96 U/L (ref 46–116)
ALT SERPL W P-5'-P-CCNC: 25 U/L (ref 12–78)
ANION GAP SERPL CALCULATED.3IONS-SCNC: 5 MMOL/L (ref 4–13)
AST SERPL W P-5'-P-CCNC: 16 U/L (ref 5–45)
BILIRUB SERPL-MCNC: 0.61 MG/DL (ref 0.2–1)
BUN SERPL-MCNC: 30 MG/DL (ref 5–25)
CALCIUM SERPL-MCNC: 9.1 MG/DL (ref 8.3–10.1)
CHLORIDE SERPL-SCNC: 100 MMOL/L (ref 100–108)
CHOLEST SERPL-MCNC: 108 MG/DL (ref 50–200)
CO2 SERPL-SCNC: 35 MMOL/L (ref 21–32)
CREAT SERPL-MCNC: 1.14 MG/DL (ref 0.6–1.3)
CREAT UR-MCNC: 60.2 MG/DL
EST. AVERAGE GLUCOSE BLD GHB EST-MCNC: 209 MG/DL
GFR SERPL CREATININE-BSD FRML MDRD: 45 ML/MIN/1.73SQ M
GLUCOSE P FAST SERPL-MCNC: 147 MG/DL (ref 65–99)
HBA1C MFR BLD: 8.9 % (ref 4.2–6.3)
HDLC SERPL-MCNC: 57 MG/DL (ref 40–60)
LDLC SERPL CALC-MCNC: 37 MG/DL (ref 0–100)
MICROALBUMIN UR-MCNC: 12.1 MG/L (ref 0–20)
MICROALBUMIN/CREAT 24H UR: 20 MG/G CREATININE (ref 0–30)
POTASSIUM SERPL-SCNC: 4.1 MMOL/L (ref 3.5–5.3)
PROT SERPL-MCNC: 6.8 G/DL (ref 6.4–8.2)
SODIUM SERPL-SCNC: 140 MMOL/L (ref 136–145)
T4 FREE SERPL-MCNC: 1.08 NG/DL (ref 0.76–1.46)
TRIGL SERPL-MCNC: 70 MG/DL
TSH SERPL DL<=0.05 MIU/L-ACNC: 5.35 UIU/ML (ref 0.36–3.74)

## 2019-09-20 PROCEDURE — 82043 UR ALBUMIN QUANTITATIVE: CPT

## 2019-09-20 PROCEDURE — 80053 COMPREHEN METABOLIC PANEL: CPT

## 2019-09-20 PROCEDURE — 36415 COLL VENOUS BLD VENIPUNCTURE: CPT

## 2019-09-20 PROCEDURE — 84443 ASSAY THYROID STIM HORMONE: CPT

## 2019-09-20 PROCEDURE — 83036 HEMOGLOBIN GLYCOSYLATED A1C: CPT

## 2019-09-20 PROCEDURE — 82570 ASSAY OF URINE CREATININE: CPT

## 2019-09-20 PROCEDURE — 84439 ASSAY OF FREE THYROXINE: CPT

## 2019-09-20 PROCEDURE — 80061 LIPID PANEL: CPT

## 2019-09-23 ENCOUNTER — OFFICE VISIT (OUTPATIENT)
Dept: CARDIOLOGY CLINIC | Facility: CLINIC | Age: 84
End: 2019-09-23
Payer: COMMERCIAL

## 2019-09-23 ENCOUNTER — TELEPHONE (OUTPATIENT)
Dept: ENDOCRINOLOGY | Facility: CLINIC | Age: 84
End: 2019-09-23

## 2019-09-23 VITALS
DIASTOLIC BLOOD PRESSURE: 66 MMHG | BODY MASS INDEX: 36.69 KG/M2 | OXYGEN SATURATION: 99 % | HEIGHT: 65 IN | SYSTOLIC BLOOD PRESSURE: 104 MMHG | HEART RATE: 65 BPM | WEIGHT: 220.2 LBS

## 2019-09-23 DIAGNOSIS — E78.2 MIXED HYPERLIPIDEMIA: ICD-10-CM

## 2019-09-23 DIAGNOSIS — I48.0 PAF (PAROXYSMAL ATRIAL FIBRILLATION) (HCC): ICD-10-CM

## 2019-09-23 DIAGNOSIS — Z95.1 HX OF CABG: ICD-10-CM

## 2019-09-23 DIAGNOSIS — Z95.5 HISTORY OF HEART ARTERY STENT: ICD-10-CM

## 2019-09-23 DIAGNOSIS — I49.5 SICK SINUS SYNDROME (HCC): ICD-10-CM

## 2019-09-23 DIAGNOSIS — I07.1 TRICUSPID VALVE INSUFFICIENCY, UNSPECIFIED ETIOLOGY: ICD-10-CM

## 2019-09-23 DIAGNOSIS — R60.9 EDEMA, UNSPECIFIED TYPE: Primary | ICD-10-CM

## 2019-09-23 DIAGNOSIS — I44.2 AV BLOCK, COMPLETE (HCC): ICD-10-CM

## 2019-09-23 DIAGNOSIS — I25.10 3-VESSEL CORONARY ARTERY DISEASE: ICD-10-CM

## 2019-09-23 PROCEDURE — 99214 OFFICE O/P EST MOD 30 MIN: CPT | Performed by: INTERNAL MEDICINE

## 2019-09-23 RX ORDER — AMLODIPINE BESYLATE 5 MG/1
TABLET ORAL
COMMUNITY
End: 2019-10-01 | Stop reason: DRUGHIGH

## 2019-09-23 NOTE — TELEPHONE ENCOUNTER
----- Message from Hang Dillard PA-C sent at 9/23/2019 12:51 PM EDT -----  A1C 8 9 high but improving  TSH slightly high, stable  Rest of labs OK  Please send BG readings for review

## 2019-09-23 NOTE — PROGRESS NOTES
Follow-up - Cardiology   Georgette Schmitt 82 y o  female MRN: [id number]        Problems    Problem List Items Addressed This Visit        Cardiovascular and Mediastinum    3-vessel coronary artery disease    PAF (paroxysmal atrial fibrillation) ([facility] [address] )       Other    Hyperlipidemia      Other Visit Diagnoses     Edema, unspecified type    -  Primary    Sick sinus syndrome ([facility] [address] )        Hx of CABG        Tricuspid valve insufficiency, unspecified etiology        AV block, complete ([facility] )        History of heart artery stent                Plan patient seen September 23, 2019  The bypass surgery 1999  Stent in 2006  Catheterization 2008 without intervention  Pacemaker 2014  Paroxysmal atrial fibrillation-on Eliquis  On aspirin because of stent  No bleeding  Left bundle branch block pattern  Diabetes  A1c 8 9 recently  Severe tricuspid regurgitation with edema edema that is relatively well controlled  Hypertension  Blood pressure on the low side  I would decreased the amlodipine  Last echocardiogram March 2019  Ventricular tachycardia in the past   On beta-blocker  Hyperlipidemia well controlled  Stable weight  No edema today  Torsemide at 20 mg daily  Creatinine less than 1 5  She has a lot of instability walking  She uses a walker  She has not fallen  The change stay will decrease her Norvasc to 7 5 daily          HPI: Georgette Schmitt is a 82y o  year old female    HPI: Georgette Schmitt is a 82y o  year old female [address] 25 y o  year old female    Patient seen December 6, 2017  Her diagnosis includes bypass surgery 1999 stent in 2006, catheterization 2008, hyperlipidemia, pacemaker 2014, dementia, preserved left ventricular function, and diabetic neuropathy  She had short burst of ventricular tachycardia pacer interrogation   Metoprolol to be increased to 37 5 in the morning and 25 in the p m         Patient seen June 6, 2018  All scripts HPI noted above  First epic visit  Issues are as follows  Bypass surgery   Stent in 0 6  Catheterization 0 8 without intervention  Hyperlipidemia  Pacemaker   Diabetes  Left ventricular function well preserved  Diabetes  Past history ventricular tachycardia  Recent admission with edema and she was given a diagnosis of congestive heart failure   Appears to me when I review the record is primarily pulmonary issue with lot of bronchospasm   She is treated both diuretics and steroids as well as bronchodilators  Right bundle branch block pattern with blood relatively wide QRS  Echocardiogram shows the following-increased right ventricle-+3 tricuspid regurgitation-large right atrium-dilated inferior vena cava  Her NT was Noberto Fret  He is presently on 60 of Lasix which was an increase done by hand visiting nurse  We will check her labs carefully   She is edema free at this time and her lungs are clear        Patient seen 2018  Diagnosis above  LDL is 44  A1c is 8 3  She has no edema   She has no bronchospasm   From cardiac standpoint she is asymptomatic  No change in medication         Patient seen 2019  Diagnosis above  The issues are as follows  Her  has  in the past several months  She has short episode of atrial fibrillation pacemaker interrogation   This has to be followed  Antonio Ferrari is not on anticoagulation  Her edema is well controlled  Will check her hemoglobin A1c         Patient seen May 27, 2019  Refer to my plan above   Eliquis will be started because of pacemaker interrogation showing significant amount of atrial fibrillation and she has deteriorated with regard to increasing edema   She may need to be admitted on the next visit if my plan does not work        Patient seen 2019  New  Refer to plan above  Norvasc decreased to 5 daily  Family will keep track of her weight  This is her dry weight  Dry weight therefore the office is 225 lb  She had been up 20-25 lb when I last saw her    She has diuresed in the hospital            Review of Systems   Constitutional: Positive for fatigue  Respiratory: Positive for shortness of breath  Cardiovascular: Positive for leg swelling  Musculoskeletal: Positive for gait problem  Past Medical History:   Diagnosis Date    Arthritis     Asthma     CAD (coronary artery disease) of artery bypass graft     Cardiac disease     CHF (congestive heart failure) (HCC)     Dementia     Depression     Diabetes mellitus ([facility] [address] )     Heart attack ([facility] )     Hyperlipidemia     Hypertension     [facility]     Neuropathy     BRANT (obstructive sleep apnea)     Peptic ulcer     was + for H pylori    Transient cerebral ischemia 11/2011    with neg CUS and ECHO     Social History     Substance and Sexual Activity   Alcohol Use No    Frequency: Never    Drinks per session: Patient refused    Binge frequency: Never     Social History     Substance and Sexual Activity   Drug Use No     Social History     Tobacco Use   Smoking Status Never Smoker   Smokeless Tobacco Never Used       Allergies:   Allergies   Allergen Reactions    Ace Inhibitors     Chlorpromazine     Latex     Lisinopril     Namenda [Memantine] Drowsiness    Penicillins Seizures    Propoxyphene     Stadol [Butorphanol]        Medications:     Current Outpatient Medications:     amLODIPine (NORVASC) 5 mg tablet, TAKE 1 TABLET BY MOUTH TWICE A DAY, Disp: 180 tablet, Rfl: 3    apixaban (ELIQUIS) 5 mg, Take 1 tablet (5 mg total) by mouth 2 (two) times a day, Disp: 60 tablet, Rfl: 5    aspirin 81 mg chewable tablet, Chew 1 tablet (81 mg total) daily, Disp: 30 tablet, Rfl: 0    atorvastatin (LIPITOR) 40 mg tablet, TAKE 1 TABLET BY MOUTH EVERY DAY, Disp: 30 tablet, Rfl: 11    calcium carbonate-vitamin D (OSCAL-D) 500 mg-200 units per tablet, Take 1 tablet by mouth 2 (two) times a day with meals, Disp: 60 tablet, Rfl: 0    docusate sodium (COLACE) 100 mg capsule, Take 1 capsule (100 mg total) by mouth 2 (two) times a day, Disp: 10 capsule, Rfl: 0    Insulin Disposable Pump (V-GO 20) KIT, Inject under the skin daily Use 1 device per day, Disp: 30 kit, Rfl: 2    insulin NPH-insulin regular (NovoLIN 70/30) 100 units/mL subcutaneous injection, 20-45 units before breakfast and dinner depending on BG reading , Disp: 10 mL, Rfl: 1    Insulin Syringe-Needle U-100 31G X 1/4" 0 5 ML MISC, by Does not apply route 4 (four) times a day, Disp: 100 each, Rfl: 0    isosorbide mononitrate (IMDUR) 60 mg 24 hr tablet, TAKE 1 TABLET BY MOUTH EVERY DAY AS DIRECTED, Disp: 90 tablet, Rfl: 3    metoprolol tartrate (LOPRESSOR) 25 mg tablet, TAKE 1 & 1/2 TABLETS IN THE MORNING, AND 1 TABLET IN THE EVENING, Disp: 225 tablet, Rfl: 3    NOVOLOG 100 UNIT/ML injection, INJECT 100 UNITS DAILY AS DIRECTED, Disp: 30 mL, Rfl: 4    potassium chloride (K-DUR,KLOR-CON) 10 mEq tablet, Take 1 tablet (10 mEq total) by mouth daily, Disp: 30 tablet, Rfl: 5    senna (SENOKOT) 8 6 mg, Take 1 tablet (8 6 mg total) by mouth daily, Disp: 120 each, Rfl: 0    torsemide (DEMADEX) 20 mg tablet, Take 1 tablet (20 mg total) by mouth 2 (two) times a day (Patient taking differently: Take 20 mg by mouth daily ), Disp: 60 tablet, Rfl: 11      Physical Exam   Constitutional: She is oriented to person, place, and time  She appears well-developed and well-nourished  No distress  Cardiovascular: Normal rate and regular rhythm  Exam reveals no friction rub  No murmur heard  Pulmonary/Chest: Breath sounds normal  No respiratory distress  Musculoskeletal: Normal range of motion  She exhibits no edema, tenderness or deformity  Requires wheelchair   Neurological: She is alert and oriented to person, place, and time  Skin: Skin is dry  No rash noted  She is not diaphoretic  No erythema  No pallor           Laboratory Studies:  CMP:  Results from last 7 days   Lab Units 09/20/19  0928   POTASSIUM mmol/L 4 1   CHLORIDE mmol/L 100 CO2 mmol/L 35*   BUN mg/dL 30*   CREATININE mg/dL 1 14   CALCIUM mg/dL 9 1   AST U/L 16   ALT U/L 25   ALK PHOS U/L 96   EGFR ml/min/1 73sq m 45     NT-proBNP:   Lab Results   Component Value Date    NTBNP 2,210 (H) 2019      Coags:    Lipid Profile:   Lab Results   Component Value Date    CHOL 144 2015     Lab Results   Component Value Date    HDL 57 2019     Lab Results   Component Value Date    LDLCALC 37 2019     Lab Results   Component Value Date    TRIG 70 2019       Cardiac testing  EKG reviewed personally:     Results for orders placed during the hospital encounter of 19   Echo complete with contrast if indicated    [facility]ative [facility]  [facility] [place], [address] [facility]  [phone]    Transthoracic Echocardiogram  2D, M-mode, Doppler, and Color Doppler    Study date:  28-Mar-2019    Patient: Josh Ambrocio  MR number: [id number]  Account number: [de-identified]  : 1935  Age: 82 years  Gender: Female  Status: Outpatient  Location: Bedside  Height: 68 in  Weight: 244 lb  BP: 128/ 66 mmHg    Indications: Heart Failure    Diagnoses: I50 9 - Heart failure, unspecified    Sonographer:  VANI Salcido  Interpreting Physician:  Tushar Galvin MD  Primary Physician:  Paolo Gray MD  Referring Physician:  Jude Barrera MD  Group:  [facility]  Cardiology Fellow:  Doug Burdick MD    SUMMARY    LEFT VENTRICLE:  Systolic function was normal  Ejection fraction was estimated to be 60 %  There were no regional wall motion abnormalities  There was no evidence of concentric hypertrophy  VENTRICULAR SEPTUM:  There was dyssynergic motion  These changes are consistent with RV volume overload  RIGHT VENTRICLE:  The ventricle was mildly to moderately dilated  Wall thickness was increased  LEFT ATRIUM:  The atrium was mildly dilated  RIGHT ATRIUM:  The atrium was moderately dilated      MITRAL VALVE:  There was mild regurgitation  AORTIC VALVE:  The valve was trileaflet  Leaflets exhibited moderately increased thickness, mild calcification, and normal cuspal separation  There was trace regurgitation  TRICUSPID VALVE:  There was severe regurgitation  IVC, HEPATIC VEINS:  The inferior vena cava was dilated  Respirophasic changes were blunted (less than 50% variation)  HISTORY: PRIOR HISTORY: HTN, HLD, SSS, CHF, CAD    PROCEDURE: The procedure was performed at the bedside  This was a routine study  The transthoracic approach was used  The study included complete 2D imaging, M-mode, complete spectral Doppler, and color Doppler  The heart rate was 60 bpm,  at the start of the study  Images were obtained from the parasternal, apical, subcostal, and suprasternal notch acoustic windows  This was a technically difficult study  LEFT VENTRICLE: Size was normal  Systolic function was normal  Ejection fraction was estimated to be 60 %  There were no regional wall motion abnormalities  Wall thickness was normal  There was no evidence of concentric hypertrophy  DOPPLER: Left ventricular diastolic function parameters were abnormal     VENTRICULAR SEPTUM: There was dyssynergic motion  These changes are consistent with RV volume overload  RIGHT VENTRICLE: The ventricle was mildly to moderately dilated  Systolic function was low normal  Wall thickness was increased  A pacing wire was present  LEFT ATRIUM: The atrium was mildly dilated  RIGHT ATRIUM: The atrium was moderately dilated  A pacing wire was present  MITRAL VALVE: Valve structure was normal  There was normal leaflet separation  DOPPLER: The transmitral velocity was within the normal range  There was no evidence for stenosis  There was mild regurgitation  AORTIC VALVE: The valve was trileaflet  Leaflets exhibited moderately increased thickness, mild calcification, and normal cuspal separation   DOPPLER: Transaortic velocity was minimally increased  There was no evidence for stenosis  There  was trace regurgitation  TRICUSPID VALVE: The valve structure was normal  There was normal leaflet separation  DOPPLER: The transtricuspid velocity was within the normal range  There was no evidence for stenosis  There was severe regurgitation  PULMONIC VALVE: Leaflets exhibited normal thickness, no calcification, and normal cuspal separation  DOPPLER: The transpulmonic velocity was within the normal range  There was no significant regurgitation  PERICARDIUM: There was no pericardial effusion  The pericardium was normal in appearance  AORTA: The root exhibited normal size  The ascending aorta was normal in size, when indexed for body surface area, measuring 3 8 cm (or 1 7 cm/m2 of body surface area)  SYSTEMIC VEINS: IVC: The inferior vena cava was dilated  Respirophasic changes were blunted (less than 50% variation)  SYSTEM MEASUREMENT TABLES    2D  %FS: 28 94 %  Ao Diam: 3 14 cm  EDV(Teich): 75 97 ml  EF(Teich): 56 07 %  ESV(Teich): 33 38 ml  IVSd: 1 01 cm  LA Area: 19 22 cm2  LA Diam: 3 06 cm  LVEDV MOD A4C: 50 04 ml  LVEF MOD A4C: 54 94 %  LVESV MOD A4C: 22 55 ml  LVIDd: 4 14 cm  LVIDs: 2 94 cm  LVLd A4C: 6 96 cm  LVLs A4C: 6 72 cm  LVOT Diam: 1 89 cm  LVPWd: 1 01 cm  RA Area: 26 01 cm2  RVIDd: 4 37 cm  SV MOD A4C: 27 49 ml  SV(Teich): 42 6 ml    CW  AV Env  Ti: 338 73 ms  AV VTI: 36 28 cm  AV Vmax: 1 57 m/s  AV Vmean: 1 07 m/s  AV maxP 84 mmHg  AV meanP 31 mmHg  TR Vmax: 1 9 m/s  TR maxP 5 mmHg    MM  TAPSE: 1 61 cm    PW  NESTOR (VTI): 1 12 cm2  NESTOR Vmax: 1 21 cm2  E': 0 06 m/s  E/E': 20 03  LVOT Env  Ti: 312 08 ms  LVOT VTI: 14 38 cm  LVOT Vmax: 0 67 m/s  LVOT Vmean: 0 46 m/s  LVOT maxP 81 mmHg  LVOT meanP 99 mmHg  LVSV Dopp: 40 46 ml  MV A [place]: 0 29 m/s  MV Dec [place]: 5 23 m/s2  MV DecT: 219 85 ms  MV E [place]: 1 15 m/s  MV E/A Ratio: 3 9  MV PHT: 63 76 ms  MVA By PHT: 3 45 cm2    [employer] Accredited [facility]    Prepared and electronically signed by    Marnie Mcfarlane MD  Signed 28-Mar-2019 18:13:19       No results found for this or any previous visit  No results found for this or any previous visit  No results found for this or any previous visit  Jabier Trevino MD    Portions of the record may have been created with voice recognition software   Occasional wrong word or "sound a like" substitutions may have occurred due to the inherent limitations of voice recognition software   Read the chart carefully and recognize, using context, where substitutions have occurred

## 2019-09-30 DIAGNOSIS — I48.0 PAF (PAROXYSMAL ATRIAL FIBRILLATION) (HCC): ICD-10-CM

## 2019-09-30 RX ORDER — POTASSIUM CHLORIDE 750 MG/1
TABLET, EXTENDED RELEASE ORAL
Qty: 30 TABLET | Refills: 5 | Status: SHIPPED | OUTPATIENT
Start: 2019-09-30 | End: 2020-03-17

## 2019-10-01 ENCOUNTER — TELEPHONE (OUTPATIENT)
Dept: CARDIOLOGY CLINIC | Facility: CLINIC | Age: 84
End: 2019-10-01

## 2019-10-01 LAB
LEFT EYE DIABETIC RETINOPATHY: NORMAL
RIGHT EYE DIABETIC RETINOPATHY: NORMAL
SEVERITY (EYE EXAM): NORMAL

## 2019-10-01 RX ORDER — AMLODIPINE BESYLATE 5 MG/1
TABLET ORAL
COMMUNITY
End: 2020-03-11

## 2019-10-01 RX ORDER — AMLODIPINE BESYLATE 5 MG/1
5 TABLET ORAL DAILY
COMMUNITY
End: 2019-10-01 | Stop reason: DRUGHIGH

## 2019-10-01 NOTE — TELEPHONE ENCOUNTER
Pt daughter called to clarify BP medication  She states pt has always taken Amlodipine 5mg daily  She states at the visit you instructed pt to decrease the medication but in our records it showed pt was taking amlodipine 7 5mg which was incorrect  So would you like pt to continue 5mg daily or cut it in half(2 5mg)        Please advise

## 2019-10-23 ENCOUNTER — OFFICE VISIT (OUTPATIENT)
Dept: ENDOCRINOLOGY | Facility: CLINIC | Age: 84
End: 2019-10-23
Payer: COMMERCIAL

## 2019-10-23 VITALS — HEART RATE: 52 BPM | DIASTOLIC BLOOD PRESSURE: 62 MMHG | SYSTOLIC BLOOD PRESSURE: 110 MMHG

## 2019-10-23 DIAGNOSIS — IMO0002 DM (DIABETES MELLITUS), TYPE 2, UNCONTROLLED W/OPHTHALMIC COMPLICATION: ICD-10-CM

## 2019-10-23 DIAGNOSIS — R79.89 ABNORMAL TSH: ICD-10-CM

## 2019-10-23 DIAGNOSIS — I10 ESSENTIAL HYPERTENSION: Chronic | ICD-10-CM

## 2019-10-23 DIAGNOSIS — E11.65 UNCONTROLLED TYPE 2 DIABETES MELLITUS WITH HYPERGLYCEMIA (HCC): Primary | ICD-10-CM

## 2019-10-23 DIAGNOSIS — E78.2 MIXED HYPERLIPIDEMIA: ICD-10-CM

## 2019-10-23 PROCEDURE — 3074F SYST BP LT 130 MM HG: CPT | Performed by: PHYSICIAN ASSISTANT

## 2019-10-23 PROCEDURE — 3078F DIAST BP <80 MM HG: CPT | Performed by: PHYSICIAN ASSISTANT

## 2019-10-23 PROCEDURE — 99215 OFFICE O/P EST HI 40 MIN: CPT | Performed by: PHYSICIAN ASSISTANT

## 2019-10-23 NOTE — PATIENT INSTRUCTIONS
Start using 2 clicks with breakfast and lunch       Check blood sugars 4x per day  Send log in two weeks    If you are interested in dexcom sensor, let us know

## 2019-10-23 NOTE — PROGRESS NOTES
Established Patient Progress Note      Chief Complaint   Patient presents with    Diabetes Type 2        History of Present Illness:   Trevor Hui is a 82 y o  female with a history of type 2 diabetes with long term use of insulin since about 20 years ago  Reports complications of retinopathy  Denies recent illness or hospitalizations  Denies recent severe hypoglycemic or severe hyperglycemic episodes  Denies any issues with her current regimen  home glucose monitoring: are performed regularly about two times per day  She is back on V-Go Device since off steroids and insulin requirements decreased  Morning sugars have bene pretty good with no hypoglycemia but evening blood sugars have been high  Most of history obtained from her daughter        Current regimen:   V-Go 20  3 clicks usually with dinner only     Last Eye Exam: UTD  Last Foot Exam: UTD    Has hypertension: Taking amlodipine and metoprolol  Has hyperlipidemia: Taking atorvastatin    Hx mild TSH elevations    Patient Active Problem List   Diagnosis    3-vessel coronary artery disease    Abnormal TSH    D[facility] without [facility] disturbance [facility]    Depression    Diabetic retinopathy ([facility]    DM (diabetes mellitus), type 2, uncontrolled w/ophthalmic complication (HCC)    Gastroesophageal reflux disease with hiatal hernia    Glaucoma    Hyperlipidemia    Essential hypertension    Cerebral artery occlusion with cerebral infarction ([address]    Obesity    Obstructive sleep apnea    Osteoarthritis of knee    Urine incontinence    Ventricular tachycardia ([address]    Acute on chronic combined systolic and diastolic congestive heart failure ([address]    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 ([address]    Abnormal chest x-ray    Urine retention    Congestive heart failure with LV diastolic dysfunction, NYHA class 2 ([facility])    Dyspnea on minimal exertion    Grieving    PAF (paroxysmal atrial fibrillation) ([facility])    SSS (sick sinus syndrome) ([facility]    Mild intermittent asthma without complication      Past Medical History:   Diagnosis Date    Arthritis     Asthma     CAD (coronary artery disease) of artery bypass graft     Cardiac disease     CHF (congestive heart failure) ([facility]     Dementia [facility]     Depression     Diabetes mellitus ([facility]     Heart attack ([facility]     Hyperlipidemia     Hypertension     MI (myocardial infarction) ([facility] )     Neuropathy     BRANT (obstructive sleep apnea)     Peptic ulcer     was + for H pylori    Transient cerebral ischemia 2011    with neg CUS and ECHO      Past Surgical History:   Procedure Laterality Date    APPENDECTOMY      CATARACT EXTRACTION       SECTION      COLONOSCOPY  2004    CORONARY ANGIOPLASTY  2006    CORONARY ANGIOPLASTY WITH STENT PLACEMENT      CORONARY ARTERY BYPASS GRAFT      DENTAL SURGERY      EGD AND COLONOSCOPY      ELBOW SURGERY      resolved     ESOPHAGOGASTRODUODENOSCOPY        Family History   Problem Relation Age of Onset    Diabetes Mother     Cancer Sister     Heart disease Sister     Thyroid disease Sister     Cancer Brother     Cancer Father     Colon cancer Family     Diabetes Family     Mitral valve prolapse Family     Thyroid cancer Family      Social History     Tobacco Use    Smoking status: Never Smoker    Smokeless tobacco: Never Used   Substance Use Topics    Alcohol use: No     Frequency: Never     Drinks per session: Patient refused     Binge frequency: Never     Allergies   Allergen Reactions    Ace Inhibitors     Chlorpromazine     Latex     Lisinopril     Namenda [Memantine] Drowsiness    Penicillins Seizures    Propoxyphene     Stadol [Butorphanol]          Current Outpatient Medications:     amLODIPine (NORVASC) 5 mg tablet, TAKE 1/2 TABLET DAILY, Disp: , Rfl:     apixaban (ELIQUIS) 5 mg, Take 1 tablet (5 mg total) by mouth 2 (two) times a day, Disp: 60 tablet, Rfl: 5   aspirin 81 mg chewable tablet, Chew 1 tablet (81 mg total) daily, Disp: 30 tablet, Rfl: 0    atorvastatin (LIPITOR) 40 mg tablet, TAKE 1 TABLET BY MOUTH EVERY DAY, Disp: 30 tablet, Rfl: 11    calcium carbonate-vitamin D (OSCAL-D) 500 mg-200 units per tablet, Take 1 tablet by mouth 2 (two) times a day with meals, Disp: 60 tablet, Rfl: 0    docusate sodium (COLACE) 100 mg capsule, Take 1 capsule (100 mg total) by mouth 2 (two) times a day (Patient taking differently: Take 100 mg by mouth 2 (two) times a day as needed ), Disp: 10 capsule, Rfl: 0    Insulin Disposable Pump (V-GO 20) KIT, Inject under the skin daily Use 1 device per day, Disp: 30 kit, Rfl: 2    isosorbide mononitrate (IMDUR) 60 mg 24 hr tablet, TAKE 1 TABLET BY MOUTH EVERY DAY AS DIRECTED, Disp: 90 tablet, Rfl: 3    KLOR-CON M10 10 MEQ tablet, TAKE 1 TABLET BY MOUTH EVERY DAY, Disp: 30 tablet, Rfl: 5    metoprolol tartrate (LOPRESSOR) 25 mg tablet, TAKE 1 & 1/2 TABLETS IN THE MORNING, AND 1 TABLET IN THE EVENING, Disp: 225 tablet, Rfl: 3    NOVOLOG 100 UNIT/ML injection, INJECT 100 UNITS DAILY AS DIRECTED, Disp: 30 mL, Rfl: 4    senna (SENOKOT) 8 6 mg, Take 1 tablet (8 6 mg total) by mouth daily (Patient taking differently: Take 1 tablet by mouth daily at bedtime as needed ), Disp: 120 each, Rfl: 0    torsemide (DEMADEX) 20 mg tablet, Take 1 tablet (20 mg total) by mouth 2 (two) times a day (Patient taking differently: Take 20 mg by mouth daily ), Disp: 60 tablet, Rfl: 11    Review of Systems   Constitutional: Negative for activity change, appetite change, chills, diaphoresis, fatigue, fever and unexpected weight change  HENT: Negative for trouble swallowing and voice change  Eyes: Negative for visual disturbance  Respiratory: Negative for shortness of breath  Cardiovascular: Negative for chest pain and palpitations  Gastrointestinal: Negative for abdominal pain, constipation and diarrhea     Endocrine: Negative for cold intolerance, heat intolerance, polydipsia, polyphagia and polyuria  Genitourinary: Negative for frequency and menstrual problem  Musculoskeletal: Negative for arthralgias and myalgias  Skin: Negative for rash  Allergic/Immunologic: Negative for food allergies  Neurological: Negative for dizziness and tremors  Hematological: Negative for adenopathy  Psychiatric/Behavioral: Negative for sleep disturbance  All other systems reviewed and are negative  Physical Exam:  There is no height or weight on file to calculate BMI  /62 (BP Location: Left arm, Patient Position: Sitting, Cuff Size: Large)   Pulse (!) 52    Wt Readings from Last 3 Encounters:   09/23/19 99 9 kg (220 lb 3 2 oz)   06/06/19 97 4 kg (214 lb 12 8 oz)   05/15/19 97 1 kg (214 lb)       Physical Exam   Constitutional: She is oriented to person, place, and time  She appears well-developed and well-nourished  No distress  HENT:   Head: Normocephalic and atraumatic  Eyes: Pupils are equal, round, and reactive to light  Conjunctivae are normal    Neck: Normal range of motion  Neck supple  No thyromegaly present  Cardiovascular: Normal rate, regular rhythm and normal heart sounds  Pulmonary/Chest: Effort normal and breath sounds normal  No respiratory distress  She has no wheezes  She has no rales  Abdominal: Soft  Bowel sounds are normal  She exhibits no distension  There is no tenderness  Musculoskeletal: Normal range of motion  She exhibits no edema  Neurological: She is alert and oriented to person, place, and time  Skin: Skin is warm and dry  Psychiatric: She has a normal mood and affect  Vitals reviewed        Labs:   Lab Results   Component Value Date    HGBA1C 8 9 (H) 09/20/2019    HGBA1C 9 7 (H) 05/08/2019    HGBA1C 10 1 (H) 03/03/2019     Lab Results   Component Value Date    CREATININE 1 14 09/20/2019    CREATININE 1 11 05/17/2019    CREATININE 1 64 (H) 05/13/2019    BUN 30 (H) 09/20/2019     12/17/2015    K 4 1 09/20/2019     09/20/2019    CO2 35 (H) 09/20/2019     eGFR   Date Value Ref Range Status   09/20/2019 45 ml/min/1 73sq m Final     Lab Results   Component Value Date    CHOL 144 04/21/2015    HDL 57 09/20/2019    TRIG 70 09/20/2019     Lab Results   Component Value Date    ALT 25 09/20/2019    AST 16 09/20/2019    ALKPHOS 96 09/20/2019    BILITOT 0 53 12/17/2015     Lab Results   Component Value Date    FZJ0QZWOCBLS 5 350 (H) 09/20/2019    CJE9BNRFEKUA 5 730 (H) 07/21/2018    ETV4ZWEHAZXJ 3 980 (H) 05/16/2017     Lab Results   Component Value Date    FREET4 1 08 09/20/2019       Impression & Plan:    Problem List Items Addressed This Visit        Endocrine    DM (diabetes mellitus), type 2, uncontrolled w/ophthalmic complication ([facility] )     Poorly Controlled/Not at goal with A1C of 8 9  Glucose log reviewed and most morning glucometer readings are under good control with no hypoglycemia, evening readings are consistently elevated frequently in the 300s  Currently on V-Go 20-- her daughter reports typically only using 3 clicks (6 units) with dinner and not taking with breakfast and lunch  Advised her to start taking 2 clicks with breakfst and lunch and send BG log for review in two weeks  Check BG 4x per day and send log to office in two weeks  [address] [place] [address] Gerri has Type 2 Diabetes and is on a V-Go delivery device/pump  She is  checking BG four times per day  She requires therapeutic CGM to help with frequent insulin adjustments  Cardiovascular and Mediastinum    Essential hypertension (Chronic)     Well controlled on multiple meds  Other    Abnormal TSH     Stable, will continue to monitor  Relevant Orders    TSH, 3rd generation    T4, free    Hyperlipidemia     Continue atorvastatin              Other Visit Diagnoses     Uncontrolled type 2 diabetes mellitus with hyperglycemia ([facility] )    -  Primary    Relevant Orders    Hemoglobin A1C    TSH, 3rd generation          Orders Placed This Encounter   Procedures    Hemoglobin A1C     Standing Status:   Future     Standing Expiration Date:   10/23/2020    TSH, 3rd generation     This is a patient instruction: This test is non-fasting  Please drink two glasses of water morning of bloodwork  Standing Status:   Future     Standing Expiration Date:   10/23/2020    T4, free     Standing Status:   Future     Standing Expiration Date:   10/23/2020       Patient Instructions   Start using 2 clicks with breakfast and lunch  Check blood sugars 4x per day  Send log in two weeks    If you are interested in dexcom sensor, let us know      Discussed with the patient and all questioned fully answered  She will call me if any problems arise  Follow-up appointment in 3 months       Counseled patient on diagnostic results, prognosis, risk and benefit of treatment options, instruction for management, importance of treatment compliance, Risk  factor reduction and impressions    Samara Cooper PA-C

## 2019-10-24 NOTE — ASSESSMENT & PLAN NOTE
Poorly Controlled/Not at goal with A1C of 8 9  Glucose log reviewed and most morning glucometer readings are under good control with no hypoglycemia, evening readings are consistently elevated frequently in the 300s  Currently on V-Go 20-- her daughter reports typically only using 3 clicks (6 units) with dinner and not taking with breakfast and lunch  Advised her to start taking 2 clicks with breakfst and lunch and send BG log for review in two weeks  Check BG 4x per day and send log to office in two weeks  [address] [place] [address] Gerri has Type 2 Diabetes and is on a V-Go delivery device/pump  She is  checking BG four times per day  She requires therapeutic CGM to help with frequent insulin adjustments

## 2019-10-28 NOTE — ASSESSMENT & PLAN NOTE
History of AVSD and Trisomy 21. Mother here for consultation on nystagmus, which mother states she noticed since being in the NICU. Reports has noticed improvement. Patient is currently on Zyrtec and receiving therapies through [facility].   · Was on IV Lasix  · Continue cardiovascular/heart failure medications  · Cardiology on board    · Echocardiogram showed DHF and severe TR

## 2019-10-31 DIAGNOSIS — I10 ESSENTIAL HYPERTENSION: ICD-10-CM

## 2019-10-31 RX ORDER — ISOSORBIDE MONONITRATE 60 MG/1
TABLET, EXTENDED RELEASE ORAL
Qty: 90 TABLET | Refills: 3 | Status: SHIPPED | OUTPATIENT
Start: 2019-10-31 | End: 2020-03-11

## 2019-11-12 DIAGNOSIS — E11.65 UNCONTROLLED TYPE 2 DIABETES MELLITUS WITH HYPERGLYCEMIA (HCC): ICD-10-CM

## 2019-11-14 RX ORDER — SUB-Q INSULIN DEVICE, 40 UNIT
EACH MISCELLANEOUS DAILY
Qty: 30 KIT | Refills: 2 | Status: SHIPPED | OUTPATIENT
Start: 2019-11-14 | End: 2020-02-14 | Stop reason: SDUPTHER

## 2019-12-05 DIAGNOSIS — I48.91 ATRIAL FIBRILLATION, UNSPECIFIED TYPE (HCC): ICD-10-CM

## 2019-12-05 DIAGNOSIS — E11.65 UNCONTROLLED TYPE 2 DIABETES MELLITUS WITH HYPERGLYCEMIA (HCC): Primary | ICD-10-CM

## 2019-12-06 ENCOUNTER — TELEPHONE (OUTPATIENT)
Dept: ENDOCRINOLOGY | Facility: CLINIC | Age: 84
End: 2019-12-06

## 2019-12-06 NOTE — TELEPHONE ENCOUNTER
[facility] called back stating that insurance will not cover 4 times daily testing  Please send updated Rx  Thank you!

## 2019-12-09 ENCOUNTER — TELEPHONE (OUTPATIENT)
Dept: ENDOCRINOLOGY | Facility: CLINIC | Age: 84
End: 2019-12-09

## 2019-12-09 NOTE — TELEPHONE ENCOUNTER
Increase to 3 clicks with breakfast and lunch- continue same at dinner  Send log in two weeks or ASAP if problems

## 2019-12-10 RX ORDER — APIXABAN 5 MG/1
TABLET, FILM COATED ORAL
Qty: 60 TABLET | Refills: 5 | Status: SHIPPED | OUTPATIENT
Start: 2019-12-10 | End: 2020-06-15

## 2019-12-18 ENCOUNTER — IN-CLINIC DEVICE VISIT (OUTPATIENT)
Dept: CARDIOLOGY CLINIC | Facility: CLINIC | Age: 84
End: 2019-12-18
Payer: COMMERCIAL

## 2019-12-18 ENCOUNTER — OFFICE VISIT (OUTPATIENT)
Dept: FAMILY MEDICINE CLINIC | Facility: CLINIC | Age: 84
End: 2019-12-18
Payer: COMMERCIAL

## 2019-12-18 VITALS
HEIGHT: 65 IN | RESPIRATION RATE: 20 BRPM | TEMPERATURE: 98 F | SYSTOLIC BLOOD PRESSURE: 122 MMHG | WEIGHT: 221 LBS | BODY MASS INDEX: 36.82 KG/M2 | OXYGEN SATURATION: 96 % | HEART RATE: 68 BPM | DIASTOLIC BLOOD PRESSURE: 66 MMHG

## 2019-12-18 DIAGNOSIS — F02.80 LATE ONSET ALZHEIMER'S DISEASE WITHOUT BEHAVIORAL DISTURBANCE (HCC): Chronic | ICD-10-CM

## 2019-12-18 DIAGNOSIS — I50.30 CONGESTIVE HEART FAILURE WITH LV DIASTOLIC DYSFUNCTION, NYHA CLASS 2 (HCC): ICD-10-CM

## 2019-12-18 DIAGNOSIS — I10 ESSENTIAL HYPERTENSION: Primary | Chronic | ICD-10-CM

## 2019-12-18 DIAGNOSIS — IMO0002 DM (DIABETES MELLITUS), TYPE 2, UNCONTROLLED W/OPHTHALMIC COMPLICATION: ICD-10-CM

## 2019-12-18 DIAGNOSIS — Z23 NEED FOR INFLUENZA VACCINATION: ICD-10-CM

## 2019-12-18 DIAGNOSIS — G30.1 LATE ONSET ALZHEIMER'S DISEASE WITHOUT BEHAVIORAL DISTURBANCE (HCC): Chronic | ICD-10-CM

## 2019-12-18 DIAGNOSIS — E78.2 MIXED HYPERLIPIDEMIA: ICD-10-CM

## 2019-12-18 DIAGNOSIS — Z95.0 PRESENCE OF PERMANENT CARDIAC PACEMAKER: Primary | ICD-10-CM

## 2019-12-18 DIAGNOSIS — I48.0 PAF (PAROXYSMAL ATRIAL FIBRILLATION) (HCC): ICD-10-CM

## 2019-12-18 PROBLEM — R79.89 ABNORMAL TSH: Status: RESOLVED | Noted: 2017-10-31 | Resolved: 2019-12-18

## 2019-12-18 PROCEDURE — 1101F PT FALLS ASSESS-DOCD LE1/YR: CPT | Performed by: FAMILY MEDICINE

## 2019-12-18 PROCEDURE — 90662 IIV NO PRSV INCREASED AG IM: CPT

## 2019-12-18 PROCEDURE — 99214 OFFICE O/P EST MOD 30 MIN: CPT | Performed by: FAMILY MEDICINE

## 2019-12-18 PROCEDURE — 1036F TOBACCO NON-USER: CPT | Performed by: FAMILY MEDICINE

## 2019-12-18 PROCEDURE — 3074F SYST BP LT 130 MM HG: CPT | Performed by: FAMILY MEDICINE

## 2019-12-18 PROCEDURE — 93280 PM DEVICE PROGR EVAL DUAL: CPT | Performed by: INTERNAL MEDICINE

## 2019-12-18 PROCEDURE — G0008 ADMIN INFLUENZA VIRUS VAC: HCPCS

## 2019-12-18 PROCEDURE — 3078F DIAST BP <80 MM HG: CPT | Performed by: FAMILY MEDICINE

## 2019-12-18 PROCEDURE — 1160F RVW MEDS BY RX/DR IN RCRD: CPT

## 2019-12-18 NOTE — PROGRESS NOTES
Chief Complaint   Patient presents with    Follow-up     6 months  Health Maintenance   Topic Date Due    SLP PLAN OF CARE  1935    DTaP,Tdap,and Td Vaccines (1 - Tdap) 09/17/1948    BMI: Followup Plan  09/17/1955    Hepatitis B Vaccine (1 of 3 - Risk 3-dose series) 09/17/1956    Influenza Vaccine  07/01/2019    HEMOGLOBIN A1C  12/20/2019    Diabetic Foot Exam  03/13/2020    Fall Risk  06/06/2020    Urinary Incontinence Screening  06/06/2020    Medicare Annual Wellness Visit (AWV)  06/06/2020    URINE MICROALBUMIN  09/20/2020    BMI: Adult  09/23/2020    DM Eye Exam  10/01/2021    DXA SCAN  05/16/2022    Pneumococcal Vaccine: 65+ Years  Completed    Pneumococcal Vaccine: Pediatrics (0 to 5 Years) and At-Risk Patients (6 to 59 Years)  Aged Out    HIB Vaccine  Aged Out    IPV Vaccine  Aged Out    Hepatitis A Vaccine  Aged Out    Meningococcal ACWY Vaccine  Aged Out    HPV Vaccine  Aged Out     BMI Counseling: Body mass index is 36 78 kg/m²  The BMI is above normal  Nutrition recommendations include reducing portion sizes, decreasing overall calorie intake and 3-5 servings of fruits/vegetables daily  Exercise recommendations include strength training exercises  Assessment/Plan:    Obstructive sleep apnea  Does not use CPAP at home  Mild intermittent asthma without complication  Controlled  Does not use albuterol for a while  Essential hypertension  Controlled  DASH diet  Continue imdur 60mg QD, metoprolol 25mg QD per cardiology  PAF (paroxysmal atrial fibrillation) ([facility] )  [facility] cardiology  Continue metoprolol and eliquis 5mg bid  Dementia without behavioral disturbance  Need help with ADL's  Son's family live with her and help her  Hyperlipidemia  9/2019 lipid 108/70/57/37 good  Low fat diet  Continue atorvastatin 40mg qhs       Congestive heart failure with LV diastolic dysfunction, NYHA class 2 ([facility])  Wt Readings from Last 3 Encounters:   12/18/19 100 kg (221 lb) 09/23/19 99 9 kg (220 lb 3 2 oz)   06/06/19 97 4 kg (214 lb 12 8 oz)     Wt is stable from 3 months ago  Continue torsemide 20mg QD per cardiology  Continue potassium supplement  Flu shot yearly  Give flu shot today  Got PCV 13 in 2016, got pneumovax in 2014  Fall precautions  RTO in 6 months  Diagnoses and all orders for this visit:    Essential hypertension    Mixed hyperlipidemia    PAF (paroxysmal atrial fibrillation) ([facility])    Congestive heart failure with LV diastolic dysfunction, NYHA class 2 (HCC)    DM (diabetes mellitus), type 2, uncontrolled w/ophthalmic complication ([facility] )    Late onset Alzheimer's disease without behavioral disturbance ([facility] )    Need for influenza vaccination  -     influenza vaccine, [id number], high-dose, PF 0 5 mL (FLUZONE HIGH-DOSE)          Subjective:      Patient ID: Ysabel Muir is a 82 y o  female  HPI    Pt is here with her daughter  HTN---She is on amlodipine 5mg QD, imdur 60mg QD, metoprolol 25mg daily now  BP is good today  Pt denies dizzy or lightheaded  Feels tired always       CHF---She is on torsemide 20mg QD  Wt is stable  She is on potassium supplement       Afib---She is on eliquis 5mg bid  Denies bleeding signs  FU cardiology Dr Samuel Serrato pacemaker 5/2014, CAD s/p bypass 1999      Asthma---Controlled  Pt does not need albuterol for a while       DM---9/2019 hgA1C 8 9 uncontrolled but improved  [facility] endocrinology  She is on V-Go 20      Hyperlipidemia---on atorvastatin 40mg qhs  Denies side effects       Dementia---need help with ADL's       Son's family live with her  Daughter helped her with her medications  Denies recent falls  Use walker  Denies depression         The following portions of the patient's history were reviewed and updated as appropriate: allergies, current medications, past family history, past medical history, past social history, past surgical history and problem list     Review of Systems Constitutional: Negative for appetite change, chills and fever  HENT: Negative for congestion, ear pain, sinus pain and sore throat  Eyes: Negative for discharge and itching  Respiratory: Negative for apnea, cough, chest tightness, shortness of breath and wheezing  Cardiovascular: Negative for chest pain, palpitations and leg swelling  Gastrointestinal: Negative for abdominal pain, anal bleeding, constipation, diarrhea, nausea and vomiting  Endocrine: Negative for cold intolerance, heat intolerance and polyuria  Genitourinary: Negative for difficulty urinating and dysuria  Musculoskeletal: Negative for arthralgias, back pain and myalgias  Skin: Negative for rash  Neurological: Negative for dizziness and headaches  Psychiatric/Behavioral: Negative for agitation  Objective:      /66 (BP Location: Left arm, Patient Position: Sitting, Cuff Size: Large)   Pulse 68   Temp 98 °F (36 7 °C) (Tympanic)   Resp 20   Ht 5' 5" (1 651 m)   Wt 100 kg (221 lb)   SpO2 96%   BMI 36 78 kg/m²          Physical Exam   Constitutional: She appears well-developed  No distress  HENT:   Head: Normocephalic  Right Ear: External ear normal    Left Ear: External ear normal    Nose: Nose normal    Mouth/Throat: Oropharynx is clear and moist    Eyes: Pupils are equal, round, and reactive to light  Conjunctivae are normal  Right eye exhibits no discharge  Left eye exhibits no discharge  Neck: Normal range of motion  No thyromegaly present  Cardiovascular: Normal rate, regular rhythm and normal heart sounds  Exam reveals no gallop and no friction rub  No murmur heard  Pulmonary/Chest: Effort normal and breath sounds normal  No respiratory distress  She has no wheezes  She has no rales  She exhibits no tenderness  Abdominal: Soft  Bowel sounds are normal    Musculoskeletal: Normal range of motion  She exhibits no edema  Lymphadenopathy:     She has no cervical adenopathy     Neurological: She is alert  Psychiatric: She has a normal mood and affect

## 2019-12-18 NOTE — ASSESSMENT & PLAN NOTE
Wt Readings from Last 3 Encounters:   12/18/19 100 kg (221 lb)   09/23/19 99 9 kg (220 lb 3 2 oz)   06/06/19 97 4 kg (214 lb 12 8 oz)     Wt is stable from 3 months ago  Continue torsemide 20mg QD per cardiology  Continue potassium supplement

## 2019-12-18 NOTE — PROGRESS NOTES
Results for orders placed or performed in visit on 12/18/19   Cardiac EP device report    Narrative    MDT-DUAL-PM  DEVICE INTERROGATED IN THE [facility] OFFICE  BATTERY VOLTAGE ADEQUATE  (5 YRS)  AP 76%  4%  ALL LEAD PARAMETERS WITHIN NORMAL LIMITS  1 VHR EPISODE DETECTED  117 AT/AF EPISODES DETECTED LONGEST 52 HOURS  PATIENT IS ON ELIQUIS AND METOPROLOL  EF NO PATIENT OR DEVICE ACTIVATED EPISODES  NORMAL DEVICE FUNCTION  ---JOHN

## 2020-01-12 DIAGNOSIS — I10 ESSENTIAL HYPERTENSION: ICD-10-CM

## 2020-01-16 ENCOUNTER — OFFICE VISIT (OUTPATIENT)
Dept: UROLOGY | Facility: AMBULATORY SURGERY CENTER | Age: 85
End: 2020-01-16
Payer: COMMERCIAL

## 2020-01-16 VITALS
SYSTOLIC BLOOD PRESSURE: 134 MMHG | BODY MASS INDEX: 36.82 KG/M2 | DIASTOLIC BLOOD PRESSURE: 72 MMHG | WEIGHT: 221 LBS | HEIGHT: 65 IN | HEART RATE: 61 BPM

## 2020-01-16 DIAGNOSIS — R33.9 URINARY RETENTION: Primary | ICD-10-CM

## 2020-01-16 LAB — POST-VOID RESIDUAL VOLUME, ML POC: 94 ML

## 2020-01-16 PROCEDURE — 1160F RVW MEDS BY RX/DR IN RCRD: CPT | Performed by: NURSE PRACTITIONER

## 2020-01-16 PROCEDURE — 99213 OFFICE O/P EST LOW 20 MIN: CPT | Performed by: NURSE PRACTITIONER

## 2020-01-16 PROCEDURE — 51798 US URINE CAPACITY MEASURE: CPT | Performed by: NURSE PRACTITIONER

## 2020-01-16 NOTE — PROGRESS NOTES
1/16/2020    [address] [place] [address] Gerri  1935  [id number]      Assessment  -Resolved urinary retention     Discussion/Plan  [place] is a 82 y o  female being managed by Dr Susie Doyle  -PVR is 94 mL  -Patient is doing well without any urinary complaints  She has had no recurrence of urinary retention  Patient asymptomatic at this time  She wishes to follow-up on as-needed basis  Reviewed dietary and behavioral modifications, including increasing water intake  She will call our office with any issues    -All questions answered, patient and caretaker agrees with plan      History of Present Illness  82 y o  female with a history of resolved urinary retention presents today for follow up  She is accompanied today by caretaker  Patient has history of urinary retention requiring santiago catheter during hospitalization in April 2018  Cause likely multifactorial secondary to deconditioned state and constipation  Patient denies any difficulties with urination since that time  She feels she is emptying her bladder to completion  No reports of gross hematuria or dysuria       Review of Systems  Review of Systems   Constitutional: Negative  HENT: Negative  Respiratory: Negative  Cardiovascular: Negative  Gastrointestinal: Negative  Genitourinary: Negative for decreased urine volume, difficulty urinating, dysuria, flank pain, frequency, hematuria and urgency  Musculoskeletal: Negative  Skin: Negative  Neurological: Negative  Psychiatric/Behavioral: Negative          Past Medical History  Past Medical History:   Diagnosis Date    Arthritis     Asthma     CAD (coronary artery disease) of artery bypass graft     Cardiac disease     [facility]     Dementia ([facility])     Depression     [facility]     Heart attack ([facility]     Hyperlipidemia     Hypertension     [facility]     Neuropathy     BRANT (obstructive sleep apnea)     Peptic ulcer     was + for H pylori    Transient cerebral ischemia 2011    with neg CUS and ECHO       Past Social History  Past Surgical History:   Procedure Laterality Date    APPENDECTOMY      CATARACT EXTRACTION       SECTION      COLONOSCOPY  2004    CORONARY ANGIOPLASTY  2006    CORONARY ANGIOPLASTY WITH STENT PLACEMENT      CORONARY ARTERY BYPASS GRAFT      DENTAL SURGERY      EGD AND COLONOSCOPY      ELBOW SURGERY      resolved     ESOPHAGOGASTRODUODENOSCOPY         Past Family History  Family History   Problem Relation Age of Onset    Diabetes Mother     Cancer Sister     Heart disease Sister     Thyroid disease Sister     Cancer Brother     Cancer Father     Colon cancer Family     Diabetes Family     Mitral valve prolapse Family     Thyroid cancer Family        Past Social history  Social History     Socioeconomic History    Marital status: /Civil Union     Spouse name: Not on file    Number of children: Not on file    Years of education: Not on file    Highest education level: Not on file   Occupational History    Not on file   Social Needs    Financial resource strain: Not on file    Food insecurity:     Worry: Not on file     Inability: Not on file    Transportation needs:     Medical: Not on file     Non-medical: Not on file   Tobacco Use    Smoking status: Never Smoker    Smokeless tobacco: Never Used   Substance and Sexual Activity    Alcohol use: No     Frequency: Never     Drinks per session: Patient refused     Binge frequency: Never    Drug use: No    Sexual activity: Not Currently   Lifestyle    Physical activity:     Days per week: Not on file     Minutes per session: Not on file    Stress: Not on file   Relationships    Social connections:     Talks on phone: Not on file     Gets together: Not on file     Attends Nondenominational service: Not on file     Active member of club or organization: Not on file     Attends meetings of clubs or organizations: Not on file     Relationship status: Not on file    Intimate partner violence:     Fear of current or ex partner: Not on file     Emotionally abused: Not on file     Physically abused: Not on file     Forced sexual activity: Not on file   Other Topics Concern    Not on file   Social History Narrative    Caffeine use       Current Medications  Current Outpatient Medications   Medication Sig Dispense Refill    amLODIPine (NORVASC) 5 mg tablet TAKE 1/2 TABLET DAILY      aspirin 81 mg chewable tablet Chew 1 tablet (81 mg total) daily 30 tablet 0    atorvastatin (LIPITOR) 40 mg tablet TAKE 1 TABLET BY MOUTH EVERY DAY 30 tablet 11    calcium carbonate-vitamin D (OSCAL-D) 500 mg-200 units per tablet Take 1 tablet by mouth 2 (two) times a day with meals 60 tablet 0    docusate sodium (COLACE) 100 mg capsule Take 1 capsule (100 mg total) by mouth 2 (two) times a day (Patient taking differently: Take 100 mg by mouth 2 (two) times a day as needed ) 10 capsule 0    ELIQUIS 5 MG TAKE 1 TABLET BY MOUTH TWICE A DAY 60 tablet 5    glucose blood (ONE TOUCH ULTRA TEST) test strip Test 3 times daily 300 each 1    Insulin Disposable Pump (V-GO 20) KIT INJECT UNDER THE SKIN DAILY USE 1 DEVICE PER DAY 30 kit 2    isosorbide mononitrate (IMDUR) 60 mg 24 hr tablet TAKE 1 TABLET BY MOUTH EVERY DAY AS DIRECTED 90 tablet 3    KLOR-CON M10 10 MEQ tablet TAKE 1 TABLET BY MOUTH EVERY DAY 30 tablet 5    metoprolol tartrate (LOPRESSOR) 25 mg tablet TAKE 1 & 1/2 TABLETS IN THE MORNING, AND 1 TABLET IN THE EVENING 225 tablet 0    NOVOLOG 100 UNIT/ML injection INJECT 100 UNITS DAILY AS DIRECTED 30 mL 4    senna (SENOKOT) 8 6 mg Take 1 tablet (8 6 mg total) by mouth daily (Patient taking differently: Take 1 tablet by mouth daily at bedtime as needed ) 120 each 0    torsemide (DEMADEX) 20 mg tablet Take 1 tablet (20 mg total) by mouth 2 (two) times a day (Patient taking differently: Take 20 mg by mouth daily ) 60 tablet 11     No current facility-administered medications for this visit  Allergies  Allergies   Allergen Reactions    Ace Inhibitors     Chlorpromazine     Latex     Lisinopril     Namenda [Memantine] Drowsiness    Penicillins Seizures    Propoxyphene     Stadol [Butorphanol]        Past Medical History, Social History, Family History, medications and allergies were reviewed  Vitals  There were no vitals filed for this visit  Physical Exam  Physical Exam   Constitutional: She is oriented to person, place, and time  She appears well-developed and well-nourished  HENT:   Head: Normocephalic  Eyes: Pupils are equal, round, and reactive to light  Neck: Normal range of motion  Cardiovascular: Normal rate and regular rhythm  Pulmonary/Chest: Effort normal    Abdominal: Soft  Normal appearance  There is no CVA tenderness  Genitourinary:   Genitourinary Comments: No suprapubic discomfort or distention   Musculoskeletal: Normal range of motion  Wheelchair-bound   Neurological: She is alert and oriented to person, place, and time  Skin: Skin is warm and dry  Psychiatric: She has a normal mood and affect  Her behavior is normal  Judgment and thought content normal        Results    I have personally reviewed all pertinent lab results and reviewed with patient  No results found for: PSA  Lab Results   Component Value Date    GLUCOSE 267 (H) 04/22/2018    CALCIUM 9 1 09/20/2019     12/17/2015    K 4 1 09/20/2019    CO2 35 (H) 09/20/2019     09/20/2019    BUN 30 (H) 09/20/2019    CREATININE 1 14 09/20/2019     Lab Results   Component Value Date    WBC 5 14 05/08/2019    HGB 14 2 05/08/2019    HCT 42 3 05/08/2019    MCV 88 05/08/2019     05/08/2019     No results found for this or any previous visit (from the past 1 hour(s))

## 2020-01-31 DIAGNOSIS — Z79.4 TYPE 2 DIABETES MELLITUS WITH HYPERGLYCEMIA, WITH LONG-TERM CURRENT USE OF INSULIN (HCC): ICD-10-CM

## 2020-01-31 DIAGNOSIS — E11.65 TYPE 2 DIABETES MELLITUS WITH HYPERGLYCEMIA, WITH LONG-TERM CURRENT USE OF INSULIN (HCC): ICD-10-CM

## 2020-02-13 ENCOUNTER — OFFICE VISIT (OUTPATIENT)
Dept: URGENT CARE | Facility: MEDICAL CENTER | Age: 85
End: 2020-02-13
Payer: COMMERCIAL

## 2020-02-13 VITALS
RESPIRATION RATE: 20 BRPM | BODY MASS INDEX: 43.39 KG/M2 | SYSTOLIC BLOOD PRESSURE: 166 MMHG | HEART RATE: 60 BPM | OXYGEN SATURATION: 95 % | DIASTOLIC BLOOD PRESSURE: 63 MMHG | HEIGHT: 60 IN | TEMPERATURE: 97.5 F | WEIGHT: 221 LBS

## 2020-02-13 DIAGNOSIS — S51.811A SKIN TEAR OF RIGHT FOREARM WITHOUT COMPLICATION, INITIAL ENCOUNTER: Primary | ICD-10-CM

## 2020-02-13 PROCEDURE — 12004 RPR S/N/AX/GEN/TRK7.6-12.5CM: CPT | Performed by: PHYSICIAN ASSISTANT

## 2020-02-13 PROCEDURE — 99213 OFFICE O/P EST LOW 20 MIN: CPT | Performed by: PHYSICIAN ASSISTANT

## 2020-02-13 PROCEDURE — S9088 SERVICES PROVIDED IN URGENT: HCPCS | Performed by: PHYSICIAN ASSISTANT

## 2020-02-14 DIAGNOSIS — E11.65 UNCONTROLLED TYPE 2 DIABETES MELLITUS WITH HYPERGLYCEMIA (HCC): ICD-10-CM

## 2020-02-14 RX ORDER — SUB-Q INSULIN DEVICE, 40 UNIT
EACH MISCELLANEOUS
Qty: 30 KIT | Refills: 2 | Status: SHIPPED | OUTPATIENT
Start: 2020-02-14 | End: 2020-02-17

## 2020-02-14 NOTE — PATIENT INSTRUCTIONS
leave bandage on for the next 2 days  Follow up with PCP in 3-5 days  Proceed to  ER if symptoms worsen    Skin Tear   WHAT YOU NEED TO KNOW:   A skin tear occurs when the layers of weakened skin split open from an injury  , elderly, and chronically or critically ill people are at higher risk for skin tears  Long-term use of steroids can also increase the risk  It is important to treat and prevent skin tears to prevent infection  DISCHARGE INSTRUCTIONS:   Medicines:   · Medicines  may be given to reduce your pain or to treat or prevent an infection  Do not wait until the pain is severe before you ask for more medicine  · Take your medicine as directed  Contact your healthcare provider if you think your medicine is not helping or if you have side effects  Tell him of her if you are allergic to any medicine  Keep a list of the medicines, vitamins, and herbs you take  Include the amounts, and when and why you take them  Bring the list or the pill bottles to follow-up visits  Carry your medicine list with you in case of an emergency  Follow up with your healthcare provider as directed:  Write down your questions so you remember to ask them during your visits  Wound care: You may be referred to a wound specialist who will teach you how to clean your wound properly  Ask for more information about wound care  Prevent another skin tear:   · Clean, moisturize, and protect your skin  Baths, hot showers, and soap can dry your skin and increase your risk for skin tears  Take tepid showers, use mild soap as directed, and gently pat your skin dry  Use lotion to keep your skin moist after you shower  Wear long sleeves, pants, and protective footwear  · Move carefully  Ask for help if you cannot lift yourself well enough to avoid dragging your skin when you move  · Keep your home safe  Cover sharp corners, keep your pathways clear, and turn on lights so you can see clearly   Ask for more information if you have questions about home safety  · Drink liquids as directed  Ask your healthcare provider how much liquid to drink each day and which liquids are best for you  Liquids will help keep your skin moist and protected from future skin tears  · Eat high-protein foods  Examples are lean meats, fish, low-fat dairy products, and beans  A dietitian may help you create high-protein meal plans to help with wound healing  Contact your healthcare provider if:   · You have redness, swelling, pus, or a bad odor coming from your wound  · Blood soaks through your bandage  · You have increased pain, even after you take pain medicine  · Your wound tears open again  Return to the emergency department if:   · You have a fever  © 2017 [address] Information is for End User's use only and may not be sold, redistributed or otherwise used for commercial purposes  All illustrations and images included in CareNotes® are the copyrighted property of [employer]  or [employer]  The above information is an  only  It is not intended as medical advice for individual conditions or treatments  Talk to your doctor, nurse or pharmacist before following any medical regimen to see if it is safe and effective for you

## 2020-02-14 NOTE — PROGRESS NOTES
[address] [facility]        NAME: Abby Coulter is a 82 y o  female  : 1935    MRN: [id number]  DATE: 2020  TIME: 10:28 PM    Assessment and Plan   Skin tear of right forearm without complication, initial encounter [S51 561A]  1  Skin tear of right forearm without complication, initial encounter  Laceration repair         Patient Instructions      leave bandage on for the next 2 days  Follow up with PCP in 3-5 days  Proceed to  ER if symptoms worsen  Chief Complaint     Chief Complaint   Patient presents with    Arm Injury     Patient fell betweent eh bed and the dressor  Patient denies hitting her head  She states she fell on her butt  Patient denies any hip or back pain  Patient ambulated with her caregiver  Patient denies hitting her head but she has a headache  Patient states she hit ehr R forearm  History of Present Illness       60-year-old female presents with a fall injury  Patient reports she was sitting on her bed and reaching out to her dresser and her foot slipped out causing her to slide down the bed onto her buttocks  When she fell down she struck the side table with her arm causing a skin tear in her right forearm  Denies any head trauma or neck pain  No chest pain shortness of breath  No abdominal pain  Injury   The incident occurred 1 to 3 hours ago  The incident occurred at home  The injury mechanism was a fall  The injury occurred in the context of a self-inflicted injury  No protective equipment was used  There is an injury to the right forearm  The pain is mild  It is unlikely that a foreign body is present  Pertinent negatives include no abdominal pain, coughing, hearing loss, light-headedness, loss of consciousness, nausea, numbness, tingling or vomiting  There have been no prior injuries to these areas  Her tetanus status is UTD  Review of Systems   Review of Systems   Constitutional: Negative  HENT: Negative for hearing loss  Respiratory: Negative  Negative for cough  Cardiovascular: Negative  Gastrointestinal: Negative  Negative for abdominal pain, nausea and vomiting  Musculoskeletal: Negative  Skin: Negative  Neurological: Negative  Negative for tingling, loss of consciousness, light-headedness and numbness           Current Medications       Current Outpatient Medications:     amLODIPine (NORVASC) 5 mg tablet, TAKE 1/2 TABLET DAILY, Disp: , Rfl:     aspirin 81 mg chewable tablet, Chew 1 tablet (81 mg total) daily, Disp: 30 tablet, Rfl: 0    atorvastatin (LIPITOR) 40 mg tablet, TAKE 1 TABLET BY MOUTH EVERY DAY, Disp: 30 tablet, Rfl: 11    calcium carbonate-vitamin D (OSCAL-D) 500 mg-200 units per tablet, Take 1 tablet by mouth 2 (two) times a day with meals, Disp: 60 tablet, Rfl: 0    docusate sodium (COLACE) 100 mg capsule, Take 1 capsule (100 mg total) by mouth 2 (two) times a day (Patient taking differently: Take 100 mg by mouth 2 (two) times a day as needed ), Disp: 10 capsule, Rfl: 0    ELIQUIS 5 MG, TAKE 1 TABLET BY MOUTH TWICE A DAY, Disp: 60 tablet, Rfl: 5    Insulin Disposable Pump (V-GO 20) KIT, INJECT UNDER THE SKIN DAILY USE 1 DEVICE PER DAY, Disp: 30 kit, Rfl: 2    isosorbide mononitrate (IMDUR) 60 mg 24 hr tablet, TAKE 1 TABLET BY MOUTH EVERY DAY AS DIRECTED, Disp: 90 tablet, Rfl: 3    KLOR-CON M10 10 MEQ tablet, TAKE 1 TABLET BY MOUTH EVERY DAY, Disp: 30 tablet, Rfl: 5    metoprolol tartrate (LOPRESSOR) 25 mg tablet, TAKE 1 & 1/2 TABLETS IN THE MORNING, AND 1 TABLET IN THE EVENING, Disp: 225 tablet, Rfl: 0    NOVOLOG 100 UNIT/ML injection, INJECT 100 UNITS DAILY AS DIRECTED, Disp: 10 mL, Rfl: 0    senna (SENOKOT) 8 6 mg, Take 1 tablet (8 6 mg total) by mouth daily (Patient taking differently: Take 1 tablet by mouth daily at bedtime as needed ), Disp: 120 each, Rfl: 0    torsemide (DEMADEX) 20 mg tablet, Take 1 tablet (20 mg total) by mouth 2 (two) times a day (Patient taking differently: Take 20 mg by mouth daily ), Disp: 60 tablet, Rfl: 11    glucose blood (ONE TOUCH ULTRA TEST) test strip, Test 3 times daily, Disp: 300 each, Rfl: 1    Current Allergies     Allergies as of 2020 - Reviewed 2020   Allergen Reaction Noted    Ace inhibitors      Chlorpromazine      Latex      Lisinopril      Namenda [memantine] Drowsiness 09/10/2017    Penicillins Seizures 09/10/2017    Propoxyphene      Stadol [butorphanol]  09/10/2017            The following portions of the patient's history were reviewed and updated as appropriate: allergies, current medications, past family history, past medical history, past social history, past surgical history and problem list      Past Medical History:   Diagnosis Date    Arthritis     Asthma     CAD (coronary artery disease) of artery bypass graft     Cardiac disease     [facility]     Dementia [facility]     Depression     [facility]     Heart attack [facility]     Hyperlipidemia     Hypertension     [facility]     Neuropathy     BRANT (obstructive sleep apnea)     Peptic ulcer     was + for H pylori    Transient cerebral ischemia 2011    with neg CUS and ECHO       Past Surgical History:   Procedure Laterality Date    APPENDECTOMY      CATARACT EXTRACTION       SECTION      COLONOSCOPY  2004    CORONARY ANGIOPLASTY  2006    CORONARY ANGIOPLASTY WITH STENT PLACEMENT      CORONARY ARTERY BYPASS GRAFT      DENTAL SURGERY      EGD AND COLONOSCOPY      ELBOW SURGERY      resolved     ESOPHAGOGASTRODUODENOSCOPY  2004       Family History   Problem Relation Age of Onset    Diabetes Mother     Cancer Sister     Heart disease Sister     Thyroid disease Sister     Cancer Brother     Cancer Father     Colon cancer Family     Diabetes Family     Mitral valve prolapse Family     Thyroid cancer Family          Medications have been verified  Objective   /63   Pulse 60   Temp 97 5 °F (36 4 °C)   Resp 20   Ht 5' (1 524 m)   Wt 100 kg (221 lb)   SpO2 95%   BMI 43 16 kg/m²          Physical Exam     Physical Exam   Constitutional: She is oriented to person, place, and time  She appears well-developed and well-nourished  No distress  HENT:   Head: Normocephalic and atraumatic  Right Ear: Hearing, tympanic membrane, external ear and ear canal normal    Left Ear: Hearing, tympanic membrane, external ear and ear canal normal    Nose: Nose normal    Mouth/Throat: Uvula is midline, oropharynx is clear and moist and mucous membranes are normal  No oropharyngeal exudate  Eyes: Conjunctivae and EOM are normal  Right eye exhibits no discharge  Left eye exhibits no discharge  Neck: Normal range of motion  Neck supple  Cardiovascular: Normal rate, regular rhythm, normal heart sounds and intact distal pulses  No murmur heard  Pulmonary/Chest: Effort normal and breath sounds normal  No respiratory distress  She has no wheezes  She has no rales  Abdominal: Soft  Bowel sounds are normal  There is no tenderness  Musculoskeletal: Normal range of motion  Right forearm: She exhibits tenderness and laceration  She exhibits no bony tenderness, no swelling, no edema and no deformity  Arms:  Lymphadenopathy:     She has no cervical adenopathy  Neurological: She is alert and oriented to person, place, and time  Skin: Skin is warm and dry  Laceration (Skin tear to her right forearm) noted  Psychiatric: She has a normal mood and affect  Nursing note and vitals reviewed  Laceration repair  Date/Time: 2/13/2020 10:26 PM  Performed by: Светлана Goncalves PA-C  Authorized by: Ashley Anaya PA-C   Consent: Verbal consent not obtained  Written consent not obtained    Risks and benefits: risks, benefits and alternatives were discussed  Consent given by: patient  Patient understanding: patient states understanding of the procedure being performed  Patient identity confirmed: verbally with patient  Body area: upper extremity  Location details: right lower arm  Laceration length: 8 cm  Foreign bodies: no foreign bodies  Tendon involvement: none  Nerve involvement: none  Vascular damage: no    Sedation:  Patient sedated: no      Wound Dehiscence:  Superficial Wound Dehiscence: simple closure      Procedure Details:  Irrigation solution: saline  Amount of cleaning: standard  Debridement: none  Degree of undermining: none  Skin closure: glue  Technique: simple  Approximation: close  Approximation difficulty: simple  Dressing: gauze roll (Surgical foam placed over wound were skin was missing)  Patient tolerance: Patient tolerated the procedure well with no immediate complications

## 2020-02-15 DIAGNOSIS — E11.65 TYPE 2 DIABETES MELLITUS WITH HYPERGLYCEMIA, WITH LONG-TERM CURRENT USE OF INSULIN (HCC): ICD-10-CM

## 2020-02-15 DIAGNOSIS — Z79.4 TYPE 2 DIABETES MELLITUS WITH HYPERGLYCEMIA, WITH LONG-TERM CURRENT USE OF INSULIN (HCC): ICD-10-CM

## 2020-02-17 DIAGNOSIS — E11.65 UNCONTROLLED TYPE 2 DIABETES MELLITUS WITH HYPERGLYCEMIA (HCC): ICD-10-CM

## 2020-02-17 RX ORDER — SUB-Q INSULIN DEVICE, 40 UNIT
EACH MISCELLANEOUS
Qty: 30 KIT | Refills: 2 | Status: SHIPPED | OUTPATIENT
Start: 2020-02-17 | End: 2020-02-19 | Stop reason: SDUPTHER

## 2020-02-19 ENCOUNTER — OFFICE VISIT (OUTPATIENT)
Dept: ENDOCRINOLOGY | Facility: CLINIC | Age: 85
End: 2020-02-19
Payer: COMMERCIAL

## 2020-02-19 ENCOUNTER — APPOINTMENT (OUTPATIENT)
Dept: LAB | Facility: CLINIC | Age: 85
End: 2020-02-19
Payer: COMMERCIAL

## 2020-02-19 VITALS
HEIGHT: 65 IN | WEIGHT: 227 LBS | SYSTOLIC BLOOD PRESSURE: 122 MMHG | DIASTOLIC BLOOD PRESSURE: 64 MMHG | HEART RATE: 69 BPM | BODY MASS INDEX: 37.82 KG/M2

## 2020-02-19 DIAGNOSIS — I10 ESSENTIAL HYPERTENSION: Chronic | ICD-10-CM

## 2020-02-19 DIAGNOSIS — Z79.4 TYPE 2 DIABETES MELLITUS WITH HYPERGLYCEMIA, WITH LONG-TERM CURRENT USE OF INSULIN (HCC): ICD-10-CM

## 2020-02-19 DIAGNOSIS — R79.89 ABNORMAL TSH: ICD-10-CM

## 2020-02-19 DIAGNOSIS — E11.65 UNCONTROLLED TYPE 2 DIABETES MELLITUS WITH HYPERGLYCEMIA (HCC): ICD-10-CM

## 2020-02-19 DIAGNOSIS — IMO0002 DM (DIABETES MELLITUS), TYPE 2, UNCONTROLLED W/OPHTHALMIC COMPLICATION: Primary | ICD-10-CM

## 2020-02-19 DIAGNOSIS — E08.42 DIABETES DUE TO UNDERLYING CONDITION W DIABETIC POLYNEUROP (HCC): Chronic | ICD-10-CM

## 2020-02-19 DIAGNOSIS — E78.2 MIXED HYPERLIPIDEMIA: ICD-10-CM

## 2020-02-19 DIAGNOSIS — E11.65 TYPE 2 DIABETES MELLITUS WITH HYPERGLYCEMIA, WITH LONG-TERM CURRENT USE OF INSULIN (HCC): ICD-10-CM

## 2020-02-19 DIAGNOSIS — G47.33 OBSTRUCTIVE SLEEP APNEA: Chronic | ICD-10-CM

## 2020-02-19 DIAGNOSIS — I25.10 3-VESSEL CORONARY ARTERY DISEASE: ICD-10-CM

## 2020-02-19 DIAGNOSIS — I49.5 SICK SINUS SYNDROME (HCC): ICD-10-CM

## 2020-02-19 LAB
ALBUMIN SERPL BCP-MCNC: 3.4 G/DL (ref 3.5–5)
ALP SERPL-CCNC: 97 U/L (ref 46–116)
ALT SERPL W P-5'-P-CCNC: 41 U/L (ref 12–78)
ANION GAP SERPL CALCULATED.3IONS-SCNC: 6 MMOL/L (ref 4–13)
AST SERPL W P-5'-P-CCNC: 23 U/L (ref 5–45)
BASOPHILS # BLD AUTO: 0.08 THOUSANDS/ΜL (ref 0–0.1)
BASOPHILS NFR BLD AUTO: 1 % (ref 0–1)
BILIRUB SERPL-MCNC: 0.56 MG/DL (ref 0.2–1)
BUN SERPL-MCNC: 34 MG/DL (ref 5–25)
CALCIUM SERPL-MCNC: 9.5 MG/DL (ref 8.3–10.1)
CHLORIDE SERPL-SCNC: 103 MMOL/L (ref 100–108)
CO2 SERPL-SCNC: 31 MMOL/L (ref 21–32)
CREAT SERPL-MCNC: 1.17 MG/DL (ref 0.6–1.3)
EOSINOPHIL # BLD AUTO: 0.3 THOUSAND/ΜL (ref 0–0.61)
EOSINOPHIL NFR BLD AUTO: 4 % (ref 0–6)
ERYTHROCYTE [DISTWIDTH] IN BLOOD BY AUTOMATED COUNT: 13.3 % (ref 11.6–15.1)
EST. AVERAGE GLUCOSE BLD GHB EST-MCNC: 194 MG/DL
GFR SERPL CREATININE-BSD FRML MDRD: 43 ML/MIN/1.73SQ M
GLUCOSE P FAST SERPL-MCNC: 198 MG/DL (ref 65–99)
HBA1C MFR BLD: 8.4 %
HCT VFR BLD AUTO: 38.5 % (ref 34.8–46.1)
HGB BLD-MCNC: 12.5 G/DL (ref 11.5–15.4)
IMM GRANULOCYTES # BLD AUTO: 0.02 THOUSAND/UL (ref 0–0.2)
IMM GRANULOCYTES NFR BLD AUTO: 0 % (ref 0–2)
LYMPHOCYTES # BLD AUTO: 1.55 THOUSANDS/ΜL (ref 0.6–4.47)
LYMPHOCYTES NFR BLD AUTO: 20 % (ref 14–44)
MCH RBC QN AUTO: 30.6 PG (ref 26.8–34.3)
MCHC RBC AUTO-ENTMCNC: 32.5 G/DL (ref 31.4–37.4)
MCV RBC AUTO: 94 FL (ref 82–98)
MONOCYTES # BLD AUTO: 0.61 THOUSAND/ΜL (ref 0.17–1.22)
MONOCYTES NFR BLD AUTO: 8 % (ref 4–12)
NEUTROPHILS # BLD AUTO: 5.29 THOUSANDS/ΜL (ref 1.85–7.62)
NEUTS SEG NFR BLD AUTO: 67 % (ref 43–75)
NRBC BLD AUTO-RTO: 0 /100 WBCS
NT-PROBNP SERPL-MCNC: 241 PG/ML
PLATELET # BLD AUTO: 184 THOUSANDS/UL (ref 149–390)
PMV BLD AUTO: 11.8 FL (ref 8.9–12.7)
POTASSIUM SERPL-SCNC: 3.9 MMOL/L (ref 3.5–5.3)
PROT SERPL-MCNC: 6.5 G/DL (ref 6.4–8.2)
RBC # BLD AUTO: 4.09 MILLION/UL (ref 3.81–5.12)
SODIUM SERPL-SCNC: 140 MMOL/L (ref 136–145)
T4 FREE SERPL-MCNC: 1.15 NG/DL (ref 0.76–1.46)
TSH SERPL DL<=0.05 MIU/L-ACNC: 5.52 UIU/ML (ref 0.36–3.74)
WBC # BLD AUTO: 7.85 THOUSAND/UL (ref 4.31–10.16)

## 2020-02-19 PROCEDURE — 84443 ASSAY THYROID STIM HORMONE: CPT

## 2020-02-19 PROCEDURE — 85025 COMPLETE CBC W/AUTO DIFF WBC: CPT

## 2020-02-19 PROCEDURE — 2022F DILAT RTA XM EVC RTNOPTHY: CPT | Performed by: INTERNAL MEDICINE

## 2020-02-19 PROCEDURE — 80053 COMPREHEN METABOLIC PANEL: CPT

## 2020-02-19 PROCEDURE — 4040F PNEUMOC VAC/ADMIN/RCVD: CPT | Performed by: INTERNAL MEDICINE

## 2020-02-19 PROCEDURE — 83036 HEMOGLOBIN GLYCOSYLATED A1C: CPT

## 2020-02-19 PROCEDURE — 36415 COLL VENOUS BLD VENIPUNCTURE: CPT

## 2020-02-19 PROCEDURE — 84439 ASSAY OF FREE THYROXINE: CPT

## 2020-02-19 PROCEDURE — 3008F BODY MASS INDEX DOCD: CPT | Performed by: INTERNAL MEDICINE

## 2020-02-19 PROCEDURE — 1160F RVW MEDS BY RX/DR IN RCRD: CPT | Performed by: INTERNAL MEDICINE

## 2020-02-19 PROCEDURE — 1036F TOBACCO NON-USER: CPT | Performed by: INTERNAL MEDICINE

## 2020-02-19 PROCEDURE — 99214 OFFICE O/P EST MOD 30 MIN: CPT | Performed by: INTERNAL MEDICINE

## 2020-02-19 PROCEDURE — 83880 ASSAY OF NATRIURETIC PEPTIDE: CPT

## 2020-02-19 RX ORDER — SUB-Q INSULIN DEVICE, 40 UNIT
EACH MISCELLANEOUS
Qty: 3 KIT | Refills: 3 | Status: SHIPPED | OUTPATIENT
Start: 2020-02-19 | End: 2021-03-15 | Stop reason: SDUPTHER

## 2020-02-19 NOTE — PROGRESS NOTES
[facility] (formerly [facility]) 82 y o  female MRN: [id number]    Encounter: [id number]      Assessment/Plan     Assessment: This is a 82y o -year-old female with diabetes with hyperglycemia, hypertension and hyperlipidemia  She obstructive apnea, peripheral neuropathy and retinopathy  Plan:  1  Type 2 diabetes with hyperglycemia-check routine laboratory testing  She would benefit from a continuous glucose monitor since she is on a V Go device and is checking her blood sugars 4 times per day  2  Peripheral neuropathy and retinopathy is stable  She follows with appropriate consultants  3  Obstructive sleep apnea is stable  4  Hypertension is well controlled  5  Hyperlipidemia-continue current regimen  CC: Diabetes    History of Present Illness     HPI:  22-year-old woman with type 2 diabetes for many years who is currently on continuous insulin infusion  She denies any hypoglycemia  She is checking her blood sugars 4 times per day  They will send these to me  Her diabetes is complicated by retinopathy and neuropathy  She follows with Podiatry and Ophthalmology  For hyperlipidemia, she is on statin  She denies any myalgia  She takes multiple medications for hypertension  She denies any headaches  Sleep apnea is stable  Review of Systems   Constitutional: Negative for chills and fever  Respiratory: Negative for shortness of breath  Cardiovascular: Negative for chest pain  Gastrointestinal: Negative for constipation, diarrhea, nausea and vomiting  Endocrine: Negative for polydipsia and polyuria  Neurological: Positive for numbness  All other systems reviewed and are negative        Historical Information   Past Medical History:   Diagnosis Date    Arthritis     Asthma     CAD (coronary artery disease) of artery bypass graft     Cardiac disease     [facility]     Dementia ([facility]     Depression     [facility] [address] [facility]     [facility] [address] [facility]     Hyperlipidemia     Hypertension     [facility]ocardial infarction) [facility] [address] [facility]     Neuropathy     BRANT (obstructive sleep apnea)     Peptic ulcer     was + for H pylori    Transient cerebral ischemia 2011    with neg CUS and ECHO     Past Surgical History:   Procedure Laterality Date    APPENDECTOMY      CATARACT EXTRACTION       SECTION      COLONOSCOPY  2004    CORONARY ANGIOPLASTY  2006    CORONARY ANGIOPLASTY WITH STENT PLACEMENT      CORONARY ARTERY BYPASS GRAFT      DENTAL SURGERY      EGD AND COLONOSCOPY      ELBOW SURGERY      resolved     ESOPHAGOGASTRODUODENOSCOPY       Social History   Social History     Substance and Sexual Activity   Alcohol Use No    Frequency: Never    Drinks per session: Patient refused    Binge frequency: Never     Social History     Substance and Sexual Activity   Drug Use No     Social History     Tobacco Use   Smoking Status Never Smoker   Smokeless Tobacco Never Used     Family History:   Family History   Problem Relation Age of Onset    Diabetes Mother     Cancer Sister     Heart disease Sister     Thyroid disease Sister     Cancer Brother     Cancer Father     Colon cancer Family     Diabetes Family     Mitral valve prolapse Family     Thyroid cancer Family        Meds/Allergies   Current Outpatient Medications   Medication Sig Dispense Refill    amLODIPine (NORVASC) 5 mg tablet TAKE 1/2 TABLET DAILY      aspirin 81 mg chewable tablet Chew 1 tablet (81 mg total) daily 30 tablet 0    atorvastatin (LIPITOR) 40 mg tablet TAKE 1 TABLET BY MOUTH EVERY DAY 30 tablet 11    calcium carbonate-vitamin D (OSCAL-D) 500 mg-200 units per tablet Take 1 tablet by mouth 2 (two) times a day with meals 60 tablet 0    docusate sodium (COLACE) 100 mg capsule Take 1 capsule (100 mg total) by mouth 2 (two) times a day (Patient taking differently: Take 100 mg by mouth 2 (two) times a day as needed ) 10 capsule 0    ELIQUIS 5 MG TAKE 1 TABLET BY MOUTH TWICE A DAY 60 tablet 5    glucose blood (ONE TOUCH ULTRA TEST) test strip Test 3 times daily 300 each 1    Insulin Disposable Pump (V-GO 20) KIT Use 1 per day 30 kit 2    isosorbide mononitrate (IMDUR) 60 mg 24 hr tablet TAKE 1 TABLET BY MOUTH EVERY DAY AS DIRECTED 90 tablet 3    KLOR-CON M10 10 MEQ tablet TAKE 1 TABLET BY MOUTH EVERY DAY 30 tablet 5    metoprolol tartrate (LOPRESSOR) 25 mg tablet TAKE 1 & 1/2 TABLETS IN THE MORNING, AND 1 TABLET IN THE EVENING (Patient taking differently: No sig reported) 225 tablet 0    NOVOLOG 100 UNIT/ML injection INJECT 100 UNITS DAILY AS DIRECTED 10 mL 0    senna (SENOKOT) 8 6 mg Take 1 tablet (8 6 mg total) by mouth daily (Patient taking differently: Take 1 tablet by mouth daily at bedtime as needed ) 120 each 0    torsemide (DEMADEX) 20 mg tablet Take 1 tablet (20 mg total) by mouth 2 (two) times a day (Patient taking differently: Take 20 mg by mouth daily ) 60 tablet 11     No current facility-administered medications for this visit  Allergies   Allergen Reactions    Ace Inhibitors     Chlorpromazine     Latex     Lisinopril     Namenda [Memantine] Drowsiness    Penicillins Seizures    Propoxyphene     Stadol [Butorphanol]        Objective   Vitals: Blood pressure 122/64, pulse 69, height 5' 5" (1 651 m), weight 103 kg (227 lb)  Physical Exam   Constitutional: She is oriented to person, place, and time  She appears well-developed and well-nourished  No distress  HENT:   Head: Normocephalic and atraumatic  Mouth/Throat: Oropharynx is clear and moist and mucous membranes are normal  No oropharyngeal exudate  Eyes: Conjunctivae, EOM and lids are normal  Right eye exhibits no discharge  Left eye exhibits no discharge  No scleral icterus  Neck: Neck supple  No thyromegaly present  Cardiovascular: Normal rate, regular rhythm and normal heart sounds  Exam reveals no gallop and no friction rub     No murmur heard   Pulmonary/Chest: Effort normal and breath sounds normal  No respiratory distress  She has no wheezes  Abdominal: Soft  Bowel sounds are normal  She exhibits no distension  There is no tenderness  Musculoskeletal: Normal range of motion  She exhibits no edema, tenderness or deformity  Lymphadenopathy:        Head (right side): No occipital adenopathy present  Head (left side): No occipital adenopathy present  Right: No supraclavicular adenopathy present  Left: No supraclavicular adenopathy present  Neurological: She is alert and oriented to person, place, and time  No cranial nerve deficit  Skin: Skin is warm and intact  No rash noted  She is not diaphoretic  No erythema  Psychiatric: She has a normal mood and affect  Her behavior is normal    Vitals reviewed  The history was obtained from the review of the chart, patient      Lab Results:   Lab Results   Component Value Date/Time    Hemoglobin A1C 8 9 (H) 09/20/2019 09:28 AM    Hemoglobin A1C 9 7 (H) 05/08/2019 03:42 AM    Hemoglobin A1C 10 1 (H) 03/03/2019 05:52 PM    WBC 5 14 05/08/2019 03:42 AM    WBC 6 71 05/07/2019 07:24 PM    WBC 6 70 04/05/2019 09:00 AM    Hemoglobin 14 2 05/08/2019 03:42 AM    Hemoglobin 15 0 05/07/2019 07:24 PM    Hemoglobin 14 0 04/05/2019 09:00 AM    Hematocrit 42 3 05/08/2019 03:42 AM    Hematocrit 45 0 05/07/2019 07:24 PM    Hematocrit 43 5 04/05/2019 09:00 AM    MCV 88 05/08/2019 03:42 AM    MCV 88 05/07/2019 07:24 PM    MCV 90 04/05/2019 09:00 AM    Platelets 996 80/23/6795 03:42 AM    Platelets 375 09/16/9129 07:24 PM    Platelets 639 64/26/1833 09:00 AM    BUN 30 (H) 09/20/2019 09:28 AM    BUN 23 05/17/2019 11:45 AM    BUN 50 (H) 05/13/2019 01:26 PM    Potassium 4 1 09/20/2019 09:28 AM    Potassium 3 8 05/17/2019 11:45 AM    Potassium 4 5 05/13/2019 01:26 PM    Chloride 100 09/20/2019 09:28 AM    Chloride 96 (L) 05/17/2019 11:45 AM    Chloride 94 (L) 05/13/2019 01:26 PM    CO2 35 (H) 09/20/2019 09:28 AM    CO2 35 (H) 05/17/2019 11:45 AM    CO2 35 (H) 05/13/2019 01:26 PM    Creatinine 1 14 09/20/2019 09:28 AM    Creatinine 1 11 05/17/2019 11:45 AM    Creatinine 1 64 (H) 05/13/2019 01:26 PM    AST 16 09/20/2019 09:28 AM    AST 25 05/07/2019 07:24 PM    AST 44 (H) 04/05/2019 09:00 AM    ALT 25 09/20/2019 09:28 AM    ALT 38 05/07/2019 07:24 PM    ALT 32 04/05/2019 09:00 AM    Albumin 3 8 09/20/2019 09:28 AM    Albumin 3 9 05/07/2019 07:24 PM    Albumin 4 0 04/05/2019 09:00 AM    HDL, Direct 57 09/20/2019 09:28 AM    Triglycerides 70 09/20/2019 09:28 AM           Imaging Studies: I have personally reviewed pertinent reports  Portions of the record may have been created with voice recognition software  Occasional wrong word or "sound a like" substitutions may have occurred due to the inherent limitations of voice recognition software  Read the chart carefully and recognize, using context, where substitutions have occurred

## 2020-02-20 ENCOUNTER — TELEPHONE (OUTPATIENT)
Dept: ENDOCRINOLOGY | Facility: CLINIC | Age: 85
End: 2020-02-20

## 2020-02-20 NOTE — TELEPHONE ENCOUNTER
----- Message from Sandro Galeano PA-C sent at 2/20/2020 10:54 AM EST -----  A1C 8 4- improving but still high  Thyroid testing stable    Please send the BG readings for

## 2020-03-11 ENCOUNTER — OFFICE VISIT (OUTPATIENT)
Dept: CARDIOLOGY CLINIC | Facility: CLINIC | Age: 85
End: 2020-03-11
Payer: COMMERCIAL

## 2020-03-11 VITALS — HEIGHT: 65 IN | OXYGEN SATURATION: 97 % | HEART RATE: 60 BPM | BODY MASS INDEX: 37.8 KG/M2 | WEIGHT: 226.9 LBS

## 2020-03-11 DIAGNOSIS — I10 ESSENTIAL HYPERTENSION: Primary | ICD-10-CM

## 2020-03-11 DIAGNOSIS — Z95.0 PRESENCE OF PERMANENT CARDIAC PACEMAKER: ICD-10-CM

## 2020-03-11 DIAGNOSIS — I07.1 TRICUSPID VALVE INSUFFICIENCY, UNSPECIFIED ETIOLOGY: ICD-10-CM

## 2020-03-11 DIAGNOSIS — I25.10 3-VESSEL CORONARY ARTERY DISEASE: ICD-10-CM

## 2020-03-11 DIAGNOSIS — I48.91 ATRIAL FIBRILLATION, UNSPECIFIED TYPE (HCC): ICD-10-CM

## 2020-03-11 DIAGNOSIS — Z95.5 HISTORY OF HEART ARTERY STENT: ICD-10-CM

## 2020-03-11 DIAGNOSIS — R60.9 EDEMA, UNSPECIFIED TYPE: ICD-10-CM

## 2020-03-11 DIAGNOSIS — I49.5 SICK SINUS SYNDROME (HCC): ICD-10-CM

## 2020-03-11 DIAGNOSIS — E78.2 MIXED HYPERLIPIDEMIA: ICD-10-CM

## 2020-03-11 DIAGNOSIS — I48.0 PAF (PAROXYSMAL ATRIAL FIBRILLATION) (HCC): ICD-10-CM

## 2020-03-11 DIAGNOSIS — Z95.1 HX OF CABG: ICD-10-CM

## 2020-03-11 PROCEDURE — 4040F PNEUMOC VAC/ADMIN/RCVD: CPT | Performed by: INTERNAL MEDICINE

## 2020-03-11 PROCEDURE — 1036F TOBACCO NON-USER: CPT | Performed by: INTERNAL MEDICINE

## 2020-03-11 PROCEDURE — 99214 OFFICE O/P EST MOD 30 MIN: CPT | Performed by: INTERNAL MEDICINE

## 2020-03-11 PROCEDURE — 1160F RVW MEDS BY RX/DR IN RCRD: CPT | Performed by: INTERNAL MEDICINE

## 2020-03-11 PROCEDURE — 3008F BODY MASS INDEX DOCD: CPT | Performed by: INTERNAL MEDICINE

## 2020-03-11 PROCEDURE — 2022F DILAT RTA XM EVC RTNOPTHY: CPT | Performed by: INTERNAL MEDICINE

## 2020-03-11 RX ORDER — TORSEMIDE 20 MG/1
20 TABLET ORAL DAILY
COMMUNITY
End: 2022-08-01 | Stop reason: SDUPTHER

## 2020-03-11 NOTE — PROGRESS NOTES
Follow-up - Cardiology   Judie Pierson 82 y o  female MRN: [id number]        Problems    Problem List Items Addressed This Visit        Cardiovascular and Mediastinum    Essential hypertension - Primary (Chronic)    Relevant Medications    torsemide (DEMADEX) 20 mg tablet    metoprolol tartrate (LOPRESSOR) 25 mg tablet    3-vessel coronary artery disease    Relevant Medications    metoprolol tartrate (LOPRESSOR) 25 mg tablet    PAF (paroxysmal atrial fibrillation) (HCC)    Relevant Medications    metoprolol tartrate (LOPRESSOR) 25 mg tablet       Other    Hyperlipidemia      Other Visit Diagnoses     Presence of permanent cardiac pacemaker        Atrial fibrillation, unspecified type (HCC)        Relevant Medications    metoprolol tartrate (LOPRESSOR) 25 mg tablet    Edema, unspecified type        Sick sinus syndrome (HCC)        Relevant Medications    metoprolol tartrate (LOPRESSOR) 25 mg tablet    Hx of CABG        Tricuspid valve insufficiency, unspecified etiology        Relevant Medications    metoprolol tartrate (LOPRESSOR) 25 mg tablet    History of heart artery stent                Plan patient seen March 11, 2020  Blood pressure is on the low side  On discontinue Isordil and the amlodipine  Bypass surgery 1999  Stent 2006  Catheterization 2008 without intervention  Pacemaker 2004  Paroxysmal atrial fibrillation on pacer interrogation  On Eliquis  Also on aspirin because of stent  She is not following  If she has any following issues may discontinue the aspirin as well  She has left bundle  She has diabetes last A1c 6 4  She tricuspid regurgitation fairly severe but she is edema free present medication  His other beta-blocker for ventricular tachycardia noted in the past   Hyperlipidemia is well controlled LDL 62  Fortunately her creatinine is only 1 point 1 4 with A1c is 68 4  She is on 20 of torsemide daily  The changes in medication are as noted above    Consideration the past possibly stopping the aspirin  However, she has stents that she will she has old bypasses and severe coronary disease          HPI: Geovani Callaway is a 82y o  year old female HPI: Geovani Callaway is a 82y o  year old female    HPI: Janeth Crawford is a 82y o  year old female HPI: Janeth Crawford is D 89 y o  year old female    Patient seen 2017  Her diagnosis includes bypass surgery  stent in , catheterization , hyperlipidemia, pacemaker , dementia, preserved left ventricular function, and diabetic neuropathy  She had short burst of ventricular tachycardia pacer interrogation  Metoprolol to be increased to 37 5 in the morning and 25 in the p m         Patient seen 2018  All scripts HPI noted above  First epic visit  Issues are as follows  Bypass surgery   Stent in 0 6  Catheterization 0 8 without intervention  Hyperlipidemia  Pacemaker   Diabetes  Left ventricular function well preserved  Diabetes  Past history ventricular tachycardia  Recent admission with edema and she was given a diagnosis of congestive heart failure   Appears to me when I review the record is primarily pulmonary issue with lot of bronchospasm   She is treated both diuretics and steroids as well as bronchodilators  Right bundle branch block pattern with blood relatively wide QRS  Echocardiogram shows the following-increased right ventricle-+3 tricuspid regurgitation-large right atrium-dilated inferior vena cava  Her NT was Wilhemenia Peeling  He is presently on 60 of Lasix which was an increase done by hand visiting nurse  We will check her labs carefully   She is edema free at this time and her lungs are clear        Patient seen 2018  Diagnosis above  LDL is 44  A1c is 8 3  She has no edema   She has no bronchospasm   From cardiac standpoint she is asymptomatic  No change in medication         Patient seen 2019  Diagnosis above  The issues are as follows    Her  has  in the past several months  She has short episode of atrial fibrillation pacemaker interrogation   This has to be followed  Matt Morrow is not on anticoagulation  Her edema is well controlled  Will check her hemoglobin A1c         Patient seen May 27, 2019  Refer to my plan above   Eliquis will be started because of pacemaker interrogation showing significant amount of atrial fibrillation and she has deteriorated with regard to increasing edema   She may need to be admitted on the next visit if my plan does not work        Patient seen April 8, 2019  New  Refer to plan above  Norvasc decreased to 5 daily  Family will keep track of her weight   This is her dry weight   Dry weight therefore the office is 225 lb   She had been up 20-25 lb when I last saw her  Matt Morrow has diuresed in the hospital            Review of Systems   Constitutional: Positive for fatigue  Respiratory: Negative  Cardiovascular: Negative  Past Medical History:   Diagnosis Date    Arthritis     Asthma     CAD (coronary artery disease) of artery bypass graft     Cardiac disease     CHF (congestive heart failure) (HCC)     Dementia (HCC)     Depression     [facility] [address] [facility]     Heart attack ([facility] [address] [facility]     Hyperlipidemia     Hypertension     MI [facility]ocardial infarction) [facility] [address] [facility]     Neuropathy     BRANT (obstructive sleep apnea)     Peptic ulcer     was + for H pylori    Transient cerebral ischemia 11/2011    with neg CUS and ECHO     Social History     Substance and Sexual Activity   Alcohol Use No    Frequency: Never    Drinks per session: Patient refused    Binge frequency: Never     Social History     Substance and Sexual Activity   Drug Use No     Social History     Tobacco Use   Smoking Status Never Smoker   Smokeless Tobacco Never Used       Allergies:   Allergies   Allergen Reactions    Ace Inhibitors     Chlorpromazine     Latex     Lisinopril     Namenda [Memantine] Drowsiness    Penicillins Seizures    Propoxyphene     Stadol [Butorphanol]        Medications:     Current Outpatient Medications:     aspirin 81 mg chewable tablet, Chew 1 tablet (81 mg total) daily, Disp: 30 tablet, Rfl: 0    atorvastatin (LIPITOR) 40 mg tablet, TAKE 1 TABLET BY MOUTH EVERY DAY, Disp: 30 tablet, Rfl: 11    calcium carbonate-vitamin D (OSCAL-D) 500 mg-200 units per tablet, Take 1 tablet by mouth 2 (two) times a day with meals, Disp: 60 tablet, Rfl: 0    docusate sodium (COLACE) 100 mg capsule, Take 1 capsule (100 mg total) by mouth 2 (two) times a day (Patient taking differently: Take 100 mg by mouth 2 (two) times a day as needed ), Disp: 10 capsule, Rfl: 0    ELIQUIS 5 MG, TAKE 1 TABLET BY MOUTH TWICE A DAY, Disp: 60 tablet, Rfl: 5    glucose blood (ONE TOUCH ULTRA TEST) test strip, Test 3 times daily, Disp: 300 each, Rfl: 1    insulin aspart (NovoLOG) 100 units/mL injection, Inject 50 Units under the skin daily As directed, Disp: 20 mL, Rfl: 3    Insulin Disposable Pump (V-GO 20) KIT, Use 1 per day, Disp: 3 kit, Rfl: 3    KLOR-CON M10 10 MEQ tablet, TAKE 1 TABLET BY MOUTH EVERY DAY, Disp: 30 tablet, Rfl: 5    metoprolol tartrate (LOPRESSOR) 25 mg tablet, Take 25 mg by mouth every 12 (twelve) hours, Disp: , Rfl:     torsemide (DEMADEX) 20 mg tablet, Take 20 mg by mouth daily, Disp: , Rfl:     senna (SENOKOT) 8 6 mg, Take 1 tablet (8 6 mg total) by mouth daily (Patient taking differently: Take 1 tablet by mouth daily at bedtime as needed ), Disp: 120 each, Rfl: 0      Physical Exam   Constitutional: She appears well-developed and well-nourished  No distress  Cardiovascular: Normal rate and regular rhythm  Exam reveals no friction rub  No murmur heard  Pulmonary/Chest: Effort normal and breath sounds normal  No respiratory distress  Musculoskeletal: Normal range of motion  She exhibits no edema, tenderness or deformity  Neurological: She is alert  Skin: Skin is warm and dry  No rash noted   She is not diaphoretic  There is pallor  She has a small hematoma on her right arm  Laboratory Studies:  CMP:      Invalid input(s): ALBUMIN  NT-proBNP:   Lab Results   Component Value Date    NTBNP 241 2020      Coags:    Lipid Profile:   Lab Results   Component Value Date    CHOL 144 2015     Lab Results   Component Value Date    HDL 57 2019     Lab Results   Component Value Date    LDLCALC 37 2019     Lab Results   Component Value Date    TRIG 70 2019       Cardiac testing:     EKG reviewed personally:     Results for orders placed during the hospital encounter of 19   Echo complete with contrast if indicated    Narrative [address]  [address] [place] [address]  [phone]    Transthoracic Echocardiogram  2D, M-mode, Doppler, and Color Doppler    Study date:  28-Mar-2019    Patient: Severiano Stark  MR number: [id number]  Account number: [de-identified]  : 1935  Age: 82 years  Gender: Female  Status: Outpatient  Location: Bedside  Height: 68 in  Weight: 244 lb  BP: 128/ 66 mmHg    Indications: Heart Failure    Diagnoses: I50 9 - Heart failure, unspecified    Sonographer:  VANI Mayer  Interpreting Physician:  Garland Garcia MD  Primary Physician:  Chery Kim MD  Referring Physician:  Brissa Kessler MD  Group:  [facility] [id number]  Cardiology Fellow:  Nghia Tierney MD    SUMMARY    LEFT VENTRICLE:  Systolic function was normal  Ejection fraction was estimated to be 60 %  There were no regional wall motion abnormalities  There was no evidence of concentric hypertrophy  VENTRICULAR SEPTUM:  There was dyssynergic motion  These changes are consistent with RV volume overload  RIGHT VENTRICLE:  The ventricle was mildly to moderately dilated  Wall thickness was increased  LEFT ATRIUM:  The atrium was mildly dilated  RIGHT ATRIUM:  The atrium was moderately dilated      MITRAL VALVE:  There was mild regurgitation  AORTIC VALVE:  The valve was trileaflet  Leaflets exhibited moderately increased thickness, mild calcification, and normal cuspal separation  There was trace regurgitation  TRICUSPID VALVE:  There was severe regurgitation  IVC, HEPATIC VEINS:  The inferior vena cava was dilated  Respirophasic changes were blunted (less than 50% variation)  HISTORY: PRIOR HISTORY: HTN, HLD, SSS, CHF, CAD    PROCEDURE: The procedure was performed at the bedside  This was a routine study  The transthoracic approach was used  The study included complete 2D imaging, M-mode, complete spectral Doppler, and color Doppler  The heart rate was 60 bpm,  at the start of the study  Images were obtained from the parasternal, apical, subcostal, and suprasternal notch acoustic windows  This was a technically difficult study  LEFT VENTRICLE: Size was normal  Systolic function was normal  Ejection fraction was estimated to be 60 %  There were no regional wall motion abnormalities  Wall thickness was normal  There was no evidence of concentric hypertrophy  DOPPLER: Left ventricular diastolic function parameters were abnormal     VENTRICULAR SEPTUM: There was dyssynergic motion  These changes are consistent with RV volume overload  RIGHT VENTRICLE: The ventricle was mildly to moderately dilated  Systolic function was low normal  Wall thickness was increased  A pacing wire was present  LEFT ATRIUM: The atrium was mildly dilated  RIGHT ATRIUM: The atrium was moderately dilated  A pacing wire was present  MITRAL VALVE: Valve structure was normal  There was normal leaflet separation  DOPPLER: The transmitral velocity was within the normal range  There was no evidence for stenosis  There was mild regurgitation  AORTIC VALVE: The valve was trileaflet  Leaflets exhibited moderately increased thickness, mild calcification, and normal cuspal separation  DOPPLER: Transaortic velocity was minimally increased  There was no evidence for stenosis  There  was trace regurgitation  TRICUSPID VALVE: The valve structure was normal  There was normal leaflet separation  DOPPLER: The transtricuspid velocity was within the normal range  There was no evidence for stenosis  There was severe regurgitation  PULMONIC VALVE: Leaflets exhibited normal thickness, no calcification, and normal cuspal separation  DOPPLER: The transpulmonic velocity was within the normal range  There was no significant regurgitation  PERICARDIUM: There was no pericardial effusion  The pericardium was normal in appearance  AORTA: The root exhibited normal size  The ascending aorta was normal in size, when indexed for body surface area, measuring 3 8 cm (or 1 7 cm/m2 of body surface area)  SYSTEMIC VEINS: IVC: The inferior vena cava was dilated  Respirophasic changes were blunted (less than 50% variation)  SYSTEM MEASUREMENT TABLES    2D  %FS: 28 94 %  Ao Diam: 3 14 cm  EDV(Teich): 75 97 ml  EF(Teich): 56 07 %  ESV(Teich): 33 38 ml  IVSd: 1 01 cm  LA Area: 19 22 cm2  LA Diam: 3 06 cm  LVEDV MOD A4C: 50 04 ml  LVEF MOD A4C: 54 94 %  LVESV MOD A4C: 22 55 ml  LVIDd: 4 14 cm  LVIDs: 2 94 cm  LVLd A4C: 6 96 cm  LVLs A4C: 6 72 cm  LVOT Diam: 1 89 cm  LVPWd: 1 01 cm  RA Area: 26 01 cm2  RVIDd: 4 37 cm  SV MOD A4C: 27 49 ml  SV(Teich): 42 6 ml    CW  AV Env  Ti: 338 73 ms  AV VTI: 36 28 cm  AV Vmax: 1 57 m/s  AV Vmean: 1 07 m/s  AV maxP 84 mmHg  AV meanP 31 mmHg  TR Vmax: 1 9 m/s  TR maxP 5 mmHg    MM  TAPSE: 1 61 cm    PW  NESTOR (VTI): 1 12 cm2  NESTOR Vmax: 1 21 cm2  E': 0 06 m/s  E/E': 20 03  LVOT Env  Ti: 312 08 ms  LVOT VTI: 14 38 cm  LVOT Vmax: 0 67 m/s  LVOT Vmean: 0 46 m/s  LVOT maxP 81 mmHg  LVOT meanP 99 mmHg  LVSV Dopp: 40 46 ml  MV A Carroll: 0 29 m/s  MV Dec Sabine: 5 23 m/s2  MV DecT: 219 85 ms  MV E Carroll: 1 15 m/s  MV E/A Ratio: 3 9  MV PHT: 63 76 ms  MVA By PHT: 3 45 cm2    [employer] [facility]    Prepared and electronically signed by    Rashaad Chacon MD  Signed 28-Mar-2019 18:13:19       No results found for this or any previous visit  No results found for this or any previous visit  No results found for this or any previous visit  Dean Baltazar MD    Portions of the record may have been created with voice recognition software   Occasional wrong word or "sound a like" substitutions may have occurred due to the inherent limitations of voice recognition software   Read the chart carefully and recognize, using context, where substitutions have occurred

## 2020-03-16 DIAGNOSIS — I48.0 PAF (PAROXYSMAL ATRIAL FIBRILLATION) (HCC): ICD-10-CM

## 2020-03-17 RX ORDER — POTASSIUM CHLORIDE 750 MG/1
TABLET, EXTENDED RELEASE ORAL
Qty: 90 TABLET | Refills: 1 | Status: SHIPPED | OUTPATIENT
Start: 2020-03-17 | End: 2020-09-11

## 2020-03-18 DIAGNOSIS — Z79.4 TYPE 2 DIABETES MELLITUS WITH HYPERGLYCEMIA, WITH LONG-TERM CURRENT USE OF INSULIN (HCC): ICD-10-CM

## 2020-03-18 DIAGNOSIS — E11.65 TYPE 2 DIABETES MELLITUS WITH HYPERGLYCEMIA, WITH LONG-TERM CURRENT USE OF INSULIN (HCC): ICD-10-CM

## 2020-03-18 NOTE — TELEPHONE ENCOUNTER
pts daughter called and said that her mom is going to be out of insulin tomorrow and pharmacy wont fill they said a new script with increase so she could get 3 vials would be better for her

## 2020-03-19 ENCOUNTER — TELEPHONE (OUTPATIENT)
Dept: FAMILY MEDICINE CLINIC | Facility: CLINIC | Age: 85
End: 2020-03-19

## 2020-03-19 DIAGNOSIS — F41.9 ANXIETY: Primary | ICD-10-CM

## 2020-03-19 RX ORDER — BUSPIRONE HYDROCHLORIDE 5 MG/1
5 TABLET ORAL 2 TIMES DAILY
Qty: 60 TABLET | Refills: 0 | Status: SHIPPED | OUTPATIENT
Start: 2020-03-19 | End: 2020-04-10

## 2020-03-19 NOTE — TELEPHONE ENCOUNTER
Pt's daughter Dilip Diana called in to say her mother is experiencing a lot of anxiety with the virus going around  She is asking if the doctor can give her something for her anxiety  [facility]""  Please advise

## 2020-04-01 ENCOUNTER — REMOTE DEVICE CLINIC VISIT (OUTPATIENT)
Dept: CARDIOLOGY CLINIC | Facility: CLINIC | Age: 85
End: 2020-04-01
Payer: COMMERCIAL

## 2020-04-01 DIAGNOSIS — Z95.0 PACEMAKER: Primary | ICD-10-CM

## 2020-04-01 PROCEDURE — 93294 REM INTERROG EVL PM/LDLS PM: CPT | Performed by: INTERNAL MEDICINE

## 2020-04-01 PROCEDURE — 93296 REM INTERROG EVL PM/IDS: CPT | Performed by: INTERNAL MEDICINE

## 2020-04-05 DIAGNOSIS — E78.5 HYPERLIPIDEMIA, UNSPECIFIED HYPERLIPIDEMIA TYPE: ICD-10-CM

## 2020-04-06 RX ORDER — ATORVASTATIN CALCIUM 40 MG/1
TABLET, FILM COATED ORAL
Qty: 90 TABLET | Refills: 3 | Status: SHIPPED | OUTPATIENT
Start: 2020-04-06 | End: 2021-04-13

## 2020-04-10 DIAGNOSIS — F41.9 ANXIETY: ICD-10-CM

## 2020-04-10 RX ORDER — BUSPIRONE HYDROCHLORIDE 5 MG/1
TABLET ORAL
Qty: 60 TABLET | Refills: 5 | Status: SHIPPED | OUTPATIENT
Start: 2020-04-10 | End: 2020-07-07 | Stop reason: ALTCHOICE

## 2020-04-16 DIAGNOSIS — E11.65 TYPE 2 DIABETES MELLITUS WITH HYPERGLYCEMIA, WITH LONG-TERM CURRENT USE OF INSULIN (HCC): ICD-10-CM

## 2020-04-16 DIAGNOSIS — Z79.4 TYPE 2 DIABETES MELLITUS WITH HYPERGLYCEMIA, WITH LONG-TERM CURRENT USE OF INSULIN (HCC): ICD-10-CM

## 2020-05-06 DIAGNOSIS — I10 ESSENTIAL HYPERTENSION: ICD-10-CM

## 2020-05-06 RX ORDER — AMLODIPINE BESYLATE 5 MG/1
TABLET ORAL
Qty: 180 TABLET | Refills: 3 | Status: SHIPPED | OUTPATIENT
Start: 2020-05-06 | End: 2020-07-07 | Stop reason: ALTCHOICE

## 2020-05-26 DIAGNOSIS — E11.65 UNCONTROLLED TYPE 2 DIABETES MELLITUS WITH HYPERGLYCEMIA (HCC): ICD-10-CM

## 2020-06-11 DIAGNOSIS — I48.91 ATRIAL FIBRILLATION, UNSPECIFIED TYPE (HCC): ICD-10-CM

## 2020-06-16 RX ORDER — APIXABAN 5 MG/1
TABLET, FILM COATED ORAL
Qty: 60 TABLET | Refills: 5 | Status: SHIPPED | OUTPATIENT
Start: 2020-06-16 | End: 2020-12-15

## 2020-07-01 ENCOUNTER — REMOTE DEVICE CLINIC VISIT (OUTPATIENT)
Dept: CARDIOLOGY CLINIC | Facility: CLINIC | Age: 85
End: 2020-07-01
Payer: COMMERCIAL

## 2020-07-01 DIAGNOSIS — Z95.0 PACEMAKER: Primary | ICD-10-CM

## 2020-07-01 PROCEDURE — 93296 REM INTERROG EVL PM/IDS: CPT | Performed by: INTERNAL MEDICINE

## 2020-07-01 PROCEDURE — 93294 REM INTERROG EVL PM/LDLS PM: CPT | Performed by: INTERNAL MEDICINE

## 2020-07-02 NOTE — PROGRESS NOTES
Results for orders placed or performed in visit on 07/01/20   Cardiac EP device report    Narrative    MDT- DUAL CHAMBER PPM (AAIR-DDDR MODE)  CARELINK TRANSMISSION: BATTERY ADEQUATE (3-3 5 YRS)  AP 77%;  8%  ALL AVAILABLE LEAD PARAMETERS WITHIN NORMAL LIMITS  AF 9% (LONGEST EPISODE 15:50 HRS/EPISODES LISTED OCURRING FROM 6/9-6/11)  NO A/C- PER DR ESTEVEZ TO MONITOR DUE TO NO A/C THERAPY  SENT TO DR Talavera 12 REVIEW  NO VHR EPISODES  NORMAL DEVICE FUNCTION   PL

## 2020-07-07 ENCOUNTER — TELEMEDICINE (OUTPATIENT)
Dept: ENDOCRINOLOGY | Facility: CLINIC | Age: 85
End: 2020-07-07
Payer: COMMERCIAL

## 2020-07-07 DIAGNOSIS — I10 ESSENTIAL HYPERTENSION: Chronic | ICD-10-CM

## 2020-07-07 DIAGNOSIS — IMO0002 DM (DIABETES MELLITUS), TYPE 2, UNCONTROLLED W/OPHTHALMIC COMPLICATION: Primary | ICD-10-CM

## 2020-07-07 DIAGNOSIS — Z79.4 TYPE 2 DIABETES MELLITUS WITH HYPERGLYCEMIA, WITH LONG-TERM CURRENT USE OF INSULIN (HCC): ICD-10-CM

## 2020-07-07 DIAGNOSIS — E11.65 TYPE 2 DIABETES MELLITUS WITH HYPERGLYCEMIA, WITH LONG-TERM CURRENT USE OF INSULIN (HCC): ICD-10-CM

## 2020-07-07 PROCEDURE — 99442 PR PHYS/QHP TELEPHONE EVALUATION 11-20 MIN: CPT | Performed by: PHYSICIAN ASSISTANT

## 2020-07-07 NOTE — PROGRESS NOTES
Virtual Brief Visit    Assessment/Plan:    Problem List Items Addressed This Visit        Endocrine    DM (diabetes mellitus), type 2, uncontrolled w/ophthalmic complication ([facility] [address] ) - Primary     Poorly controlled/not at goal A1C 8 4  Complete lab testing as ordered at last visit- provided info for mobile lab as an option if she is not comfortable going to lab  Her daughter is reporting elevation of Pre-dinner glucose readings  Increase lunch dose of insulin from 2 clicks to 3 clicks (6 units)  Send Glucose log for review  Her daughter will plan to call dexcom back regarding starting personal CGM  Lab Results   Component Value Date    HGBA1C 8 4 (H) 02/19/2020               Cardiovascular and Mediastinum    Essential hypertension (Chronic)     This was well controlled at 2/19 office visit  Amlodipine has recently been discontinued  Continue current regimen  Other Visit Diagnoses     Type 2 diabetes mellitus with hyperglycemia, with long-term current use of insulin ([facility] )                    Reason for visit is   Chief Complaint   Patient presents with    Virtual Brief Visit        Encounter provider Uriel Patel PA-C    Provider located at [address] [place] [id number]    Recent Visits  Date Type Provider Dept   07/07/20 Telemedicine Uriel Patel PA-C [facility]   Showing recent visits within past 7 days and meeting all other requirements     Future Appointments  No visits were found meeting these conditions  Showing future appointments within next 150 days and meeting all other requirements        After connecting through telephone, the patient was identified by name and date of birth  Jillian Gaston was informed that this is a telemedicine visit and that the visit is being conducted through telephone  My office door was closed  No one else was in the room  She acknowledged consent and understanding of privacy and security of the platform  The patient has agreed to participate and understands she can discontinue the visit at any time  Patient is aware this is a billable service  John Caldwell is a 82 y o  female with history of Type 2 Diabetes  Most of the history is obtained from her daughter  For the Diabetes she is using V-Go 20 and taking 2 clicks (4 units)  before meals  She is reporting morning blood sugars 100-130s, before dinner high often 300s  She is UTD on eye/foot exam   Since last visit, her daughter did speak with someone about dexcom but need to call them back, this has been on hold due to [place]  Hasn't gone to labs either due to COVID  For HTN, she is taking metoprolol  She has been off the amlodipine  For hyperlipidemia, taking atorvastatin         HPI     Past Medical History:   Diagnosis Date    Arthritis     Asthma     CAD (coronary artery disease) of artery bypass graft     Cardiac disease     CHF (congestive heart failure) (HCC)     Dementia (HCC)     Depression     Diabetes mellitus ([address] )     Heart attack ([address] )     Hyperlipidemia     Hypertension     MI [facility]myocardial infarction) ([facility] [address] )     Neuropathy     BRANT (obstructive sleep apnea)     Peptic ulcer     was + for H pylori    Transient cerebral ischemia 2011    with neg CUS and ECHO       Past Surgical History:   Procedure Laterality Date    APPENDECTOMY      CATARACT EXTRACTION       SECTION      COLONOSCOPY  2004    CORONARY ANGIOPLASTY  2006    CORONARY ANGIOPLASTY WITH STENT PLACEMENT      CORONARY ARTERY BYPASS GRAFT      DENTAL SURGERY      EGD AND COLONOSCOPY      ELBOW SURGERY      resolved     ESOPHAGOGASTRODUODENOSCOPY         Current Outpatient Medications   Medication Sig Dispense Refill    atorvastatin (LIPITOR) 40 mg tablet TAKE 1 TABLET BY MOUTH EVERY DAY 90 tablet 3    calcium carbonate-vitamin D (OSCAL-D) 500 mg-200 units per tablet Take 1 tablet by mouth 2 (two) times a day with meals 60 tablet 0    docusate sodium (COLACE) 100 mg capsule Take 1 capsule (100 mg total) by mouth 2 (two) times a day (Patient taking differently: Take 100 mg by mouth 2 (two) times a day as needed ) 10 capsule 0    ELIQUIS 5 MG TAKE 1 TABLET BY MOUTH TWICE A DAY 60 tablet 5    glucose blood test strip USE AS DIRECTED 3 TIMES A  each 1    Insulin Disposable Pump (V-GO 20) KIT Use 1 per day 3 kit 3    KLOR-CON M10 10 MEQ tablet TAKE 1 TABLET BY MOUTH EVERY DAY 90 tablet 1    metoprolol tartrate (LOPRESSOR) 25 mg tablet Take 25 mg by mouth every 12 (twelve) hours      NOVOLOG 100 UNIT/ML injection INJECT 100 UNITS DAILY AS DIRECTED 10 mL 0    senna (SENOKOT) 8 6 mg Take 1 tablet (8 6 mg total) by mouth daily (Patient taking differently: Take 1 tablet by mouth daily at bedtime as needed ) 120 each 0    torsemide (DEMADEX) 20 mg tablet Take 20 mg by mouth daily       No current facility-administered medications for this visit  Allergies   Allergen Reactions    Ace Inhibitors     Chlorpromazine     Latex     Lisinopril     Namenda [Memantine] Drowsiness    Penicillins Seizures    Propoxyphene     Stadol [Butorphanol]        Review of Systems   Constitutional: Negative for activity change, appetite change, chills, diaphoresis, fatigue, fever and unexpected weight change  HENT: Negative for trouble swallowing and voice change  Eyes: Negative for visual disturbance  Respiratory: Negative for shortness of breath  Cardiovascular: Negative for chest pain and palpitations  Gastrointestinal: Negative for abdominal pain, constipation and diarrhea  Endocrine: Negative for cold intolerance, heat intolerance, polydipsia, polyphagia and polyuria  Genitourinary: Negative for frequency and menstrual problem     Musculoskeletal: Negative for arthralgias and myalgias  Skin: Negative for rash  Allergic/Immunologic: Negative for food allergies  Neurological: Negative for dizziness and tremors  Hematological: Negative for adenopathy  Psychiatric/Behavioral: Negative for sleep disturbance  All other systems reviewed and are negative  There were no vitals filed for this visit  Component      Latest Ref Rng & Units 9/20/2019 9/20/2019 9/20/2019           9:28 AM  9:28 AM  9:28 AM   Sodium      136 - 145 mmol/L  140    Potassium      3 5 - 5 3 mmol/L  4 1    Chloride      100 - 108 mmol/L  100    CO2      21 - 32 mmol/L  35 (H)    Anion Gap      4 - 13 mmol/L  5    BUN      5 - 25 mg/dL  30 (H)    Creatinine      0 60 - 1 30 mg/dL  1 14    GLUCOSE FASTING      65 - 99 mg/dL  147 (H)    Calcium      8 3 - 10 1 mg/dL  9 1    AST      5 - 45 U/L  16    ALT      12 - 78 U/L  25    Alkaline Phosphatase      46 - 116 U/L  96    Total Protein      6 4 - 8 2 g/dL  6 8    Albumin      3 5 - 5 0 g/dL  3 8    TOTAL BILIRUBIN      0 20 - 1 00 mg/dL  0 61    eGFR      ml/min/1 73sq m  45    Cholesterol      50 - 200 mg/dL   108   Triglycerides      <=150 mg/dL   70   HDL      40 - 60 mg/dL   57   LDL Calculated      0 - 100 mg/dL   37   EXT Creatinine Urine      mg/dL      MICROALBUM ,U,RANDOM      0 0 - 20 0 mg/L      MICROALBUMIN/CREATININE RATIO      0 - 30 mg/g creatinine      Hemoglobin A1C      Normal 3 8-5 6%; PreDiabetic 5 7-6 4%;  Diabetic >=6 5%; Glycemic control for adults with diabetes <7 0% % 8 9 (H)     eAG, EST AVG Glucose      mg/dl 209     TSH 3RD GENERATON      0 358 - 3 740 uIU/mL      Free T4      0 76 - 1 46 ng/dL        Component      Latest Ref Rng & Units 9/20/2019 9/20/2019 9/20/2019           9:28 AM  9:28 AM  9:28 AM   Sodium      136 - 145 mmol/L      Potassium      3 5 - 5 3 mmol/L      Chloride      100 - 108 mmol/L      CO2      21 - 32 mmol/L      Anion Gap      4 - 13 mmol/L      BUN      5 - 25 mg/dL      Creatinine      0 60 - 1 30 mg/dL GLUCOSE FASTING      65 - 99 mg/dL      Calcium      8 3 - 10 1 mg/dL      AST      5 - 45 U/L      ALT      12 - 78 U/L      Alkaline Phosphatase      46 - 116 U/L      Total Protein      6 4 - 8 2 g/dL      Albumin      3 5 - 5 0 g/dL      TOTAL BILIRUBIN      0 20 - 1 00 mg/dL      eGFR      ml/min/1 73sq m      Cholesterol      50 - 200 mg/dL      Triglycerides      <=150 mg/dL      HDL      40 - 60 mg/dL      LDL Calculated      0 - 100 mg/dL      EXT Creatinine Urine      mg/dL   60 2   MICROALBUM ,U,RANDOM      0 0 - 20 0 mg/L   12 1   MICROALBUMIN/CREATININE RATIO      0 - 30 mg/g creatinine   20   Hemoglobin A1C      Normal 3 8-5 6%; PreDiabetic 5 7-6 4%; Diabetic >=6 5%; Glycemic control for adults with diabetes <7 0% %      eAG, EST AVG Glucose      mg/dl      TSH 3RD GENERATON      0 358 - 3 740 uIU/mL 5 350 (H)     Free T4      0 76 - 1 46 ng/dL  1 08      Component      Latest Ref Rng & Units 2/19/2020 2/19/2020 2/19/2020          10:27 AM 10:27 AM 10:27 AM   Sodium      136 - 145 mmol/L 140     Potassium      3 5 - 5 3 mmol/L 3 9     Chloride      100 - 108 mmol/L 103     CO2      21 - 32 mmol/L 31     Anion Gap      4 - 13 mmol/L 6     BUN      5 - 25 mg/dL 34 (H)     Creatinine      0 60 - 1 30 mg/dL 1 17     GLUCOSE FASTING      65 - 99 mg/dL 198 (H)     Calcium      8 3 - 10 1 mg/dL 9 5     AST      5 - 45 U/L 23     ALT      12 - 78 U/L 41     Alkaline Phosphatase      46 - 116 U/L 97     Total Protein      6 4 - 8 2 g/dL 6 5     Albumin      3 5 - 5 0 g/dL 3 4 (L)     TOTAL BILIRUBIN      0 20 - 1 00 mg/dL 0 56     eGFR      ml/min/1 73sq m 43     Cholesterol      50 - 200 mg/dL      Triglycerides      <=150 mg/dL      HDL      40 - 60 mg/dL      LDL Calculated      0 - 100 mg/dL      EXT Creatinine Urine      mg/dL      MICROALBUM ,U,RANDOM      0 0 - 20 0 mg/L      MICROALBUMIN/CREATININE RATIO      0 - 30 mg/g creatinine      Hemoglobin A1C      Normal 3 8-5 6%;  PreDiabetic 5 7-6 4%; Diabetic >=6 5%; Glycemic control for adults with diabetes <7 0% %      eAG, EST AVG Glucose      mg/dl      TSH 3RD GENERATON      0 358 - 3 740 uIU/mL  5 520 (H)    Free T4      0 76 - 1 46 ng/dL   1 15     Component      Latest Ref Rng & Units 2/19/2020          10:27 AM   Sodium      136 - 145 mmol/L    Potassium      3 5 - 5 3 mmol/L    Chloride      100 - 108 mmol/L    CO2      21 - 32 mmol/L    Anion Gap      4 - 13 mmol/L    BUN      5 - 25 mg/dL    Creatinine      0 60 - 1 30 mg/dL    GLUCOSE FASTING      65 - 99 mg/dL    Calcium      8 3 - 10 1 mg/dL    AST      5 - 45 U/L    ALT      12 - 78 U/L    Alkaline Phosphatase      46 - 116 U/L    Total Protein      6 4 - 8 2 g/dL    Albumin      3 5 - 5 0 g/dL    TOTAL BILIRUBIN      0 20 - 1 00 mg/dL    eGFR      ml/min/1 73sq m    Cholesterol      50 - 200 mg/dL    Triglycerides      <=150 mg/dL    HDL      40 - 60 mg/dL    LDL Calculated      0 - 100 mg/dL    EXT Creatinine Urine      mg/dL    MICROALBUM ,U,RANDOM      0 0 - 20 0 mg/L    MICROALBUMIN/CREATININE RATIO      0 - 30 mg/g creatinine    Hemoglobin A1C      Normal 3 8-5 6%; PreDiabetic 5 7-6 4%; Diabetic >=6 5%; Glycemic control for adults with diabetes <7 0% % 8 4 (H)   eAG, EST AVG Glucose      mg/dl 194   TSH 3RD GENERATON      0 358 - 3 740 uIU/mL    Free T4      0 76 - 1 46 ng/dL      As a result of this visit, I have not referred the patient for further respiratory evaluation  I spent 15 minutes directly with the patient during this visit    Sarai Suero acknowledges that she has consented to an online visit or consultation  She understands that the online visit is based solely on information provided by her, and that, in the absence of a face-to-face physical evaluation by the physician, the diagnosis she receives is both limited and provisional in terms of accuracy and completeness   This is not intended to replace a full medical face-to-face evaluation by the physician  Maya García understands and accepts these terms

## 2020-07-08 NOTE — ASSESSMENT & PLAN NOTE
This was well controlled at 2/19 office visit  Amlodipine has recently been discontinued  Continue current regimen

## 2020-07-08 NOTE — ASSESSMENT & PLAN NOTE
Poorly controlled/not at goal A1C 8 4  Complete lab testing as ordered at last visit- provided info for mobile lab as an option if she is not comfortable going to lab  Her daughter is reporting elevation of Pre-dinner glucose readings  Increase lunch dose of insulin from 2 clicks to 3 clicks (6 units)  Send Glucose log for review  Her daughter will plan to call dexcom back regarding starting personal CGM     Lab Results   Component Value Date    HGBA1C 8 4 (H) 02/19/2020

## 2020-07-22 DIAGNOSIS — I47.2 VENTRICULAR TACHYCARDIA (HCC): Primary | ICD-10-CM

## 2020-07-22 DIAGNOSIS — I48.0 PAF (PAROXYSMAL ATRIAL FIBRILLATION) (HCC): ICD-10-CM

## 2020-07-22 DIAGNOSIS — I10 ESSENTIAL HYPERTENSION: Chronic | ICD-10-CM

## 2020-08-04 DIAGNOSIS — J45.21 MILD INTERMITTENT ASTHMA WITH ACUTE EXACERBATION: ICD-10-CM

## 2020-08-04 RX ORDER — ALBUTEROL SULFATE 2.5 MG/3ML
SOLUTION RESPIRATORY (INHALATION)
Qty: 150 ML | Refills: 1 | Status: SHIPPED | OUTPATIENT
Start: 2020-08-04 | End: 2022-08-01 | Stop reason: ALTCHOICE

## 2020-08-20 DIAGNOSIS — E11.65 TYPE 2 DIABETES MELLITUS WITH HYPERGLYCEMIA, WITH LONG-TERM CURRENT USE OF INSULIN (HCC): ICD-10-CM

## 2020-08-20 DIAGNOSIS — Z79.4 TYPE 2 DIABETES MELLITUS WITH HYPERGLYCEMIA, WITH LONG-TERM CURRENT USE OF INSULIN (HCC): ICD-10-CM

## 2020-09-10 DIAGNOSIS — I48.0 PAF (PAROXYSMAL ATRIAL FIBRILLATION) (HCC): ICD-10-CM

## 2020-09-11 RX ORDER — POTASSIUM CHLORIDE 750 MG/1
TABLET, EXTENDED RELEASE ORAL
Qty: 90 TABLET | Refills: 1 | Status: SHIPPED | OUTPATIENT
Start: 2020-09-11 | End: 2021-03-09

## 2020-09-30 ENCOUNTER — REMOTE DEVICE CLINIC VISIT (OUTPATIENT)
Dept: CARDIOLOGY CLINIC | Facility: CLINIC | Age: 85
End: 2020-09-30
Payer: COMMERCIAL

## 2020-09-30 DIAGNOSIS — Z95.0 CARDIAC PACEMAKER IN SITU: Primary | ICD-10-CM

## 2020-09-30 PROCEDURE — 93294 REM INTERROG EVL PM/LDLS PM: CPT | Performed by: INTERNAL MEDICINE

## 2020-09-30 PROCEDURE — 93296 REM INTERROG EVL PM/IDS: CPT | Performed by: INTERNAL MEDICINE

## 2020-09-30 NOTE — PROGRESS NOTES
Results for orders placed or performed in visit on 09/30/20   Cardiac EP device report    Narrative    MDT- DUAL CHAMBER PPM (AAIR-DDDR MODE)  CARELINK TRANSMISSION: BATTERY STATUS "OK"  AP 80%  0 4%  ALL AVAILABLE LEAD PARAMETERS WITHIN NORMAL LIMITS  12 AT/AF NOTED; 0 7% BURDEN  PT ON ELIQUIS  AVAIL EGRAMS PRESENT AS AF  NORMAL DEVICE FUNCTION   NC       Current Outpatient Medications:     albuterol (2 5 mg/3 mL) 0 083 % nebulizer solution, TAKE 1 VIAL (2 5 MG) BY NEBULIZATION EVERY 6 HOURS AS NEEDED FOR WHEEZING OR SHORTNESS OF BREATH, Disp: 150 mL, Rfl: 1    atorvastatin (LIPITOR) 40 mg tablet, TAKE 1 TABLET BY MOUTH EVERY DAY, Disp: 90 tablet, Rfl: 3    calcium carbonate-vitamin D (OSCAL-D) 500 mg-200 units per tablet, Take 1 tablet by mouth 2 (two) times a day with meals, Disp: 60 tablet, Rfl: 0    docusate sodium (COLACE) 100 mg capsule, Take 1 capsule (100 mg total) by mouth 2 (two) times a day (Patient taking differently: Take 100 mg by mouth 2 (two) times a day as needed ), Disp: 10 capsule, Rfl: 0    ELIQUIS 5 MG, TAKE 1 TABLET BY MOUTH TWICE A DAY, Disp: 60 tablet, Rfl: 5    glucose blood test strip, USE AS DIRECTED 3 TIMES A DAY, Disp: 300 each, Rfl: 1    insulin aspart (NovoLOG) 100 units/mL injection, Inject 100 Units under the skin daily As directed, Disp: 10 mL, Rfl: 3    Insulin Disposable Pump (V-GO 20) KIT, Use 1 per day, Disp: 3 kit, Rfl: 3    Klor-Con M10 10 MEQ tablet, TAKE 1 TABLET BY MOUTH EVERY DAY, Disp: 90 tablet, Rfl: 1    metoprolol tartrate (LOPRESSOR) 25 mg tablet, Take 1 tablet (25 mg total) by mouth every 12 (twelve) hours, Disp: 90 tablet, Rfl: 3    senna (SENOKOT) 8 6 mg, Take 1 tablet (8 6 mg total) by mouth daily (Patient taking differently: Take 1 tablet by mouth daily at bedtime as needed ), Disp: 120 each, Rfl: 0    torsemide (DEMADEX) 20 mg tablet, Take 20 mg by mouth daily, Disp: , Rfl:

## 2020-11-06 ENCOUNTER — TELEPHONE (OUTPATIENT)
Dept: LAB | Facility: HOSPITAL | Age: 85
End: 2020-11-06

## 2020-11-06 DIAGNOSIS — E11.65 TYPE 2 DIABETES MELLITUS WITH HYPERGLYCEMIA, WITH LONG-TERM CURRENT USE OF INSULIN (HCC): ICD-10-CM

## 2020-11-06 DIAGNOSIS — Z79.4 TYPE 2 DIABETES MELLITUS WITH HYPERGLYCEMIA, WITH LONG-TERM CURRENT USE OF INSULIN (HCC): ICD-10-CM

## 2020-11-09 ENCOUNTER — OFFICE VISIT (OUTPATIENT)
Dept: FAMILY MEDICINE CLINIC | Facility: CLINIC | Age: 85
End: 2020-11-09
Payer: COMMERCIAL

## 2020-11-09 VITALS
DIASTOLIC BLOOD PRESSURE: 70 MMHG | HEART RATE: 80 BPM | SYSTOLIC BLOOD PRESSURE: 90 MMHG | TEMPERATURE: 98.4 F | OXYGEN SATURATION: 99 % | RESPIRATION RATE: 16 BRPM

## 2020-11-09 DIAGNOSIS — L03.114 CELLULITIS OF LEFT UPPER EXTREMITY: Primary | ICD-10-CM

## 2020-11-09 DIAGNOSIS — IMO0002 DM (DIABETES MELLITUS), TYPE 2, UNCONTROLLED W/OPHTHALMIC COMPLICATION: Primary | ICD-10-CM

## 2020-11-09 DIAGNOSIS — I10 ESSENTIAL HYPERTENSION: Chronic | ICD-10-CM

## 2020-11-09 DIAGNOSIS — E66.9 OBESITY (BMI 30-39.9): ICD-10-CM

## 2020-11-09 DIAGNOSIS — E78.2 MIXED HYPERLIPIDEMIA: ICD-10-CM

## 2020-11-09 DIAGNOSIS — IMO0002 DM (DIABETES MELLITUS), TYPE 2, UNCONTROLLED W/OPHTHALMIC COMPLICATION: ICD-10-CM

## 2020-11-09 DIAGNOSIS — I48.0 PAF (PAROXYSMAL ATRIAL FIBRILLATION) (HCC): ICD-10-CM

## 2020-11-09 DIAGNOSIS — F02.80 LATE ONSET ALZHEIMER'S DISEASE WITHOUT BEHAVIORAL DISTURBANCE (HCC): Chronic | ICD-10-CM

## 2020-11-09 DIAGNOSIS — Z23 NEED FOR INFLUENZA VACCINATION: ICD-10-CM

## 2020-11-09 DIAGNOSIS — G30.1 LATE ONSET ALZHEIMER'S DISEASE WITHOUT BEHAVIORAL DISTURBANCE (HCC): Chronic | ICD-10-CM

## 2020-11-09 LAB — SL AMB POCT HEMOGLOBIN AIC: 7.4 (ref ?–6.5)

## 2020-11-09 PROCEDURE — 3074F SYST BP LT 130 MM HG: CPT | Performed by: FAMILY MEDICINE

## 2020-11-09 PROCEDURE — 1036F TOBACCO NON-USER: CPT | Performed by: FAMILY MEDICINE

## 2020-11-09 PROCEDURE — 3725F SCREEN DEPRESSION PERFORMED: CPT | Performed by: FAMILY MEDICINE

## 2020-11-09 PROCEDURE — G0008 ADMIN INFLUENZA VIRUS VAC: HCPCS

## 2020-11-09 PROCEDURE — 83036 HEMOGLOBIN GLYCOSYLATED A1C: CPT | Performed by: FAMILY MEDICINE

## 2020-11-09 PROCEDURE — 3288F FALL RISK ASSESSMENT DOCD: CPT | Performed by: FAMILY MEDICINE

## 2020-11-09 PROCEDURE — 3078F DIAST BP <80 MM HG: CPT | Performed by: FAMILY MEDICINE

## 2020-11-09 PROCEDURE — 1160F RVW MEDS BY RX/DR IN RCRD: CPT | Performed by: FAMILY MEDICINE

## 2020-11-09 PROCEDURE — 99214 OFFICE O/P EST MOD 30 MIN: CPT | Performed by: FAMILY MEDICINE

## 2020-11-09 PROCEDURE — 90662 IIV NO PRSV INCREASED AG IM: CPT

## 2020-11-09 RX ORDER — CEPHALEXIN 500 MG/1
500 CAPSULE ORAL EVERY 8 HOURS SCHEDULED
Qty: 30 CAPSULE | Refills: 0 | Status: SHIPPED | OUTPATIENT
Start: 2020-11-09 | End: 2020-11-19

## 2020-12-14 DIAGNOSIS — I10 ESSENTIAL HYPERTENSION: Chronic | ICD-10-CM

## 2020-12-14 DIAGNOSIS — I48.91 ATRIAL FIBRILLATION, UNSPECIFIED TYPE (HCC): ICD-10-CM

## 2020-12-14 DIAGNOSIS — I48.0 PAF (PAROXYSMAL ATRIAL FIBRILLATION) (HCC): ICD-10-CM

## 2020-12-14 DIAGNOSIS — I47.2 VENTRICULAR TACHYCARDIA (HCC): ICD-10-CM

## 2020-12-15 RX ORDER — APIXABAN 5 MG/1
TABLET, FILM COATED ORAL
Qty: 60 TABLET | Refills: 5 | Status: SHIPPED | OUTPATIENT
Start: 2020-12-15 | End: 2021-06-14

## 2020-12-16 ENCOUNTER — TELEMEDICINE (OUTPATIENT)
Dept: CARDIOLOGY CLINIC | Facility: CLINIC | Age: 85
End: 2020-12-16
Payer: COMMERCIAL

## 2020-12-16 DIAGNOSIS — I47.2 VENTRICULAR TACHYCARDIA (HCC): Primary | ICD-10-CM

## 2020-12-16 DIAGNOSIS — Z95.5 HISTORY OF HEART ARTERY STENT: ICD-10-CM

## 2020-12-16 DIAGNOSIS — E78.2 MIXED HYPERLIPIDEMIA: ICD-10-CM

## 2020-12-16 DIAGNOSIS — I25.10 3-VESSEL CORONARY ARTERY DISEASE: ICD-10-CM

## 2020-12-16 DIAGNOSIS — I07.1 TRICUSPID VALVE INSUFFICIENCY, UNSPECIFIED ETIOLOGY: ICD-10-CM

## 2020-12-16 DIAGNOSIS — I48.0 PAF (PAROXYSMAL ATRIAL FIBRILLATION) (HCC): ICD-10-CM

## 2020-12-16 DIAGNOSIS — R60.9 EDEMA, UNSPECIFIED TYPE: ICD-10-CM

## 2020-12-16 DIAGNOSIS — IMO0002 DM (DIABETES MELLITUS), TYPE 2, UNCONTROLLED W/OPHTHALMIC COMPLICATION: ICD-10-CM

## 2020-12-16 DIAGNOSIS — I49.5 SICK SINUS SYNDROME (HCC): ICD-10-CM

## 2020-12-16 DIAGNOSIS — Z95.1 HX OF CABG: ICD-10-CM

## 2020-12-16 DIAGNOSIS — I44.2 AV BLOCK, COMPLETE (HCC): ICD-10-CM

## 2020-12-16 DIAGNOSIS — Z95.0 CARDIAC PACEMAKER IN SITU: ICD-10-CM

## 2020-12-16 PROCEDURE — 1160F RVW MEDS BY RX/DR IN RCRD: CPT | Performed by: INTERNAL MEDICINE

## 2020-12-16 PROCEDURE — 99213 OFFICE O/P EST LOW 20 MIN: CPT | Performed by: INTERNAL MEDICINE

## 2021-01-07 ENCOUNTER — TELEPHONE (OUTPATIENT)
Dept: ENDOCRINOLOGY | Facility: CLINIC | Age: 86
End: 2021-01-07

## 2021-01-07 NOTE — TELEPHONE ENCOUNTER
pateint is asking if visit tomorrow could be switched to virtual   She is not comfortable coming in   thankyou

## 2021-01-20 DIAGNOSIS — Z23 ENCOUNTER FOR IMMUNIZATION: ICD-10-CM

## 2021-03-08 DIAGNOSIS — I48.0 PAF (PAROXYSMAL ATRIAL FIBRILLATION) (HCC): ICD-10-CM

## 2021-03-09 RX ORDER — POTASSIUM CHLORIDE 750 MG/1
TABLET, EXTENDED RELEASE ORAL
Qty: 90 TABLET | Refills: 3 | Status: SHIPPED | OUTPATIENT
Start: 2021-03-09 | End: 2022-01-03 | Stop reason: SDUPTHER

## 2021-03-15 DIAGNOSIS — E11.65 UNCONTROLLED TYPE 2 DIABETES MELLITUS WITH HYPERGLYCEMIA (HCC): ICD-10-CM

## 2021-03-15 RX ORDER — SUB-Q INSULIN DEVICE, 40 UNIT
EACH MISCELLANEOUS
Qty: 3 KIT | Refills: 0 | Status: SHIPPED | OUTPATIENT
Start: 2021-03-15 | End: 2021-03-31 | Stop reason: SDUPTHER

## 2021-03-16 ENCOUNTER — TELEPHONE (OUTPATIENT)
Dept: ENDOCRINOLOGY | Facility: CLINIC | Age: 86
End: 2021-03-16

## 2021-03-20 DIAGNOSIS — Z79.4 TYPE 2 DIABETES MELLITUS WITH HYPERGLYCEMIA, WITH LONG-TERM CURRENT USE OF INSULIN (HCC): ICD-10-CM

## 2021-03-20 DIAGNOSIS — E11.65 TYPE 2 DIABETES MELLITUS WITH HYPERGLYCEMIA, WITH LONG-TERM CURRENT USE OF INSULIN (HCC): ICD-10-CM

## 2021-03-25 ENCOUNTER — REMOTE DEVICE CLINIC VISIT (OUTPATIENT)
Dept: CARDIOLOGY CLINIC | Facility: CLINIC | Age: 86
End: 2021-03-25
Payer: COMMERCIAL

## 2021-03-25 DIAGNOSIS — Z95.0 PRESENCE OF PERMANENT CARDIAC PACEMAKER: Primary | ICD-10-CM

## 2021-03-25 PROCEDURE — 93294 REM INTERROG EVL PM/LDLS PM: CPT | Performed by: INTERNAL MEDICINE

## 2021-03-25 PROCEDURE — 93296 REM INTERROG EVL PM/IDS: CPT | Performed by: INTERNAL MEDICINE

## 2021-03-25 NOTE — PROGRESS NOTES
Results for orders placed or performed in visit on 03/25/21   Cardiac EP device report    Narrative    MDT- DUAL CHAMBER PPM (AAIR-DDDR MODE)  CARELINK TRANSMISSION: BATTERY VOLTAGE ADEQUATE (3 5 YRS) AP 93%  1%  ALL AVAILABLE LEAD PARAMETERS WITHIN NORMAL LIMITS  190 AT/AF EPISODES DETECTED LONGEST 3 HOURS  PATIENT IS ON METOPROLOL TART AND ELIQUIS  NORMAL DEVICE FUNCTION  ---JOHN

## 2021-03-31 ENCOUNTER — OFFICE VISIT (OUTPATIENT)
Dept: ENDOCRINOLOGY | Facility: CLINIC | Age: 86
End: 2021-03-31
Payer: COMMERCIAL

## 2021-03-31 VITALS — DIASTOLIC BLOOD PRESSURE: 72 MMHG | HEART RATE: 62 BPM | SYSTOLIC BLOOD PRESSURE: 112 MMHG

## 2021-03-31 DIAGNOSIS — IMO0002 DM (DIABETES MELLITUS), TYPE 2, UNCONTROLLED W/OPHTHALMIC COMPLICATION: ICD-10-CM

## 2021-03-31 DIAGNOSIS — E11.65 UNCONTROLLED TYPE 2 DIABETES MELLITUS WITH HYPERGLYCEMIA (HCC): ICD-10-CM

## 2021-03-31 DIAGNOSIS — E78.5 HYPERLIPIDEMIA, UNSPECIFIED HYPERLIPIDEMIA TYPE: ICD-10-CM

## 2021-03-31 DIAGNOSIS — I10 ESSENTIAL HYPERTENSION: Chronic | ICD-10-CM

## 2021-03-31 DIAGNOSIS — M85.80 OSTEOPENIA, UNSPECIFIED LOCATION: Primary | ICD-10-CM

## 2021-03-31 PROCEDURE — 99214 OFFICE O/P EST MOD 30 MIN: CPT | Performed by: PHYSICIAN ASSISTANT

## 2021-03-31 PROCEDURE — 95251 CONT GLUC MNTR ANALYSIS I&R: CPT | Performed by: PHYSICIAN ASSISTANT

## 2021-03-31 RX ORDER — BLOOD-GLUCOSE TRANSMITTER
EACH MISCELLANEOUS
COMMUNITY

## 2021-03-31 RX ORDER — BLOOD-GLUCOSE,RECEIVER,CONT
EACH MISCELLANEOUS
COMMUNITY

## 2021-03-31 RX ORDER — BLOOD-GLUCOSE SENSOR
EACH MISCELLANEOUS
COMMUNITY

## 2021-03-31 RX ORDER — SUB-Q INSULIN DEVICE, 40 UNIT
EACH MISCELLANEOUS
Qty: 30 KIT | Refills: 1 | Status: SHIPPED | OUTPATIENT
Start: 2021-03-31 | End: 2021-06-16 | Stop reason: SDUPTHER

## 2021-03-31 NOTE — PROGRESS NOTES
Established Patient Progress Note      Chief Complaint   Patient presents with    Diabetes Type 2        History of Present Illness:   Lillie Bowers is a 82 y o  female with a history of type 2 diabetes without long term use of insulin since many years ago  Reports complications of neuropathy and retinopathy  Denies recent illness or hospitalizations  Denies recent severe hypoglycemic or severe hyperglycemic episodes  Denies any issues with her current regimen  home glucose monitoring: are performed regularly (288x/day) with dexcom G6  Current regimen:   V-Go 20--  2 clicks (4 units) with meals  Last Eye Exam: Overdue, 2019 exam showed no retinopathy  Last Foot Exam: Will be scheduling with Dr Mariaelena Box at new office  Also today at visit  Has hypertension: Taking metoprolol  Has hyperlipidemia: Taking atorvastatin        Patient Active Problem List   Diagnosis    3-vessel coronary artery disease    D[facility] without [facility] disturbance [facility]    Depression    Diabetic retinopathy [facility]    DM (diabetes mellitus), type 2, uncontrolled w/ophthalmic complication (HCC)    Gastroesophageal reflux disease with hiatal hernia    Glaucoma    Hyperlipidemia    Essential hypertension    Cerebral artery occlusion with cerebral infarction ([facility]    Obesity (BMI 30-39  9)    Obstructive sleep apnea    Osteoarthritis of knee    Urine incontinence    Ventricular tachycardia (HCC)    Acute on chronic combined systolic and diastolic congestive heart failure (HCC)    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)    Abnormal chest x-ray    Urine retention    Congestive heart failure with LV diastolic dysfunction, NYHA class 2 (HCC)    Dyspnea on minimal exertion    PAF (paroxysmal atrial fibrillation) (HCC)    [facility]) [facility]    Mild intermittent asthma without complication    Osteopenia      Past Medical History:   Diagnosis Date    Arthritis     Asthma     CAD (coronary artery disease) of artery bypass graft     Cardiac disease     CHF (congestive heart failure) (HCC)     Dementia (HCC)     Depression     Diabetes mellitus [facility]     Heart attack ([facility]     Hyperlipidemia     Hypertension     MI (myocardial infarction) [facility]     Neuropathy     BRANT (obstructive sleep apnea)     Peptic ulcer     was + for H pylori    Transient cerebral ischemia 2011    with neg CUS and ECHO      Past Surgical History:   Procedure Laterality Date    APPENDECTOMY      CATARACT EXTRACTION       SECTION      COLONOSCOPY  2004    CORONARY ANGIOPLASTY  2006    CORONARY ANGIOPLASTY WITH STENT PLACEMENT      CORONARY ARTERY BYPASS GRAFT      DENTAL SURGERY      EGD AND COLONOSCOPY      ELBOW SURGERY      resolved     ESOPHAGOGASTRODUODENOSCOPY        Family History   Problem Relation Age of Onset    Diabetes Mother     Cancer Sister     Heart disease Sister     Thyroid disease Sister     Cancer Brother     Cancer Father     Colon cancer Family     Diabetes Family     Mitral valve prolapse Family     Thyroid cancer Family      Social History     Tobacco Use    Smoking status: Never Smoker    Smokeless tobacco: Never Used   Substance Use Topics    Alcohol use: Not Currently     Frequency: Never     Binge frequency: Never     Allergies   Allergen Reactions    Ace Inhibitors     Chlorpromazine     Latex - Food Allergy     Lisinopril     Namenda [Memantine] Drowsiness    Penicillins Seizures    Propoxyphene     Stadol [Butorphanol]          Current Outpatient Medications:     albuterol (2 5 mg/3 mL) 0 083 % nebulizer solution, TAKE 1 VIAL (2 5 MG) BY NEBULIZATION EVERY 6 HOURS AS NEEDED FOR WHEEZING OR SHORTNESS OF BREATH, Disp: 150 mL, Rfl: 1    atorvastatin (LIPITOR) 40 mg tablet, TAKE 1 TABLET BY MOUTH EVERY DAY, Disp: 90 tablet, Rfl: 3    calcium carbonate-vitamin D (OSCAL-D) 500 mg-200 units per tablet, Take 1 tablet by mouth 2 (two) times a day with meals, Disp: 60 tablet, Rfl: 0    Continuous Blood Gluc  (Dexcom G6 ) SIVAKUMAR, Use daily as directed for CGM, Disp: , Rfl:     Continuous Blood Gluc Sensor (Dexcom G6 Sensor) MISC, Use daily as directed for CGM - Change every 10 days, Disp: , Rfl:     Continuous Blood Gluc Transmit (Dexcom G6 Transmitter) MISC, Use daily as directed for CGM - Change every 3 months, Disp: , Rfl:     docusate sodium (COLACE) 100 mg capsule, Take 1 capsule (100 mg total) by mouth 2 (two) times a day (Patient taking differently: Take 100 mg by mouth 2 (two) times a day as needed ), Disp: 10 capsule, Rfl: 0    Eliquis 5 MG, TAKE 1 TABLET BY MOUTH TWICE A DAY, Disp: 60 tablet, Rfl: 5    glucose blood test strip, USE AS DIRECTED 3 TIMES A DAY, Disp: 300 each, Rfl: 1    Insulin Disposable Pump (V-Go 20) KIT, Use 1 per day Disp #30 for 30 day supply  , Disp: 30 kit, Rfl: 1    Klor-Con M10 10 MEQ tablet, TAKE 1 TABLET BY MOUTH EVERY DAY, Disp: 90 tablet, Rfl: 3    metoprolol tartrate (LOPRESSOR) 25 mg tablet, TAKE 1 TABLET (25 MG TOTAL) BY MOUTH EVERY 12 (TWELVE) HOURS, Disp: 180 tablet, Rfl: 1    NovoLOG 100 UNIT/ML injection, INJECT 100 UNITS UNDER THE SKIN DAILY AS DIRECTED, Disp: 30 mL, Rfl: 3    senna (SENOKOT) 8 6 mg, Take 1 tablet (8 6 mg total) by mouth daily (Patient taking differently: Take 1 tablet by mouth daily at bedtime as needed ), Disp: 120 each, Rfl: 0    torsemide (DEMADEX) 20 mg tablet, Take 20 mg by mouth daily, Disp: , Rfl:     Review of Systems   Constitutional: Negative for activity change, appetite change, chills, diaphoresis, fatigue, fever and unexpected weight change  HENT: Negative for trouble swallowing and voice change  Eyes: Negative for visual disturbance  Respiratory: Negative for shortness of breath  Cardiovascular: Negative for chest pain and palpitations     Gastrointestinal: Negative for abdominal pain, constipation and diarrhea  Endocrine: Negative for cold intolerance, heat intolerance, polydipsia, polyphagia and polyuria  Genitourinary: Negative for frequency and menstrual problem  Musculoskeletal: Positive for gait problem  Negative for arthralgias and myalgias  Skin: Negative for rash  Allergic/Immunologic: Negative for food allergies  Neurological: Negative for dizziness and tremors  Hematological: Negative for adenopathy  Psychiatric/Behavioral: Negative for sleep disturbance  All other systems reviewed and are negative  Physical Exam:  There is no height or weight on file to calculate BMI  /72 (BP Location: Left arm, Patient Position: Sitting, Cuff Size: Large)   Pulse 62    Wt Readings from Last 3 Encounters:   03/11/20 103 kg (226 lb 14 4 oz)   02/19/20 103 kg (227 lb)   02/13/20 100 kg (221 lb)       Physical Exam  Vitals signs reviewed  Constitutional:       General: She is not in acute distress  Appearance: She is well-developed  HENT:      Head: Normocephalic and atraumatic  Eyes:      Conjunctiva/sclera: Conjunctivae normal       Pupils: Pupils are equal, round, and reactive to light  Neck:      Musculoskeletal: Normal range of motion and neck supple  Thyroid: No thyromegaly  Cardiovascular:      Rate and Rhythm: Normal rate and regular rhythm  Pulses: no weak pulses          Dorsalis pedis pulses are 2+ on the right side and 2+ on the left side  Posterior tibial pulses are 2+ on the right side and 2+ on the left side  Heart sounds: Normal heart sounds  Pulmonary:      Effort: Pulmonary effort is normal  No respiratory distress  Breath sounds: Normal breath sounds  No wheezing or rales  Abdominal:      General: Bowel sounds are normal  There is no distension  Palpations: Abdomen is soft  Tenderness: There is no abdominal tenderness  Musculoskeletal: Normal range of motion     Feet:      Right foot:      Skin integrity: No ulcer, skin breakdown, erythema, warmth, callus or dry skin  Left foot:      Skin integrity: No ulcer, skin breakdown, erythema, warmth, callus or dry skin  Skin:     General: Skin is warm and dry  Neurological:      Mental Status: She is alert and oriented to person, place, and time  Patient's shoes and socks removed  Right Foot/Ankle   Right Foot Inspection  Skin Exam: skin normal and skin intact no dry skin, no warmth, no callus, no erythema, no maceration, no abnormal color, no pre-ulcer, no ulcer and no callus                          Toe Exam: ROM and strength within normal limitsno swelling, no tenderness, erythema and  no right toe deformity  Sensory       Monofilament testing: diminished  Vascular  Capillary refills: < 3 seconds  The right DP pulse is 2+  The right PT pulse is 2+  Left Foot/Ankle  Left Foot Inspection  Skin Exam: skin normal and skin intactno dry skin, no warmth, no erythema, no maceration, normal color, no pre-ulcer, no ulcer and no callus                         Toe Exam: ROM and strength within normal limitsno swelling, no tenderness, no erythema and no left toe deformity                   Sensory       Monofilament: diminished  Vascular  Capillary refills: < 3 seconds  The left DP pulse is 2+  The left PT pulse is 2+  Assign Risk Category:  No deformity present; Loss of protective sensation;  No weak pulses       Risk: 1      Labs:   Lab Results   Component Value Date    HGBA1C 7 4 (A) 11/09/2020    HGBA1C 8 4 (H) 02/19/2020    HGBA1C 8 9 (H) 09/20/2019     Lab Results   Component Value Date    CREATININE 1 17 02/19/2020    CREATININE 1 14 09/20/2019    CREATININE 1 11 05/17/2019    BUN 34 (H) 02/19/2020     12/17/2015    K 3 9 02/19/2020     02/19/2020    CO2 31 02/19/2020     eGFR   Date Value Ref Range Status   02/19/2020 43 ml/min/1 73sq m Final     Lab Results   Component Value Date    CHOL 144 04/21/2015    HDL 57 09/20/2019    TRIG 70 09/20/2019     Lab Results   Component Value Date    ALT 41 02/19/2020    AST 23 02/19/2020    ALKPHOS 97 02/19/2020    BILITOT 0 53 12/17/2015     Lab Results   Component Value Date    IJF6EKQPBSXF 5 520 (H) 02/19/2020    ZRL8GFOCUVLN 5 350 (H) 09/20/2019    [id number] 5 730 (H) 07/21/2018     Lab Results   Component Value Date    FREET4 1 15 02/19/2020       Impression & Plan:    Problem List Items Addressed This Visit        Endocrine    DM (diabetes mellitus), type 2, uncontrolled w/ophthalmic complication ([facility] )     Control is stable based on last A1C of 7 4  She will be getting labs done in the near future  CGM downloaded and reviewed with patient and her daughter at visit  Average sensor glucose 195 over the past two weeks  She is in target 38% of time, high 46% of time and very high 15% and low <1% of time  She is having hyperglycemia after breakfast  Advised her to increase breakfast dose of insulin to "3 Clicks" or 6 units  She is having some evening highs as well but due to blood sugar declining overnight and inability to reduce basal insulin without discontinuing V-Go will continue the same insulin dosing at dinner  Lab Results   Component Value Date    HGBA1C 7 4 (A) 11/09/2020               Cardiovascular and Mediastinum    Essential hypertension (Chronic)     Well controlled on current regimen  Musculoskeletal and Integument    Osteopenia - Primary     She had had DEXA a few year back that shoes osteopenia  Continue calcium and Vitamin D  Fall risk is high- would suggest repeat DEXA scan  Relevant Orders    DXA bone density spine hip and pelvis       Other    Hyperlipidemia     Continue statin              Other Visit Diagnoses     Uncontrolled type 2 diabetes mellitus with hypergly[facility]        Relevant Medications    Insulin Disposable Pump (V-Go 20) KIT          Orders Placed This Encounter   Procedures    DXA bone density spine hip and pelvis     Standing Status:   Future Standing Expiration Date:   3/31/2025     Scheduling Instructions:      Please wear comfortable clothing with no metal buttons, zippers, snaps or an underwire bra  Do not take any calcium supplements 24 hours prior to your test  Please bring your insurance cards, a form of photo ID and a list of your medications with you  Arrive 5-10 minutes prior to your appointment time in order to register  Your study cannot be performed if you take your calcium supplement 24 hours before the scheduled Dexa scan examination  To schedule this appointment, please contact Central Scheduling at [phone]  There are no Patient Instructions on file for this visit  Discussed with the patient and all questioned fully answered  She will call me if any problems arise  Follow-up appointment in 4 months       Counseled patient on diagnostic results, prognosis, risk and benefit of treatment options, instruction for management, importance of treatment compliance, Risk  factor reduction and impressions    Irma Reeves PA-C

## 2021-04-02 PROBLEM — M85.80 OSTEOPENIA: Status: ACTIVE | Noted: 2021-04-02

## 2021-04-02 NOTE — ASSESSMENT & PLAN NOTE
She had had DEXA a few year back that shoes osteopenia  Continue calcium and Vitamin D  Fall risk is high- would suggest repeat DEXA scan

## 2021-04-02 NOTE — ASSESSMENT & PLAN NOTE
Control is stable based on last A1C of 7 4  She will be getting labs done in the near future  CGM downloaded and reviewed with patient and her daughter at visit  Average sensor glucose 195 over the past two weeks  She is in target 38% of time, high 46% of time and very high 15% and low <1% of time  She is having hyperglycemia after breakfast  Advised her to increase breakfast dose of insulin to "3 Clicks" or 6 units  She is having some evening highs as well but due to blood sugar declining overnight and inability to reduce basal insulin without discontinuing V-Go will continue the same insulin dosing at dinner     Lab Results   Component Value Date    HGBA1C 7 4 (A) 11/09/2020

## 2021-04-13 DIAGNOSIS — E78.5 HYPERLIPIDEMIA, UNSPECIFIED HYPERLIPIDEMIA TYPE: ICD-10-CM

## 2021-04-13 RX ORDER — ATORVASTATIN CALCIUM 40 MG/1
TABLET, FILM COATED ORAL
Qty: 90 TABLET | Refills: 3 | Status: SHIPPED | OUTPATIENT
Start: 2021-04-13 | End: 2022-01-03 | Stop reason: SDUPTHER

## 2021-04-23 ENCOUNTER — TELEPHONE (OUTPATIENT)
Dept: ENDOCRINOLOGY | Facility: CLINIC | Age: 86
End: 2021-04-23

## 2021-04-23 NOTE — TELEPHONE ENCOUNTER
Patient's daughter l/m stating her DME supplier has changed to [employer]  She stated she can no longer use [employer]  She asked that we fax orders for her Dexcom to them at [phone]  I reviewed chart and don't see anything from this company in her chart  I called them at [phone] for more information  They explained to me that they are a company that assists [facility] members with finding a participating [employer]  She asked that I complete the online form and submit it to them, so that they can work on getting supplies filled for the patient  They will then follow up with the patient once info is received  Completed online form as requested  Attached last OV note and A1c result  Submitted form online and they said they would email a copy to the office for verification  Called and notified daughter that request was sent to [employer] as requested and they should be calling her  Asked that she call back if any other issues  She said ok

## 2021-05-30 DIAGNOSIS — I47.2 VENTRICULAR TACHYCARDIA (HCC): ICD-10-CM

## 2021-05-30 DIAGNOSIS — I10 ESSENTIAL HYPERTENSION: Chronic | ICD-10-CM

## 2021-05-30 DIAGNOSIS — I48.0 PAF (PAROXYSMAL ATRIAL FIBRILLATION) (HCC): ICD-10-CM

## 2021-06-07 ENCOUNTER — APPOINTMENT (OUTPATIENT)
Dept: LAB | Facility: HOSPITAL | Age: 86
End: 2021-06-07
Payer: COMMERCIAL

## 2021-06-07 DIAGNOSIS — I07.1 TRICUSPID VALVE INSUFFICIENCY, UNSPECIFIED ETIOLOGY: ICD-10-CM

## 2021-06-07 DIAGNOSIS — Z95.1 HX OF CABG: ICD-10-CM

## 2021-06-07 DIAGNOSIS — R60.9 EDEMA, UNSPECIFIED TYPE: ICD-10-CM

## 2021-06-07 DIAGNOSIS — IMO0002 DM (DIABETES MELLITUS), TYPE 2, UNCONTROLLED W/OPHTHALMIC COMPLICATION: ICD-10-CM

## 2021-06-07 DIAGNOSIS — I10 ESSENTIAL HYPERTENSION: Chronic | ICD-10-CM

## 2021-06-07 DIAGNOSIS — Z95.0 CARDIAC PACEMAKER IN SITU: ICD-10-CM

## 2021-06-07 DIAGNOSIS — I49.5 SICK SINUS SYNDROME (HCC): ICD-10-CM

## 2021-06-07 DIAGNOSIS — E66.9 OBESITY (BMI 30-39.9): ICD-10-CM

## 2021-06-07 DIAGNOSIS — E78.2 MIXED HYPERLIPIDEMIA: ICD-10-CM

## 2021-06-07 DIAGNOSIS — I48.0 PAF (PAROXYSMAL ATRIAL FIBRILLATION) (HCC): ICD-10-CM

## 2021-06-07 DIAGNOSIS — I44.2 AV BLOCK, COMPLETE (HCC): ICD-10-CM

## 2021-06-07 DIAGNOSIS — Z95.5 HISTORY OF HEART ARTERY STENT: ICD-10-CM

## 2021-06-07 DIAGNOSIS — I25.10 3-VESSEL CORONARY ARTERY DISEASE: ICD-10-CM

## 2021-06-07 DIAGNOSIS — I47.2 VENTRICULAR TACHYCARDIA (HCC): ICD-10-CM

## 2021-06-07 LAB
ALBUMIN SERPL BCP-MCNC: 3.7 G/DL (ref 3.5–5)
ALP SERPL-CCNC: 84 U/L (ref 46–116)
ALT SERPL W P-5'-P-CCNC: 30 U/L (ref 12–78)
ANION GAP SERPL CALCULATED.3IONS-SCNC: 2 MMOL/L (ref 4–13)
AST SERPL W P-5'-P-CCNC: 43 U/L (ref 5–45)
BASOPHILS # BLD AUTO: 0.1 THOUSANDS/ΜL (ref 0–0.1)
BASOPHILS NFR BLD AUTO: 2 % (ref 0–1)
BILIRUB SERPL-MCNC: 0.71 MG/DL (ref 0.2–1)
BUN SERPL-MCNC: 30 MG/DL (ref 5–25)
CALCIUM SERPL-MCNC: 9.7 MG/DL (ref 8.3–10.1)
CHLORIDE SERPL-SCNC: 104 MMOL/L (ref 100–108)
CHOLEST SERPL-MCNC: 116 MG/DL (ref 50–200)
CK MB SERPL-MCNC: 1.4 NG/ML (ref 0–5)
CK MB SERPL-MCNC: <1 % (ref 0–2.5)
CK SERPL-CCNC: 227 U/L (ref 26–192)
CO2 SERPL-SCNC: 31 MMOL/L (ref 21–32)
CREAT SERPL-MCNC: 1.16 MG/DL (ref 0.6–1.3)
EOSINOPHIL # BLD AUTO: 0.29 THOUSAND/ΜL (ref 0–0.61)
EOSINOPHIL NFR BLD AUTO: 5 % (ref 0–6)
ERYTHROCYTE [DISTWIDTH] IN BLOOD BY AUTOMATED COUNT: 13.4 % (ref 11.6–15.1)
EST. AVERAGE GLUCOSE BLD GHB EST-MCNC: 177 MG/DL
GFR SERPL CREATININE-BSD FRML MDRD: 44 ML/MIN/1.73SQ M
GLUCOSE P FAST SERPL-MCNC: 214 MG/DL (ref 65–99)
HBA1C MFR BLD: 7.8 %
HCT VFR BLD AUTO: 44.6 % (ref 34.8–46.1)
HDLC SERPL-MCNC: 61 MG/DL
HGB BLD-MCNC: 14.6 G/DL (ref 11.5–15.4)
IMM GRANULOCYTES # BLD AUTO: 0.01 THOUSAND/UL (ref 0–0.2)
IMM GRANULOCYTES NFR BLD AUTO: 0 % (ref 0–2)
LDLC SERPL CALC-MCNC: 39 MG/DL (ref 0–100)
LYMPHOCYTES # BLD AUTO: 1.3 THOUSANDS/ΜL (ref 0.6–4.47)
LYMPHOCYTES NFR BLD AUTO: 21 % (ref 14–44)
MCH RBC QN AUTO: 31.1 PG (ref 26.8–34.3)
MCHC RBC AUTO-ENTMCNC: 32.7 G/DL (ref 31.4–37.4)
MCV RBC AUTO: 95 FL (ref 82–98)
MONOCYTES # BLD AUTO: 0.45 THOUSAND/ΜL (ref 0.17–1.22)
MONOCYTES NFR BLD AUTO: 7 % (ref 4–12)
NEUTROPHILS # BLD AUTO: 4.06 THOUSANDS/ΜL (ref 1.85–7.62)
NEUTS SEG NFR BLD AUTO: 65 % (ref 43–75)
NRBC BLD AUTO-RTO: 0 /100 WBCS
NT-PROBNP SERPL-MCNC: 488 PG/ML
PLATELET # BLD AUTO: 187 THOUSANDS/UL (ref 149–390)
PMV BLD AUTO: 11.8 FL (ref 8.9–12.7)
POTASSIUM SERPL-SCNC: 5 MMOL/L (ref 3.5–5.3)
PROT SERPL-MCNC: 7.2 G/DL (ref 6.4–8.2)
RBC # BLD AUTO: 4.7 MILLION/UL (ref 3.81–5.12)
SODIUM SERPL-SCNC: 137 MMOL/L (ref 136–145)
T4 FREE SERPL-MCNC: 1.07 NG/DL (ref 0.76–1.46)
TRIGL SERPL-MCNC: 79 MG/DL
TSH SERPL DL<=0.05 MIU/L-ACNC: 5.84 UIU/ML (ref 0.36–3.74)
WBC # BLD AUTO: 6.21 THOUSAND/UL (ref 4.31–10.16)

## 2021-06-07 PROCEDURE — 83036 HEMOGLOBIN GLYCOSYLATED A1C: CPT

## 2021-06-07 PROCEDURE — 82553 CREATINE MB FRACTION: CPT

## 2021-06-07 PROCEDURE — 84443 ASSAY THYROID STIM HORMONE: CPT

## 2021-06-07 PROCEDURE — 83880 ASSAY OF NATRIURETIC PEPTIDE: CPT

## 2021-06-07 PROCEDURE — 84439 ASSAY OF FREE THYROXINE: CPT

## 2021-06-07 PROCEDURE — 80061 LIPID PANEL: CPT

## 2021-06-07 PROCEDURE — 85025 COMPLETE CBC W/AUTO DIFF WBC: CPT

## 2021-06-07 PROCEDURE — 36415 COLL VENOUS BLD VENIPUNCTURE: CPT

## 2021-06-07 PROCEDURE — 80053 COMPREHEN METABOLIC PANEL: CPT

## 2021-06-07 PROCEDURE — 82550 ASSAY OF CK (CPK): CPT

## 2021-06-09 ENCOUNTER — TELEPHONE (OUTPATIENT)
Dept: ENDOCRINOLOGY | Facility: CLINIC | Age: 86
End: 2021-06-09

## 2021-06-09 ENCOUNTER — APPOINTMENT (OUTPATIENT)
Dept: LAB | Facility: HOSPITAL | Age: 86
End: 2021-06-09
Payer: COMMERCIAL

## 2021-06-09 DIAGNOSIS — IMO0002 DM (DIABETES MELLITUS), TYPE 2, UNCONTROLLED W/OPHTHALMIC COMPLICATION: ICD-10-CM

## 2021-06-09 LAB
CREAT UR-MCNC: 135 MG/DL
MICROALBUMIN UR-MCNC: 59.3 MG/L (ref 0–20)
MICROALBUMIN/CREAT 24H UR: 44 MG/G CREATININE (ref 0–30)

## 2021-06-09 PROCEDURE — 82043 UR ALBUMIN QUANTITATIVE: CPT

## 2021-06-09 PROCEDURE — 82570 ASSAY OF URINE CREATININE: CPT

## 2021-06-09 NOTE — TELEPHONE ENCOUNTER
----- Message from Rodríguez Crawley PA-C sent at 6/9/2021 12:30 PM EDT -----  A1C 7 8- Diabetes control is stable

## 2021-06-11 DIAGNOSIS — I48.91 ATRIAL FIBRILLATION, UNSPECIFIED TYPE (HCC): ICD-10-CM

## 2021-06-14 RX ORDER — APIXABAN 5 MG/1
TABLET, FILM COATED ORAL
Qty: 60 TABLET | Refills: 5 | Status: SHIPPED | OUTPATIENT
Start: 2021-06-14 | End: 2021-12-13

## 2021-06-16 DIAGNOSIS — E11.65 UNCONTROLLED TYPE 2 DIABETES MELLITUS WITH HYPERGLYCEMIA (HCC): ICD-10-CM

## 2021-06-16 RX ORDER — SUB-Q INSULIN DEVICE, 40 UNIT
EACH MISCELLANEOUS
Qty: 30 KIT | Refills: 1 | Status: SHIPPED | OUTPATIENT
Start: 2021-06-16 | End: 2021-10-18

## 2021-06-24 ENCOUNTER — REMOTE DEVICE CLINIC VISIT (OUTPATIENT)
Dept: CARDIOLOGY CLINIC | Facility: CLINIC | Age: 86
End: 2021-06-24
Payer: COMMERCIAL

## 2021-06-24 DIAGNOSIS — Z95.0 PRESENCE OF CARDIAC PACEMAKER: Primary | ICD-10-CM

## 2021-06-24 PROCEDURE — 93294 REM INTERROG EVL PM/LDLS PM: CPT | Performed by: INTERNAL MEDICINE

## 2021-06-24 PROCEDURE — 93296 REM INTERROG EVL PM/IDS: CPT | Performed by: INTERNAL MEDICINE

## 2021-06-24 NOTE — PROGRESS NOTES
Results for orders placed or performed in visit on 06/24/21   Cardiac EP device report    Narrative    MDT- DUAL CHAMBER PPM (AAIR-DDDR MODE)  CARELINK TRANSMISSION: BATTERY VOLTAGE ADEQUATE (3 YRS)  AP: 84 8%  : 0 2% (MVP-ON)  ALL AVAILABLE LEAD PARAMETERS WITHIN NORMAL LIMITS  NO SIGNIFICANT HIGH RATE EPISODES  PACEMAKER FUNCTIONING APPROPRIATELY    [address] [place] [address]

## 2021-06-30 ENCOUNTER — OFFICE VISIT (OUTPATIENT)
Dept: CARDIOLOGY CLINIC | Facility: CLINIC | Age: 86
End: 2021-06-30
Payer: COMMERCIAL

## 2021-06-30 VITALS
OXYGEN SATURATION: 98 % | BODY MASS INDEX: 36.59 KG/M2 | WEIGHT: 219.9 LBS | DIASTOLIC BLOOD PRESSURE: 72 MMHG | HEART RATE: 64 BPM | SYSTOLIC BLOOD PRESSURE: 126 MMHG

## 2021-06-30 DIAGNOSIS — Z95.1 HX OF CABG: ICD-10-CM

## 2021-06-30 DIAGNOSIS — Z95.0 PRESENCE OF CARDIAC PACEMAKER: Primary | ICD-10-CM

## 2021-06-30 DIAGNOSIS — I50.30 CONGESTIVE HEART FAILURE WITH LV DIASTOLIC DYSFUNCTION, NYHA CLASS 2 (HCC): ICD-10-CM

## 2021-06-30 DIAGNOSIS — R60.9 EDEMA, UNSPECIFIED TYPE: ICD-10-CM

## 2021-06-30 DIAGNOSIS — Z95.5 HISTORY OF HEART ARTERY STENT: ICD-10-CM

## 2021-06-30 DIAGNOSIS — IMO0002 DM (DIABETES MELLITUS), TYPE 2, UNCONTROLLED W/OPHTHALMIC COMPLICATION: ICD-10-CM

## 2021-06-30 DIAGNOSIS — I49.5 SICK SINUS SYNDROME (HCC): ICD-10-CM

## 2021-06-30 DIAGNOSIS — Z95.0 CARDIAC PACEMAKER IN SITU: ICD-10-CM

## 2021-06-30 DIAGNOSIS — E66.01 OBESITY, MORBID (HCC): ICD-10-CM

## 2021-06-30 DIAGNOSIS — I44.2 AV BLOCK, COMPLETE (HCC): ICD-10-CM

## 2021-06-30 DIAGNOSIS — I48.0 PAF (PAROXYSMAL ATRIAL FIBRILLATION) (HCC): ICD-10-CM

## 2021-06-30 DIAGNOSIS — I25.10 3-VESSEL CORONARY ARTERY DISEASE: ICD-10-CM

## 2021-06-30 DIAGNOSIS — I47.2 VENTRICULAR TACHYCARDIA (HCC): ICD-10-CM

## 2021-06-30 DIAGNOSIS — I10 ESSENTIAL HYPERTENSION: ICD-10-CM

## 2021-06-30 DIAGNOSIS — I07.1 TRICUSPID VALVE INSUFFICIENCY, UNSPECIFIED ETIOLOGY: ICD-10-CM

## 2021-06-30 DIAGNOSIS — Z95.0 PRESENCE OF PERMANENT CARDIAC PACEMAKER: ICD-10-CM

## 2021-06-30 DIAGNOSIS — E78.2 MIXED HYPERLIPIDEMIA: ICD-10-CM

## 2021-06-30 PROCEDURE — 99214 OFFICE O/P EST MOD 30 MIN: CPT | Performed by: INTERNAL MEDICINE

## 2021-06-30 PROCEDURE — 1160F RVW MEDS BY RX/DR IN RCRD: CPT | Performed by: INTERNAL MEDICINE

## 2021-06-30 PROCEDURE — 1036F TOBACCO NON-USER: CPT | Performed by: INTERNAL MEDICINE

## 2021-06-30 NOTE — PROGRESS NOTES
Follow-up - Cardiology   Fred Prieto 82 y o  female MRN: [id number]        Problems    Problem List Items Addressed This Visit        Endocrine    DM (diabetes mellitus), type 2, uncontrolled w/ophthalmic complication [facility])       Cardiovascular and Mediastinum    Essential hypertension (Chronic)    3-vessel coronary artery disease    Ventricular tachycardia (HCC)    PAF (paroxysmal atrial fibrillation) ([facility] )       Other    Hyperlipidemia      Other Visit Diagnoses     Presence of cardiac pacemaker    -  Primary    Presence of permanent cardiac pacemaker        Cardiac pacemaker in situ        Edema, unspecified type        Sick sinus syndrome ([facility] )        Hx of CABG        Tricuspid valve insufficiency, unspecified etiology        History of heart artery stent        AV block, complete ([facility] )                Plan and discussion  Patient seen July 30, 2021   Blood sugar blood pressure well controlled   Bypass surgery 1999   Stent 2006  Catheterization 2008 without intervention  Pacemaker 2004  Paroxysmal atrial fibrillation  On Eliquis  Off aspirin at this time  Diabetes last A1c 7 8   Tricuspid regurgitation is moderate without much edema  She has been controlled on 20 mg of torsemide  She has had ventricular tachycardia in the past   On beta-blocker  LDL is 39  Creatinine is less than 1 4  She is angina free  She has no edema  Family takes very good care of her  Most recent pacer test actually shows no evidence of high rates  Her chads Vasc score is at least 4    No change in medication          HPI: Fred Prieto is a 82y o  year old female    HPI: Janeth Crawford is a 82 y  o  year old female HPI: Janeth REZA Ty is A 16 y  o  year old female    HPI: Janeth Crawford is M 65 y o  year old female HPI: Janeth REZA Ty is S 89 y o  year old female    Patient seen December 6, 2017  Her diagnosis includes bypass surgery 1999 stent in 2006, catheterization , hyperlipidemia, pacemaker , dementia, preserved left ventricular function, and diabetic neuropathy  She had short burst of ventricular tachycardia pacer interrogation  Metoprolol to be increased to 37 5 in the morning and 25 in the p m         Patient seen 2018  All scripts HPI noted above  First epic visit  Issues are as follows  Bypass surgery   Stent in 0 6  Catheterization 0 8 without intervention  Hyperlipidemia  Pacemaker   Diabetes  Left ventricular function well preserved  Diabetes  Past history ventricular tachycardia  Recent admission with edema and she was given a diagnosis of congestive heart failure   Appears to me when I review the record is primarily pulmonary issue with lot of bronchospasm   She is treated both diuretics and steroids as well as bronchodilators  Right bundle branch block pattern with blood relatively wide QRS  Echocardiogram shows the following-increased right ventricle-+3 tricuspid regurgitation-large right atrium-dilated inferior vena cava  Her NT was Whitney Bunn  He is presently on 60 of Lasix which was an increase done by hand visiting nurse  We will check her labs carefully   She is edema free at this time and her lungs are clear        Patient seen 2018  Diagnosis above  LDL is 44  A1c is 8 3  She has no edema   She has no bronchospasm   From cardiac standpoint she is asymptomatic  No change in medication         Patient seen 2019  Diagnosis above  The issues are as follows  Her  has  in the past several months  She has short episode of atrial fibrillation pacemaker interrogation   This has to be followed  Sophia Kingston is not on anticoagulation  Her edema is well controlled  Will check her hemoglobin A1c         Patient seen May 27, 2019    Refer to my plan above   Eliquis will be started because of pacemaker interrogation showing significant amount of atrial fibrillation and she has deteriorated with regard to increasing edema   She may need to be admitted on the next visit if my plan does not work        Patient seen April 8, 2019  New  Refer to plan above  Norvasc decreased to 5 daily  Family will keep track of her weight   This is her dry weight   Dry weight therefore the office is 225 lb   She had been up 20-25 lb when I last saw her  Jayla Hollingsworth has diuresed in the     Plan patient seen March 11, 2020  Blood pressure is on the low side   On discontinue Isordil and the amlodipine  Bypass surgery 1999  Stent 2006  Catheterization 2008 without intervention  Pacemaker 2004  Paroxysmal atrial fibrillation on pacer interrogation  On Eliquis  Also on aspirin because of stent   She is not following   If she has any following issues may discontinue the aspirin as well  She has left bundle  She has diabetes last A1c 6 4  She tricuspid regurgitation fairly severe but she is edema free present medication  His other beta-blocker for ventricular tachycardia noted in the past   Hyperlipidemia is well controlled LDL 62  Fortunately her creatinine is only 1 point 1 4 with A1c is 68 4  She is on 20 of torsemide daily  The changes in medication are as noted above  Consideration the past possibly stopping the aspirin   However, she has stents that she will she has old bypasses and severe coronary disease    Review of Systems   Constitutional: Positive for fatigue  Respiratory: Negative  Cardiovascular: Negative  Musculoskeletal:        Very sedentary  In wheelchair in the office    Unable walk into the office         Past Medical History:   Diagnosis Date    Arthritis     Asthma     CAD (coronary artery disease) of artery bypass graft     Cardiac disease     [facility]est[facility]     D[facility] )     Depression     Diabetes mellitus [facility] )     Heart attack ([facility] )     Hyperlipidemia     Hypertension     MI [facility]ocardial infarction) [facility] )     Neuropathy     BRANT (obstructive sleep apnea)     Peptic ulcer     was + for H pylori    Transient cerebral ischemia 11/2011    with neg CUS and ECHO     Social History     Substance and Sexual Activity   Alcohol Use Not Currently     Social History     Substance and Sexual Activity   Drug Use Never     Social History     Tobacco Use   Smoking Status Never Smoker   Smokeless Tobacco Never Used       Allergies: Allergies   Allergen Reactions    Ace Inhibitors     Chlorpromazine     Latex     Lisinopril     Namenda [Memantine] Drowsiness    Penicillins Seizures    Propoxyphene     Stadol [Butorphanol]        Medications:     Current Outpatient Medications:     albuterol (2 5 mg/3 mL) 0 083 % nebulizer solution, TAKE 1 VIAL (2 5 MG) BY NEBULIZATION EVERY 6 HOURS AS NEEDED FOR WHEEZING OR SHORTNESS OF BREATH, Disp: 150 mL, Rfl: 1    atorvastatin (LIPITOR) 40 mg tablet, TAKE 1 TABLET BY MOUTH EVERY DAY, Disp: 90 tablet, Rfl: 3    calcium carbonate-vitamin D (OSCAL-D) 500 mg-200 units per tablet, Take 1 tablet by mouth 2 (two) times a day with meals, Disp: 60 tablet, Rfl: 0    Continuous Blood Gluc  (Dexcom G6 ) SIVAKUMAR, Use daily as directed for CGM, Disp: , Rfl:     Continuous Blood Gluc Sensor (Dexcom G6 Sensor) MISC, Use daily as directed for CGM - Change every 10 days, Disp: , Rfl:     Continuous Blood Gluc Transmit (Dexcom G6 Transmitter) MISC, Use daily as directed for CGM - Change every 3 months, Disp: , Rfl:     docusate sodium (COLACE) 100 mg capsule, Take 1 capsule (100 mg total) by mouth 2 (two) times a day (Patient taking differently: Take 100 mg by mouth 2 (two) times a day as needed ), Disp: 10 capsule, Rfl: 0    Eliquis 5 MG, TAKE 1 TABLET BY MOUTH TWICE A DAY, Disp: 60 tablet, Rfl: 5    glucose blood test strip, USE AS DIRECTED 3 TIMES A DAY, Disp: 300 each, Rfl: 1    Insulin Disposable Pump (V-Go 20) KIT, Use 1 per day Disp #30 for 30 day supply  , Disp: 30 kit, Rfl: 1    Klor-Con M10 10 MEQ tablet, TAKE 1 TABLET BY MOUTH EVERY DAY, Disp: 90 tablet, Rfl: 3    metoprolol tartrate (LOPRESSOR) 25 mg tablet, TAKE 1 TABLET (25 MG TOTAL) BY MOUTH EVERY 12 (TWELVE) HOURS, Disp: 180 tablet, Rfl: 3    NovoLOG 100 UNIT/ML injection, INJECT 100 UNITS UNDER THE SKIN DAILY AS DIRECTED, Disp: 30 mL, Rfl: 3    senna (SENOKOT) 8 6 mg, Take 1 tablet (8 6 mg total) by mouth daily (Patient taking differently: Take 1 tablet by mouth daily at bedtime as needed ), Disp: 120 each, Rfl: 0    torsemide (DEMADEX) 20 mg tablet, Take 20 mg by mouth daily, Disp: , Rfl:       Physical Exam  Constitutional:       Appearance: She is obese  Cardiovascular:      Rate and Rhythm: Normal rate and regular rhythm  Heart sounds: No murmur heard  No friction rub  No gallop  Pulmonary:      Effort: Pulmonary effort is normal  No respiratory distress  Breath sounds: No wheezing  Musculoskeletal:         General: Normal range of motion  Right lower leg: No edema  Left lower leg: No edema  Skin:     General: Skin is dry  Neurological:      Mental Status: She is alert and oriented to person, place, and time     Psychiatric:         Behavior: Behavior normal            Laboratory Studies:  CMP:      Invalid input(s): ALBUMIN  NT-proBNP:   Lab Results   Component Value Date    NTBNP 488 (H) 06/07/2021      Coags:    Lipid Profile:   Lab Results   Component Value Date    CHOL 144 04/21/2015     Lab Results   Component Value Date    HDL 61 06/07/2021     Lab Results   Component Value Date    LDLCALC 39 06/07/2021     Lab Results   Component Value Date    TRIG 79 06/07/2021       Cardiac testing:     EKG reviewed personally:     Results for orders placed during the hospital encounter of 03/28/19    Echo complete with contrast if indicated    Narrative  [address]  [facility] [place] [address] [facility]  [phone]    Transthoracic Echocardiogram  2D, M-mode, Doppler, and Color Doppler    Study date: 28-Mar-2019    Patient: Suzie Haque  MR number: [id number]  Account number: [de-identified]  : 1935  Age: 82 years  Gender: Female  Status: Outpatient  Location: Bedside  Height: 68 in  Weight: 244 lb  BP: 128/ 66 mmHg    Indications: Heart Failure    Diagnoses: I50 9 - Heart failure, unspecified    Sonographer:  VANI Clement  Interpreting Physician:  Naveen George MD  Primary Physician:  Berna Bond MD  Referring Physician:  Brian Pascual MD  Group:  [id number]  Cardiology Fellow:  Rosa Maria Manning MD    SUMMARY    LEFT VENTRICLE:  Systolic function was normal  Ejection fraction was estimated to be 60 %  There were no regional wall motion abnormalities  There was no evidence of concentric hypertrophy  VENTRICULAR SEPTUM:  There was dyssynergic motion  These changes are consistent with RV volume overload  RIGHT VENTRICLE:  The ventricle was mildly to moderately dilated  Wall thickness was increased  LEFT ATRIUM:  The atrium was mildly dilated  RIGHT ATRIUM:  The atrium was moderately dilated  MITRAL VALVE:  There was mild regurgitation  AORTIC VALVE:  The valve was trileaflet  Leaflets exhibited moderately increased thickness, mild calcification, and normal cuspal separation  There was trace regurgitation  TRICUSPID VALVE:  There was severe regurgitation  IVC, HEPATIC VEINS:  The inferior vena cava was dilated  Respirophasic changes were blunted (less than 50% variation)  HISTORY: PRIOR HISTORY: HTN, HLD, SSS, CHF, CAD    PROCEDURE: The procedure was performed at the bedside  This was a routine study  The transthoracic approach was used  The study included complete 2D imaging, M-mode, complete spectral Doppler, and color Doppler  The heart rate was 60 bpm,  at the start of the study  Images were obtained from the parasternal, apical, subcostal, and suprasternal notch acoustic windows  This was a technically difficult study      LEFT VENTRICLE: Size was normal  Systolic function was normal  Ejection fraction was estimated to be 60 %  There were no regional wall motion abnormalities  Wall thickness was normal  There was no evidence of concentric hypertrophy  DOPPLER: Left ventricular diastolic function parameters were abnormal     VENTRICULAR SEPTUM: There was dyssynergic motion  These changes are consistent with RV volume overload  RIGHT VENTRICLE: The ventricle was mildly to moderately dilated  Systolic function was low normal  Wall thickness was increased  A pacing wire was present  LEFT ATRIUM: The atrium was mildly dilated  RIGHT ATRIUM: The atrium was moderately dilated  A pacing wire was present  MITRAL VALVE: Valve structure was normal  There was normal leaflet separation  DOPPLER: The transmitral velocity was within the normal range  There was no evidence for stenosis  There was mild regurgitation  AORTIC VALVE: The valve was trileaflet  Leaflets exhibited moderately increased thickness, mild calcification, and normal cuspal separation  DOPPLER: Transaortic velocity was minimally increased  There was no evidence for stenosis  There  was trace regurgitation  TRICUSPID VALVE: The valve structure was normal  There was normal leaflet separation  DOPPLER: The transtricuspid velocity was within the normal range  There was no evidence for stenosis  There was severe regurgitation  PULMONIC VALVE: Leaflets exhibited normal thickness, no calcification, and normal cuspal separation  DOPPLER: The transpulmonic velocity was within the normal range  There was no significant regurgitation  PERICARDIUM: There was no pericardial effusion  The pericardium was normal in appearance  AORTA: The root exhibited normal size  The ascending aorta was normal in size, when indexed for body surface area, measuring 3 8 cm (or 1 7 cm/m2 of body surface area)  SYSTEMIC VEINS: IVC: The inferior vena cava was dilated   Respirophasic changes were blunted (less than 50% variation)  SYSTEM MEASUREMENT TABLES    2D  %FS: 28 94 %  Ao Diam: 3 14 cm  EDV(Teich): 75 97 ml  EF(Teich): 56 07 %  ESV(Teich): 33 38 ml  IVSd: 1 01 cm  LA Area: 19 22 cm2  LA Diam: 3 06 cm  LVEDV MOD A4C: 50 04 ml  LVEF MOD A4C: 54 94 %  LVESV MOD A4C: 22 55 ml  LVIDd: 4 14 cm  LVIDs: 2 94 cm  LVLd A4C: 6 96 cm  LVLs A4C: 6 72 cm  LVOT Diam: 1 89 cm  LVPWd: 1 01 cm  RA Area: 26 01 cm2  RVIDd: 4 37 cm  SV MOD A4C: 27 49 ml  SV(Teich): 42 6 ml    CW  AV Env  Ti: 338 73 ms  AV VTI: 36 28 cm  AV Vmax: 1 57 m/s  AV Vmean: 1 07 m/s  AV maxP 84 mmHg  AV meanP 31 mmHg  TR Vmax: 1 9 m/s  TR maxP 5 mmHg    MM  TAPSE: 1 61 cm    PW  NESTOR (VTI): 1 12 cm2  NESTOR Vmax: 1 21 cm2  E': 0 06 m/s  E/E': 20 03  LVOT Env  Ti: 312 08 ms  LVOT VTI: 14 38 cm  LVOT Vmax: 0 67 m/s  LVOT Vmean: 0 46 m/s  LVOT maxP 81 mmHg  LVOT meanP 99 mmHg  LVSV Dopp: 40 46 ml  MV A [place]: 0 29 m/s  MV Dec [place]: 5 23 m/s2  MV DecT: 219 85 ms  MV E [place]: 1 15 m/s  MV E/A Ratio: 3 9  MV PHT: 63 76 ms  MVA By PHT: 3 45 cm2    [employer] Accredited [facility]    Prepared and electronically signed by    Viraj Clark MD  Signed 28-Mar-2019 18:13:19    No results found for this or any previous visit  No results found for this or any previous visit  No results found for this or any previous visit  Hanny Alberts MD    Portions of the record may have been created with voice recognition software   Occasional wrong word or "sound a like" substitutions may have occurred due to the inherent limitations of voice recognition software   Read the chart carefully and recognize, using context, where substitutions have occurred

## 2021-07-16 ENCOUNTER — OFFICE VISIT (OUTPATIENT)
Dept: URGENT CARE | Facility: MEDICAL CENTER | Age: 86
End: 2021-07-16
Payer: COMMERCIAL

## 2021-07-16 ENCOUNTER — HOSPITAL ENCOUNTER (EMERGENCY)
Facility: HOSPITAL | Age: 86
Discharge: HOME | End: 2021-07-17
Attending: EMERGENCY MEDICINE | Admitting: EMERGENCY MEDICINE
Payer: COMMERCIAL

## 2021-07-16 ENCOUNTER — APPOINTMENT (EMERGENCY)
Dept: RADIOLOGY | Facility: HOSPITAL | Age: 86
End: 2021-07-16
Payer: COMMERCIAL

## 2021-07-16 VITALS
HEART RATE: 69 BPM | OXYGEN SATURATION: 97 % | TEMPERATURE: 98.1 F | DIASTOLIC BLOOD PRESSURE: 61 MMHG | SYSTOLIC BLOOD PRESSURE: 131 MMHG | RESPIRATION RATE: 20 BRPM

## 2021-07-16 VITALS
SYSTOLIC BLOOD PRESSURE: 122 MMHG | OXYGEN SATURATION: 96 % | DIASTOLIC BLOOD PRESSURE: 59 MMHG | TEMPERATURE: 98.1 F | HEART RATE: 67 BPM | RESPIRATION RATE: 16 BRPM
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
## 2021-07-16 DIAGNOSIS — M25.552 LEFT HIP PAIN: ICD-10-CM

## 2021-07-16 DIAGNOSIS — N39.0 UTI (URINARY TRACT INFECTION): ICD-10-CM

## 2021-07-16 PROCEDURE — 71046 X-RAY EXAM CHEST 2 VIEWS: CPT

## 2021-07-16 PROCEDURE — 85025 COMPLETE CBC W/AUTO DIFF WBC: CPT | Performed by: EMERGENCY MEDICINE

## 2021-07-16 PROCEDURE — 84484 ASSAY OF TROPONIN QUANT: CPT | Performed by: EMERGENCY MEDICINE

## 2021-07-16 PROCEDURE — 99213 OFFICE O/P EST LOW 20 MIN: CPT | Performed by: PHYSICIAN ASSISTANT

## 2021-07-16 PROCEDURE — 99283 EMERGENCY DEPT VISIT LOW MDM: CPT

## 2021-07-16 PROCEDURE — 36415 COLL VENOUS BLD VENIPUNCTURE: CPT | Performed by: EMERGENCY MEDICINE

## 2021-07-16 PROCEDURE — 99285 EMERGENCY DEPT VISIT HI MDM: CPT | Performed by: EMERGENCY MEDICINE

## 2021-07-16 PROCEDURE — 73502 X-RAY EXAM HIP UNI 2-3 VIEWS: CPT

## 2021-07-16 PROCEDURE — 80053 COMPREHEN METABOLIC PANEL: CPT | Performed by: EMERGENCY MEDICINE

## 2021-07-16 PROCEDURE — 83880 ASSAY OF NATRIURETIC PEPTIDE: CPT | Performed by: EMERGENCY MEDICINE

## 2021-07-16 PROCEDURE — S9088 SERVICES PROVIDED IN URGENT: HCPCS | Performed by: PHYSICIAN ASSISTANT

## 2021-07-17 LAB
ALBUMIN SERPL BCP-MCNC: 3.6 G/DL (ref 3.5–5)
ALP SERPL-CCNC: 84 U/L (ref 46–116)
ALT SERPL W P-5'-P-CCNC: 26 U/L (ref 12–78)
ANION GAP SERPL CALCULATED.3IONS-SCNC: 3 MMOL/L (ref 4–13)
AST SERPL W P-5'-P-CCNC: 30 U/L (ref 5–45)
BACTERIA UR QL AUTO: ABNORMAL /HPF
BASOPHILS # BLD AUTO: 0.09 THOUSANDS/ΜL (ref 0–0.1)
BASOPHILS NFR BLD AUTO: 1 % (ref 0–1)
BILIRUB SERPL-MCNC: 0.49 MG/DL (ref 0.2–1)
BILIRUB UR QL STRIP: NEGATIVE
BUN SERPL-MCNC: 42 MG/DL (ref 5–25)
CALCIUM SERPL-MCNC: 9.3 MG/DL (ref 8.3–10.1)
CHLORIDE SERPL-SCNC: 104 MMOL/L (ref 100–108)
CLARITY UR: ABNORMAL
CO2 SERPL-SCNC: 31 MMOL/L (ref 21–32)
COLOR UR: YELLOW
CREAT SERPL-MCNC: 1.37 MG/DL (ref 0.6–1.3)
EOSINOPHIL # BLD AUTO: 0.37 THOUSAND/ΜL (ref 0–0.61)
EOSINOPHIL NFR BLD AUTO: 4 % (ref 0–6)
ERYTHROCYTE [DISTWIDTH] IN BLOOD BY AUTOMATED COUNT: 13.7 % (ref 11.6–15.1)
GFR SERPL CREATININE-BSD FRML MDRD: 36 ML/MIN/1.73SQ M
GLUCOSE SERPL-MCNC: 88 MG/DL (ref 65–140)
GLUCOSE UR STRIP-MCNC: NEGATIVE MG/DL
HCT VFR BLD AUTO: 43.3 % (ref 34.8–46.1)
HGB BLD-MCNC: 14.1 G/DL (ref 11.5–15.4)
HGB UR QL STRIP.AUTO: ABNORMAL
HYALINE CASTS #/AREA URNS LPF: ABNORMAL /LPF
IMM GRANULOCYTES # BLD AUTO: 0.02 THOUSAND/UL (ref 0–0.2)
IMM GRANULOCYTES NFR BLD AUTO: 0 % (ref 0–2)
KETONES UR STRIP-MCNC: NEGATIVE MG/DL
LEUKOCYTE ESTERASE UR QL STRIP: ABNORMAL
LYMPHOCYTES # BLD AUTO: 2.14 THOUSANDS/ΜL (ref 0.6–4.47)
LYMPHOCYTES NFR BLD AUTO: 22 % (ref 14–44)
MCH RBC QN AUTO: 30.5 PG (ref 26.8–34.3)
MCHC RBC AUTO-ENTMCNC: 32.6 G/DL (ref 31.4–37.4)
MCV RBC AUTO: 94 FL (ref 82–98)
MONOCYTES # BLD AUTO: 0.87 THOUSAND/ΜL (ref 0.17–1.22)
MONOCYTES NFR BLD AUTO: 9 % (ref 4–12)
NEUTROPHILS # BLD AUTO: 6.34 THOUSANDS/ΜL (ref 1.85–7.62)
NEUTS SEG NFR BLD AUTO: 64 % (ref 43–75)
NITRITE UR QL STRIP: NEGATIVE
NON-SQ EPI CELLS URNS QL MICRO: ABNORMAL /HPF
NRBC BLD AUTO-RTO: 0 /100 WBCS
NT-PROBNP SERPL-MCNC: 922 PG/ML
PH UR STRIP.AUTO: 5.5 [PH]
PLATELET # BLD AUTO: 205 THOUSANDS/UL (ref 149–390)
PMV BLD AUTO: 11.7 FL (ref 8.9–12.7)
POTASSIUM SERPL-SCNC: 3.9 MMOL/L (ref 3.5–5.3)
PROT SERPL-MCNC: 6.7 G/DL (ref 6.4–8.2)
PROT UR STRIP-MCNC: NEGATIVE MG/DL
RBC # BLD AUTO: 4.63 MILLION/UL (ref 3.81–5.12)
RBC #/AREA URNS AUTO: ABNORMAL /HPF
SODIUM SERPL-SCNC: 138 MMOL/L (ref 136–145)
SP GR UR STRIP.AUTO: 1.01 (ref 1–1.03)
TROPONIN I SERPL-MCNC: 0.02 NG/ML
UROBILINOGEN UR QL STRIP.AUTO: 0.2 E.U./DL
WBC # BLD AUTO: 9.83 THOUSAND/UL (ref 4.31–10.16)
WBC #/AREA URNS AUTO: ABNORMAL /HPF

## 2021-07-17 PROCEDURE — 87077 CULTURE AEROBIC IDENTIFY: CPT | Performed by: EMERGENCY MEDICINE

## 2021-07-17 PROCEDURE — 87086 URINE CULTURE/COLONY COUNT: CPT | Performed by: EMERGENCY MEDICINE

## 2021-07-17 PROCEDURE — 81001 URINALYSIS AUTO W/SCOPE: CPT | Performed by: EMERGENCY MEDICINE

## 2021-07-17 PROCEDURE — 87186 SC STD MICRODIL/AGAR DIL: CPT | Performed by: EMERGENCY MEDICINE

## 2021-07-17 PROCEDURE — 96365 THER/PROPH/DIAG IV INF INIT: CPT

## 2021-07-17 RX ORDER — CEPHALEXIN 500 MG/1
500 CAPSULE ORAL EVERY 8 HOURS SCHEDULED
Qty: 15 CAPSULE | Refills: 0 | Status: SHIPPED | OUTPATIENT
Start: 2021-07-17 | End: 2021-07-22

## 2021-07-17 RX ADMIN — CEFTRIAXONE SODIUM 1000 MG: 10 INJECTION, POWDER, FOR SOLUTION INTRAVENOUS at 02:01

## 2021-07-17 NOTE — ED PROVIDER NOTES
History  Chief Complaint   Patient presents with   Jax Sarah Fall     pt presents by hospital wheelchair with c/o L buttock/posterior leg pain s/p fall last night (possibly due to hyperglycemia ) +eliquis, kiana head strike      Niyah Menjivar is an 82y o  year old female with PMHx significant for CAD, CHF, IDDM, HTN, baseline confusion, who presents to the ED today with left hip pain for one day after a mechanical fall last night  Hip pain is exacerbated by palpation and movement and relieved by rest   The pain is sharp in quality, does not radiate, and currently rated moderate to severe in severity  Also in the last 24 hours the patient had a period of time where her insulin pump was not working and her blood sugars were persistently 300s  This has been remedied per the patient's daughter  The patient denies any pain anywhere else  She is having some shortness of breath which the daughter and the patient think that is baseline as she has CHF and normally has some shortness of breath particularly on exertion at home  She does not wear oxygen at home  Denies any chest pain, lightheadedness, headaches, loss of consciousness, head strike with fall, nausea, changes with urination or bowel movements, or any other complaints at this time  Further history and review of systems limited secondary to dementia  Ambulates with a walker at baseline and has been able to ambulate for the last 24 hours since the fall  History provided by:  Medical records and patient (daughter)  History limited by:  Dementia   used: No    Fall      Prior to Admission Medications   Prescriptions Last Dose Informant Patient Reported? Taking?    Continuous Blood Gluc  (Dexcom G6 ) SIVAKUMAR  Child Yes No   Sig: Use daily as directed for CGM   Continuous Blood Gluc Sensor (Dexcom G6 Sensor) MISC  Child Yes No   Sig: Use daily as directed for CGM - Change every 10 days   Continuous Blood Gluc Transmit (Dexcom G6 Transmitter) MISC  Child Yes No   Sig: Use daily as directed for CGM - Change every 3 months   Eliquis 5 MG  Child No No   Sig: TAKE 1 TABLET BY MOUTH TWICE A DAY   Insulin Disposable Pump (V-Go 20) KIT  Child No No   Sig: Use 1 per day Disp #30 for 30 day supply     Klor-Con M10 10 MEQ tablet  Child No No   Sig: TAKE 1 TABLET BY MOUTH EVERY DAY   NovoLOG 100 UNIT/ML injection  Child No No   Sig: INJECT 100 UNITS UNDER THE SKIN DAILY AS DIRECTED   albuterol (2 5 mg/3 mL) 0 083 % nebulizer solution  Child No No   Sig: TAKE 1 VIAL (2 5 MG) BY NEBULIZATION EVERY 6 HOURS AS NEEDED FOR WHEEZING OR SHORTNESS OF BREATH   atorvastatin (LIPITOR) 40 mg tablet  Child No No   Sig: TAKE 1 TABLET BY MOUTH EVERY DAY   calcium carbonate-vitamin D (OSCAL-D) 500 mg-200 units per tablet  Child No No   Sig: Take 1 tablet by mouth 2 (two) times a day with meals   docusate sodium (COLACE) 100 mg capsule  Child No No   Sig: Take 1 capsule (100 mg total) by mouth 2 (two) times a day   Patient taking differently: Take 100 mg by mouth 2 (two) times a day as needed    glucose blood test strip  Child No No   Sig: USE AS DIRECTED 3 TIMES A DAY   metoprolol tartrate (LOPRESSOR) 25 mg tablet  Child No No   Sig: TAKE 1 TABLET (25 MG TOTAL) BY MOUTH EVERY 12 (TWELVE) HOURS   senna (SENOKOT) 8 6 mg  Child No No   Sig: Take 1 tablet (8 6 mg total) by mouth daily   Patient taking differently: Take 1 tablet by mouth daily at bedtime as needed    torsemide (DEMADEX) 20 mg tablet  Child Yes No   Sig: Take 20 mg by mouth daily      Facility-Administered Medications: None       Past Medical History:   Diagnosis Date    Arthritis     Asthma     CAD (coronary artery disease) of artery bypass graft     Cardiac disease     CHF (congestive heart failure) (HCC)     Dementia (HCC)     Depression     Diabetes mellitus (HCC)     Heart attack (HCC)     Hyperlipidemia     Hypertension     MI (myocardial infarction) (HCC)     Neuropathy     BRANT (obstructive sleep apnea)     Peptic ulcer     was + for H pylori    Transient cerebral ischemia 2011    with neg CUS and ECHO       Past Surgical History:   Procedure Laterality Date    APPENDECTOMY      CATARACT EXTRACTION       SECTION      COLONOSCOPY  2004    CORONARY ANGIOPLASTY  2006    CORONARY ANGIOPLASTY WITH STENT PLACEMENT      CORONARY ARTERY BYPASS GRAFT      DENTAL SURGERY      EGD AND COLONOSCOPY      ELBOW SURGERY      resolved     ESOPHAGOGASTRODUODENOSCOPY         Family History   Problem Relation Age of Onset    Diabetes Mother     Cancer Sister     Heart disease Sister     Thyroid disease Sister     Cancer Brother     Cancer Father     Colon cancer Family     Diabetes Family     Mitral valve prolapse Family     Thyroid cancer Family      I have reviewed and agree with the history as documented  E-Cigarette/Vaping    E-Cigarette Use Never User      E-Cigarette/Vaping Substances    Nicotine No     THC No     CBD No     Flavoring No      Social History     Tobacco Use    Smoking status: Never Smoker    Smokeless tobacco: Never Used   Vaping Use    Vaping Use: Never used   Substance Use Topics    Alcohol use: Not Currently    Drug use: Never        Review of Systems   Reason unable to perform ROS: see above  Physical Exam  ED Triage Vitals [21]   Temperature Pulse Respirations Blood Pressure SpO2   98 1 °F (36 7 °C) 67 16 122/59 96 %      Temp Source Heart Rate Source Patient Position - Orthostatic VS BP Location FiO2 (%)   Tympanic Monitor -- -- --      Pain Score       --             Orthostatic Vital Signs  Vitals:    21   BP: 122/59   Pulse: 67       Physical Exam  Vitals and nursing note reviewed  Constitutional:       General: She is not in acute distress  Appearance: She is well-developed  She is not diaphoretic  HENT:      Head: Normocephalic and atraumatic     Eyes:      General: No scleral icterus  Conjunctiva/sclera: Conjunctivae normal    Neck:      Vascular: No JVD  Trachea: No tracheal deviation  Cardiovascular:      Rate and Rhythm: Normal rate and regular rhythm  Pulmonary:      Comments: tachypneic  Abdominal:      Palpations: Abdomen is soft  Tenderness: There is no abdominal tenderness  There is no guarding  Musculoskeletal:         General: No deformity  Cervical back: Neck supple  Comments: Tenderness to palpation L lateral hip region, no obvious deformity, able to flex and extend at L knee, hip, and ankle   Skin:     General: Skin is warm and dry  Findings: No rash  Neurological:      Mental Status: She is alert  Comments: Confused (at baseline), strength 5/5 in all extremities, face symmetric, normal speech   Psychiatric:         Behavior: Behavior normal          ED Medications  Medications   ceftriaxone (ROCEPHIN) 1 g/50 mL in dextrose IVPB (0 mg Intravenous Stopped 7/17/21 0326)       Diagnostic Studies  Results Reviewed     Procedure Component Value Units Date/Time    Urine Microscopic [[id number]]  (Abnormal) Collected: 07/17/21 0132    Lab Status: Final result Specimen: Urine, Clean Catch Updated: 07/17/21 0147     RBC, UA None Seen /hpf      WBC, UA Innumerable /hpf      Epithelial Cells None Seen /hpf      Bacteria, UA Moderate /hpf      Hyaline Casts, UA None Seen /lpf     Urine culture [[id number]] Collected: 07/17/21 0132    Lab Status:  In process Specimen: Urine, Clean Catch Updated: 07/17/21 0147    UA w Reflex to Microscopic w Reflex to Culture [[id number]]  (Abnormal) Collected: 07/17/21 0132    Lab Status: Final result Specimen: Urine, Clean Catch Updated: 07/17/21 0145     Color, UA Yellow     Clarity, UA Turbid     Specific Sedan, UA 1 013     pH, UA 5 5     Leukocytes, UA Large     Nitrite, UA Negative     Protein, UA Negative mg/dl      Glucose, UA Negative mg/dl      Ketones, UA Negative mg/dl      Urobilinogen, UA 0 2 E U /dl      Bilirubin, UA Negative     Blood, UA Small    Comprehensive metabolic panel [[id number]]  (Abnormal) Collected: 07/16/21 2343    Lab Status: Final result Specimen: Blood from Arm, Left Updated: 07/17/21 0018     Sodium 138 mmol/L      Potassium 3 9 mmol/L      Chloride 104 mmol/L      CO2 31 mmol/L      ANION GAP 3 mmol/L      BUN 42 mg/dL      Creatinine 1 37 mg/dL      Glucose 88 mg/dL      Calcium 9 3 mg/dL      AST 30 U/L      ALT 26 U/L      Alkaline Phosphatase 84 U/L      Total Protein 6 7 g/dL      Albumin 3 6 g/dL      Total Bilirubin 0 49 mg/dL      eGFR 36 ml/min/1 73sq m     Narrative:      [facility] guidelines for Chronic Kidney Disease (CKD):     Stage 1 with normal or high GFR (GFR > 90 mL/min/1 73 square meters)    Stage 2 Mild CKD (GFR = 60-89 mL/min/1 73 square meters)    Stage 3A Moderate CKD (GFR = 45-59 mL/min/1 73 square meters)    Stage 3B Moderate CKD (GFR = 30-44 mL/min/1 73 square meters)    Stage 4 Severe CKD (GFR = 15-29 mL/min/1 73 square meters)    Stage 5 End Stage CKD (GFR <15 mL/min/1 73 square meters)  Note: GFR calculation is accurate only with a steady state creatinine    NT-BNP PRO [[id number]]  (Abnormal) Collected: 07/16/21 2343    Lab Status: Final result Specimen: Blood from Arm, Left Updated: 07/17/21 0018     NT-proBNP 922 pg/mL     Troponin I [[id number]]  (Normal) Collected: 07/16/21 2343    Lab Status: Final result Specimen: Blood from Arm, Left Updated: 07/17/21 0016     Troponin I 0 02 ng/mL     CBC and differential [[id number]] Collected: 07/16/21 2343    Lab Status: Final result Specimen: Blood from Arm, Left Updated: 07/17/21 0009     WBC 9 83 Thousand/uL      RBC 4 63 Million/uL      Hemoglobin 14 1 g/dL      Hematocrit 43 3 %      MCV 94 fL      MCH 30 5 pg      MCHC 32 6 g/dL      RDW 13 7 %      MPV 11 7 fL      Platelets 119 Thousands/uL      nRBC 0 /100 WBCs      Neutrophils Relative 64 %      Immat GRANS % 0 % Lymphocytes Relative 22 %      Monocytes Relative 9 %      Eosinophils Relative 4 %      Basophils Relative 1 %      Neutrophils Absolute 6 34 Thousands/µL      Immature Grans Absolute 0 02 Thousand/uL      Lymphocytes Absolute 2 14 Thousands/µL      Monocytes Absolute 0 87 Thousand/µL      Eosinophils Absolute 0 37 Thousand/µL      Basophils Absolute 0 09 Thousands/µL                  XR hip/pelv 2-3 vws left if performed   Final Result by Hattie Mcgill MD (07/17 1016)      No acute osseous abnormality  Workstation performed: [id number]         XR chest 2 views   Final Result by Annalee Garcia MD (07/17 5016)      No acute cardiopulmonary disease  Workstation performed: [id number]               Procedures  Procedures      ED Course                                       MDM  Number of Diagnoses or Management Options  Diagnosis management comments: Hemodynamically stable, mildly tachypneic but with normal oxygen saturation on room air on initial evaluation  Bilateral breath sounds present  No associated chest pain  Not tachycardic or febrile  Blood pressure within normal range  Patient is at her baseline mental status  She does not have any external signs of trauma except for pain on palpation her left hip  Initial ddx includes but is not limited to:  Hip contusion, fracture dislocation  Metabolic disturbance, hyperglycemia, infection, CHF exacerbation, ACS, pneumonia, or other pathology  Anticipate ultimate dispo to be to be determined per workup           Amount and/or Complexity of Data Reviewed  Clinical lab tests: ordered and reviewed  Tests in the radiology section of CPT®: reviewed and ordered  Tests in the medicine section of CPT®: ordered and reviewed  Review and summarize past medical records: yes  Discuss the patient with other providers: yes    Risk of Complications, Morbidity, and/or Mortality  Presenting problems: moderate  Diagnostic procedures: low  Management options: moderate    Patient Progress  Patient progress: stable      Disposition  Final diagnoses:   Fall, initial encounter   UTI (urinary tract infection)     Time reflects when diagnosis was documented in both MDM as applicable and the Disposition within this note     Time User Action Codes Description Comment    7/16/2021 11:27 PM Nury Renato Add Thalia Kaur  XXXA] Fall, initial encounter     7/17/2021  1:50 AM Check, Erminia Castleman Add [N39 0] UTI (urinary tract infection)       ED Disposition     ED Disposition Condition Date/Time Comment    Discharge Stable Sat Jul 17, 2021 12:44 AM Verna All discharge to home/self care  Results of completed tests discussed  Return to ER precautions given, verbal and written, and questions answered satisfactorily                  Follow-up Information     Follow up With Specialties Details Why Contact Info    Clint Goddard MD Family Medicine Call in 1 day To recheck symptoms and follow up on your ER visit [address] [facility]  [phone]            Discharge Medication List as of 7/17/2021  1:51 AM      START taking these medications    Details   cephalexin (KEFLEX) 500 mg capsule Take 1 capsule (500 mg total) by mouth every 8 (eight) hours for 5 days, Starting Sat 7/17/2021, Until Thu 7/22/2021, Normal         CONTINUE these medications which have NOT CHANGED    Details   albuterol (2 5 mg/3 mL) 0 083 % nebulizer solution TAKE 1 VIAL (2 5 MG) BY NEBULIZATION EVERY 6 HOURS AS NEEDED FOR WHEEZING OR SHORTNESS OF BREATH, Normal      atorvastatin (LIPITOR) 40 mg tablet TAKE 1 TABLET BY MOUTH EVERY DAY, Normal      calcium carbonate-vitamin D (OSCAL-D) 500 mg-200 units per tablet Take 1 tablet by mouth 2 (two) times a day with meals, Starting Sat 4/28/2018, No Print      Continuous Blood Gluc  (Dexcom G6 ) SIVAKUMAR Use daily as directed for CGM, Historical Med      Continuous Blood Gluc Sensor (Dexcom G6 Sensor) MISC Use daily as directed for CGM - Change every 10 days, Historical Med      Continuous Blood Gluc Transmit (Dexcom G6 Transmitter) MISC Use daily as directed for CGM - Change every 3 months, Historical Med      docusate sodium (COLACE) 100 mg capsule Take 1 capsule (100 mg total) by mouth 2 (two) times a day, Starting Sat 4/28/2018, No Print      Eliquis 5 MG TAKE 1 TABLET BY MOUTH TWICE A DAY, Normal      glucose blood test strip USE AS DIRECTED 3 TIMES A DAY, Normal      Insulin Disposable Pump (V-Go 20) KIT Use 1 per day Disp #30 for 30 day supply , Normal      Klor-Con M10 10 MEQ tablet TAKE 1 TABLET BY MOUTH EVERY DAY, Normal      metoprolol tartrate (LOPRESSOR) 25 mg tablet TAKE 1 TABLET (25 MG TOTAL) BY MOUTH EVERY 12 (TWELVE) HOURS, Starting Mon 6/7/2021, Normal      NovoLOG 100 UNIT/ML injection INJECT 100 UNITS UNDER THE SKIN DAILY AS DIRECTED, Normal      senna (SENOKOT) 8 6 mg Take 1 tablet (8 6 mg total) by mouth daily, Starting Sun 4/29/2018, No Print      torsemide (DEMADEX) 20 mg tablet Take 20 mg by mouth daily, Historical Med           No discharge procedures on file  PDMP Review     None           ED Provider  Attending physically available and evaluated Tori Avelar  CONNIE managed the patient along with the ED Attending      Electronically Signed by         Pablo Solorio DO  07/17/21 2033

## 2021-07-17 NOTE — ED ATTENDING ATTESTATION
7/16/2021  ISea DO, saw and evaluated the patient  I have discussed the patient with the resident/non-physician practitioner and agree with the resident's/non-physician practitioner's findings, Plan of Care, and MDM as documented in the resident's/non-physician practitioner's note, except where noted  All available labs and Radiology studies were reviewed  I was present for key portions of any procedure(s) performed by the resident/non-physician practitioner and I was immediately available to provide assistance  At this point I agree with the current assessment done in the Emergency Department  I have conducted an independent evaluation of this patient a history and physical is as follows:    82 yof with fall last night  Then today seemed fine then complained of left hip pain  Tylenol with mild relief  Walking with minimal difficult   Plan xray, metabolic workup    ED Course         Critical Care Time  Procedures

## 2021-07-17 NOTE — DISCHARGE INSTRUCTIONS
You were seen today in the Emergency Department for a fall  Your workup included blood tests, urine tests, and imaging  Please follow up with your Primary Care Provider in the next 1-2 days to recheck your symptoms and to follow up on your visit to the Emergency Department today  Please return to the Emergency Department if you have fevers, chills, chest pain, shortness of breath, are unable to eat or drink, or have any other symptoms that concern you  Please look this over and let your nurse and/or me know if you have any further questions before you leave

## 2021-07-17 NOTE — PROGRESS NOTES
[address] [facility]        NAME: Parth Rodriguez is a 82 y o  female  : 1935    MRN: [id number]  DATE: 2021  TIME: 8:50 PM    Assessment and Plan   Fall, initial encounter [W19  XXXA]  1  Fall, initial encounter  Transfer to other facility         Patient Instructions       Patient was sent to ED  Patient fell and does not remember fall and is on eliquis  Chief Complaint     Chief Complaint   Patient presents with    Back Pain     pts daughter said pt fell on her buttocks last night due to low blood sugar  Pt today per daughter has been up toileting herself offering no complaints of pain  tonight at dinner time, pts daughter relates pt c/o left sided buttocks pain  History of Present Illness       Patient presents with daughter for right hip pain that started when she fell last night on 21  Patients daughter reports she fell due to sugars being in the 300's  Patients reports she does not remember falling  Patient reports pain bearing weight on left leg  Patient is ambulating with a wheel chair  Patient needs assistance to stand  Patient is taking no pain medications and rates pain 10/10  Review of Systems   Review of Systems   Constitutional: Negative  Respiratory: Negative  Cardiovascular: Negative  Musculoskeletal:        Left hip pain   Neurological: Negative  Psychiatric/Behavioral: Positive for confusion           Current Medications       Current Outpatient Medications:     albuterol (2 5 mg/3 mL) 0 083 % nebulizer solution, TAKE 1 VIAL (2 5 MG) BY NEBULIZATION EVERY 6 HOURS AS NEEDED FOR WHEEZING OR SHORTNESS OF BREATH, Disp: 150 mL, Rfl: 1    atorvastatin (LIPITOR) 40 mg tablet, TAKE 1 TABLET BY MOUTH EVERY DAY, Disp: 90 tablet, Rfl: 3    calcium carbonate-vitamin D (OSCAL-D) 500 mg-200 units per tablet, Take 1 tablet by mouth 2 (two) times a day with meals, Disp: 60 tablet, Rfl: 0    Continuous Blood Gluc  (Dexcom G6 ) SIVAKUMAR, Use daily as directed for CGM, Disp: , Rfl:     Continuous Blood Gluc Sensor (Dexcom G6 Sensor) MISC, Use daily as directed for CGM - Change every 10 days, Disp: , Rfl:     Continuous Blood Gluc Transmit (Dexcom G6 Transmitter) MISC, Use daily as directed for CGM - Change every 3 months, Disp: , Rfl:     docusate sodium (COLACE) 100 mg capsule, Take 1 capsule (100 mg total) by mouth 2 (two) times a day (Patient taking differently: Take 100 mg by mouth 2 (two) times a day as needed ), Disp: 10 capsule, Rfl: 0    Eliquis 5 MG, TAKE 1 TABLET BY MOUTH TWICE A DAY, Disp: 60 tablet, Rfl: 5    glucose blood test strip, USE AS DIRECTED 3 TIMES A DAY, Disp: 300 each, Rfl: 1    Insulin Disposable Pump (V-Go 20) KIT, Use 1 per day Disp #30 for 30 day supply  , Disp: 30 kit, Rfl: 1    Klor-Con M10 10 MEQ tablet, TAKE 1 TABLET BY MOUTH EVERY DAY, Disp: 90 tablet, Rfl: 3    metoprolol tartrate (LOPRESSOR) 25 mg tablet, TAKE 1 TABLET (25 MG TOTAL) BY MOUTH EVERY 12 (TWELVE) HOURS, Disp: 180 tablet, Rfl: 3    NovoLOG 100 UNIT/ML injection, INJECT 100 UNITS UNDER THE SKIN DAILY AS DIRECTED, Disp: 30 mL, Rfl: 3    senna (SENOKOT) 8 6 mg, Take 1 tablet (8 6 mg total) by mouth daily (Patient taking differently: Take 1 tablet by mouth daily at bedtime as needed ), Disp: 120 each, Rfl: 0    torsemide (DEMADEX) 20 mg tablet, Take 20 mg by mouth daily, Disp: , Rfl:     Current Allergies     Allergies as of 07/16/2021 - Reviewed 07/16/2021   Allergen Reaction Noted    Ace inhibitors      Chlorpromazine      Latex      Lisinopril      Namenda [memantine] Drowsiness 09/10/2017    Penicillins Seizures 09/10/2017    Propoxyphene      Stadol [butorphanol]  09/10/2017            The following portions of the patient's history were reviewed and updated as appropriate: allergies, current medications, past family history, past medical history, past social history, past surgical history and problem list      Past Medical History: Diagnosis Date    Arthritis     Asthma     CAD (coronary artery disease) of artery bypass graft     Cardiac disease     CHF (congestive heart failure) (HCC)     Dementia [facility]     Depression     Diabetes mellitus ([facility]     Heart attack ([facility]     Hyperlipidemia     Hypertension     MI (myocardial infarction) ([facility] [address] [facility]     Neuropathy     BRANT (obstructive sleep apnea)     Peptic ulcer     was + for H pylori    Transient cerebral ischemia 2011    with neg CUS and ECHO       Past Surgical History:   Procedure Laterality Date    APPENDECTOMY      CATARACT EXTRACTION       SECTION      COLONOSCOPY  2004    CORONARY ANGIOPLASTY  2006    CORONARY ANGIOPLASTY WITH STENT PLACEMENT      CORONARY ARTERY BYPASS GRAFT      DENTAL SURGERY      EGD AND COLONOSCOPY      ELBOW SURGERY      resolved     ESOPHAGOGASTRODUODENOSCOPY         Family History   Problem Relation Age of Onset    Diabetes Mother     Cancer Sister     Heart disease Sister     Thyroid disease Sister     Cancer Brother     Cancer Father     Colon cancer Family     Diabetes Family     Mitral valve prolapse Family     Thyroid cancer Family          Medications have been verified  Objective   /61   Pulse 69   Temp 98 1 °F (36 7 °C)   Resp 20   SpO2 97%   No LMP recorded  Patient is postmenopausal        Physical Exam     Physical Exam  Constitutional:       Appearance: She is normal weight  HENT:      Head: Normocephalic  Cardiovascular:      Rate and Rhythm: Normal rate and regular rhythm  Heart sounds: Murmur heard  Pulmonary:      Breath sounds: Normal breath sounds  Musculoskeletal:      Comments: Patient could not extend left knee  Pain with limited left leg log roll  Mild pain over lateral left hip  NO ecchymosis on left buttocks  Patient can't stand up without two assist     Neurological:      Mental Status: She is alert  She is disoriented     Psychiatric: Mood and Affect: Mood normal          Behavior: Behavior normal

## 2021-07-19 LAB — BACTERIA UR CULT: ABNORMAL

## 2021-07-29 ENCOUNTER — OFFICE VISIT (OUTPATIENT)
Dept: FAMILY MEDICINE CLINIC | Facility: CLINIC | Age: 86
End: 2021-07-29
Payer: COMMERCIAL

## 2021-07-29 VITALS
HEART RATE: 64 BPM | DIASTOLIC BLOOD PRESSURE: 66 MMHG | RESPIRATION RATE: 16 BRPM | OXYGEN SATURATION: 97 % | SYSTOLIC BLOOD PRESSURE: 122 MMHG | TEMPERATURE: 97.5 F

## 2021-07-29 DIAGNOSIS — F02.80 LATE ONSET ALZHEIMER'S DEMENTIA WITHOUT BEHAVIORAL DISTURBANCE (HCC): Chronic | ICD-10-CM

## 2021-07-29 DIAGNOSIS — IMO0002 DM (DIABETES MELLITUS), TYPE 2, UNCONTROLLED W/OPHTHALMIC COMPLICATION: ICD-10-CM

## 2021-07-29 DIAGNOSIS — E78.2 MIXED HYPERLIPIDEMIA: ICD-10-CM

## 2021-07-29 DIAGNOSIS — Z00.00 MEDICARE ANNUAL WELLNESS VISIT, SUBSEQUENT: Primary | ICD-10-CM

## 2021-07-29 DIAGNOSIS — G30.1 LATE ONSET ALZHEIMER'S DEMENTIA WITHOUT BEHAVIORAL DISTURBANCE (HCC): Chronic | ICD-10-CM

## 2021-07-29 DIAGNOSIS — I10 ESSENTIAL HYPERTENSION: Chronic | ICD-10-CM

## 2021-07-29 DIAGNOSIS — I48.0 PAF (PAROXYSMAL ATRIAL FIBRILLATION) (HCC): ICD-10-CM

## 2021-07-29 PROCEDURE — 3074F SYST BP LT 130 MM HG: CPT | Performed by: FAMILY MEDICINE

## 2021-07-29 PROCEDURE — G0439 PPPS, SUBSEQ VISIT: HCPCS | Performed by: FAMILY MEDICINE

## 2021-07-29 PROCEDURE — 1101F PT FALLS ASSESS-DOCD LE1/YR: CPT | Performed by: FAMILY MEDICINE

## 2021-07-29 PROCEDURE — 1036F TOBACCO NON-USER: CPT | Performed by: FAMILY MEDICINE

## 2021-07-29 PROCEDURE — 1160F RVW MEDS BY RX/DR IN RCRD: CPT | Performed by: FAMILY MEDICINE

## 2021-07-29 PROCEDURE — 3078F DIAST BP <80 MM HG: CPT | Performed by: FAMILY MEDICINE

## 2021-07-29 PROCEDURE — 3288F FALL RISK ASSESSMENT DOCD: CPT | Performed by: FAMILY MEDICINE

## 2021-07-29 PROCEDURE — 3725F SCREEN DEPRESSION PERFORMED: CPT | Performed by: FAMILY MEDICINE

## 2021-07-29 PROCEDURE — 1170F FXNL STATUS ASSESSED: CPT | Performed by: FAMILY MEDICINE

## 2021-07-29 PROCEDURE — 1125F AMNT PAIN NOTED PAIN PRSNT: CPT | Performed by: FAMILY MEDICINE

## 2021-07-29 PROCEDURE — 99214 OFFICE O/P EST MOD 30 MIN: CPT | Performed by: FAMILY MEDICINE

## 2021-07-29 NOTE — PROGRESS NOTES
Assessment/Plan:    DM (diabetes mellitus), type 2, uncontrolled w/ophthalmic complication (HCC)    Lab Results   Component Value Date    HGBA1C 7 8 (H) 06/07/2021     [facility] endocrinology  Essential hypertension  Controlled  DASH diet  Continue metoprolol per cardiology  PAF (paroxysmal atrial fibrillation) ([facility] )  Continue eliquis per cardiology  Hyperlipidemia  Continue atorvastatin 40mg qhs  Flu shot yearly    Got Covid19 vaccine  Got PCV 13 in 2016, got pneumovax in 2014  Fall precautions    RTO in 6 months         Diagnoses and all orders for this visit:    Medicare annual wellness visit, subsequent    DM (diabetes mellitus), type 2, uncontrolled w/ophthalmic complication ([facility] [address] )    Essential hypertension    PAF (paroxysmal atrial fibrillation) ([facility] [address] )    Mixed hyperlipidemia    Late onset Alzheimer's dementia without behavioral disturbance ([facility] )          Subjective:      Patient ID: Luz Elliott is a 82 y o  female  HPI      Pt is here with her daughter       DM---6/2021 hgA1C 7 8 uncontrolled but improved  [facility] endocrinology  She is on V-Go 20      HTN---She is on metoprolol 25mg bid now  BP is good today  Pt denies dizzy or lightheaded     CHF---She is on torsemide 20mg QD  Wt is stable per daughter  She is on potassium supplement     Afib---She is on eliquis 5mg bid  Denies bleeding signs    [facility] cardiology Dr Gavi Velazquez pacemaker 5/2014, CAD s/p bypass 1999       Hyperlipidemia---on atorvastatin 40mg qhs  Denies side effects       Dementia---Stable       Family lives with her  Daughter helped her with her medications    Leanora Bough recently but no injury  Use walker at home     Denies depression        The following portions of the patient's history were reviewed and updated as appropriate: allergies, current medications, past family history, past medical history, past social history, past surgical history and problem list     Review of Systems   Constitutional: Negative for appetite change, chills and fever  HENT: Negative for congestion, ear pain, sinus pain and sore throat  Eyes: Negative for discharge and itching  Respiratory: Negative for apnea, cough, chest tightness, shortness of breath and wheezing  Cardiovascular: Negative for chest pain, palpitations and leg swelling  Gastrointestinal: Negative for abdominal pain, anal bleeding, constipation, diarrhea, nausea and vomiting  Endocrine: Negative for cold intolerance, heat intolerance and polyuria  Genitourinary: Negative for difficulty urinating and dysuria  Musculoskeletal: Negative for arthralgias, back pain and myalgias  Skin: Negative for rash  Neurological: Negative for dizziness and headaches  Psychiatric/Behavioral: Negative for agitation  Objective:      /66 (BP Location: Left arm, Patient Position: Sitting, Cuff Size: Large)   Pulse 64   Temp 97 5 °F (36 4 °C) (Tympanic)   Resp 16   SpO2 97%          Physical Exam  Constitutional:       General: She is not in acute distress  Appearance: She is well-developed  HENT:      Head: Normocephalic  Eyes:      General:         Right eye: No discharge  Left eye: No discharge  Conjunctiva/sclera: Conjunctivae normal    Neck:      Thyroid: No thyromegaly  Cardiovascular:      Rate and Rhythm: Normal rate and regular rhythm  Heart sounds: Normal heart sounds  No murmur heard  No friction rub  No gallop  Pulmonary:      Effort: Pulmonary effort is normal  No respiratory distress  Breath sounds: Normal breath sounds  No wheezing or rales  Chest:      Chest wall: No tenderness  Abdominal:      General: Bowel sounds are normal  There is no distension  Palpations: Abdomen is soft  There is no mass  Tenderness: There is no abdominal tenderness  There is no guarding or rebound  Musculoskeletal:         General: No tenderness or deformity  Cervical back: Normal range of motion  Comments:  On wheelchair   Lymphadenopathy:      Cervical: No cervical adenopathy  Neurological:      Mental Status: She is alert

## 2021-07-29 NOTE — PROGRESS NOTES
Chief Complaint   Patient presents with   [facility] Wellness Visit     Subsequent  Health Maintenance   Topic Date Due    SLP PLAN OF CARE  Never done    DTaP,Tdap,and Td Vaccines (1 - Tdap) 09/17/1958   Kassie Ellis Medicare Annual Wellness Visit (AWV)  06/06/2020    Influenza Vaccine (1) 09/01/2021    HEMOGLOBIN A1C  09/07/2021    DM Eye Exam  10/01/2021    Diabetic Foot Exam  05/05/2022    DXA SCAN  05/16/2022    URINE MICROALBUMIN  06/09/2022    BMI: Adult  06/30/2022    Fall Risk  07/29/2022    Depression Remission PHQ  07/29/2022    Pneumococcal Vaccine: 65+ Years  Completed    COVID-19 Vaccine  Completed    HIB Vaccine  Aged Out    Hepatitis B Vaccine  Aged Out    IPV Vaccine  Aged Out    Hepatitis A Vaccine  Aged Out    Meningococcal ACWY Vaccine  Aged Out    HPV Vaccine  Aged Out        Assessment and Plan:     Problem List Items Addressed This Visit        Endocrine    DM (diabetes mellitus), type 2, uncontrolled w/ophthalmic complication ([address] )       Lab Results   Component Value Date    HGBA1C 7 8 (H) 06/07/2021     [facility] endocrinology  Cardiovascular and Mediastinum    Essential hypertension (Chronic)     Controlled  DASH diet  Continue metoprolol per cardiology  PAF (paroxysmal atrial fibrillation) ([facility] [address] )     Continue eliquis per cardiology  Nervous and Auditory    Dementia without behavioral disturbance (HCC) (Chronic)       Other    Hyperlipidemia     Continue atorvastatin 40mg qhs  Other Visit Diagnoses     Medicare annual wellness visit, subsequent    -  Primary           Preventive health issues were discussed with patient, and age appropriate screening tests were ordered as noted in patient's After Visit Summary  Personalized health advice and appropriate referrals for health education or preventive services given if needed, as noted in patient's After Visit Summary       History of Present Illness:     Patient presents for Medicare Annual Wellness visit    Patient Care Team:  Joanna Denise MD as PCP - Sampson Greer MD as PCP - Endocrinology (Endocrinology)  MD Fatou Egan CRNP [place], PA-C Soloi, Charleen Ba, MD Jody Kaye PA-C (Physician Assistant)     Problem List:     Patient Active Problem List   Diagnosis    3-vessel coronary artery disease    Dementia without behavioral disturbance ([place] [address] )    Depression    Diabetic retinopathy ([place] [address] )    DM (diabetes mellitus), type 2, uncontrolled w/ophthalmic complication ([place] [address] )    Gastroesophageal reflux disease with hiatal hernia    Glaucoma    Hyperlipidemia    Essential hypertension    [facility]  )    Obesity (BMI 30-39  9)    Obstructive sleep apnea    Osteoarthritis of knee    Urine incontinence    Ventricular tachycardia (HCC)    Acute on chronic combined systolic and diastolic congestive heart failure (HCC)    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)    Dyspnea on minimal exertion    PAF (paroxysmal atrial fibrillation) ([facility])    SSS (sick sinus syndrome) ([facility])    Mild intermittent asthma without complication    Osteopenia    Obesity, morbid (HCC)      Past Medical and Surgical History:     Past Medical History:   Diagnosis Date    Arthritis     Asthma     CAD (coronary artery disease) of artery bypass graft     Cardiac disease     CHF (congestive heart failure) ([facility])     Dementia (HCC)     Depression     Diabetes mellitus [facility]  )     Heart attack ([facility]  )     Hyperlipidemia     Hypertension     MI (myocardial infarction) ([facility]  )     Neuropathy     BRANT (obstructive sleep apnea)     Peptic ulcer     was + for H pylori    Transient cerebral ischemia 11/2011    with neg CUS and ECHO     Past Surgical History:   Procedure Laterality Date    APPENDECTOMY      CATARACT EXTRACTION       SECTION      COLONOSCOPY  2004    CORONARY ANGIOPLASTY  2006    CORONARY ANGIOPLASTY WITH STENT PLACEMENT      CORONARY ARTERY BYPASS GRAFT      DENTAL SURGERY      EGD AND COLONOSCOPY      ELBOW SURGERY      resolved     ESOPHAGOGASTRODUODENOSCOPY        Family History:     Family History   Problem Relation Age of Onset    Diabetes Mother     Cancer Sister     Heart disease Sister     Thyroid disease Sister     Cancer Brother     Cancer Father     Colon cancer Family     Diabetes Family     Mitral valve prolapse Family     Thyroid cancer Family       Social History:     Social History     Socioeconomic History    Marital status: /Civil Union     Spouse name: None    Number of children: None    Years of education: None    Highest education level: None   Occupational History    None   Tobacco Use    Smoking status: Never Smoker    Smokeless tobacco: Never Used   Vaping Use    Vaping Use: Never used   Substance and Sexual Activity    Alcohol use: Not Currently    Drug use: Never    Sexual activity: Not Currently   Other Topics Concern    None   Social History Narrative    Caffeine use     Social Determinants of Health     Financial Resource Strain:     Difficulty of Paying Living Expenses:    Food Insecurity:     Worried About Running Out of Food in the Last Year:     Ran Out of Food in the Last Year:    Transportation Needs:     Lack of Transportation (Medical):      Lack of Transportation (Non-Medical):    Physical Activity:     Days of Exercise per Week:     Minutes of Exercise per Session:    Stress:     Feeling of Stress :    Social Connections:     Frequency of Communication with Friends and Family:     Frequency of Social Gatherings with Friends and Family:     Attends Yazidism Services:     Active Member of Clubs or Organizations:     Attends Club or Organization Meetings:    Danilo Spicer Marital Status:    Intimate Partner Violence:     Fear of Current or Ex-Partner:     Emotionally Abused:     Physically Abused:     Sexually Abused:       Medications and Allergies:     Current Outpatient Medications   Medication Sig Dispense Refill    albuterol (2 5 mg/3 mL) 0 083 % nebulizer solution TAKE 1 VIAL (2 5 MG) BY NEBULIZATION EVERY 6 HOURS AS NEEDED FOR WHEEZING OR SHORTNESS OF BREATH 150 mL 1    atorvastatin (LIPITOR) 40 mg tablet TAKE 1 TABLET BY MOUTH EVERY DAY 90 tablet 3    calcium carbonate-vitamin D (OSCAL-D) 500 mg-200 units per tablet Take 1 tablet by mouth 2 (two) times a day with meals 60 tablet 0    Continuous Blood Gluc  (Dexcom G6 ) SIVAKUMAR Use daily as directed for CGM      Continuous Blood Gluc Sensor (Dexcom G6 Sensor) MISC Use daily as directed for CGM - Change every 10 days      Continuous Blood Gluc Transmit (Dexcom G6 Transmitter) MISC Use daily as directed for CGM - Change every 3 months      docusate sodium (COLACE) 100 mg capsule Take 1 capsule (100 mg total) by mouth 2 (two) times a day (Patient taking differently: Take 100 mg by mouth 2 (two) times a day as needed ) 10 capsule 0    Eliquis 5 MG TAKE 1 TABLET BY MOUTH TWICE A DAY 60 tablet 5    glucose blood test strip USE AS DIRECTED 3 TIMES A  each 1    Insulin Disposable Pump (V-Go 20) KIT Use 1 per day Disp #30 for 30 day supply   30 kit 1    Klor-Con M10 10 MEQ tablet TAKE 1 TABLET BY MOUTH EVERY DAY 90 tablet 3    metoprolol tartrate (LOPRESSOR) 25 mg tablet TAKE 1 TABLET (25 MG TOTAL) BY MOUTH EVERY 12 (TWELVE) HOURS 180 tablet 3    NovoLOG 100 UNIT/ML injection INJECT 100 UNITS UNDER THE SKIN DAILY AS DIRECTED 30 mL 3    senna (SENOKOT) 8 6 mg Take 1 tablet (8 6 mg total) by mouth daily (Patient taking differently: Take 1 tablet by mouth daily at bedtime as needed ) 120 each 0    torsemide (DEMADEX) 20 mg tablet Take 20 mg by mouth daily       No current facility-administered medications for this visit  Allergies   Allergen Reactions    Ace Inhibitors     Chlorpromazine     Latex     Lisinopril     Namenda [Memantine] Drowsiness    Penicillins Seizures    Propoxyphene     Stadol [Butorphanol]       Immunizations:     Immunization History   Administered Date(s) Administered    INFLUENZA 11/03/2015, 10/20/2016, 09/27/2017    Influenza Split High Dose Preservative Free IM 11/04/2014, 11/03/2015, 10/20/2016, 09/27/2017    Influenza, high dose seasonal 0 7 mL 12/18/2019, 11/09/2020    Influenza, seasonal, injectable 12/17/2012, 09/23/2013    Pneumococcal Conjugate 13-Valent 10/20/2016    Pneumococcal Polysaccharide PPV23 01/01/1997, 04/17/2014    SARS-CoV-2 / COVID-19 mRNA IM (Jae Acosta) 01/25/2021, 02/20/2021, 02/22/2021    Td (adult), adsorbed 01/01/1995      Health Maintenance:         Topic Date Due    DXA SCAN  05/16/2022         Topic Date Due    DTaP,Tdap,and Td Vaccines (1 - Tdap) 09/17/1958    Influenza Vaccine (1) 09/01/2021      Medicare Health Risk Assessment:     /66 (BP Location: Left arm, Patient Position: Sitting, Cuff Size: Large)   Pulse 64   Temp 97 5 °F (36 4 °C) (Tympanic)   Resp 16   SpO2 97%      [place] is here for her Subsequent Wellness visit  Health Risk Assessment:   Patient rates overall health as good  Patient feels that their physical health rating is same  Patient is satisfied with their life  Eyesight was rated as same  Hearing was rated as same  Patient feels that their emotional and mental health rating is same  Patients states they are never, rarely angry  Patient states they are sometimes unusually tired/fatigued  Pain experienced in the last 7 days has been some  Patient's pain rating has been 3/10  Patient states that she has experienced no weight loss or gain in last 6 months  Completed by daughter with patient  Depression Screening:   PHQ-2 Score: 0  PHQ-9 Score: 3      Fall Risk Screening:    In the past year, patient has experienced: history of falling in past year    Number of falls: 1  Injured during fall?: No    Feels unsteady when standing or walking?: Yes    Worried about falling?: No      Urinary Incontinence Screening:   Patient has leaked urine accidently in the last six months  Home Safety:  Patient does not have trouble with stairs inside or outside of their home  Patient has working smoke alarms and has working carbon monoxide detector  Home safety hazards include: none  Nutrition:   Current diet is Diabetic and No Added Salt  Medications:   Patient is currently taking over-the-counter supplements  OTC medications include: see medication list  Patient is able to manage medications  Activities of Daily Living (ADLs)/Instrumental Activities of Daily Living (IADLs):   Walk and transfer into and out of bed and chair?: Yes  Dress and groom yourself?: Yes    Bathe or shower yourself?: Yes    Feed yourself? Yes  Do your laundry/housekeeping?: Yes  Manage your money, pay your bills and track your expenses?: No  Make your own meals?: No    Do your own shopping?: No    Previous Hospitalizations:   Any hospitalizations or ED visits within the last 12 months?: Yes    How many hospitalizations have you had in the last year?: 1-2    Advance Care Planning:   Living will: Yes    Durable POA for healthcare: Yes    Advanced directive: Yes      Comments: POA---daughter  Living will---DNR per pt       Cognitive Screening:   Provider or family/friend/caregiver concerned regarding cognition?: Yes    PREVENTIVE SCREENINGS      Cardiovascular Screening:    General: History Lipid Disorder and Screening Current      Diabetes Screening:     General: History Diabetes and Screening Current      Colorectal Cancer Screening:     General: Screening Not Indicated      Breast Cancer Screening:     General: Screening Not Indicated      Cervical Cancer Screening:    General: Screening Not Indicated      Osteoporosis Screening: General: Risks and Benefits Discussed    Due for: DXA Axial      Lung Cancer Screening:     General: Screening Not Indicated    Screening, Brief Intervention, and Referral to Treatment (SBIRT)    Screening  Typical number of drinks in a day: 0  Typical number of drinks in a week: 0  Interpretation: Low risk drinking behavior      Single Item Drug Screening:  How often have you used an illegal drug (including marijuana) or a prescription medication for non-medical reasons in the past year? never    Single Item Drug Screen Score: 0  Interpretation: Negative screen for possible drug use disorder      Manpreet Mota MD

## 2021-07-29 NOTE — PATIENT INSTRUCTIONS
Medicare Preventive Visit Patient Instructions  Thank you for completing your Welcome to Medicare Visit or Medicare Annual Wellness Visit today  Your next wellness visit will be due in one year (7/30/2022)  The screening/preventive services that you may require over the next 5-10 years are detailed below  Some tests may not apply to you based off risk factors and/or age  Screening tests ordered at today's visit but not completed yet may show as past due  Also, please note that scanned in results may not display below  Preventive Screenings:  Service Recommendations Previous Testing/Comments   Colorectal Cancer Screening  * Colonoscopy    * Fecal Occult Blood Test (FOBT)/Fecal Immunochemical Test (FIT)  * Fecal DNA/Cologuard Test  * Flexible Sigmoidoscopy Age: 54-65 years old   Colonoscopy: every 10 years (may be performed more frequently if at higher risk)  OR  FOBT/FIT: every 1 year  OR  Cologuard: every 3 years  OR  Sigmoidoscopy: every 5 years  Screening may be recommended earlier than age 48 if at higher risk for colorectal cancer  Also, an individualized decision between you and your healthcare provider will decide whether screening between the ages of 74-80 would be appropriate  Colonoscopy: Not on file  FOBT/FIT: Not on file  Cologuard: Not on file  Sigmoidoscopy: Not on file          Breast Cancer Screening Age: 36 years old  Frequency: every 1-2 years  Not required if history of left and right mastectomy Mammogram: Not on file        Cervical Cancer Screening Between the ages of 21-29, pap smear recommended once every 3 years  Between the ages of 33-67, can perform pap smear with HPV co-testing every 5 years     Recommendations may differ for women with a history of total hysterectomy, cervical cancer, or abnormal pap smears in past  Pap Smear: Not on file        Hepatitis C Screening Once for adults born between [facility]  More frequently in patients at high risk for Hepatitis C Hep C Antibody: Not on file        Diabetes Screening 1-2 times per year if you're at risk for diabetes or have pre-diabetes Fasting glucose: 214 mg/dL   A1C: 7 8 %        Cholesterol Screening Once every 5 years if you don't have a lipid disorder  May order more often based on risk factors  Lipid panel: 06/07/2021          Other Preventive Screenings Covered by Medicare:  1  Abdominal Aortic Aneurysm (AAA) Screening: covered once if your at risk  You're considered to be at risk if you have a family history of AAA  2  Lung Cancer Screening: covers low dose CT scan once per year if you meet all of the following conditions: (1) Age 50-69; (2) No signs or symptoms of lung cancer; (3) Current smoker or have quit smoking within the last 15 years; (4) You have a tobacco smoking history of at least 30 pack years (packs per day multiplied by number of years you smoked); (5) You get a written order from a healthcare provider  3  Glaucoma Screening: covered annually if you're considered high risk: (1) You have diabetes OR (2) Family history of glaucoma OR (3)  aged 48 and older OR (3)  American aged 72 and older  3  Osteoporosis Screening: covered every 2 years if you meet one of the following conditions: (1) You're estrogen deficient and at risk for osteoporosis based off medical history and other findings; (2) Have a vertebral abnormality; (3) On glucocorticoid therapy for more than 3 months; (4) Have primary hyperparathyroidism; (5) On osteoporosis medications and need to assess response to drug therapy  · Last bone density test (DXA Scan): 05/16/2017  5  HIV Screening: covered annually if you're between the age of 12-76  Also covered annually if you are younger than 13 and older than 72 with risk factors for HIV infection  For pregnant patients, it is covered up to 3 times per pregnancy      Immunizations:  Immunization Recommendations   Influenza Vaccine Annual influenza vaccination during flu season is recommended for all persons aged >= 6 months who do not have contraindications   Pneumococcal Vaccine (Prevnar and Pneumovax)  * Prevnar = PCV13  * Pneumovax = PPSV23   Adults 25-60 years old: 1-3 doses may be recommended based on certain risk factors  Adults 72 years old: Prevnar (PCV13) vaccine recommended followed by Pneumovax (PPSV23) vaccine  If already received PPSV23 since turning 65, then PCV13 recommended at least one year after PPSV23 dose  Hepatitis B Vaccine 3 dose series if at intermediate or high risk (ex: diabetes, end stage renal disease, liver disease)   Tetanus (Td) Vaccine - COST NOT COVERED BY MEDICARE PART B Following completion of primary series, a booster dose should be given every 10 years to maintain immunity against tetanus  Td may also be given as tetanus wound prophylaxis  Tdap Vaccine - COST NOT COVERED BY MEDICARE PART B Recommended at least once for all adults  For pregnant patients, recommended with each pregnancy  Shingles Vaccine (Shingrix) - COST NOT COVERED BY MEDICARE PART B  2 shot series recommended in those aged 48 and above     Health Maintenance Due:      Topic Date Due    DXA SCAN  05/16/2022     Immunizations Due:      Topic Date Due    DTaP,Tdap,and Td Vaccines (1 - Tdap) 09/17/1956    Influenza Vaccine (1) 09/01/2021     Advance Directives   What are advance directives? Advance directives are legal documents that state your wishes and plans for medical care  These plans are made ahead of time in case you lose your ability to make decisions for yourself  Advance directives can apply to any medical decision, such as the treatments you want, and if you want to donate organs  What are the types of advance directives? There are many types of advance directives, and each state has rules about how to use them  You may choose a combination of any of the following:  · Living will: This is a written record of the treatment you want   You can also choose which treatments you do not want, which to limit, and which to stop at a certain time  This includes surgery, medicine, IV fluid, and tube feedings  · Durable power of  for healthcare [facility]): This is a written record that states who you want to make healthcare choices for you when you are unable to make them for yourself  This person, called a proxy, is usually a family member or a friend  You may choose more than 1 proxy  · Do not resuscitate (DNR) order:  A DNR order is used in case your heart stops beating or you stop breathing  It is a request not to have certain forms of treatment, such as CPR  A DNR order may be included in other types of advance directives  · Medical directive: This covers the care that you want if you are in a coma, near death, or unable to make decisions for yourself  You can list the treatments you want for each condition  Treatment may include pain medicine, surgery, blood transfusions, dialysis, IV or tube feedings, and a ventilator (breathing machine)  · Values history: This document has questions about your views, beliefs, and how you feel and think about life  This information can help others choose the care that you would choose  Why are advance directives important? An advance directive helps you control your care  Although spoken wishes may be used, it is better to have your wishes written down  Spoken wishes can be misunderstood, or not followed  Treatments may be given even if you do not want them  An advance directive may make it easier for your family to make difficult choices about your care  Urinary Incontinence   Urinary incontinence (UI)  is when you lose control of your bladder  UI develops because your bladder cannot store or empty urine properly  The 3 most common types of UI are stress incontinence, urge incontinence, or both  Medicines:   · May be given to help strengthen your bladder control   Report any side effects of medication to your healthcare provider  Do pelvic muscle exercises often:  Your pelvic muscles help you stop urinating  Squeeze these muscles tight for 5 seconds, then relax for 5 seconds  Gradually work up to squeezing for 10 seconds  Do 3 sets of 15 repetitions a day, or as directed  This will help strengthen your pelvic muscles and improve bladder control  Train your bladder:  Go to the bathroom at set times, such as every 2 hours, even if you do not feel the urge to go  You can also try to hold your urine when you feel the urge to go  For example, hold your urine for 5 minutes when you feel the urge to go  As that becomes easier, hold your urine for 10 minutes  Self-care:   · Keep a UI record  Write down how often you leak urine and how much you leak  Make a note of what you were doing when you leaked urine  · Drink liquids as directed  You may need to limit the amount of liquid you drink to help control your urine leakage  Do not drink any liquid right before you go to bed  Limit or do not have drinks that contain caffeine or alcohol  · Prevent constipation  Eat a variety of high-fiber foods  Good examples are high-fiber cereals, beans, vegetables, and whole-grain breads  Walking is the best way to trigger your intestines to have a bowel movement  · Exercise regularly and maintain a healthy weight  Weight loss and exercise will decrease pressure on your bladder and help you control your leakage  · Use a catheter as directed  to help empty your bladder  A catheter is a tiny, plastic tube that is put into your bladder to drain your urine  · Go to behavior therapy as directed  Behavior therapy may be used to help you learn to control your urge to urinate  © Copyright [employer] 2018 Information is for End User's use only and may not be sold, redistributed or otherwise used for commercial purposes   All illustrations and images included in CareNotes® are the copyrighted property of A D A M [employer] Inc  or [employer]

## 2021-10-15 DIAGNOSIS — E11.65 UNCONTROLLED TYPE 2 DIABETES MELLITUS WITH HYPERGLYCEMIA (HCC): ICD-10-CM

## 2021-10-18 RX ORDER — SUB-Q INSULIN DEVICE, 40 UNIT
EACH MISCELLANEOUS
Qty: 30 KIT | Refills: 1 | Status: SHIPPED | OUTPATIENT
Start: 2021-10-18 | End: 2021-12-16

## 2021-11-10 DIAGNOSIS — E11.65 TYPE 2 DIABETES MELLITUS WITH HYPERGLYCEMIA, WITH LONG-TERM CURRENT USE OF INSULIN (HCC): ICD-10-CM

## 2021-11-10 DIAGNOSIS — Z79.4 TYPE 2 DIABETES MELLITUS WITH HYPERGLYCEMIA, WITH LONG-TERM CURRENT USE OF INSULIN (HCC): ICD-10-CM

## 2021-12-11 DIAGNOSIS — E11.65 TYPE 2 DIABETES MELLITUS WITH HYPERGLYCEMIA, WITH LONG-TERM CURRENT USE OF INSULIN (HCC): ICD-10-CM

## 2021-12-11 DIAGNOSIS — I48.91 ATRIAL FIBRILLATION, UNSPECIFIED TYPE (HCC): ICD-10-CM

## 2021-12-11 DIAGNOSIS — Z79.4 TYPE 2 DIABETES MELLITUS WITH HYPERGLYCEMIA, WITH LONG-TERM CURRENT USE OF INSULIN (HCC): ICD-10-CM

## 2021-12-13 RX ORDER — APIXABAN 5 MG/1
TABLET, FILM COATED ORAL
Qty: 60 TABLET | Refills: 5 | Status: SHIPPED | OUTPATIENT
Start: 2021-12-13 | End: 2022-01-03 | Stop reason: SDUPTHER

## 2021-12-16 DIAGNOSIS — E11.65 UNCONTROLLED TYPE 2 DIABETES MELLITUS WITH HYPERGLYCEMIA (HCC): ICD-10-CM

## 2021-12-16 RX ORDER — SUB-Q INSULIN DEVICE, 40 UNIT
EACH MISCELLANEOUS DAILY
Qty: 30 KIT | Refills: 0 | Status: SHIPPED | OUTPATIENT
Start: 2021-12-16 | End: 2022-07-29 | Stop reason: SDUPTHER

## 2022-01-01 DIAGNOSIS — G30.1 LATE ONSET ALZHEIMER'S DEMENTIA WITHOUT BEHAVIORAL DISTURBANCE (HCC): ICD-10-CM

## 2022-01-01 DIAGNOSIS — F02.80 LATE ONSET ALZHEIMER'S DEMENTIA WITHOUT BEHAVIORAL DISTURBANCE (HCC): ICD-10-CM

## 2022-01-01 DIAGNOSIS — F32.9 REACTIVE DEPRESSION: ICD-10-CM

## 2022-01-01 LAB
DME PARACHUTE DELIVERY DATE REQUESTED: NORMAL
DME PARACHUTE ITEM DESCRIPTION: NORMAL
DME PARACHUTE ORDER STATUS: NORMAL
DME PARACHUTE SUPPLIER NAME: NORMAL
DME PARACHUTE SUPPLIER PHONE: NORMAL

## 2022-01-01 RX ORDER — ESCITALOPRAM OXALATE 10 MG/1
10 TABLET ORAL DAILY
Qty: 30 TABLET | Refills: 5 | Status: SHIPPED | OUTPATIENT
Start: 2022-01-01 | End: 2022-01-01 | Stop reason: SDUPTHER

## 2022-01-03 DIAGNOSIS — I48.0 PAF (PAROXYSMAL ATRIAL FIBRILLATION) (HCC): ICD-10-CM

## 2022-01-03 DIAGNOSIS — I48.91 ATRIAL FIBRILLATION, UNSPECIFIED TYPE (HCC): ICD-10-CM

## 2022-01-03 DIAGNOSIS — E78.5 HYPERLIPIDEMIA, UNSPECIFIED HYPERLIPIDEMIA TYPE: ICD-10-CM

## 2022-01-03 RX ORDER — POTASSIUM CHLORIDE 750 MG/1
10 TABLET, EXTENDED RELEASE ORAL DAILY
Qty: 90 TABLET | Refills: 3 | Status: SHIPPED | OUTPATIENT
Start: 2022-01-03 | End: 2022-08-01 | Stop reason: SDUPTHER

## 2022-01-03 RX ORDER — ATORVASTATIN CALCIUM 40 MG/1
40 TABLET, FILM COATED ORAL DAILY
Qty: 90 TABLET | Refills: 3 | Status: SHIPPED | OUTPATIENT
Start: 2022-01-03

## 2022-01-20 DIAGNOSIS — Z79.4 TYPE 2 DIABETES MELLITUS WITH HYPERGLYCEMIA, WITH LONG-TERM CURRENT USE OF INSULIN (HCC): ICD-10-CM

## 2022-01-20 DIAGNOSIS — E11.65 TYPE 2 DIABETES MELLITUS WITH HYPERGLYCEMIA, WITH LONG-TERM CURRENT USE OF INSULIN (HCC): ICD-10-CM

## 2022-01-25 ENCOUNTER — OFFICE VISIT (OUTPATIENT)
Dept: ENDOCRINOLOGY | Facility: CLINIC | Age: 87
End: 2022-01-25
Payer: COMMERCIAL

## 2022-01-25 VITALS — DIASTOLIC BLOOD PRESSURE: 72 MMHG | HEART RATE: 62 BPM | SYSTOLIC BLOOD PRESSURE: 120 MMHG

## 2022-01-25 DIAGNOSIS — E11.65 TYPE 2 DIABETES MELLITUS WITH HYPERGLYCEMIA, WITH LONG-TERM CURRENT USE OF INSULIN (HCC): Primary | ICD-10-CM

## 2022-01-25 DIAGNOSIS — I10 ESSENTIAL HYPERTENSION: Chronic | ICD-10-CM

## 2022-01-25 DIAGNOSIS — E78.5 HYPERLIPIDEMIA, UNSPECIFIED HYPERLIPIDEMIA TYPE: ICD-10-CM

## 2022-01-25 DIAGNOSIS — Z79.4 TYPE 2 DIABETES MELLITUS WITH HYPERGLYCEMIA, WITH LONG-TERM CURRENT USE OF INSULIN (HCC): Primary | ICD-10-CM

## 2022-01-25 LAB — SL AMB POCT HEMOGLOBIN AIC: 8.2 (ref ?–6.5)

## 2022-01-25 PROCEDURE — 83036 HEMOGLOBIN GLYCOSYLATED A1C: CPT

## 2022-01-25 PROCEDURE — 1036F TOBACCO NON-USER: CPT

## 2022-01-25 PROCEDURE — 3074F SYST BP LT 130 MM HG: CPT

## 2022-01-25 PROCEDURE — 3078F DIAST BP <80 MM HG: CPT

## 2022-01-25 PROCEDURE — 99214 OFFICE O/P EST MOD 30 MIN: CPT

## 2022-01-25 PROCEDURE — 1160F RVW MEDS BY RX/DR IN RCRD: CPT

## 2022-01-25 RX ORDER — SUB-Q INSULIN DEVICE, 40 UNIT
EACH MISCELLANEOUS
Qty: 30 KIT | Refills: 5 | Status: SHIPPED | OUTPATIENT
Start: 2022-01-25

## 2022-01-25 NOTE — ASSESSMENT & PLAN NOTE
A1C slightly above goal  Goal A1C 8  Increase to 3 clicks (6 units) on V-go with dinner  She is having consistent highs after dinner and snack  Continue with snack each night before bed to prevent her going too low at night  Continue with 3 clicks with breakfast and 2 with lunch  Repeat A1C, CMP, and microalbumin     Lab Results   Component Value Date    HGBA1C 8 2 (A) 01/25/2022

## 2022-01-25 NOTE — PROGRESS NOTES
Established Patient Progress Note      Chief Complaint   Patient presents with    Diabetes Type 2        History of Present Illness:   Tyler Lima is a 82 y o  female with type 2 diabetes with long term use of insulin since many years  Last seen in the office 3/31/2021  Reports complications of neuropathy  A1C today was 8 2  Denies recent illness or hospitalizations  Denies recent severe hypoglycemic or severe hyperglycemic episodes  Denies any issues with her current regimen  Home glucose monitoring: are performed regularly with CGM  Sometimes forgets to give herself clicks for breakfast  Family changes V-go every night  Eats dinner around 6-730 and usually has snack at night before bed (peanut butter sandwich)  Usually sleeps in and eats a late breakfast (9-10ish)    Tyler Lima   Device used: Dexcom  Home use   Indication: Type 2 Diabetes  More than 72 hours of data was reviewed  Report to be scanned to chart  Date Range: 1/12/2022-1/25/2022  Analysis of data:   Average Glucose: 184  SD : 52   Time in Target Range: 49%   Time Above Range: 39% high, 11% very high   Time Below Range: <1%   Interpretation of data: Having highs after dinner and into night 5PM-2AM  Lowest BGs before eating 8AM-12PM  No hypoglycemia  Overall BGs doing well        Current regimen:   V-Go 20: 3 clicks (6 units) with breakfast, 2 clicks (4 units) with lunch and dinner, sometimes gives extra click if having big dinner   Insulin used: Novolog     Last Eye Exam: 10/1/2019, no retinopathy  Last Foot Exam: 5/5/2021, UTD, podiatry every 3 months     Has hypertension: Taking metoprolol  Has hyperlipidemia: Taking atorvastatin      Patient Active Problem List   Diagnosis    3-vessel coronary artery disease    D[facility] without [facility] disturbance [facility] [address] [facility]    Depression    Diabetic retinopathy ([facility] [address] [facility]    DM (diabetes mellitus), type 2, uncontrolled w/ophthalmic complication ([facility] [address] )    Gastroesophageal reflux disease with hiatal hernia    Glaucoma    Hyperlipidemia    Essential hypertension    [facility]erebral artery occlusion with cerebral infarction ([facility])    Obesity (BMI 30-39  9)    Obstructive sleep apnea    Osteoarthritis of knee    Urine incontinence    Ventricular tachycardia ([facility])    Acute on chronic combined systolic and diastolic congestive heart failure ([facility])    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 ([facility])    Abnormal chest x-ray    Urine retention    Congestive heart failure with LV diastolic dysfunction, NYHA class 2 ([facility])    Dyspnea on minimal exertion    PAF (paroxysmal atrial fibrillation) ([facility])    SSS (sick sinus syndrome) ([facility])    Mild intermittent asthma without complication    Osteopenia    Obesity, morbid ([facility])    Type 2 diabetes mellitus with hyperglycemia, with long-term current use of insulin ([facility])      Past Medical History:   Diagnosis Date    Arthritis     Asthma     CAD (coronary artery disease) of artery bypass graft     Cardiac disease     CHF (congestive heart failure) ([facility])     Dementia ([facility])     Depression     Diabetes mellitus ([address]     Heart attack ([facility] [address] [facility]     Hyperlipidemia     Hypertension     MI (myocardial infarction) ([facility] [address] [facility]     Neuropathy     BRANT (obstructive sleep apnea)     Peptic ulcer     was + for H pylori    Transient cerebral ischemia 2011    with neg CUS and ECHO      Past Surgical History:   Procedure Laterality Date    APPENDECTOMY      CATARACT EXTRACTION       SECTION      COLONOSCOPY  2004    CORONARY ANGIOPLASTY  2006    CORONARY ANGIOPLASTY WITH STENT PLACEMENT      CORONARY ARTERY BYPASS GRAFT      DENTAL SURGERY      EGD AND COLONOSCOPY      ELBOW SURGERY      resolved     ESOPHAGOGASTRODUODENOSCOPY        Family History   Problem Relation Age of Onset    Diabetes Mother     Cancer Sister     Heart disease Sister     Thyroid disease Sister     Cancer Brother     Cancer Father     Colon cancer Family     Diabetes Family     Mitral valve prolapse Family     Thyroid cancer Family      Social History     Tobacco Use    Smoking status: Never Smoker    Smokeless tobacco: Never Used   Substance Use Topics    Alcohol use: Not Currently     Allergies   Allergen Reactions    Ace Inhibitors     Chlorpromazine     Latex     Lisinopril     Namenda [Memantine] Drowsiness    Penicillins Seizures    Propoxyphene     Stadol [Butorphanol]          Current Outpatient Medications:     albuterol (2 5 mg/3 mL) 0 083 % nebulizer solution, TAKE 1 VIAL (2 5 MG) BY NEBULIZATION EVERY 6 HOURS AS NEEDED FOR WHEEZING OR SHORTNESS OF BREATH, Disp: 150 mL, Rfl: 1    apixaban (Eliquis) 5 mg, Take 1 tablet (5 mg total) by mouth 2 (two) times a day, Disp: 60 tablet, Rfl: 5    atorvastatin (LIPITOR) 40 mg tablet, Take 1 tablet (40 mg total) by mouth daily, Disp: 90 tablet, Rfl: 3    calcium carbonate-vitamin D (OSCAL-D) 500 mg-200 units per tablet, Take 1 tablet by mouth 2 (two) times a day with meals, Disp: 60 tablet, Rfl: 0    Continuous Blood Gluc  (Dexcom G6 ) SIVAKUMAR, Use daily as directed for CGM, Disp: , Rfl:     Continuous Blood Gluc Sensor (Dexcom G6 Sensor) MISC, Use daily as directed for CGM - Change every 10 days, Disp: , Rfl:     Continuous Blood Gluc Transmit (Dexcom G6 Transmitter) MISC, Use daily as directed for CGM - Change every 3 months, Disp: , Rfl:     docusate sodium (COLACE) 100 mg capsule, Take 1 capsule (100 mg total) by mouth 2 (two) times a day (Patient taking differently: Take 100 mg by mouth 2 (two) times a day as needed ), Disp: 10 capsule, Rfl: 0    glucose blood test strip, USE AS DIRECTED 3 TIMES A DAY, Disp: 300 each, Rfl: 1    insulin aspart (NovoLOG) 100 units/mL injection, Use for V-Go, Disp: 90 mL, Rfl: 3    Insulin Disposable Pump (V-Go 20) KIT, Use daily Use one daily, Disp: 30 kit, Rfl: 0    Insulin Disposable Pump (V-Go 20) KIT, Apply once a night, Disp: 30 kit, Rfl: 5    metoprolol tartrate (LOPRESSOR) 25 mg tablet, TAKE 1 TABLET (25 MG TOTAL) BY MOUTH EVERY 12 (TWELVE) HOURS, Disp: 180 tablet, Rfl: 3    potassium chloride (Klor-Con M10) 10 mEq tablet, Take 1 tablet (10 mEq total) by mouth daily, Disp: 90 tablet, Rfl: 3    senna (SENOKOT) 8 6 mg, Take 1 tablet (8 6 mg total) by mouth daily (Patient taking differently: Take 1 tablet by mouth daily at bedtime as needed ), Disp: 120 each, Rfl: 0    torsemide (DEMADEX) 20 mg tablet, Take 20 mg by mouth daily, Disp: , Rfl:     Review of Systems   Constitutional: Negative for activity change, appetite change, chills, fatigue, fever and unexpected weight change  HENT: Negative for ear pain, sore throat, trouble swallowing and voice change  Eyes: Negative for photophobia, pain and visual disturbance  Respiratory: Positive for shortness of breath (chronic)  Negative for cough and chest tightness  Cardiovascular: Negative for chest pain, palpitations and leg swelling  Gastrointestinal: Negative for abdominal pain, constipation, diarrhea, nausea and vomiting  Endocrine: Negative for cold intolerance, heat intolerance, polydipsia, polyphagia and polyuria  Genitourinary: Positive for frequency (on diuretics)  Negative for dysuria and hematuria  Musculoskeletal: Positive for gait problem (unable to do steps, walker at home, wheelchair for long distances)  Negative for arthralgias, back pain, joint swelling and myalgias  Skin: Negative for color change and rash  Neurological: Negative for dizziness, tremors, seizures, syncope, light-headedness and numbness  Psychiatric/Behavioral: Negative  All other systems reviewed and are negative  Physical Exam:  There is no height or weight on file to calculate BMI    /72   Pulse 62    Wt Readings from Last 3 Encounters:   06/30/21 99 7 kg (219 lb 14 4 oz)   03/11/20 103 kg (226 lb 14 4 oz)   02/19/20 103 kg (227 lb)       Physical Exam  Vitals reviewed  Constitutional:       General: She is not in acute distress  Appearance: Normal appearance  She is obese  She is not ill-appearing or diaphoretic  HENT:      Head: Normocephalic  Right Ear: External ear normal       Left Ear: External ear normal    Eyes:      Extraocular Movements: Extraocular movements intact  Conjunctiva/sclera: Conjunctivae normal       Pupils: Pupils are equal, round, and reactive to light  Cardiovascular:      Rate and Rhythm: Normal rate and regular rhythm  Pulses: Normal pulses  Heart sounds: Murmur heard  No friction rub  No gallop  Pulmonary:      Effort: Pulmonary effort is normal  No respiratory distress  Breath sounds: Normal breath sounds  No stridor  No wheezing, rhonchi or rales  Abdominal:      General: Bowel sounds are normal  There is no distension  Palpations: Abdomen is soft  Tenderness: There is no abdominal tenderness  Musculoskeletal:         General: No swelling, tenderness or signs of injury  Normal range of motion  Cervical back: Normal range of motion and neck supple  Right lower leg: No edema  Left lower leg: No edema  Comments: In wheelchair     Skin:     General: Skin is warm and dry  Coloration: Skin is not jaundiced  Findings: No bruising, erythema or rash  Neurological:      General: No focal deficit present  Mental Status: She is alert and oriented to person, place, and time  Sensory: No sensory deficit  Motor: No weakness  Coordination: Coordination normal       Gait: Gait normal    Psychiatric:         Mood and Affect: Mood normal          Behavior: Behavior normal          Thought Content:  Thought content normal          Judgment: Judgment normal        Labs:   Lab Results   Component Value Date    HGBA1C 8 2 (A) 01/25/2022    HGBA1C 7 8 (H) 06/07/2021    HGBA1C 7 4 (A) 11/09/2020     Lab Results   Component Value Date    CREATININE 1 37 (H) 07/16/2021    CREATININE 1 16 06/07/2021    CREATININE 1 17 02/19/2020    BUN 42 (H) 07/16/2021     12/17/2015    K 3 9 07/16/2021     07/16/2021    CO2 31 07/16/2021     eGFR   Date Value Ref Range Status   07/16/2021 36 ml/min/1 73sq m Final     Lab Results   Component Value Date    CHOL 144 04/21/2015    HDL 61 06/07/2021    TRIG 79 06/07/2021     Lab Results   Component Value Date    ALT 26 07/16/2021    AST 30 07/16/2021    ALKPHOS 84 07/16/2021    BILITOT 0 53 12/17/2015     Lab Results   Component Value Date    AMA6APTZGJTH 5 840 (H) 06/07/2021    HMG2QLCJLPYL 5 520 (H) 02/19/2020    [id number] 5 350 (H) 09/20/2019     Lab Results   Component Value Date    FREET4 1 07 06/07/2021       Impression & Plan:    Problem List Items Addressed This Visit        Endocrine    Type 2 diabetes mellitus with hyperglycemia, with long-term current use of insulin ([facility] [address] ) - Primary     A1C slightly above goal  Goal A1C 8  Increase to 3 clicks (6 units) on V-go with dinner  She is having consistent highs after dinner and snack  Continue with snack each night before bed to prevent her going too low at night  Continue with 3 clicks with breakfast and 2 with lunch  Repeat A1C, CMP, and microalbumin  Lab Results   Component Value Date    HGBA1C 8 2 (A) 01/25/2022            Relevant Medications    Insulin Disposable Pump (V-Go 20) KIT    insulin aspart (NovoLOG) 100 units/mL injection    Other Relevant Orders    Comprehensive metabolic panel Lab Collect    Microalbumin / creatinine urine ratio Lab Collect    HEMOGLOBIN A1C W/ EAG ESTIMATION Lab Collect       Cardiovascular and Mediastinum    Essential hypertension (Chronic)     BP at goal today  Continue current medication regimen  Other    Hyperlipidemia     Check lipids  Continue atorvastatin            Relevant Orders    Lipid panel Lab Collect Lab Collect          Orders Placed This Encounter   Procedures    Comprehensive metabolic panel Lab Collect This is a patient instruction: Patient fasting for 8 hours or longer recommended  Standing Status:   Future     Standing Expiration Date:   7/25/2023    Microalbumin / creatinine urine ratio Lab Collect     Standing Status:   Future     Standing Expiration Date:   7/25/2023    HEMOGLOBIN A1C W/ EAG ESTIMATION Lab Collect     Standing Status:   Future     Standing Expiration Date:   1/25/2023    Lipid panel Lab Collect Lab Collect     This is a patient instruction: This test requires patient fasting for 10-12 hours or longer  Drinking of black coffee or black tea is acceptable  Standing Status:   Future     Standing Expiration Date:   7/25/2023       Patient Instructions   Add one click with dinner (6 units)  Discussed with the patient and all questioned fully answered  She will call me if any problems arise      EMELI Eldridge

## 2022-02-14 ENCOUNTER — APPOINTMENT (EMERGENCY)
Dept: RADIOLOGY | Facility: HOSPITAL | Age: 87
End: 2022-02-14
Payer: COMMERCIAL

## 2022-02-14 ENCOUNTER — HOSPITAL ENCOUNTER (EMERGENCY)
Facility: HOSPITAL | Age: 87
Discharge: HOME | End: 2022-02-14
Attending: SURGERY
Payer: COMMERCIAL

## 2022-02-14 VITALS
TEMPERATURE: 97.4 F | OXYGEN SATURATION: 97 % | DIASTOLIC BLOOD PRESSURE: 65 MMHG | SYSTOLIC BLOOD PRESSURE: 144 MMHG | HEART RATE: 60 BPM | BODY MASS INDEX: 34.89 KG/M2 | HEIGHT: 65 IN | RESPIRATION RATE: 20 BRPM | WEIGHT: 209.44 LBS
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
## 2022-02-14 LAB
BASE EXCESS BLDA CALC-SCNC: 7 MMOL/L (ref -2–3)
GLUCOSE SERPL-MCNC: 201 MG/DL (ref 65–140)
HCO3 BLDA-SCNC: 32.4 MMOL/L (ref 24–30)
HCT VFR BLD CALC: 44 % (ref 34.8–46.1)
HGB BLDA-MCNC: 15 G/DL (ref 11.5–15.4)
HOLD SPECIMEN: NORMAL
PCO2 BLD: 34 MMOL/L (ref 21–32)
PCO2 BLD: 49 MM HG (ref 42–50)
PH BLD: 7.43 [PH] (ref 7.3–7.4)
PO2 BLD: 37 MM HG (ref 35–45)
POTASSIUM BLD-SCNC: 3.6 MMOL/L (ref 3.5–5.3)
SAO2 % BLD FROM PO2: 71 % (ref 60–85)
SODIUM BLD-SCNC: 140 MMOL/L (ref 136–145)
SPECIMEN SOURCE: ABNORMAL

## 2022-02-14 PROCEDURE — 84132 ASSAY OF SERUM POTASSIUM: CPT

## 2022-02-14 PROCEDURE — 82947 ASSAY GLUCOSE BLOOD QUANT: CPT

## 2022-02-14 PROCEDURE — 70450 CT HEAD/BRAIN W/O DYE: CPT

## 2022-02-14 PROCEDURE — NC001 PR NO CHARGE: Performed by: EMERGENCY MEDICINE

## 2022-02-14 PROCEDURE — 99284 EMERGENCY DEPT VISIT MOD MDM: CPT

## 2022-02-14 PROCEDURE — NC001 PR NO CHARGE: Performed by: SURGERY

## 2022-02-14 PROCEDURE — 99282 EMERGENCY DEPT VISIT SF MDM: CPT | Performed by: SURGERY

## 2022-02-14 PROCEDURE — 84295 ASSAY OF SERUM SODIUM: CPT

## 2022-02-14 PROCEDURE — 71045 X-RAY EXAM CHEST 1 VIEW: CPT

## 2022-02-14 PROCEDURE — 72125 CT NECK SPINE W/O DYE: CPT

## 2022-02-14 PROCEDURE — 36415 COLL VENOUS BLD VENIPUNCTURE: CPT

## 2022-02-14 PROCEDURE — 85014 HEMATOCRIT: CPT

## 2022-02-14 PROCEDURE — 82803 BLOOD GASES ANY COMBINATION: CPT

## 2022-02-14 RX ORDER — ACETAMINOPHEN 325 MG/1
975 TABLET ORAL ONCE
Status: COMPLETED | OUTPATIENT
Start: 2022-02-14 | End: 2022-02-14

## 2022-02-14 RX ADMIN — ACETAMINOPHEN 975 MG: 325 TABLET, FILM COATED ORAL at 03:48

## 2022-02-14 NOTE — ED PROVIDER NOTES
Emergency Department Airway Evaluation and Management Form    History  Obtained from: patient  Review of patient's allergies indicates no known allergies  Chief Complaint:  Trauma Alert    HPI: Pt is a 82 y o  female presents s/p fall with head strike on apixaban      I have reviewed and agree with the history as documented  ROS is unobtainable secondary to critical status of the patient as a result of the trauma  Physical Exam    There were no vitals filed for this visit  Supplemental Oxygen: RA    GCS: 15  Airway: patent  Breathing and Pulmonary exam: bilateral BS  Cardiac and Circulation: RRR  Neurologic exam: moving all extremities  C spine and Neck exam: In collar      Monitor:  SR      ED Medications    No current facility-administered medications for this encounter      Current Outpatient Medications:     albuterol (2 5 mg/3 mL) 0 083 % nebulizer solution, TAKE 1 VIAL (2 5 MG) BY NEBULIZATION EVERY 6 HOURS AS NEEDED FOR WHEEZING OR SHORTNESS OF BREATH, Disp: 150 mL, Rfl: 1    apixaban (Eliquis) 5 mg, Take 1 tablet (5 mg total) by mouth 2 (two) times a day, Disp: 60 tablet, Rfl: 5    atorvastatin (LIPITOR) 40 mg tablet, Take 1 tablet (40 mg total) by mouth daily, Disp: 90 tablet, Rfl: 3    calcium carbonate-vitamin D (OSCAL-D) 500 mg-200 units per tablet, Take 1 tablet by mouth 2 (two) times a day with meals, Disp: 60 tablet, Rfl: 0    Continuous Blood Gluc  (Dexcom G6 ) SIVAKUMAR, Use daily as directed for CGM, Disp: , Rfl:     Continuous Blood Gluc Sensor (Dexcom G6 Sensor) MISC, Use daily as directed for CGM - Change every 10 days, Disp: , Rfl:     Continuous Blood Gluc Transmit (Dexcom G6 Transmitter) MISC, Use daily as directed for CGM - Change every 3 months, Disp: , Rfl:     docusate sodium (COLACE) 100 mg capsule, Take 1 capsule (100 mg total) by mouth 2 (two) times a day (Patient taking differently: Take 100 mg by mouth 2 (two) times a day as needed ), Disp: 10 capsule, Rfl: 0    glucose blood test strip, USE AS DIRECTED 3 TIMES A DAY, Disp: 300 each, Rfl: 1    insulin aspart (NovoLOG) 100 units/mL injection, Use for V-Go, Disp: 90 mL, Rfl: 3    Insulin Disposable Pump (V-Go 20) KIT, Use daily Use one daily, Disp: 30 kit, Rfl: 0    Insulin Disposable Pump (V-Go 20) KIT, Apply once a night, Disp: 30 kit, Rfl: 5    metoprolol tartrate (LOPRESSOR) 25 mg tablet, TAKE 1 TABLET (25 MG TOTAL) BY MOUTH EVERY 12 (TWELVE) HOURS, Disp: 180 tablet, Rfl: 3    potassium chloride (Klor-Con M10) 10 mEq tablet, Take 1 tablet (10 mEq total) by mouth daily, Disp: 90 tablet, Rfl: 3    senna (SENOKOT) 8 6 mg, Take 1 tablet (8 6 mg total) by mouth daily (Patient taking differently: Take 1 tablet by mouth daily at bedtime as needed ), Disp: 120 each, Rfl: 0    torsemide (DEMADEX) 20 mg tablet, Take 20 mg by mouth daily, Disp: , Rfl:     Medical Decision Makin  Fall with head strike on apixaban      Portions of the record may have been created with voice recognition software  Occasional wrong word or "sound a like" substitutions may have occurred due to the inherent limitations of voice recognition software           Deepa Villatoro MD  [phone]

## 2022-02-14 NOTE — H&P
H&P - Juan Pablotie  82 y o  female MRN: [id number]  Unit/Bed#: ED 27 Encounter: [id number]    Trauma Alert: Level B   Model of Arrival: Self    Trauma Team: Attending Naty Speaker and Residents [facility]VAULT  Consultants:     None     Assessment/Plan   Active Problems / Assessment:   - Headache     Plan:   - No traumatic findings on CT  - C-collar cleared  - Headache treated symptomatically and resolved  - Pt ambulated at baseline without difficulty and tolerated PO  - DC with daughter   - Plan for outpatient f/u and return precautions    History of Present Illness     Chief Complaint: Headache  Mechanism:Fall     HPI:    Estephanie iTan is a 82 y o  female who presents with headache after a fall  Pt states she 'turned too quickly' and fell yesterday afternoon  Pt states she struck the back of her head but did not lose consciousness  Pt reports that she was able to get back up with assistance and ambulate at her baseline, but since the fall has had worsening headache  Pt states it is a 10/10 and is posterior in origin  Pt denies any other pain or complaints  Pt takes Eliquis  Review of Systems   Neurological: Positive for headaches  12-point, complete review of systems was reviewed and negative except as stated above       Historical Information     Past Medical History:   Diagnosis Date    Arthritis     Asthma     CAD (coronary artery disease) of artery bypass graft     Cardiac disease     CHF (congestive heart failure) (HCC)     Dementia (HCC)     Depression     [facility]     Heart attack [facility]     Hyperlipidemia     Hypertension     MI [facility]myocardial infarction) [facility]     Neuropathy     BRANT (obstructive sleep apnea)     Peptic ulcer     was + for H pylori    Transient cerebral ischemia 2011    with neg CUS and ECHO     Past Surgical History:   Procedure Laterality Date    APPENDECTOMY      CATARACT EXTRACTION       SECTION      COLONOSCOPY  2004    CORONARY ANGIOPLASTY  12/2006    CORONARY ANGIOPLASTY WITH STENT PLACEMENT  2006    CORONARY ARTERY BYPASS GRAFT  1999    DENTAL SURGERY      EGD AND COLONOSCOPY      ELBOW SURGERY      resolved 2000    ESOPHAGOGASTRODUODENOSCOPY  2004        Social History     Tobacco Use    Smoking status: Never Smoker    Smokeless tobacco: Never Used   Vaping Use    Vaping Use: Never used   Substance Use Topics    Alcohol use: Not Currently    Drug use: Never     Immunization History   Administered Date(s) Administered    COVID-19 MODERNA VACC 0 5 ML IM 01/25/2021, 02/20/2021, 02/22/2021    INFLUENZA 11/03/2015, 10/20/2016, 09/27/2017    Influenza Split High Dose Preservative Free IM 11/04/2014, 11/03/2015, 10/20/2016, 09/27/2017    Influenza, high dose seasonal 0 7 mL 12/18/2019, 11/09/2020    Influenza, seasonal, injectable 12/17/2012, 09/23/2013    Pneumococcal Conjugate 13-Valent 10/20/2016    Pneumococcal Polysaccharide PPV23 01/01/1997, 04/17/2014    Td (adult), adsorbed 01/01/1995     Family History: Non-contributory       Meds/Allergies   PTA meds:   Prior to Admission Medications   Prescriptions Last Dose Informant Patient Reported? Taking?    Continuous Blood Gluc  (Dexcom G6 ) SIVAKUMAR  Child Yes No   Sig: Use daily as directed for CGM   Continuous Blood Gluc Sensor (Dexcom G6 Sensor) MISC  Child Yes No   Sig: Use daily as directed for CGM - Change every 10 days   Continuous Blood Gluc Transmit (Dexcom G6 Transmitter) MISC  Child Yes No   Sig: Use daily as directed for CGM - Change every 3 months   Insulin Disposable Pump (V-Go 20) KIT   No No   Sig: Use daily Use one daily   Insulin Disposable Pump (V-Go 20) KIT   No No   Sig: Apply once a night   albuterol (2 5 mg/3 mL) 0 083 % nebulizer solution  Child No No   Sig: TAKE 1 VIAL (2 5 MG) BY NEBULIZATION EVERY 6 HOURS AS NEEDED FOR WHEEZING OR SHORTNESS OF BREATH   apixaban (Eliquis) 5 mg   No No   Sig: Take 1 tablet (5 mg total) by mouth 2 (two) times a day   atorvastatin (LIPITOR) 40 mg tablet   No No   Sig: Take 1 tablet (40 mg total) by mouth daily   calcium carbonate-vitamin D (OSCAL-D) 500 mg-200 units per tablet  Child No No   Sig: Take 1 tablet by mouth 2 (two) times a day with meals   docusate sodium (COLACE) 100 mg capsule  Child No No   Sig: Take 1 capsule (100 mg total) by mouth 2 (two) times a day   Patient taking differently: Take 100 mg by mouth 2 (two) times a day as needed    glucose blood test strip  Child No No   Sig: USE AS DIRECTED 3 TIMES A DAY   insulin aspart (NovoLOG) 100 units/mL injection   No No   Sig: Use for V-Go   metoprolol tartrate (LOPRESSOR) 25 mg tablet  Child No No   Sig: TAKE 1 TABLET (25 MG TOTAL) BY MOUTH EVERY 12 (TWELVE) HOURS   potassium chloride (Klor-Con M10) 10 mEq tablet   No No   Sig: Take 1 tablet (10 mEq total) by mouth daily   senna (SENOKOT) 8 6 mg  Child No No   Sig: Take 1 tablet (8 6 mg total) by mouth daily   Patient taking differently: Take 1 tablet by mouth daily at bedtime as needed    torsemide (DEMADEX) 20 mg tablet  Child Yes No   Sig: Take 20 mg by mouth daily      Facility-Administered Medications: None        Allergies   Allergen Reactions    Ace Inhibitors     Chlorpromazine     Latex     Lisinopril     Namenda [Memantine] Drowsiness    Penicillins Seizures    Propoxyphene     Stadol [Butorphanol]        Objective   Initial Vitals:   Temperature: 98 4 °F (36 9 °C) (02/14/22 0152)  Pulse: 70 (02/14/22 0146)  Respirations: 16 (02/14/22 0146)  Blood Pressure: (!) 143/116 (02/14/22 0146)    Primary Survey:   Airway:        Status: patent;                  Breathing:               Effort: normal       Right breath sounds: normal       Left breath sounds: normal  Circulation:        Rhythm:        Rate: regular   Right Pulses Left Pulses    R radial: 2+  R femoral: 2+  R pedal: 2+     L radial: 2+  L femoral: 2+  L pedal: 2+       Disability:        GCS: Eye: 4; Verbal: 5 Motor: 6 Total: 15       Right Pupil: 2 mm;  round;  reactive         Left Pupil:  2 mm;  round;  reactive      R Motor Strength L Motor Strength    R : 5/5  R dorsiflex: 5/5  R plantarflex: 5/5 L : 5/5  L dorsiflex: 5/5  L plantarflex: 5/5        Sensory:  No sensory deficit  Exposure:       Completed: Yes      Secondary Survey:  Physical Exam  Vitals reviewed  Constitutional:       General: She is not in acute distress  Appearance: She is not toxic-appearing or diaphoretic  HENT:      Head: Normocephalic and atraumatic  Right Ear: External ear normal       Left Ear: External ear normal       Nose: Nose normal       Mouth/Throat:      Pharynx: Oropharynx is clear  Eyes:      Extraocular Movements: Extraocular movements intact  Pupils: Pupils are equal, round, and reactive to light  Cardiovascular:      Rate and Rhythm: Normal rate  Pulses: Normal pulses  Pulmonary:      Effort: Pulmonary effort is normal  No respiratory distress  Abdominal:      General: There is no distension  Palpations: Abdomen is soft  Tenderness: There is no abdominal tenderness  Musculoskeletal:      Cervical back: Neck supple  No rigidity or tenderness  Skin:     General: Skin is warm  Capillary Refill: Capillary refill takes less than 2 seconds  Comments: Flat, erythematous rash present under breast tissue and pannus  Old appearing superficial abrasions to posterior aspect of R hand   Neurological:      General: No focal deficit present  Mental Status: She is alert and oriented to person, place, and time  Cranial Nerves: No cranial nerve deficit  Sensory: No sensory deficit  Motor: No weakness  Psychiatric:         Speech: Speech normal          Behavior: Behavior is cooperative           Invasive Devices  Report    Peripheral Intravenous Line            Peripheral IV 02/14/22 Right Antecubital <1 day          Drain            External Urinary Catheter 1013 days Imaging Results: I have personally reviewed pertinent reports     and I have personally reviewed pertinent films in PACS  Chest Xray(s): negative for acute traumatic findings   FAST exam(s): negative for acute traumatic findings   CT Scan(s): negative for acute traumatic findings   Additional Xray(s): N/A       Code Status: Prior  Advance Directive and Living Will:      Power of :    POLST: Yes

## 2022-02-14 NOTE — PROCEDURES
POC FAST US    Date/Time: 2/14/2022 2:02 AM  Performed by: Hubert Rodriguez MD  Authorized by: Hubert Rodriguez MD     Patient location:  ED and Trauma  Procedure details:     Exam Type:  Diagnostic    Assess for:  Hemothorax, intra-abdominal fluid and pericardial effusion    Technique: FAST      Views obtained:  Heart - Pericardial sac, RUQ - Baird's Pouch, LUQ - Splenorenal space and Suprapubic - Pouch of Misbah    Image quality: diagnostic      Image availability:  Not saved  FAST Findings:     RUQ (Hepatorenal) free fluid: absent      LUQ (Splenorenal) free fluid: absent      Suprapubic free fluid: absent      Cardiac wall motion: identified      Pericardial effusion: absent    Interpretation:     Impressions: negative

## 2022-02-14 NOTE — DISCHARGE INSTRUCTIONS
Follow-up with PCP for further care  You may also follow-up with the trauma team as needed  Contact info provided below  Use Tylenol as stated on the bottle as needed for pain control  Continue your home medications as prescribed  Return to the ED with new or worsening symptoms  Fall Prevention for Older Adults   WHAT YOU NEED TO KNOW:   As you age, your muscles weaken and your risk for falls increases  Your risk also increases if you take medicines that make you sleepy or dizzy  You may also be at risk if you have vision or joint problems, have low blood pressure, or are not active  DISCHARGE INSTRUCTIONS:   Call 911 or have someone else call if:   · You have fallen and are unconscious  · You have fallen and cannot move part of your body  Contact your healthcare provider if:   · You have fallen and have pain or a headache  · You have questions or concerns about your condition or care  Fall prevention tips:   · Stay active  Exercise can help strengthen your muscles and improve your balance  Your healthcare provider may recommend water aerobics, walking, or Lul Chi  He or she may also recommend physical therapy to improve your coordination  Never start an exercise program without asking your healthcare provider first             · Wear shoes that fit well and have soles that   Wear shoes both inside and outside  Use slippers with good   Avoid shoes with high heels  · Use assistive devices as directed  Your healthcare provider may suggest that you use a cane or walker to help you keep your balance  You may need to have grab bars put in your bathroom near the toilet or in the shower  · Stand or sit up slowly  This may help you keep your balance and prevent falls  · Wear a personal alarm  This is a device that allows you to call 911 if you need help  Ask for more information on personal alarms  · Manage your medical conditions    Keep all appointments with your healthcare providers  Visit your eye doctor as directed  Home safety tips:       · Add items to prevent falls in the bathroom  Put nonslip strips on your bath or shower floor to prevent you from slipping  Use a bath mat if you do not have carpet in the bathroom  This will prevent you from falling when you step out of the bath or shower  Use a shower seat so you do not need to stand while you shower  Sit on the toilet or a chair in your bathroom to dry yourself and put on clothing  This will prevent you from losing your balance from drying or dressing yourself while you are standing  · Keep paths clear  Remove books, shoes, and other objects from walkways and stairs  Place cords for telephones and lamps out of the way so that you do not need to walk over them  Tape them down if you cannot move them  Remove small rugs  If you cannot remove a rug, secure it with double-sided tape  This will prevent you from tripping  · Install bright lights in your home  Use night lights to help light paths to the bathroom or kitchen  Always turn on the light before you start walking  · Keep items you use often on shelves within reach  Do not use a step stool to help you reach an item  · Paint or place reflective tape on the edges of your stairs  This will help you see the stairs better  Follow up with your doctor as directed:  Write down your questions so you remember to ask them during your visits  © [employer] 2021 Information is for End User's use only and may not be sold, redistributed or otherwise used for commercial purposes  All illustrations and images included in CareNotes® are the copyrighted property of A D A M , Inc  or [facility] Franck Nolan   The above information is an  only  It is not intended as medical advice for individual conditions or treatments   Talk to your doctor, nurse or pharmacist before following any medical regimen to see if it is safe and effective for you       Acute Headache   WHAT YOU NEED TO KNOW:   An acute headache is pain or discomfort that may start suddenly and get worse quickly  You may have an acute headache only when you feel stress or eat certain foods  Other acute headache pain can happen every day, and sometimes several times a day  DISCHARGE INSTRUCTIONS:   Seek care immediately if:   · You have severe pain  · You have numbness or weakness on one side of your face or body  · You have a headache that occurs after a blow to the head, a fall, or other trauma  · You have a headache, are forgetful or confused, or have trouble speaking  · You have a headache, stiff neck, and a fever  Call your doctor if:   · You have a constant headache and are vomiting  · You have a headache each day that does not get better, even after treatment  · You have changes in your headaches, or new symptoms that occur when you have a headache  · You have questions or concerns about your condition or care  Medicines: You may need any of the following:  · Prescription pain medicine  may be given  The medicine your healthcare provider recommends will depend on the kind of headaches you have  You will need to take prescription headache medicines as directed to prevent a problem called rebound headache  These headaches happen with regular use of pain relievers for headache disorders  · NSAIDs , such as ibuprofen, help decrease swelling, pain, and fever  This medicine is available with or without a doctor's order  NSAIDs can cause stomach bleeding or kidney problems in certain people  If you take blood thinner medicine, always ask your healthcare provider if NSAIDs are safe for you  Always read the medicine label and follow directions  · Acetaminophen  decreases pain and fever  It is available without a doctor's order  Ask how much to take and how often to take it  Follow directions   Read the labels of all other medicines you are using to see if they also contain acetaminophen, or ask your doctor or pharmacist  Acetaminophen can cause liver damage if not taken correctly  Do not use more than 3 grams (3,000 milligrams) total of acetaminophen in one day  · Antidepressants  may be given for some kinds of headaches  · Take your medicine as directed  Contact your healthcare provider if you think your medicine is not helping or if you have side effects  Tell him or her if you are allergic to any medicine  Keep a list of the medicines, vitamins, and herbs you take  Include the amounts, and when and why you take them  Bring the list or the pill bottles to follow-up visits  Carry your medicine list with you in case of an emergency  Manage your symptoms:   · Apply heat or ice  on the headache area  Use a heat or ice pack  For an ice pack, you can also put crushed ice in a plastic bag  Cover the pack or bag with a towel before you apply it to your skin  Ice and heat both help decrease pain, and heat also helps decrease muscle spasms  Apply heat for 20 to 30 minutes every 2 hours  Apply ice for 15 to 20 minutes every hour  Apply heat or ice for as long and for as many days as directed  You may alternate heat and ice  · Relax your muscles  Lie down in a comfortable position and close your eyes  Relax your muscles slowly  Start at your toes and work your way up your body  · Keep a record of your headaches  Write down when your headaches start and stop  Include your symptoms and what you were doing when the headache began  Record what you ate or drank for 24 hours before the headache started  Describe the pain and where it hurts  Keep track of what you did to treat your headache and if it worked  Prevent an acute headache:   · Avoid anything that triggers an acute headache  Examples include exposure to chemicals, going to high altitude, or not getting enough sleep  Create a regular sleep routine   Go to sleep at the same time and wake up at the same time each day  Do not use electronic devices before bedtime  These may trigger a headache or prevent you from sleeping well  · Do not smoke  Nicotine and other chemicals in cigarettes and cigars can trigger an acute headache or make it worse  Ask your healthcare provider for information if you currently smoke and need help to quit  E-cigarettes or smokeless tobacco still contain nicotine  Talk to your healthcare provider before you use these products  · Limit alcohol as directed  Alcohol can trigger an acute headache or make it worse  If you have cluster headaches, do not drink alcohol during an episode  For other types of headaches, ask your healthcare provider if it is safe for you to drink alcohol  Ask how much is safe for you to drink, and how often  · Exercise as directed  Exercise can reduce tension and help with headache pain  Aim for 30 minutes of physical activity on most days of the week  Your healthcare provider can help you create an exercise plan  · Eat a variety of healthy foods  Healthy foods include fruits, vegetables, low-fat dairy products, lean meats, fish, whole grains, and cooked beans  Your healthcare provider or dietitian can help you create meals plans if you need to avoid foods that trigger headaches  Follow up with your healthcare provider as directed:  Bring your headache record with you when you see your healthcare provider  Write down your questions so you remember to ask them during your visits  © Copyright [employer] 2021 Information is for End User's use only and may not be sold, redistributed or otherwise used for commercial purposes  All illustrations and images included in CareNotes® are the copyrighted property of A D A M , Inc  or Ninfa Nolan   The above information is an  only  It is not intended as medical advice for individual conditions or treatments   Talk to your doctor, nurse or pharmacist before following any medical regimen to see if it is safe and effective for you

## 2022-04-11 ENCOUNTER — PATIENT OUTREACH (OUTPATIENT)
Dept: FAMILY MEDICINE CLINIC | Facility: CLINIC | Age: 87
End: 2022-04-11

## 2022-04-11 ENCOUNTER — EPISODE CHANGES (OUTPATIENT)
Dept: CASE MANAGEMENT | Facility: OTHER | Age: 87
End: 2022-04-11

## 2022-04-11 NOTE — PROGRESS NOTES
Left message with my contact information for patients daughter Alexey Goff to return my call  Alexey Goff is primary contact in patients chart

## 2022-04-11 NOTE — PROGRESS NOTES
Chart review completed for the following sections:   Recent Vital Signs   Allergies/Contradictions   Medication Review    History    [facility]    Problem List   Immunizations   Past hospitalizations and major procedures, including surgery   Significant past illnesses and treatment history including: Other provider notes   Relevant past medications related to the patient's condition

## 2022-04-13 ENCOUNTER — PATIENT OUTREACH (OUTPATIENT)
Dept: FAMILY MEDICINE CLINIC | Facility: CLINIC | Age: 87
End: 2022-04-13

## 2022-04-13 NOTE — PROGRESS NOTES
ΣΑΡΑΝΤΙ patients daughter return my call  Explained the complex care management program and she declines at this time to participate She  stated her mother would not want to talk that much and answer that many questions and would not want to participate either  Her mother had issues with insurance sending people out to ask questions feels she does not need that since she goes to all her doctors appointments  ΣΑΡΑΝΤΙ will keep the program in mind if needed in the future

## 2022-06-12 DIAGNOSIS — I47.2 VENTRICULAR TACHYCARDIA (HCC): ICD-10-CM

## 2022-06-12 DIAGNOSIS — I10 ESSENTIAL HYPERTENSION: Chronic | ICD-10-CM

## 2022-06-12 DIAGNOSIS — I48.0 PAF (PAROXYSMAL ATRIAL FIBRILLATION) (HCC): ICD-10-CM

## 2022-06-14 NOTE — TELEPHONE ENCOUNTER
Requested medication(s) are due for refill today: Y  Patient has already received a courtesy refill: N  Other reason request has been forwarded to provider:

## 2022-07-13 DIAGNOSIS — I48.91 ATRIAL FIBRILLATION, UNSPECIFIED TYPE (HCC): ICD-10-CM

## 2022-07-13 RX ORDER — APIXABAN 5 MG/1
TABLET, FILM COATED ORAL
Qty: 60 TABLET | Refills: 5 | Status: SHIPPED | OUTPATIENT
Start: 2022-07-13

## 2022-07-21 ENCOUNTER — RA CDI HCC (OUTPATIENT)
Dept: OTHER | Facility: HOSPITAL | Age: 87
End: 2022-07-21

## 2022-07-22 NOTE — PROGRESS NOTES
[facility]  coding opportunities     E11 22, I13 0     Chart Reviewed number of suggestions sent to Provider: 2     Patients Insurance     Medicare Insurance: [address] [place] [address]

## 2022-07-29 DIAGNOSIS — E11.65 UNCONTROLLED TYPE 2 DIABETES MELLITUS WITH HYPERGLYCEMIA (HCC): ICD-10-CM

## 2022-07-29 RX ORDER — SUB-Q INSULIN DEVICE, 40 UNIT
EACH MISCELLANEOUS DAILY
Qty: 30 KIT | Refills: 1 | Status: SHIPPED | OUTPATIENT
Start: 2022-07-29

## 2022-07-30 ENCOUNTER — APPOINTMENT (OUTPATIENT)
Dept: LAB | Facility: HOSPITAL | Age: 87
End: 2022-07-30
Payer: COMMERCIAL

## 2022-07-30 DIAGNOSIS — E78.5 HYPERLIPIDEMIA, UNSPECIFIED HYPERLIPIDEMIA TYPE: ICD-10-CM

## 2022-07-30 DIAGNOSIS — Z95.0 PRESENCE OF PERMANENT CARDIAC PACEMAKER: ICD-10-CM

## 2022-07-30 DIAGNOSIS — E78.2 MIXED HYPERLIPIDEMIA: ICD-10-CM

## 2022-07-30 DIAGNOSIS — Z95.0 CARDIAC PACEMAKER IN SITU: ICD-10-CM

## 2022-07-30 DIAGNOSIS — Z79.4 TYPE 2 DIABETES MELLITUS WITH HYPERGLYCEMIA, WITH LONG-TERM CURRENT USE OF INSULIN (HCC): ICD-10-CM

## 2022-07-30 DIAGNOSIS — IMO0002 DM (DIABETES MELLITUS), TYPE 2, UNCONTROLLED W/OPHTHALMIC COMPLICATION: ICD-10-CM

## 2022-07-30 DIAGNOSIS — I10 ESSENTIAL HYPERTENSION: ICD-10-CM

## 2022-07-30 DIAGNOSIS — I49.5 SICK SINUS SYNDROME (HCC): ICD-10-CM

## 2022-07-30 DIAGNOSIS — I48.0 PAF (PAROXYSMAL ATRIAL FIBRILLATION) (HCC): ICD-10-CM

## 2022-07-30 DIAGNOSIS — I47.20 VENTRICULAR TACHYCARDIA: ICD-10-CM

## 2022-07-30 DIAGNOSIS — R60.9 EDEMA, UNSPECIFIED TYPE: ICD-10-CM

## 2022-07-30 DIAGNOSIS — E11.65 TYPE 2 DIABETES MELLITUS WITH HYPERGLYCEMIA, WITH LONG-TERM CURRENT USE OF INSULIN (HCC): ICD-10-CM

## 2022-07-30 DIAGNOSIS — Z95.0 PRESENCE OF CARDIAC PACEMAKER: ICD-10-CM

## 2022-07-30 LAB
ALBUMIN SERPL BCP-MCNC: 3.5 G/DL (ref 3.5–5)
ALP SERPL-CCNC: 86 U/L (ref 46–116)
ALT SERPL W P-5'-P-CCNC: 21 U/L (ref 12–78)
ANION GAP SERPL CALCULATED.3IONS-SCNC: 1 MMOL/L (ref 4–13)
AST SERPL W P-5'-P-CCNC: 16 U/L (ref 5–45)
BASOPHILS # BLD AUTO: 0.08 THOUSANDS/ΜL (ref 0–0.1)
BASOPHILS NFR BLD AUTO: 1 % (ref 0–1)
BILIRUB SERPL-MCNC: 0.71 MG/DL (ref 0.2–1)
BUN SERPL-MCNC: 20 MG/DL (ref 5–25)
CALCIUM SERPL-MCNC: 9.5 MG/DL (ref 8.3–10.1)
CHLORIDE SERPL-SCNC: 106 MMOL/L (ref 96–108)
CHOLEST SERPL-MCNC: 105 MG/DL
CK SERPL-CCNC: 93 U/L (ref 26–192)
CO2 SERPL-SCNC: 32 MMOL/L (ref 21–32)
CREAT SERPL-MCNC: 1.06 MG/DL (ref 0.6–1.3)
CREAT UR-MCNC: 55.9 MG/DL
EOSINOPHIL # BLD AUTO: 0.36 THOUSAND/ΜL (ref 0–0.61)
EOSINOPHIL NFR BLD AUTO: 6 % (ref 0–6)
ERYTHROCYTE [DISTWIDTH] IN BLOOD BY AUTOMATED COUNT: 15 % (ref 11.6–15.1)
EST. AVERAGE GLUCOSE BLD GHB EST-MCNC: 163 MG/DL
GFR SERPL CREATININE-BSD FRML MDRD: 48 ML/MIN/1.73SQ M
GLUCOSE P FAST SERPL-MCNC: 92 MG/DL (ref 65–99)
HBA1C MFR BLD: 7.3 %
HCT VFR BLD AUTO: 45 % (ref 34.8–46.1)
HDLC SERPL-MCNC: 55 MG/DL
HGB BLD-MCNC: 14.5 G/DL (ref 11.5–15.4)
IMM GRANULOCYTES # BLD AUTO: 0.02 THOUSAND/UL (ref 0–0.2)
IMM GRANULOCYTES NFR BLD AUTO: 0 % (ref 0–2)
LDLC SERPL CALC-MCNC: 41 MG/DL (ref 0–100)
LYMPHOCYTES # BLD AUTO: 1.43 THOUSANDS/ΜL (ref 0.6–4.47)
LYMPHOCYTES NFR BLD AUTO: 25 % (ref 14–44)
MCH RBC QN AUTO: 30 PG (ref 26.8–34.3)
MCHC RBC AUTO-ENTMCNC: 32.2 G/DL (ref 31.4–37.4)
MCV RBC AUTO: 93 FL (ref 82–98)
MICROALBUMIN UR-MCNC: 49.7 MG/L (ref 0–20)
MICROALBUMIN/CREAT 24H UR: 89 MG/G CREATININE (ref 0–30)
MONOCYTES # BLD AUTO: 0.52 THOUSAND/ΜL (ref 0.17–1.22)
MONOCYTES NFR BLD AUTO: 9 % (ref 4–12)
NEUTROPHILS # BLD AUTO: 3.3 THOUSANDS/ΜL (ref 1.85–7.62)
NEUTS SEG NFR BLD AUTO: 59 % (ref 43–75)
NONHDLC SERPL-MCNC: 50 MG/DL
NRBC BLD AUTO-RTO: 0 /100 WBCS
NT-PROBNP SERPL-MCNC: 952 PG/ML
PLATELET # BLD AUTO: 211 THOUSANDS/UL (ref 149–390)
PMV BLD AUTO: 11.5 FL (ref 8.9–12.7)
POTASSIUM SERPL-SCNC: 4 MMOL/L (ref 3.5–5.3)
PROT SERPL-MCNC: 6.5 G/DL (ref 6.4–8.4)
RBC # BLD AUTO: 4.84 MILLION/UL (ref 3.81–5.12)
SODIUM SERPL-SCNC: 139 MMOL/L (ref 135–147)
TRIGL SERPL-MCNC: 44 MG/DL
WBC # BLD AUTO: 5.71 THOUSAND/UL (ref 4.31–10.16)

## 2022-07-30 PROCEDURE — 36415 COLL VENOUS BLD VENIPUNCTURE: CPT

## 2022-07-30 PROCEDURE — 80061 LIPID PANEL: CPT

## 2022-07-30 PROCEDURE — 82570 ASSAY OF URINE CREATININE: CPT

## 2022-07-30 PROCEDURE — 85025 COMPLETE CBC W/AUTO DIFF WBC: CPT

## 2022-07-30 PROCEDURE — 83036 HEMOGLOBIN GLYCOSYLATED A1C: CPT

## 2022-07-30 PROCEDURE — 80053 COMPREHEN METABOLIC PANEL: CPT

## 2022-07-30 PROCEDURE — 83880 ASSAY OF NATRIURETIC PEPTIDE: CPT

## 2022-07-30 PROCEDURE — 82550 ASSAY OF CK (CPK): CPT

## 2022-07-30 PROCEDURE — 82043 UR ALBUMIN QUANTITATIVE: CPT

## 2022-08-01 ENCOUNTER — OFFICE VISIT (OUTPATIENT)
Dept: FAMILY MEDICINE CLINIC | Facility: CLINIC | Age: 87
End: 2022-08-01
Payer: COMMERCIAL

## 2022-08-01 VITALS
BODY MASS INDEX: 34.99 KG/M2 | HEIGHT: 65 IN | HEART RATE: 64 BPM | TEMPERATURE: 98.2 F | WEIGHT: 210 LBS | OXYGEN SATURATION: 99 % | RESPIRATION RATE: 16 BRPM | SYSTOLIC BLOOD PRESSURE: 138 MMHG | DIASTOLIC BLOOD PRESSURE: 76 MMHG

## 2022-08-01 DIAGNOSIS — E78.2 MIXED HYPERLIPIDEMIA: ICD-10-CM

## 2022-08-01 DIAGNOSIS — F02.80 LATE ONSET ALZHEIMER'S DEMENTIA WITHOUT BEHAVIORAL DISTURBANCE (HCC): Chronic | ICD-10-CM

## 2022-08-01 DIAGNOSIS — G30.1 LATE ONSET ALZHEIMER'S DEMENTIA WITHOUT BEHAVIORAL DISTURBANCE (HCC): Chronic | ICD-10-CM

## 2022-08-01 DIAGNOSIS — E11.65 TYPE 2 DIABETES MELLITUS WITH HYPERGLYCEMIA, WITH LONG-TERM CURRENT USE OF INSULIN (HCC): ICD-10-CM

## 2022-08-01 DIAGNOSIS — I10 ESSENTIAL HYPERTENSION: Chronic | ICD-10-CM

## 2022-08-01 DIAGNOSIS — Z00.00 MEDICARE ANNUAL WELLNESS VISIT, SUBSEQUENT: Primary | ICD-10-CM

## 2022-08-01 DIAGNOSIS — I48.0 PAF (PAROXYSMAL ATRIAL FIBRILLATION) (HCC): ICD-10-CM

## 2022-08-01 DIAGNOSIS — I50.30 CONGESTIVE HEART FAILURE WITH LV DIASTOLIC DYSFUNCTION, NYHA CLASS 2 (HCC): Primary | ICD-10-CM

## 2022-08-01 DIAGNOSIS — I50.30 CONGESTIVE HEART FAILURE WITH LV DIASTOLIC DYSFUNCTION, NYHA CLASS 2 (HCC): ICD-10-CM

## 2022-08-01 DIAGNOSIS — Z79.4 TYPE 2 DIABETES MELLITUS WITH HYPERGLYCEMIA, WITH LONG-TERM CURRENT USE OF INSULIN (HCC): ICD-10-CM

## 2022-08-01 PROCEDURE — 1125F AMNT PAIN NOTED PAIN PRSNT: CPT | Performed by: FAMILY MEDICINE

## 2022-08-01 PROCEDURE — 3725F SCREEN DEPRESSION PERFORMED: CPT | Performed by: FAMILY MEDICINE

## 2022-08-01 PROCEDURE — 3288F FALL RISK ASSESSMENT DOCD: CPT | Performed by: FAMILY MEDICINE

## 2022-08-01 PROCEDURE — G0439 PPPS, SUBSEQ VISIT: HCPCS | Performed by: FAMILY MEDICINE

## 2022-08-01 PROCEDURE — 1100F PTFALLS ASSESS-DOCD GE2>/YR: CPT | Performed by: FAMILY MEDICINE

## 2022-08-01 PROCEDURE — 1170F FXNL STATUS ASSESSED: CPT | Performed by: FAMILY MEDICINE

## 2022-08-01 PROCEDURE — 1160F RVW MEDS BY RX/DR IN RCRD: CPT | Performed by: FAMILY MEDICINE

## 2022-08-01 RX ORDER — TORSEMIDE 20 MG/1
20 TABLET ORAL DAILY
Qty: 90 TABLET | Refills: 3 | Status: SHIPPED | OUTPATIENT
Start: 2022-08-01

## 2022-08-01 RX ORDER — POTASSIUM CHLORIDE 750 MG/1
10 TABLET, EXTENDED RELEASE ORAL DAILY
Qty: 90 TABLET | Refills: 3 | Status: SHIPPED | OUTPATIENT
Start: 2022-08-01

## 2022-08-01 RX ORDER — INSULIN ASPART 100 [IU]/ML
INJECTION, SOLUTION INTRAVENOUS; SUBCUTANEOUS
COMMUNITY
Start: 2022-06-17 | End: 2022-08-22

## 2022-08-01 NOTE — ASSESSMENT & PLAN NOTE
Pt arrived for velcade.  Pt tolerated injection SQ to left side of abd.  Discharged to home using her cane with her granddaughter.   Wt Readings from Last 3 Encounters:   08/01/22 95 3 kg (210 lb)   02/14/22 95 kg (209 lb 7 oz)   06/30/21 99 7 kg (219 lb 14 4 oz)     Continue torsemide and potassium per cardiology

## 2022-08-01 NOTE — PROGRESS NOTES
Chief Complaint   Patient presents with   [facility] Wellness Visit     Health Maintenance   Topic Date Due    SLP PLAN OF CARE  Never done   [facility] Annual Wellness Visit (AWV)  06/06/2020    BMI: Followup Plan  12/18/2020    COVID-19 Vaccine (4 - Booster for Moderna series) 06/22/2021    DM Eye Exam  10/01/2021    Diabetic Foot Exam  05/05/2022    DXA SCAN  05/16/2022    Influenza Vaccine (1) 09/01/2022    HEMOGLOBIN A1C  10/30/2022    URINE MICROALBUMIN  07/30/2023    Fall Risk  08/01/2023    BMI: Adult  08/01/2023    Osteoporosis Screening  Completed    Pneumococcal Vaccine: 65+ Years  Completed    HIB Vaccine  Aged Out    Hepatitis B Vaccine  Aged Out    IPV Vaccine  Aged Out    Hepatitis A Vaccine  Aged Out    Meningococcal ACWY Vaccine  Aged Out    HPV Vaccine  Aged Out        Assessment and Plan:     Problem List Items Addressed This Visit        Endocrine    Type 2 diabetes mellitus with hyperglycemia, with long-term current use of insulin ([address] )       Lab Results   Component Value Date    HGBA1C 7 3 (H) 07/30/2022     Improved  FU endocrinology  Relevant Medications    NovoLOG 100 UNIT/ML injection       Cardiovascular and Mediastinum    Essential hypertension (Chronic)     Controlled  DASH diet  Continue metoprolol per cardiology  Congestive heart failure with LV diastolic dysfunction, NYHA class 2 (HCC)     Wt Readings from Last 3 Encounters:   08/01/22 95 3 kg (210 lb)   02/14/22 95 kg (209 lb 7 oz)   06/30/21 99 7 kg (219 lb 14 4 oz)     Continue torsemide and potassium per cardiology  Nervous and Auditory    Dementia without behavioral disturbance (HCC) (Chronic)     Need help with ADL's  Stable  Other    Hyperlipidemia     Low fat diet  Continue atorvastatin 40mg qhs  Other Visit Diagnoses     Medicare annual wellness visit, subsequent    -  Primary        BMI Counseling: Body mass index is 34 95 kg/m²   Follow-up plan was not completed due to elderly patient (74 years old) where weight reduction/weight gain would complicate underlying health condition such as: illness or physical disability  Falls Plan of Care: balance, strength, and gait training instructions were provided  Flu shot yearly    Got Covid19 vaccine and 2 boosters  Got PCV 13 in 2016, got pneumovax in 2014  Fall precautions    RTO in 6 months        Preventive health issues were discussed with patient, and age appropriate screening tests were ordered as noted in patient's After Visit Summary  Personalized health advice and appropriate referrals for health education or preventive services given if needed, as noted in patient's After Visit Summary  History of Present Illness:     Patient presents for a Medicare Wellness Visit    HPI     Pt is here with her daughter  DM---7/2022 hgA1C 7 3 improved  FU endocrinology  She is on V-Go 20  Refused eye exam    FU podiatry regularly  No problem       HTN---She is on metoprolol 25mg bid now  BP is good today  Pt denies dizzy or lightheaded     CHF---She is on torsemide 20mg QD  Wt is stable per daughter  She is on potassium supplement     Afib---She is on eliquis 5mg bid  Denies bleeding signs    FU cardiology Dr Gilman Alert pacemaker 5/2014, CAD s/p bypass 1999       Hyperlipidemia---on atorvastatin 40mg qhs  Denies side effects       Dementia---Stable  Asthma----Controlled  Does not need albuterol for a while       Son lives with her  Daughter helped her with her medications    Use walker at home    Denies depression           Patient Care Team:  Berna Bond MD as PCP - General  MD Rodney Robertson, [address] [place] [address], PA-C  Makenzie Tello MD Mackie Baxter, PA-C (Physician Assistant)  Suly Reyna MD (Endocrinology)     Review of Systems:     Review of Systems   Constitutional: Negative for appetite change, chills and fever     HENT: Negative for congestion, ear pain, sinus pain and sore throat  Eyes: Negative for discharge and itching  Respiratory: Negative for apnea, cough, chest tightness, shortness of breath and wheezing  Cardiovascular: Negative for chest pain, palpitations and leg swelling  Gastrointestinal: Negative for abdominal pain, anal bleeding, constipation, diarrhea, nausea and vomiting  Endocrine: Negative for cold intolerance, heat intolerance and polyuria  Genitourinary: Negative for difficulty urinating and dysuria  Musculoskeletal: Positive for gait problem  Negative for arthralgias, back pain and myalgias  Skin: Negative for rash  Neurological: Negative for dizziness and headaches  Psychiatric/Behavioral: Negative for agitation  Problem List:     Patient Active Problem List   Diagnosis    3-vessel coronary artery disease    Dementia without behavioral disturbance ([facility] )    Depression    Diabetic retinopathy ([facility] )    DM (diabetes mellitus), type 2, uncontrolled w/ophthalmic complication    Gastroesophageal reflux disease with hiatal hernia    Glaucoma    Hyperlipidemia    Essential hypertension    [facility]erebral artery occlusion [facility]    Obesity (BMI 30-39  9)    Obstructive sleep apnea    Osteoarthritis of knee    Urine incontinence    [facility] tachycardia ([facility]    Acute on chronic combined systolic and diastolic congestive heart failure ([facility])    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 ([facility])    Abnormal chest x-ray    Urine retention    Congestive heart failure with LV diastolic dysfunction, NYHA class 2 ([facility])    Dyspnea on minimal exertion    PAF (paroxysmal atrial fibrillation) ([facility])    SSS (sick sinus syndrome) ([facility])    Mild intermittent asthma without complication    Osteopenia    Obesity, morbid ([facility])    Type 2 diabetes mellitus with hyperglycemia, with long-term current use of insulin ([facility])      Past Medical and Surgical History:     Past Medical History:   Diagnosis Date    Arthritis     Asthma     CAD (coronary artery disease) of artery bypass graft     Cardiac disease     CH[facility]congestive heart failure) [facility]     D[facility]     Depression     Diabetes mellitus [facility]     Heart attack ([facility] )     Hyperlipidemia     Hypertension     MI (myocardial infarction) ([facility] )     Neuropathy     BRANT (obstructive sleep apnea)     Peptic ulcer     was + for H pylori    Transient cerebral ischemia 2011    with neg CUS and ECHO     Past Surgical History:   Procedure Laterality Date    APPENDECTOMY      CATARACT EXTRACTION       SECTION      COLONOSCOPY  2004    CORONARY ANGIOPLASTY  2006    CORONARY ANGIOPLASTY WITH STENT PLACEMENT      CORONARY ARTERY BYPASS GRAFT      DENTAL SURGERY      EGD AND COLONOSCOPY      ELBOW SURGERY      resolved     ESOPHAGOGASTRODUODENOSCOPY        Family History:     Family History   Problem Relation Age of Onset    Diabetes Mother     Cancer Sister     Heart disease Sister     Thyroid disease Sister     Cancer Brother     Cancer Father     Colon cancer Family     Diabetes Family     Mitral valve prolapse Family     Thyroid cancer Family       Social History:     Social History     Socioeconomic History    Marital status:       Spouse name: None    Number of children: None    Years of education: None    Highest education level: None   Occupational History    None   Tobacco Use    Smoking status: Never Smoker    Smokeless tobacco: Never Used   Vaping Use    Vaping Use: Never used   Substance and Sexual Activity    Alcohol use: Not Currently    Drug use: Never    Sexual activity: Not Currently   Other Topics Concern    None   Social History Narrative    Caffeine use     Social Determinants of Health     Financial Resource Strain: Not on file   Food Insecurity: Not on file   Transportation Needs: Not on file   Physical Activity: Not on file   Stress: Not on file   Social Connections: Not on file   Intimate Partner Violence: Not on file   Housing Stability: Not on file      Medications and Allergies:     Current Outpatient Medications   Medication Sig Dispense Refill    atorvastatin (LIPITOR) 40 mg tablet Take 1 tablet (40 mg total) by mouth daily 90 tablet 3    calcium carbonate-vitamin D (OSCAL-D) 500 mg-200 units per tablet Take 1 tablet by mouth 2 (two) times a day with meals 60 tablet 0    Continuous Blood Gluc  (Dexcom G6 ) SIVAKUMAR Use daily as directed for CGM      Continuous Blood Gluc Sensor (Dexcom G6 Sensor) MISC Use daily as directed for CGM - Change every 10 days      Continuous Blood Gluc Transmit (Dexcom G6 Transmitter) MISC Use daily as directed for CGM - Change every 3 months      docusate sodium (COLACE) 100 mg capsule Take 1 capsule (100 mg total) by mouth 2 (two) times a day (Patient taking differently: Take 100 mg by mouth 2 (two) times a day as needed) 10 capsule 0    Eliquis 5 MG TAKE 1 TABLET BY MOUTH TWICE A DAY 60 tablet 5    glucose blood test strip USE AS DIRECTED 3 TIMES A  each 1    insulin aspart (NovoLOG) 100 units/mL injection Use for V-Go 90 mL 3    Insulin Disposable Pump (V-Go 20) KIT Apply once a night 30 kit 5    Insulin Disposable Pump (V-Go 20) KIT Use daily Use one daily 30 kit 1    metoprolol tartrate (LOPRESSOR) 25 mg tablet TAKE 1 TABLET (25 MG TOTAL) BY MOUTH EVERY 12 (TWELVE) HOURS 180 tablet 3    NovoLOG 100 UNIT/ML injection USE FOR V-GO      potassium chloride (Klor-Con M10) 10 mEq tablet Take 1 tablet (10 mEq total) by mouth daily 90 tablet 3    senna (SENOKOT) 8 6 mg Take 1 tablet (8 6 mg total) by mouth daily (Patient taking differently: Take 1 tablet by mouth daily at bedtime as needed) 120 each 0    torsemide (DEMADEX) 20 mg tablet Take 1 tablet (20 mg total) by mouth daily 90 tablet 3     No current facility-administered medications for this visit       Allergies   Allergen Reactions    Ace Inhibitors     Chlorpromazine     Latex     Lisinopril     Namenda [Memantine] Drowsiness    Penicillins Seizures    Propoxyphene     Stadol [Butorphanol]       Immunizations:     Immunization History   Administered Date(s) Administered    COVID-19 MODERNA VACC 0 5 ML IM 01/25/2021, 02/20/2021, 02/22/2021    INFLUENZA 11/03/2015, 10/20/2016, 09/27/2017    Influenza Split High Dose Preservative Free IM 11/04/2014, 11/03/2015, 10/20/2016, 09/27/2017    Influenza, high dose seasonal 0 7 mL 12/18/2019, 11/09/2020    Influenza, seasonal, injectable 12/17/2012, 09/23/2013    Pneumococcal Conjugate 13-Valent 10/20/2016    Pneumococcal Polysaccharide PPV23 01/01/1997, 04/17/2014    Td (adult), adsorbed 01/01/1995      Health Maintenance:         Topic Date Due    DXA SCAN  05/16/2022         Topic Date Due    COVID-19 Vaccine (4 - Booster for Moderna series) 06/22/2021    Influenza Vaccine (1) 09/01/2022      Medicare Screening Tests and Risk Assessments:     [place] is here for her Subsequent Wellness visit  Health Risk Assessment:   Patient rates overall health as good  Patient feels that their physical health rating is same  Patient is very satisfied with their life  Eyesight was rated as same  Hearing was rated as same  Patient feels that their emotional and mental health rating is same  Patients states they are never, rarely angry  Patient states they are sometimes unusually tired/fatigued  Pain experienced in the last 7 days has been none  Patient states that she has experienced no weight loss or gain in last 6 months  Depression Screening:   PHQ-9 Score: 0      Fall Risk Screening: In the past year, patient has experienced: history of falling in past year    Number of falls: 2 or more  Injured during fall?: No      Urinary Incontinence Screening:   Patient has leaked urine accidently in the last six months       Home Safety:  Patient has trouble with stairs inside or outside of their home  Patient has working smoke alarms and has working carbon monoxide detector  Home safety hazards include: none  Nutrition:   Current diet is No Added Salt, Diabetic and Low Carb  Medications:   Patient is currently taking over-the-counter supplements  OTC medications include: see medication list  Patient is not able to manage medications  Activities of Daily Living (ADLs)/Instrumental Activities of Daily Living (IADLs):   Walk and transfer into and out of bed and chair?: Yes  Dress and groom yourself?: Yes    Bathe or shower yourself?: Yes    Feed yourself? Yes  Do your laundry/housekeeping?: No  Manage your money, pay your bills and track your expenses?: No  Make your own meals?: No    Do your own shopping?: No    ADL comments: Daughter helped her shower sometimes  Previous Hospitalizations:   Any hospitalizations or ED visits within the last 12 months?: No      Advance Care Planning:   Living will: Yes    Durable POA for healthcare: Yes    Advanced directive: Yes      PREVENTIVE SCREENINGS      Cardiovascular Screening:    General: History Lipid Disorder and Screening Current      Diabetes Screening:     General: History Diabetes and Screening Current      Colorectal Cancer Screening:     General: Screening Not Indicated      Breast Cancer Screening:     General: Screening Not Indicated      Cervical Cancer Screening:    General: Screening Not Indicated      Lung Cancer Screening:     General: Screening Not Indicated    Screening, Brief Intervention, and Referral to Treatment (SBIRT)    Screening  Typical number of drinks in a day: 0  Typical number of drinks in a week: 0  Interpretation: Low risk drinking behavior      Single Item Drug Screening:  How often have you used an illegal drug (including marijuana) or a prescription medication for non-medical reasons in the past year? never    Single Item Drug Screen Score: 0  Interpretation: Negative screen for possible drug use disorder    No exam data present     Physical Exam:     /76 (BP Location: Left arm, Patient Position: Sitting)   Pulse 64   Temp 98 2 °F (36 8 °C) (Tympanic)   Resp 16   Ht 5' 5" (1 651 m)   Wt 95 3 kg (210 lb)   SpO2 99%   BMI 34 95 kg/m²     Physical Exam  Vitals reviewed  Constitutional:       Appearance: She is obese  HENT:      Head: Normocephalic and atraumatic  Eyes:      General:         Right eye: No discharge  Left eye: No discharge  Conjunctiva/sclera: Conjunctivae normal    Neck:      Vascular: No carotid bruit  Cardiovascular:      Rate and Rhythm: Normal rate and regular rhythm  Heart sounds: Normal heart sounds  No murmur heard  No friction rub  No gallop  Pulmonary:      Effort: Pulmonary effort is normal  No respiratory distress  Breath sounds: Normal breath sounds  No wheezing or rales  Abdominal:      General: Bowel sounds are normal  There is no distension  Palpations: Abdomen is soft  Tenderness: There is no abdominal tenderness  Musculoskeletal:      Cervical back: Normal range of motion and neck supple  No muscular tenderness  Right lower leg: No edema  Left lower leg: No edema  Comments: On wheelchair   Lymphadenopathy:      Cervical: No cervical adenopathy  Neurological:      Mental Status: She is alert     Psychiatric:         Mood and Affect: Mood normal           Nicole Shrestha MD

## 2022-08-01 NOTE — PATIENT INSTRUCTIONS
Medicare Preventive Visit Patient Instructions  Thank you for completing your Welcome to Medicare Visit or Medicare Annual Wellness Visit today  Your next wellness visit will be due in one year (8/2/2023)  The screening/preventive services that you may require over the next 5-10 years are detailed below  Some tests may not apply to you based off risk factors and/or age  Screening tests ordered at today's visit but not completed yet may show as past due  Also, please note that scanned in results may not display below  Preventive Screenings:  Service Recommendations Previous Testing/Comments   Colorectal Cancer Screening  * Colonoscopy    * Fecal Occult Blood Test (FOBT)/Fecal Immunochemical Test (FIT)  * Fecal DNA/Cologuard Test  * Flexible Sigmoidoscopy Age: 54-65 years old   Colonoscopy: every 10 years (may be performed more frequently if at higher risk)  OR  FOBT/FIT: every 1 year  OR  Cologuard: every 3 years  OR  Sigmoidoscopy: every 5 years  Screening may be recommended earlier than age 48 if at higher risk for colorectal cancer  Also, an individualized decision between you and your healthcare provider will decide whether screening between the ages of 74-80 would be appropriate  Colonoscopy: Not on file  FOBT/FIT: Not on file  Cologuard: Not on file  Sigmoidoscopy: Not on file          Breast Cancer Screening Age: 36 years old  Frequency: every 1-2 years  Not required if history of left and right mastectomy Mammogram: Not on file        Cervical Cancer Screening Between the ages of 21-29, pap smear recommended once every 3 years  Between the ages of 33-67, can perform pap smear with HPV co-testing every 5 years     Recommendations may differ for women with a history of total hysterectomy, cervical cancer, or abnormal pap smears in past  Pap Smear: Not on file        Hepatitis C Screening Once for adults born between [facility]  More frequently in patients at high risk for Hepatitis C Hep C Antibody: Not on file        Diabetes Screening 1-2 times per year if you're at risk for diabetes or have pre-diabetes Fasting glucose: 92 mg/dL   A1C: 7 3 %        Cholesterol Screening Once every 5 years if you don't have a lipid disorder  May order more often based on risk factors  Lipid panel: 07/30/2022          Other Preventive Screenings Covered by Medicare:  1  Abdominal Aortic Aneurysm (AAA) Screening: covered once if your at risk  You're considered to be at risk if you have a family history of AAA  2  Lung Cancer Screening: covers low dose CT scan once per year if you meet all of the following conditions: (1) Age 50-69; (2) No signs or symptoms of lung cancer; (3) Current smoker or have quit smoking within the last 15 years; (4) You have a tobacco smoking history of at least 30 pack years (packs per day multiplied by number of years you smoked); (5) You get a written order from a healthcare provider  3  Glaucoma Screening: covered annually if you're considered high risk: (1) You have diabetes OR (2) Family history of glaucoma OR (3)  aged 48 and older OR (3)  American aged 72 and older  3  Osteoporosis Screening: covered every 2 years if you meet one of the following conditions: (1) You're estrogen deficient and at risk for osteoporosis based off medical history and other findings; (2) Have a vertebral abnormality; (3) On glucocorticoid therapy for more than 3 months; (4) Have primary hyperparathyroidism; (5) On osteoporosis medications and need to assess response to drug therapy  · Last bone density test (DXA Scan): 05/16/2017  5  HIV Screening: covered annually if you're between the age of 12-76  Also covered annually if you are younger than 13 and older than 72 with risk factors for HIV infection  For pregnant patients, it is covered up to 3 times per pregnancy      Immunizations:  Immunization Recommendations   Influenza Vaccine Annual influenza vaccination during flu season is recommended for all persons aged >= 6 months who do not have contraindications   Pneumococcal Vaccine (Prevnar and Pneumovax)  * Prevnar = PCV13  * Pneumovax = PPSV23   Adults 25-60 years old: 1-3 doses may be recommended based on certain risk factors  Adults 72 years old: Prevnar (PCV13) vaccine recommended followed by Pneumovax (PPSV23) vaccine  If already received PPSV23 since turning 65, then PCV13 recommended at least one year after PPSV23 dose  Hepatitis B Vaccine 3 dose series if at intermediate or high risk (ex: diabetes, end stage renal disease, liver disease)   Tetanus (Td) Vaccine - COST NOT COVERED BY MEDICARE PART B Following completion of primary series, a booster dose should be given every 10 years to maintain immunity against tetanus  Td may also be given as tetanus wound prophylaxis  Tdap Vaccine - COST NOT COVERED BY MEDICARE PART B Recommended at least once for all adults  For pregnant patients, recommended with each pregnancy  Shingles Vaccine (Shingrix) - COST NOT COVERED BY MEDICARE PART B  2 shot series recommended in those aged 48 and above     Health Maintenance Due:      Topic Date Due    DXA SCAN  05/16/2022     Immunizations Due:      Topic Date Due    COVID-19 Vaccine (4 - Booster for Moderna series) 06/22/2021    Influenza Vaccine (1) 09/01/2022     Advance Directives   What are advance directives? Advance directives are legal documents that state your wishes and plans for medical care  These plans are made ahead of time in case you lose your ability to make decisions for yourself  Advance directives can apply to any medical decision, such as the treatments you want, and if you want to donate organs  What are the types of advance directives? There are many types of advance directives, and each state has rules about how to use them  You may choose a combination of any of the following:  · Living will: This is a written record of the treatment you want   You can also choose which treatments you do not want, which to limit, and which to stop at a certain time  This includes surgery, medicine, IV fluid, and tube feedings  · Durable power of  for healthcare [facility]): This is a written record that states who you want to make healthcare choices for you when you are unable to make them for yourself  This person, called a proxy, is usually a family member or a friend  You may choose more than 1 proxy  · Do not resuscitate (DNR) order:  A DNR order is used in case your heart stops beating or you stop breathing  It is a request not to have certain forms of treatment, such as CPR  A DNR order may be included in other types of advance directives  · Medical directive: This covers the care that you want if you are in a coma, near death, or unable to make decisions for yourself  You can list the treatments you want for each condition  Treatment may include pain medicine, surgery, blood transfusions, dialysis, IV or tube feedings, and a ventilator (breathing machine)  · Values history: This document has questions about your views, beliefs, and how you feel and think about life  This information can help others choose the care that you would choose  Why are advance directives important? An advance directive helps you control your care  Although spoken wishes may be used, it is better to have your wishes written down  Spoken wishes can be misunderstood, or not followed  Treatments may be given even if you do not want them  An advance directive may make it easier for your family to make difficult choices about your care  Urinary Incontinence   Urinary incontinence (UI)  is when you lose control of your bladder  UI develops because your bladder cannot store or empty urine properly  The 3 most common types of UI are stress incontinence, urge incontinence, or both  Medicines:   · May be given to help strengthen your bladder control   Report any side effects of medication to your healthcare provider  Do pelvic muscle exercises often:  Your pelvic muscles help you stop urinating  Squeeze these muscles tight for 5 seconds, then relax for 5 seconds  Gradually work up to squeezing for 10 seconds  Do 3 sets of 15 repetitions a day, or as directed  This will help strengthen your pelvic muscles and improve bladder control  Train your bladder:  Go to the bathroom at set times, such as every 2 hours, even if you do not feel the urge to go  You can also try to hold your urine when you feel the urge to go  For example, hold your urine for 5 minutes when you feel the urge to go  As that becomes easier, hold your urine for 10 minutes  Self-care:   · Keep a UI record  Write down how often you leak urine and how much you leak  Make a note of what you were doing when you leaked urine  · Drink liquids as directed  You may need to limit the amount of liquid you drink to help control your urine leakage  Do not drink any liquid right before you go to bed  Limit or do not have drinks that contain caffeine or alcohol  · Prevent constipation  Eat a variety of high-fiber foods  Good examples are high-fiber cereals, beans, vegetables, and whole-grain breads  Walking is the best way to trigger your intestines to have a bowel movement  · Exercise regularly and maintain a healthy weight  Weight loss and exercise will decrease pressure on your bladder and help you control your leakage  · Use a catheter as directed  to help empty your bladder  A catheter is a tiny, plastic tube that is put into your bladder to drain your urine  · Go to behavior therapy as directed  Behavior therapy may be used to help you learn to control your urge to urinate  Weight Management   Why it is important to manage your weight:  Being overweight increases your risk of health conditions such as heart disease, high blood pressure, type 2 diabetes, and certain types of cancer   It can also increase your risk for osteoarthritis, sleep apnea, and other respiratory problems  Aim for a slow, steady weight loss  Even a small amount of weight loss can lower your risk of health problems  How to lose weight safely:  A safe and healthy way to lose weight is to eat fewer calories and get regular exercise  You can lose up about 1 pound a week by decreasing the number of calories you eat by 500 calories each day  Healthy meal plan for weight management:  A healthy meal plan includes a variety of foods, contains fewer calories, and helps you stay healthy  A healthy meal plan includes the following:  · Eat whole-grain foods more often  A healthy meal plan should contain fiber  Fiber is the part of grains, fruits, and vegetables that is not broken down by your body  Whole-grain foods are healthy and provide extra fiber in your diet  Some examples of whole-grain foods are whole-wheat breads and pastas, oatmeal, brown rice, and bulgur  · Eat a variety of vegetables every day  Include dark, leafy greens such as spinach, kale, kristi greens, and mustard greens  Eat yellow and orange vegetables such as carrots, sweet potatoes, and winter squash  · Eat a variety of fruits every day  Choose fresh or canned fruit (canned in its own juice or light syrup) instead of juice  Fruit juice has very little or no fiber  · Eat low-fat dairy foods  Drink fat-free (skim) milk or 1% milk  Eat fat-free yogurt and low-fat cottage cheese  Try low-fat cheeses such as mozzarella and other reduced-fat cheeses  · Choose meat and other protein foods that are low in fat  Choose beans or other legumes such as split peas or lentils  Choose fish, skinless poultry (chicken or turkey), or lean cuts of red meat (beef or pork)  Before you cook meat or poultry, cut off any visible fat  · Use less fat and oil  Try baking foods instead of frying them  Add less fat, such as margarine, sour cream, regular salad dressing and mayonnaise to foods  Eat fewer high-fat foods   Some examples of high-fat foods include french fries, doughnuts, ice cream, and cakes  · Eat fewer sweets  Limit foods and drinks that are high in sugar  This includes candy, cookies, regular soda, and sweetened drinks  Exercise:  Exercise at least 30 minutes per day on most days of the week  Some examples of exercise include walking, biking, dancing, and swimming  You can also fit in more physical activity by taking the stairs instead of the elevator or parking farther away from stores  Ask your healthcare provider about the best exercise plan for you  © Copyright [employer] 2018 Information is for End User's use only and may not be sold, redistributed or otherwise used for commercial purposes   All illustrations and images included in CareNotes® are the copyrighted property of A D A M , Inc  or [address] [place], [place] [id number]

## 2022-08-15 ENCOUNTER — HOSPITAL ENCOUNTER (INPATIENT)
Facility: HOSPITAL | Age: 87
LOS: 7 days | Discharge: NURSING, REHAB OR LONG-TERM CARE | DRG: 492 | End: 2022-08-22
Attending: EMERGENCY MEDICINE | Admitting: GENERAL PRACTICE
Payer: COMMERCIAL

## 2022-08-15 ENCOUNTER — APPOINTMENT (EMERGENCY)
Dept: RADIOLOGY | Facility: HOSPITAL | Age: 87
DRG: 492 | End: 2022-08-15
Payer: COMMERCIAL

## 2022-08-15 ENCOUNTER — APPOINTMENT (INPATIENT)
Dept: NON INVASIVE DIAGNOSTICS | Facility: HOSPITAL | Age: 87
DRG: 492 | End: 2022-08-15
Payer: COMMERCIAL

## 2022-08-15 DIAGNOSIS — S82.891A CLOSED FRACTURE OF RIGHT ANKLE, INITIAL ENCOUNTER: ICD-10-CM

## 2022-08-15 DIAGNOSIS — E11.65 TYPE 2 DIABETES MELLITUS WITH HYPERGLYCEMIA, WITH LONG-TERM CURRENT USE OF INSULIN (HCC): ICD-10-CM

## 2022-08-15 DIAGNOSIS — S82.891A CLOSED FRACTURE DISLOCATION OF RIGHT ANKLE, INITIAL ENCOUNTER: ICD-10-CM

## 2022-08-15 DIAGNOSIS — R41.82 ALTERED MENTAL STATUS: ICD-10-CM

## 2022-08-15 DIAGNOSIS — Z98.890 S/P ORIF (OPEN REDUCTION INTERNAL FIXATION) FRACTURE: ICD-10-CM

## 2022-08-15 DIAGNOSIS — Z87.81 S/P ORIF (OPEN REDUCTION INTERNAL FIXATION) FRACTURE: ICD-10-CM

## 2022-08-15 DIAGNOSIS — S82.861A MAISONNEUVE FRACTURE OF FIBULA, RIGHT, CLOSED, INITIAL ENCOUNTER: Primary | ICD-10-CM

## 2022-08-15 DIAGNOSIS — Z79.4 TYPE 2 DIABETES MELLITUS WITH HYPERGLYCEMIA, WITH LONG-TERM CURRENT USE OF INSULIN (HCC): ICD-10-CM

## 2022-08-15 DIAGNOSIS — R07.9 CHEST PAIN: ICD-10-CM

## 2022-08-15 PROBLEM — I50.32 CHRONIC DIASTOLIC CONGESTIVE HEART FAILURE (HCC): Status: ACTIVE | Noted: 2018-04-23

## 2022-08-15 PROBLEM — R82.90 FOUL SMELLING URINE: Status: ACTIVE | Noted: 2022-08-15

## 2022-08-15 LAB
2HR DELTA HS TROPONIN: 0 NG/L
2HR DELTA HS TROPONIN: 1 NG/L
ABO GROUP BLD: NORMAL
ALBUMIN SERPL BCP-MCNC: 3.4 G/DL (ref 3.5–5)
ALP SERPL-CCNC: 88 U/L (ref 46–116)
ALT SERPL W P-5'-P-CCNC: 21 U/L (ref 12–78)
AMORPH URATE CRY URNS QL MICRO: ABNORMAL
ANION GAP SERPL CALCULATED.3IONS-SCNC: 4 MMOL/L (ref 4–13)
APTT PPP: 32 SECONDS (ref 23–37)
AST SERPL W P-5'-P-CCNC: 18 U/L (ref 5–45)
ATRIAL RATE: 340 BPM
ATRIAL RATE: 67 BPM
BACTERIA UR QL AUTO: ABNORMAL /HPF
BASOPHILS # BLD AUTO: 0.08 THOUSANDS/ΜL (ref 0–0.1)
BASOPHILS NFR BLD AUTO: 1 % (ref 0–1)
BILIRUB SERPL-MCNC: 0.71 MG/DL (ref 0.2–1)
BILIRUB UR QL STRIP: NEGATIVE
BLD GP AB SCN SERPL QL: NEGATIVE
BUN SERPL-MCNC: 26 MG/DL (ref 5–25)
CALCIUM ALBUM COR SERPL-MCNC: 10.3 MG/DL (ref 8.3–10.1)
CALCIUM SERPL-MCNC: 9.8 MG/DL (ref 8.3–10.1)
CARDIAC TROPONIN I PNL SERPL HS: 10 NG/L
CARDIAC TROPONIN I PNL SERPL HS: 9 NG/L
CHLORIDE SERPL-SCNC: 110 MMOL/L (ref 96–108)
CLARITY UR: ABNORMAL
CO2 SERPL-SCNC: 26 MMOL/L (ref 21–32)
COLOR UR: YELLOW
CREAT SERPL-MCNC: 1.07 MG/DL (ref 0.6–1.3)
EOSINOPHIL # BLD AUTO: 0.3 THOUSAND/ΜL (ref 0–0.61)
EOSINOPHIL NFR BLD AUTO: 3 % (ref 0–6)
ERYTHROCYTE [DISTWIDTH] IN BLOOD BY AUTOMATED COUNT: 14.9 % (ref 11.6–15.1)
GFR SERPL CREATININE-BSD FRML MDRD: 47 ML/MIN/1.73SQ M
GLUCOSE SERPL-MCNC: 131 MG/DL (ref 65–140)
GLUCOSE SERPL-MCNC: 191 MG/DL (ref 65–140)
GLUCOSE SERPL-MCNC: 203 MG/DL (ref 65–140)
GLUCOSE SERPL-MCNC: 210 MG/DL (ref 65–140)
GLUCOSE SERPL-MCNC: 213 MG/DL (ref 65–140)
GLUCOSE SERPL-MCNC: 267 MG/DL (ref 65–140)
GLUCOSE SERPL-MCNC: 280 MG/DL (ref 65–140)
GLUCOSE UR STRIP-MCNC: ABNORMAL MG/DL
HCT VFR BLD AUTO: 43.4 % (ref 34.8–46.1)
HGB BLD-MCNC: 14 G/DL (ref 11.5–15.4)
HGB UR QL STRIP.AUTO: NEGATIVE
HYALINE CASTS #/AREA URNS LPF: ABNORMAL /LPF
IMM GRANULOCYTES # BLD AUTO: 0.03 THOUSAND/UL (ref 0–0.2)
IMM GRANULOCYTES NFR BLD AUTO: 0 % (ref 0–2)
INR PPP: 1.09 (ref 0.84–1.19)
KETONES UR STRIP-MCNC: NEGATIVE MG/DL
LACTATE SERPL-SCNC: 1.4 MMOL/L (ref 0.5–2)
LEUKOCYTE ESTERASE UR QL STRIP: NEGATIVE
LYMPHOCYTES # BLD AUTO: 1.26 THOUSANDS/ΜL (ref 0.6–4.47)
LYMPHOCYTES NFR BLD AUTO: 13 % (ref 14–44)
MCH RBC QN AUTO: 30.5 PG (ref 26.8–34.3)
MCHC RBC AUTO-ENTMCNC: 32.3 G/DL (ref 31.4–37.4)
MCV RBC AUTO: 95 FL (ref 82–98)
MONOCYTES # BLD AUTO: 0.6 THOUSAND/ΜL (ref 0.17–1.22)
MONOCYTES NFR BLD AUTO: 6 % (ref 4–12)
MUCOUS THREADS UR QL AUTO: ABNORMAL
NEUTROPHILS # BLD AUTO: 7.48 THOUSANDS/ΜL (ref 1.85–7.62)
NEUTS SEG NFR BLD AUTO: 77 % (ref 43–75)
NITRITE UR QL STRIP: NEGATIVE
NON-SQ EPI CELLS URNS QL MICRO: ABNORMAL /HPF
NRBC BLD AUTO-RTO: 0 /100 WBCS
NT-PROBNP SERPL-MCNC: 623 PG/ML
PH UR STRIP.AUTO: 5.5 [PH]
PLATELET # BLD AUTO: 207 THOUSANDS/UL (ref 149–390)
PMV BLD AUTO: 11.7 FL (ref 8.9–12.7)
POTASSIUM SERPL-SCNC: 4.2 MMOL/L (ref 3.5–5.3)
PROCALCITONIN SERPL-MCNC: 0.05 NG/ML
PROT SERPL-MCNC: 6.6 G/DL (ref 6.4–8.4)
PROT UR STRIP-MCNC: ABNORMAL MG/DL
PROTHROMBIN TIME: 14.4 SECONDS (ref 11.6–14.5)
QRS AXIS: 220 DEGREES
QRS AXIS: 240 DEGREES
QRSD INTERVAL: 142 MS
QRSD INTERVAL: 152 MS
QT INTERVAL: 412 MS
QT INTERVAL: 428 MS
QTC INTERVAL: 438 MS
QTC INTERVAL: 452 MS
RBC # BLD AUTO: 4.59 MILLION/UL (ref 3.81–5.12)
RBC #/AREA URNS AUTO: ABNORMAL /HPF
RH BLD: NEGATIVE
SODIUM SERPL-SCNC: 140 MMOL/L (ref 135–147)
SP GR UR STRIP.AUTO: 1.02 (ref 1–1.03)
SPECIMEN EXPIRATION DATE: NORMAL
T WAVE AXIS: 28 DEGREES
T WAVE AXIS: 45 DEGREES
TSH SERPL DL<=0.05 MIU/L-ACNC: 5.41 UIU/ML (ref 0.45–4.5)
UROBILINOGEN UR STRIP-ACNC: <2 MG/DL
VENTRICULAR RATE: 67 BPM
VENTRICULAR RATE: 68 BPM
WBC # BLD AUTO: 9.75 THOUSAND/UL (ref 4.31–10.16)
WBC #/AREA URNS AUTO: ABNORMAL /HPF

## 2022-08-15 PROCEDURE — 82948 REAGENT STRIP/BLOOD GLUCOSE: CPT

## 2022-08-15 PROCEDURE — 70450 CT HEAD/BRAIN W/O DYE: CPT

## 2022-08-15 PROCEDURE — 84484 ASSAY OF TROPONIN QUANT: CPT

## 2022-08-15 PROCEDURE — 93005 ELECTROCARDIOGRAM TRACING: CPT

## 2022-08-15 PROCEDURE — 86922 COMPATIBILITY TEST ANTIGLOB: CPT

## 2022-08-15 PROCEDURE — 80053 COMPREHEN METABOLIC PANEL: CPT

## 2022-08-15 PROCEDURE — 73610 X-RAY EXAM OF ANKLE: CPT

## 2022-08-15 PROCEDURE — 83880 ASSAY OF NATRIURETIC PEPTIDE: CPT

## 2022-08-15 PROCEDURE — 93010 ELECTROCARDIOGRAM REPORT: CPT | Performed by: INTERNAL MEDICINE

## 2022-08-15 PROCEDURE — 71045 X-RAY EXAM CHEST 1 VIEW: CPT

## 2022-08-15 PROCEDURE — 93306 TTE W/DOPPLER COMPLETE: CPT

## 2022-08-15 PROCEDURE — 81001 URINALYSIS AUTO W/SCOPE: CPT

## 2022-08-15 PROCEDURE — 99222 1ST HOSP IP/OBS MODERATE 55: CPT | Performed by: ORTHOPAEDIC SURGERY

## 2022-08-15 PROCEDURE — 99223 1ST HOSP IP/OBS HIGH 75: CPT | Performed by: PHYSICIAN ASSISTANT

## 2022-08-15 PROCEDURE — 96374 THER/PROPH/DIAG INJ IV PUSH: CPT

## 2022-08-15 PROCEDURE — 73590 X-RAY EXAM OF LOWER LEG: CPT

## 2022-08-15 PROCEDURE — 84145 PROCALCITONIN (PCT): CPT

## 2022-08-15 PROCEDURE — 0QSJXZZ REPOSITION RIGHT FIBULA, EXTERNAL APPROACH: ICD-10-PCS | Performed by: EMERGENCY MEDICINE

## 2022-08-15 PROCEDURE — 85025 COMPLETE CBC W/AUTO DIFF WBC: CPT

## 2022-08-15 PROCEDURE — 85730 THROMBOPLASTIN TIME PARTIAL: CPT

## 2022-08-15 PROCEDURE — 27810 TREATMENT OF ANKLE FRACTURE: CPT | Performed by: EMERGENCY MEDICINE

## 2022-08-15 PROCEDURE — 99223 1ST HOSP IP/OBS HIGH 75: CPT | Performed by: GENERAL PRACTICE

## 2022-08-15 PROCEDURE — 86900 BLOOD TYPING SEROLOGIC ABO: CPT | Performed by: INTERNAL MEDICINE

## 2022-08-15 PROCEDURE — 64450 NJX AA&/STRD OTHER PN/BRANCH: CPT | Performed by: EMERGENCY MEDICINE

## 2022-08-15 PROCEDURE — 36415 COLL VENOUS BLD VENIPUNCTURE: CPT

## 2022-08-15 PROCEDURE — 84484 ASSAY OF TROPONIN QUANT: CPT | Performed by: GENERAL PRACTICE

## 2022-08-15 PROCEDURE — 86850 RBC ANTIBODY SCREEN: CPT | Performed by: INTERNAL MEDICINE

## 2022-08-15 PROCEDURE — 84443 ASSAY THYROID STIM HORMONE: CPT

## 2022-08-15 PROCEDURE — 86921 COMPATIBILITY TEST INCUBATE: CPT

## 2022-08-15 PROCEDURE — 83605 ASSAY OF LACTIC ACID: CPT

## 2022-08-15 PROCEDURE — 87040 BLOOD CULTURE FOR BACTERIA: CPT

## 2022-08-15 PROCEDURE — 86901 BLOOD TYPING SEROLOGIC RH(D): CPT | Performed by: INTERNAL MEDICINE

## 2022-08-15 PROCEDURE — 99285 EMERGENCY DEPT VISIT HI MDM: CPT | Performed by: EMERGENCY MEDICINE

## 2022-08-15 PROCEDURE — 99285 EMERGENCY DEPT VISIT HI MDM: CPT

## 2022-08-15 PROCEDURE — 85610 PROTHROMBIN TIME: CPT

## 2022-08-15 PROCEDURE — G1004 CDSM NDSC: HCPCS

## 2022-08-15 RX ORDER — OXYCODONE HYDROCHLORIDE 5 MG/1
2.5 TABLET ORAL EVERY 6 HOURS PRN
Status: DISCONTINUED | OUTPATIENT
Start: 2022-08-15 | End: 2022-08-19

## 2022-08-15 RX ORDER — ACETAMINOPHEN 325 MG/1
975 TABLET ORAL EVERY 8 HOURS SCHEDULED
Status: DISCONTINUED | OUTPATIENT
Start: 2022-08-15 | End: 2022-08-22 | Stop reason: HOSPADM

## 2022-08-15 RX ORDER — FENTANYL CITRATE 50 UG/ML
50 INJECTION, SOLUTION INTRAMUSCULAR; INTRAVENOUS ONCE
Status: COMPLETED | OUTPATIENT
Start: 2022-08-15 | End: 2022-08-15

## 2022-08-15 RX ORDER — ATORVASTATIN CALCIUM 40 MG/1
40 TABLET, FILM COATED ORAL
Status: DISCONTINUED | OUTPATIENT
Start: 2022-08-15 | End: 2022-08-22 | Stop reason: HOSPADM

## 2022-08-15 RX ORDER — ONDANSETRON 2 MG/ML
4 INJECTION INTRAMUSCULAR; INTRAVENOUS EVERY 6 HOURS PRN
Status: DISCONTINUED | OUTPATIENT
Start: 2022-08-15 | End: 2022-08-22 | Stop reason: HOSPADM

## 2022-08-15 RX ORDER — LIDOCAINE HYDROCHLORIDE 10 MG/ML
10 INJECTION, SOLUTION EPIDURAL; INFILTRATION; INTRACAUDAL; PERINEURAL ONCE
Status: COMPLETED | OUTPATIENT
Start: 2022-08-15 | End: 2022-08-15

## 2022-08-15 RX ORDER — FENTANYL CITRATE 50 UG/ML
1 INJECTION, SOLUTION INTRAMUSCULAR; INTRAVENOUS ONCE
Status: COMPLETED | OUTPATIENT
Start: 2022-08-15 | End: 2022-08-15

## 2022-08-15 RX ORDER — HYDROMORPHONE HCL IN WATER/PF 6 MG/30 ML
0.2 PATIENT CONTROLLED ANALGESIA SYRINGE INTRAVENOUS
Status: DISCONTINUED | OUTPATIENT
Start: 2022-08-15 | End: 2022-08-17

## 2022-08-15 RX ORDER — ONDANSETRON 2 MG/ML
4 INJECTION INTRAMUSCULAR; INTRAVENOUS ONCE
Status: COMPLETED | OUTPATIENT
Start: 2022-08-15 | End: 2022-08-15

## 2022-08-15 RX ORDER — LIDOCAINE HYDROCHLORIDE 10 MG/ML
20 INJECTION, SOLUTION EPIDURAL; INFILTRATION; INTRACAUDAL; PERINEURAL ONCE
Status: COMPLETED | OUTPATIENT
Start: 2022-08-15 | End: 2022-08-15

## 2022-08-15 RX ORDER — OXYCODONE HYDROCHLORIDE 5 MG/1
5 TABLET ORAL EVERY 6 HOURS PRN
Status: DISCONTINUED | OUTPATIENT
Start: 2022-08-15 | End: 2022-08-17

## 2022-08-15 RX ORDER — SODIUM CHLORIDE, SODIUM LACTATE, POTASSIUM CHLORIDE, CALCIUM CHLORIDE 600; 310; 30; 20 MG/100ML; MG/100ML; MG/100ML; MG/100ML
75 INJECTION, SOLUTION INTRAVENOUS CONTINUOUS
Status: DISCONTINUED | OUTPATIENT
Start: 2022-08-16 | End: 2022-08-17

## 2022-08-15 RX ADMIN — METOPROLOL TARTRATE 25 MG: 25 TABLET, FILM COATED ORAL at 21:06

## 2022-08-15 RX ADMIN — SODIUM CHLORIDE 3 UNITS/HR: 9 INJECTION, SOLUTION INTRAVENOUS at 17:01

## 2022-08-15 RX ADMIN — OXYCODONE HYDROCHLORIDE 2.5 MG: 5 TABLET ORAL at 16:51

## 2022-08-15 RX ADMIN — ACETAMINOPHEN 975 MG: 325 TABLET ORAL at 21:06

## 2022-08-15 RX ADMIN — HYDROMORPHONE HYDROCHLORIDE 0.2 MG: 0.2 INJECTION, SOLUTION INTRAMUSCULAR; INTRAVENOUS; SUBCUTANEOUS at 19:52

## 2022-08-15 RX ADMIN — ONDANSETRON 4 MG: 2 INJECTION INTRAMUSCULAR; INTRAVENOUS at 13:31

## 2022-08-15 RX ADMIN — ONDANSETRON HYDROCHLORIDE 4 MG: 2 INJECTION, SOLUTION INTRAMUSCULAR; INTRAVENOUS at 20:24

## 2022-08-15 RX ADMIN — SODIUM CHLORIDE, SODIUM LACTATE, POTASSIUM CHLORIDE, AND CALCIUM CHLORIDE 75 ML/HR: .6; .31; .03; .02 INJECTION, SOLUTION INTRAVENOUS at 23:55

## 2022-08-15 RX ADMIN — FENTANYL CITRATE 50 MCG: 50 INJECTION INTRAMUSCULAR; INTRAVENOUS at 09:46

## 2022-08-15 RX ADMIN — LIDOCAINE HYDROCHLORIDE 20 ML: 10 INJECTION, SOLUTION EPIDURAL; INFILTRATION; INTRACAUDAL; PERINEURAL at 12:26

## 2022-08-15 RX ADMIN — LIDOCAINE HYDROCHLORIDE 10 ML: 10 INJECTION, SOLUTION EPIDURAL; INFILTRATION; INTRACAUDAL; PERINEURAL at 09:47

## 2022-08-15 NOTE — ASSESSMENT & PLAN NOTE
Wt Readings from Last 3 Encounters:   08/01/22 95 3 kg (210 lb)   02/14/22 95 kg (209 lb 7 oz)   06/30/21 99 7 kg (219 lb 14 4 oz)   · Hold torsemide for OR  · Repeat echo ordered due to episode of CP

## 2022-08-15 NOTE — CONSULTS
Orville Avalos 82 y o  female MRN: [id number]  Unit/Bed#: X ray      Chief Complaint:   right ankle pain    HPI:  82 y  o female who ambulates with a walker at home who presented to the ED with right ankle deformity  Per her son, she likely fell off her mobile chair while trying to get out of it  The patient is non-verbal on exam this afternoon so history was taken from her son at bedside  Son reports she lives at home with his brother and his nephews  The emergency department worked up her right ankle deformity and discovered a right ankle fracture dislocation and proximal fibula fracture  The emergency department graciously attempted reduction and splinting, however it was a failed attempt and orthopaedics was consulted  The patient is non-verbal and thus does not complain of pain at this time  On history taking, the patient did not appear in distress and was making eye contact without grimacing   Patient's son reports the patient has a history of dementia, CHF, CAD, MI, and DM requiring insulin dependence     She takes Eliquis daily     Review Of Systems:   · Skin: Normal  · Neuro: See HPI  · Musculoskeletal: See HPI  · 14 point review of systems negative except as stated above     Past Medical History:   Past Medical History:   Diagnosis Date    Arthritis     Asthma     CAD (coronary artery disease) of artery bypass graft     Cardiac disease     [facility]     Dementia [facility]     Depression     Diabetes mellitus [facility]     Heart attack [facility]     Hyperlipidemia     Hypertension     MI (myocardial infarction) [facility]     Neuropathy     BRANT (obstructive sleep apnea)     Peptic ulcer     was + for H pylori    Transient cerebral ischemia 2011    with neg CUS and ECHO       Past Surgical History:   Past Surgical History:   Procedure Laterality Date    APPENDECTOMY      CATARACT EXTRACTION       SECTION      COLONOSCOPY  2004    CORONARY ANGIOPLASTY  12/2006    CORONARY ANGIOPLASTY WITH STENT PLACEMENT  2006    CORONARY ARTERY BYPASS GRAFT  1999    DENTAL SURGERY      EGD AND COLONOSCOPY      ELBOW SURGERY      resolved 2000    ESOPHAGOGASTRODUODENOSCOPY  2004       Family History:  Family history reviewed and non-contributory  Family History   Problem Relation Age of Onset    Diabetes Mother     Cancer Sister     Heart disease Sister     Thyroid disease Sister     Cancer Brother     Cancer Father     Colon cancer Family     Diabetes Family     Mitral valve prolapse Family     Thyroid cancer Family        Social History:  Social History     Socioeconomic History    Marital status:      Spouse name: None    Number of children: None    Years of education: None    Highest education level: None   Occupational History    None   Tobacco Use    Smoking status: Never Smoker    Smokeless tobacco: Never Used   Vaping Use    Vaping Use: Never used   Substance and Sexual Activity    Alcohol use: Not Currently    Drug use: Never    Sexual activity: Not Currently   Other Topics Concern    None   Social History Narrative    Caffeine use     Social Determinants of Health     Financial Resource Strain: Not on file   Food Insecurity: Not on file   Transportation Needs: Not on file   Physical Activity: Not on file   Stress: Not on file   Social Connections: Not on file   Intimate Partner Violence: Not on file   Housing Stability: Not on file       Allergies:    Allergies   Allergen Reactions    Ace Inhibitors     Chlorpromazine     Latex     Lisinopril     Namenda [Memantine] Drowsiness    Penicillins Seizures    Propoxyphene     Stadol [Butorphanol]            Labs:  0   Lab Value Date/Time    HCT 43 4 08/15/2022 0819    HCT 45 0 07/30/2022 1041    HCT 44 02/14/2022 0156    HCT 43 3 07/16/2021 2343    HCT 38 3 09/16/2015 0158    HCT 39 1 04/21/2015 1010    HCT 38 3 02/27/2015 1019    HGB 14 0 08/15/2022 0819    HGB 14 5 07/30/2022 1041    HGB 15 0 02/14/2022 0156    HGB 14 1 07/16/2021 2343    HGB 13 2 09/16/2015 0158    HGB 13 5 04/21/2015 1010    HGB 13 3 02/27/2015 1053    HGB 13 1 02/27/2015 1019    INR 1 09 08/15/2022 0819    INR 1 04 02/27/2015 1019    WBC 9 75 08/15/2022 0819    WBC 5 71 07/30/2022 1041    WBC 9 83 07/16/2021 2343    WBC 7 16 09/16/2015 0158    WBC 8 33 04/21/2015 1010    WBC 7 32 02/27/2015 1019    ESR 19 06/02/2014 0619       Meds:  No current facility-administered medications for this encounter      Current Outpatient Medications:     atorvastatin (LIPITOR) 40 mg tablet, Take 1 tablet (40 mg total) by mouth daily, Disp: 90 tablet, Rfl: 3    calcium carbonate-vitamin D (OSCAL-D) 500 mg-200 units per tablet, Take 1 tablet by mouth 2 (two) times a day with meals, Disp: 60 tablet, Rfl: 0    Continuous Blood Gluc  (Dexcom G6 ) SIVAKUMAR, Use daily as directed for CGM, Disp: , Rfl:     Continuous Blood Gluc Sensor (Dexcom G6 Sensor) MISC, Use daily as directed for CGM - Change every 10 days, Disp: , Rfl:     Continuous Blood Gluc Transmit (Dexcom G6 Transmitter) MISC, Use daily as directed for CGM - Change every 3 months, Disp: , Rfl:     docusate sodium (COLACE) 100 mg capsule, Take 1 capsule (100 mg total) by mouth 2 (two) times a day (Patient taking differently: Take 100 mg by mouth 2 (two) times a day as needed), Disp: 10 capsule, Rfl: 0    Eliquis 5 MG, TAKE 1 TABLET BY MOUTH TWICE A DAY, Disp: 60 tablet, Rfl: 5    glucose blood test strip, USE AS DIRECTED 3 TIMES A DAY, Disp: 300 each, Rfl: 1    insulin aspart (NovoLOG) 100 units/mL injection, Use for V-Go, Disp: 90 mL, Rfl: 3    Insulin Disposable Pump (V-Go 20) KIT, Apply once a night, Disp: 30 kit, Rfl: 5    Insulin Disposable Pump (V-Go 20) KIT, Use daily Use one daily, Disp: 30 kit, Rfl: 1    metoprolol tartrate (LOPRESSOR) 25 mg tablet, TAKE 1 TABLET (25 MG TOTAL) BY MOUTH EVERY 12 (TWELVE) HOURS, Disp: 180 tablet, Rfl: 3   NovoLOG 100 UNIT/ML injection, USE FOR V-GO, Disp: , Rfl:     potassium chloride (Klor-Con M10) 10 mEq tablet, Take 1 tablet (10 mEq total) by mouth daily, Disp: 90 tablet, Rfl: 3    senna (SENOKOT) 8 6 mg, Take 1 tablet (8 6 mg total) by mouth daily (Patient taking differently: Take 1 tablet by mouth daily at bedtime as needed), Disp: 120 each, Rfl: 0    torsemide (DEMADEX) 20 mg tablet, Take 1 tablet (20 mg total) by mouth daily, Disp: 90 tablet, Rfl: 3    Blood Culture:   Lab Results   Component Value Date    BLOODCX Received in Microbiology Lab  Culture in Progress  08/15/2022    BLOODCX Received in Microbiology Lab  Culture in Progress  08/15/2022       Wound Culture:   No results found for: WOUNDCULT    Ins and Outs:  No intake/output data recorded  Physical Exam:   BP (!) 178/74 (BP Location: Right arm)   Pulse 63   Temp 98 2 °F (36 8 °C) (Oral)   Resp 16   SpO2 97%   Gen: Alert  Non-verbal on exam  Makes eye contact throughout history taking   HEENT: EOMI, eyes clear, moist mucus membranes, hearing intact  Respiratory: Bilateral chest rise  No audible wheezing found  Cardiovascular: Regular rate   Abdomen: soft nontender/nondistended  Musculoskeletal: right lower extremity  · Skin with medial blistering and erythema about ankle  No open wounds   · Tender to palpation over medial and lateral malleoli  · TTP about proximal fibula   · Painful ankle range of motion   · Unable to determine neurosensory exam due to patient's non-verbal status on exam  · Positive knee flexion/extension, EHL/FHL  · Toes warm and well perfused    Radiology:   I personally reviewed the films  X-rays right ankle shows acute fracture dislocation of the ankle    Procedure- Madhav [place] 82 y o  female MRN: [id number]  Unit/Bed#: X ray    Procedure: Ankle reduction and splint application    A hematoma block was given by the emergency department staff prior to examination   Adequate anesthesia was determined to be maintained on my exam  A gentle closed reduction maneuver was performed and pt was placed in a well padded AO splint  Pt tolerated the procedure well and was neurovascularly intact both pre and post procedure  Post reduction orthogonal x rays showed appropriate reduction of the talus in the ankle mortise     _*_*_*_*_*_*_*_*_*_*_*_*_*_*_*_*_*_*_*_*_*_*_*_*_*_*_*_*_*_*_*_*_*_*_*_*_*_*_*_*_*    Assessment:  82 y  o female status post fall from chair with right ankle fracture dislocation with associated proximal fibular fracture that is non-displaced  Plan:   · NWB right lower extremity in AO splint  · Will discuss with orthopaedic team regarding definitive treatment plan and timing   · Pre op labs in ED  · Medicine team for clearance for possible OR   · There is no height or weight on file to calculate BMI  moderately obese  Recommend behavior modifications, nutrition and physical activity    · Dispo: Ortho will follow as patient is admitted under the medical service at this time     Chey March MD

## 2022-08-15 NOTE — QUICK NOTE
Orthopedics PreOp Note  [place] 82 y o  female MRN: [id number]  Unit/Bed#: [facility] 818-01      Dx: R ankle fx  Procedure: ORIF R ankle    Hgb: 14  Plt: 207  Wbc: 9 75    Na: 140  K: 4 2  Cl: 110  Co2: 26  BUN: 26  Cr: 1 07  Gluc: 280    PTT:33  INR:1 1  PT:14 4    Abx: ancef  Type and Screen/Cross: completed  NPO:at midnight  Consent: obtained  POA Name/ Phone: DaughterLigia, [phone]   Clearance: cleared  Anticoagulation: held

## 2022-08-15 NOTE — ASSESSMENT & PLAN NOTE
· With diaphoresis and bradycardia during ankle spliting  · F/u serial trops  · Cardio consult as pt needs OR

## 2022-08-15 NOTE — ED PROVIDER NOTES
History  Chief Complaint   Patient presents with    Fall     Pt fell this morning and c/o R leg pain, - headstrike, family reports foul smelling urine and AMS from baseline for past two days      Patient is an 83 y/o F with PMH a fib on Eliquis, IDDM, HLD, dementia presenting via EMS with altered mental status and mechanical fall  Per EMS report, patient was noted to have foul smelling urine for two days and progressively became more altered  Per report, typically oriented x3, currently only oriented to self  Around 6am this morning, patient with mechanical fall while using walker, rolling right ankle and fell to ground  No headstrike or LOC  On discussion with daughter on the phone, she states pt does seem more confused than usual but does have baseline dementia  Denies any prodromal event or unresponsiveness leading up to fall, reports mechanical in nature  Prior to Admission Medications   Prescriptions Last Dose Informant Patient Reported? Taking?    Continuous Blood Gluc  (Dexcom G6 ) SIVAKUMAR  Child Yes No   Sig: Use daily as directed for CGM   Continuous Blood Gluc Sensor (Dexcom G6 Sensor) MISC  Child Yes No   Sig: Use daily as directed for CGM - Change every 10 days   Continuous Blood Gluc Transmit (Dexcom G6 Transmitter) MISC  Child Yes No   Sig: Use daily as directed for CGM - Change every 3 months   Eliquis 5 MG  Child No No   Sig: TAKE 1 TABLET BY MOUTH TWICE A DAY   Insulin Disposable Pump (V-Go 20) KIT  Child No No   Sig: Apply once a night   Insulin Disposable Pump (V-Go 20) KIT  Child No No   Sig: Use daily Use one daily   NovoLOG 100 UNIT/ML injection  Child Yes No   Sig: USE FOR V-GO   atorvastatin (LIPITOR) 40 mg tablet  Child No No   Sig: Take 1 tablet (40 mg total) by mouth daily   calcium carbonate-vitamin D (OSCAL-D) 500 mg-200 units per tablet  Child No No   Sig: Take 1 tablet by mouth 2 (two) times a day with meals   docusate sodium (COLACE) 100 mg capsule  Child No No Sig: Take 1 capsule (100 mg total) by mouth 2 (two) times a day   Patient taking differently: Take 100 mg by mouth 2 (two) times a day as needed   glucose blood test strip  Child No No   Sig: USE AS DIRECTED 3 TIMES A DAY   insulin aspart (NovoLOG) 100 units/mL injection  Child No No   Sig: Use for V-Go   metoprolol tartrate (LOPRESSOR) 25 mg tablet  Child No No   Sig: TAKE 1 TABLET (25 MG TOTAL) BY MOUTH EVERY 12 (TWELVE) HOURS   potassium chloride (Klor-Con M10) 10 mEq tablet  Child No No   Sig: Take 1 tablet (10 mEq total) by mouth daily   senna (SENOKOT) 8 6 mg  Child No No   Sig: Take 1 tablet (8 6 mg total) by mouth daily   Patient taking differently: Take 1 tablet by mouth daily at bedtime as needed   torsemide (DEMADEX) 20 mg tablet  Child No No   Sig: Take 1 tablet (20 mg total) by mouth daily      Facility-Administered Medications: None       Past Medical History:   Diagnosis Date    Arthritis     Asthma     CAD (coronary artery disease) of artery bypass graft     Cardiac disease     CH[facility]congestive heart failure) ([facility]     Dementia (HCC)     Depression     Diabetes mellitus (HCC)     Heart attack (HCC)     Hyperlipidemia     Hypertension     MI [facility]ocardial infarction) ([facility] [address] [facility]     Neuropathy     BRANT (obstructive sleep apnea)     Peptic ulcer     was + for H pylori    Transient cerebral ischemia 2011    with neg CUS and ECHO       Past Surgical History:   Procedure Laterality Date    APPENDECTOMY      CATARACT EXTRACTION       SECTION      COLONOSCOPY  2004    CORONARY ANGIOPLASTY  2006    CORONARY ANGIOPLASTY WITH STENT PLACEMENT      CORONARY ARTERY BYPASS GRAFT      DENTAL SURGERY      EGD AND COLONOSCOPY      ELBOW SURGERY      resolved     ESOPHAGOGASTRODUODENOSCOPY         Family History   Problem Relation Age of Onset    Diabetes Mother     Cancer Sister     Heart disease Sister     Thyroid disease Sister     Cancer Brother     Cancer Father     Colon cancer Family     Diabetes Family     Mitral valve prolapse Family     Thyroid cancer Family      I have reviewed and agree with the history as documented  E-Cigarette/Vaping    E-Cigarette Use Never User      E-Cigarette/Vaping Substances    Nicotine No     THC No     CBD No     Flavoring No      Social History     Tobacco Use    Smoking status: Never Smoker    Smokeless tobacco: Never Used   Vaping Use    Vaping Use: Never used   Substance Use Topics    Alcohol use: Not Currently    Drug use: Never        Review of Systems   Unable to perform ROS: Dementia       Physical Exam  ED Triage Vitals   Temperature Pulse Respirations Blood Pressure SpO2   08/15/22 0801 08/15/22 0801 08/15/22 0801 08/15/22 0801 08/15/22 0801   98 2 °F (36 8 °C) 69 20 (!) 176/64 95 %      Temp Source Heart Rate Source Patient Position - Orthostatic VS BP Location FiO2 (%)   08/15/22 0801 08/15/22 0801 08/15/22 0801 08/15/22 0801 --   Oral Monitor Lying Right arm       Pain Score       08/15/22 0946       10 - Worst Possible Pain             Orthostatic Vital Signs  Vitals:    08/15/22 1000 08/15/22 1300 08/15/22 1333 08/15/22 1547   BP: 124/60 143/63 (!) 178/74 127/56   Pulse: 62 64 63 70   Patient Position - Orthostatic VS: Lying  Lying        Physical Exam  Vitals and nursing note reviewed  Constitutional:       General: She is not in acute distress  HENT:      Head: Normocephalic and atraumatic  Right Ear: External ear normal       Left Ear: External ear normal       Nose: Nose normal       Mouth/Throat:      Pharynx: Oropharynx is clear  Eyes:      Extraocular Movements: Extraocular movements intact  Pupils: Pupils are equal, round, and reactive to light  Cardiovascular:      Rate and Rhythm: Normal rate and regular rhythm  Pulses: Normal pulses  Heart sounds: Murmur heard  No friction rub  No gallop        Comments: Systolic ejection murmur 2/6  Pulmonary:      Effort: Pulmonary effort is normal  No respiratory distress  Breath sounds: Normal breath sounds  No wheezing, rhonchi or rales  Abdominal:      General: Abdomen is flat  There is no distension  Palpations: Abdomen is soft  Tenderness: There is no abdominal tenderness  There is no guarding or rebound  Musculoskeletal:         General: Tenderness, deformity and signs of injury present  Cervical back: Normal range of motion  Right lower leg: No edema  Left lower leg: No edema  Comments: R ankle with obvious deformity, skin bruising/tenting at area of left malleolous without skin break   Skin:     General: Skin is warm and dry  Capillary Refill: Capillary refill takes less than 2 seconds  Findings: No rash  Neurological:      Mental Status: She is alert  She is disoriented  Cranial Nerves: No cranial nerve deficit  Comments: Oriented only to self, intermittently answering questions but seems unsure of answers   Psychiatric:         Cognition and Memory: Cognition is impaired  Memory is impaired           ED Medications  Medications   atorvastatin (LIPITOR) tablet 40 mg (has no administration in time range)   calcium carbonate-vitamin D (OSCAL-D) 500 mg-200 units per tablet 1 tablet (has no administration in time range)   insulin regular (HumuLIN R,NovoLIN R) 1 Units/mL in sodium chloride 0 9 % 100 mL infusion (has no administration in time range)   acetaminophen (TYLENOL) tablet 975 mg (has no administration in time range)   oxyCODONE (ROXICODONE) IR tablet 2 5 mg (has no administration in time range)     Or   oxyCODONE (ROXICODONE) IR tablet 5 mg (has no administration in time range)   HYDROmorphone HCl (DILAUDID) injection 0 2 mg (has no administration in time range)   ondansetron (ZOFRAN) injection 4 mg (has no administration in time range)   metoprolol tartrate (LOPRESSOR) tablet 25 mg (has no administration in time range)   fentanyl citrate (PF) (FOR EMS ONLY) 100 mcg/2 mL injection 100 mcg (0 mcg Does not apply Given to EMS 8/15/22 0629)   lidocaine (PF) (XYLOCAINE-MPF) 1 % injection 10 mL (10 mL Infiltration Given by Other 8/15/22 0941)   fentanyl citrate (PF) 100 MCG/2ML 50 mcg (50 mcg Intravenous Given 8/15/22 0946)   lidocaine (PF) (XYLOCAINE-MPF) 1 % injection 20 mL (20 mL Infiltration Given by Other 8/15/22 1226)   ondansetron (ZOFRAN) injection 4 mg (4 mg Intravenous Given 8/15/22 1331)       Diagnostic Studies  Results Reviewed     Procedure Component Value Units Date/Time    HS Troponin I 4hr [[id number]]     Lab Status: No result Specimen: Blood     HS Troponin I 2hr [[id number]]     Lab Status: No result Specimen: Blood     HS Troponin 0hr (reflex protocol) [[id number]]  (Normal) Collected: 08/15/22 1444    Lab Status: Final result Specimen: Blood from Arm, Left Updated: 08/15/22 1530     hs TnI 0hr 9 ng/L     Urine Microscopic [[id number]]  (Abnormal) Collected: 08/15/22 1237    Lab Status: Final result Specimen: Urine, Straight Cath Updated: 08/15/22 1423     RBC, UA None Seen /hpf      WBC, UA None Seen /hpf      Epithelial Cells Occasional /hpf      Bacteria, UA Occasional /hpf      MUCUS THREADS Moderate     Hyaline Casts, UA 10-25 /lpf      Amorphous Crystals, UA Occasional    Fingerstick Glucose (POCT) [[id number]]  (Abnormal) Collected: 08/15/22 1327    Lab Status: Final result Updated: 08/15/22 1328     POC Glucose 203 mg/dl     UA w Reflex to Microscopic w Reflex to Culture [[id number]]  (Abnormal) Collected: 08/15/22 1237    Lab Status: Final result Specimen: Urine, Straight Cath Updated: 08/15/22 1253     Color, UA Yellow     Clarity, UA Turbid     Specific Gravity, UA 1 022     pH, UA 5 5     Leukocytes, UA Negative     Nitrite, UA Negative     Protein,  (2+) mg/dl      Glucose,  (3/10%) mg/dl      Ketones, UA Negative mg/dl      Urobilinogen, UA <2 0 mg/dl      Bilirubin, UA Negative     Occult Blood, UA Negative    Blood culture #1 [[id number]] Collected: 08/15/22 0819    Lab Status: Preliminary result Specimen: Blood from Arm, Right Updated: 08/15/22 1203     Blood Culture Received in Microbiology Lab  Culture in Progress  Blood culture #2 [[id number]] Collected: 08/15/22 0819    Lab Status: Preliminary result Specimen: Blood from Arm, Left Updated: 08/15/22 1203     Blood Culture Received in Microbiology Lab  Culture in Progress  HS Troponin I 2hr [[id number]]  (Normal) Collected: 08/15/22 1029    Lab Status: Final result Specimen: Blood from Arm, Left Updated: 08/15/22 1107     hs TnI 2hr 10 ng/L      Delta 2hr hsTnI 1 ng/L     Protime-INR [[id number]]  (Normal) Collected: 08/15/22 0819    Lab Status: Final result Specimen: Blood from Arm, Right Updated: 08/15/22 0907     Protime 14 4 seconds      INR 1 09    APTT [[id number]]  (Normal) Collected: 08/15/22 0819    Lab Status: Final result Specimen: Blood from Arm, Right Updated: 08/15/22 0907     PTT 32 seconds     Procalcitonin [[id number]]  (Normal) Collected: 08/15/22 0819    Lab Status: Final result Specimen: Blood from Arm, Left Updated: 08/15/22 0901     Procalcitonin 0 05 ng/ml     HS Troponin 0hr (reflex protocol) [[id number]]  (Normal) Collected: 08/15/22 0819    Lab Status: Final result Specimen: Blood from Arm, Left Updated: 08/15/22 0858     hs TnI 0hr 9 ng/L     TSH [[id number]]  (Abnormal) Collected: 08/15/22 0819    Lab Status: Final result Specimen: Blood from Arm, Left Updated: 08/15/22 0858     TSH 3RD Gerhardt Uribe 5 410 uIU/mL     Narrative:      Patients undergoing fluorescein dye angiography may retain small amounts of fluorescein in the body for 48-72 hours post procedure  Samples containing fluorescein can produce falsely depressed TSH values  If the patient had this procedure,a specimen should be resubmitted post fluorescein clearance        NT-BNP PRO [[id number]]  (Abnormal) Collected: 08/15/22 0819    Lab Status: Final result Specimen: Blood from Arm, Left Updated: 08/15/22 0858     NT-proBNP 623 pg/mL     Lactic acid [[id number]]  (Normal) Collected: 08/15/22 0819    Lab Status: Final result Specimen: Blood from Arm, Left Updated: 08/15/22 0853     LACTIC ACID 1 4 mmol/L     Narrative:      Result may be elevated if tourniquet was used during collection      Comprehensive metabolic panel [[id number]]  (Abnormal) Collected: 08/15/22 0819    Lab Status: Final result Specimen: Blood from Arm, Left Updated: 08/15/22 0850     Sodium 140 mmol/L      Potassium 4 2 mmol/L      Chloride 110 mmol/L      CO2 26 mmol/L      ANION GAP 4 mmol/L      BUN 26 mg/dL      Creatinine 1 07 mg/dL      Glucose 280 mg/dL      Calcium 9 8 mg/dL      Corrected Calcium 10 3 mg/dL      AST 18 U/L      ALT 21 U/L      Alkaline Phosphatase 88 U/L      Total Protein 6 6 g/dL      Albumin 3 4 g/dL      Total Bilirubin 0 71 mg/dL      eGFR 47 ml/min/1 73sq m     Narrative:      [place] guidelines for Chronic Kidney Disease (CKD):     Stage 1 with normal or high GFR (GFR > 90 mL/min/1 73 square meters)    Stage 2 Mild CKD (GFR = 60-89 mL/min/1 73 square meters)    Stage 3A Moderate CKD (GFR = 45-59 mL/min/1 73 square meters)    Stage 3B Moderate CKD (GFR = 30-44 mL/min/1 73 square meters)    Stage 4 Severe CKD (GFR = 15-29 mL/min/1 73 square meters)    Stage 5 End Stage CKD (GFR <15 mL/min/1 73 square meters)  Note: GFR calculation is accurate only with a steady state creatinine    CBC and differential [[id number]]  (Abnormal) Collected: 08/15/22 0819    Lab Status: Final result Specimen: Blood from Arm, Left Updated: 08/15/22 0833     WBC 9 75 Thousand/uL      RBC 4 59 Million/uL      Hemoglobin 14 0 g/dL      Hematocrit 43 4 %      MCV 95 fL      MCH 30 5 pg      MCHC 32 3 g/dL      RDW 14 9 %      MPV 11 7 fL      Platelets 287 Thousands/uL      nRBC 0 /100 WBCs      Neutrophils Relative 77 %      Immat GRANS % 0 %      Lymphocytes Relative 13 %      Monocytes Relative 6 % Eosinophils Relative 3 %      Basophils Relative 1 %      Neutrophils Absolute 7 48 Thousands/µL      Immature Grans Absolute 0 03 Thousand/uL      Lymphocytes Absolute 1 26 Thousands/µL      Monocytes Absolute 0 60 Thousand/µL      Eosinophils Absolute 0 30 Thousand/µL      Basophils Absolute 0 08 Thousands/µL     Fingerstick Glucose (POCT) [[id number]]  (Abnormal) Collected: 08/15/22 0756    Lab Status: Final result Updated: 08/15/22 0815     POC Glucose 267 mg/dl                  XR ankle 3+ vw right   Final Result by Pamela Aranda MD (08/15 1433)      Anatomic alignment of bimalleolar fractures post splint placement  Workstation performed: [id number]         XR ankle 3+ vw right   Final Result by Dajuan Acevedo MD (08/15 1323)      Significantly improved alignment of the ankle fracture dislocation with persistent mild widening of the medial tibiotalar joint  Improved alignment of the mildly displaced medial malleolar fracture  No posterior malleolar fracture  Workstation performed: [id number]         XR ankle 3+ views RIGHT   Final Result by Dajuan Acevedo MD (08/15 1258)      Improved alignment of the tibiotalar joint but with persistent ankle dislocation, related to fracture dislocation as described  Workstation performed: [id number]         CT head without contrast   Final Result by Jairo Cabello MD (08/15 1028)      No acute intracranial process  No skull fracture  Chronic microangiopathy  Workstation performed: [id number]         XR chest portable   Final Result by Ivon Reyes DO (08/15 1187)      No acute cardiopulmonary disease  Workstation performed: [id number]         XR tibia fibula 2 views RIGHT   Final Result by Ivon Reyes DO (08/15 0920)      Fracture dislocation right ankle, incompletely evaluated  Acute, nondisplaced fracture proximal fibula  Dedicated imaging of the right ankle recommended  The study was marked in [facility] for immediate notification  Workstation performed: [id number]               Procedures  Orthopedic injury treatment    Date/Time: 8/15/2022 12:33 PM  Performed by: Jermain Daniel MD  Authorized by: Jermain Daniel MD     Patient Location:  ED  [place] Protocol:  Procedure performed by: (Dr Jarad Steward)  Consent: Verbal consent obtained  Risks and benefits: risks, benefits and alternatives were discussed  Consent given by: power of   Time out: Immediately prior to procedure a "time out" was called to verify the correct patient, procedure, equipment, support staff and site/side marked as required  Timeout called at: 8/15/2022 12:33 PM   Radiology Images displayed and confirmed   If images not available, report reviewed: imaging studies available  Patient identity confirmed: arm band and hospital-assigned identification number      Injury location:  Ankle  Location details:  Right ankle  Injury type:  Fracture-dislocation  Fracture type: bimalleolar    Neurovascular status: Neurovascularly intact    Distal perfusion: normal    Neurological function: normal    Range of motion: reduced    Local anesthesia used?: Yes    General anesthesia used?: No    Anesthesia:  Hematoma block  Local anesthetic:  Lidocaine 1% without epinephrine  Anesthetic total (ml):  20  Manipulation performed?: Yes    Skin traction used?: No    Skeletal traction used?: No    Reduction successful?: Yes (partial)    Confirmation: Reduction confirmed by x-ray    Immobilization:  Splint  Splint type:  Long leg  Supplies used:  Cotton padding, Ortho-Glass and elastic bandage  Neurovascular status: Neurovascularly intact    Distal perfusion: normal    Neurological function: normal    Range of motion: unchanged    Patient tolerance:  Patient tolerated the procedure well with no immediate complications          ED Course  ED Course as of 08/15/22 1618   Mon Aug 15, 2022   1330 Repeat EKG following likely vagal episode while manipulating R leg  No significant change from prior, still RBBB w/o ST changes concerning for ischemia               Identification of Seniors at [facility] Most Recent Value   (ISAR) Identification of Seniors at Risk    Before the illness or injury that brought you to the Emergency, did you need someone to help you on a regular basis? 1 Filed at: 08/15/2022 0806   In the last 24 hours, have you needed more help than usual? 1 Filed at: 08/15/2022 1660   Have you been hospitalized for one or more nights during the past 6 months? 0 Filed at: 08/15/2022 0806   In general, do you see well? 0 Filed at: 08/15/2022 9445   In general, do you have serious problems with your memory? 0 Filed at: 08/15/2022 7098   Do you take more than three different medications every day? 1 Filed at: 08/15/2022 0806   ISAR Score 3 Filed at: 08/15/2022 0806                              MDM  Number of Diagnoses or Management Options  Altered mental status  Maisonneuve fracture of fibula, right, closed, initial encounter  Diagnosis management comments: 81 y/o F presenting with AMS and fall with obvious R ankle deformity  VSS  Broad w/u for infection given AMS and concern for UTI  W/u overall unrevealing  R leg maisonneuve fracture  Ankle reduction attempted in ED  Ortho consulted  Pt admitted to [facility]         Disposition  Final diagnoses:   Maisonneuve fracture of fibula, right, closed, initial encounter   Altered mental status     Time reflects when diagnosis was documented in both MDM as applicable and the Disposition within this note     Time User Action Codes Description Comment    8/15/2022 12:10 PM Lawrnce Ken Add [U93 100O] Maisonneuve fracture of fibula, right, closed, initial encounter     8/15/2022  1:29 PM Lawrnce Ken Add [R41 82] Altered mental status     8/15/2022  2:22 PM Terressa Lowers Add [R07 9] Chest pain     8/15/2022 2:38 PM Soumya Arnold Add [E11 65,  Z79 4] Type 2 diabetes mellitus with hyperglycemia, with long-term current use of insulin ([facility] [address] )     8/15/2022  3:01 PM Declan Medel Add [H41 [id number]] Closed fracture dislocation of right ankle, initial encounter       ED Disposition     ED Disposition   Admit    Condition   Stable    Date/Time   Mon Aug 15, 2022  1:29 PM    Comment   Case was discussed with Dr Christiano Powers and the patient's admission status was agreed to be Admission Status: inpatient status to the service of Dr Christiano Powers              Follow-up Information    None         Current Discharge Medication List      CONTINUE these medications which have NOT CHANGED    Details   atorvastatin (LIPITOR) 40 mg tablet Take 1 tablet (40 mg total) by mouth daily  Qty: 90 tablet, Refills: 3    Associated Diagnoses: Hyperlipidemia, unspecified hyperlipidemia type      calcium carbonate-vitamin D (OSCAL-D) 500 mg-200 units per tablet Take 1 tablet by mouth 2 (two) times a day with meals  Qty: 60 tablet, Refills: 0    Associated Diagnoses: Dementia without behavioral disturbance, unspecified dementia type (HCC)      Continuous Blood Gluc  (Dexcom G6 ) SIVAKUMAR Use daily as directed for CGM      Continuous Blood Gluc Sensor (Dexcom G6 Sensor) MISC Use daily as directed for CGM - Change every 10 days      Continuous Blood Gluc Transmit (Dexcom G6 Transmitter) MISC Use daily as directed for CGM - Change every 3 months      docusate sodium (COLACE) 100 mg capsule Take 1 capsule (100 mg total) by mouth 2 (two) times a day  Qty: 10 capsule, Refills: 0    Associated Diagnoses: Dementia without behavioral disturbance, unspecified dementia type (HCC)      Eliquis 5 MG TAKE 1 TABLET BY MOUTH TWICE A DAY  Qty: 60 tablet, Refills: 5    Associated Diagnoses: Atrial fibrillation, unspecified type (HCC)      glucose blood test strip USE AS DIRECTED 3 TIMES A DAY  Qty: 300 each, Refills: 1    Associated Diagnoses: Uncontrolled type 2 diabetes mellitus with hyperglycemia (HCC)      insulin aspart (NovoLOG) 100 units/mL injection Use for V-Go  Qty: 90 mL, Refills: 3    Associated Diagnoses: Type 2 diabetes mellitus with hyperglycemia, with long-term current use of insulin ([address] )      ! ! Insulin Disposable Pump (V-Go 20) KIT Apply once a night  Qty: 30 kit, Refills: 5    Associated Diagnoses: Type 2 diabetes mellitus with hyperglycemia, with long-term current use of insulin ([address] )      ! ! Insulin Disposable Pump (V-Go 20) KIT Use daily Use one daily  Qty: 30 kit, Refills: 1    Associated Diagnoses: Uncontrolled type 2 diabetes mellitus with hyperglycemia (HCC)      metoprolol tartrate (LOPRESSOR) 25 mg tablet TAKE 1 TABLET (25 MG TOTAL) BY MOUTH EVERY 12 (TWELVE) HOURS  Qty: 180 tablet, Refills: 3    Associated Diagnoses: Ventricular tachycardia ([address] ); Essential hypertension; PAF (paroxysmal atrial fibrillation) ([facility])      NovoLOG 100 UNIT/ML injection USE FOR V-GO      potassium chloride (Klor-Con M10) 10 mEq tablet Take 1 tablet (10 mEq total) by mouth daily  Qty: 90 tablet, Refills: 3    Associated Diagnoses: Congestive heart failure with LV diastolic dysfunction, NYHA class 2 (HCC)      senna (SENOKOT) 8 6 mg Take 1 tablet (8 6 mg total) by mouth daily  Qty: 120 each, Refills: 0    Associated Diagnoses: Dementia without behavioral disturbance, unspecified dementia type (HCC)      torsemide (DEMADEX) 20 mg tablet Take 1 tablet (20 mg total) by mouth daily  Qty: 90 tablet, Refills: 3    Associated Diagnoses: Congestive heart failure with LV diastolic dysfunction, NYHA class 2 ([address] )       ! ! - Potential duplicate medications found  Please discuss with provider  No discharge procedures on file  PDMP Review     None           ED Provider  Attending physically available and evaluated Jose Macdonald I managed the patient along with the ED Attending      Electronically Signed by         Emil Yusuf MD  08/15/22 1073

## 2022-08-15 NOTE — ASSESSMENT & PLAN NOTE
Lab Results   Component Value Date    HGBA1C 7 3 (H) 07/30/2022       Recent Labs     08/15/22  0756 08/15/22  1327   POCGLU 267* 203*       Blood Sugar Average: Last 72 hrs:  (P) 235     · Uses VGO pump at home which will be d/c as pt not competent to use it and dtr on vacation  · As pt going to [place], will use insulin gtt  · Endo consult as BSs high

## 2022-08-15 NOTE — ED ATTENDING ATTESTATION
8/15/2022  IHolly MD, saw and evaluated the patient  I have discussed the patient with the resident/non-physician practitioner and agree with the resident's/non-physician practitioner's findings, Plan of Care, and MDM as documented in the resident's/non-physician practitioner's note, except where noted  All available labs and Radiology studies were reviewed  I was present for key portions of any procedure(s) performed by the resident/non-physician practitioner and I was immediately available to provide assistance  At this point I agree with the current assessment done in the Emergency Department  I have conducted an independent evaluation of this patient a history and physical is as follows:  Patient lives at home with family, was not acting like herself last night  Patient has significant dementia  Family states that her urine has been more foul smelling and she has seemed a little off  Patient fell last night, and then again this morning while getting out of bed  Patient with deformity to right ankle  Patient cannot provide any history, and cannot provide any review of systems  On exam patient is hypertensive  She is afebrile with otherwise normal vital signs  On HEENT exam, she has no signs of trauma  Face is symmetric  Her mucous membranes are slightly tacky  Her neck is supple and nontender  The patient's heart is irregular without murmurs, rubs, or gallops  Her lungs are clear bilaterally  Abdomen is soft and nontender without masses rebound, guarding  She has obvious deformity at her right ankle, but is neurovascularly intact distal to the injury  The patient was completely exposed  There are no flank hematomas or back tenderness  She has stage I breakdown on her backside  She is neurologically nonfocal   Impression:  Fall, ankle fracture  Altered mental status  Will plan to do infectious, cardiac, septic evaluation, CT head, chest x-ray, x-ray of ankle  Anticipate a hematoma block, and admission to the hospital  ED Course         Critical Care Time  Procedures

## 2022-08-15 NOTE — PLAN OF CARE
Problem: PAIN - ADULT  Goal: Verbalizes/displays adequate comfort level or baseline comfort level  Description: Interventions:  - Encourage patient to monitor pain and request assistance  - Assess pain using appropriate pain scale  - Administer analgesics based on type and severity of pain and evaluate response  - Implement non-pharmacological measures as appropriate and evaluate response  - Consider cultural and social influences on pain and pain management  - Notify physician/advanced practitioner if interventions unsuccessful or patient reports new pain  Outcome: Progressing     Problem: INFECTION - ADULT  Goal: Absence or prevention of progression during hospitalization  Description: INTERVENTIONS:  - Assess and monitor for signs and symptoms of infection  - Monitor lab/diagnostic results  - Monitor all insertion sites, i e  indwelling lines, tubes, and drains  - Monitor endotracheal if appropriate and nasal secretions for changes in amount and color  - [place] appropriate cooling/warming therapies per order  - Administer medications as ordered  - Instruct and encourage patient and family to use good hand hygiene technique  - Identify and instruct in appropriate isolation precautions for identified infection/condition  Outcome: Progressing  Goal: Absence of fever/infection during neutropenic period  Description: INTERVENTIONS:  - Monitor WBC    Outcome: Progressing

## 2022-08-15 NOTE — H&P
[facility] [facility]  H&P- [address] [place] [address] Hope 1935, 82 y o  female MRN: [id number]  Unit/Bed#: [facility] 818-01 Encounter: [id number]  Primary Care Provider: Naresh Kinney MD   Date and time admitted to hospital: 8/15/2022  7:51 AM    * Closed right ankle fracture  Assessment & Plan  · Ortho consult  · Due to event in ER where pt had CP and diaphoresis during splinting I asked for cardio to clear for OR  · Pain control ordered    Chest pain  Assessment & Plan  · With diaphoresis and bradycardia during ankle spliting  · F/u serial trops  · Cardio consult as pt needs OR    Foul smelling urine  Assessment & Plan  · UA ruled out UTI  · Bladder scan unremarkable    Type 2 diabetes mellitus with hyperglycemia, with long-term current use of insulin ([facility])  Assessment & Plan  Lab Results   Component Value Date    HGBA1C 7 3 (H) 07/30/2022       Recent Labs     08/15/22  0756 08/15/22  1327   POCGLU 267* 203*       Blood Sugar Average: Last 72 hrs:  (P) 235     · Uses VGO pump at home which will be d/c as pt not competent to use it and dtr on vacation  · As pt going to OR, will use insulin gtt  · Endo consult as BSs high    PAF (paroxysmal atrial fibrillation) ([facility])  Assessment & Plan  · C/w BB  · Eliquis currently held for OR    Chronic diastolic congestive heart failure ([facility] )  Assessment & Plan  Wt Readings from Last 3 Encounters:   08/01/22 95 3 kg (210 lb)   02/14/22 95 kg (209 lb 7 oz)   06/30/21 99 7 kg (219 lb 14 4 oz)   · Hold torsemide for OR  · Repeat echo ordered due to episode of CP          Dementia without behavioral disturbance ([facility] [address] )  Assessment & Plan  · Family will make medical decisions    VTE Pharmacologic Prophylaxis: VTE Score: 9 High Risk (Score >/= 5) - Pharmacological DVT Prophylaxis Contraindicated  Sequential Compression Devices Ordered  Code Status: Level 1 - Full Code   Discussion with family: Updated  (son) at bedside      Anticipated Length of Stay: Patient will be admitted on an inpatient basis with an anticipated length of stay of greater than 2 midnights secondary to need for OR  Total Time for Visit, including Counseling / Coordination of Care: 45 minutes Greater than 50% of this total time spent on direct patient counseling and coordination of care  Chief Complaint: fell    History of Present Illness:  Chema Hamilton is a 82 y o  female with a PMH of dementia and CAD and Afib who presents with fall  Pt has weakness in LLE and tried to get into bed but fell  Noted RLE pain  Dx w/ ankle fx  While splinting ankle, pt had episode of CP, nausea, diaphoresis where HR dropped to 30  Review of Systems:  Review of Systems   Unable to perform ROS: Dementia       Past Medical and Surgical History:   Past Medical History:   Diagnosis Date    Arthritis     Asthma     CAD (coronary artery disease) of artery bypass graft     Cardiac disease     CHF [facility]congestive heart failure) ([facility] [address] [facility]     Dementia ([facility] [address] [facility]     Depression     Diabetes mellitus ([facility] [address] [facility]     Heart attack ([facility] [address] [facility]     Hyperlipidemia     Hypertension     MI (myocardial infarction) ([facility] [address] [facility]     Neuropathy     BRANT (obstructive sleep apnea)     Peptic ulcer     was + for H pylori    Transient cerebral ischemia 2011    with neg CUS and ECHO       Past Surgical History:   Procedure Laterality Date    APPENDECTOMY      CATARACT EXTRACTION       SECTION      COLONOSCOPY  2004    CORONARY ANGIOPLASTY  2006    CORONARY ANGIOPLASTY WITH STENT PLACEMENT      CORONARY ARTERY BYPASS GRAFT      DENTAL SURGERY      EGD AND COLONOSCOPY      ELBOW SURGERY      resolved     ESOPHAGOGASTRODUODENOSCOPY         Meds/Allergies:  Prior to Admission medications    Medication Sig Start Date End Date Taking?  Authorizing Provider   atorvastatin (LIPITOR) 40 mg tablet Take 1 tablet (40 mg total) by mouth daily 1/3/22   Braden Smith MD   calcium carbonate-vitamin D (OSCAL-D) 500 mg-200 units per tablet Take 1 tablet by mouth 2 (two) times a day with meals 4/28/18   Lara Mixer, DO   Continuous Blood Gluc  (Dexcom G6 ) SIVAKUMAR Use daily as directed for CGM    Historical Provider, MD   Continuous Blood Gluc Sensor (Dexcom G6 Sensor) MISC Use daily as directed for CGM - Change every 10 days    Historical Provider, MD   Continuous Blood Gluc Transmit (Dexcom G6 Transmitter) MISC Use daily as directed for CGM - Change every 3 months    Historical Provider, MD   docusate sodium (COLACE) 100 mg capsule Take 1 capsule (100 mg total) by mouth 2 (two) times a day  Patient taking differently: Take 100 mg by mouth 2 (two) times a day as needed 4/28/18   [place] Mixer, DO   Eliquis 5 MG TAKE 1 TABLET BY MOUTH TWICE A DAY 7/13/22   Ally Peguero MD   glucose blood test strip USE AS DIRECTED 3 TIMES A DAY 5/26/20   EMELI Jacobo   insulin aspart (NovoLOG) 100 units/mL injection Use for V-Go 1/25/22   Nicholle Roselind Goodpasture, CRNP   Insulin Disposable Pump (V-Go 20) KIT Apply once a night 1/25/22   Celsa LimstEMELI saldana   Insulin Disposable Pump (V-Go 20) KIT Use daily Use one daily 7/29/22   EMELI Rodas   metoprolol tartrate (LOPRESSOR) 25 mg tablet TAKE 1 TABLET (25 MG TOTAL) BY MOUTH EVERY 12 (TWELVE) HOURS 6/14/22   Ally Peguero MD   NovoLOG 100 UNIT/ML injection USE FOR V-GO 6/17/22   Historical Provider, MD   potassium chloride (Klor-Con M10) 10 mEq tablet Take 1 tablet (10 mEq total) by mouth daily 8/1/22   Ally Peguero MD   senna (SENOKOT) 8 6 mg Take 1 tablet (8 6 mg total) by mouth daily  Patient taking differently: Take 1 tablet by mouth daily at bedtime as needed 4/29/18   Lara Mixer, DO   torsemide (DEMADEX) 20 mg tablet Take 1 tablet (20 mg total) by mouth daily 8/1/22   Ally Peguero MD     I have reviewed home medications using recent Epic encounter  Allergies:    Allergies   Allergen Reactions    Ace Inhibitors  Chlorpromazine     Latex     Lisinopril     Namenda [Memantine] Drowsiness    Penicillins Seizures    Propoxyphene     Stadol [Butorphanol]        Social History:  Marital Status:      Patient Pre-hospital Living Situation: Home  Patient Pre-hospital Level of Mobility: power wheelchair    Substance Use History:   Social History     Substance and Sexual Activity   Alcohol Use Not Currently     Social History     Tobacco Use   Smoking Status Never Smoker   Smokeless Tobacco Never Used     Social History     Substance and Sexual Activity   Drug Use Never       Family History:  Family History   Problem Relation Age of Onset    Diabetes Mother     Cancer Sister     Heart disease Sister     Thyroid disease Sister     Cancer Brother     Cancer Father     Colon cancer Family     Diabetes Family     Mitral valve prolapse Family     Thyroid cancer Family        Physical Exam:     Vitals:   Blood Pressure: (!) 178/74 (08/15/22 1333)  Pulse: 63 (08/15/22 1333)  Temperature: 98 2 °F (36 8 °C) (08/15/22 0801)  Temp Source: Oral (08/15/22 0801)  Respirations: 16 (08/15/22 1300)  SpO2: 97 % (08/15/22 1333)    Physical Exam  HENT:      Head: Normocephalic and atraumatic  Nose: Nose normal       Mouth/Throat:      Mouth: Mucous membranes are moist    Eyes:      Extraocular Movements: Extraocular movements intact  Conjunctiva/sclera: Conjunctivae normal    Cardiovascular:      Rate and Rhythm: Normal rate and regular rhythm  Pulmonary:      Effort: Pulmonary effort is normal       Breath sounds: Normal breath sounds  Abdominal:      General: Bowel sounds are normal       Palpations: Abdomen is soft  Musculoskeletal:      Cervical back: Normal range of motion and neck supple  Right lower leg: No edema  Left lower leg: No edema  Skin:     General: Skin is warm and dry  Neurological:      Mental Status: She is alert  She is disoriented           Additional Data:     Lab Results:  Results from last 7 days   Lab Units 08/15/22  0819   WBC Thousand/uL 9 75   HEMOGLOBIN g/dL 14 0   HEMATOCRIT % 43 4   PLATELETS Thousands/uL 207   NEUTROS PCT % 77*   LYMPHS PCT % 13*   MONOS PCT % 6   EOS PCT % 3     Results from last 7 days   Lab Units 08/15/22  0819   SODIUM mmol/L 140   POTASSIUM mmol/L 4 2   CHLORIDE mmol/L 110*   CO2 mmol/L 26   BUN mg/dL 26*   CREATININE mg/dL 1 07   ANION GAP mmol/L 4   CALCIUM mg/dL 9 8   ALBUMIN g/dL 3 4*   TOTAL BILIRUBIN mg/dL 0 71   ALK PHOS U/L 88   ALT U/L 21   AST U/L 18   GLUCOSE RANDOM mg/dL 280*     Results from last 7 days   Lab Units 08/15/22  0819   INR  1 09     Results from last 7 days   Lab Units 08/15/22  1327 08/15/22  0756   POC GLUCOSE mg/dl 203* 267*         Results from last 7 days   Lab Units 08/15/22  0819   LACTIC ACID mmol/L 1 4   PROCALCITONIN ng/ml 0 05       Imaging: Reviewed radiology reports from this admission including: xray(s)  XR ankle 3+ vw right   Final Result by Inga Vega MD (08/15 1433)      Anatomic alignment of bimalleolar fractures post splint placement  Workstation performed: [id number]         XR ankle 3+ vw right   Final Result by Sujey Jaramillo MD (08/15 1323)      Significantly improved alignment of the ankle fracture dislocation with persistent mild widening of the medial tibiotalar joint  Improved alignment of the mildly displaced medial malleolar fracture  No posterior malleolar fracture  Workstation performed: [id number]         XR ankle 3+ views RIGHT   Final Result by Sujey Jaramillo MD (08/15 1258)      Improved alignment of the tibiotalar joint but with persistent ankle dislocation, related to fracture dislocation as described  Workstation performed: [id number]         CT head without contrast   Final Result by Jinny Mina MD (08/15 1028)      No acute intracranial process  No skull fracture  Chronic microangiopathy  Workstation performed: [id number]         XR chest portable   Final Result by Glen Tran DO (08/15 6849)      No acute cardiopulmonary disease  Workstation performed: [id number]         XR tibia fibula 2 views RIGHT   Final Result by Glen Tran DO (08/15 0920)      Fracture dislocation right ankle, incompletely evaluated  Acute, nondisplaced fracture proximal fibula  Dedicated imaging of the right ankle recommended  The study was marked in [facility] for immediate notification  Workstation performed: [id number]             EKG and Other Studies Reviewed on Admission:   · EKG: Tele showed sinus regi then wide complex AFib  ** Please Note: This note has been constructed using a voice recognition system   **

## 2022-08-15 NOTE — ED NOTES
While readjusting pt's leg, wendy Barros noticed pt 02 sat drop to 88-89% and pt pulse dropped to 50's  Pt became pale and reported to this RN that she felt nauseous  Dr Nola Parson made aware  Verbal orders for blood glucose and EKG completed        Guido Cuellar  08/15/22 4205

## 2022-08-15 NOTE — CONSULTS
Consultation - Cardiology Team One  [place] 82 y o  female MRN: [id number]  Unit/Bed#: ED 23 Encounter: [id number]    Inpatient consult to Cardiology  Consult performed by: Latasha Taet PA-C  Consult ordered by: Hina Baird DO          Physician Requesting Consult: Hina Baird DO  Reason for Consult / Principal Problem:  Preoperative cardiac risk assessment, chest pain, diaphoresis    Assessment:    1  Preoperative cardiac risk assessment:  Surgical intervention of right ankle fracture  Reported to be diaphoretic and bradycardic during reduction with chest pain  Patient denies any chest pain or shortness of breath  She has limited functional capacity ambulating with a walker, however denies any angina or anginal equivalent without level of exertion  EKG with no acute ischemic change  HS troponin negative x2  Concern of AS murmur on exam as below  2  Acute Right fibular fracture: s/p mechanical fall with imaging revealed an acute nondisplaced fracture of the proximal fibula and dislocation of the right ankle  Reduction was attempted in the ED  Orthopedic surgery consulted for surgical intervention  3  AS murmur:  4/6 systolic ejection murmur heard at the sternal borders that is suspicious for AS  No prior history of significant valvular heart disease per last echo in 2019   4  Chronic HFpEF:  Maintained on torsemide 20 mg daily  Volume status appears stable  · Echocardiogram 03/28/2019:  EF 60% with no WMA mild-moderate RV dilatation with normal function, mild LA dilatation, moderate RA dilatation, no significant valvular abnormality  5  CAD: s/p remote CABG in 1999 with subsequent stent placement in 2006  Repeat cardiac catheterization 2008 was without intervention  Maintained on beta-blocker and statin  No aspirin due to full anticoagulation with Eliquis  6  SSS: s/p MDT DC PPM   · Device interrogation 06/24/2021: MDT DC PPM   Normal pacemaker function    No significant high rate episodes noted  AP 84%,  0 2%  7  PAF:  Noted on device interrogations in the past   Maintained on Lopressor 25 mg BID and anticoagulated on Eliquis 5 mg BID which is currently on hold  8  Essential hypertension:  Average /65 on Lopressor 25 mg BID  9  Hyperlipidemia:  Lipid panel 07/2022 , TG 44, HDL 55, LDL 41 on atorvastatin 40 mg daily  10  Type 2 DM:  Hemoglobin A1c 7 3 in 07/2022  Management per primary team   11  Cognitive decline:  Patient A&O x3   Unable to recall year, month or president  Unable to obtain reliable history from the patient  Plan/Recommendations:  · Echocardiogram for further evaluation of murmur that is concerning for AS  · Device interrogation  · Continue Lopressor 25 mg BID perioperatively  · Optimize pain control  · Further recommendations pending above-mentioned testing  · Please await attending attestation for final recommendations  ____________________________________________________________    CC:  Fall      History of Present Illness   HPI: Maldonado Thrasher is a 82y o  year old female who has CAD s/p CABG in 1999 with subsequent stent placement in 2006 and repeat catheterization in 2008 without intervention, chronic diastolic CHF, SSS s/p MDT DC PPM in 2014, PAF, essential hypertension, hyperlipidemia, type 2 DM, BRANT who follows with cardiologist Dr Sonya Miller  Patient presents to the emergency room at [facility] via EMS on 08/15/2022 after a mechanical fall  Per staff she was noted to have foul-smelling urine for 2 days and progressively became more altered  This morning patient had a mechanical fall using walker rolling her right ankle and fell to ground  On arrival to the ED patient's BP was 176/64 with oxygen saturation 95% on room air  In the ED EKG revealed sinus rhythm with first-degree AV block and RBBB  Imaging revealed an acute nondisplaced fracture of the proximal fibula and dislocation of the right ankle    CT head revealed no acute intracranial abnormality fracture  Labs revealed stable CMP, stable CBC, HS troponin negative x3, NT proBNP 6023  Reduction and and splinting was attempted in the ED  During this time patient had some diaphoresis, bradycardia and chest discomfort  Orthopedic surgery was consulted  Cardiology has been consulted for preoperative clearance  Home medication regimen includes atorvastatin 40 mg daily, Eliquis 5 mg BID, Lopressor 25 mg BID,  torsemide 20 mg daily  Patient resting in bed during consultation and is unable to give me a history of what happened with her fall  She does not reply when I am going over review of systems  Her family at bedside states that she is independent at home in terms of walking to the bathroom and cleaning up around her room  She ambulates with a walker and has not complained of any chest pain or unusual dyspnea  She does not recall having any chest pain earlier  Device interrogation 06/24/2021: MDT DC PPM   Normal pacemaker function  No significant high rate episodes noted  AP 84%,  0 2%  Echocardiogram 03/28/2019:  EF 60% with no WMA mild-moderate RV dilatation with normal function, mild LA dilatation, moderate RA dilatation, no significant valvular abnormality  Nuclear stress test 05/16/2014:  Mildly diminished perfusion within the anterior wall compatible with ischemia  EKG reviewed personally: 8/15/2022-sinus rhythm at a rate of 85 beats per minute with first-degree AVB and PACs, RBBB and anterolateral infarct  No significant change when compared to the EKG from 05/07/2019  Telemetry reviewed personally:  Sinus rhythm with intermittent pacing          Review of Systems   Unable to perform ROS: dementia     Historical Information   Past Medical History:   Diagnosis Date    Arthritis     Asthma     CAD (coronary artery disease) of artery bypass graft     Cardiac disease     [facility]congestive [facility] failure) ([email or web address]     Dementia ([facility] )  Depression     Diabetes mellitus [facility]     Heart attack ([facility]     Hyperlipidemia     Hypertension     MI (myocardial infarction) ([facility]     Neuropathy     BRANT (obstructive sleep apnea)     Peptic ulcer     was + for H pylori    Transient cerebral ischemia 2011    with neg CUS and ECHO     Past Surgical History:   Procedure Laterality Date    APPENDECTOMY      CATARACT EXTRACTION       SECTION      COLONOSCOPY  2004    CORONARY ANGIOPLASTY  2006    CORONARY ANGIOPLASTY WITH STENT PLACEMENT      CORONARY ARTERY BYPASS GRAFT      DENTAL SURGERY      EGD AND COLONOSCOPY      ELBOW SURGERY      resolved     ESOPHAGOGASTRODUODENOSCOPY       Social History     Substance and Sexual Activity   Alcohol Use Not Currently     Social History     Substance and Sexual Activity   Drug Use Never     Social History     Tobacco Use   Smoking Status Never Smoker   Smokeless Tobacco Never Used     Family History:   Family History   Problem Relation Age of Onset    Diabetes Mother     Cancer Sister     Heart disease Sister     Thyroid disease Sister     Cancer Brother     Cancer Father     Colon cancer Family     Diabetes Family     Mitral valve prolapse Family     Thyroid cancer Family        Meds/Allergies   all current active meds have been reviewed, current meds:   No current facility-administered medications for this encounter  and PTA meds:   Prior to Admission Medications   Prescriptions Last Dose Informant Patient Reported? Taking?    Continuous Blood Gluc  (Dexcom G6 ) SIVAKUMAR  Child Yes No   Sig: Use daily as directed for CGM   Continuous Blood Gluc Sensor (Dexcom G6 Sensor) MISC  Child Yes No   Sig: Use daily as directed for CGM - Change every 10 days   Continuous Blood Gluc Transmit (Dexcom G6 Transmitter) MISC  Child Yes No   Sig: Use daily as directed for CGM - Change every 3 months   Eliquis 5 MG  Child No No   Sig: TAKE 1 TABLET BY MOUTH TWICE A DAY   Insulin Disposable Pump (V-Go 20) KIT  Child No No   Sig: Apply once a night   Insulin Disposable Pump (V-Go 20) KIT  Child No No   Sig: Use daily Use one daily   NovoLOG 100 UNIT/ML injection  Child Yes No   Sig: USE FOR V-GO   atorvastatin (LIPITOR) 40 mg tablet  Child No No   Sig: Take 1 tablet (40 mg total) by mouth daily   calcium carbonate-vitamin D (OSCAL-D) 500 mg-200 units per tablet  Child No No   Sig: Take 1 tablet by mouth 2 (two) times a day with meals   docusate sodium (COLACE) 100 mg capsule  Child No No   Sig: Take 1 capsule (100 mg total) by mouth 2 (two) times a day   Patient taking differently: Take 100 mg by mouth 2 (two) times a day as needed   glucose blood test strip  Child No No   Sig: USE AS DIRECTED 3 TIMES A DAY   insulin aspart (NovoLOG) 100 units/mL injection  Child No No   Sig: Use for V-Go   metoprolol tartrate (LOPRESSOR) 25 mg tablet  Child No No   Sig: TAKE 1 TABLET (25 MG TOTAL) BY MOUTH EVERY 12 (TWELVE) HOURS   potassium chloride (Klor-Con M10) 10 mEq tablet  Child No No   Sig: Take 1 tablet (10 mEq total) by mouth daily   senna (SENOKOT) 8 6 mg  Child No No   Sig: Take 1 tablet (8 6 mg total) by mouth daily   Patient taking differently: Take 1 tablet by mouth daily at bedtime as needed   torsemide (DEMADEX) 20 mg tablet  Child No No   Sig: Take 1 tablet (20 mg total) by mouth daily      Facility-Administered Medications: None     No current facility-administered medications for this encounter  Allergies   Allergen Reactions    Ace Inhibitors     Chlorpromazine     Latex     Lisinopril     Namenda [Memantine] Drowsiness    Penicillins Seizures    Propoxyphene     Stadol [Butorphanol]        Objective   Vitals: Blood pressure (!) 178/74, pulse 63, temperature 98 2 °F (36 8 °C), temperature source Oral, resp  rate 16, SpO2 97 %  ,     There is no height or weight on file to calculate BMI ,     Systolic (42ADB), TDC:844 , Min:124 , YYR:276     Diastolic (24hrs), Av, Min:60, Max:74    Wt Readings from Last 3 Encounters:   22 95 3 kg (210 lb)   22 95 kg (209 lb 7 oz)   21 99 7 kg (219 lb 14 4 oz)      Lab Results   Component Value Date    CREATININE 1 07 08/15/2022    CREATININE 1 06 2022    CREATININE 1 37 (H) 2021           No intake or output data in the 24 hours ending 08/15/22 1445  Weight (last 2 days)     None        Invasive Devices  Report    Peripheral Intravenous Line  Duration           Peripheral IV 08/15/22 Left Antecubital <1 day    Peripheral IV 08/15/22 Right Antecubital <1 day          Drain  Duration           External Urinary Catheter 1195 days                  Physical Exam  Vitals and nursing note reviewed  Constitutional:       General: She is not in acute distress  Appearance: She is well-developed  She is obese  Comments: On 2 L NC in NAD   HENT:      Head: Normocephalic and atraumatic  Neck:      Vascular: No JVD  Cardiovascular:      Rate and Rhythm: Normal rate and regular rhythm  Heart sounds: Normal heart sounds  No murmur heard  No friction rub  Pulmonary:      Effort: Pulmonary effort is normal  No respiratory distress  Breath sounds: Normal breath sounds  No wheezing or rales  Abdominal:      General: Bowel sounds are normal  There is no distension  Palpations: Abdomen is soft  Tenderness: There is no abdominal tenderness  Musculoskeletal:         General: No tenderness  Normal range of motion  Cervical back: Normal range of motion and neck supple  Left lower leg: No edema  Skin:     General: Skin is warm and dry  Coloration: Skin is pale  Findings: No erythema  Comments: Right lower extremity in brace   Neurological:      Mental Status: She is alert and oriented to person, place, and time     Psychiatric:      Comments: Flat affect           LABORATORY RESULTS:      CBC with diff:   Results from last 7 days   Lab Units 08/15/22  0819   WBC Thousand/uL 9 75   HEMOGLOBIN g/dL 14 0   HEMATOCRIT % 43 4   MCV fL 95   PLATELETS Thousands/uL 207   MCH pg 30 5   MCHC g/dL 32 3   RDW % 14 9   MPV fL 11 7   NRBC AUTO /100 WBCs 0       CMP:  Results from last 7 days   Lab Units 08/15/22  0819   POTASSIUM mmol/L 4 2   CHLORIDE mmol/L 110*   CO2 mmol/L 26   BUN mg/dL 26*   CREATININE mg/dL 1 07   CALCIUM mg/dL 9 8   AST U/L 18   ALT U/L 21   ALK PHOS U/L 88   EGFR ml/min/1 73sq m 47       BMP:  Results from last 7 days   Lab Units 08/15/22  0819   POTASSIUM mmol/L 4 2   CHLORIDE mmol/L 110*   CO2 mmol/L 26   BUN mg/dL 26*   CREATININE mg/dL 1 07   CALCIUM mg/dL 9 8          Lab Results   Component Value Date    NTBNP 623 (H) 08/15/2022    NTBNP 952 (H) 2022    NTBNP 922 (H) 2021                          Results from last 7 days   Lab Units 08/15/22  0819   TSH 3RD GENERATON uIU/mL 5 410*       Results from last 7 days   Lab Units 08/15/22  0819   INR  1 09     Lipid Profile:   Lab Results   Component Value Date    CHOL 144 2015    CHOL 150 2013     Lab Results   Component Value Date    HDL 55 2022    HDL 61 2021    HDL 57 2019     Lab Results   Component Value Date    LDLCALC 41 2022    LDLCALC 39 2021    LDLCALC 37 2019     Lab Results   Component Value Date    TRIG 44 2022    TRIG 79 2021    TRIG 70 2019         Cardiac testing:   Results for orders placed during the hospital encounter of 19    Echo complete with contrast if indicated    Narrative  [address] [facility], [address] [place] [address]  [phone]    Transthoracic Echocardiogram  2D, M-mode, Doppler, and Color Doppler    Study date:  28-Mar-2019    Patient: Jackeline Mattson  MR number: [id number]  Account number: [de-identified]  : 1935  Age: 82 years  Gender: Female  Status: Outpatient  Location: Bedside  Height: 68 in  Weight: 244 lb  BP: 128/ 66 mmHg    Indications: Heart Failure    Diagnoses: I50 9 - Heart failure, unspecified    Sonographer:  VANI Velázquez  Interpreting Physician:  Reed Pichardo MD  Primary Physician:  Valentino Lyn MD  Referring Physician:  Amira Beckford MD  Group:  [facility]  Cardiology Fellow:  Ashok Leonard MD    SUMMARY    LEFT VENTRICLE:  Systolic function was normal  Ejection fraction was estimated to be 60 %  There were no regional wall motion abnormalities  There was no evidence of concentric hypertrophy  VENTRICULAR SEPTUM:  There was dyssynergic motion  These changes are consistent with RV volume overload  RIGHT VENTRICLE:  The ventricle was mildly to moderately dilated  Wall thickness was increased  LEFT ATRIUM:  The atrium was mildly dilated  RIGHT ATRIUM:  The atrium was moderately dilated  MITRAL VALVE:  There was mild regurgitation  AORTIC VALVE:  The valve was trileaflet  Leaflets exhibited moderately increased thickness, mild calcification, and normal cuspal separation  There was trace regurgitation  TRICUSPID VALVE:  There was severe regurgitation  IVC, HEPATIC VEINS:  The inferior vena cava was dilated  Respirophasic changes were blunted (less than 50% variation)  HISTORY: PRIOR HISTORY: HTN, HLD, SSS, CHF, CAD    PROCEDURE: The procedure was performed at the bedside  This was a routine study  The transthoracic approach was used  The study included complete 2D imaging, M-mode, complete spectral Doppler, and color Doppler  The heart rate was 60 bpm,  at the start of the study  Images were obtained from the parasternal, apical, subcostal, and suprasternal notch acoustic windows  This was a technically difficult study  LEFT VENTRICLE: Size was normal  Systolic function was normal  Ejection fraction was estimated to be 60 %  There were no regional wall motion abnormalities   Wall thickness was normal  There was no evidence of concentric hypertrophy  DOPPLER: Left ventricular diastolic function parameters were abnormal     VENTRICULAR SEPTUM: There was dyssynergic motion  These changes are consistent with RV volume overload  RIGHT VENTRICLE: The ventricle was mildly to moderately dilated  Systolic function was low normal  Wall thickness was increased  A pacing wire was present  LEFT ATRIUM: The atrium was mildly dilated  RIGHT ATRIUM: The atrium was moderately dilated  A pacing wire was present  MITRAL VALVE: Valve structure was normal  There was normal leaflet separation  DOPPLER: The transmitral velocity was within the normal range  There was no evidence for stenosis  There was mild regurgitation  AORTIC VALVE: The valve was trileaflet  Leaflets exhibited moderately increased thickness, mild calcification, and normal cuspal separation  DOPPLER: Transaortic velocity was minimally increased  There was no evidence for stenosis  There  was trace regurgitation  TRICUSPID VALVE: The valve structure was normal  There was normal leaflet separation  DOPPLER: The transtricuspid velocity was within the normal range  There was no evidence for stenosis  There was severe regurgitation  PULMONIC VALVE: Leaflets exhibited normal thickness, no calcification, and normal cuspal separation  DOPPLER: The transpulmonic velocity was within the normal range  There was no significant regurgitation  PERICARDIUM: There was no pericardial effusion  The pericardium was normal in appearance  AORTA: The root exhibited normal size  The ascending aorta was normal in size, when indexed for body surface area, measuring 3 8 cm (or 1 7 cm/m2 of body surface area)  SYSTEMIC VEINS: IVC: The inferior vena cava was dilated  Respirophasic changes were blunted (less than 50% variation)      SYSTEM MEASUREMENT TABLES    2D  %FS: 28 94 %  Ao Diam: 3 14 cm  EDV(Teich): 75 97 ml  EF(Teich): 56 07 %  ESV(Teich): 33 38 ml  IVSd: 1 01 cm  LA Area: 19 22 cm2  LA Diam: 3 06 cm  LVEDV MOD A4C: 50 04 ml  LVEF MOD A4C: 54 94 %  LVESV MOD A4C: 22 55 ml  LVIDd: 4 14 cm  LVIDs: 2 94 cm  LVLd A4C: 6 96 cm  LVLs A4C: 6 72 cm  LVOT Diam: 1 89 cm  LVPWd: 1 01 cm  RA Area: 26 01 cm2  RVIDd: 4 37 cm  SV MOD A4C: 27 49 ml  SV(Teich): 42 6 ml    CW  AV Env  Ti: 338 73 ms  AV VTI: 36 28 cm  AV Vmax: 1 57 m/s  AV Vmean: 1 07 m/s  AV maxP 84 mmHg  AV meanP 31 mmHg  TR Vmax: 1 9 m/s  TR maxP 5 mmHg    MM  TAPSE: 1 61 cm    PW  NESTOR (VTI): 1 12 cm2  NESTOR Vmax: 1 21 cm2  E': 0 06 m/s  E/E': 20 03  LVOT Env  Ti: 312 08 ms  LVOT VTI: 14 38 cm  LVOT Vmax: 0 67 m/s  LVOT Vmean: 0 46 m/s  LVOT maxP 81 mmHg  LVOT meanP 99 mmHg  LVSV Dopp: 40 46 ml  MV A [place]: 0 29 m/s  MV Dec [place]: 5 23 m/s2  MV DecT: 219 85 ms  MV E [place]: 1 15 m/s  MV E/A Ratio: 3 9  MV PHT: 63 76 ms  MVA By PHT: 3 45 cm2    [facility] Accredited [facility]ch[facility]    Prepared and electronically signed by    Krishna Vitale MD  Signed 28-Mar-2019 18:13:19    No results found for this or any previous visit  No valid procedures specified  No results found for this or any previous visit  Imaging: I have personally reviewed pertinent reports  XR chest portable    Result Date: 8/15/2022  Narrative: CHEST INDICATION:  Cardiac w/u  Fall this morning, right ankle fracture  COMPARISON:  2022, report CT chest, abdomen and pelvis 2015 EXAM PERFORMED/VIEWS:  XR CHEST PORTABLE FINDINGS:  Monitoring leads and clips project over the chest  Cardiomediastinal silhouette appears unremarkable  Status post median sternotomy and CABG  Left chest wall pacer  The lungs are clear  No pneumothorax or pleural effusion  Osseous structures appear within normal limits for patient age  Impression: No acute cardiopulmonary disease   Workstation performed: [id number]     XR tibia fibula 2 views RIGHT    Result Date: 8/15/2022  Narrative: RIGHT TIBIA AND FIBULA INDICATION:  Fall, deformity to right ankle  COMPARISON:  Right knee 4/6/2010 VIEWS:  XR TIBIA FIBULA 2 VW RIGHT Images: 4 FINDINGS: There is fracture dislocation of the ankle identified  Comminuted, angulated and displaced fracture of the distal fibula  Cannot exclude distal tibial fracture  Acute, nondisplaced fracture proximal fibula neck  Tricompartmental osteoarthritis of the knee, most pronounced medial tibiofemoral joint  Small knee joint effusion suspected  No lytic or blastic osseous lesion  Plantar calcaneal spur  Periarticular soft tissue swelling about the ankle  Vascular calcifications  Impression: Fracture dislocation right ankle, incompletely evaluated  Acute, nondisplaced fracture proximal fibula  Dedicated imaging of the right ankle recommended  The study was marked in [facility] for immediate notification  Workstation performed: [id number]     XR ankle 3+ vw right    Result Date: 8/15/2022  Narrative: RIGHT ANKLE INDICATION:   Post splint  COMPARISON:  8/15/2022  VIEWS:  XR ANKLE 3+ VW RIGHT FINDINGS: Interval splint fixation of previously noted bimalleolar fracture is seen  Alignment appears anatomic  No significant degenerative changes  No lytic or blastic osseous lesion  Soft tissues are unremarkable  Impression: Anatomic alignment of bimalleolar fractures post splint placement  Workstation performed: [id number]     XR ankle 3+ vw right    Result Date: 8/15/2022  Narrative: RIGHT ANKLE INDICATION:   Post reduction/ splint  COMPARISON:  Same day right ankle radiographs  VIEWS:  XR ANKLE 3+ VW RIGHT FINDINGS: Significantly improved alignment of the ankle fracture dislocation with persistent mild widening of the medial tibiotalar joint  Stable alignment of a distal fibular fracture  Improved alignment of the mildly displaced medial malleolar fracture  Posterior malleolus appears intact  No significant degenerative changes  No lytic or blastic osseous lesion  There are atherosclerotic calcifications   Soft tissues are otherwise unremarkable  Diffuse soft tissue swelling  Impression: Significantly improved alignment of the ankle fracture dislocation with persistent mild widening of the medial tibiotalar joint  Improved alignment of the mildly displaced medial malleolar fracture  No posterior malleolar fracture  Workstation performed: [id number]     XR ankle 3+ views RIGHT    Result Date: 8/15/2022  Narrative: RIGHT ANKLE INDICATION:   post reduction  COMPARISON:  Same day tibia/fibular radiographs  VIEWS:  XR ANKLE 3+ VW RIGHT FINDINGS: Overlying cast obscures fine bone detail  Improved alignment of the tibiotalar joint with persistent ankle dislocation  Comminuted, angulated and displaced distal fibular fracture, significantly improved in alignment compared to the prior study  Osseous body medial to the talus may be related to an additional posterior malleolar fracture, noting that the posterior malleolus is not well evaluated, but the the medial malleolus appears intact  No significant degenerative changes  No lytic or blastic osseous lesion  Soft tissues are unremarkable  Impression: Improved alignment of the tibiotalar joint but with persistent ankle dislocation, related to fracture dislocation as described  Workstation performed: [id number]     CT head without contrast    Result Date: 8/15/2022  Narrative: CT BRAIN - WITHOUT CONTRAST INDICATION:   Mental status change, unknown cause fall, altered mental status, on eliquis  COMPARISON:  CT head 2/14/2022 TECHNIQUE:  CT examination of the brain was performed  In addition to axial images, sagittal and coronal 2D reformatted images were created and submitted for interpretation  Radiation dose length product (DLP) for this visit:  866 45 mGy-cm     This examination, like all CT scans performed in the [facility], was performed utilizing techniques to minimize radiation dose exposure, including the use of iterative  reconstruction and automated exposure control  IMAGE QUALITY:  Diagnostic  FINDINGS: PARENCHYMA:  No intracranial mass, mass effect or midline shift  No CT signs of acute infarction  No acute parenchymal hemorrhage  Periventricular, centrum semiovale, and subcortical white matter hypodensity is a nonspecific finding likely representing  chronic microangiopathy  Calcification bilateral cavernous internal carotid and distal vertebral arteries  VENTRICLES AND EXTRA-AXIAL SPACES:  No acute hydrocephalus  Mild prominence of CSF spaces diffusely attributed to generalized volume loss  VISUALIZED ORBITS AND PARANASAL SINUSES:  Changes of bilateral lens replacements noted  Paranasal sinuses are clear  CALVARIUM AND EXTRACRANIAL SOFT TISSUES:  Scalp unremarkable  No skull fracture  Bilateral  temporomandibular degenerative changes  Impression: No acute intracranial process  No skull fracture  Chronic microangiopathy  Workstation performed: [id number]       Counseling / Coordination of Care  Total floor / unit time spent today 45 minutes  Greater than 50% of total time was spent with the patient and / or family counseling and / or coordination of care  A description of the counseling / coordination of care: Review of history, current assessment, development of a plan  Code Status: Prior    ** Please Note: Dragon 360 Dictation voice to text software may have been used in the creation of this document   **

## 2022-08-15 NOTE — ASSESSMENT & PLAN NOTE
· Ortho consult  · Due to event in ER where pt had CP and diaphoresis during splinting I asked for cardio to clear for OR  · Pain control ordered

## 2022-08-16 ENCOUNTER — ANESTHESIA EVENT (INPATIENT)
Dept: PERIOP | Facility: HOSPITAL | Age: 87
DRG: 492 | End: 2022-08-16
Payer: COMMERCIAL

## 2022-08-16 ENCOUNTER — APPOINTMENT (INPATIENT)
Dept: RADIOLOGY | Facility: HOSPITAL | Age: 87
DRG: 492 | End: 2022-08-16
Payer: COMMERCIAL

## 2022-08-16 ENCOUNTER — ANESTHESIA (INPATIENT)
Dept: PERIOP | Facility: HOSPITAL | Age: 87
DRG: 492 | End: 2022-08-16
Payer: COMMERCIAL

## 2022-08-16 LAB
ANION GAP SERPL CALCULATED.3IONS-SCNC: 1 MMOL/L (ref 4–13)
AORTIC ROOT: 3.5 CM
AORTIC VALVE MEAN VELOCITY: 18.7 M/S
APICAL FOUR CHAMBER EJECTION FRACTION: 74 %
APTT PPP: 33 SECONDS (ref 23–37)
AV AREA BY CONTINUOUS VTI: 0.8 CM2
AV AREA PEAK VELOCITY: 0.8 CM2
AV LVOT MEAN GRADIENT: 2 MMHG
AV LVOT PEAK GRADIENT: 3 MMHG
AV MEAN GRADIENT: 16 MMHG
AV PEAK GRADIENT: 29 MMHG
AV VALVE AREA: 0.77 CM2
AV VELOCITY RATIO: 0.33
BUN SERPL-MCNC: 26 MG/DL (ref 5–25)
CALCIUM SERPL-MCNC: 9.6 MG/DL (ref 8.3–10.1)
CHLORIDE SERPL-SCNC: 110 MMOL/L (ref 96–108)
CO2 SERPL-SCNC: 28 MMOL/L (ref 21–32)
CREAT SERPL-MCNC: 1.03 MG/DL (ref 0.6–1.3)
DOP CALC AO PEAK VEL: 2.69 M/S
DOP CALC AO VTI: 53.45 CM
DOP CALC LVOT AREA: 2.54 CM2
DOP CALC LVOT DIAMETER: 1.8 CM
DOP CALC LVOT PEAK VEL VTI: 16.08 CM
DOP CALC LVOT PEAK VEL: 0.89 M/S
DOP CALC LVOT STROKE INDEX: 19.8 ML/M2
DOP CALC LVOT STROKE VOLUME: 40.9 CM3
E WAVE DECELERATION TIME: 267 MS
ERYTHROCYTE [DISTWIDTH] IN BLOOD BY AUTOMATED COUNT: 14.8 % (ref 11.6–15.1)
FRACTIONAL SHORTENING: 35 % (ref 28–44)
GFR SERPL CREATININE-BSD FRML MDRD: 50 ML/MIN/1.73SQ M
GLUCOSE SERPL-MCNC: 104 MG/DL (ref 65–140)
GLUCOSE SERPL-MCNC: 122 MG/DL (ref 65–140)
GLUCOSE SERPL-MCNC: 122 MG/DL (ref 65–140)
GLUCOSE SERPL-MCNC: 125 MG/DL (ref 65–140)
GLUCOSE SERPL-MCNC: 127 MG/DL (ref 65–140)
GLUCOSE SERPL-MCNC: 147 MG/DL (ref 65–140)
GLUCOSE SERPL-MCNC: 151 MG/DL (ref 65–140)
GLUCOSE SERPL-MCNC: 206 MG/DL (ref 65–140)
GLUCOSE SERPL-MCNC: 229 MG/DL (ref 65–140)
GLUCOSE SERPL-MCNC: 91 MG/DL (ref 65–140)
GLUCOSE SERPL-MCNC: 99 MG/DL (ref 65–140)
HCT VFR BLD AUTO: 41.5 % (ref 34.8–46.1)
HGB BLD-MCNC: 13.1 G/DL (ref 11.5–15.4)
INR PPP: 1.1 (ref 0.84–1.19)
INTERVENTRICULAR SEPTUM IN DIASTOLE (PARASTERNAL SHORT AXIS VIEW): 1.5 CM
INTERVENTRICULAR SEPTUM: 1.5 CM (ref 0.6–1.1)
LAAS-AP2: 11.5 CM2
LAAS-AP4: 22.3 CM2
LEFT ATRIUM SIZE: 2.9 CM
LEFT INTERNAL DIMENSION IN SYSTOLE: 2.2 CM (ref 2.1–4)
LEFT VENTRICULAR INTERNAL DIMENSION IN DIASTOLE: 3.4 CM (ref 3.5–6)
LEFT VENTRICULAR POSTERIOR WALL IN END DIASTOLE: 1.5 CM
LEFT VENTRICULAR STROKE VOLUME: 31 ML
LVSV (TEICH): 31 ML
MAGNESIUM SERPL-MCNC: 2 MG/DL (ref 1.6–2.6)
MCH RBC QN AUTO: 30.4 PG (ref 26.8–34.3)
MCHC RBC AUTO-ENTMCNC: 31.6 G/DL (ref 31.4–37.4)
MCV RBC AUTO: 96 FL (ref 82–98)
MV E'TISSUE VEL-SEP: 4 CM/S
MV PEAK A VEL: 0.77 M/S
MV PEAK E VEL: 78 CM/S
MV STENOSIS PRESSURE HALF TIME: 77 MS
MV VALVE AREA P 1/2 METHOD: 2.86 CM2
PHOSPHATE SERPL-MCNC: 3.3 MG/DL (ref 2.3–4.1)
PLATELET # BLD AUTO: 187 THOUSANDS/UL (ref 149–390)
PMV BLD AUTO: 10.9 FL (ref 8.9–12.7)
POTASSIUM SERPL-SCNC: 4.4 MMOL/L (ref 3.5–5.3)
PROTHROMBIN TIME: 14.4 SECONDS (ref 11.6–14.5)
RA PRESSURE ESTIMATED: 10 MMHG
RBC # BLD AUTO: 4.31 MILLION/UL (ref 3.81–5.12)
RIGHT ATRIUM AREA SYSTOLE A4C: 21.7 CM2
RIGHT VENTRICLE ID DIMENSION: 4.7 CM
RV PSP: 36 MMHG
SL CV LEFT ATRIUM LENGTH A2C: 4.1 CM
SL CV LV EF: 70
SL CV PED ECHO LEFT VENTRICLE DIASTOLIC VOLUME (MOD BIPLANE) 2D: 47 ML
SL CV PED ECHO LEFT VENTRICLE SYSTOLIC VOLUME (MOD BIPLANE) 2D: 16 ML
SODIUM SERPL-SCNC: 139 MMOL/L (ref 135–147)
TR MAX PG: 26 MMHG
TR PEAK VELOCITY: 2.6 M/S
TRICUSPID VALVE PEAK REGURGITATION VELOCITY: 2.55 M/S
WBC # BLD AUTO: 12.64 THOUSAND/UL (ref 4.31–10.16)

## 2022-08-16 PROCEDURE — 82948 REAGENT STRIP/BLOOD GLUCOSE: CPT

## 2022-08-16 PROCEDURE — 85027 COMPLETE CBC AUTOMATED: CPT | Performed by: GENERAL PRACTICE

## 2022-08-16 PROCEDURE — C1713 ANCHOR/SCREW BN/BN,TIS/BN: HCPCS | Performed by: ORTHOPAEDIC SURGERY

## 2022-08-16 PROCEDURE — 73600 X-RAY EXAM OF ANKLE: CPT

## 2022-08-16 PROCEDURE — 0QSG04Z REPOSITION RIGHT TIBIA WITH INTERNAL FIXATION DEVICE, OPEN APPROACH: ICD-10-PCS | Performed by: ORTHOPAEDIC SURGERY

## 2022-08-16 PROCEDURE — 27814 TREATMENT OF ANKLE FRACTURE: CPT | Performed by: ORTHOPAEDIC SURGERY

## 2022-08-16 PROCEDURE — 83735 ASSAY OF MAGNESIUM: CPT | Performed by: GENERAL PRACTICE

## 2022-08-16 PROCEDURE — 93306 TTE W/DOPPLER COMPLETE: CPT | Performed by: INTERNAL MEDICINE

## 2022-08-16 PROCEDURE — 99222 1ST HOSP IP/OBS MODERATE 55: CPT | Performed by: INTERNAL MEDICINE

## 2022-08-16 PROCEDURE — 0QSJ04Z REPOSITION RIGHT FIBULA WITH INTERNAL FIXATION DEVICE, OPEN APPROACH: ICD-10-PCS | Performed by: ORTHOPAEDIC SURGERY

## 2022-08-16 PROCEDURE — 84100 ASSAY OF PHOSPHORUS: CPT | Performed by: GENERAL PRACTICE

## 2022-08-16 PROCEDURE — 97167 OT EVAL HIGH COMPLEX 60 MIN: CPT

## 2022-08-16 PROCEDURE — 97163 PT EVAL HIGH COMPLEX 45 MIN: CPT

## 2022-08-16 PROCEDURE — 99232 SBSQ HOSP IP/OBS MODERATE 35: CPT | Performed by: INTERNAL MEDICINE

## 2022-08-16 PROCEDURE — 85730 THROMBOPLASTIN TIME PARTIAL: CPT

## 2022-08-16 PROCEDURE — 85610 PROTHROMBIN TIME: CPT

## 2022-08-16 PROCEDURE — NC001 PR NO CHARGE: Performed by: ORTHOPAEDIC SURGERY

## 2022-08-16 PROCEDURE — 80048 BASIC METABOLIC PNL TOTAL CA: CPT | Performed by: GENERAL PRACTICE

## 2022-08-16 DEVICE — 3.5MM LOCKING SCREW SLF-TPNG W/STARDRIVE(TM) RECESS 18MM: Type: IMPLANTABLE DEVICE | Site: ANKLE | Status: FUNCTIONAL

## 2022-08-16 DEVICE — 2.7MM CORTEX SCREW SELF-TAPPING 45MM: Type: IMPLANTABLE DEVICE | Site: ANKLE | Status: FUNCTIONAL

## 2022-08-16 DEVICE — LCP ONE-THIRD TUBULAR PLATE WITH COLLAR 7 HOLES/81MM
Type: IMPLANTABLE DEVICE | Site: ANKLE | Status: FUNCTIONAL
Brand: LCP

## 2022-08-16 DEVICE — 3.5MM CORTEX SCREW SELF-TAPPING 14MM: Type: IMPLANTABLE DEVICE | Site: ANKLE | Status: FUNCTIONAL

## 2022-08-16 DEVICE — 3.5MM LOCKING SCREW SLF-TPNG W/STARDRIVE(TM) RECESS 14MM: Type: IMPLANTABLE DEVICE | Site: ANKLE | Status: FUNCTIONAL

## 2022-08-16 DEVICE — 3.5MM LOCKING SCREW SLF-TPNG W/STARDRIVE(TM) RECESS 16MM: Type: IMPLANTABLE DEVICE | Site: ANKLE | Status: FUNCTIONAL

## 2022-08-16 RX ORDER — DEXAMETHASONE SODIUM PHOSPHATE 10 MG/ML
INJECTION, SOLUTION INTRAMUSCULAR; INTRAVENOUS AS NEEDED
Status: DISCONTINUED | OUTPATIENT
Start: 2022-08-16 | End: 2022-08-16

## 2022-08-16 RX ORDER — INSULIN LISPRO 100 [IU]/ML
4-6 INJECTION, SOLUTION INTRAVENOUS; SUBCUTANEOUS
Status: DISCONTINUED | OUTPATIENT
Start: 2022-08-17 | End: 2022-08-17

## 2022-08-16 RX ORDER — ROPIVACAINE HYDROCHLORIDE 5 MG/ML
INJECTION, SOLUTION EPIDURAL; INFILTRATION; PERINEURAL
Status: COMPLETED | OUTPATIENT
Start: 2022-08-16 | End: 2022-08-16

## 2022-08-16 RX ORDER — DEXMEDETOMIDINE HYDROCHLORIDE 100 UG/ML
INJECTION, SOLUTION INTRAVENOUS AS NEEDED
Status: DISCONTINUED | OUTPATIENT
Start: 2022-08-16 | End: 2022-08-16

## 2022-08-16 RX ORDER — PROPOFOL 10 MG/ML
INJECTION, EMULSION INTRAVENOUS AS NEEDED
Status: DISCONTINUED | OUTPATIENT
Start: 2022-08-16 | End: 2022-08-16

## 2022-08-16 RX ORDER — INSULIN LISPRO 100 [IU]/ML
1-5 INJECTION, SOLUTION INTRAVENOUS; SUBCUTANEOUS
Status: DISCONTINUED | OUTPATIENT
Start: 2022-08-17 | End: 2022-08-17

## 2022-08-16 RX ORDER — HEPARIN SODIUM 5000 [USP'U]/ML
5000 INJECTION, SOLUTION INTRAVENOUS; SUBCUTANEOUS EVERY 8 HOURS SCHEDULED
Status: DISCONTINUED | OUTPATIENT
Start: 2022-08-17 | End: 2022-08-17

## 2022-08-16 RX ORDER — CEFAZOLIN SODIUM 2 G/50ML
2000 SOLUTION INTRAVENOUS
Status: DISCONTINUED | OUTPATIENT
Start: 2022-08-17 | End: 2022-08-16

## 2022-08-16 RX ORDER — FENTANYL CITRATE/PF 50 MCG/ML
25 SYRINGE (ML) INJECTION
Status: DISCONTINUED | OUTPATIENT
Start: 2022-08-16 | End: 2022-08-16 | Stop reason: HOSPADM

## 2022-08-16 RX ORDER — INSULIN GLARGINE 100 [IU]/ML
20 INJECTION, SOLUTION SUBCUTANEOUS
Status: DISCONTINUED | OUTPATIENT
Start: 2022-08-16 | End: 2022-08-17

## 2022-08-16 RX ORDER — OXYCODONE HYDROCHLORIDE 5 MG/1
5 TABLET ORAL EVERY 6 HOURS PRN
Qty: 20 TABLET | Refills: 0 | Status: SHIPPED | OUTPATIENT
Start: 2022-08-16 | End: 2022-08-26

## 2022-08-16 RX ORDER — ONDANSETRON 2 MG/ML
4 INJECTION INTRAMUSCULAR; INTRAVENOUS ONCE AS NEEDED
Status: DISCONTINUED | OUTPATIENT
Start: 2022-08-16 | End: 2022-08-16 | Stop reason: HOSPADM

## 2022-08-16 RX ORDER — ONDANSETRON 2 MG/ML
INJECTION INTRAMUSCULAR; INTRAVENOUS AS NEEDED
Status: DISCONTINUED | OUTPATIENT
Start: 2022-08-16 | End: 2022-08-16

## 2022-08-16 RX ORDER — FENTANYL CITRATE 50 UG/ML
INJECTION, SOLUTION INTRAMUSCULAR; INTRAVENOUS AS NEEDED
Status: DISCONTINUED | OUTPATIENT
Start: 2022-08-16 | End: 2022-08-16

## 2022-08-16 RX ORDER — FENTANYL CITRATE 50 UG/ML
INJECTION, SOLUTION INTRAMUSCULAR; INTRAVENOUS
Status: COMPLETED | OUTPATIENT
Start: 2022-08-16 | End: 2022-08-16

## 2022-08-16 RX ORDER — CEFAZOLIN SODIUM 1 G/3ML
INJECTION, POWDER, FOR SOLUTION INTRAMUSCULAR; INTRAVENOUS AS NEEDED
Status: DISCONTINUED | OUTPATIENT
Start: 2022-08-16 | End: 2022-08-16

## 2022-08-16 RX ADMIN — ONDANSETRON 4 MG: 2 INJECTION INTRAMUSCULAR; INTRAVENOUS at 20:07

## 2022-08-16 RX ADMIN — DEXMEDETOMIDINE HCL 4 MCG: 100 INJECTION INTRAVENOUS at 18:15

## 2022-08-16 RX ADMIN — METOPROLOL TARTRATE 25 MG: 25 TABLET, FILM COATED ORAL at 09:35

## 2022-08-16 RX ADMIN — SODIUM CHLORIDE, SODIUM LACTATE, POTASSIUM CHLORIDE, AND CALCIUM CHLORIDE 75 ML/HR: .6; .31; .03; .02 INJECTION, SOLUTION INTRAVENOUS at 20:45

## 2022-08-16 RX ADMIN — OXYCODONE HYDROCHLORIDE 5 MG: 5 TABLET ORAL at 06:14

## 2022-08-16 RX ADMIN — ROPIVACAINE HYDROCHLORIDE 20 ML: 5 INJECTION, SOLUTION EPIDURAL; INFILTRATION; PERINEURAL at 15:46

## 2022-08-16 RX ADMIN — PHENYLEPHRINE HYDROCHLORIDE 50 MCG/MIN: 10 INJECTION INTRAVENOUS at 17:22

## 2022-08-16 RX ADMIN — FENTANYL CITRATE 25 MCG: 50 INJECTION INTRAMUSCULAR; INTRAVENOUS at 15:38

## 2022-08-16 RX ADMIN — CEFAZOLIN 2000 MG: 1 INJECTION, POWDER, FOR SOLUTION INTRAMUSCULAR; INTRAVENOUS at 17:29

## 2022-08-16 RX ADMIN — ROPIVACAINE HYDROCHLORIDE 20 ML: 5 INJECTION, SOLUTION EPIDURAL; INFILTRATION; PERINEURAL at 15:44

## 2022-08-16 RX ADMIN — ACETAMINOPHEN 975 MG: 325 TABLET ORAL at 23:17

## 2022-08-16 RX ADMIN — PROPOFOL 100 MG: 10 INJECTION, EMULSION INTRAVENOUS at 17:10

## 2022-08-16 RX ADMIN — INSULIN GLARGINE 20 UNITS: 100 INJECTION, SOLUTION SUBCUTANEOUS at 23:17

## 2022-08-16 RX ADMIN — HYDROMORPHONE HYDROCHLORIDE 0.2 MG: 0.2 INJECTION, SOLUTION INTRAMUSCULAR; INTRAVENOUS; SUBCUTANEOUS at 09:35

## 2022-08-16 RX ADMIN — DEXMEDETOMIDINE HCL 8 MCG: 100 INJECTION INTRAVENOUS at 17:36

## 2022-08-16 RX ADMIN — ACETAMINOPHEN 975 MG: 325 TABLET ORAL at 13:18

## 2022-08-16 RX ADMIN — DEXAMETHASONE SODIUM PHOSPHATE 5 MG: 10 INJECTION, SOLUTION INTRAMUSCULAR; INTRAVENOUS at 15:46

## 2022-08-16 RX ADMIN — FENTANYL CITRATE 50 MCG: 50 INJECTION, SOLUTION INTRAMUSCULAR; INTRAVENOUS at 17:34

## 2022-08-16 RX ADMIN — SODIUM CHLORIDE, SODIUM LACTATE, POTASSIUM CHLORIDE, AND CALCIUM CHLORIDE 75 ML/HR: .6; .31; .03; .02 INJECTION, SOLUTION INTRAVENOUS at 13:18

## 2022-08-16 RX ADMIN — PROPOFOL 20 MG: 10 INJECTION, EMULSION INTRAVENOUS at 17:35

## 2022-08-16 RX ADMIN — OXYCODONE HYDROCHLORIDE 5 MG: 5 TABLET ORAL at 13:18

## 2022-08-16 RX ADMIN — DEXAMETHASONE SODIUM PHOSPHATE 5 MG: 10 INJECTION, SOLUTION INTRAMUSCULAR; INTRAVENOUS at 15:44

## 2022-08-16 RX ADMIN — DEXMEDETOMIDINE HCL 4 MCG: 100 INJECTION INTRAVENOUS at 17:45

## 2022-08-16 RX ADMIN — FENTANYL CITRATE 25 MCG: 50 INJECTION, SOLUTION INTRAMUSCULAR; INTRAVENOUS at 17:30

## 2022-08-16 NOTE — ANESTHESIA PREPROCEDURE EVALUATION
Procedure:  OPEN REDUCTION W/ INTERNAL FIXATION (ORIF) ANKLE (Right Ankle)    Relevant Problems   CARDIO   (+) 3-vessel coronary artery disease   (+) Cerebral artery occlusion with cerebral infarction (HCC)   (+) Chest pain   (+) Chronic diastolic congestive heart failure ([facility])   (+) Congestive heart failure with LV diastolic dysfunction, NYHA class 2 ([facility])   (+) Dyspnea on minimal exertion   (+) Essential hypertension   (+) Hyperlipidemia   (+) PAF (paroxysmal atrial fibrillation) ([facility])   (+) SSS (sick sinus syndrome) ([facility])   (+) Ventricular tachycardia ([facility])      ENDO   (+) Type 2 diabetes mellitus with hyperglycemia, with long-term current use of insulin (HCC)      GI/HEPATIC   (+) Gastroesophageal reflux disease with hiatal hernia      MUSCULOSKELETAL   (+) Gastroesophageal reflux disease with hiatal hernia   (+) Osteoarthritis of knee      NEURO/PSYCH   (+) Cerebral artery occlusion with cerebral infarction ([facility])   (+) Dementia without behavioral disturbance ([facility])   (+) Depression      PULMONARY   (+) Dyspnea on minimal exertion   (+) Mild intermittent asthma without complication   (+) Obstructive sleep apnea        Physical Exam    Airway    Mallampati score: III         Dental   lower dentures and upper dentures,     Cardiovascular  Murmur, Cardiovascular exam normal    Pulmonary  Pulmonary exam normal     Other Findings    Pt w dementia  Consent obtained from son  Left Ventricle: Left ventricular cavity size is normal  There is severe concentric hypertrophy  The left ventricular ejection fraction is 70%  Systolic function is vigorous  Wall motion is normal  Diastolic function is moderately abnormal, consistent with grade II (pseudonormal) relaxation    Right Ventricle: Right ventricular cavity size is normal  Systolic function is normal  A pacer wire is present    Left Atrium: The atrium is mildly dilated    Right Atrium: The atrium is mildly dilated    Aortic Valve:  There is trace regurgitation  There is moderate to severe stenosis    Tricuspid Valve: There is mild regurgitation  Anesthesia Plan  ASA Score- 4     Anesthesia Type- regional with ASA Monitors  Additional Monitors:   Airway Plan:           Plan Factors-Exercise tolerance (METS): <4 METS  Chart reviewed  EKG reviewed  Existing labs reviewed  Patient summary reviewed  Patient is not a current smoker  Induction- intravenous  Postoperative Plan- Plan for postoperative opioid use  Informed Consent- Anesthetic plan and risks discussed with patient  I personally reviewed this patient with the CRNA  Discussed and agreed on the Anesthesia Plan with the CRNA  Patricia Bowman

## 2022-08-16 NOTE — ASSESSMENT & PLAN NOTE
· With diaphoresis and bradycardia during ankle spliting  · Cardio consult for pre op clearance apprecaited- cleared with mod to high risk, echo done

## 2022-08-16 NOTE — CASE MANAGEMENT
Case Management Assessment & Discharge Planning Note    Patient name Fortunato Waters  Location [facility] MRN [id number]  : 1935 Date 2022       Current Admission Date: 8/15/2022  Current Admission Diagnosis:Closed right ankle fracture   Patient Active Problem List    Diagnosis Date Noted    Closed right ankle fracture 08/15/2022    Chest pain 08/15/2022    Foul smelling urine 08/15/2022    Type 2 diabetes mellitus with hyperglycemia, with long-term current use of insulin ([facility] ) 2022    Obesity, morbid ([facility] ) 2021    Osteopenia 2021    Mild intermittent asthma without complication     PAF (paroxysmal atrial fibrillation) ([facility]) 2019    SSS (sick sinus syndrome) ([facility]) 2019    Dyspnea on minimal exertion 2019    Congestive heart failure with LV diastolic dysfunction, NYHA class 2 ([facility] ) 2018    Urine retention 2018    Chronic diastolic congestive heart failure ([facility] ) 2018    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 ([facility] ) 2018    Abnormal chest x-ray 2018    Ventricular tachycardia ([facility] ) 2017    Urine incontinence 2017    Gastroesophageal reflux disease with hiatal hernia 2014    Cerebral artery occlusion with cerebral infarction ([facility] ) 2014    Dementia without behavioral disturbance ([facility] ) 2013    3-vessel coronary artery disease 2012    Depression 2012    Diabetic retinopathy ([facility] ) 2012    DM (diabetes mellitus), type 2, uncontrolled w/ophthalmic complication     Glaucoma 2012    Hyperlipidemia 2012    Essential hypertension 2012    Obesity (BMI 30-39 9) 2012    Obstructive sleep apnea 2012    Osteoarthritis of knee 2012      LOS (days): 1  Geometric Mean LOS (GMLOS) (days):   Days to GMLOS:     OBJECTIVE:    Risk of Unplanned Readmission Score: 13 21         Current admission status: Inpatient       Preferred Pharmacy:   [facility] [address]  Phone: [phone] Fax: [phone]    Primary Care Provider: Reggie Aguilar MD    Primary Insurance: [facility]  Secondary Insurance:     ASSESSMENT:  Saritha 26 Proxies    There are no active Health Care Proxies on file  Readmission Root Cause  30 Day Readmission: No    Patient Information  Admitted from[de-identified] Home  Mental Status: Confused  During Assessment patient was accompanied by: Not accompanied during assessment  Assessment information provided by[de-identified] Son  Primary Caregiver: Family  Caregiver's Name[de-identified] Nichole Pair Relationship to Patient[de-identified] Family Member  Caregiver's Telephone Number[de-identified] [phone]  Support Systems: Family members, Self, [address] of Residence: [address] [place] [address] do you live in?: [employer] entry access options   Select all that apply : Ramp  Type of Current Residence: [place]  In the last 12 months, was there a time when you were not able to pay the mortgage or rent on time?: No  In the last 12 months, how many places have you lived?: 1  In the last 12 months, was there a time when you did not have a steady place to sleep or slept in a shelter (including now)?: No  Homeless/housing insecurity resource given?: No  Living Arrangements: Lives w/ Son    Activities of Daily Living Prior to Admission  Functional Status: Assistance  Completes ADLs independently?: No  Level of ADL dependence: Assistance  Ambulates independently?: No  Level of ambulatory dependence: Assistance  Does patient use assisted devices?: Yes  Assisted Devices (DME) used:  Cutting, Shower Chair  Does patient currently own DME?: Yes  What DME does the patient currently own?: Shower Chair,  Cutting  Does the patient have a history of Short-Term Rehab?: Yes ([facility] in 2014)  Does patient have a history of HHC?: Yes ([facility])  Does patient currently have [facility]?: No     Patient Information Continued  Income Source: Pension/penitentiary  Does patient have prescription coverage?: Yes  Within the past 12 months, you worried that your food would run out before you got the money to buy more : Never true  Within the past 12 months, the food you bought just didn't last and you didn't have money to get more : Never true  Food insecurity resource given?: No  Does patient receive dialysis treatments?: No  Does patient have a history of substance abuse?: No  Does patient have a history of Mental Health Diagnosis?: No    Means of Transportation  Means of Transport to Appts[de-identified] Family transport  In the past 12 months, has lack of transportation kept you from medical appointments or from getting medications?: No  In the past 12 months, has lack of transportation kept you from meetings, work, or from getting things needed for daily living?: No  Was application for public transport provided?: No    DISCHARGE DETAILS:    Discharge planning discussed with[de-identified] sonChristel of Choice: Yes  Comments - Freedom of Choice: Per Blanca Neerujames, if possible they would like pt to go to the rehab she went to on [facility] in 2014  CM reached out and confirmed that pt was at [facility] in 2014  CM contacted family/caregiver?: Yes   Contacts  Patient Contacts: Mariam Swanson  Relationship to Patient[de-identified] Family  Contact Method: Phone  Phone Number: [phone]  Reason/Outcome: Continuity of Care, Emergency Contact, Discharge Planning    Other Referral/Resources/Interventions Provided:  Interventions: Short Term Rehab  Referral Comments: Pt will likely need STR at d/c s/p surgical repair of right ankle fx and dislocation  CM discussed likely STR needs with son, Blanca Nelson who states that pt and family would not want long term nursing home placement but they are agreeable to STR  Blancarika Siddiqijames states that ideally they would like pt to go to the nursing facility on [facility] where she went in 2014    WILLIAM reached out to Shelly Filipe Cantrell from [facility] who confirmed that pt was at [address] on [place] in 2014  Per Shelly, for pt to be considered for placement she would need to be fully COVID vaccinated and boosted x2  Pt currently has one booster  CM reached out to pt's daughter, Isaias Mejias (pt's primary care taker) to discuss vaccine, VM was left requesting a call back  Treatment Team Recommendation: Short Term Rehab  Discharge Destination Plan[de-identified] Short Term Rehab  Transport at Discharge : [facility]LS Ambulance      Additional Comments: CM completed Open assessment via phone with pt's son, Lyndon Dee  CM attempted pt's other children, Isaias Mejias and Denisa Montalvo, with no answer and phones went right to VM  CM did leave a VM for Lydia requesting a call back  Lyndon Dee states that his other siblings are pt's primary contacts but his is happy to assist with the Open  Pt resides at home with a son and a nephew, pt has assistance with ADL's  Pt currently has 3 COVID vaccines  Family would like pt to go to  for STR if possible however pt would need the fourth vaccine  CM reached out to Isaias Mejias and left a VM for a call back as Lyndon Dee states she handles the vaccines  CM waiting for a call back

## 2022-08-16 NOTE — PROGRESS NOTES
[facility]  Progress Note - [place] 1935, 82 y o  female MRN: [id number]  Unit/Bed#: OR POOL Encounter: [id number]  Primary Care Provider: Aysha Balbuena MD   Date and time admitted to hospital: 8/15/2022  7:51 AM    * Closed right ankle fracture  Assessment & Plan  · Ortho consult for bimalleolar fracture for ORIF today  · Due to event in ER where pt had CP and diaphoresis during splinting, asked for cardio to clear for OR  · Pain control ordered    Foul smelling urine  Assessment & Plan  · UA ruled out UTI  · Bladder scan unremarkable    Chest pain  Assessment & Plan  · With diaphoresis and bradycardia during ankle spliting  · Cardio consult for pre op clearance apprecaited- cleared with mod to high risk, echo done    Type 2 diabetes mellitus with hyperglycemia, with long-term current use of insulin [facility])  Assessment & Plan  Lab Results   Component Value Date    HGBA1C 7 3 (H) 07/30/2022       Recent Labs     08/16/22  0820 08/16/22  1039 08/16/22  1325 08/16/22  1437   POCGLU 99 122 147* 151*       Blood Sugar Average: Last 72 hrs:  (P) 504 [id number]     · Uses VGO pump at home which will be d/c as pt not competent to use it and dtr on vacation  · As pt going to OR, will use insulin gtt  · Endo consult as BSs high    PAF (paroxysmal atrial fibrillation) (HCC)  Assessment & Plan  · C/w BB  · Eliquis currently held for OR    Chronic diastolic congestive heart failure ([facility] )  Assessment & Plan  Wt Readings from Last 3 Encounters:   08/15/22 95 3 kg (210 lb)   08/01/22 95 3 kg (210 lb)   02/14/22 95 kg (209 lb 7 oz)   · Hold torsemide for OR  · Repeat echo ordered due to episode of CP          Dementia without behavioral disturbance ([facility] )  Assessment & Plan  · Family will make medical decisions  · Supportive care          VTE Pharmacologic Prophylaxis: VTE Score: 9 Moderate Risk (Score 3-4) - Pharmacological DVT Prophylaxis Ordered: heparin      Patient Centered Rounds: I performed bedside rounds with nursing staff today  Discussions with Specialists or Other Care Team Provider:     Education and Discussions with Family / Patient: Updated  (son) at bedside  Time Spent for Care: 20 minutes  More than 50% of total time spent on counseling and coordination of care as described above  Current Length of Stay: 1 day(s)  Current Patient Status: Inpatient   Certification Statement: The patient will continue to require additional inpatient hospital stay due to OR today  Discharge Plan: Anticipate discharge in 24-48 hrs to rehab facility  Code Status: Level 1 - Full Code    Subjective:   Patient is resting in bed, has dementia, son and daughter at bedside  Denies any chest pain, shortness a breath, nausea, vomiting  Has pain in right lower extremity, due for oxycodone soon    Objective:     Vitals:   Temp (24hrs), Av 8 °F (36 6 °C), Min:97 3 °F (36 3 °C), Max:98 1 °F (36 7 °C)    Temp:  [97 3 °F (36 3 °C)-98 1 °F (36 7 °C)] 98 1 °F (36 7 °C)  HR:  [61-73] 69  Resp:  [16-20] 20  BP: (132-170)/(56-81) 140/70  SpO2:  [94 %-98 %] 96 %  Body mass index is 34 95 kg/m²  Input and Output Summary (last 24 hours): Intake/Output Summary (Last 24 hours) at 2022 1851  Last data filed at 2022 1439  Gross per 24 hour   Intake 1029 06 ml   Output 250 ml   Net 779 06 ml       Physical Exam:   Physical Exam  Vitals and nursing note reviewed  Constitutional:       General: She is not in acute distress  Appearance: She is well-developed  HENT:      Head: Normocephalic and atraumatic  Mouth/Throat:      Mouth: Mucous membranes are dry  Eyes:      Conjunctiva/sclera: Conjunctivae normal    Cardiovascular:      Rate and Rhythm: Normal rate and regular rhythm  Heart sounds: No murmur heard  Pulmonary:      Effort: Pulmonary effort is normal  No respiratory distress  Breath sounds: Normal breath sounds     Abdominal: General: Bowel sounds are normal       Palpations: Abdomen is soft  Tenderness: There is no abdominal tenderness  Musculoskeletal:      Cervical back: Normal range of motion and neck supple  Comments: Right lower extremity decreased ROM secondary to fracture and pain   Skin:     General: Skin is warm and dry  Neurological:      General: No focal deficit present  Mental Status: She is alert  Mental status is at baseline  Psychiatric:      Comments: Unable to properly assess secondary to dementia          Additional Data:     Labs:  Results from last 7 days   Lab Units 08/16/22  0817 08/15/22  0819   WBC Thousand/uL 12 64* 9 75   HEMOGLOBIN g/dL 13 1 14 0   HEMATOCRIT % 41 5 43 4   PLATELETS Thousands/uL 187 207   NEUTROS PCT %  --  77*   LYMPHS PCT %  --  13*   MONOS PCT %  --  6   EOS PCT %  --  3     Results from last 7 days   Lab Units 08/16/22  0817 08/15/22  0819   SODIUM mmol/L 139 140   POTASSIUM mmol/L 4 4 4 2   CHLORIDE mmol/L 110* 110*   CO2 mmol/L 28 26   BUN mg/dL 26* 26*   CREATININE mg/dL 1 03 1 07   ANION GAP mmol/L 1* 4   CALCIUM mg/dL 9 6 9 8   ALBUMIN g/dL  --  3 4*   TOTAL BILIRUBIN mg/dL  --  0 71   ALK PHOS U/L  --  88   ALT U/L  --  21   AST U/L  --  18   GLUCOSE RANDOM mg/dL 104 280*     Results from last 7 days   Lab Units 08/16/22  0817   INR  1 10     Results from last 7 days   Lab Units 08/16/22  1437 08/16/22  1325 08/16/22  1039 08/16/22  0820 08/16/22  0715 08/16/22  0509 08/16/22  0308 08/16/22  0107 08/15/22  2304 08/15/22  2101 08/15/22  1858 08/15/22  1657   POC GLUCOSE mg/dl 151* 147* 122 99 91 125 127 122 131 191* 210* 213*         Results from last 7 days   Lab Units 08/15/22  0819   LACTIC ACID mmol/L 1 4   PROCALCITONIN ng/ml 0 05       Lines/Drains:  Invasive Devices  Report    Peripheral Intravenous Line  Duration           Peripheral IV 08/15/22 Distal;Dorsal (posterior); Right Forearm 1 day    Peripheral IV 08/15/22 Right Antecubital 1 day          Drain Duration           External Urinary Catheter 1 day          Airway  Duration           Supraglottic Airway LMA 4 <1 day                  Telemetry:  Telemetry Orders (From admission, onward)             48 Hour Telemetry Monitoring  Continuous x 48 hours        References:    Telemetry Guidelines   Question:  Reason for 48 Hour Telemetry  Answer:  Acute MI, chest pain - R/O MI, or unstable angina                 Telemetry Reviewed: Normal Sinus Rhythm  Indication for Continued Telemetry Use: Metabolic/electrolyte disturbance with high probability of dysrhythmia             Imaging: Reviewed radiology reports from this admission including: ankle xrays    Recent Cultures (last 7 days):   Results from last 7 days   Lab Units 08/15/22  0819   BLOOD CULTURE  No Growth at 24 hrs  No Growth at 24 hrs         Last 24 Hours Medication List:   Current Facility-Administered Medications   Medication Dose Route Frequency Provider Last Rate    [MAR Hold] acetaminophen  975 mg Oral [facility] Marisabel Seaman DO      [facility] Hold] atorvastatin  40 mg Oral Daily With [facility], DO      bupivacaine liposomal  20 mL Infiltration Once Niall Wilkins MD      [facility] Hold] calcium carbonate-vitamin D  1 tablet Oral BID With Meals Marisabel Seaman DO      [START ON 8/17/2022] cefazolin  2,000 mg Intravenous On Call To Nicole William MD      [facility] Hold] COVID-19 Pfizer vac (jake sucrose, gray cap form)  0 3 mL Intramuscular Once Manuel Rabago MD      [facility] Hold] HYDROmorphone  0 2 mg Intravenous Q3H PRN Marisabel Seaman DO      insulin glargine  20 Units Subcutaneous HS Christian Méndez MD      [START ON 8/17/2022] insulin lispro  1-5 Units Subcutaneous TID [facility] Christian Méndez MD      insulin lispro  4-6 Units Subcutaneous TID With Meals Christian Méndez MD      insulin regular (HumuLIN R,NovoLIN R) infusion  0 3-21 Units/hr Intravenous Titrated Christian Méndez MD Stopped (08/16/22 9425)    lactated ringers  75 mL/hr Intravenous Continuous Olinda Sun MD 75 mL/hr (08/16/22 1653)    [MAR Hold] metoprolol tartrate  25 mg Oral Q12H [address] [facility] Hold] ondansetron  4 mg Intravenous Q6H PRN [facility] Hold] oxyCODONE  2 5 mg Oral Q6H PRN [facility]      Or    [facility] Hold] oxyCODONE  5 mg Oral Q6H PRN [facility]       Facility-Administered Medications Ordered in Other Encounters   Medication Dose Route Frequency Provider Last Rate    ceFAZolin   Intravenous PRN Parvizisacarrie Dapper, CRNA      dexamethasone (PF)   Infiltration PRN Ly Keane MD      dexmedeTOMIDine   Intravenous PRN Dorisann Dapper, CRNA      fentanyl citrate (PF)   Intravenous PRN Dorisann Dapper, CRNA      phenylephrine (FERNANDA-SYNEPHRINE) 50 mg (STANDARD CONCENTRATION) in sodium chloride 0 9% 250 mL   Intravenous Continuous PRN Lola Vega CRNA 50 mcg/min (08/16/22 1750)    phenylephrine   Intravenous PRN Dorisacarrie Dapper, CRNA      propofol   Intravenous PRN Parvizisacarrie Lucasper, CRNA          Today, Patient Was Seen By: Lay Berman MD    **Please Note: This note may have been constructed using a voice recognition system  **

## 2022-08-16 NOTE — ASSESSMENT & PLAN NOTE
Lab Results   Component Value Date    HGBA1C 7 3 (H) 07/30/2022       Recent Labs     08/16/22  0820 08/16/22  1039 08/16/22  1325 08/16/22  1437   POCGLU 99 122 147* 151*       Blood Sugar Average: Last 72 hrs:  (P) 256 [id number]     · Uses VGO pump at home which will be d/c as pt not competent to use it and dtr on vacation  · As pt going to OR, will use insulin gtt  · Endo consult as BSs high

## 2022-08-16 NOTE — UTILIZATION REVIEW
Inpatient Admission Authorization Request   NOTIFICATION OF INPATIENT ADMISSION/INPATIENT AUTHORIZATION REQUEST   SERVICING FACILITY:   [facility]  Address: [address], [address] [address]  Tax ID: [id number]  NPI: [id number]  Place of Service: Inpatient [address] Code: 24     ATTENDING PROVIDER:  Attending Name and NPI#: Kingsley Echevarria, [id number] [facility]  Address: [address], [address] [address]  Phone: [phone]     UTILIZATION REVIEW CONTACT:  Rashard Camejo Utilization   Network Utilization Review Department  Phone: [phone]  Fax: [phone]  Email: [email or web address]     PHYSICIAN ADVISORY SERVICES:  FOR WAOE-FI-QPXA REVIEW - MEDICAL NECESSITY DENIAL  Phone: [phone]  Fax: [phone]  Email: [email or web address]     TYPE OF REQUEST:  Inpatient Status     ADMISSION INFORMATION:  ADMISSION DATE/TIME: 8/15/22  1:30 PM  PATIENT DIAGNOSIS CODE/DESCRIPTION:  Ankle injury [S99 919A]  Altered mental status [R41 82]  Chest pain [R07 9]  Maisonneuve fracture of fibula, right, closed, initial encounter [S82 861A]  Type 2 diabetes mellitus with hyperglycemia, with long-term current use of insulin (HCC) [E11 65, Z79 4]  Closed fracture dislocation of right ankle, initial encounter [S82 891A]  DISCHARGE DATE/TIME: No discharge date for patient encounter  IMPORTANT INFORMATION:  Please contact Rashard Camejo directly with any questions or concerns regarding this request  Department voicemails are confidential     Send requests for admission clinical reviews, concurrent reviews, approvals, and administrative denials due to lack of clinical to fax [phone]

## 2022-08-16 NOTE — PROGRESS NOTES
Progress Note - Cynthia Reyes 82 y o  female MRN: [id number]  [facility]      Subjective:    82 y  o female with R bimalleolar ankle fracture dislocation  Agitated overnight, pain controlled       Labs:  0   Lab Value Date/Time    HCT 43 4 08/15/2022 0819    HCT 45 0 07/30/2022 1041    HCT 44 02/14/2022 0156    HCT 43 3 07/16/2021 2343    HCT 38 3 09/16/2015 0158    HCT 39 1 04/21/2015 1010    HCT 38 3 02/27/2015 1019    HGB 14 0 08/15/2022 0819    HGB 14 5 07/30/2022 1041    HGB 15 0 02/14/2022 0156    HGB 14 1 07/16/2021 2343    HGB 13 2 09/16/2015 0158    HGB 13 5 04/21/2015 1010    HGB 13 3 02/27/2015 1053    HGB 13 1 02/27/2015 1019    INR 1 09 08/15/2022 0819    INR 1 04 02/27/2015 1019    WBC 9 75 08/15/2022 0819    WBC 5 71 07/30/2022 1041    WBC 9 83 07/16/2021 2343    WBC 7 16 09/16/2015 0158    WBC 8 33 04/21/2015 1010    WBC 7 32 02/27/2015 1019    ESR 19 06/02/2014 0619       Meds:    Current Facility-Administered Medications:     acetaminophen (TYLENOL) tablet 975 mg, 975 mg, Oral, Q8H [facility], Rober Ashley DO, 975 mg at 08/15/22 2106    atorvastatin (LIPITOR) tablet 40 mg, 40 mg, Oral, Daily With Dinner, Rober Ashley DO    calcium carbonate-vitamin D (OSCAL-D) 500 mg-200 units per tablet 1 tablet, 1 tablet, Oral, BID With Meals, Rober Ashley DO    HYDROmorphone HCl (DILAUDID) injection 0 2 mg, 0 2 mg, Intravenous, Q3H PRN, Rober Ashley DO, 0 2 mg at 08/15/22 1952    insulin regular (HumuLIN R,NovoLIN R) 1 Units/mL in sodium chloride 0 9 % 100 mL infusion, 0 3-21 Units/hr, Intravenous, Titrated, Rober Ashley DO, Stopped at 08/16/22 3838    lactated ringers infusion, 75 mL/hr, Intravenous, Continuous, Venkata Baires MD, Last Rate: 75 mL/hr at 08/15/22 2355, 75 mL/hr at 08/15/22 2355    metoprolol tartrate (LOPRESSOR) tablet 25 mg, 25 mg, Oral, Q12H [facility], Rober Ashley DO, 25 mg at 08/15/22 2106    ondansetron (ZOFRAN) injection 4 mg, 4 mg, Intravenous, Q6H PRN, Dionicio Charles, DO, 4 mg at 08/15/22 2024    oxyCODONE (ROXICODONE) IR tablet 2 5 mg, 2 5 mg, Oral, Q6H PRN, 2 5 mg at 08/15/22 1651 **OR** oxyCODONE (ROXICODONE) IR tablet 5 mg, 5 mg, Oral, Q6H PRN, Dionicio Charles, DO, 5 mg at 08/16/22 0254    Blood Culture:   Lab Results   Component Value Date    BLOODCX Received in Microbiology Lab  Culture in Progress  08/15/2022    BLOODCX Received in Microbiology Lab  Culture in Progress  08/15/2022       Wound Culture:   No results found for: WOUNDCULT    Ins and Outs:  I/O last 24 hours: In: 29 1 [I V :29 1]  Out: 150 [Urine:150]          Physical:  Vitals:    08/16/22 0707   BP: 165/81   Pulse: 73   Resp:    Temp: 97 6 °F (36 4 °C)   SpO2: 96%     Musculoskeletal: right Lower Extremity  · Splint in place  · TTP ankle  · Unable to comply with detailed sensory exam due to mental status  · Motor intact EHL/FHL  · 2 sec cap refill    Assessment:    82 y  o female with R bimalleolar ankle fracture dislocation  Plan for OR today for ORIF        Plan:  · NWB RLE  · Will monitor for ABLA  · PT/OT  · Pain control  · DVT PPX: hold this AM in preparation for surgery  · Dispo: Ortho will follow    Marylen Clinton, MD

## 2022-08-16 NOTE — ASSESSMENT & PLAN NOTE
· Ortho consult for bimalleolar fracture for ORIF today  · Due to event in ER where pt had CP and diaphoresis during splinting, asked for cardio to clear for OR  · Pain control ordered

## 2022-08-16 NOTE — ASSESSMENT & PLAN NOTE
Wt Readings from Last 3 Encounters:   08/15/22 95 3 kg (210 lb)   08/01/22 95 3 kg (210 lb)   02/14/22 95 kg (209 lb 7 oz)   · Hold torsemide for OR  · Repeat echo ordered due to episode of CP

## 2022-08-16 NOTE — PLAN OF CARE
Problem: PHYSICAL THERAPY ADULT  Goal: Performs mobility at highest level of function for planned discharge setting  See evaluation for individualized goals  Description: Treatment/Interventions: LE strengthening/ROM, Functional transfer training, Therapeutic exercise, Endurance training, Cognitive reorientation, Patient/family training, Equipment eval/education, Bed mobility          See flowsheet documentation for full assessment, interventions and recommendations  Note: Prognosis: Guarded  Problem List: Decreased strength, Decreased endurance, Impaired balance, Decreased mobility, Decreased safety awareness, Pain, Orthopedic restrictions, Decreased cognition  Assessment: Pt is a 82 y o  female seen for PT evaluation s/p admit to [facility] on 8/15/[facility]22  Pt was admitted with a primary dx of: Closed R ankle fx  Pt evaluated by orthopedics, NWB R LE, plans for OR for surgical fixation  PT now consulted for assessment of mobility and d/c needs  Pts current comorbidities and personal factors effecting treatment include: Dementia, Afib, DM, CAD, CHF, MI  Pts current clinical presentation is Unstable/Unpredictable (high complexity) due to Ongoing medical management for primary dx, Decreased activity tolerance compared to baseline, Fall risk, Increased assistance needed from caregiver at current time, Ongoing telemetry monitoring, Cog status, Current WBS, Trending lab values  Prior to admission, pt was independent with use of RW  Upon evaluation, pt currently is requiring ModA x2 for bed mobility; MaxA x2-3 for transfers  Pt presents at PT eval functioning below baseline and currently w/ overall mobility deficits 2* to: BLE weakness, impaired balance, pain, decreased activity tolerance compared to baseline, decreased functional mobility tolerance compared to baseline, decreased safety awareness, fall risk, orthopedic restrictions, decreased cognition   Pt currently at a fall risk 2* to impairments listed above   Pt will continue to benefit from skilled acute PT interventions to address stated impairments; to maximize functional mobility; for ongoing pt/ family training; and DME needs  At conclusion of PT session pt returned BTB and bed alarm engaged with phone and call bell within reach  Pt denies any further questions at this time  Recommend IP rehab upon hospital D/C  PT Discharge Recommendation: Post acute rehabilitation services    See flowsheet documentation for full assessment

## 2022-08-16 NOTE — PLAN OF CARE
Problem: OCCUPATIONAL THERAPY ADULT  Goal: Performs self-care activities at highest level of function for planned discharge setting  See evaluation for individualized goals  Description: Treatment Interventions: ADL retraining, Functional transfer training, Endurance training, Cognitive reorientation, Patient/family training, Equipment evaluation/education, Activityengagement, Energy conservation          See flowsheet documentation for full assessment, interventions and recommendations  Note: Limitation: Decreased ADL status, Decreased Safe judgement during ADL, Decreased cognition, Decreased endurance, Decreased self-care trans, Decreased high-level ADLs  Prognosis: Fair  Assessment: Pt is an 83 yo F s/p fall w/ R ankle deformity, and AMS that has persisted for a couple days  Pt sustained "right ankle fracture dislocation with associated proximal fibular fracture that is non-displaced"  Pt is planned to undergo ORIF or RLE, anticipation of WBS to remain the same post-op  Pt is NWB RLE in AO splint  Pt's problem list includes: baseline dementia, arthritis, asthma, CAD, cardiac disease, CHF, depressing, DM, heart attack, hyperlipidemia, HTN, MI, neuropathy, BRANT, hx transient cerebral ischemia  Pt is a poor historian and was unable to provide information regarding PLOF and MONAHAN  Son was at bedside during eval and provided information  Pt requires assistance w/ ADLs/IADLs, living in a [facility] w/ son and family  Currently, pt is Min A-Mod A w/ UB ADLs and Max A w/ LB ADLs  Pt completed sit <>stand transfers at Max A x 2  Pt completed lateral side scoots to [facility] w/ Max A x 2 +assist of 3rd for RLE management to maintain WBS  Pt demonstrated poor WBS t/o session  Pt's limitations include: pain, fatigue, WBS, baseline dementia, decreased endurance, decreased activity tolerance, decreased balance, decreased ability to complete ADLs, impaired safety awareness, impaired judgement, impaired insight   Pt was educated on WBS, and increased participation w/ nursing to complete self care tasks  The patient's raw score on the AM-PAC Daily Activity inpatient short form is 14, standardized score is 33 39, less than 39 4  Patients at this level are likely to benefit from discharge to post-acute rehabilitation services  Please refer to the recommendation of the Occupational Therapist for safe discharge planning  OT recommends pt receives additional OT services at [facility] rehab  OT will continue to address the following goals listed below       OT Discharge Recommendation: Post acute rehabilitation services

## 2022-08-16 NOTE — ANESTHESIA PROCEDURE NOTES
Peripheral Block    Patient location during procedure: pre-op  Start time: 8/16/2022 3:44 PM  Reason for block: at surgeon's request and post-op pain management  Staffing  Performed: Anesthesiologist   Anesthesiologist: Simin Pleitez MD  Preanesthetic Checklist  Completed: patient identified, IV checked, site marked, risks and benefits discussed, surgical consent, monitors and equipment checked, pre-op evaluation and timeout performed  Peripheral Block  Patient position: supine  Prep: ChloraPrep  Patient monitoring: heart rate, frequent blood pressure checks and continuous pulse ox  Block type: femoral  Laterality: right  Injection technique: single-shot  Procedures: ultrasound guided, Ultrasound guidance required for the procedure to increase accuracy and safety of medication placement and decrease risk of complications    Ultrasound permanent image savedropivacaine (NAROPIN) 0 5 % - Perineural   20 mL - 8/16/2022 3:44:00 PM  fentaNYL 50 mcg/mL - Intravenous   25 mcg - 8/16/2022 3:38:00 PM  Needle  Needle type: Stimuplex   Needle gauge: 22 G  Needle length: 10 cm  Needle localization: ultrasound guidance  Assessment  Injection assessment: incremental injection, local visualized surrounding nerve on ultrasound, negative aspiration for heme and no paresthesia on injection  Paresthesia pain: none  Heart rate change: no  Slow fractionated injection: yes  Post-procedure:  site cleaned  patient tolerated the procedure well with no immediate complications

## 2022-08-16 NOTE — UTILIZATION REVIEW
Initial Clinical Review    Admission: Date/Time/Statement:   Admission Orders (From admission, onward)     Ordered        08/15/22 1330  INPATIENT ADMISSION  Once                      Orders Placed This Encounter   Procedures    INPATIENT ADMISSION     Standing Status:   Standing     Number of Occurrences:   1     Order Specific Question:   Level of Care     Answer:   Med Surg [16]     Order Specific Question:   Estimated length of stay     Answer:   More than 2 Midnights     Order Specific Question:   Certification     Answer:   I certify that inpatient services are medically necessary for this patient for a duration of greater than two midnights  See H&P and MD Progress Notes for additional information about the patient's course of treatment  ED Arrival Information     Expected   -    Arrival   8/15/2022 07:51    Acuity   Urgent            Means of arrival   Ambulance    Escorted by   [facility] ([address])    Service   Hospitalist    Admission type   Urgent            Arrival complaint   Ankle Injury            Chief Complaint   Patient presents with    Fall     Pt fell this morning and c/o R leg pain, - headstrike, family reports foul smelling urine and AMS from baseline for past two days        Initial Presentation: 82 y o  female with PMH of dementia, CAD, AFib presents to [place] ED via EMS with c/o right lower extremity pain after a fall, weakness in LLE and tried to get out of bed but fell  Imagaing revealed right ankle fx  In ED,  while splinting ankle, pt had episode of CP, nausea, diaphoresis where HR dropped to 30  Trops ordered, cardio consulted  Admit Inpatient med surg d/t closed right ankle fx and chest pain:  Ortho and cardiology consults, pain control, serial trops, repeat echo  Pt also has foul smelling urine, order UA to ro UTI  Endocrinology consult for DM management, pt has insulin pump which will be dc'd and insulin drip started    Hold home eliquis for OR     8/15 Ortho Consult:  Closed right ankle fracture dislocation:  Clearance to OR for ORIF, NWB RLE in AO splint  8/15 Cardiology Consult: With normal cardiac function and absence of any severe stenotic valvular lesions, she is at elevated, but acceptable cardiac risk for planned orthopedic procedure  Okay to proceed with planned surgery as moderate to high cardiac risk for moderate risk surgery  She does have a pacemaker in place and as is will get a device interrogation to clarify any heart rhythm issues      Date: 8/16   Day 2:   Pt agitated overnight, pain controlled  Splint in place, TTP ankle  Continue NWB RLE, monitor for ABLA, PT/OT eval, continue pain control, hold dvt ppx in prep for sx, ortho following        ED Triage Vitals   Temperature Pulse Respirations Blood Pressure SpO2   08/15/22 0801 08/15/22 0801 08/15/22 0801 08/15/22 0801 08/15/22 0801   98 2 °F (36 8 °C) 69 20 (!) 176/64 95 %      Temp Source Heart Rate Source Patient Position - Orthostatic VS BP Location FiO2 (%)   08/15/22 0801 08/15/22 0801 08/15/22 0801 08/15/22 0801 --   Oral Monitor Lying Right arm       Pain Score       08/15/22 0946       10 - Worst Possible Pain          Wt Readings from Last 1 Encounters:   08/15/22 95 3 kg (210 lb)     Additional Vital Signs:   Date/Time Temp Pulse Resp BP MAP (mmHg) SpO2 Calculated FIO2 (%) - Nasal Cannula Nasal Cannula O2 Flow Rate (L/min) O2 Device   08/16/22 07:07:03 97 6 °F (36 4 °C) 73 -- 165/81 109 96 % -- -- --   08/16/22 03:21:32 97 9 °F (36 6 °C) 67 16 170/79 109 97 % -- -- --   08/15/22 23:08:49 98 1 °F (36 7 °C) 61 20 135/66 89 97 % -- -- --   08/15/22 2037 -- -- -- -- -- 97 % 28 2 L/min  Nasal cannula   08/15/22 19:34:38 97 3 °F (36 3 °C) Abnormal  69 -- 132/56 81 98 % -- -- --   08/15/22 15:47:39 -- 70 -- 127/56 80 95 % -- -- --   08/15/22 1333 -- 63 -- 178/74 Abnormal  -- 97 % -- -- None (Room air)   08/15/22 1300 -- 64 16 143/63 -- 95 % -- -- None (Room air)   08/15/22 1000 -- 62 16 124/60 87 94 % -- -- None (Room air)   08/15/22 0801 98 2 °F (36 8 °C) 69 20 176/64 Abnormal  -- 95 % -- -- None (Room air)       Pertinent Labs/Diagnostic Test Results:   8/15 EKG:  Sinus rhythm with 1st degree A-V block  Right bundle branch block  Poor anterior R wave progression is noted  Abnormal ECG    8/15 ECHO:      Left Ventricle: Left ventricular cavity size is normal  There is severe concentric hypertrophy  The left ventricular ejection fraction is 70%  Systolic function is vigorous  Wall motion is normal  Diastolic function is moderately abnormal, consistent with grade II (pseudonormal) relaxation    Right Ventricle: Right ventricular cavity size is normal  Systolic function is normal  A pacer wire is present    Left Atrium: The atrium is mildly dilated    Right Atrium: The atrium is mildly dilated    Aortic Valve: There is trace regurgitation  There is moderate to severe stenosis    Tricuspid Valve: There is mild regurgitation  XR ankle 3+ vw right   Final Result by Mello Beckford MD (08/15 1433)      Anatomic alignment of bimalleolar fractures post splint placement  XR ankle 3+ vw right   Final Result by Stefanie Long MD (08/15 1323)      Significantly improved alignment of the ankle fracture dislocation with persistent mild widening of the medial tibiotalar joint  Improved alignment of the mildly displaced medial malleolar fracture  No posterior malleolar fracture  Workstation performed: [id number]         XR ankle 3+ views RIGHT   Final Result by Stefanie Long MD (08/15 1258)      Improved alignment of the tibiotalar joint but with persistent ankle dislocation, related to fracture dislocation as described  CT head without contrast   Final Result by Rhonda Hernandez MD (08/15 1028)      No acute intracranial process  No skull fracture  Chronic microangiopathy     XR chest portable   Final Result by Michel Austin DO (08/15 5442)      No acute cardiopulmonary disease  XR tibia fibula 2 views RIGHT   Final Result by Chay Pool DO (08/15 0920)      Fracture dislocation right ankle, incompletely evaluated  Acute, nondisplaced fracture proximal fibula  Dedicated imaging of the right ankle recommended           Results from last 7 days   Lab Units 08/16/22  0817 08/15/22  0819   WBC Thousand/uL 12 64* 9 75   HEMOGLOBIN g/dL 13 1 14 0   HEMATOCRIT % 41 5 43 4   PLATELETS Thousands/uL 187 207   NEUTROS ABS Thousands/µL  --  7 48         Results from last 7 days   Lab Units 08/16/22  0817 08/15/22  0819   SODIUM mmol/L 139 140   POTASSIUM mmol/L 4 4 4 2   CHLORIDE mmol/L 110* 110*   CO2 mmol/L 28 26   ANION GAP mmol/L 1* 4   BUN mg/dL 26* 26*   CREATININE mg/dL 1 03 1 07   EGFR ml/min/1 73sq m 50 47   CALCIUM mg/dL 9 6 9 8   MAGNESIUM mg/dL 2 0  --    PHOSPHORUS mg/dL 3 3  --      Results from last 7 days   Lab Units 08/15/22  0819   AST U/L 18   ALT U/L 21   ALK PHOS U/L 88   TOTAL PROTEIN g/dL 6 6   ALBUMIN g/dL 3 4*   TOTAL BILIRUBIN mg/dL 0 71     Results from last 7 days   Lab Units 08/16/22  0820 08/16/22  0715 08/16/22  0509 08/16/22  0308 08/16/22  0107 08/15/22  2304 08/15/22  2101 08/15/22  1858 08/15/22  1657 08/15/22  1327 08/15/22  0756   POC GLUCOSE mg/dl 99 91 125 127 122 131 191* 210* 213* 203* 267*     Results from last 7 days   Lab Units 08/16/22  0817 08/15/22  0819   GLUCOSE RANDOM mg/dL 104 280*     Results from last 7 days   Lab Units 08/15/22  1750 08/15/22  1444 08/15/22  1029 08/15/22  0819   HS TNI 0HR ng/L  --  9  --  9   HS TNI 2HR ng/L 9  --  10  --    HSTNI D2 ng/L 0  --  1  --      Results from last 7 days   Lab Units 08/16/22  0817 08/15/22  0819   PROTIME seconds 14 4 14 4   INR  1 10 1 09   PTT seconds 33 32     Results from last 7 days   Lab Units 08/15/22  0819   TSH 3RD GENERATON uIU/mL 5 410*     Results from last 7 days   Lab Units 08/15/22  0819   PROCALCITONIN ng/ml 0 05     Results from last 7 days   Lab Units 08/15/22  0819   LACTIC ACID mmol/L 1 4     Results from last 7 days   Lab Units 08/15/22  0819   NT-PRO BNP pg/mL 623*     Results from last 7 days   Lab Units 08/15/22  1237   CLARITY UA  Turbid   COLOR UA  Yellow   SPEC GRAV UA  1 022   PH UA  5 5   GLUCOSE UA mg/dl 300 (3/10%)*   KETONES UA mg/dl Negative   BLOOD UA  Negative   PROTEIN UA mg/dl 100 (2+)*   NITRITE UA  Negative   BILIRUBIN UA  Negative   UROBILINOGEN UA (BE) mg/dl <2 0   LEUKOCYTES UA  Negative   WBC UA /hpf None Seen   RBC UA /hpf None Seen   BACTERIA UA /hpf Occasional   EPITHELIAL CELLS WET PREP /hpf Occasional   MUCUS THREADS  Moderate*     Results from last 7 days   Lab Units 08/15/22  0819   BLOOD CULTURE  Received in Microbiology Lab  Culture in Progress  Received in Microbiology Lab  Culture in Progress       ED Treatment:   Medication Administration from 08/15/2022 0751 to 08/15/2022 1538       Date/Time Order Dose Route Action     08/15/2022 0759 fentanyl citrate (PF) (FOR EMS ONLY) 100 mcg/2 mL injection 100 mcg 0 mcg Does not apply Given to EMS     08/15/2022 0947 lidocaine (PF) (XYLOCAINE-MPF) 1 % injection 10 mL 10 mL Infiltration Given by Other     08/15/2022 0946 fentanyl citrate (PF) 100 MCG/2ML 50 mcg 50 mcg Intravenous Given     08/15/2022 1226 lidocaine (PF) (XYLOCAINE-MPF) 1 % injection 20 mL 20 mL Infiltration Given by Other     08/15/2022 1331 ondansetron (ZOFRAN) injection 4 mg 4 mg Intravenous Given        Past Medical History:   Diagnosis Date    Arthritis     Asthma     CAD (coronary artery disease) of artery bypass graft     Cardiac disease     [facility]est[facility] [address] [facility]     Dementia [facility]     Depression     Diabetes mellitus [facility] [address] [facility]     Heart attack ([facility] [address] [facility]     Hyperlipidemia     Hypertension     MI (myocardial infarction) ([facility] [address] [facility]     Neuropathy     BRANT (obstructive sleep apnea)     Peptic ulcer     was + for H pylori    Transient cerebral ischemia 11/2011    with neg CUS and ECHO Present on Admission:   Chronic diastolic congestive heart failure (HCC)   [facility]oxysmal atrial fibrillation) ([facility]   Dementia without behavioral disturbance ([facility])      Admitting Diagnosis: Ankle injury [S99 919A]  Altered mental status [R41 82]  Chest pain [R07 9]  Maisonneuve fracture of fibula, right, closed, initial encounter [S82 861A]  Type 2 diabetes mellitus with hyperglycemia, with long-term current use of insulin (HCC) [E11 65, Z79 4]  Closed fracture dislocation of right ankle, initial encounter [S82 891A]  Age/Sex: 82 y o  female  Admission Orders:  Scheduled Medications:  acetaminophen, 975 mg, Oral, Q8H [facility]  atorvastatin, 40 mg, Oral, Daily With Dinner  calcium carbonate-vitamin D, 1 tablet, Oral, BID With Meals  metoprolol tartrate, 25 mg, Oral, Q12H [facility]      Continuous IV Infusions:  insulin regular (HumuLIN R,NovoLIN R) infusion, 0 3-21 Units/hr, Intravenous, Titrated  lactated ringers, 75 mL/hr, Intravenous, Continuous      PRN Meds:  HYDROmorphone, 0 2 mg, Intravenous, Q3H PRN x1 thus far  ondansetron, 4 mg, Intravenous, Q6H PRN x1 thus far  oxyCODONE, 2 5 mg, Oral, Q6H PRN x1 thus far   Or  oxyCODONE, 5 mg, Oral, Q6H PRN x1 thus far    scd    IP CONSULT TO ORTHOPEDIC SURGERY  IP CONSULT TO CARDIOLOGY  IP CONSULT TO ENDOCRINOLOGY    Network Utilization Review Department  ATTENTION: Please call with any questions or concerns to [phone] and carefully listen to the prompts so that you are directed to the right person  All voicemails are confidential   Harlene Pair all requests for admission clinical reviews, approved or denied determinations and any other requests to dedicated fax number below belonging to the campus where the patient is receiving treatment   List of dedicated fax numbers for the Facilities:  [address] DENIALS (Administrative/Medical Necessity) [phone]   [address] (Maternity/NICU/Pediatrics) [phone] 5000 [facility] [phone]   [address] [phone]   [address] [address] [place] [address] [address] [phone]   [address] [phone]

## 2022-08-16 NOTE — PHYSICAL THERAPY NOTE
Physical Therapy Evaluation    Patient's Name: Aracely Menchaca    Admitting Diagnosis  Ankle injury [S99 989A]  Altered mental status [R41 82]  Chest pain [R07 9]  Maisonneuve fracture of fibula, right, closed, initial encounter [S82 861A]  Type 2 diabetes mellitus with hyperglycemia, with long-term current use of insulin ([facility] ) [E11 65, Z79 4]  Closed fracture dislocation of right ankle, initial encounter [S82 891A]    Problem List  Patient Active Problem List   Diagnosis    3-vessel coronary artery disease    Dementia without behavioral disturbance ([facility])    Depression    Diabetic retinopathy ([facility] )    DM (diabetes mellitus), type 2, uncontrolled w/ophthalmic complication    Gastroesophageal reflux disease with hiatal hernia    Glaucoma    Hyperlipidemia    Essential hypertension    Cerebral artery occlusion with cerebral infarction ([facility] )    Obesity (BMI 30-39  9)    Obstructive sleep apnea    Osteoarthritis of knee    Urine incontinence    Ventricular tachycardia ([facility])    Chronic diastolic congestive heart failure ([facility] )    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 ([facility])    Abnormal chest x-ray    Urine retention    Congestive heart failure with LV diastolic dysfunction, NYHA class 2 ([facility])    Dyspnea on minimal exertion    PAF (paroxysmal atrial fibrillation) ([facility])    SSS (sick sinus syndrome) ([facility])    Mild intermittent asthma without complication    Osteopenia    Obesity, morbid ([facility])    Type 2 diabetes mellitus with hyperglycemia, with long-term current use of insulin ([facility])    Closed right ankle fracture    Chest pain    Foul smelling urine       Past Medical History  Past Medical History:   Diagnosis Date    Arthritis     Asthma     CAD (coronary artery disease) of artery bypass graft     Cardiac disease     CHF (congestive heart failure) ([facility])     Dementia ([facility] )     Depression     Diabetes mellitus ([facility] )     Heart attack ([facility] )     Hyperlipidemia     Hypertension     MI (myocardial infarction) ([address] )     Neuropathy     BRANT (obstructive sleep apnea)     Peptic ulcer     was + for H pylori    Transient cerebral ischemia 2011    with neg CUS and ECHO       Past Surgical History  Past Surgical History:   Procedure Laterality Date    APPENDECTOMY      CATARACT EXTRACTION       SECTION      COLONOSCOPY  2004    CORONARY ANGIOPLASTY  2006    CORONARY ANGIOPLASTY WITH STENT PLACEMENT      CORONARY ARTERY BYPASS GRAFT      DENTAL SURGERY      EGD AND COLONOSCOPY      ELBOW SURGERY      resolved     ESOPHAGOGASTRODUODENOSCOPY  22 [id number]   PT Last Visit   PT Visit Date 22   Note Type   Note type Evaluation   Pain Assessment   Pain Assessment Tool 0-10   Pain Score 6   Pain Location/Orientation Orientation: Right;Location: Leg   Hospital Pain Intervention(s) Repositioned; Ambulation/increased activity; Elevated   Restrictions/Precautions   Weight Bearing Precautions Per Order Yes   RLE Weight Bearing Per Order NWB   Braces or Orthoses Other (Comment)  (splint)   Other Precautions Cognitive; Bed Alarm; Chair Alarm;WBS;Fall Risk;Pain;Multiple lines   Home Living   Type of [address] [place] [address] One level;Ramped entrance   [address] [facility]; Wheelchair-manual   Prior Function   Level of [place] Needs assistance with ADLs and functional mobility   Lives With Family; Son   Reginaldo 87 in the last 6 months 1 to 4   Comments Son at bedside, assisted with history  Son reports pt resides with his brother and pt's grandkids  Pt is ambulatory with RW, however unaware of her mobility impairments given cognitive changes and has had frequent falls  General   Family/Caregiver Present Yes   Cognition   Overall Cognitive Status Impaired   Arousal/Participation Alert   Orientation Level Oriented to person;Oriented to place; Disoriented to time;Disoriented to situation  (pt grossly oriented to place, able to state "today is the day of surgery" however unable to recall events leading to hospital admission)   Following Commands Follows one step commands with increased time or repetition   Comments Pt requires increased time for processing, poor insight to impairments   RLE Assessment   RLE Assessment X   Strength RLE   RLE Overall Strength 3/5  (ankle splinted - not tested)   LLE Assessment   LLE Assessment X   Strength LLE   LLE Overall Strength 4-/5   Bed Mobility   Supine to Sit 3  Moderate assistance   Additional items Assist x 2; Increased time required;Verbal cues   Sit to Supine 3  Moderate assistance   Additional items Assist x 2; Increased time required;Verbal cues   Additional Comments VC's for sequencing of bed mobility, initially required Roberto for sitting balance, improved to brief periods of CGA   Transfers   Sit to Stand 2  Maximal assistance   Additional items Assist x 2; Increased time required;Verbal cues   Stand to Sit 2  Maximal assistance   Additional items Assist x 2; Increased time required;Verbal cues   Sit pivot 2  Maximal assistance   Additional items Assist x 3; Increased time required;Verbal cues   Additional Comments Initial STS with pt's foot on therapists foot to assist in NWB, however pt with difficulty maintaining  Performed lateral scoot toward [facility] with HHA x2 and assist of 3rd person for R LE to maintain NWB status   Balance   Static Sitting Poor +   Dynamic Sitting Poor   Static Standing Poor -   Dynamic Standing Poor -   Ambulatory Zero   Activity Tolerance   Activity Tolerance Patient limited by pain; Patient limited by fatigue   Medical Staff Made Aware Co-evaluation with OT given medical complexity and limited activity tolerance   Nurse Made Aware RN updated  Bed alarm engaged   Assessment   Prognosis Guarded   Problem List Decreased strength;Decreased endurance; Impaired balance;Decreased mobility; Decreased safety awareness;Pain;Orthopedic restrictions;Decreased cognition   Assessment Pt is a 82 y o  female seen for PT evaluation s/p admit to [facility] on 8/15/2022  Pt was admitted with a primary dx of: Closed R ankle fx  Pt evaluated by orthopedics, NWB R LE, plans for OR for surgical fixation  PT now consulted for assessment of mobility and d/c needs  Pts current comorbidities and personal factors effecting treatment include: Dementia, Afib, DM, CAD, CHF, MI  Pts current clinical presentation is Unstable/Unpredictable (high complexity) due to Ongoing medical management for primary dx, Decreased activity tolerance compared to baseline, Fall risk, Increased assistance needed from caregiver at current time, Ongoing telemetry monitoring, Cog status, Current WBS, Trending lab values  Prior to admission, pt was independent with use of RW  Upon evaluation, pt currently is requiring ModA x2 for bed mobility; MaxA x2-3 for transfers  Pt presents at PT eval functioning below baseline and currently w/ overall mobility deficits 2* to: BLE weakness, impaired balance, pain, decreased activity tolerance compared to baseline, decreased functional mobility tolerance compared to baseline, decreased safety awareness, fall risk, orthopedic restrictions, decreased cognition  Pt currently at a fall risk 2* to impairments listed above  Pt will continue to benefit from skilled acute PT interventions to address stated impairments; to maximize functional mobility; for ongoing pt/ family training; and DME needs  At conclusion of PT session pt returned BTB and bed alarm engaged with phone and call bell within reach  Pt denies any further questions at this time  Recommend IP rehab upon hospital D/C  Goals   Patient Goals to rest   STG Expiration Date 08/30/22   Short Term Goal #1 In 14 days pt will be able to: 1  Demonstrate ability to perform all aspects of bed mobility with Roberto to improve functional safety  2  Perform functional transfers with Roberto to facilitate safe return to previous living environment    3   Pt will be able to maintain sitting balance at EOB > 20 minutes with supervision to improve trunk strength in preparation for W/C transfers  4  Improve LE strength grades by 1 to increase ease of functional mobility with transfers and gait  5  Pt will demonstrate improved balance by one grade in order to decrease risk of falls  PT Treatment Day 0   Plan   Treatment/Interventions LE strengthening/ROM; Functional transfer training; Therapeutic exercise; Endurance training;Cognitive reorientation;Patient/family training;Equipment eval/education; Bed mobility   PT Frequency 3-5x/wk   Recommendation   PT Discharge Recommendation Post acute rehabilitation services   AM-PAC Basic Mobility Inpatient   Turning in Bed Without Bedrails 2   Lying on Back to Sitting on Edge of Flat Bed 1   Moving Bed to Chair 1   Standing Up From Chair 1   Walk in Room 1   Climb 3-5 Stairs 1   Basic Mobility Inpatient Raw Score 7   Turning Head Towards Sound 4   Follow Simple Instructions 3   Low Function Basic Mobility Raw Score 14   Low Function Basic Mobility Standardized Score 22 01   Highest Level Of Mobility   JH-HLM Goal 2: Bed activities/Dependent transfer   JH-HLM Achieved 3: Sit at edge of bed       Elizabeth Colon, PT, DPT, GCS

## 2022-08-16 NOTE — ANESTHESIA PROCEDURE NOTES
Peripheral Block    Patient location during procedure: pre-op  Start time: 8/16/2022 3:45 PM  Reason for block: at surgeon's request and post-op pain management  Staffing  Performed: Anesthesiologist   Anesthesiologist: Niall Wilkins MD  Preanesthetic Checklist  Completed: patient identified, IV checked, site marked, risks and benefits discussed, surgical consent, monitors and equipment checked, pre-op evaluation and timeout performed  Peripheral Block  Patient position: supine  Prep: ChloraPrep  Patient monitoring: heart rate, continuous pulse ox and frequent blood pressure checks  Block type: popliteal  Laterality: right  Injection technique: single-shot  Procedures: ultrasound guided, Ultrasound guidance required for the procedure to increase accuracy and safety of medication placement and decrease risk of complications    Ultrasound permanent image savedropivacaine (NAROPIN) 0 5 % - Perineural   20 mL - 8/16/2022 3:46:00 PM  Needle  Needle type: Stimuplex   Needle gauge: 22 G  Needle length: 10 cm  Needle localization: ultrasound guidance  Assessment  Injection assessment: incremental injection, local visualized surrounding nerve on ultrasound, negative aspiration for heme and no paresthesia on injection  Paresthesia pain: none  Heart rate change: no  Slow fractionated injection: yes  Post-procedure:  site cleaned  patient tolerated the procedure well with no immediate complications

## 2022-08-16 NOTE — DISCHARGE INSTRUCTIONS
Discharge Instructions - [address] 82 y o  female MRN: [id number]  Unit/Bed#: OR POOL    Weight Bearing Status:                                           Remain non-weight bearing to the right lower extremity in a splint     DVT prophylaxis  Continue usual dosing of Eliquis    Pain:  Continue analgesics as directed    Dressing Instructions:   Please keep clean, dry and intact until follow up     Appt Instructions: If you do not have your appointment, please call the clinic at [phone]  Follow-up should be in 2 weeks with Dr Celestino Khan  Otherwise followup as scheduled     Contact the office sooner if you experience any increased numbness/tingling in the extremities        Miscellaneous:  None

## 2022-08-16 NOTE — CONSULTS
Consultation - Oni Philippe 82 y o  female MRN: [id number]    [facility] Encounter: [id number]    Impression:   Diabetes type 2 on home insulin, with history of complications (, CAD, neuropathy)  History of dementia  History of heart failure  History of atrial fibrillation  Right ankle fracture    Assessment: This is a 82y o -year-old female with past medical history of diabetes type 2  On home insulin with complications  History of dementia, heart failure,  hypertension, atrial fibrillation  Who was admitted to the hospital after mechanical fall leading to right ankle fracture  Endocrinology consulted for diabetes type 2    Plan:   A1c 7 3  Patient uses VGO 20 at home, 6 units with breakfast and 4 units with lunch and dinner  During admission patient was started on insulin drip to stabilize hyperglycemia for planned surgery   Plan for surgery today, ORIF  Recommend to continue with the insulin infusion during surgery and discontinue the infusion tonight and give Lantus 20 units at bedtime, and start with lispro t i d  With meals, 6 units with breakfast, 4 units with lunch, 4 units with dinner + correctional scale  At discharge patient can continue with current regimen, and follow up with Endocrinology outpt  CC: Diabetes Consult      HPI: Oni Philippe is a 82y o  year old female with type 2 diabetes long-term insulin use  With history of complications such as CAD, Neuropathy,  History of dementia, heart failure, hypertension, atrial fibrillation  Who was admitted to the hospital after mechanical fall fracture leading to right ankle fracture  Unable to obtain information from the patient due to her history of dementia  As per documentation patient follows up with Endocrinology outpatient and patient uses a VGO discussed with family member who denied any VGO  malfunction, also patient has a Dexcom at home  VGO was removed at admission for planned procedure           Review of Systems   Constitutional: Negative for activity change and appetite change  HENT: Negative for congestion and facial swelling  Eyes: Negative for photophobia and discharge  Respiratory: Negative for apnea, shortness of breath and wheezing  Cardiovascular: Negative for chest pain and palpitations  Gastrointestinal: Negative for abdominal distention and abdominal pain  Endocrine: Negative for cold intolerance, heat intolerance and polydipsia  Musculoskeletal: Negative for arthralgias and back pain  Skin: Negative for color change and wound  Neurological: Negative for tremors, weakness and headaches  Psychiatric/Behavioral: Negative for agitation, behavioral problems and confusion         Historical Information   Past Medical History:   Diagnosis Date    Arthritis     Asthma     CAD (coronary artery disease) of artery bypass graft     Cardiac disease     [facility]est[facility]) [facility]     Dementia ([facility])     Depression     [facility]     Heart attack ([facility]     Hyperlipidemia     Hypertension     MI (myocardial infarction) [facility]     Neuropathy     BRANT (obstructive sleep apnea)     Peptic ulcer     was + for H pylori    Transient cerebral ischemia 2011    with neg CUS and ECHO     Past Surgical History:   Procedure Laterality Date    APPENDECTOMY      CATARACT EXTRACTION       SECTION      COLONOSCOPY  2004    CORONARY ANGIOPLASTY  2006    CORONARY ANGIOPLASTY WITH STENT PLACEMENT      CORONARY ARTERY BYPASS GRAFT      DENTAL SURGERY      EGD AND COLONOSCOPY      ELBOW SURGERY      resolved     ESOPHAGOGASTRODUODENOSCOPY       Social History   Social History     Substance and Sexual Activity   Alcohol Use Not Currently     Social History     Substance and Sexual Activity   Drug Use Never     Social History     Tobacco Use   Smoking Status Never Smoker   Smokeless Tobacco Never Used     Family History:   Family History Problem Relation Age of Onset    Diabetes Mother     Cancer Sister     Heart disease Sister     Thyroid disease Sister     Cancer Brother     Cancer Father     Colon cancer Family     Diabetes Family     Mitral valve prolapse Family     Thyroid cancer Family        Meds/Allergies   Current Facility-Administered Medications   Medication Dose Route Frequency Provider Last Rate Last Admin    acetaminophen (TYLENOL) tablet 975 mg  975 mg Oral [facility] Daniella Vilchis DO   975 mg at 08/15/22 2106    atorvastatin (LIPITOR) tablet 40 mg  40 mg Oral Daily With [facility], DO        calcium carbonate-vitamin D (OSCAL-D) 500 mg-200 units per tablet 1 tablet  1 tablet Oral BID With Meals Daniella Vilchis DO        COVID-19 Pfizer vac (jake sucrose, gray cap form) 0 3 mL  0 3 mL Intramuscular Once Qi Leung MD        HYDROmorphone HCl (DILAUDID) injection 0 2 mg  0 2 mg Intravenous Q3H PRN Daniella Vilchis DO   0 2 mg at 08/16/22 0935    insulin regular (HumuLIN R,NovoLIN R) 1 Units/mL in sodium chloride 0 9 % 100 mL infusion  0 3-21 Units/hr Intravenous Titrated Daniella Vilchis DO   Stopped at 08/16/22 3848    lactated ringers infusion  75 mL/hr Intravenous Continuous Kaitlynn Brizuela MD 75 mL/hr at 08/15/22 2355 75 mL/hr at 08/15/22 2355    metoprolol tartrate (LOPRESSOR) tablet 25 mg  25 mg Oral Q12H [address] Daniella Vilchis DO   25 mg at 08/16/22 0935    ondansetron (ZOFRAN) injection 4 mg  4 mg Intravenous Q6H PRN Daniella Vilchis DO   4 mg at 08/15/22 2024    oxyCODONE (ROXICODONE) IR tablet 2 5 mg  2 5 mg Oral Q6H PRN Daniella Vilchis DO   2 5 mg at 08/15/22 1651    Or    oxyCODONE (ROXICODONE) IR tablet 5 mg  5 mg Oral Q6H PRN Daniella Vilchis DO   5 mg at 08/16/22 8795     Allergies   Allergen Reactions    Ace Inhibitors     Chlorpromazine     Latex     Lisinopril     Namenda [Memantine] Drowsiness    Penicillins Seizures    Propoxyphene     Stadol [Butorphanol]        Objective Vitals: Blood pressure 141/69, pulse 64, temperature 97 8 °F (36 6 °C), resp  rate 16, height 5' 5" (1 651 m), weight 95 3 kg (210 lb), SpO2 94 %  Intake/Output Summary (Last 24 hours) at 8/16/2022 1308  Last data filed at 8/16/2022 0501  Gross per 24 hour   Intake 29 06 ml   Output 150 ml   Net -120 94 ml     Invasive Devices  Report    Peripheral Intravenous Line  Duration           Peripheral IV 08/15/22 Right Antecubital 1 day    Peripheral IV 08/15/22 Distal;Dorsal (posterior); Right Forearm <1 day          Drain  Duration           External Urinary Catheter 1 day                Physical Exam  Constitutional:       Appearance: Normal appearance  HENT:      Nose: No congestion or rhinorrhea  Mouth/Throat:      Pharynx: No oropharyngeal exudate or posterior oropharyngeal erythema  Eyes:      Conjunctiva/sclera: Conjunctivae normal       Pupils: Pupils are equal, round, and reactive to light  Cardiovascular:      Rate and Rhythm: Normal rate and regular rhythm  Pulses: Normal pulses  Heart sounds: No murmur heard  No friction rub  Pulmonary:      Effort: No respiratory distress  Breath sounds: No stridor  No wheezing  Abdominal:      General: There is no distension  Palpations: Abdomen is soft  Tenderness: There is no abdominal tenderness  Musculoskeletal:         General: No swelling  Cervical back: No rigidity or tenderness  Skin:     Coloration: Skin is not jaundiced  Findings: No bruising  Neurological:      General: No focal deficit present  Mental Status: She is alert and oriented to person, place, and time  Motor: No weakness  Psychiatric:         Mood and Affect: Mood normal          Thought Content:  Thought content normal          Judgment: Judgment normal            Lab Results:       Lab Results   Component Value Date    WBC 12 64 (H) 08/16/2022    HGB 13 1 08/16/2022    HCT 41 5 08/16/2022    MCV 96 08/16/2022     08/16/2022     Lab Results   Component Value Date/Time    BUN 26 (H) 08/16/2022 08:17 AM    BUN 27 (H) 12/17/2015 05:07 PM     12/17/2015 05:07 PM    K 4 4 08/16/2022 08:17 AM    K 4 0 12/17/2015 05:07 PM     (H) 08/16/2022 08:17 AM     12/17/2015 05:07 PM    CO2 28 08/16/2022 08:17 AM    CO2 34 (H) 02/14/2022 01:56 AM    CREATININE 1 03 08/16/2022 08:17 AM    CREATININE 1 25 12/17/2015 05:07 PM    AST 18 08/15/2022 08:19 AM    AST 10 12/17/2015 05:07 PM    ALT 21 08/15/2022 08:19 AM    ALT 25 12/17/2015 05:07 PM    ALB 3 4 (L) 08/15/2022 08:19 AM    ALB 3 7 12/17/2015 05:07 PM     No results for input(s): CHOL, HDL, LDL, TRIG, VLDL in the last 72 hours  No results found for: Isidro Wiggins  POC Glucose   Date Value   08/16/2022 122 mg/dl   08/16/2022 99 mg/dl   08/16/2022 91 mg/dl   08/16/2022 125 mg/dl   08/16/2022 127 mg/dl   08/16/2022 122 mg/dl   08/15/2022 131 mg/dl   08/15/2022 191 mg/dl (H)   08/15/2022 210 mg/dl (H)   08/15/2022 213 mg/dl (H)   02/27/2015 264 mg/dL (H)   06/04/2014 280 mg/dL (H)   05/24/2014 308 mg/dL (H)   05/22/2014 278 mg/dL (H)       Portions of the record may have been created with voice recognition software

## 2022-08-16 NOTE — NURSING NOTE
Attempted to give pt first OR bath, pt extremely combative, hitting at staff, yelling at staff and uncooperative  Multiple attempts made to calm pt, unsuccessful  As much of OR bath done as possible  In meantime pt pulled IV site out and also caused skin tear to herself (on left arm) with thrashing she was doing in bed  Attempted to dress skin tear but pt not allowing at this time  Will attempt at later time

## 2022-08-16 NOTE — OCCUPATIONAL THERAPY NOTE
Occupational Therapy Evaluation     Patient Name: Danial Goddard  Today's Date: 2022  Problem List  Principal Problem:    Closed right ankle fracture  Active Problems:    Dementia without behavioral disturbance (HCC)    Chronic diastolic congestive heart failure (HCC)    PAF (paroxysmal atrial fibrillation) ([facility] )    Type 2 diabetes mellitus with hyperglycemia, with long-term current use of insulin (HCC)    Chest pain    Foul smelling urine    Past Medical History  Past Medical History:   Diagnosis Date    Arthritis     Asthma     CAD (coronary artery disease) of artery bypass graft     Cardiac disease     CHF (congestive heart failure) (HCC)     Dementia ([facility] )     Depression     Diabetes mellitus ([facility] )     Heart attack ([facility] )     Hyperlipidemia     Hypertension     MI (myocardial infarction) ([facility] )     Neuropathy     BRANT (obstructive sleep apnea)     Peptic ulcer     was + for H pylori    Transient cerebral ischemia 2011    with neg CUS and ECHO     Past Surgical History  Past Surgical History:   Procedure Laterality Date    APPENDECTOMY      CATARACT EXTRACTION       SECTION      COLONOSCOPY  2004    CORONARY ANGIOPLASTY  2006    CORONARY ANGIOPLASTY WITH STENT PLACEMENT      CORONARY ARTERY BYPASS GRAFT      DENTAL SURGERY      EGD AND COLONOSCOPY      ELBOW SURGERY      resolved     ESOPHAGOGASTRODUODENOSCOPY  22 0851   OT Last Visit   OT Visit Date 22   Note Type   Note type Evaluation   Restrictions/Precautions   Weight Bearing Precautions Per Order Yes   RLE Weight Bearing Per Order (S)  NWB   Braces or Orthoses Splint  (RLE)   Other Precautions Cognitive; Bed Alarm;WBS;Fall Risk;Pain;Multiple lines   Pain Assessment   Pain Assessment Tool FLACC   Patient's Stated Pain Goal No pain   Hospital Pain Intervention(s) Repositioned; Ambulation/increased activity   Pain Rating: FLACC (Rest) - Face 0   Pain Rating: FLACC (Rest) - Legs 0   Pain Rating: FLACC (Rest) - Activity 0   Pain Rating: FLACC (Rest) - Cry 0   Pain Rating: FLACC (Rest) - Consolability 0   Score: FLACC (Rest) 0   Pain Rating: FLACC (Activity) - Face 1   Pain Rating: FLACC (Activity) - Legs 1   Pain Rating: FLACC (Activity) - Activity 0   Pain Rating: FLACC (Activity) - Cry 0   Pain Rating: FLACC (Activity) - Consolability 1   Score: FLACC (Activity) 3   Home Living   Type of Home House   Home Layout One level;Ramped entrance   Bathroom Shower/Tub Walk-in shower   Bathroom Toilet Standard   Bathroom Equipment Grab bars in shower; [address]; Wheelchair-manual   Prior Function   Level of [place] Needs assistance with ADLs and functional mobility   Lives With Family; Son   Receives Help From Family   ADL Assistance Needs assistance   IADLs Needs assistance   Falls in the last 6 months 1 to 4   Vocational Retired   Comments Pt is a poor historian  Pt's son at bedside during eval to provide information regarding pt's PLOF due to pt's cog status  Son reports that pt uses walker for functional mobility, and has experienced frequent falls due to cog changes/deficits  Lifestyle   Autonomy Pt required assistance w/ ADLs/IADLs, including dressing/bathing, cooking, cleaning, and medication management  Reciprocal Relationships Pt lives with son and grandchildren who provide support   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  Intrinsic Gratification Pt enjoys coloring, word finds, and viewing photo albums     Psychosocial   Psychosocial (WDL) WDL   ADL   Eating Assistance 4  Minimal Assistance   Grooming Assistance 4  Minimal Assistance   UB Bathing Assistance 3  Moderate Assistance   LB Bathing Assistance 2  Maximal Assistance   UB Dressing Assistance 3  Moderate Assistance   LB Dressing Assistance 2  Maximal Assistance   Toileting Assistance  2  Maximal Assistance   Functional Assistance 2  Maximal Assistance   Bed Mobility   Supine to Sit 3  Moderate assistance   Additional items Assist x 2; Increased time required;Verbal cues   Sit to Supine 3  Moderate assistance   Additional items Assist x 2; Increased time required   Additional Comments While seated EOB pt required Min A to address posterior lean  After verbal cues pt able to breifly maintain dynamic/static sitting balance at a supervision level  Transfers   Sit to Stand 2  Maximal assistance   Additional items Assist x 2; Increased time required   Stand to Sit 2  Maximal assistance   Additional items Assist x 2; Increased time required   Additional Comments Pt performed initial STS transfer w/ therapist foot underneath RLE to maintain NWB  Pt completed lateral side scoots to HOB at a Max x 2 w/ b/l HHA with assist of 3rd to maintain NWB  Pt was left supine in bed with all items within in reach and bed alarm activated  Balance   Static Sitting Poor +   Dynamic Sitting Poor   Static Standing Poor -   Dynamic Standing Poor -   Activity Tolerance   Activity Tolerance Patient limited by pain; Patient limited by fatigue   Medical Staff Made Aware Ana DPT; PT was present due to pt's medical presentation that overall impacts occupational performance  Nurse Made Aware Pt appropriate to be seen   RUE Assessment   RUE Assessment WFL   LUE Assessment   LUE Assessment WFL   Cognition   Overall Cognitive Status Impaired   Arousal/Participation Cooperative   Attention Attends with cues to redirect   Orientation Level Oriented to person;Disoriented to time;Disoriented to situation;Oriented to time  (Pt grossly oriented to place as she reports "today is the day I get surgery"  Pt unable to report situation, or time  Pt resorted to looking at son for answers )   Memory Decreased recall of precautions;Decreased short term memory;Decreased recall of recent events   Following Commands Follows one step commands with increased time or repetition   Comments Pt demonstrates baseline dementia  Pt laughed at inappropraite times t/o session   When asked questions pt resorted to son to respond  Pt demonstrates poor carry over of WBS +assistance w/ RLE management  Pt demonstrates impaired safety awareness, judgement, and insight  Assessment   Limitation Decreased ADL status; Decreased Safe judgement during ADL;Decreased cognition;Decreased endurance;Decreased self-care trans;Decreased high-level ADLs   Prognosis Fair   Assessment Pt is an 83 yo F s/p fall w/ R ankle deformity, and AMS that has persisted for a couple days  Pt sustained "right ankle fracture dislocation with associated proximal fibular fracture that is non-displaced"  Pt is planned to undergo ORIF or RLE, anticipation of WBS to remain the same post-op  Pt is NWB RLE in AO splint  Pt's problem list includes: baseline dementia, arthritis, asthma, CAD, cardiac disease, CHF, depressing, DM, heart attack, hyperlipidemia, HTN, MI, neuropathy, BRANT, hx transient cerebral ischemia  Pt is a poor historian and was unable to provide information regarding PLOF and [place]  Son was at bedside during eval and provided information  Pt requires assistance w/ ADLs/IADLs, living in a [facility] w/ son and family  Currently, pt is Min A-Mod A w/ UB ADLs and Max A w/ LB ADLs  Pt completed sit <>stand transfers at Max A x 2  Pt completed lateral side scoots to [facility] w/ Max A x 2 +assist of 3rd for RLE management to maintain WBS  Pt demonstrated poor WBS t/o session  Pt's limitations include: pain, fatigue, WBS, baseline dementia, decreased endurance, decreased activity tolerance, decreased balance, decreased ability to complete ADLs, impaired safety awareness, impaired judgement, impaired insight  Pt was educated on WBS, and increased participation w/ nursing to complete self care tasks  The patient's raw score on the AM-PAC Daily Activity inpatient short form is 14, standardized score is 33 39, less than 39 4  Patients at this level are likely to benefit from discharge to post-acute rehabilitation services   Please refer to the recommendation of the Occupational Therapist for safe discharge planning  OT recommends pt receives additional OT services at [facility] rehab  OT will continue to address the following goals listed below  Goals   Patient Goals To rest   LTG Time Frame 10-14   Long Term Goal #1 See below   Plan   Treatment Interventions ADL retraining;Functional transfer training; Endurance training;Cognitive reorientation;Patient/family training;Equipment evaluation/education; Activityengagement; Energy conservation   Goal Expiration Date 08/30/22   OT Frequency 3-5x/wk   Recommendation   OT Discharge Recommendation Post acute rehabilitation services   AM-PAC Daily Activity Inpatient   Lower Body Dressing 2   Bathing 2   Toileting 2   Upper Body Dressing 2   Grooming 3   Eating 3   Daily Activity Raw Score 14   Daily Activity Standardized Score (Calc for Raw Score >=11) 33 39   AM-PAC Applied Cognition Inpatient   Following a Speech/Presentation 1   Understanding Ordinary Conversation 2   Taking Medications 1   Remembering Where Things Are Placed or Put Away 1   Remembering List of 4-5 Errands 1   Taking Care of Complicated Tasks 1   Applied Cognition Raw Score 7   Applied Cognition Standardized Score 15 17     Pt will complete rolling R<>L at a supervision level to increase skin integrity     Pt will complete bed mobility at a supervision level as a prerequisite to complete functional OOB functional tasks     Pt will tolerate sitting EOB at a supervision level while completing an ADL task to increase static/dynamic sitting balance     Pt will complete functional transfers sit pivot/STS at a Min A level w/ F+ balance and safety     Pt will complete UB/LB ADLs at a Min A level w/ min verbal cues to increase participation w/ self care      Pt will complete toileting at a Min A level to increase participation in self care tasks  Pt will complete a grooming task a supervision level to increase participation in self care tasks       Pt will participate in ongoing formal/informal cognitive assessments to determine cognitive deficits to assist in a safe d/c    Pt will demonstrate carry over of WBS w/ Mod verbal cues to increase safety and participation in functional tasks  Pt and caregiver will be attentive 100% of the time during skilled pt education to assist in a safe discharge  OTR/L to see functional mobility when appropriate       Jason Chen, OTS

## 2022-08-16 NOTE — OP NOTE
OPERATIVE REPORT  PATIENT NAME: [place]    :  1935  MRN: [id number]  Pt Location:  OR ROOM 18    SURGERY DATE: 2022    Surgeon(s) and Role:     Camila York MD - Primary     * Mauro Little MD - Assisting    Preop Diagnosis:  Closed fracture dislocation of right ankle, initial encounter [B41 555L]    Post-Op Diagnosis Codes:     * Closed fracture dislocation of right ankle, initial encounter [T71 624Y]    Procedure(s) (LRB):  OPEN REDUCTION W/ INTERNAL FIXATION (ORIF) ANKLE (Right)    Specimen(s):  * No specimens in log *    Estimated Blood Loss:   Minimal    Drains:  External Urinary Catheter (Active)   Output (mL) 150 mL 22 0409   Number of days: 1       Anesthesia Type:   General    Operative Indications:  Closed fracture dislocation of right ankle, initial encounter [W41 121F]  Comminuted bimalleolar fracture dislocation of the right ankle    Operative Findings:  Comminuted bimalleolar fracture dislocation right ankle    Complications:   None    Procedure and Technique:  After informed surgical consent had been obtained the patient was brought to the operating room and transferred to the operating table in the supine position after general anesthesia had been obtained  Tourniquet cuff was placed on the upper aspect of the thigh and the patient was prepped and draped normal sterile fashion  The right leg was elevated and the tourniquet was raised  Using a standard lateral approach dissection was taken down through the skin and subcutaneous tissues to the level of the fibular fracture  This was open reduced and clamped and then fixed with a 2 7 mm lag screw  This was then stabilized with a small fragment 1/3 tubular plate with a 3 5 mm Quadra cortical syndesmotic screw in addition to 3 5 mm locking screws  This was done because her bone was of such poor quality        A standard medial approach was undertaken and dissection was taken down through the skin and subcutaneous tissues to the level of the medial malleolar fracture  This was open reduced and clamped and fixed with a 2 7 mm screw  Final fluoroscopic views were obtained showing everything to be in good reduction  Wounds were then closed with 2-0 Vicryl and 3-0 nylon  A posterior splint was applied  Patient was then awakened from general anesthesia and transferred to recovery in stable condition having tolerated the procedure well     I was present for the entire procedure and I was present for all critical portions of the procedure    Patient Disposition:  PACU       SIGNATURE: Jerri Moreira MD  DATE: August 16, 2022  TIME: 6:42 PM

## 2022-08-17 LAB
ERYTHROCYTE [DISTWIDTH] IN BLOOD BY AUTOMATED COUNT: 14.6 % (ref 11.6–15.1)
GLUCOSE SERPL-MCNC: 145 MG/DL (ref 65–140)
GLUCOSE SERPL-MCNC: 257 MG/DL (ref 65–140)
GLUCOSE SERPL-MCNC: 300 MG/DL (ref 65–140)
GLUCOSE SERPL-MCNC: 358 MG/DL (ref 65–140)
HCT VFR BLD AUTO: 38.2 % (ref 34.8–46.1)
HGB BLD-MCNC: 12 G/DL (ref 11.5–15.4)
MCH RBC QN AUTO: 30.2 PG (ref 26.8–34.3)
MCHC RBC AUTO-ENTMCNC: 31.4 G/DL (ref 31.4–37.4)
MCV RBC AUTO: 96 FL (ref 82–98)
PLATELET # BLD AUTO: 184 THOUSANDS/UL (ref 149–390)
PMV BLD AUTO: 11.6 FL (ref 8.9–12.7)
RBC # BLD AUTO: 3.97 MILLION/UL (ref 3.81–5.12)
WBC # BLD AUTO: 9.87 THOUSAND/UL (ref 4.31–10.16)

## 2022-08-17 PROCEDURE — 91305 COVID-19 PFIZER VAC (TRIS SUCROSE, GRAY CAP FORM) 30 MCG/0.3ML SUSP: CPT | Performed by: STUDENT IN AN ORGANIZED HEALTH CARE EDUCATION/TRAINING PROGRAM

## 2022-08-17 PROCEDURE — 99232 SBSQ HOSP IP/OBS MODERATE 35: CPT | Performed by: INTERNAL MEDICINE

## 2022-08-17 PROCEDURE — NC001 PR NO CHARGE: Performed by: ORTHOPAEDIC SURGERY

## 2022-08-17 PROCEDURE — 85027 COMPLETE CBC AUTOMATED: CPT | Performed by: STUDENT IN AN ORGANIZED HEALTH CARE EDUCATION/TRAINING PROGRAM

## 2022-08-17 PROCEDURE — 82948 REAGENT STRIP/BLOOD GLUCOSE: CPT

## 2022-08-17 RX ORDER — AMOXICILLIN 250 MG
1 CAPSULE ORAL
Status: DISCONTINUED | OUTPATIENT
Start: 2022-08-17 | End: 2022-08-22 | Stop reason: HOSPADM

## 2022-08-17 RX ORDER — INSULIN GLARGINE 100 [IU]/ML
28 INJECTION, SOLUTION SUBCUTANEOUS
Status: DISCONTINUED | OUTPATIENT
Start: 2022-08-17 | End: 2022-08-18

## 2022-08-17 RX ORDER — INSULIN LISPRO 100 [IU]/ML
10-15 INJECTION, SOLUTION INTRAVENOUS; SUBCUTANEOUS
Status: DISCONTINUED | OUTPATIENT
Start: 2022-08-17 | End: 2022-08-18

## 2022-08-17 RX ORDER — TORSEMIDE 20 MG/1
20 TABLET ORAL DAILY
Status: DISCONTINUED | OUTPATIENT
Start: 2022-08-17 | End: 2022-08-18

## 2022-08-17 RX ADMIN — ACETAMINOPHEN 975 MG: 325 TABLET ORAL at 21:28

## 2022-08-17 RX ADMIN — METOPROLOL TARTRATE 25 MG: 25 TABLET, FILM COATED ORAL at 08:40

## 2022-08-17 RX ADMIN — ACETAMINOPHEN 975 MG: 325 TABLET ORAL at 13:33

## 2022-08-17 RX ADMIN — Medication 1 TABLET: at 17:42

## 2022-08-17 RX ADMIN — INSULIN LISPRO 2 UNITS: 100 INJECTION, SOLUTION INTRAVENOUS; SUBCUTANEOUS at 17:43

## 2022-08-17 RX ADMIN — INSULIN LISPRO 3 UNITS: 100 INJECTION, SOLUTION INTRAVENOUS; SUBCUTANEOUS at 12:06

## 2022-08-17 RX ADMIN — Medication 1 TABLET: at 08:40

## 2022-08-17 RX ADMIN — ATORVASTATIN CALCIUM 40 MG: 40 TABLET, FILM COATED ORAL at 17:42

## 2022-08-17 RX ADMIN — APIXABAN 5 MG: 5 TABLET, FILM COATED ORAL at 17:42

## 2022-08-17 RX ADMIN — BNT162B2 0.3 ML: 0.23 INJECTION, SUSPENSION INTRAMUSCULAR at 10:36

## 2022-08-17 RX ADMIN — INSULIN LISPRO 4 UNITS: 100 INJECTION, SOLUTION INTRAVENOUS; SUBCUTANEOUS at 12:07

## 2022-08-17 RX ADMIN — INSULIN LISPRO 10 UNITS: 100 INJECTION, SOLUTION INTRAVENOUS; SUBCUTANEOUS at 17:42

## 2022-08-17 RX ADMIN — OXYCODONE HYDROCHLORIDE 5 MG: 5 TABLET ORAL at 08:39

## 2022-08-17 RX ADMIN — TORSEMIDE 20 MG: 20 TABLET ORAL at 13:35

## 2022-08-17 RX ADMIN — INSULIN LISPRO 4 UNITS: 100 INJECTION, SOLUTION INTRAVENOUS; SUBCUTANEOUS at 08:20

## 2022-08-17 RX ADMIN — INSULIN LISPRO 6 UNITS: 100 INJECTION, SOLUTION INTRAVENOUS; SUBCUTANEOUS at 08:21

## 2022-08-17 RX ADMIN — SENNOSIDES AND DOCUSATE SODIUM 1 TABLET: 50; 8.6 TABLET ORAL at 21:29

## 2022-08-17 RX ADMIN — INSULIN GLARGINE 28 UNITS: 100 INJECTION, SOLUTION SUBCUTANEOUS at 21:29

## 2022-08-17 NOTE — RESTORATIVE TECHNICIAN NOTE
Restorative Technician Note      Patient Name: Maxi Sanchez     [facility] Tech Visit Date: 8/17/2022  Note Type: Mobility  Patient Position Upon Consult: Supine  Activity Performed: Repositioned; Other (Comment) (Assisted RN)  Patient Position at End of Consult: Supine; All needs within reach;  Other (comment)    Hilario Hassan  DPT, Restorative Technician

## 2022-08-17 NOTE — CASE MANAGEMENT
Case Management Discharge Planning Note    Patient name Karoline Tejada  Location [facility] MRN [id number]  : 1935 Date 2022       Current Admission Date: 8/15/2022  Current Admission Diagnosis:Closed right ankle fracture   Patient Active Problem List    Diagnosis Date Noted    Closed right ankle fracture 08/15/2022    Chest pain 08/15/2022    Foul smelling urine 08/15/2022    Type 2 diabetes mellitus with hyperglycemia, with long-term current use of insulin ([facility] ) 2022    Obesity, morbid ([facility] ) 2021    Osteopenia 2021    Mild intermittent asthma without complication     PA[facility]ox[facility]mal [facility] fibrillation) ([facility]) 2019    SSS (sick sinus syndrome) (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 ([facility] ) 2018    Abnormal chest x-ray 2018    Ventricular tachycardia ([facility] ) 2017    Urine incontinence 2017    Gastroesophageal reflux disease with hiatal hernia 2014    Cerebral artery occlusion with cerebral infarction ([facility] ) 2014    Dementia without behavioral disturbance ([facility] ) 2013    3-vessel coronary artery disease 2012    Depression 2012    Diabetic retinopathy ([facility] ) 2012    DM (diabetes mellitus), type 2, uncontrolled w/ophthalmic complication     Glaucoma 2012    Hyperlipidemia 2012    Essential hypertension 2012    Obesity (BMI 30-39 9) 2012    Obstructive sleep apnea 2012    Osteoarthritis of knee 2012      LOS (days): 2  Geometric Mean LOS (GMLOS) (days): 4 00  Days to GMLOS:1 9     OBJECTIVE:  Risk of Unplanned Readmission Score: 13 98         Current admission status: Inpatient   Preferred Pharmacy:   [facility] [address], [address] [facility] [address]  Phone: [phone] Fax: [phone]    [address] [place], [address] [place] [address] [address] [facility] [address] [address]  Phone: [phone] Fax: [phone]    Primary Care Provider: Hussain Sadler MD    Primary Insurance: [facility]  Secondary Insurance:     DISCHARGE DETAILS:                                          Other Referral/Resources/Interventions Provided:  Referral Comments: Pt discusseed during care coordination rounds  Should be ready for dc tomorrow  Asked to start auth  Notified therapy will need updated notes in am for auth  AIDIN message sent to [facility]

## 2022-08-17 NOTE — CASE MANAGEMENT
Case Management Discharge Planning Note    Patient name Hernan Fernando  [facility] MRN [id number]  : 1935 Date 2022       Current Admission Date: 8/15/2022  Current Admission Diagnosis:Closed right ankle fracture   Patient Active Problem List    Diagnosis Date Noted    Closed right ankle fracture 08/15/2022    Chest pain 08/15/2022    Foul smelling urine 08/15/2022    Type 2 diabetes mellitus with hyperglycemia, with long-term current use of insulin ([facility] ) 2022    Obesity, morbid ([facility] 2021    Osteopenia 2021    Mild intermittent asthma without complication     PAF [facility]oxysmal [facility] fibrillation) ([facility]) 2019    SSS (sick sinus syndrome) ([facility]) 2019    Dyspnea on minimal exertion 2019    Congestive heart failure with LV diastolic dysfunction, NYHA class 2 ([facility] ) 2018    Urine retention 2018    Chronic diastolic congestive heart failure ([facility] ) 2018    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 ([facility] ) 2018    Abnormal chest x-ray 2018    Ventricular tachycardia ([facility] ) 2017    Urine incontinence 2017    Gastroesophageal reflux disease with hiatal hernia 2014    Cerebral artery occlusion with cerebral infarction ([facility] ) 2014    Dementia without behavioral disturbance ([facility] ) 2013    3-vessel coronary artery disease 2012    Depression 2012    Diabetic retinopathy ([facility] ) 2012    DM (diabetes mellitus), type 2, uncontrolled w/ophthalmic complication     Glaucoma 2012    Hyperlipidemia 2012    Essential hypertension 2012    Obesity (BMI 30-39 9) 2012    Obstructive sleep apnea 2012    Osteoarthritis of knee 2012      LOS (days): 2  Geometric Mean LOS (GMLOS) (days): 3 00  Days to GMLOS:1 1     OBJECTIVE:  Risk of Unplanned Readmission Score: 14 25         Current admission status: Inpatient   Preferred Pharmacy:   [facility] [address], [address] [facility] [address]  Phone: [phone] Fax: [phone] [address] [place], [address] [address] [address] [facility] [address] [address]  Phone: [phone] Fax: [phone]    Primary Care Provider: Sid Key MD    Primary Insurance: Elza DIEGO  Secondary Insurance:     DISCHARGE DETAILS:            Other Referral/Resources/Interventions Provided:  Referral Comments: Pt received 4th dose of covid vaccine today  Rehab referral sent to [address] [facility] per family request              6710 update: S/w pt's son Kamar Bay in pt's room  He s/w his sister on phone  [facility] accept  They agreed to accept bed  Reserved [facility]

## 2022-08-17 NOTE — ASSESSMENT & PLAN NOTE
· Status post ORIF, pod 1   · Had preoperative right femoral and popliteal single-shot blocks with Exparel  · Difficult to assess effectiveness of blocks due to patient's confusion  · Patient appears comfortable and appears to have minimal to moderate pain on palpation of the ankle  · Based on the patient's age, comorbidities and increased confusion overnight, would minimize opioids, benzodiazepines or any other potentially sedating or mind-altering medications  · Continue Tylenol 975 mg p o  q 8 hours scheduled  · Suggest discontinue IV Dilaudid  · Suggest discontinue oxycodone 5 mg p o  dosing  · Suggest change oxycodone 2 5 mg p o  q 6 hours to PRN moderate or severe pain  · Ice to painful area for up to 20 minutes every hour as needed  · Suggest scheduled Senokot S and MiraLax while on opioid pain medication to avoid opioid induced constipation (which itself can cause delirium)  · At discharge, suggest Tylenol 975 mg p o  q 8 hours scheduled, oxycodone 2 5 mg p o  q 6 hours PRN moderate to severe pain x2 days and bowel regimen while on opioid pain medication

## 2022-08-17 NOTE — PROGRESS NOTES
[facility]  Progress Note - [place] 1935, 82 y o  female MRN: [id number]  Unit/Bed#: [facility] 397-50 Encounter: [id number]  Primary Care Provider: Aysha Balbuena MD   Date and time admitted to hospital: 8/15/2022  7:51 AM    Obstructive sleep apnea  Assessment & Plan   Patient's with sleep apnea are at higher risk of respiratory depression secondary to opioid use   Attempt to minimize use of opioid pain medication while maintaining adequate analgesia  Depression  Assessment & Plan   Patients with depression and/or anxiety have more perceived pain on average and often require higher doses of opioid pain medication   Treat depression and/or anxiety aggressively  D[facility]  Assessment & Plan  · Reportedly very confused and agitated overnight  · This morning, repeatedly claimed that people have threatened her life recently  · Family member in the room states that patient has improved since yesterday  · Oriented to place and person  * Closed right ankle fracture  Assessment & Plan  · Status post ORIF, pod 1   · Had preoperative right femoral and popliteal single-shot blocks with Exparel  · Difficult to assess effectiveness of blocks due to patient's confusion  · Patient appears comfortable and appears to have minimal to moderate pain on palpation of the ankle  · Based on the patient's age, comorbidities and increased confusion overnight, would minimize opioids, benzodiazepines or any other potentially sedating or mind-altering medications  · Continue Tylenol 975 mg p o  q 8 hours scheduled  · Suggest discontinue IV Dilaudid  · Suggest discontinue oxycodone 5 mg p o  dosing  · Suggest change oxycodone 2 5 mg p o  q 6 hours to PRN moderate or severe pain  · Ice to painful area for up to 20 minutes every hour as needed    · Suggest scheduled Senokot S and MiraLax while on opioid pain medication to avoid opioid induced constipation (which itself can cause delirium)  · At discharge, suggest Tylenol 975 mg p o  q 8 hours scheduled, oxycodone 2 5 mg p o  q 6 hours PRN moderate to severe pain x2 days and bowel regimen while on opioid pain medication  Danial Goddard is a 82y o  year old female status post ORIF right ankle fracture, pod 1 with preoperative placement of right femoral and popliteal single shot nerve blocks with Exparel  APS will sign off at this time  Thank you for the consult  All opioids and other analgesics to be written at discretion of primary team  Please contact Acute Pain [facility] via [employer] from [phone] with additional questions or concerns  See [employer]et or Kang for additional contacts and after hours information  Pain History  Current pain location(s):  Right ankle  Pain Scale:   Unable to quantify  Quality:  Unable to qualify  24 hour history:  Patient is pod 1 status post ORIF of right ankle fracture  Had preoperative placement of right femoral and right popliteal single shot nerve block catheters with Exparel  Patient appears relatively comfortable and does not appear in any significant pain on range of motion or palpation of the surgical area  Patient very confused overnight and apparently acting out  This morning patient is calm and cooperative, however remains confused  Family member bedside states patient appears comfortable  Opioid requirement previous 24 hours:   Postoperatively:  Oxycodone 5 mg p o  Perioperatively:  Fentanyl 100 mcg IV, Dilaudid 0 2 mg IV, oxycodone 5 mg p o      Meds/Allergies   all current active meds have been reviewed, current meds:   Current Facility-Administered Medications   Medication Dose Route Frequency    acetaminophen (TYLENOL) tablet 975 mg  975 mg Oral Q8H [facility]    atorvastatin (LIPITOR) tablet 40 mg  40 mg Oral Daily With Dinner    calcium carbonate-vitamin D (OSCAL-D) 500 mg-200 units per tablet 1 tablet  1 tablet Oral BID With Meals    heparin (porcine) subcutaneous injection 5,000 Units  5,000 Units Subcutaneous Q8H [facility]    HYDROmorphone HCl (DILAUDID) injection 0 2 mg  0 2 mg Intravenous Q3H PRN    insulin glargine (LANTUS) subcutaneous injection 20 Units 0 2 mL  20 Units Subcutaneous HS    insulin lispro (HumaLOG) 100 units/mL subcutaneous injection 1-5 Units  1-5 Units Subcutaneous TID AC    insulin lispro (HumaLOG) 100 units/mL subcutaneous injection 4-6 Units  4-6 Units Subcutaneous TID With Meals    metoprolol tartrate (LOPRESSOR) tablet 25 mg  25 mg Oral Q12H REJI    ondansetron (ZOFRAN) injection 4 mg  4 mg Intravenous Q6H PRN    oxyCODONE (ROXICODONE) IR tablet 2 5 mg  2 5 mg Oral Q6H PRN    Or    oxyCODONE (ROXICODONE) IR tablet 5 mg  5 mg Oral Q6H PRN    and PTA meds:   Prior to Admission Medications   Prescriptions Last Dose Informant Patient Reported? Taking?    Continuous Blood Gluc  (Dexcom G6 ) SIVAKUMAR  Child Yes No   Sig: Use daily as directed for CGM   Continuous Blood Gluc Sensor (Dexcom G6 Sensor) MISC  Child Yes No   Sig: Use daily as directed for CGM - Change every 10 days   Continuous Blood Gluc Transmit (Dexcom G6 Transmitter) MISC  Child Yes No   Sig: Use daily as directed for CGM - Change every 3 months   Eliquis 5 MG  Child No No   Sig: TAKE 1 TABLET BY MOUTH TWICE A DAY   Insulin Disposable Pump (V-Go 20) KIT  Child No No   Sig: Apply once a night   Insulin Disposable Pump (V-Go 20) KIT  Child No No   Sig: Use daily Use one daily   NovoLOG 100 UNIT/ML injection  Child Yes No   Sig: USE FOR V-GO   atorvastatin (LIPITOR) 40 mg tablet  Child No No   Sig: Take 1 tablet (40 mg total) by mouth daily   calcium carbonate-vitamin D (OSCAL-D) 500 mg-200 units per tablet  Child No No   Sig: Take 1 tablet by mouth 2 (two) times a day with meals   docusate sodium (COLACE) 100 mg capsule  Child No No   Sig: Take 1 capsule (100 mg total) by mouth 2 (two) times a day   Patient taking differently: Take 100 mg by mouth 2 (two) times a day as needed   glucose blood test strip  Child No No   Sig: USE AS DIRECTED 3 TIMES A DAY   insulin aspart (NovoLOG) 100 units/mL injection  Child No No   Sig: Use for V-Go   metoprolol tartrate (LOPRESSOR) 25 mg tablet  Child No No   Sig: TAKE 1 TABLET (25 MG TOTAL) BY MOUTH EVERY 12 (TWELVE) HOURS   potassium chloride (Klor-Con M10) 10 mEq tablet  Child No No   Sig: Take 1 tablet (10 mEq total) by mouth daily   senna (SENOKOT) 8 6 mg  Child No No   Sig: Take 1 tablet (8 6 mg total) by mouth daily   Patient taking differently: Take 1 tablet by mouth daily at bedtime as needed   torsemide (DEMADEX) 20 mg tablet  Child No No   Sig: Take 1 tablet (20 mg total) by mouth daily      Facility-Administered Medications: None       Allergies   Allergen Reactions    Ace Inhibitors     Chlorpromazine     Latex     Lisinopril     Namenda [Memantine] Drowsiness    Penicillins Seizures    Propoxyphene     Stadol [Butorphanol]        Objective     Temp:  [97 1 °F (36 2 °C)-98 5 °F (36 9 °C)] 97 4 °F (36 3 °C)  HR:  [60-90] 90  Resp:  [14-20] 16  BP: (109-154)/() 142/78  FiO2 (%):  [6] 6    Physical Exam    Lab Results:   Results from last 7 days   Lab Units 08/17/22  0731   WBC Thousand/uL 9 87   HEMOGLOBIN g/dL 12 0   HEMATOCRIT % 38 2   PLATELETS Thousands/uL 184      Results from last 7 days   Lab Units 08/16/22  0817 08/15/22  0819   POTASSIUM mmol/L 4 4 4 2   CHLORIDE mmol/L 110* 110*   CO2 mmol/L 28 26   BUN mg/dL 26* 26*   CREATININE mg/dL 1 03 1 07   CALCIUM mg/dL 9 6 9 8   ALK PHOS U/L  --  88   ALT U/L  --  21   AST U/L  --  18       Imaging Studies: I have personally reviewed pertinent reports  EKG, Pathology, and Other Studies: I have personally reviewed pertinent reports  Counseling / Coordination of Care  Total floor / unit time spent today 30 minutes   Greater than 50% of total time was spent with the patient and / or family counseling and / or coordination of care  A description of the counseling / coordination of care:  Patient interview, physical examination, review of medical record, review of    Please note that the APS provides consultative services regarding pain management only  With the exception of ketamine, peripheral nerve catheters, and epidural infusions (and except when indicated), final decisions regarding starting or changing doses of analgesic medications are at the discretion of the consulting service  Off hours consultation and/or medication management is generally not available      Jessica Brantley PA-C  Acute Pain Service

## 2022-08-17 NOTE — ASSESSMENT & PLAN NOTE
Wt Readings from Last 3 Encounters:   08/15/22 95 3 kg (210 lb)   08/01/22 95 3 kg (210 lb)   02/14/22 95 kg (209 lb 7 oz)   · Hold torsemide for OR  · Repeat echo done on 08/15/2022 shows normal EF and grade 2 diastolic dysfunction  · Will restart torsemide, cardio follow-up

## 2022-08-17 NOTE — PROGRESS NOTES
Progress Note - Amber Solorzano 82 y o  female MRN: [id number]    Unit/Bed#: [facility] 430-93 Encounter: [id number]      CC: diabetes f/u    Subjective: This is a 82y o -year-old female with past medical history of diabetes type 2  On home insulin with complications  History of dementia, heart failure,  hypertension, atrial fibrillation  Who was admitted to the hospital after mechanical fall leading to right ankle fracture  S/p ORIF, pt has good appetite, no hypoglycemia    Objective:     Vitals: Blood pressure (!) 111/49, pulse 74, temperature 98 1 °F (36 7 °C), resp  rate 16, height 5' 5" (1 651 m), weight 95 3 kg (210 lb), SpO2 96 %  ,Body mass index is 34 95 kg/m²  Intake/Output Summary (Last 24 hours) at 8/17/2022 1612  Last data filed at 8/16/2022 2018  Gross per 24 hour   Intake 800 ml   Output --   Net 800 ml       Physical Exam:  General Appearance: awake, appears stated age and cooperative  Head: Normocephalic, without obvious abnormality, atraumatic  Extremities: moves all extremities  Skin: Skin color and temperature normal    Pulm: no labored breathing        POC Glucose   Date Value   08/17/2022 300 mg/dl (H)   08/17/2022 358 mg/dl (H)   08/16/2022 229 mg/dl (H)   08/16/2022 206 mg/dl (H)   08/16/2022 151 mg/dl (H)   08/16/2022 147 mg/dl (H)   08/16/2022 122 mg/dl   08/16/2022 99 mg/dl   08/16/2022 91 mg/dl   08/16/2022 125 mg/dl   02/27/2015 264 mg/dL (H)   06/04/2014 280 mg/dL (H)   05/24/2014 308 mg/dL (H)   05/22/2014 278 mg/dL (H)       Impression:   Diabetes type 2 on home insulin, with history of complications (, CAD, neuropathy)  History of dementia  History of heart failure  History of atrial fibrillation  Right ankle fracture     Assessment: This is a 82y o -year-old female with past medical history of diabetes type 2  On home insulin with complications  History of dementia, heart failure,  hypertension, atrial fibrillation    Who was admitted to the hospital after mechanical fall leading to right ankle fracture  Endocrinology consulted for diabetes type 2     Plan:   A1c 7 3  Patient uses VGO 20 at home, 6 units with breakfast and 4 units with lunch and dinner  During admission patient was started on insulin drip to stabilize hyperglycemia for planned surgery   S/p  ORIF in 8/16   Currently off of insulin drip, currently patient is on Lantus 20 units, and lispro 6 units with breakfast, 4 units with lunch, and 4 units with dinner  Patient had episodes of prandial hyperglycemia and fasting hyperglycemia  Recommend to increase Lantus to 28 units at bedtime, increase lispro dose 15  units with breakfast, 10 units with lunch, and 10 units with dinner  Continue with correctional scale  Continue with Accu-Cheks      Portions of the record may have been created with voice recognition software

## 2022-08-17 NOTE — PROGRESS NOTES
[facility]  Progress Note - [place] 1935, 82 y o  female MRN: [id number]  [facility] Encounter: [id number]  Primary Care Provider: Nicole Shrestha MD   Date and time admitted to hospital: 8/15/2022  7:51 AM    * Closed right ankle fracture  Assessment & Plan  Ortho consult for bimalleolar fracture s/p  ORIF POD#1  Overnight had episode of agiattion/delirium  Also cleared to restart Eliquis  Pain management consult appreciated-recommended to DC Dilaudid and oxycodone 5 mg, continue oxycodone 2 5 mg q 6 hours p r n  and bowel regimen  PT/OT after ortho clearance      Foul smelling urine  Assessment & Plan  UA ruled out UTI  Bladder scan unremarkable    Chest pain  Assessment & Plan  With diaphoresis and bradycardia during ankle spliting  Cardio consult for pre op clearance apprecaited- cleared with mod to high risk, echo done    Type 2 diabetes mellitus with hyperglycemia, with long-term current use of insulin [facility])  Assessment & Plan  Lab Results   Component Value Date    HGBA1C 7 3 (H) 07/30/2022       Recent Labs     08/16/22  2039 08/16/22  2205 08/17/22  0819 08/17/22  1137   POCGLU 206* 229* 358* 300*       Blood Sugar Average: Last 72 hrs:  (P) 414 [id number]     Uses VGO pump at home which will be d/c as pt not competent to use it and dtr on vacation  As pt going to OR, will use insulin gtt  Endo consult as BSs high    PAF (paroxysmal atrial fibrillation) (HCC)  Assessment & Plan  C/w BB  Ortho cleared to restart Eliquis    Chronic diastolic congestive heart failure (HCC)  Assessment & Plan  Wt Readings from Last 3 Encounters:   08/15/22 95 3 kg (210 lb)   08/01/22 95 3 kg (210 lb)   02/14/22 95 kg (209 lb 7 oz)   Hold torsemide for OR  Repeat echo done on 08/15/2022 shows normal EF and grade 2 diastolic dysfunction  Will restart torsemide, cardio follow-up          Obstructive sleep apnea  Assessment & Plan  Use cpap    Dementia without behavioral disturbance ([facility] )  Assessment & Plan  Family will make medical decisions  Supportive care      Acute toxic metabolic encephalopathy-   Bouts of agitation/delirium related to post op sec to anesthesia/surgery and possible sundowning   Today MS back to baseline during am rounds    VTE Pharmacologic Prophylaxis: VTE Score: 9 Moderate Risk (Score 3-4) - Pharmacological DVT Prophylaxis Ordered: apixaban (Eliquis)  Patient Centered Rounds: I performed bedside rounds with nursing staff today  Discussions with Specialists or Other Care Team Provider: APS    Education and Discussions with Family / Patient: Updated  (son) at bedside  Time Spent for Care: 20 minutes  More than 50% of total time spent on counseling and coordination of care as described above  Current Length of Stay: 2 day(s)  Current Patient Status: Inpatient   Certification Statement: The patient will continue to require additional inpatient hospital stay due to high sugar, PT/OT eval, pain mangement, dc rehab  Discharge Plan: Anticipate discharge in 48-72 hrs to rehab facility  Code Status: Level 1 - Full Code    Subjective:   Resting in bed, she is awake and alert but seems confused  Overnight he had event of agitation/delirium  Son at bedside  She does not seem to be in acute distress or pain  Objective:     Vitals:   Temp (24hrs), Av 8 °F (36 6 °C), Min:97 1 °F (36 2 °C), Max:98 5 °F (36 9 °C)    Temp:  [97 1 °F (36 2 °C)-98 5 °F (36 9 °C)] 97 4 °F (36 3 °C)  HR:  [60-90] 90  Resp:  [14-20] 16  BP: (109-154)/() 142/78  SpO2:  [94 %-100 %] 94 %  Body mass index is 34 95 kg/m²  Input and Output Summary (last 24 hours): Intake/Output Summary (Last 24 hours) at 2022 1332  Last data filed at 2022 2018  Gross per 24 hour   Intake 800 ml   Output 100 ml   Net 700 ml       Physical Exam:     Vitals and nursing note reviewed  Constitutional:       General: She is not in acute distress  Appearance: She is well-developed  HENT:      Head: Normocephalic and atraumatic  Mouth/Throat:      Mouth: Mucous membranes are dry  Eyes:      Conjunctiva/sclera: Conjunctivae normal    Cardiovascular:      Rate and Rhythm: Normal rate and regular rhythm  Heart sounds: No murmur heard  Pulmonary:      Effort: Pulmonary effort is normal  No respiratory distress  Breath sounds: Normal breath sounds  Abdominal:      General: Bowel sounds are normal       Palpations: Abdomen is soft  Tenderness: There is no abdominal tenderness  Musculoskeletal:      Cervical back: Normal range of motion and neck supple  Comments: Right lower extremity decreased ROM secondary to fracture and pain   Skin:     General: Skin is warm and dry  Neurological:      General: No focal deficit present  Mental Status: She is alert  Mental status is at baseline  Psychiatric:      Comments: Unable to properly assess secondary to dementia    Additional Data:     Labs:  Results from last 7 days   Lab Units 08/17/22  0731 08/16/22  0817 08/15/22  0819   WBC Thousand/uL 9 87   < > 9 75   HEMOGLOBIN g/dL 12 0   < > 14 0   HEMATOCRIT % 38 2   < > 43 4   PLATELETS Thousands/uL 184   < > 207   NEUTROS PCT %  --   --  77*   LYMPHS PCT %  --   --  13*   MONOS PCT %  --   --  6   EOS PCT %  --   --  3    < > = values in this interval not displayed       Results from last 7 days   Lab Units 08/16/22  0817 08/15/22  0819   SODIUM mmol/L 139 140   POTASSIUM mmol/L 4 4 4 2   CHLORIDE mmol/L 110* 110*   CO2 mmol/L 28 26   BUN mg/dL 26* 26*   CREATININE mg/dL 1 03 1 07   ANION GAP mmol/L 1* 4   CALCIUM mg/dL 9 6 9 8   ALBUMIN g/dL  --  3 4*   TOTAL BILIRUBIN mg/dL  --  0 71   ALK PHOS U/L  --  88   ALT U/L  --  21   AST U/L  --  18   GLUCOSE RANDOM mg/dL 104 280*     Results from last 7 days   Lab Units 08/16/22  0817   INR  1 10     Results from last 7 days   Lab Units 08/17/22  1137 08/17/22  0819 08/16/22  2205 08/16/22  2039 08/16/22  1437 08/16/22  1325 08/16/22  1039 08/16/22  0820 08/16/22  0715 08/16/22  0509 08/16/22  0308 08/16/22  0107   POC GLUCOSE mg/dl 300* 358* 229* 206* 151* 147* 122 99 91 125 127 122         Results from last 7 days   Lab Units 08/15/22  0819   LACTIC ACID mmol/L 1 4   PROCALCITONIN ng/ml 0 05       Lines/Drains:  Invasive Devices  Report      Peripheral Intravenous Line  Duration             Peripheral IV 08/15/22 Right Antecubital 2 days    Peripheral IV 08/15/22 Distal;Dorsal (posterior); Right Forearm 1 day              Drain  Duration             External Urinary Catheter 2 days                      Telemetry:  Telemetry Orders (From admission, onward)               48 Hour Telemetry Monitoring  Continuous x 48 hours        Expiring   References:    Telemetry Guidelines   Question:  Reason for 48 Hour Telemetry  Answer:  Acute MI, chest pain - R/O MI, or unstable angina                                Imaging: Reviewed radiology reports from this admission including: chest xray    Recent Cultures (last 7 days):   Results from last 7 days   Lab Units 08/15/22  0819   BLOOD CULTURE  No Growth at 48 hrs  No Growth at 48 hrs         Last 24 Hours Medication List:   Current Facility-Administered Medications   Medication Dose Route Frequency Provider Last Rate    acetaminophen  975 mg Oral [facility] Kosta Funez MD      apixaban  5 mg Oral BID Ham Gee MD      atorvastatin  40 mg Oral Daily With José Daily MD      calcium carbonate-vitamin D  1 tablet Oral BID With Meals Kosta Funez MD      insulin glargine  20 Units Subcutaneous HS Kosta Funez MD      insulin lispro  1-5 Units Subcutaneous TID AC Kosta Funez MD      insulin lispro  4-6 Units Subcutaneous TID With Meals Kosta Funez MD      metoprolol tartrate  25 mg Oral Q12H 119 Eugene Mancuso MD      ondansetron  4 mg Intravenous Q6H PRN Kosta Funez MD oxyCODONE  2 5 mg Oral Q6H PRN Mian Luevano MD      senna-docusate sodium  1 tablet Oral HS Chantelle Snyder MD      torsemide  20 mg Oral Daily Chantelle Snyder MD          Today, Patient Was Seen By: Chantelle Snyder MD    **Please Note: This note may have been constructed using a voice recognition system  **

## 2022-08-17 NOTE — PLAN OF CARE
Problem: PAIN - ADULT  Goal: Verbalizes/displays adequate comfort level or baseline comfort level  Description: Interventions:  - Encourage patient to monitor pain and request assistance  - Assess pain using appropriate pain scale  - Administer analgesics based on type and severity of pain and evaluate response  - Implement non-pharmacological measures as appropriate and evaluate response  - Consider cultural and social influences on pain and pain management  - Notify physician/advanced practitioner if interventions unsuccessful or patient reports new pain  Outcome: Progressing     Problem: INFECTION - ADULT  Goal: Absence or prevention of progression during hospitalization  Description: INTERVENTIONS:  - Assess and monitor for signs and symptoms of infection  - Monitor lab/diagnostic results  - Monitor all insertion sites, i e  indwelling lines, tubes, and drains  - Monitor endotracheal if appropriate and nasal secretions for changes in amount and color  - [place] appropriate cooling/warming therapies per order  - Administer medications as ordered  - Instruct and encourage patient and family to use good hand hygiene technique  - Identify and instruct in appropriate isolation precautions for identified infection/condition  Outcome: Progressing  Goal: Absence of fever/infection during neutropenic period  Description: INTERVENTIONS:  - Monitor WBC    Outcome: Progressing     Problem: SAFETY ADULT  Goal: Patient will remain free of falls  Description: INTERVENTIONS:  - Educate patient/family on patient safety including physical limitations  - Instruct patient to call for assistance with activity   - Consult OT/PT to assist with strengthening/mobility   - Keep Call bell within reach  - Keep bed low and locked with side rails adjusted as appropriate  - Keep care items and personal belongings within reach  - Initiate and maintain comfort rounds  - Make Fall Risk Sign visible to staff  - Apply yellow socks and bracelet for high fall risk patients  - Consider moving patient to room near nurses station  Outcome: Progressing  Goal: Maintain or return to baseline ADL function  Description: INTERVENTIONS:  -  Assess patient's ability to carry out ADLs; assess patient's baseline for ADL function and identify physical deficits which impact ability to perform ADLs (bathing, care of mouth/teeth, toileting, grooming, dressing, etc )  - Assess/evaluate cause of self-care deficits   - Assess range of motion  - Assess patient's mobility; develop plan if impaired  - Assess patient's need for assistive devices and provide as appropriate  - Encourage maximum independence but intervene and supervise when necessary  - Involve family in performance of ADLs  - Assess for home care needs following discharge   - Consider OT consult to assist with ADL evaluation and planning for discharge  - Provide patient education as appropriate  Outcome: Progressing  Goal: Maintains/Returns to pre admission functional level  Description: INTERVENTIONS:  - Perform BMAT or MOVE assessment daily    - Set and communicate daily mobility goal to care team and patient/family/caregiver  - Collaborate with rehabilitation services on mobility goals if consulted  - Out of bed for toileting  - Record patient progress and toleration of activity level   Outcome: Progressing     Problem: Knowledge Deficit  Goal: Patient/family/caregiver demonstrates understanding of disease process, treatment plan, medications, and discharge instructions  Description: Complete learning assessment and assess knowledge base    Interventions:  - Provide teaching at level of understanding  - Provide teaching via preferred learning methods  Outcome: Progressing     Problem: Prexisting or High Potential for Compromised Skin Integrity  Goal: Skin integrity is maintained or improved  Description: INTERVENTIONS:  - Identify patients at risk for skin breakdown  - Assess and monitor skin integrity  - Assess and monitor nutrition and hydration status  - Monitor labs   - Assess for incontinence   - Turn and reposition patient  - Assist with mobility/ambulation  - Relieve pressure over bony prominences  - Avoid friction and shearing  - Provide appropriate hygiene as needed including keeping skin clean and dry  - Evaluate need for skin moisturizer/barrier cream  - Collaborate with interdisciplinary team   - Patient/family teaching  - Consider wound care consult   Outcome: Progressing     Problem: MOBILITY - ADULT  Goal: Maintain or return to baseline ADL function  Description: INTERVENTIONS:  -  Assess patient's ability to carry out ADLs; assess patient's baseline for ADL function and identify physical deficits which impact ability to perform ADLs (bathing, care of mouth/teeth, toileting, grooming, dressing, etc )  - Assess/evaluate cause of self-care deficits   - Assess range of motion  - Assess patient's mobility; develop plan if impaired  - Assess patient's need for assistive devices and provide as appropriate  - Encourage maximum independence but intervene and supervise when necessary  - Involve family in performance of ADLs  - Assess for home care needs following discharge   - Consider OT consult to assist with ADL evaluation and planning for discharge  - Provide patient education as appropriate  Outcome: Progressing  Goal: Maintains/Returns to pre admission functional level  Description: INTERVENTIONS:  - Perform BMAT or MOVE assessment daily    - Set and communicate daily mobility goal to care team and patient/family/caregiver     - Collaborate with rehabilitation services on mobility goals if consulted  - Out of bed for toileting  - Record patient progress and toleration of activity level   Outcome: Progressing

## 2022-08-17 NOTE — ASSESSMENT & PLAN NOTE
Lab Results   Component Value Date    HGBA1C 7 3 (H) 07/30/2022       Recent Labs     08/16/22 2039 08/16/22  2205 08/17/22  0819 08/17/22  1137   POCGLU 206* 229* 358* 300*       Blood Sugar Average: Last 72 hrs:  (P) 376 [id number]     · Uses VGO pump at home which will be d/c as pt not competent to use it and dtr on vacation  · As pt going to OR, will use insulin gtt  · Endo consult as BSs high

## 2022-08-17 NOTE — PROGRESS NOTES
Progress Note - Adonica Linker 82 y o  female MRN: [id number]  Unit/Bed#: [facility] 818-01      Subjective:    82 y  o female POD 1 ORIF R ankle  Confused this AM, somewhat agitated overnight         Labs:  0   Lab Value Date/Time    HCT 38 2 08/17/2022 0731    HCT 41 5 08/16/2022 0817    HCT 43 4 08/15/2022 0819    HCT 38 3 09/16/2015 0158    HCT 39 1 04/21/2015 1010    HCT 38 3 02/27/2015 1019    HGB 12 0 08/17/2022 0731    HGB 13 1 08/16/2022 0817    HGB 14 0 08/15/2022 0819    HGB 13 2 09/16/2015 0158    HGB 13 5 04/21/2015 1010    HGB 13 3 02/27/2015 1053    HGB 13 1 02/27/2015 1019    INR 1 10 08/16/2022 0817    INR 1 04 02/27/2015 1019    WBC 9 87 08/17/2022 0731    WBC 12 64 (H) 08/16/2022 0817    WBC 9 75 08/15/2022 0819    WBC 7 16 09/16/2015 0158    WBC 8 33 04/21/2015 1010    WBC 7 32 02/27/2015 1019    ESR 19 06/02/2014 0619       Meds:    Current Facility-Administered Medications:     acetaminophen (TYLENOL) tablet 975 mg, 975 mg, Oral, Q8H [address], Milli Sheldon MD, 975 mg at 08/16/22 2317    atorvastatin (LIPITOR) tablet 40 mg, 40 mg, Oral, Daily With Dinner, Milli Sheldon MD    calcium carbonate-vitamin D (OSCAL-D) 500 mg-200 units per tablet 1 tablet, 1 tablet, Oral, BID With Meals, Milli Sheldon MD, 1 tablet at 08/17/22 0840    COVID-19 Pfizer vac (jake sucrose, gray cap form) 0 3 mL, 0 3 mL, Intramuscular, Once, Milli Sheldon MD    heparin (porcine) subcutaneous injection 5,000 Units, 5,000 Units, Subcutaneous, Q8H [address], Milli Sheldon MD    HYDROmorphone HCl (DILAUDID) injection 0 2 mg, 0 2 mg, Intravenous, Q3H PRN, Milli Sheldon MD, 0 2 mg at 08/16/22 0935    insulin glargine (LANTUS) subcutaneous injection 20 Units 0 2 mL, 20 Units, Subcutaneous, HS, Milli Sheldon MD, 20 Units at 08/16/22 2317    insulin lispro (HumaLOG) 100 units/mL subcutaneous injection 1-5 Units, 1-5 Units, Subcutaneous, TID AC, 4 Units at 08/17/22 0820 **AND** Fingerstick Glucose (POCT), , , TID AC, Cristofer Byers MD    insulin lispro (HumaLOG) 100 units/mL subcutaneous injection 4-6 Units, 4-6 Units, Subcutaneous, TID With Meals, Cristofer Byers MD, 6 Units at 08/17/22 9968    lactated ringers infusion, 75 mL/hr, Intravenous, Continuous, Cristofer Byers MD, Last Rate: 75 mL/hr at 08/16/22 2045, 75 mL/hr at 08/16/22 2045    metoprolol tartrate (LOPRESSOR) tablet 25 mg, 25 mg, Oral, Q12H [facility] HOME, Cristofer Byers MD, 25 mg at 08/17/22 0840    ondansetron [facility]) injection 4 mg, 4 mg, Intravenous, Q6H PRN, Cristofer Byers MD, 4 mg at 08/15/22 2024    oxyCODONE (ROXICODONE) IR tablet 2 5 mg, 2 5 mg, Oral, Q6H PRN, 2 5 mg at 08/15/22 1651 **OR** oxyCODONE (ROXICODONE) IR tablet 5 mg, 5 mg, Oral, Q6H PRN, Cristofer Byers MD, 5 mg at 08/17/22 0839    Blood Culture:   Lab Results   Component Value Date    BLOODCX No Growth at 24 hrs  08/15/2022    BLOODCX No Growth at 24 hrs  08/15/2022       Wound Culture:   No results found for: WOUNDCULT    Ins and Outs:  I/O last 24 hours: In: 1800 [I V :1800]  Out: 100 [Urine:100]          Physical:  Vitals:    08/17/22 0734   BP: 142/78   Pulse: 90   Resp: 16   Temp:    SpO2: 94%     Musculoskeletal: right Lower Extremity  · Splint in place  · TTP ankle  · Unable to comply with detailed sensory exam due to mental status  · Unable to comply with detailed motor exam due to mental status  · 2 sec cap refill    Assessment:    82 y  o female POD 1 ORIF R ankle  Doing well        Plan:  · NWB RLE  · Will monitor for ABLA  · PT/OT  · Pain control  · DVT PPX: OK to resume home eliquis  · Dispo: Ortho will follow    Stacy Ball MD

## 2022-08-17 NOTE — PROGRESS NOTES
Upon attempting to do morning labs and administer morning medications, patient became extremely aggressive while violently swinging at staff and yelling  Patient could not be verbally redirected at this time and labs could not be obtained due to risk of harm to staff and patient  Mendy Shepherd made aware via [employer]
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
## 2022-08-17 NOTE — ASSESSMENT & PLAN NOTE
 Patient's with sleep apnea are at higher risk of respiratory depression secondary to opioid use   Attempt to minimize use of opioid pain medication while maintaining adequate analgesia

## 2022-08-17 NOTE — ANESTHESIA POSTPROCEDURE EVALUATION
Post-Op Assessment Note    CV Status:  Stable    Pain management: adequate     Mental Status:  Alert and awake   Hydration Status:  Euvolemic   PONV Controlled:  Controlled   Airway Patency:  Patent      Post Op Vitals Reviewed: Yes      Staff: CRNA, Anesthesiologist         No complications documented      BP   156/78   Temp   98   Pulse  67   Resp   15   SpO2   100

## 2022-08-17 NOTE — ASSESSMENT & PLAN NOTE
· Ortho consult for bimalleolar fracture s/p  ORIF POD#1  · Overnight had episode of agiattion/delirium  · Also cleared to restart Eliquis  · Pain management consult appreciated-recommended to DC Dilaudid and oxycodone 5 mg, continue oxycodone 2 5 mg q 6 hours p r n  and bowel regimen  · PT/OT after ortho clearance

## 2022-08-18 ENCOUNTER — APPOINTMENT (OUTPATIENT)
Dept: NEUROLOGY | Facility: CLINIC | Age: 87
DRG: 492 | End: 2022-08-18
Payer: COMMERCIAL

## 2022-08-18 ENCOUNTER — APPOINTMENT (INPATIENT)
Dept: RADIOLOGY | Facility: HOSPITAL | Age: 87
DRG: 492 | End: 2022-08-18
Payer: COMMERCIAL

## 2022-08-18 PROBLEM — R41.82 ALTERED MENTAL STATUS: Status: ACTIVE | Noted: 2022-08-18

## 2022-08-18 LAB
ALBUMIN SERPL BCP-MCNC: 3.2 G/DL (ref 3.5–5)
ALP SERPL-CCNC: 68 U/L (ref 46–116)
ALT SERPL W P-5'-P-CCNC: 19 U/L (ref 12–78)
AMMONIA PLAS-SCNC: 21 UMOL/L (ref 11–35)
ANION GAP SERPL CALCULATED.3IONS-SCNC: 3 MMOL/L (ref 4–13)
ANION GAP SERPL CALCULATED.3IONS-SCNC: 3 MMOL/L (ref 4–13)
AST SERPL W P-5'-P-CCNC: 31 U/L (ref 5–45)
BASE EXCESS BLDA CALC-SCNC: 8 MMOL/L (ref -2–3)
BASOPHILS # BLD AUTO: 0.09 THOUSANDS/ΜL (ref 0–0.1)
BASOPHILS NFR BLD AUTO: 1 % (ref 0–1)
BILIRUB SERPL-MCNC: 0.52 MG/DL (ref 0.2–1)
BUN SERPL-MCNC: 47 MG/DL (ref 5–25)
BUN SERPL-MCNC: 47 MG/DL (ref 5–25)
CA-I BLD-SCNC: 1.26 MMOL/L (ref 1.12–1.32)
CALCIUM ALBUM COR SERPL-MCNC: 10.1 MG/DL (ref 8.3–10.1)
CALCIUM SERPL-MCNC: 9.5 MG/DL (ref 8.3–10.1)
CALCIUM SERPL-MCNC: 9.5 MG/DL (ref 8.3–10.1)
CHLORIDE SERPL-SCNC: 103 MMOL/L (ref 96–108)
CHLORIDE SERPL-SCNC: 103 MMOL/L (ref 96–108)
CK MB SERPL-MCNC: 1.6 % (ref 0–2.5)
CK MB SERPL-MCNC: 4.9 NG/ML (ref 0–5)
CK SERPL-CCNC: 303 U/L (ref 26–192)
CO2 SERPL-SCNC: 31 MMOL/L (ref 21–32)
CO2 SERPL-SCNC: 31 MMOL/L (ref 21–32)
CREAT SERPL-MCNC: 1.4 MG/DL (ref 0.6–1.3)
CREAT SERPL-MCNC: 1.4 MG/DL (ref 0.6–1.3)
EOSINOPHIL # BLD AUTO: 0.13 THOUSAND/ΜL (ref 0–0.61)
EOSINOPHIL NFR BLD AUTO: 1 % (ref 0–6)
ERYTHROCYTE [DISTWIDTH] IN BLOOD BY AUTOMATED COUNT: 14.9 % (ref 11.6–15.1)
FIO2 GAS DIL.REBREATH: 0.21 L
GFR SERPL CREATININE-BSD FRML MDRD: 34 ML/MIN/1.73SQ M
GFR SERPL CREATININE-BSD FRML MDRD: 34 ML/MIN/1.73SQ M
GLUCOSE SERPL-MCNC: 106 MG/DL (ref 65–140)
GLUCOSE SERPL-MCNC: 110 MG/DL (ref 65–140)
GLUCOSE SERPL-MCNC: 128 MG/DL (ref 65–140)
GLUCOSE SERPL-MCNC: 128 MG/DL (ref 65–140)
GLUCOSE SERPL-MCNC: 137 MG/DL (ref 65–140)
GLUCOSE SERPL-MCNC: 153 MG/DL (ref 65–140)
GLUCOSE SERPL-MCNC: 181 MG/DL (ref 65–140)
GLUCOSE SERPL-MCNC: 228 MG/DL (ref 65–140)
GLUCOSE SERPL-MCNC: 58 MG/DL (ref 65–140)
GLUCOSE SERPL-MCNC: 68 MG/DL (ref 65–140)
HCO3 BLDA-SCNC: 32 MMOL/L (ref 22–28)
HCT VFR BLD AUTO: 33.3 % (ref 34.8–46.1)
HCT VFR BLD CALC: 32 % (ref 34.8–46.1)
HGB BLD-MCNC: 10.8 G/DL (ref 11.5–15.4)
HGB BLDA-MCNC: 10.9 G/DL (ref 11.5–15.4)
IMM GRANULOCYTES # BLD AUTO: 0.03 THOUSAND/UL (ref 0–0.2)
IMM GRANULOCYTES NFR BLD AUTO: 0 % (ref 0–2)
LYMPHOCYTES # BLD AUTO: 2.3 THOUSANDS/ΜL (ref 0.6–4.47)
LYMPHOCYTES NFR BLD AUTO: 21 % (ref 14–44)
MCH RBC QN AUTO: 30.4 PG (ref 26.8–34.3)
MCHC RBC AUTO-ENTMCNC: 32.4 G/DL (ref 31.4–37.4)
MCV RBC AUTO: 94 FL (ref 82–98)
MONOCYTES # BLD AUTO: 1.05 THOUSAND/ΜL (ref 0.17–1.22)
MONOCYTES NFR BLD AUTO: 10 % (ref 4–12)
NEUTROPHILS # BLD AUTO: 7.22 THOUSANDS/ΜL (ref 1.85–7.62)
NEUTS SEG NFR BLD AUTO: 67 % (ref 43–75)
NRBC BLD AUTO-RTO: 0 /100 WBCS
PCO2 BLD: 33 MMOL/L (ref 21–32)
PCO2 BLD: 42.5 MM HG (ref 36–44)
PH BLD: 7.49 [PH] (ref 7.35–7.45)
PLATELET # BLD AUTO: 212 THOUSANDS/UL (ref 149–390)
PMV BLD AUTO: 11.6 FL (ref 8.9–12.7)
PO2 BLD: 68 MM HG (ref 75–129)
POTASSIUM BLD-SCNC: 3.5 MMOL/L (ref 3.5–5.3)
POTASSIUM SERPL-SCNC: 3.5 MMOL/L (ref 3.5–5.3)
POTASSIUM SERPL-SCNC: 3.5 MMOL/L (ref 3.5–5.3)
PROT SERPL-MCNC: 5.8 G/DL (ref 6.4–8.4)
RBC # BLD AUTO: 3.55 MILLION/UL (ref 3.81–5.12)
SAO2 % BLD FROM PO2: 94 % (ref 60–85)
SODIUM BLD-SCNC: 138 MMOL/L (ref 136–145)
SODIUM SERPL-SCNC: 137 MMOL/L (ref 135–147)
SODIUM SERPL-SCNC: 137 MMOL/L (ref 135–147)
SPECIMEN SOURCE: ABNORMAL
WBC # BLD AUTO: 10.82 THOUSAND/UL (ref 4.31–10.16)

## 2022-08-18 PROCEDURE — 80048 BASIC METABOLIC PNL TOTAL CA: CPT | Performed by: INTERNAL MEDICINE

## 2022-08-18 PROCEDURE — 97530 THERAPEUTIC ACTIVITIES: CPT

## 2022-08-18 PROCEDURE — G1004 CDSM NDSC: HCPCS

## 2022-08-18 PROCEDURE — 99232 SBSQ HOSP IP/OBS MODERATE 35: CPT | Performed by: INTERNAL MEDICINE

## 2022-08-18 PROCEDURE — 95819 EEG AWAKE AND ASLEEP: CPT | Performed by: PSYCHIATRY & NEUROLOGY

## 2022-08-18 PROCEDURE — 82553 CREATINE MB FRACTION: CPT

## 2022-08-18 PROCEDURE — 82947 ASSAY GLUCOSE BLOOD QUANT: CPT

## 2022-08-18 PROCEDURE — 84295 ASSAY OF SERUM SODIUM: CPT

## 2022-08-18 PROCEDURE — 80053 COMPREHEN METABOLIC PANEL: CPT | Performed by: INTERNAL MEDICINE

## 2022-08-18 PROCEDURE — 85025 COMPLETE CBC W/AUTO DIFF WBC: CPT | Performed by: INTERNAL MEDICINE

## 2022-08-18 PROCEDURE — 82803 BLOOD GASES ANY COMBINATION: CPT

## 2022-08-18 PROCEDURE — 99222 1ST HOSP IP/OBS MODERATE 55: CPT | Performed by: PSYCHIATRY & NEUROLOGY

## 2022-08-18 PROCEDURE — 99232 SBSQ HOSP IP/OBS MODERATE 35: CPT

## 2022-08-18 PROCEDURE — 70496 CT ANGIOGRAPHY HEAD: CPT

## 2022-08-18 PROCEDURE — 99233 SBSQ HOSP IP/OBS HIGH 50: CPT | Performed by: INTERNAL MEDICINE

## 2022-08-18 PROCEDURE — 82330 ASSAY OF CALCIUM: CPT

## 2022-08-18 PROCEDURE — 82948 REAGENT STRIP/BLOOD GLUCOSE: CPT

## 2022-08-18 PROCEDURE — 97112 NEUROMUSCULAR REEDUCATION: CPT

## 2022-08-18 PROCEDURE — 82550 ASSAY OF CK (CPK): CPT

## 2022-08-18 PROCEDURE — 70498 CT ANGIOGRAPHY NECK: CPT

## 2022-08-18 PROCEDURE — NC001 PR NO CHARGE: Performed by: ORTHOPAEDIC SURGERY

## 2022-08-18 PROCEDURE — 85014 HEMATOCRIT: CPT

## 2022-08-18 PROCEDURE — 84132 ASSAY OF SERUM POTASSIUM: CPT

## 2022-08-18 PROCEDURE — 97535 SELF CARE MNGMENT TRAINING: CPT

## 2022-08-18 PROCEDURE — 82140 ASSAY OF AMMONIA: CPT | Performed by: INTERNAL MEDICINE

## 2022-08-18 PROCEDURE — 95816 EEG AWAKE AND DROWSY: CPT

## 2022-08-18 RX ORDER — LORAZEPAM 2 MG/ML
2 INJECTION INTRAMUSCULAR ONCE
Status: DISCONTINUED | OUTPATIENT
Start: 2022-08-18 | End: 2022-08-19

## 2022-08-18 RX ORDER — LORAZEPAM 2 MG/ML
INJECTION INTRAMUSCULAR
Status: COMPLETED
Start: 2022-08-18 | End: 2022-08-18

## 2022-08-18 RX ORDER — INSULIN GLARGINE 100 [IU]/ML
25 INJECTION, SOLUTION SUBCUTANEOUS
Status: DISCONTINUED | OUTPATIENT
Start: 2022-08-18 | End: 2022-08-22 | Stop reason: HOSPADM

## 2022-08-18 RX ORDER — MIDAZOLAM HYDROCHLORIDE 2 MG/2ML
INJECTION, SOLUTION INTRAMUSCULAR; INTRAVENOUS
Status: DISPENSED
Start: 2022-08-18 | End: 2022-08-19

## 2022-08-18 RX ORDER — INSULIN LISPRO 100 [IU]/ML
10 INJECTION, SOLUTION INTRAVENOUS; SUBCUTANEOUS
Status: DISCONTINUED | OUTPATIENT
Start: 2022-08-18 | End: 2022-08-21

## 2022-08-18 RX ADMIN — IOHEXOL 80 ML: 350 INJECTION, SOLUTION INTRAVENOUS at 14:41

## 2022-08-18 RX ADMIN — Medication 1 TABLET: at 08:02

## 2022-08-18 RX ADMIN — ACETAMINOPHEN 975 MG: 325 TABLET ORAL at 08:02

## 2022-08-18 RX ADMIN — APIXABAN 5 MG: 5 TABLET, FILM COATED ORAL at 08:02

## 2022-08-18 RX ADMIN — LORAZEPAM 2 MG: 2 INJECTION INTRAMUSCULAR; INTRAVENOUS at 14:06

## 2022-08-18 RX ADMIN — INSULIN LISPRO 10 UNITS: 100 INJECTION, SOLUTION INTRAVENOUS; SUBCUTANEOUS at 18:44

## 2022-08-18 RX ADMIN — INSULIN GLARGINE 25 UNITS: 100 INJECTION, SOLUTION SUBCUTANEOUS at 22:44

## 2022-08-18 RX ADMIN — TORSEMIDE 20 MG: 20 TABLET ORAL at 08:02

## 2022-08-18 RX ADMIN — INSULIN LISPRO 15 UNITS: 100 INJECTION, SOLUTION INTRAVENOUS; SUBCUTANEOUS at 08:03

## 2022-08-18 RX ADMIN — METOPROLOL TARTRATE 25 MG: 25 TABLET, FILM COATED ORAL at 08:02

## 2022-08-18 NOTE — PROGRESS NOTES
[address] [facility]  Progress Note - [place] 1935, 82 y o  female MRN: [id number]  Unit/Bed#: [facility] 800-72 Encounter: [id number]  Primary Care Provider: Aysha Balbuena MD   Date and time admitted to hospital: 8/15/2022  7:51 AM    Altered mental status  Assessment & Plan  Had rapid response today for altered mental status  Patient was at her baseline this morning however later nurse stated that patient states she felt like she was going to pass out and then became lethargic  Rapid response team stated could be possible seizure-like activity and Ativan was given during rapid response  Patient was then transported to CT brain stat no obvious acute CVA  Neurology was consulted  Patient now back to baseline, actually started becoming slightly agitated  As per neurology EEG and if another episode of altered mental status to give Keppra and not  Ativan  Discussed with son Shay Gong, informed about rapid response episode    Verified patient is still full code    * Closed right ankle fracture  Assessment & Plan  · Ortho consult for bimalleolar fracture s/p  ORIF POD#2  · Also cleared to restart Eliquis  · Pain management consult appreciated-recommended to DC Dilaudid and oxycodone 5 mg, continue oxycodone 2 5 mg q 6 hours p r n  and bowel regimen  · PT/OT after ortho clearance-recommended acute rehab      Foul smelling urine  Assessment & Plan  · UA ruled out UTI  · Bladder scan unremarkable    Chest pain  Assessment & Plan  · With diaphoresis and bradycardia during ankle spliting  · Cardio consult for pre op clearance apprecaited- cleared with mod to high risk, echo done    Type 2 diabetes mellitus with hyperglycemia, with long-term current use of insulin [facility])  Assessment & Plan  Lab Results   Component Value Date    HGBA1C 7 3 (H) 07/30/2022       Recent Labs     08/18/22  1133 08/18/22  1217 08/18/22  1259 08/18/22  1338   POCGLU 58* 68 106 137       Blood Sugar Average: Last 72 hrs:  (P) 166 92     · Uses VGO pump at home which will be d/c as pt not competent to use it and dtr on vacation  · Endo consult as BSs high  · This morning had episode of hypoglycemia hands and the current adjusted Lantus and Humalog does    PAF (paroxysmal atrial fibrillation) (HCC)  Assessment & Plan  · C/w BB  · Ortho cleared to restart Eliquis    Chronic diastolic congestive heart failure ([facility] )  Assessment & Plan  Wt Readings from Last 3 Encounters:   08/15/22 95 3 kg (210 lb)   08/01/22 95 3 kg (210 lb)   02/14/22 95 kg (209 lb 7 oz)   · Hold torsemide for OR  · Repeat echo done on 08/15/2022 shows normal EF and grade 2 diastolic dysfunction  · Cardio follow-up appreciated, restarted torsemide  Hold for blood pressure below 100          Obstructive sleep apnea  Assessment & Plan  Use cpap    Dementia without behavioral disturbance ([facility] [address] )  Assessment & Plan  · Family will make medical decisions  · Supportive care        VTE Pharmacologic Prophylaxis: VTE Score: 9 Moderate Risk (Score 3-4) - Pharmacological DVT Prophylaxis Ordered: apixaban (Eliquis)  Patient Centered Rounds: I performed bedside rounds with nursing staff today  Discussions with Specialists or Other Care Team Provider: neurology, cc    Education and Discussions with Family / Patient: Updated  (son) at bedside  Time Spent for Care: 45 minutes  More than 50% of total time spent on counseling and coordination of care as described above  Current Length of Stay: 3 day(s)  Current Patient Status: Inpatient   Certification Statement: The patient will continue to require additional inpatient hospital stay due to on going medical treatment  Discharge Plan: Anticipate discharge in 48-72 hrs to rehab facility  Code Status: Level 1 - Full Code    Subjective:   During morning rounds patient was stable at baseline, sister was radiating and patient had no complaints  Patient was at her baseline mental status    However later during the afternoon rapid response was called for altered mental status and possible seizure-like activity  CT brain stat done rule out acute CVA  Objective:     Vitals:   Temp (24hrs), Av 1 °F (36 7 °C), Min:97 3 °F (36 3 °C), Max:98 9 °F (37 2 °C)    Temp:  [97 3 °F (36 3 °C)-98 9 °F (37 2 °C)] 97 3 °F (36 3 °C)  HR:  [] 69  Resp:  [16-18] 16  BP: ()/(48-82) 88/57  SpO2:  [93 %-97 %] 94 %  Body mass index is 34 95 kg/m²  Input and Output Summary (last 24 hours): Intake/Output Summary (Last 24 hours) at 2022 1730  Last data filed at 2022 1500  Gross per 24 hour   Intake 120 ml   Output 2800 ml   Net -2680 ml       Physical Exam:   Physical Exam  Vitals and nursing note reviewed  Constitutional:       General: She is not in acute distress  Appearance: She is well-developed  She is ill-appearing  HENT:      Head: Normocephalic and atraumatic  Mouth/Throat:      Mouth: Mucous membranes are moist    Eyes:      Conjunctiva/sclera: Conjunctivae normal    Cardiovascular:      Rate and Rhythm: Normal rate and regular rhythm  Heart sounds: No murmur heard  Pulmonary:      Effort: Pulmonary effort is normal  No respiratory distress  Breath sounds: Normal breath sounds  Abdominal:      General: Bowel sounds are normal       Palpations: Abdomen is soft  Tenderness: There is no abdominal tenderness  Musculoskeletal:         General: Normal range of motion  Cervical back: Normal range of motion and neck supple  Skin:     General: Skin is warm and dry  Neurological:      Mental Status: She is alert        Comments: During rounds patient was at her baseline mental status  During rapid response she was lethargic, altered after rapid response she became agitated  No focal deficit        Additional Data:     Labs:  Results from last 7 days   Lab Units 22  1411 22  1405   WBC Thousand/uL  --  10 82*   HEMOGLOBIN g/dL  --  10 8*   I STAT HEMOGLOBIN g/dl 10 9*  --    HEMATOCRIT %  --  33 3*   HEMATOCRIT, ISTAT % 32*  --    PLATELETS Thousands/uL  --  212   NEUTROS PCT %  --  67   LYMPHS PCT %  --  21   MONOS PCT %  --  10   EOS PCT %  --  1     Results from last 7 days   Lab Units 08/18/22  1411 08/18/22  1405   SODIUM mmol/L  --  137  137   POTASSIUM mmol/L  --  3 5  3 5   CHLORIDE mmol/L  --  103  103   CO2 mmol/L  --  31  31   CO2, I-STAT mmol/L 33*  --    BUN mg/dL  --  47*  47*   CREATININE mg/dL  --  1 40*  1 40*   ANION GAP mmol/L  --  3*  3*   CALCIUM mg/dL  --  9 5  9 5   ALBUMIN g/dL  --  3 2*   TOTAL BILIRUBIN mg/dL  --  0 52   ALK PHOS U/L  --  68   ALT U/L  --  19   AST U/L  --  31   GLUCOSE RANDOM mg/dL  --  128  128     Results from last 7 days   Lab Units 08/16/22  0817   INR  1 10     Results from last 7 days   Lab Units 08/18/22  1338 08/18/22  1259 08/18/22  1217 08/18/22  1133 08/18/22  0735 08/17/22  2103 08/17/22  1636 08/17/22  1137 08/17/22  0819 08/16/22  2205 08/16/22  2039 08/16/22  1437   POC GLUCOSE mg/dl 137 106 68 58* 110 145* 257* 300* 358* 229* 206* 151*         Results from last 7 days   Lab Units 08/15/22  0819   LACTIC ACID mmol/L 1 4   PROCALCITONIN ng/ml 0 05       Lines/Drains:  Invasive Devices  Report    Peripheral Intravenous Line  Duration           Peripheral IV 08/15/22 Right Antecubital 3 days    Peripheral IV 08/15/22 Distal;Dorsal (posterior); Right Forearm 2 days    Peripheral IV 08/18/22 Right Arm <1 day          Drain  Duration           External Urinary Catheter 3 days                  Telemetry:  Telemetry Orders (From admission, onward)             48 Hour Telemetry Monitoring  Continuous x 48 hours        References:    Telemetry Guidelines   Question:  Reason for 48 Hour Telemetry  Answer:  Acute CVA (<24 hrs old, hemispheric strokes, selected brainstem strokes, cardiac arrhythmias)                 Telemetry Reviewed: Normal Sinus Rhythm  Indication for Continued Telemetry Use: Metabolic/electrolyte disturbance with high probability of dysrhythmia             Imaging: Reviewed radiology reports from this admission including: CT head    Recent Cultures (last 7 days):   Results from last 7 days   Lab Units 08/15/22  0819   BLOOD CULTURE  No Growth at 72 hrs  No Growth at 72 hrs  Last 24 Hours Medication List:   Current Facility-Administered Medications   Medication Dose Route Frequency Provider Last Rate    acetaminophen  975 mg Oral [facility] Mauro Little MD      apixaban  5 mg Oral BID Kelsea Oreilly MD      atorvastatin  40 mg Oral Daily With Ny Dorsey MD      calcium carbonate-vitamin D  1 tablet Oral BID With Meals Mauro Little MD      insulin glargine  25 Units Subcutaneous HS Rosa Abarca MD      insulin lispro  10 Units Subcutaneous TID With Meals Rosa Abarca MD      LORazepam  2 mg Intravenous Once Kalpenny Randolph PA-C      metoprolol tartrate  25 mg Oral Q12H [address] Eugene Mancuso MD      midazolam           ondansetron  4 mg Intravenous Q6H PRN Mauro Little MD      oxyCODONE  2 5 mg Oral Q6H PRN Mauro Little MD      senna-docusate sodium  1 tablet Oral HS Kelsea Oreilly MD          Today, Patient Was Seen By: Kelsea Oreilly MD    **Please Note: This note may have been constructed using a voice recognition system  **

## 2022-08-18 NOTE — ASSESSMENT & PLAN NOTE
Wt Readings from Last 3 Encounters:   08/15/22 95 3 kg (210 lb)   08/01/22 95 3 kg (210 lb)   02/14/22 95 kg (209 lb 7 oz)   · Hold torsemide for OR  · Repeat echo done on 08/15/2022 shows normal EF and grade 2 diastolic dysfunction  · Cardio follow-up appreciated, restarted torsemide    Hold for blood pressure below 100

## 2022-08-18 NOTE — PLAN OF CARE
Problem: OCCUPATIONAL THERAPY ADULT  Goal: Performs self-care activities at highest level of function for planned discharge setting  See evaluation for individualized goals  Description: Treatment Interventions: ADL retraining, Functional transfer training, Endurance training, Cognitive reorientation, Patient/family training, Equipment evaluation/education, Activityengagement, Energy conservation          See flowsheet documentation for full assessment, interventions and recommendations  Outcome: Progressing  Note: Limitation: Decreased ADL status, Decreased Safe judgement during ADL, Decreased cognition, Decreased endurance, Decreased self-care trans, Decreased high-level ADLs  Prognosis: Fair  Assessment: Patient participated in Skilled OT session this date with interventions consisting of ADL re training with the use of correct body mechnaics, Energy Conservation techniques,  therapeutic activities to: increase activity tolerance and increase trunk control   Patient agreeable to OT treatment session, upon arrival patient was found supine in bed  In comparison to previous session, patient with improvements in attention to task*   Patient requiring frequent rest periods and ocassional safety reminders  Patient continues to be functioning below baseline level, occupational performance remains limited secondary to factors listed above and increased risk for falls and injury  From OT standpoint, recommendation at time of d/c would be Short Term Rehab  Patient to benefit from continued Occupational Therapy treatment while in the hospital to address deficits as defined above and maximize level of functional independence with ADLs and functional mobility  The patient's raw score on the AM-PAC Daily Activity inpatient short form is 14, standardized score is 33 39, less than 39 4  Patients at this level are likely to benefit from discharge to post-acute rehabilitation services   Please refer to the recommendation of the Occupational Therapist for safe discharge planning       OT Discharge Recommendation: Post acute rehabilitation services

## 2022-08-18 NOTE — ASSESSMENT & PLAN NOTE
Had rapid response today for altered mental status  Patient was at her baseline this morning however later nurse stated that patient states she felt like she was going to pass out and then became lethargic  Rapid response team stated could be possible seizure-like activity and Ativan was given during rapid response  Patient was then transported to CT brain stat no obvious acute CVA  Neurology was consulted  Patient now back to baseline, actually started becoming slightly agitated  As per neurology EEG and if another episode of altered mental status to give Keppra and not  Ativan  Discussed with son Sylvia Radha, informed about rapid response episode    Verified patient is still full code

## 2022-08-18 NOTE — ASSESSMENT & PLAN NOTE
Lab Results   Component Value Date    HGBA1C 7 3 (H) 07/30/2022       Recent Labs     08/18/22  1133 08/18/22  1217 08/18/22  1259 08/18/22  1338   POCGLU 58* 68 106 137       Blood Sugar Average: Last 72 hrs:  (P) 166 92     · Uses VGO pump at home which will be d/c as pt not competent to use it and dtr on vacation  · Endo consult as BSs high  · This morning had episode of hypoglycemia hands and the current adjusted Lantus and Humalog does

## 2022-08-18 NOTE — PROGRESS NOTES
Progress Note - [place] 82 y o  female MRN: [id number]  Unit/Bed#: [facility] 818-01      Subjective:    82 y  o female POD 2 ORIF R ankle  No overnight events, continues to be agitated       Labs:  0   Lab Value Date/Time    HCT 38 2 08/17/2022 0731    HCT 41 5 08/16/2022 0817    HCT 43 4 08/15/2022 0819    HCT 38 3 09/16/2015 0158    HCT 39 1 04/21/2015 1010    HCT 38 3 02/27/2015 1019    HGB 12 0 08/17/2022 0731    HGB 13 1 08/16/2022 0817    HGB 14 0 08/15/2022 0819    HGB 13 2 09/16/2015 0158    HGB 13 5 04/21/2015 1010    HGB 13 3 02/27/2015 1053    HGB 13 1 02/27/2015 1019    INR 1 10 08/16/2022 0817    INR 1 04 02/27/2015 1019    WBC 9 87 08/17/2022 0731    WBC 12 64 (H) 08/16/2022 0817    WBC 9 75 08/15/2022 0819    WBC 7 16 09/16/2015 0158    WBC 8 33 04/21/2015 1010    WBC 7 32 02/27/2015 1019    ESR 19 06/02/2014 0619       Meds:    Current Facility-Administered Medications:     acetaminophen (TYLENOL) tablet 975 mg, 975 mg, Oral, Q8H [address], Stephen Kan MD, 975 mg at 08/18/22 0802    apixaban (ELIQUIS) tablet 5 mg, 5 mg, Oral, BID, Maulik Farooq MD, 5 mg at 08/18/22 0802    atorvastatin (LIPITOR) tablet 40 mg, 40 mg, Oral, Daily With Elaina Conn MD, 40 mg at 08/17/22 1742    calcium carbonate-vitamin D (OSCAL-D) 500 mg-200 units per tablet 1 tablet, 1 tablet, Oral, BID With Meals, Stephen Kan MD, 1 tablet at 08/18/22 0802    insulin glargine (LANTUS) subcutaneous injection 28 Units 0 28 mL, 28 Units, Subcutaneous, HS, Monika Angulo MD, 28 Units at 08/17/22 2129    insulin lispro (HumaLOG) 100 units/mL subcutaneous injection 10-15 Units, 10-15 Units, Subcutaneous, TID With Meals, Monika Angulo MD, 15 Units at 08/18/22 0803    metoprolol tartrate (LOPRESSOR) tablet 25 mg, 25 mg, Oral, Q12H [address], Stephen Kan MD, 25 mg at 08/18/22 0802    ondansetron (ZOFRAN) injection 4 mg, 4 mg, Intravenous, Q6H PRN, Stephen Kan MD, 4 mg at 08/15/22 2024    oxyCODONE (ROXICODONE) IR tablet 2 5 mg, 2 5 mg, Oral, Q6H PRN, 2 5 mg at 08/15/22 1651 **OR** [DISCONTINUED] oxyCODONE (ROXICODONE) IR tablet 5 mg, 5 mg, Oral, Q6H PRN, Cassia Brambila MD, 5 mg at 08/17/22 0839    senna-docusate sodium (SENOKOT S) 8 6-50 mg per tablet 1 tablet, 1 tablet, Oral, HS, Valentino Hansen, MD, 1 tablet at 08/17/22 2129    torsemide (DEMADEX) tablet 20 mg, 20 mg, Oral, Daily, Valentino Hansen, MD, 20 mg at 08/18/22 0802    Blood Culture:   Lab Results   Component Value Date    BLOODCX No Growth at 48 hrs  08/15/2022    BLOODCX No Growth at 48 hrs  08/15/2022       Wound Culture:   No results found for: WOUNDCULT    Ins and Outs:  I/O last 24 hours: In: -   Out: 500 [Urine:500]          Physical:  Vitals:    08/18/22 0736   BP: 113/78   Pulse: 70   Resp: 16   Temp: 97 7 °F (36 5 °C)   SpO2: 97%     Musculoskeletal: right Lower Extremity  · Splint in place  · TTP ankle  · Unable to comply with detailed sensory exam due to mental status  · Unable to comply with detailed motor exam due to mental status  · 2 sec cap refill    Assessment:    82 y  o female POD 2 ORIF R ankle  Doing well  Plan:  · NWB RLE  · Will monitor for ABLA  · PT/OT  · Pain control  · DVT PPX: ok to resume home anticoagulation of eliquis  · Dispo:  Ok to discharge from ortho standpoint    Dakotah Pelayo MD

## 2022-08-18 NOTE — RAPID RESPONSE
Rapid Response Note  Juan Carlos Hall 82 y o  female MRN: [id number]  [facility] Encounter: [id number]    Rapid Response Notification(s):   Response called date/time:  8/18/2022 1:50 PM  Response team arrival date/time:  8/18/2022 1:55 PM  Response end date/time:  8/18/2022 2:52 PM  [facility]  Rapid response location:  [facility] unit  Primary reason for rapid response call:  Acute change in neuro status    Rapid Response Intervention(s):   Airway:  None  Breathing:  None  Circulation:  None  Fluids administered:  None  Medications administered:  Lorazepam       Assessment:   · RRT called for AMS, given decreased GCS of [id number] on arrival  · No pain medications/sedatives administered today  · Noted Roving eye movements during rapid response    Plan:   · 2mg Ativan given for suspected seizure activity  · EKG obtained, NSR  · Stat CTA appears unremarkable on wet read  · Neuro consulted  · F/u CT results  · To remain on P8 pending neurology recs     Rapid Response Outcome:   Transfer:  Remain on floor  Code Status: Level 1 (Full Code)         Background/Situation:   Juan Carlos Hall is a 82 y o  female with a  PMHx of CHF CAD, AS, HTN BRANT who presented to [facility] 8/15 with a right ankle fracture sustained after a fall  Now POD 2  RRT called for AMS, initial GCS of 9, noted rapid eye movement, the receiving 2mg of IV ativan  Went for stat CTA revealing no acute intracranial hemorrhage, severe stenosis of right vertebral artery  50% stenosis of distal right carotid artery  Plan to return to P8 at this time with neurology consult  Review of Systems   Unable to perform ROS: Mental status change       Objective:   Vitals:    08/18/22 1355 08/18/22 1356 08/18/22 1418 08/18/22 1419   BP: 142/77 142/77  124/82   Pulse: 91 103 64    Resp:   16    Temp:       TempSrc:       SpO2: 94% 95% 96%    Weight:       Height:         Physical Exam  Vitals and nursing note reviewed     HENT:      Head: Normocephalic and atraumatic  Right Ear: External ear normal       Left Ear: External ear normal       Nose: Nose normal       Mouth/Throat:      Mouth: Mucous membranes are dry  Pharynx: Oropharynx is clear  Eyes:      Pupils: Pupils are equal, round, and reactive to light  Comments: Roving eye movements noted during rapid response   Cardiovascular:      Rate and Rhythm: Normal rate and regular rhythm  Pulses:           Radial pulses are 2+ on the right side and 2+ on the left side  Dorsalis pedis pulses are 1+ on the right side and 1+ on the left side  Heart sounds: S1 normal and S2 normal    Pulmonary:      Effort: Pulmonary effort is normal  No respiratory distress  Breath sounds: Normal breath sounds  Abdominal:      General: Abdomen is flat  Bowel sounds are normal  There is no distension  Palpations: Abdomen is soft  Tenderness: There is no abdominal tenderness  Musculoskeletal:      Right lower leg: Edema present  Left lower leg: Edema present  Comments: Nonpitting b/l LE edema   Skin:     General: Skin is warm and dry  Capillary Refill: Capillary refill takes less than 2 seconds  Neurological:      Mental Status: She is lethargic  GCS: GCS eye subscore is 3  GCS verbal subscore is 4  GCS motor subscore is 6  Portions of the record may have been created with voice recognition software  Occasional wrong word or "sound a like" substitutions may have occurred due to the inherent limitations of voice recognition software  Read the chart carefully and recognize, using context, where substitutions have occurred      Dionte Akerssins

## 2022-08-18 NOTE — PLAN OF CARE
Problem: PHYSICAL THERAPY ADULT  Goal: Performs mobility at highest level of function for planned discharge setting  See evaluation for individualized goals  Description: Treatment/Interventions: LE strengthening/ROM, Functional transfer training, Therapeutic exercise, Endurance training, Cognitive reorientation, Patient/family training, Equipment eval/education, Bed mobility          See flowsheet documentation for full assessment, interventions and recommendations  Outcome: Progressing  Note: Prognosis: Guarded  Problem List: Decreased strength, Decreased endurance, Impaired balance, Decreased mobility, Decreased safety awareness, Pain, Orthopedic restrictions, Decreased cognition  Assessment: The patient was pleasant this morning, and amenable to therapy  She was able to transition to the side of the bed with less assistance today, but for all other activities she required more assistance  She required constant mod-maximal assistance in order to maintain her sitting balance, and this progressively worsened as she fatigued  The patient was cognizant of her being NWB RLE, and she was able to maintain this while seated  She continues to require assistance of two-three people for any functional mobility along the edge of the bed, and standing is not appropriate today as she has much less balance than the prior session  She was returned to bed with all needs within reach and the alarm active  Would recommend dependent means for out of bed at this time  Barriers to Discharge: Inaccessible home environment, Decreased caregiver support     PT Discharge Recommendation: Post acute rehabilitation services    See flowsheet documentation for full assessment

## 2022-08-18 NOTE — CONSULTS
NEUROLOGY RESIDENCY CONSULT NOTE     Name: Raisa Guardado   Age & Sex: 82 y o  female   MRN: [id number]  Unit/Bed#: [facility] 80-26   Encounter: [id number]  Length of Stay: 3    ASSESSMENT & PLAN     Altered mental status  Assessment & Plan  81yo w/ PMH of PAF on eliquis (paused due to ORIF), CAD, asthma, CHF, dementia, HLD, past MI, dementia, coming in ORIF surgery where eliquis was held  This is POD#2  RRT was called Patient stating she did not feel well  Began having decreased mentation to verbal/noxious stimuli  Rapid Response called around 2pm in patient was noted to have some eye deviation up and to the right  She also had some roving eyes her critical care team/rapid response team   She was given Ativan 2 mg 2 times for her noted altered mental status/unresponsive episode  She got back to baseline a few minutes after the 2nd dose of Ativan was given  Per nursing, soon after patient came back from CT patient has remained agitated and aggressive compared to her baseline that is pleasantly demented  W/U:  CTA h/n: 1  Advanced, chronic microangiopathy is similar to the prior head CT from 3 days ago  No acute intracranial hemorrhage  No obvious large evolving territorial infarction  2   Advanced, 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  3   Approximately 50% stenosis of the distal right common carotid artery immediately proximal to the common carotid bifurcation   (BG 58 11:30am)  CMP had noted NEGRA of 1 40  WBC 10 82, Hgb 10 8 <- 12 0  Ammonia WNL   ABG: pH 7 485/42  5/68/32 0      Impression: At this time unsure was a seizure as patient did not exhibit any generalized tonic-clonic activity but was more so unresponsive to verbal and noxious stimuli    Per rapid response team eyes were deviating up into the right and eyes were rolling back and forth which can be possible concern for subclinical seizures but unsure at this time      Plan   - will get routine EEG  - if no improvement in mentation, can consider getting MRI brain to evaluate given was off eliquis for procedure and also possible concern for possible fat embolus with procedure  - no indication for Ativan at this time and please do not give any as patient is not having generalized tonic-clonic seizures  - if patient has another episode similar to her presentation at the rapid response, please give bolus of 1 g of Keppra  - please alert Neurology team or neurology on call if patient has any further episodes for further evaluation  - for noted hypoplastic vertebral arteries would recommend avoiding hypotension          Recommendations for outpatient neurological follow up have yet to be determined  SUBJECTIVE     Reason for Consult / Principal Problem:  Altered mental status, concern for possible seizure  Hx and PE limited by:  Patient's has baseline dementia and not willing to answer questions/participate in exam    HPI: Cindy Mcknight is a 82 y o   female w/ a PMH of PAF on eliquis (paused due to ORIF), CAD, asthma, CHF, dementia, HLD, past MI, dementia coming in ORIF surgery where eliquis was held  This is POD#2  She's had a previous episode of AMS on 08/15 after a fall and CTH was negative that seems to be closer to delirium  Restarted home eliquis yesterday 5mg BID  RRT was called Patient stating she did not feel well  Began having decreased mentation to verbal/noxious stimuli  Rapid Response called around 2pm in patient was noted to have some eye deviation up and to the right  She also had some roving eyes her critical care team/rapid response team   She was given Ativan 2 mg 2 times for her noted altered mental status/unresponsive episode  She got back to baseline a few minutes after the 2nd dose of Ativan was given  Labs were taken and an EKG was done that was showed normal sinus rhythm  Ammonia and CBC were not far off from baseline    CMP did show an NEGRA that is been present since the previous day  Her blood glucose was 137 but there was noted to be an episode of glucose being 58 around 11:30 a m  Today CTA head and neck was negative for any acute infarcts/LVO/aneurysm/AVMs  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 but no evidence territorial infarct  Per nursing, soon after patient came back from CT patient has remained agitated and aggressive compared to her baseline that is pleasantly demented  At baseline, per nursing that been with patient she is pleasant and demented  She is talkative and is able to express words fine is usually oriented to person and place  Per discussion with family patient has had no history of strokes, seizures, family history of seizures and whole new major head strike was the 1 that happened a few days ago where CT head was normal/did not show any acute intracranial abnormalities  Neurology was consulted for evaluation of altered mental status and possible seizure  When evaluated, patient was not willing to answer many questions but was able the state her name as well as where he was understood why she was in a hospital   She also did deny any pain    Otherwise she states she went to go home I want to give a bag to the team               Inpatient consult to Neurology  Consult performed by: Holly Mcdonough DO  Consult ordered by: EMELI Perrin          Historical Information   Past Medical History:   Diagnosis Date    Arthritis     Asthma     CAD (coronary artery disease) of artery bypass graft     Cardiac disease     [facility] [address] [facility]     Dementia [facility] [address] [facility]     Depression     Diabetes mellitus [facility] [address] [facility]     Heart attack [facility] [address] [facility]     Hyperlipidemia     Hypertension     MI (myocardial infarction) [facility] [address] [facility]     Neuropathy     BRANT (obstructive sleep apnea)     Peptic ulcer     was + for H pylori    Transient cerebral ischemia 11/2011    with neg CUS and ECHO     Past Surgical History:   Procedure Laterality Date    APPENDECTOMY      CATARACT EXTRACTION       SECTION      COLONOSCOPY  2004    CORONARY ANGIOPLASTY  2006    CORONARY ANGIOPLASTY WITH STENT PLACEMENT      CORONARY ARTERY BYPASS GRAFT      DENTAL SURGERY      EGD AND COLONOSCOPY      ELBOW SURGERY      resolved     ESOPHAGOGASTRODUODENOSCOPY      ORIF TIBIA & FIBULA FRACTURES Right 2022    Procedure: OPEN REDUCTION W/ INTERNAL FIXATION (ORIF) ANKLE;  Surgeon: Lola Rowell MD;  Location: [facility];  Service: Orthopedics     Social History   Social History     Substance and Sexual Activity   Alcohol Use Not Currently     Social History     Substance and Sexual Activity   Drug Use Never     E-Cigarette/Vaping    E-Cigarette Use Never User      E-Cigarette/Vaping Substances    Nicotine No     THC No     CBD No     Flavoring No      Social History     Tobacco Use   Smoking Status Never Smoker   Smokeless Tobacco Never Used     Family History:   Family History   Problem Relation Age of Onset    Diabetes Mother     Cancer Sister     Heart disease Sister     Thyroid disease Sister     Cancer Brother     Cancer Father     Colon cancer Family     Diabetes Family     Mitral valve prolapse Family     Thyroid cancer Family      Meds/Allergies   all current active meds have been reviewed  Allergies   Allergen Reactions    Ace Inhibitors     Chlorpromazine     Latex     Lisinopril     Namenda [Memantine] Drowsiness    Penicillins Seizures    Propoxyphene     Stadol [Butorphanol]            Review of Systems   Unable to perform ROS: Dementia       OBJECTIVE     Patient ID: Chirag Baker is a 82 y o  female  Vitals:   Vitals:    22 1356 22 1418 22 1419 22 1629   BP: 142/77  124/82 (!) 88/57   Pulse: 103 64  69   Resp:  16     Temp:    (!) 97 3 °F (36 3 °C)   TempSrc:       SpO2: 95% 96%  94%   Weight:       Height:          Body mass index is 34 95 kg/m²       Intake/Output Summary (Last 24 hours) at 2022 1718  Last data filed at 2022 1500  Gross per 24 hour   Intake 120 ml   Output 2800 ml   Net -2680 ml       Temperature:   Temp (24hrs), Av 1 °F (36 7 °C), Min:97 3 °F (36 3 °C), Max:98 9 °F (37 2 °C)    Temperature: (!) 97 3 °F (36 3 °C)    Invasive Devices: Invasive Devices  Report    Peripheral Intravenous Line  Duration           Peripheral IV 08/15/22 Right Antecubital 3 days    Peripheral IV 08/15/22 Distal;Dorsal (posterior); Right Forearm 2 days    Peripheral IV 22 Right Arm <1 day          Drain  Duration           External Urinary Catheter 3 days                Physical Exam  Eyes:      Extraocular Movements: EOM normal       Pupils: Pupils are equal, round, and reactive to light  Neurological:      Deep Tendon Reflexes:      Reflex Scores:       Tricep reflexes are 2+ on the right side and 2+ on the left side  Bicep reflexes are 2+ on the right side and 2+ on the left side  Brachioradialis reflexes are 2+ on the right side and 2+ on the left side  Patellar reflexes are 1+ on the right side and 1+ on the left side  Achilles reflexes are 1+ on the left side  Physical Exam  Vitals and nursing note reviewed  Constitutional:       General: She is not in acute distress  Appearance: She is not diaphoretic  HENT:      Head: Normocephalic and atraumatic  Mouth/Throat:      Mouth: Mucous membranes are moist    Eyes:      General: No scleral icterus  Cardiovascular:      Rate and Rhythm: Normal rate and regular rhythm  Pulses: Normal pulses  Heart sounds: Murmur heard  Pulmonary:      Effort: Pulmonary effort is normal       Breath sounds: Normal breath sounds  No wheezing  Abdominal:      Palpations: Abdomen is soft  Comments: Obese abdomen  Musculoskeletal:      Cervical back: Neck supple  Left lower leg: No edema  Comments: RLE wrapped w/ACE wrap dressing      Skin:     General: Skin is warm and dry  Capillary Refill: Capillary refill takes less than 2 seconds  Neurological:      Mental Status: She is alert  She is disoriented  Psychiatric:         Mood and Affect: Mood normal      Neurologic Exam     Mental Status   Oriented to person place but unable to give time  Patient stated she understood when she was in a hospital     Cranial Nerves     CN II   Visual acuity: (Blink to threat intact)    CN III, IV, VI   Pupils are equal, round, and reactive to light  Extraocular motions are normal      CN V   Facial sensation intact  CN VII   Facial expression full, symmetric  CN VIII   CN VIII normal      CN IX, X   CN IX normal    CN X normal      CN XI   CN XI normal      CN XII   CN XII normal      Motor Exam Able to move all 4 extremities antigravity  Minimal exam could be obtained because patient was not willing to participate in exam when evaluated     Sensory Exam       Respond to pain in all 4 extremities     Gait, Coordination, and Reflexes     Reflexes   Right brachioradialis: 2+  Left brachioradialis: 2+  Right biceps: 2+  Left biceps: 2+  Right triceps: 2+  Left triceps: 2+  Right patellar: 1+  Left patellar: 1+  Right achilles reflex grade: Unable to assess due to bandage around right lower extremity  Left achilles: 1+  Right plantar reflex: Unable to assess given patient's right lower extremity is wrapped in bandage from surgery  Left plantar: normal    Minimal exam could be obtained because patient was not willing to participate in exam when evaluated            LABORATORY DATA     Labs: I have personally reviewed pertinent reports     and I have personally reviewed pertinent films in PACS  Results from last 7 days   Lab Units 08/18/22  1411 08/18/22  1405 08/17/22  0731 08/16/22  0817 08/15/22  0819   WBC Thousand/uL  --  10 82* 9 87 12 64* 9 75   HEMOGLOBIN g/dL  --  10 8* 12 0 13 1 14 0   I STAT HEMOGLOBIN g/dl 10 9*  --   --   --   --    HEMATOCRIT %  --  33 3* 38 2 41 5 43 4   HEMATOCRIT, ISTAT % 32*  --   --   --   --    PLATELETS Thousands/uL  --  212 184 187 207   NEUTROS PCT %  --  67  --   --  77*   MONOS PCT %  --  10  --   --  6      Results from last 7 days   Lab Units 08/18/22  1411 08/18/22  1405 08/16/22  0817 08/15/22  0819   POTASSIUM mmol/L  --  3 5  3 5 4 4 4 2   CHLORIDE mmol/L  --  103  103 110* 110*   CO2 mmol/L  --  31  31 28 26   CO2, I-STAT mmol/L 33*  --   --   --    BUN mg/dL  --  47*  47* 26* 26*   CREATININE mg/dL  --  1 40*  1 40* 1 03 1 07   CALCIUM mg/dL  --  9 5  9 5 9 6 9 8   ALK PHOS U/L  --  68  --  88   ALT U/L  --  19  --  21   AST U/L  --  31  --  18   GLUCOSE, ISTAT mg/dl 153*  --   --   --      Results from last 7 days   Lab Units 08/16/22  0817   MAGNESIUM mg/dL 2 0     Results from last 7 days   Lab Units 08/16/22  0817   PHOSPHORUS mg/dL 3 3      Results from last 7 days   Lab Units 08/16/22  0817 08/15/22  0819   INR  1 10 1 09   PTT seconds 33 32     Results from last 7 days   Lab Units 08/15/22  0819   LACTIC ACID mmol/L 1 4           IMAGING & DIAGNOSTIC TESTING     Radiology Results: I have personally reviewed pertinent reports  and I have personally reviewed pertinent films in PACS  CTA head and neck w wo contrast   Final Result by Pearl Page MD (08/18 1751)         1  Advanced, chronic microangiopathy is similar to the prior head CT from 3 days ago  No acute intracranial hemorrhage  No obvious large evolving territorial infarction  2   Advanced, 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 3  Approximately 50% stenosis of the distal right common carotid artery immediately proximal to the common carotid bifurcation  4   No intracranial aneurysm or major intracranial arterial occlusion/stenosis  5   Left chest wall pacemaker                    Workstation performed: [id number]         XR ankle 2 vw right   Final Result by Jairo Cabello MD (11/47 5697)      Fluoroscopic guidance provided for procedure guidance  Please refer to the separate procedure notes for additional details  Workstation performed: [id number]         XR ankle 3+ vw right   Final Result by Pamela Aranda MD (08/15 1433)      Anatomic alignment of bimalleolar fractures post splint placement  Workstation performed: [id number]         XR ankle 3+ vw right   Final Result by Dajuan Acevedo MD (08/15 1323)      Significantly improved alignment of the ankle fracture dislocation with persistent mild widening of the medial tibiotalar joint  Improved alignment of the mildly displaced medial malleolar fracture  No posterior malleolar fracture  Workstation performed: [id number]         XR ankle 3+ views RIGHT   Final Result by Dajuan Acevedo MD (08/15 1258)      Improved alignment of the tibiotalar joint but with persistent ankle dislocation, related to fracture dislocation as described  Workstation performed: [id number]         CT head without contrast   Final Result by Jairo Cabello MD (08/15 1028)      No acute intracranial process  No skull fracture  Chronic microangiopathy  Workstation performed: [id number]         XR chest portable   Final Result by Ivon Reyes DO (08/15 1666)      No acute cardiopulmonary disease  Workstation performed: [id number]         XR tibia fibula 2 views RIGHT   Final Result by Ivon Reyes DO (08/15 0920)      Fracture dislocation right ankle, incompletely evaluated  Acute, nondisplaced fracture proximal fibula  Dedicated imaging of the right ankle recommended  The study was marked in [facility] for immediate notification  Workstation performed: [id number]             Other Diagnostic Testing: I have personally reviewed pertinent reports     and I have personally reviewed pertinent films in PACS    ACTIVE MEDICATIONS     Current Facility-Administered Medications   Medication Dose Route Frequency    acetaminophen (TYLENOL) tablet 975 mg  975 mg Oral Q8H [facility]    apixaban (ELIQUIS) tablet 5 mg  5 mg Oral BID    atorvastatin (LIPITOR) tablet 40 mg  40 mg Oral Daily With Dinner    calcium carbonate-vitamin D (OSCAL-D) 500 mg-200 units per tablet 1 tablet  1 tablet Oral BID With Meals    insulin glargine (LANTUS) subcutaneous injection 25 Units 0 25 mL  25 Units Subcutaneous HS    insulin lispro (HumaLOG) 100 units/mL subcutaneous injection 10 Units  10 Units Subcutaneous TID With Meals    LORazepam (ATIVAN) injection 2 mg  2 mg Intravenous Once    metoprolol tartrate (LOPRESSOR) tablet 25 mg  25 mg Oral Q12H [facility]    midazolam (VERSED) 2 mg/2 mL injection **ADS Override Pull**        ondansetron (ZOFRAN) injection 4 mg  4 mg Intravenous Q6H PRN    oxyCODONE (ROXICODONE) IR tablet 2 5 mg  2 5 mg Oral Q6H PRN    senna-docusate sodium (SENOKOT S) 8 6-50 mg per tablet 1 tablet  1 tablet Oral HS    torsemide (DEMADEX) tablet 20 mg  20 mg Oral Daily       Prior to Admission medications    Medication Sig Start Date End Date Taking?  Authorizing Provider   oxyCODONE (Roxicodone) 5 immediate release tablet Take 1 tablet (5 mg total) by mouth every 6 (six) hours as needed for severe pain for up to 10 days Max Daily Amount: 20 mg 8/16/22 8/26/22 Yes Susan Juárez PA-C   atorvastatin (LIPITOR) 40 mg tablet Take 1 tablet (40 mg total) by mouth daily 1/3/22   Raghavendra Carias MD   calcium carbonate-vitamin D (OSCAL-D) 500 mg-200 units per tablet Take 1 tablet by mouth 2 (two) times a day with meals 4/28/18   Kandice Fu DO   Continuous Blood Gluc  (Dexcom G6 ) SIVAKUMAR Use daily as directed for CGM    Historical Provider, MD   Continuous Blood Gluc Sensor (Dexcom G6 Sensor) MISC Use daily as directed for CGM - Change every 10 days    Historical Provider, MD   Continuous Blood Gluc Transmit (Dexcom G6 Transmitter) MISC Use daily as directed for CGM - Change every 3 months    Historical Provider, MD   docusate sodium (COLACE) 100 mg capsule Take 1 capsule (100 mg total) by mouth 2 (two) times a day  Patient taking differently: Take 100 mg by mouth 2 (two) times a day as needed 4/28/18   Annalisa Simmons DO   Eliquis 5 MG TAKE 1 TABLET BY MOUTH TWICE A DAY 7/13/22   Victor Manuel Greco MD   glucose blood test strip USE AS DIRECTED 3 TIMES A DAY 5/26/20   EMELI Troy   insulin aspart (NovoLOG) 100 units/mL injection Use for V-Go 1/25/22   EMELI Leon   Insulin Disposable Pump (V-Go 20) KIT Apply once a night 1/25/22   EMELI Leon   Insulin Disposable Pump (V-Go 20) KIT Use daily Use one daily 7/29/22   EMELI Guajardo   metoprolol tartrate (LOPRESSOR) 25 mg tablet TAKE 1 TABLET (25 MG TOTAL) BY MOUTH EVERY 12 (TWELVE) HOURS 6/14/22   Victor Manuel Greco MD   NovoLOG 100 UNIT/ML injection USE FOR V-GO 6/17/22   Historical Provider, MD   potassium chloride (Klor-Con M10) 10 mEq tablet Take 1 tablet (10 mEq total) by mouth daily 8/1/22   Victor Manuel Greco MD   senna (SENOKOT) 8 6 mg Take 1 tablet (8 6 mg total) by mouth daily  Patient taking differently: Take 1 tablet by mouth daily at bedtime as needed 4/29/18   Annalisa Simmons DO   torsemide (DEMADEX) 20 mg tablet Take 1 tablet (20 mg total) by mouth daily 8/1/22   Victor Manuel Greco MD         CODE STATUS & ADVANCED DIRECTIVES     Code Status: Level 1 - Full Code  Advance Directive and Living Will:      Power of :    POLST: Yes      VTE Pharmacologic Prophylaxis: Apixaban (Eliquis)  VTE Mechanical Prophylaxis: sequential compression device    ==  [address]  Neurology Residency, PGY-2

## 2022-08-18 NOTE — PROGRESS NOTES
Progress Note - Chema Hamilton 82 y o  female MRN: [id number]    Unit/Bed#: [facility] 154-24 Encounter: [id number]      CC: diabetes f/u     Subjective: This is C 18 y o -year-old female with past medical history of diabetes type 2  On home insulin with complications   History of dementia, heart failure,  hypertension, atrial fibrillation   Who was admitted to the hospital after mechanical fall leading to right ankle fracture  S/p ORIF, pt had episode of  hypoglycemia     Objective:      Vitals: Blood pressure (!) 111/49, pulse 74, temperature 98 1 °F (36 7 °C), resp  rate 16, height 5' 5" (1 651 m), weight 95 3 kg (210 lb), SpO2 96 %  ,Body mass index is 34 95 kg/m²         Intake/Output Summary (Last 24 hours) at 8/17/2022 1612  Last data filed at 8/16/2022 2018      Gross per 24 hour   Intake 800 ml   Output --   Net 800 ml         Physical Exam:  General Appearance: awake, appears stated age and cooperative  Head: Normocephalic, without obvious abnormality, atraumatic  Extremities: moves all extremities  Skin: Skin color and temperature normal    Pulm: no labored breathing               POC Glucose   Date Value   08/17/2022 300 mg/dl (H)   08/17/2022 358 mg/dl (H)   08/16/2022 229 mg/dl (H)   08/16/2022 206 mg/dl (H)   08/16/2022 151 mg/dl (H)   08/16/2022 147 mg/dl (H)   08/16/2022 122 mg/dl   08/16/2022 99 mg/dl   08/16/2022 91 mg/dl   08/16/2022 125 mg/dl   02/27/2015 264 mg/dL (H)   06/04/2014 280 mg/dL (H)   05/24/2014 308 mg/dL (H)   05/22/2014 278 mg/dL (H)         Impression:   Diabetes type 2 on home insulin, with history of complications (, CAD, neuropathy)  History of dementia  History of heart failure  History of atrial fibrillation  Right ankle fracture     Assessment:   This is N 74 y o -year-old female with past medical history of diabetes type 2  On home insulin with complications   History of dementia, heart failure,  hypertension, atrial fibrillation   Who was admitted to the hospital after mechanical fall leading to right ankle fracture   Endocrinology consulted for diabetes type 2     Plan:   A1c 7 3  Patient uses VGO 20 at home, 6 units with breakfast and 4 units with lunch and dinner   During admission patient was started on insulin drip to stabilize hyperglycemia for planned surgery   S/p  ORIF in 8/16   Currently patient is on Lantus 28 units at bedtime and lispro 15 units with breakfast 10 units with lunch and 10 units with dinner  Patient had an episode of hypoglycemia around lunchtime  Will recommend to decrease Lantus 25 units at bedtime and increase lispro to 10 units t i d  With meals and continue with correctional scale  Continue with Accu-Cheks           Portions of the record may have been created with voice recognition software

## 2022-08-18 NOTE — PROGRESS NOTES
Pastoral Care Progress Note    2022  Patient: Cierra Olivia : 1935  Admission Date & Time: 8/15/2022 0751  MRN: [id number] CSN: [id number]         responded to RRT  Patient with medical staff, no external support systems present at this time   provided silent supportive presence and interfaith blessing outside of patient room  Spiritual Care will remain available  Please contact via JANZZ if any patient, family, or staff spiritual care needs arise                 22 1400   Clinical Encounter Type   Visited With Patient not available   Routine Visit Introduction   Crisis Visit   (RRT)

## 2022-08-18 NOTE — PHYSICAL THERAPY NOTE
Physical Therapy Progress Note     08/18/22 0955   PT Last Visit   PT Visit Date 08/18/22   Note Type   Note Type Treatment for insurance authorization   Pain Assessment   Pain Assessment Tool 0-10   Pain Score No Pain   Restrictions/Precautions   RLE Weight Bearing Per Order NWB   Other Precautions Cognitive; Chair Alarm; Bed Alarm;WBS;Fall Risk;Pain   Subjective   Subjective The patient states that her daughter visited her this morning  She is agreeable to sit at the side of the bed  Bed Mobility   Supine to Sit 3  Moderate assistance   Additional items Assist x 1; Increased time required;Verbal cues;LE management   Sit to Supine 3  Moderate assistance   Additional items Assist x 2; Increased time required;Verbal cues;LE management   Transfers   Sit to Stand Unable to assess   Other 2  Maximal assistance   Additional items Assist x 2; Increased time required;Verbal cues   Additional Comments Lateral scoot along the edge of the bed x 1  Balance   Static Sitting Poor  (22 minutes at the side of the bed )   Dynamic Sitting Poor -   Static Standing Zero   Activity Tolerance   Activity Tolerance Patient tolerated treatment well;Patient limited by fatigue   Medical Staff Made Aware Co-treat with occupational therapy due to patient's cognitive impairments, limited activity tolerance, and attempts to progress her functional mobility  Nurse Made Aware Yes  Assessment   Prognosis Guarded   Problem List Decreased strength;Decreased endurance; Impaired balance;Decreased mobility; Decreased safety awareness;Pain;Orthopedic restrictions;Decreased cognition   Assessment The patient was pleasant this morning, and amenable to therapy  She was able to transition to the side of the bed with less assistance today, but for all other activities she required more assistance  She required constant mod-maximal assistance in order to maintain her sitting balance, and this progressively worsened as she fatigued   The patient was cognizant of her being NWB RLE, and she was able to maintain this while seated  She continues to require assistance of two-three people for any functional mobility along the edge of the bed, and standing is not appropriate today as she has much less balance than the prior session  She was returned to bed with all needs within reach and the alarm active  Would recommend dependent means for out of bed at this time  Barriers to Discharge Inaccessible home environment;Decreased caregiver support   Goals   Patient Goals To colour  STG Expiration Date 08/30/22   PT Treatment Day 1   Plan   Treatment/Interventions Functional transfer training;LE strengthening/ROM; Therapeutic exercise; Endurance training;Cognitive reorientation;Patient/family training;Bed mobility   Progress Slow progress, decreased activity tolerance   PT Frequency 3-5x/wk   Recommendation   PT Discharge Recommendation Post acute rehabilitation services   AM-PAC Basic Mobility Inpatient   Turning in Bed Without Bedrails 2   Lying on Back to Sitting on Edge of Flat Bed 2   Moving Bed to Chair 1   Standing Up From Chair 1   Walk in Room 1   Climb 3-5 Stairs 1   Basic Mobility Inpatient Raw Score 8   Turning Head Towards Sound 4   Follow Simple Instructions 4   Low Function Basic Mobility Raw Score 16   Low Function Basic Mobility Standardized Score 25 72   Highest Level Of Mobility   -[facility] Goal 3: Sit at edge of bed       An AM-PAC Basic Mobility standardized score less than 40 78 suggests the patient may benefit from discharge to post-acute rehab services      Jailyn Bass, PTA

## 2022-08-18 NOTE — ASSESSMENT & PLAN NOTE
· Ortho consult for bimalleolar fracture s/p  ORIF POD#2  · Also cleared to restart Eliquis  · Pain management consult appreciated-recommended to DC Dilaudid and oxycodone 5 mg, continue oxycodone 2 5 mg q 6 hours p r n  and bowel regimen  · PT/OT after ortho clearance-recommended acute rehab

## 2022-08-18 NOTE — QUICK NOTE
Verbal order for midazolam 2 mg during rapid response  Medication vial pulled from ADS cabinet on override  Midazolam not given  Midazolam 2 mg/2 mL wasted  Sabino Cross witnessed waste

## 2022-08-18 NOTE — PROGRESS NOTES
General Cardiology   Progress Note -  Team One   [place] 82 y o  female MRN: [id number]    [facility] Encounter: [id number]    Assessment  1  Traumatic mechanical fall  2  Acute right fibular fracture -- s/p ORIF of right ankle POD # 2  -Management per the orthopedic surgery team   3  Chronic diastolic CHF  -TTE 8/28/2173 - LV systolic function hyperdynamic at 70%, severe concentric hypertrophy, grade 2 DD, RV normal size/systolic function, LA/RA mildly dilated, trace AI, moderate to severe AS  4  Mild to moderate AS -- per attending cardiologist review (Dr Aki Monge), read as moderate to severe on TTE 8/15/22  -Appears compensated   -O torsemide 20 mg daily  5  PAF  -ECG on admission demonstrated NSR, RBBB, and first-degree AVB  -On metoprolol tartrate 25 mg q 12 hours  -On Eliquis 5 mg b i d for systemic anticoagulation  6  SSS s/p ppm implantation  Device interrogation 6/24/2021: MDT DC PPM   Normal pacemaker function  No significant high rate episodes noted  AP 84%,  0 2%  7  CAD: s/p remote CABG in 1999 w/ subsequent stent placement in 2006  Repeat cardiac catheterization 2008 was without intervention  Maintained on beta-blocker and statin  No aspirin due to full anticoagulation with Eliquis  8  Essential hypertension; average /63, last recorded 113/78  9  Dyslipidemia; lipid panel 7/2022 - cholesterol 105, triglycerides 44, HDL 55, and LDL 41; on atorvastatin 40 mg daily  10  DM type 2; HGB A1c 7 3    Plan  -Doing well from a cardiac perspective without any specific complaints   -Continue current medical therapies as listed above    -Follow-up with routine Cardiologist Dr Debra Costa on discharge  Subjective  Review of Systems   Constitutional: Positive for malaise/fatigue  Cardiovascular: Negative for chest pain  Respiratory: Negative for shortness of breath  Musculoskeletal:        Right LE sore/discomfort  Gastrointestinal: Negative for abdominal pain  Objective:   Physical Exam  Vitals and nursing note reviewed  Constitutional:       General: She is not in acute distress  Appearance: She is not diaphoretic  HENT:      Head: Normocephalic and atraumatic  Mouth/Throat:      Mouth: Mucous membranes are moist    Eyes:      General: No scleral icterus  Cardiovascular:      Rate and Rhythm: Normal rate and regular rhythm  Pulses: Normal pulses  Heart sounds: Murmur heard  Pulmonary:      Effort: Pulmonary effort is normal       Breath sounds: Normal breath sounds  No wheezing  Abdominal:      Palpations: Abdomen is soft  Comments: Obese abdomen  Musculoskeletal:      Cervical back: Neck supple  Left lower leg: No edema  Comments: RLE wrapped w/ACE wrap dressing  Skin:     General: Skin is warm and dry  Capillary Refill: Capillary refill takes less than 2 seconds  Neurological:      Mental Status: She is alert  She is disoriented  Psychiatric:         Mood and Affect: Mood normal          Vitals: Blood pressure 113/78, pulse 70, temperature 97 7 °F (36 5 °C), resp  rate 16, height 5' 5" (1 651 m), weight 95 3 kg (210 lb), SpO2 97 %  ,     Body mass index is 34 95 kg/m²  ,   Systolic (07NVB), CALLUM:478 , Min:98 , SSX:246     Diastolic (58MGD), LDF:60, Min:48, Max:78      Intake/Output Summary (Last 24 hours) at 8/18/2022 1112  Last data filed at 8/17/2022 2040  Gross per 24 hour   Intake --   Output 500 ml   Net -500 ml     Weight (last 2 days)     None          LABORATORY RESULTS      CBC with diff:   Results from last 7 days   Lab Units 08/17/22  0731 08/16/22  0817 08/15/22  0819   WBC Thousand/uL 9 87 12 64* 9 75   HEMOGLOBIN g/dL 12 0 13 1 14 0   HEMATOCRIT % 38 2 41 5 43 4   MCV fL 96 96 95   PLATELETS Thousands/uL 184 187 207   MCH pg 30 2 30 4 30 5   MCHC g/dL 31 4 31 6 32 3   RDW % 14 6 14 8 14 9   MPV fL 11 6 10 9 11 7   NRBC AUTO /100 WBCs  --   --  0       CMP:  Results from last 7 days   Lab Units 22  0817 08/15/22  0819   POTASSIUM mmol/L 4 4 4 2   CHLORIDE mmol/L 110* 110*   CO2 mmol/L 28 26   BUN mg/dL 26* 26*   CREATININE mg/dL 1 03 1 07   CALCIUM mg/dL 9 6 9 8   AST U/L  --  18   ALT U/L  --  21   ALK PHOS U/L  --  88   EGFR ml/min/1 73sq m 50 47       BMP:  Results from last 7 days   Lab Units 22  0817 08/15/22  0819   POTASSIUM mmol/L 4 4 4 2   CHLORIDE mmol/L 110* 110*   CO2 mmol/L 28 26   BUN mg/dL 26* 26*   CREATININE mg/dL 1 03 1 07   CALCIUM mg/dL 9 6 9 8       Lab Results   Component Value Date    NTBNP 623 (H) 08/15/2022    NTBNP 952 (H) 2022    NTBNP 922 (H) 2021        Results from last 7 days   Lab Units 22  0817   MAGNESIUM mg/dL 2 0             Results from last 7 days   Lab Units 08/15/22  0819   TSH 3RD GENERATON uIU/mL 5 410*       Results from last 7 days   Lab Units 22  0817 08/15/22  0819   INR  1 10 1 09       Lipid Profile:   Lab Results   Component Value Date    CHOL 144 2015    CHOL 150 2013     Lab Results   Component Value Date    HDL 55 2022    HDL 61 2021    HDL 57 2019     Lab Results   Component Value Date    LDLCALC 41 2022    LDLCALC 39 2021    LDLCALC 37 2019     Lab Results   Component Value Date    TRIG 44 2022    TRIG 79 2021    TRIG 70 2019       Cardiac testing:   Results for orders placed during the hospital encounter of 19    Echo complete with contrast if indicated    Narrative  [address]  [phone]    Transthoracic Echocardiogram  2D, M-mode, Doppler, and Color Doppler    Study date:  28-Mar-2019    Patient: Monica Hassan  MR number: [id number]  Account number: [de-identified]  : 1935  Age: 82 years  Gender: Female  Status: Outpatient  Location: Bedside  Height: 68 in  Weight: 244 lb  BP: 128/ 66 mmHg    Indications: Heart Failure    Diagnoses: I50 9 - Heart failure, unspecified    Sonographer:  VANI Polanco  Interpreting Physician:  Sarah Waters MD  Primary Physician:  Hussain Sadler MD  Referring Physician:  Kristopher Zaraogza MD  Group:  [id number]  Cardiology Fellow:  Ann-Marie Blackwood MD    SUMMARY    LEFT VENTRICLE:  Systolic function was normal  Ejection fraction was estimated to be 60 %  There were no regional wall motion abnormalities  There was no evidence of concentric hypertrophy  VENTRICULAR SEPTUM:  There was dyssynergic motion  These changes are consistent with RV volume overload  RIGHT VENTRICLE:  The ventricle was mildly to moderately dilated  Wall thickness was increased  LEFT ATRIUM:  The atrium was mildly dilated  RIGHT ATRIUM:  The atrium was moderately dilated  MITRAL VALVE:  There was mild regurgitation  AORTIC VALVE:  The valve was trileaflet  Leaflets exhibited moderately increased thickness, mild calcification, and normal cuspal separation  There was trace regurgitation  TRICUSPID VALVE:  There was severe regurgitation  IVC, HEPATIC VEINS:  The inferior vena cava was dilated  Respirophasic changes were blunted (less than 50% variation)  HISTORY: PRIOR HISTORY: HTN, HLD, SSS, CHF, CAD    PROCEDURE: The procedure was performed at the bedside  This was a routine study  The transthoracic approach was used  The study included complete 2D imaging, M-mode, complete spectral Doppler, and color Doppler  The heart rate was 60 bpm,  at the start of the study  Images were obtained from the parasternal, apical, subcostal, and suprasternal notch acoustic windows  This was a technically difficult study  LEFT VENTRICLE: Size was normal  Systolic function was normal  Ejection fraction was estimated to be 60 %  There were no regional wall motion abnormalities  Wall thickness was normal  There was no evidence of concentric hypertrophy    DOPPLER: Left ventricular diastolic function parameters were abnormal     VENTRICULAR SEPTUM: There was dyssynergic motion  These changes are consistent with RV volume overload  RIGHT VENTRICLE: The ventricle was mildly to moderately dilated  Systolic function was low normal  Wall thickness was increased  A pacing wire was present  LEFT ATRIUM: The atrium was mildly dilated  RIGHT ATRIUM: The atrium was moderately dilated  A pacing wire was present  MITRAL VALVE: Valve structure was normal  There was normal leaflet separation  DOPPLER: The transmitral velocity was within the normal range  There was no evidence for stenosis  There was mild regurgitation  AORTIC VALVE: The valve was trileaflet  Leaflets exhibited moderately increased thickness, mild calcification, and normal cuspal separation  DOPPLER: Transaortic velocity was minimally increased  There was no evidence for stenosis  There  was trace regurgitation  TRICUSPID VALVE: The valve structure was normal  There was normal leaflet separation  DOPPLER: The transtricuspid velocity was within the normal range  There was no evidence for stenosis  There was severe regurgitation  PULMONIC VALVE: Leaflets exhibited normal thickness, no calcification, and normal cuspal separation  DOPPLER: The transpulmonic velocity was within the normal range  There was no significant regurgitation  PERICARDIUM: There was no pericardial effusion  The pericardium was normal in appearance  AORTA: The root exhibited normal size  The ascending aorta was normal in size, when indexed for body surface area, measuring 3 8 cm (or 1 7 cm/m2 of body surface area)  SYSTEMIC VEINS: IVC: The inferior vena cava was dilated  Respirophasic changes were blunted (less than 50% variation)      SYSTEM MEASUREMENT TABLES    2D  %FS: 28 94 %  Ao Diam: 3 14 cm  EDV(Teich): 75 97 ml  EF(Teich): 56 07 %  ESV(Teich): 33 38 ml  IVSd: 1 01 cm  LA Area: 19 22 cm2  LA Diam: 3 06 cm  LVEDV MOD A4C: 50 04 ml  LVEF MOD A4C: 54 94 %  LVESV MOD A4C: 22 55 ml  LVIDd: 4 14 cm  LVIDs: 2 94 cm  LVLd A4C: 6 96 cm  LVLs A4C: 6 72 cm  LVOT Diam: 1 89 cm  LVPWd: 1 01 cm  RA Area: 26 01 cm2  RVIDd: 4 37 cm  SV MOD A4C: 27 49 ml  SV(Teich): 42 6 ml    CW  AV Env  Ti: 338 73 ms  AV VTI: 36 28 cm  AV Vmax: 1 57 m/s  AV Vmean: 1 07 m/s  AV maxP 84 mmHg  AV meanP 31 mmHg  TR Vmax: 1 9 m/s  TR maxP 5 mmHg    MM  TAPSE: 1 61 cm    PW  NESTOR (VTI): 1 12 cm2  NESTOR Vmax: 1 21 cm2  E': 0 06 m/s  E/E': 20 03  LVOT Env  Ti: 312 08 ms  LVOT VTI: 14 38 cm  LVOT Vmax: 0 67 m/s  LVOT Vmean: 0 46 m/s  LVOT maxP 81 mmHg  LVOT meanP 99 mmHg  LVSV Dopp: 40 46 ml  MV A [place]: 0 29 m/s  MV Dec [place]: 5 23 m/s2  MV DecT: 219 85 ms  MV E [place]: 1 15 m/s  MV E/A Ratio: 3 9  MV PHT: 63 76 ms  MVA By PHT: 3 45 cm2    [employer] Accredited Echocardiography Laboratory    Prepared and electronically signed by    Ian Mcgrath MD  Signed 28-Mar-2019 18:13:19    No results found for this or any previous visit  No results found for this or any previous visit  No valid procedures specified  No results found for this or any previous visit        Meds/Allergies   all current active meds have been reviewed and current meds:   Current Facility-Administered Medications   Medication Dose Route Frequency    acetaminophen (TYLENOL) tablet 975 mg  975 mg Oral Q8H [facility]    apixaban (ELIQUIS) tablet 5 mg  5 mg Oral BID    atorvastatin (LIPITOR) tablet 40 mg  40 mg Oral Daily With Dinner    calcium carbonate-vitamin D (OSCAL-D) 500 mg-200 units per tablet 1 tablet  1 tablet Oral BID With Meals    insulin glargine (LANTUS) subcutaneous injection 28 Units 0 28 mL  28 Units Subcutaneous HS    insulin lispro (HumaLOG) 100 units/mL subcutaneous injection 10-15 Units  10-15 Units Subcutaneous TID With Meals    metoprolol tartrate (LOPRESSOR) tablet 25 mg  25 mg Oral Q12H [facility]    ondansetron (ZOFRAN) injection 4 mg  4 mg Intravenous Q6H PRN    oxyCODONE (ROXICODONE) IR tablet 2 5 mg 2 5 mg Oral Q6H PRN    senna-docusate sodium (SENOKOT S) 8 6-50 mg per tablet 1 tablet  1 tablet Oral HS    torsemide (DEMADEX) tablet 20 mg  20 mg Oral Daily          EKG personally reviewed by EMELI Freeman    Assessment:  Principal Problem:    Closed right ankle fracture  Active Problems:    Dementia without behavioral disturbance (HCC)    Depression    Obstructive sleep apnea    Chronic diastolic congestive heart failure (HCC)    PAF (paroxysmal atrial fibrillation) (HCC)    Type 2 diabetes mellitus with hyperglycemia, with long-term current use of insulin (HCC)    Chest pain    Foul smelling urine    Counseling / Coordination of Care  Total floor / unit time spent today 20 minutes  Greater than 50% of total time was spent with the patient and / or family counseling and / or coordination of care  ** Please Note: Dragon 360 Dictation voice to text software may have been used in the creation of this document   **

## 2022-08-18 NOTE — OCCUPATIONAL THERAPY NOTE
Occupational Therapy Progress Note     Patient Name: Lida Him  Today's Date: 8/18/2022  Problem List  Principal Problem:    Closed right ankle fracture  Active Problems:    Dementia without behavioral disturbance (HCC)    Depression    Obstructive sleep apnea    Chronic diastolic congestive heart failure (HCC)    PAF (paroxysmal atrial fibrillation) ([facility] [address] )    Type 2 diabetes mellitus with hyperglycemia, with long-term current use of insulin (HCC)    Chest pain    Foul smelling urine          08/18/22 0946   OT Last Visit   OT Visit Date 08/18/22   Note Type   Note Type Treatment for insurance authorization   Restrictions/Precautions   Weight Bearing Precautions Per Order Yes   RLE Weight Bearing Per Order NWB   Braces or Orthoses   (splint RLE)   Other Precautions Bed Alarm;Cognitive   General   Response to Previous Treatment Patient with no complaints from previous session   Lifestyle   Autonomy Pt required assistance w/ ADLs/IADLs, including dressing/bathing, cooking, cleaning, and medication management  Reciprocal Relationships Pt lives with son and grandchildren who provide support   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  Intrinsic Gratification Pt enjoys coloring, word finds, and viewing photo albums  Pain Assessment   Pain Assessment Tool 0-10   Pain Score No Pain   ADL   Where Assessed Edge of bed   Grooming Assistance 4  Minimal Assistance   Grooming Deficit Brushing hair;Denture care   Grooming Comments increased time to open packages and increased time to comb knots out of hair   Bed Mobility   Supine to Sit 3  Moderate assistance   Additional items Increased time required;Verbal cues; Assist x 1   Sit to Supine 3  Moderate assistance   Additional items Assist x 2; Increased time required;Verbal cues   Additional Comments EOB seated requiring varying levels of assistance ranign from mod to max w/ fatigue   Coordination   Fine Motor using scissors to open efferent pack   Cognition   Overall Cognitive Status Impaired   Arousal/Participation Cooperative   Attention Attends with cues to redirect   Orientation Level Oriented to person;Disoriented to place; Disoriented to time;Disoriented to situation   Memory Decreased recall of recent events;Decreased recall of precautions   Following Commands Follows one step commands with increased time or repetition   Comments pt very repetitive t/o sessions frequently telling therapist same things, decreased safety awareness   Activity Tolerance   Activity Tolerance Patient tolerated treatment well   Medical Staff Made Aware PTA Pretty 2* medical complexity/comorbidities   Assessment   Assessment Patient participated in Skilled OT session this date with interventions consisting of ADL re training with the use of correct body mechnaics, Energy Conservation techniques,  therapeutic activities to: increase activity tolerance and increase trunk control   Patient agreeable to OT treatment session, upon arrival patient was found supine in bed  In comparison to previous session, patient with improvements in attention to task*   Patient requiring frequent rest periods and ocassional safety reminders  Patient continues to be functioning below baseline level, occupational performance remains limited secondary to factors listed above and increased risk for falls and injury  From OT standpoint, recommendation at time of d/c would be Short Term Rehab  Patient to benefit from continued Occupational Therapy treatment while in the hospital to address deficits as defined above and maximize level of functional independence with ADLs and functional mobility  The patient's raw score on the AM-PAC Daily Activity inpatient short form is 14, standardized score is 33 39, less than 39 4  Patients at this level are likely to benefit from discharge to post-acute rehabilitation services  Please refer to the recommendation of the Occupational Therapist for safe discharge planning     Plan Treatment Interventions ADL retraining;Functional transfer training; Endurance training;Patient/family training; Compensatory technique education   Goal Expiration Date 08/30/22   OT Treatment Day 1   OT Frequency 3-5x/wk   Recommendation   OT Discharge Recommendation Post acute rehabilitation services   AM-PAC Daily Activity Inpatient   Lower Body Dressing 2   Bathing 2   Toileting 2   Upper Body Dressing 2   Grooming 3   Eating 3   Daily Activity Raw Score 14   Daily Activity Standardized Score (Calc for Raw Score >=11) 33 39   Modified [place] Scale   Modified [place]o Scale 4       Ashely Hubbard MS, OTR/L

## 2022-08-18 NOTE — PLAN OF CARE
Problem: PAIN - ADULT  Goal: Verbalizes/displays adequate comfort level or baseline comfort level  Description: Interventions:  - Encourage patient to monitor pain and request assistance  - Assess pain using appropriate pain scale  - Administer analgesics based on type and severity of pain and evaluate response  - Implement non-pharmacological measures as appropriate and evaluate response  - Consider cultural and social influences on pain and pain management  - Notify physician/advanced practitioner if interventions unsuccessful or patient reports new pain  Outcome: Progressing     Problem: INFECTION - ADULT  Goal: Absence or prevention of progression during hospitalization  Description: INTERVENTIONS:  - Assess and monitor for signs and symptoms of infection  - Monitor lab/diagnostic results  - Monitor all insertion sites, i e  indwelling lines, tubes, and drains  - Monitor endotracheal if appropriate and nasal secretions for changes in amount and color  - [place] appropriate cooling/warming therapies per order  - Administer medications as ordered  - Instruct and encourage patient and family to use good hand hygiene technique  - Identify and instruct in appropriate isolation precautions for identified infection/condition  Outcome: Progressing  Goal: Absence of fever/infection during neutropenic period  Description: INTERVENTIONS:  - Monitor WBC    Outcome: Progressing     Problem: SAFETY ADULT  Goal: Patient will remain free of falls  Description: INTERVENTIONS:  - Educate patient/family on patient safety including physical limitations  - Instruct patient to call for assistance with activity   - Consult OT/PT to assist with strengthening/mobility   - Keep Call bell within reach  - Keep bed low and locked with side rails adjusted as appropriate  - Keep care items and personal belongings within reach  - Initiate and maintain comfort rounds  - Make Fall Risk Sign visible to staff  - Offer Toileting every 2 Hours, in advance of need  - Initiate/Maintain bed alarm  - Obtain necessary fall risk management equipment: bed/chair alarm  - Apply yellow socks and bracelet for high fall risk patients  - Consider moving patient to room near nurses station  Outcome: Progressing  Goal: Maintain or return to baseline ADL function  Description: INTERVENTIONS:  -  Assess patient's ability to carry out ADLs; assess patient's baseline for ADL function and identify physical deficits which impact ability to perform ADLs (bathing, care of mouth/teeth, toileting, grooming, dressing, etc )  - Assess/evaluate cause of self-care deficits   - Assess range of motion  - Assess patient's mobility; develop plan if impaired  - Assess patient's need for assistive devices and provide as appropriate  - Encourage maximum independence but intervene and supervise when necessary  - Involve family in performance of ADLs  - Assess for home care needs following discharge   - Consider OT consult to assist with ADL evaluation and planning for discharge  - Provide patient education as appropriate  Outcome: Progressing  Goal: Maintains/Returns to pre admission functional level  Description: INTERVENTIONS:  - Perform BMAT or MOVE assessment daily    - Set and communicate daily mobility goal to care team and patient/family/caregiver  - Collaborate with rehabilitation services on mobility goals if consulted  - Perform Range of Motion 6 times a day  - Reposition patient every 2 hours    - Dangle patient 6 times a day  - Stand patient 6 times a day  - Ambulate patient 6 times a day  - Out of bed to chair 6 times a day   - Out of bed for meals 6 times a day  - Out of bed for toileting  - Record patient progress and toleration of activity level   Outcome: Progressing     Problem: DISCHARGE PLANNING  Goal: Discharge to home or other facility with appropriate resources  Description: INTERVENTIONS:  - Identify barriers to discharge w/patient and caregiver  - Arrange for needed discharge resources and transportation as appropriate  - Identify discharge learning needs (meds, wound care, etc )  - Arrange for interpretive services to assist at discharge as needed  - Refer to Case Management Department for coordinating discharge planning if the patient needs post-hospital services based on physician/advanced practitioner order or complex needs related to functional status, cognitive ability, or social support system  Outcome: Progressing     Problem: Knowledge Deficit  Goal: Patient/family/caregiver demonstrates understanding of disease process, treatment plan, medications, and discharge instructions  Description: Complete learning assessment and assess knowledge base    Interventions:  - Provide teaching at level of understanding  - Provide teaching via preferred learning methods  Outcome: Progressing     Problem: Prexisting or High Potential for Compromised Skin Integrity  Goal: Skin integrity is maintained or improved  Description: INTERVENTIONS:  - Identify patients at risk for skin breakdown  - Assess and monitor skin integrity  - Assess and monitor nutrition and hydration status  - Monitor labs   - Assess for incontinence   - Turn and reposition patient  - Assist with mobility/ambulation  - Relieve pressure over bony prominences  - Avoid friction and shearing  - Provide appropriate hygiene as needed including keeping skin clean and dry  - Evaluate need for skin moisturizer/barrier cream  - Collaborate with interdisciplinary team   - Patient/family teaching  - Consider wound care consult   Outcome: Progressing     Problem: MOBILITY - ADULT  Goal: Maintain or return to baseline ADL function  Description: INTERVENTIONS:  -  Assess patient's ability to carry out ADLs; assess patient's baseline for ADL function and identify physical deficits which impact ability to perform ADLs (bathing, care of mouth/teeth, toileting, grooming, dressing, etc )  - Assess/evaluate cause of self-care deficits - Assess range of motion  - Assess patient's mobility; develop plan if impaired  - Assess patient's need for assistive devices and provide as appropriate  - Encourage maximum independence but intervene and supervise when necessary  - Involve family in performance of ADLs  - Assess for home care needs following discharge   - Consider OT consult to assist with ADL evaluation and planning for discharge  - Provide patient education as appropriate  Outcome: Progressing  Goal: Maintains/Returns to pre admission functional level  Description: INTERVENTIONS:  - Perform BMAT or MOVE assessment daily    - Set and communicate daily mobility goal to care team and patient/family/caregiver  - Collaborate with rehabilitation services on mobility goals if consulted  - Perform Range of Motion 6 times a day  - Reposition patient every 2 hours    - Dangle patient 6 times a day  - Stand patient 6 times a day  - Ambulate patient 6 times a day  - Out of bed to chair 6 times a day   - Out of bed for meals 6 times a day  - Out of bed for toileting  - Record patient progress and toleration of activity level   Outcome: Progressing     Problem: Potential for Falls  Goal: Patient will remain free of falls  Description: INTERVENTIONS:  - Educate patient/family on patient safety including physical limitations  - Instruct patient to call for assistance with activity   - Consult OT/PT to assist with strengthening/mobility   - Keep Call bell within reach  - Keep bed low and locked with side rails adjusted as appropriate  - Keep care items and personal belongings within reach  - Initiate and maintain comfort rounds  - Make Fall Risk Sign visible to staff  - Offer Toileting every 2 Hours, in advance of need  - Initiate/Maintain bed alarm  - Obtain necessary fall risk management equipment: bed/chair alarm  - Apply yellow socks and bracelet for high fall risk patients  - Consider moving patient to room near nurses station  Outcome: Progressing

## 2022-08-18 NOTE — ASSESSMENT & PLAN NOTE
83yo w/ PMH of PAF on eliquis (paused due to ORIF), CAD, asthma, CHF, dementia, HLD, past MI, dementia, coming in ORIF surgery where eliquis was held  This is POD#2  RRT was called Patient stating she did not feel well  Began having decreased mentation to verbal/noxious stimuli  Rapid Response called around 2pm in patient was noted to have some eye deviation up and to the right  She also had some roving eyes her critical care team/rapid response team   She was given Ativan 2 mg 2 times for her noted altered mental status/unresponsive episode  She got back to baseline a few minutes after the 2nd dose of Ativan was given  Per nursing, soon after patient came back from CT patient has remained agitated and aggressive compared to her baseline that is pleasantly demented  W/U:  - vEEG: This is an abnormal 38 minutes awake, drowsy, and asleep EEG due to excessive sleep and when she is awake there is diffuse 3-4 Hz delta activity and 4-5 Hz theta activity  These findings are etiologically nonspecific for moderate diffuse cerebral dysfunction, which in part may be due to medications  - CTA h/n: 1  Advanced, chronic microangiopathy is similar to the prior head CT from 3 days ago  No acute intracranial hemorrhage  No obvious large evolving territorial infarction  2   Advanced, 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  3   Approximately 50% stenosis of the distal right common carotid artery immediately proximal to the common carotid bifurcation   (BG 58 11:30am)  CMP had noted NEGRA of 1 40  WBC 10 82, Hgb 10 8 <- 12 0  Ammonia WNL   ABG: pH 7 485/42  5/68/32 0      Impression:  Given that routine EEG was negative for any spikes or seizure activity in this position that diffuse cerebral dysfunction is more related to medication use; high consideration for toxic metabolic cause, seizures are lower on the differential      Plan   - no indication for Ativan at this time and please do not give any as patient is not having generalized tonic-clonic seizures, no AEDs indicated  - if patient has another episode similar to her presentation at the rapid response, please alert Neurology team or neurology on call if patient has any further episodes for further evaluation and can get stat EEG  - for noted hypoplastic vertebral arteries would recommend avoiding hypotension  - workup as per primary team in regards to toxic/metabolic causes  - No other further recommendations from the inpatient Neurology perspective  - Please contact Neurology any further questions

## 2022-08-19 LAB
ABO GROUP BLD BPU: NORMAL
ANION GAP SERPL CALCULATED.3IONS-SCNC: 4 MMOL/L (ref 4–13)
BPU ID: NORMAL
BUN SERPL-MCNC: 44 MG/DL (ref 5–25)
CALCIUM SERPL-MCNC: 8.7 MG/DL (ref 8.3–10.1)
CHLORIDE SERPL-SCNC: 101 MMOL/L (ref 96–108)
CO2 SERPL-SCNC: 32 MMOL/L (ref 21–32)
CREAT SERPL-MCNC: 1.37 MG/DL (ref 0.6–1.3)
CROSSMATCH: NORMAL
ERYTHROCYTE [DISTWIDTH] IN BLOOD BY AUTOMATED COUNT: 15 % (ref 11.6–15.1)
GFR SERPL CREATININE-BSD FRML MDRD: 35 ML/MIN/1.73SQ M
GLUCOSE SERPL-MCNC: 104 MG/DL (ref 65–140)
GLUCOSE SERPL-MCNC: 161 MG/DL (ref 65–140)
GLUCOSE SERPL-MCNC: 218 MG/DL (ref 65–140)
GLUCOSE SERPL-MCNC: 299 MG/DL (ref 65–140)
GLUCOSE SERPL-MCNC: 318 MG/DL (ref 65–140)
GLUCOSE SERPL-MCNC: 88 MG/DL (ref 65–140)
HCT VFR BLD AUTO: 32.3 % (ref 34.8–46.1)
HGB BLD-MCNC: 10.5 G/DL (ref 11.5–15.4)
MCH RBC QN AUTO: 31.2 PG (ref 26.8–34.3)
MCHC RBC AUTO-ENTMCNC: 32.5 G/DL (ref 31.4–37.4)
MCV RBC AUTO: 96 FL (ref 82–98)
PLATELET # BLD AUTO: 202 THOUSANDS/UL (ref 149–390)
PMV BLD AUTO: 11.6 FL (ref 8.9–12.7)
POTASSIUM SERPL-SCNC: 3.9 MMOL/L (ref 3.5–5.3)
RBC # BLD AUTO: 3.37 MILLION/UL (ref 3.81–5.12)
SODIUM SERPL-SCNC: 137 MMOL/L (ref 135–147)
UNIT DISPENSE STATUS: NORMAL
UNIT PRODUCT CODE: NORMAL
UNIT PRODUCT VOLUME: 350 ML
UNIT RH: NORMAL
WBC # BLD AUTO: 7.32 THOUSAND/UL (ref 4.31–10.16)

## 2022-08-19 PROCEDURE — 85027 COMPLETE CBC AUTOMATED: CPT | Performed by: INTERNAL MEDICINE

## 2022-08-19 PROCEDURE — 99233 SBSQ HOSP IP/OBS HIGH 50: CPT | Performed by: INTERNAL MEDICINE

## 2022-08-19 PROCEDURE — 99232 SBSQ HOSP IP/OBS MODERATE 35: CPT | Performed by: PSYCHIATRY & NEUROLOGY

## 2022-08-19 PROCEDURE — 80048 BASIC METABOLIC PNL TOTAL CA: CPT | Performed by: INTERNAL MEDICINE

## 2022-08-19 PROCEDURE — 82948 REAGENT STRIP/BLOOD GLUCOSE: CPT

## 2022-08-19 RX ORDER — INSULIN LISPRO 100 [IU]/ML
1-6 INJECTION, SOLUTION INTRAVENOUS; SUBCUTANEOUS
Status: DISCONTINUED | OUTPATIENT
Start: 2022-08-19 | End: 2022-08-22 | Stop reason: HOSPADM

## 2022-08-19 RX ADMIN — INSULIN LISPRO 2 UNITS: 100 INJECTION, SOLUTION INTRAVENOUS; SUBCUTANEOUS at 11:54

## 2022-08-19 RX ADMIN — Medication 1 TABLET: at 08:07

## 2022-08-19 RX ADMIN — INSULIN GLARGINE 25 UNITS: 100 INJECTION, SOLUTION SUBCUTANEOUS at 21:52

## 2022-08-19 RX ADMIN — METOPROLOL TARTRATE 25 MG: 25 TABLET, FILM COATED ORAL at 21:52

## 2022-08-19 RX ADMIN — APIXABAN 5 MG: 5 TABLET, FILM COATED ORAL at 17:07

## 2022-08-19 RX ADMIN — SENNOSIDES AND DOCUSATE SODIUM 1 TABLET: 50; 8.6 TABLET ORAL at 21:52

## 2022-08-19 RX ADMIN — ACETAMINOPHEN 975 MG: 325 TABLET ORAL at 05:24

## 2022-08-19 RX ADMIN — APIXABAN 5 MG: 5 TABLET, FILM COATED ORAL at 08:07

## 2022-08-19 RX ADMIN — METOPROLOL TARTRATE 25 MG: 25 TABLET, FILM COATED ORAL at 08:07

## 2022-08-19 RX ADMIN — INSULIN LISPRO 10 UNITS: 100 INJECTION, SOLUTION INTRAVENOUS; SUBCUTANEOUS at 11:54

## 2022-08-19 RX ADMIN — INSULIN LISPRO 10 UNITS: 100 INJECTION, SOLUTION INTRAVENOUS; SUBCUTANEOUS at 08:08

## 2022-08-19 RX ADMIN — ACETAMINOPHEN 975 MG: 325 TABLET ORAL at 21:52

## 2022-08-19 RX ADMIN — ACETAMINOPHEN 975 MG: 325 TABLET ORAL at 13:13

## 2022-08-19 NOTE — PROGRESS NOTES
[address] [facility]  Progress Note - [place] 1935, 82 y o  female MRN: [id number]  Unit/Bed#: [facility] 716-93 Encounter: [id number]  Primary Care Provider: Silviano Tolliver MD   Date and time admitted to hospital: 8/15/2022  7:51 AM    Altered mental status  Assessment & Plan  Had rapid response on 8/18/22 for altered mental status  Patient was at her baseline in the morning however later nurse stated that patient states she felt like she was going to pass out and then became lethargic  Rapid response team stated could be possible seizure-like activity and Ativan was given during rapid response  Patient was then transported to CT brain stat no obvious acute CVA  Neurology was consulted  As per neurology EEG and if another episode of altered mental status to give Keppra and not  Ativan  Discussed with son Cesia Ramirez, informed about rapid response episode  Verified patient is still full code    Discussed with daughter at bedside today  She is aware that this type of episodes  have occurred in the past as well  They had concerns about side effect of one  of the medication that she was started for dementia leading to similar episode in the past and that was held  Noted neurology resident's note that today afternoon patient again had almost similar episode like yesterday  Instead of calling rapid response, Neurology resident  assised patient  It was not a seizure-like activity  There was concern about some behavioral problem related to being discharged to rehab as per Neurology      * Closed right ankle fracture  Assessment & Plan  · Ortho consult for bimalleolar fracture s/p  ORIF POD#3  · Also cleared to restart Eliquis  · Pain management consult appreciated-recommended to DC Dilaudid and oxycodone 5 mg, continue oxycodone 2 5 mg q 6 hours p r n  and bowel regimen  · PT/OT after ortho clearance-recommended acute rehab  · DC held sec to RR yesterday- concerns for ?? Seizure vs behaviour problem vs s/e of anesthesia/meds      Type 2 diabetes mellitus with hyperglycemia, with long-term current use of insulin [facility])  Assessment & Plan  Lab Results   Component Value Date    HGBA1C 7 3 (H) 07/30/2022       Recent Labs     08/19/22  0729 08/19/22  1138 08/19/22  1514 08/19/22  1618   POCGLU 318* 218* 104 88       Blood Sugar Average: Last 72 hrs:  (P) 163 8     · Uses VGO pump at home which will be d/c as pt not competent to use it and dtr on vacation  · Endo consult as BSs high  · This morning had episode of hypoglycemia hands and the current adjusted Lantus and Humalog does    PAF (paroxysmal atrial fibrillation) ([facility])  Assessment & Plan  · C/w BB  · Ortho cleared to restart Eliquis    Obstructive sleep apnea  Assessment & Plan  Use cpap    Dementia without behavioral disturbance ([facility] [address] )  Assessment & Plan  · On going dementia- family making decisions  · Supportive care          VTE Pharmacologic Prophylaxis: VTE Score: 9 Moderate Risk (Score 3-4) - Pharmacological DVT Prophylaxis Ordered: apixaban (Eliquis)  Patient Centered Rounds: I performed bedside rounds with nursing staff today  Discussions with Specialists or Other Care Team Provider: neurology    Education and Discussions with Family / Patient: Updated  (daughter) at bedside  Time Spent for Care: 30 minutes  More than 50% of total time spent on counseling and coordination of care as described above  Current Length of Stay: 4 day(s)  Current Patient Status: Inpatient   Certification Statement: The patient will continue to require additional inpatient hospital stay due to neurology w/u and rehab arrangement  Discharge Plan: Anticipate discharge in 24-48 hrs to rehab facility  Code Status: Level 1 - Full Code    Subjective:   During morning rounds patient was found to be at her baseline today  She denied any chest pain, shortness for breath, nausea, vomiting  Her right leg pain under control    She tolerated breakfast well  Her daughter was at bedside  Daughter is aware of episode that happened yesterday  As per daughter she wondered if it had anything to do with lingering anesthesia effect or side effect of any medication as daughter recalls atient had similar episodes in the past   Daughter stated that when she was diagnosed with dementia and started on medication, dimitris had similar reaction, so med was stopped but she cannot recall the name of the medication  Later during the day noted that Neurology resident was called as patient stated similar symptoms like yesterday  As per assessment by Neurology not a seizure-like activity  Daughter stated she had similar reaction when she was told that she needs to go to rehab  Objective:     Vitals:   Temp (24hrs), Av 8 °F (36 6 °C), Min:96 8 °F (36 °C), Max:98 4 °F (36 9 °C)    Temp:  [96 8 °F (36 °C)-98 4 °F (36 9 °C)] 97 5 °F (36 4 °C)  HR:  [65-74] 74  Resp:  [14-20] 20  BP: (131-159)/(53-91) 150/80  SpO2:  [95 %-98 %] 97 %  Body mass index is 34 95 kg/m²  Input and Output Summary (last 24 hours): Intake/Output Summary (Last 24 hours) at 2022 1938  Last data filed at 2022 1458  Gross per 24 hour   Intake 360 ml   Output 650 ml   Net -290 ml       Physical Exam:   Physical Exam  Vitals and nursing note reviewed  Constitutional:       General: She is not in acute distress  Appearance: She is well-developed  Comments: elderly   HENT:      Head: Normocephalic and atraumatic  Mouth/Throat:      Mouth: Mucous membranes are moist    Eyes:      Conjunctiva/sclera: Conjunctivae normal    Cardiovascular:      Rate and Rhythm: Normal rate and regular rhythm  Heart sounds: Murmur heard  Pulmonary:      Effort: Pulmonary effort is normal  No respiratory distress  Breath sounds: Normal breath sounds  Abdominal:      General: Bowel sounds are normal       Palpations: Abdomen is soft  Tenderness:  There is no abdominal tenderness  Musculoskeletal:      Cervical back: Neck supple  Comments: Rt LE in dressing, moving toes, LLE wnl   Skin:     General: Skin is warm and dry  Neurological:      Mental Status: She is alert  Mental status is at baseline  Psychiatric:         Behavior: Behavior normal         Additional Data:     Labs:  Results from last 7 days   Lab Units 08/19/22  0937 08/18/22  1411 08/18/22  1405   WBC Thousand/uL 7 32  --  10 82*   HEMOGLOBIN g/dL 10 5*  --  10 8*   I STAT HEMOGLOBIN   --    < >  --    HEMATOCRIT % 32 3*  --  33 3*   HEMATOCRIT, ISTAT   --    < >  --    PLATELETS Thousands/uL 202  --  212   NEUTROS PCT %  --   --  67   LYMPHS PCT %  --   --  21   MONOS PCT %  --   --  10   EOS PCT %  --   --  1    < > = values in this interval not displayed  Results from last 7 days   Lab Units 08/19/22  0937 08/18/22  1411 08/18/22  1405   SODIUM mmol/L 137  --  137  137   POTASSIUM mmol/L 3 9  --  3 5  3 5   CHLORIDE mmol/L 101  --  103  103   CO2 mmol/L 32  --  31  31   CO2, I-STAT   --    < >  --    BUN mg/dL 44*  --  47*  47*   CREATININE mg/dL 1 37*  --  1 40*  1 40*   ANION GAP mmol/L 4  --  3*  3*   CALCIUM mg/dL 8 7  --  9 5  9 5   ALBUMIN g/dL  --   --  3 2*   TOTAL BILIRUBIN mg/dL  --   --  0 52   ALK PHOS U/L  --   --  68   ALT U/L  --   --  19   AST U/L  --   --  31   GLUCOSE RANDOM mg/dL 299*  --  128  128    < > = values in this interval not displayed       Results from last 7 days   Lab Units 08/16/22  0817   INR  1 10     Results from last 7 days   Lab Units 08/19/22  1618 08/19/22  1514 08/19/22  1138 08/19/22  0729 08/18/22  2020 08/18/22  1830 08/18/22  1338 08/18/22  1259 08/18/22  1217 08/18/22  1133 08/18/22  0735 08/17/22  2103   POC GLUCOSE mg/dl 88 104 218* 318* 228* 181* 137 106 68 58* 110 145*         Results from last 7 days   Lab Units 08/15/22  0819   LACTIC ACID mmol/L 1 4   PROCALCITONIN ng/ml 0 05       Lines/Drains:  Invasive Devices  Report Peripheral Intravenous Line  Duration           Peripheral IV 08/18/22 Right Arm 1 day          Drain  Duration           External Urinary Catheter 4 days                  Telemetry:  Telemetry Orders (From admission, onward)             48 Hour Telemetry Monitoring  Continuous x 48 hours        References:    Telemetry Guidelines   Question:  Reason for 48 Hour Telemetry  Answer:  Acute CVA (<24 hrs old, hemispheric strokes, selected brainstem strokes, cardiac arrhythmias)                            Imaging: Reviewed radiology reports from this admission including: CT head    Recent Cultures (last 7 days):   Results from last 7 days   Lab Units 08/15/22  0819   BLOOD CULTURE  No Growth After 4 Days  No Growth After 4 Days  Last 24 Hours Medication List:   Current Facility-Administered Medications   Medication Dose Route Frequency Provider Last Rate    acetaminophen  975 mg Oral [facility] Esteban Lopez MD      apixaban  5 mg Oral BID Janell Salas MD      atorvastatin  40 mg Oral Daily With Jennifer MD Santhosh      calcium carbonate-vitamin D  1 tablet Oral BID With Meals Esteban Lopez MD      insulin glargine  25 Units Subcutaneous HS Myriam Jurado MD      insulin lispro  1-6 Units Subcutaneous TID [facility] Myriam Jurado MD      insulin lispro  10 Units Subcutaneous TID With Meals Myriam Jurado MD      metoprolol tartrate  25 mg Oral Q12H [address], MD      ondansetron  4 mg Intravenous Q6H PRN Esteban Lopez MD      senna-docusate sodium  1 tablet Oral HS Janell Salas MD          Today, Patient Was Seen By: Janell Salas MD    **Please Note: This note may have been constructed using a voice recognition system  **

## 2022-08-19 NOTE — ASSESSMENT & PLAN NOTE
Had rapid response on 8/18/22 for altered mental status  Patient was at her baseline in the morning however later nurse stated that patient states she felt like she was going to pass out and then became lethargic  Rapid response team stated could be possible seizure-like activity and Ativan was given during rapid response  Patient was then transported to CT brain stat no obvious acute CVA  Neurology was consulted  As per neurology EEG and if another episode of altered mental status to give Keppra and not  Ativan  Discussed with son Sharyle Cords, informed about rapid response episode  Verified patient is still full code    Discussed with daughter at bedside today  She is aware that this type of episodes  have occurred in the past as well  They had concerns about side effect of one  of the medication that she was started for dementia leading to similar episode in the past and that was held  Noted neurology resident's note that today afternoon patient again had almost similar episode like yesterday  Instead of calling rapid response, Neurology resident  assised patient  It was not a seizure-like activity  There was concern about some behavioral problem related to being discharged to rehab as per Neurology

## 2022-08-19 NOTE — PLAN OF CARE
Problem: PAIN - ADULT  Goal: Verbalizes/displays adequate comfort level or baseline comfort level  Description: Interventions:  - Encourage patient to monitor pain and request assistance  - Assess pain using appropriate pain scale  - Administer analgesics based on type and severity of pain and evaluate response  - Implement non-pharmacological measures as appropriate and evaluate response  - Consider cultural and social influences on pain and pain management  - Notify physician/advanced practitioner if interventions unsuccessful or patient reports new pain  Outcome: Progressing     Problem: INFECTION - ADULT  Goal: Absence or prevention of progression during hospitalization  Description: INTERVENTIONS:  - Assess and monitor for signs and symptoms of infection  - Monitor lab/diagnostic results  - Monitor all insertion sites, i e  indwelling lines, tubes, and drains  - Monitor endotracheal if appropriate and nasal secretions for changes in amount and color  - [place] appropriate cooling/warming therapies per order  - Administer medications as ordered  - Instruct and encourage patient and family to use good hand hygiene technique  - Identify and instruct in appropriate isolation precautions for identified infection/condition  Outcome: Progressing  Goal: Absence of fever/infection during neutropenic period  Description: INTERVENTIONS:  - Monitor WBC    Outcome: Progressing     Problem: DISCHARGE PLANNING  Goal: Discharge to home or other facility with appropriate resources  Description: INTERVENTIONS:  - Identify barriers to discharge w/patient and caregiver  - Arrange for needed discharge resources and transportation as appropriate  - Identify discharge learning needs (meds, wound care, etc )  - Arrange for interpretive services to assist at discharge as needed  - Refer to Case Management Department for coordinating discharge planning if the patient needs post-hospital services based on physician/advanced practitioner order or complex needs related to functional status, cognitive ability, or social support system  Outcome: Progressing     Problem: Knowledge Deficit  Goal: Patient/family/caregiver demonstrates understanding of disease process, treatment plan, medications, and discharge instructions  Description: Complete learning assessment and assess knowledge base    Interventions:  - Provide teaching at level of understanding  - Provide teaching via preferred learning methods  Outcome: Progressing

## 2022-08-19 NOTE — ASSESSMENT & PLAN NOTE
· Ortho consult for bimalleolar fracture s/p  ORIF POD#3  · Also cleared to restart Eliquis  · Pain management consult appreciated-recommended to DC Dilaudid and oxycodone 5 mg, continue oxycodone 2 5 mg q 6 hours p r n  and bowel regimen  · PT/OT after ortho clearance-recommended acute rehab  · DC held sec to RR yesterday- concerns for ??  Seizure vs behaviour problem vs s/e of anesthesia/meds

## 2022-08-19 NOTE — PLAN OF CARE
Problem: PAIN - ADULT  Goal: Verbalizes/displays adequate comfort level or baseline comfort level  Description: Interventions:  - Encourage patient to monitor pain and request assistance  - Assess pain using appropriate pain scale  - Administer analgesics based on type and severity of pain and evaluate response  - Implement non-pharmacological measures as appropriate and evaluate response  - Consider cultural and social influences on pain and pain management  - Notify physician/advanced practitioner if interventions unsuccessful or patient reports new pain  Outcome: Progressing     Problem: INFECTION - ADULT  Goal: Absence or prevention of progression during hospitalization  Description: INTERVENTIONS:  - Assess and monitor for signs and symptoms of infection  - Monitor lab/diagnostic results  - Monitor all insertion sites, i e  indwelling lines, tubes, and drains  - Monitor endotracheal if appropriate and nasal secretions for changes in amount and color  - Robert Lee appropriate cooling/warming therapies per order  - Administer medications as ordered  - Instruct and encourage patient and family to use good hand hygiene technique  - Identify and instruct in appropriate isolation precautions for identified infection/condition  Outcome: Progressing  Goal: Absence of fever/infection during neutropenic period  Description: INTERVENTIONS:  - Monitor WBC    Outcome: Progressing

## 2022-08-19 NOTE — PROGRESS NOTES
NEUROLOGY RESIDENCY PROGRESS NOTE     Name: [place]   Age & Sex: 82 y o  female   MRN: [id number]  Unit/Bed#: [facility] 137-51   Encounter: [id number]    [place] will not need outpatient follow up with Neurology  She will not require outpatient neurological testing  ASSESSMENT & PLAN     Altered mental status  Assessment & Plan  81yo w/ PMH of PAF on eliquis (paused due to ORIF), CAD, asthma, CHF, dementia, HLD, past MI, dementia, coming in ORIF surgery where eliquis was held  This is POD#2  RRT was called Patient stating she did not feel well  Began having decreased mentation to verbal/noxious stimuli  Rapid Response called around 2pm in patient was noted to have some eye deviation up and to the right  She also had some roving eyes her critical care team/rapid response team   She was given Ativan 2 mg 2 times for her noted altered mental status/unresponsive episode  She got back to baseline a few minutes after the 2nd dose of Ativan was given  Per nursing, soon after patient came back from CT patient has remained agitated and aggressive compared to her baseline that is pleasantly demented  W/U:  - vEEG: This is an abnormal 38 minutes awake, drowsy, and asleep EEG due to excessive sleep and when she is awake there is diffuse 3-4 Hz delta activity and 4-5 Hz theta activity  These findings are etiologically nonspecific for moderate diffuse cerebral dysfunction, which in part may be due to medications  - CTA h/n: 1  Advanced, chronic microangiopathy is similar to the prior head CT from 3 days ago  No acute intracranial hemorrhage  No obvious large evolving territorial infarction  2   Advanced, 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  3   Approximately 50% stenosis of the distal right common carotid artery immediately proximal to the common carotid bifurcation     (BG 58 11:30am)  CMP had noted NEGRA of 1 40  WBC 10 82, Hgb 10 8 <- 12 0  Ammonia WNL   ABG: pH 7 485/42  5/68/32 0      Impression:  Given that routine EEG was negative for any spikes or seizure activity in this position that diffuse cerebral dysfunction is more related to medication use; high consideration for toxic metabolic cause, seizures are lower on the differential      Plan   - no indication for Ativan at this time and please do not give any as patient is not having generalized tonic-clonic seizures, no AEDs indicated  - if patient has another episode similar to her presentation at the rapid response, please alert Neurology team or neurology on call if patient has any further episodes for further evaluation and can get stat EEG  - for noted hypoplastic vertebral arteries would recommend avoiding hypotension  - workup as per primary team in regards to toxic/metabolic causes  - No other further recommendations from the inpatient Neurology perspective  - Please contact Neurology any further questions  SUBJECTIVE     Patient was seen and examined  No acute events overnight  Patient was very pleasant and back at baseline mentation per daughter and answering questions appropriately and following commands  She states she remember not feeling well yesterday but not the exact details of what happened     She denies any headaches, syncope, paresthesias, diplopia, visual changes, tinnitus, sudden onset weakness, garbled speech, dysarthria/slurring of words,  Loss of bowel or bladder    Review of Systems   Constitutional: Negative for chills and fever  HENT: Negative for ear pain and sore throat  Eyes: Negative for pain and visual disturbance  Respiratory: Negative for cough and shortness of breath  Cardiovascular: Negative for chest pain and palpitations  Gastrointestinal: Negative for abdominal pain and vomiting  Genitourinary: Negative for dysuria and hematuria  Musculoskeletal: Negative for arthralgias and back pain     Skin: Negative for color change and rash  Neurological: Negative for seizures and syncope  All other systems reviewed and are negative  OBJECTIVE     Patient ID: Raisa Guardado is a 82 y o  female  Vitals:    22 1419 22 1629 22 2211 22 0731   BP: 124/82 (!) 88/57 136/91 131/53   Pulse:  69 67 72   Resp:   16 18   Temp:  (!) 97 3 °F (36 3 °C) (!) 96 8 °F (36 °C) 98 4 °F (36 9 °C)   TempSrc:       SpO2:  94% 98% 98%   Weight:       Height:          Temperature:   Temp (24hrs), Av 9 °F (36 6 °C), Min:96 8 °F (36 °C), Max:98 9 °F (37 2 °C)    Temperature: 98 4 °F (36 9 °C)      Physical Exam  Vitals and nursing note reviewed  Constitutional:       General: She is not in acute distress  Appearance: She is well-developed  HENT:      Head: Normocephalic and atraumatic  Eyes:      Extraocular Movements: EOM normal       Conjunctiva/sclera: Conjunctivae normal       Pupils: Pupils are equal, round, and reactive to light  Cardiovascular:      Rate and Rhythm: Normal rate and regular rhythm  Heart sounds: No murmur heard  Pulmonary:      Effort: Pulmonary effort is normal  No respiratory distress  Breath sounds: Normal breath sounds  Abdominal:      Palpations: Abdomen is soft  Tenderness: There is no abdominal tenderness  Musculoskeletal:      Cervical back: Neck supple  Comments: Noted right lower extremity covered in bandage   Skin:     General: Skin is warm and dry  Neurological:      Mental Status: She is alert  Coordination: Finger-Nose-Finger Test and Heel to [employer] (Unable to assess given patient's right lower extremities in bandage) normal       Deep Tendon Reflexes:      Reflex Scores:       Tricep reflexes are 2+ on the right side and 2+ on the left side  Bicep reflexes are 2+ on the right side and 2+ on the left side  Brachioradialis reflexes are 2+ on the right side and 2+ on the left side         Patellar reflexes are 1+ on the right side and 1+ on the left side  Achilles reflexes are 1+ on the left side  Neurologic Exam     Mental Status   Level of consciousness: alert  Able to name object  Normal comprehension  Oriented to person place as well situation and seems to be back at baseline     Cranial Nerves     CN II   Visual fields full to confrontation  CN III, IV, VI   Pupils are equal, round, and reactive to light  Extraocular motions are normal    CN III: no CN III palsy  CN VI: no CN VI palsy  Nystagmus: none     CN V   Facial sensation intact  CN VII   Facial expression full, symmetric  CN VIII   CN VIII normal      CN IX, X   CN IX normal    CN X normal      CN XI   CN XI normal      CN XII   CN XII normal      Motor Exam   Right arm pronator drift: absent  Left arm pronator drift: absent    Strength   Strength 5/5 except as noted  Unable to assess right lower extremity given is bandage and patient has strict nonweightbearing precautions given recent surgery     Sensory Exam   Light touch normal      Gait, Coordination, and Reflexes     Coordination   Finger to nose coordination: normal  Heel to shin coordination: normal (Unable to assess given patient's right lower extremities in bandage)    Tremor   Resting tremor: absent  Intention tremor: absent  Action tremor: absent    Reflexes   Right brachioradialis: 2+  Left brachioradialis: 2+  Right biceps: 2+  Left biceps: 2+  Right triceps: 2+  Left triceps: 2+  Right patellar: 1+  Left patellar: 1+  Right achilles reflex grade: Unable to assess given patient's right lower extremities in bandage  Left achilles: 1+  Right plantar reflex: Unable to assess given patient's right lower extremities in bandage  Left plantar: normal  Right ankle clonus present: Unable to assess given patient's right lower extremities in bandage  Left pendular knee jerk: absent  Gait deferred            LABORATORY DATA     Labs: I have personally reviewed pertinent reports     and I have personally reviewed pertinent films in PACS  Results from last 7 days   Lab Units 08/19/22  0937 08/18/22  1411 08/18/22  1405 08/17/22  0731 08/16/22  0817 08/15/22  0819   WBC Thousand/uL 7 32  --  10 82* 9 87   < > 9 75   HEMOGLOBIN g/dL 10 5*  --  10 8* 12 0   < > 14 0   I STAT HEMOGLOBIN g/dl  --  10 9*  --   --   --   --    HEMATOCRIT % 32 3*  --  33 3* 38 2   < > 43 4   HEMATOCRIT, ISTAT %  --  32*  --   --   --   --    PLATELETS Thousands/uL 202  --  212 184   < > 207   NEUTROS PCT %  --   --  67  --   --  77*   MONOS PCT %  --   --  10  --   --  6    < > = values in this interval not displayed  Results from last 7 days   Lab Units 08/19/22  0937 08/18/22  1411 08/18/22  1405 08/16/22  0817 08/15/22  0819   SODIUM mmol/L 137  --  137  137 139 140   POTASSIUM mmol/L 3 9  --  3 5  3 5 4 4 4 2   CHLORIDE mmol/L 101  --  103  103 110* 110*   CO2 mmol/L 32  --  31  31 28 26   CO2, I-STAT mmol/L  --  33*  --   --   --    BUN mg/dL 44*  --  47*  47* 26* 26*   CREATININE mg/dL 1 37*  --  1 40*  1 40* 1 03 1 07   CALCIUM mg/dL 8 7  --  9 5  9 5 9 6 9 8   ALK PHOS U/L  --   --  68  --  88   ALT U/L  --   --  19  --  21   AST U/L  --   --  31  --  18   GLUCOSE, ISTAT mg/dl  --  153*  --   --   --      Results from last 7 days   Lab Units 08/16/22  0817   MAGNESIUM mg/dL 2 0     Results from last 7 days   Lab Units 08/16/22  0817   PHOSPHORUS mg/dL 3 3      Results from last 7 days   Lab Units 08/16/22  0817 08/15/22  0819   INR  1 10 1 09   PTT seconds 33 32     Results from last 7 days   Lab Units 08/15/22  0819   LACTIC ACID mmol/L 1 4           IMAGING & DIAGNOSTIC TESTING     Radiology Results: I have personally reviewed pertinent reports  and I have personally reviewed pertinent films in PACS    CTA head and neck w wo contrast   Final Result by Juan Pablo Mittal MD (08/18 4701)         1  Advanced, chronic microangiopathy is similar to the prior head CT from 3 days ago    No acute intracranial hemorrhage  No obvious large evolving territorial infarction  2   Advanced, 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 3  Approximately 50% stenosis of the distal right common carotid artery immediately proximal to the common carotid bifurcation  4   No intracranial aneurysm or major intracranial arterial occlusion/stenosis  5   Left chest wall pacemaker  Workstation performed: [id number]         XR ankle 2 vw right   Final Result by Robyn Arroyo MD (56/97 5740)      Fluoroscopic guidance provided for procedure guidance  Please refer to the separate procedure notes for additional details  Workstation performed: [id number]         XR ankle 3+ vw right   Final Result by Shanell Brumfield MD (08/15 1433)      Anatomic alignment of bimalleolar fractures post splint placement  Workstation performed: [id number]         XR ankle 3+ vw right   Final Result by Wei Jasso MD (08/15 1323)      Significantly improved alignment of the ankle fracture dislocation with persistent mild widening of the medial tibiotalar joint  Improved alignment of the mildly displaced medial malleolar fracture  No posterior malleolar fracture  Workstation performed: [id number]         XR ankle 3+ views RIGHT   Final Result by Wei Jasso MD (08/15 1258)      Improved alignment of the tibiotalar joint but with persistent ankle dislocation, related to fracture dislocation as described  Workstation performed: [id number]         CT head without contrast   Final Result by Robyn Arroyo MD (08/15 1028)      No acute intracranial process  No skull fracture  Chronic microangiopathy                                      Workstation performed: [id number]         XR chest portable   Final Result by Wendi Waldron DO (68/73 2552) No acute cardiopulmonary disease  Workstation performed: [id number]         XR tibia fibula 2 views RIGHT   Final Result by Daniel Singh DO (08/15 0920)      Fracture dislocation right ankle, incompletely evaluated  Acute, nondisplaced fracture proximal fibula  Dedicated imaging of the right ankle recommended  The study was marked in [facility] for immediate notification  Workstation performed: [id number]             Other Diagnostic Testing: I have personally reviewed pertinent reports  and I have personally reviewed pertinent films in PACS    ACTIVE MEDICATIONS     Current Facility-Administered Medications   Medication Dose Route Frequency    acetaminophen (TYLENOL) tablet 975 mg  975 mg Oral Q8H [facility]    apixaban (ELIQUIS) tablet 5 mg  5 mg Oral BID    atorvastatin (LIPITOR) tablet 40 mg  40 mg Oral Daily With Dinner    calcium carbonate-vitamin D (OSCAL-D) 500 mg-200 units per tablet 1 tablet  1 tablet Oral BID With Meals    insulin glargine (LANTUS) subcutaneous injection 25 Units 0 25 mL  25 Units Subcutaneous HS    insulin lispro (HumaLOG) 100 units/mL subcutaneous injection 10 Units  10 Units Subcutaneous TID With Meals    LORazepam (ATIVAN) injection 2 mg  2 mg Intravenous Once    metoprolol tartrate (LOPRESSOR) tablet 25 mg  25 mg Oral Q12H [facility]    ondansetron (ZOFRAN) injection 4 mg  4 mg Intravenous Q6H PRN    oxyCODONE (ROXICODONE) IR tablet 2 5 mg  2 5 mg Oral Q6H PRN    senna-docusate sodium (SENOKOT S) 8 6-50 mg per tablet 1 tablet  1 tablet Oral HS       Prior to Admission medications    Medication Sig Start Date End Date Taking?  Authorizing Provider   oxyCODONE (Roxicodone) 5 immediate release tablet Take 1 tablet (5 mg total) by mouth every 6 (six) hours as needed for severe pain for up to 10 days Max Daily Amount: 20 mg 8/16/22 8/26/22 Yes Susan Juárez PA-C   atorvastatin (LIPITOR) 40 mg tablet Take 1 tablet (40 mg total) by mouth daily 1/3/22   Haroldo Jenkins MD   calcium carbonate-vitamin D (OSCAL-D) 500 mg-200 units per tablet Take 1 tablet by mouth 2 (two) times a day with meals 4/28/18   Ariana Mar DO   Continuous Blood Gluc  (Dexcom G6 ) SIVAKUMAR Use daily as directed for CGM    Historical Provider, MD   Continuous Blood Gluc Sensor (Dexcom G6 Sensor) MISC Use daily as directed for CGM - Change every 10 days    Historical Provider, MD   Continuous Blood Gluc Transmit (Dexcom G6 Transmitter) MISC Use daily as directed for CGM - Change every 3 months    Historical Provider, MD   docusate sodium (COLACE) 100 mg capsule Take 1 capsule (100 mg total) by mouth 2 (two) times a day  Patient taking differently: Take 100 mg by mouth 2 (two) times a day as needed 4/28/18   Ariana Mar DO   Eliquis 5 MG TAKE 1 TABLET BY MOUTH TWICE A DAY 7/13/22   Haroldo Jeknins MD   glucose blood test strip USE AS DIRECTED 3 TIMES A DAY 5/26/20   EMELI Olson   insulin aspart (NovoLOG) 100 units/mL injection Use for V-Go 1/25/22   EMELI Winter   Insulin Disposable Pump (V-Go 20) KIT Apply once a night 1/25/22   EMELI Winter   Insulin Disposable Pump (V-Go 20) KIT Use daily Use one daily 7/29/22   EMELI Robert   metoprolol tartrate (LOPRESSOR) 25 mg tablet TAKE 1 TABLET (25 MG TOTAL) BY MOUTH EVERY 12 (TWELVE) HOURS 6/14/22   Haroldo Jenkins MD   NovoLOG 100 UNIT/ML injection USE FOR V-GO 6/17/22   Historical Provider, MD   potassium chloride (Klor-Con M10) 10 mEq tablet Take 1 tablet (10 mEq total) by mouth daily 8/1/22   Haroldo Jenkins MD   senna (SENOKOT) 8 6 mg Take 1 tablet (8 6 mg total) by mouth daily  Patient taking differently: Take 1 tablet by mouth daily at bedtime as needed 4/29/18   Ariana Mar DO   torsemide (DEMADEX) 20 mg tablet Take 1 tablet (20 mg total) by mouth daily 8/1/22   Haroldo Jenkins MD         VTE Pharmacologic Prophylaxis: Apixaban (Eliquis)  VTE Mechanical Prophylaxis: sequential compression device    ==  Vahe Dai 30  Neurology Residency, PGY-2

## 2022-08-19 NOTE — QUICK NOTE
Was called to bedside by nursing staff to bedside as had similar episodes as previously  There was concern for seizure but evaluation noted that patient was able to follow commands despite closing eyes tightly and able to localize pain when given sternal rub  No recent meds given and vitals stable  Patients eyes were able to cross midline w/o problem w/ stimulus  Was told by nurse that patient exhibited similar behavior yesterday and in the past when told she would go to rehab previously in the past by daughter  Daughter reminded patient that she would go to rehab  Communicated no acute concern for seizure at this time   Possibly behavioral related given behavior occurs when reminded pt is going to rehab

## 2022-08-19 NOTE — ASSESSMENT & PLAN NOTE
Lab Results   Component Value Date    HGBA1C 7 3 (H) 07/30/2022       Recent Labs     08/19/22  0729 08/19/22  1138 08/19/22  1514 08/19/22  1618   POCGLU 318* 218* 104 88       Blood Sugar Average: Last 72 hrs:  (P) 163 8     · Uses VGO pump at home which will be d/c as pt not competent to use it and dtr on vacation  · Endo consult as BSs high  · This morning had episode of hypoglycemia hands and the current adjusted Lantus and Humalog does

## 2022-08-20 LAB
ANION GAP SERPL CALCULATED.3IONS-SCNC: 5 MMOL/L (ref 4–13)
BACTERIA BLD CULT: NORMAL
BACTERIA BLD CULT: NORMAL
BUN SERPL-MCNC: 38 MG/DL (ref 5–25)
CALCIUM SERPL-MCNC: 9 MG/DL (ref 8.3–10.1)
CHLORIDE SERPL-SCNC: 103 MMOL/L (ref 96–108)
CO2 SERPL-SCNC: 31 MMOL/L (ref 21–32)
CREAT SERPL-MCNC: 1.2 MG/DL (ref 0.6–1.3)
GFR SERPL CREATININE-BSD FRML MDRD: 41 ML/MIN/1.73SQ M
GLUCOSE SERPL-MCNC: 173 MG/DL (ref 65–140)
GLUCOSE SERPL-MCNC: 182 MG/DL (ref 65–140)
GLUCOSE SERPL-MCNC: 190 MG/DL (ref 65–140)
GLUCOSE SERPL-MCNC: 221 MG/DL (ref 65–140)
GLUCOSE SERPL-MCNC: 227 MG/DL (ref 65–140)
GLUCOSE SERPL-MCNC: 74 MG/DL (ref 65–140)
POTASSIUM SERPL-SCNC: 3.9 MMOL/L (ref 3.5–5.3)
SODIUM SERPL-SCNC: 139 MMOL/L (ref 135–147)

## 2022-08-20 PROCEDURE — 80048 BASIC METABOLIC PNL TOTAL CA: CPT | Performed by: STUDENT IN AN ORGANIZED HEALTH CARE EDUCATION/TRAINING PROGRAM

## 2022-08-20 PROCEDURE — 82948 REAGENT STRIP/BLOOD GLUCOSE: CPT

## 2022-08-20 PROCEDURE — 99233 SBSQ HOSP IP/OBS HIGH 50: CPT | Performed by: STUDENT IN AN ORGANIZED HEALTH CARE EDUCATION/TRAINING PROGRAM

## 2022-08-20 RX ADMIN — APIXABAN 5 MG: 5 TABLET, FILM COATED ORAL at 08:48

## 2022-08-20 RX ADMIN — ATORVASTATIN CALCIUM 40 MG: 40 TABLET, FILM COATED ORAL at 17:31

## 2022-08-20 RX ADMIN — APIXABAN 5 MG: 5 TABLET, FILM COATED ORAL at 17:31

## 2022-08-20 RX ADMIN — INSULIN LISPRO 10 UNITS: 100 INJECTION, SOLUTION INTRAVENOUS; SUBCUTANEOUS at 08:48

## 2022-08-20 RX ADMIN — Medication 1 TABLET: at 17:31

## 2022-08-20 RX ADMIN — Medication 1 TABLET: at 08:48

## 2022-08-20 RX ADMIN — METOPROLOL TARTRATE 25 MG: 25 TABLET, FILM COATED ORAL at 08:48

## 2022-08-20 RX ADMIN — INSULIN LISPRO 1 UNITS: 100 INJECTION, SOLUTION INTRAVENOUS; SUBCUTANEOUS at 08:48

## 2022-08-20 RX ADMIN — INSULIN LISPRO 10 UNITS: 100 INJECTION, SOLUTION INTRAVENOUS; SUBCUTANEOUS at 12:20

## 2022-08-20 RX ADMIN — INSULIN LISPRO 2 UNITS: 100 INJECTION, SOLUTION INTRAVENOUS; SUBCUTANEOUS at 12:19

## 2022-08-20 RX ADMIN — INSULIN GLARGINE 25 UNITS: 100 INJECTION, SOLUTION SUBCUTANEOUS at 22:15

## 2022-08-20 RX ADMIN — ACETAMINOPHEN 975 MG: 325 TABLET ORAL at 22:15

## 2022-08-20 RX ADMIN — ACETAMINOPHEN 975 MG: 325 TABLET ORAL at 15:15

## 2022-08-20 RX ADMIN — SENNOSIDES AND DOCUSATE SODIUM 1 TABLET: 50; 8.6 TABLET ORAL at 22:15

## 2022-08-20 NOTE — PROGRESS NOTES
[address] [facility]  Progress Note - [place] 1935, 82 y o  female MRN: [id number]  Unit/Bed#: [facility] 586-78 Encounter: [id number]  Primary Care Provider: Chasity Crowley MD   Date and time admitted to hospital: 8/15/2022  7:51 AM    Altered mental status  Assessment & Plan  Had rapid response on 8/18/22 for altered mental status  Patient was at her baseline in the morning however later nurse stated that patient states she felt like she was going to pass out and then became lethargic  Rapid response team stated could be possible seizure-like activity and Ativan was given during rapid response  Patient was then transported to CT brain stat no obvious acute CVA  Neurology was consulted  As per neurology EEG and if another episode of altered mental status to give Keppra and not  Ativan  Discussed with son James Malik, informed about rapid response episode  Verified patient is still full code    Prior hospitalist's discussedwith daughter at bedside 8/18  She is aware that this type of episodes  have occurred in the past as well  They had concerns about side effect of one  of the medication that she was started for dementia leading to similar episode in the past and that was held  Noted neurology resident's note that 8/19 afternoon patient again had almost similar episode like 8/18  Instead of calling rapid response, Neurology resident  assised patient  It was not a seizure-like activity  There was concern about some behavioral problem related to being discharged to rehab as per Neurology      Chest pain  Assessment & Plan  · With diaphoresis and bradycardia during ankle spliting  · Cardio consult for pre op clearance appreciated - cleared with mod to high risk, echo done  · No further chest pain    Type 2 diabetes mellitus with hyperglycemia, with long-term current use of insulin [facility])  Assessment & Plan  Lab Results   Component Value Date    HGBA1C 7 3 (H) 07/30/2022 Recent Labs     08/19/22  2034 08/20/22  0600 08/20/22  0747 08/20/22  1110   POCGLU 161* 221* 182* 227*       Blood Sugar Average: Last 72 hrs:  (P) 182 [id number]     · Uses VGO pump at home which will be d/c as pt not competent to use it and dtr on vacation  · Endo consult as BSs high  · Episode of hypoglycemia several days ago    PAF (paroxysmal atrial fibrillation) ([facility])  Assessment & Plan  · C/w BB  · Ortho cleared to restart Eliquis    Chronic diastolic congestive heart failure (HCC)  Assessment & Plan  Wt Readings from Last 3 Encounters:   08/15/22 95 3 kg (210 lb)   08/01/22 95 3 kg (210 lb)   02/14/22 95 kg (209 lb 7 oz)   · Hold torsemide for OR  · Repeat echo done on 08/15/2022 shows normal EF and grade 2 diastolic dysfunction  · Cardio follow-up appreciated, restarted torsemide however then was held, no reasoning documented for this but suspect in setting of NEGRA  Will monitor patient for signs of volume overload, continue to hold torsemide          Obstructive sleep apnea  Assessment & Plan  Use cpap    Dementia without behavioral disturbance ([facility] )  Assessment & Plan  · On going dementia- family making decisions  · Supportive care    * Closed right ankle fracture  Assessment & Plan  · Ortho consult for bimalleolar fracture s/p  ORIF POD#3  · Also cleared to restart Eliquis  · Pain management consult appreciated-recommended to DC Dilaudid and oxycodone 5 mg, continue oxycodone 2 5 mg q 6 hours p r n  and bowel regimen  · PT/OT after ortho clearance-recommended acute rehab  · DC held sec to rapid response 8/17- concerns for ?? Seizure vs behaviour problem vs s/e of anesthesia/meds  · Patient now stable and cleared for discharge           VTE Pharmacologic Prophylaxis: VTE Score: 9 High Risk (Score >/= 5) - Pharmacological DVT Prophylaxis Ordered: apixaban (Eliquis)  Sequential Compression Devices Ordered      Patient Centered Rounds: I performed bedside rounds with nursing staff today   Discussions with Specialists or Other Care Team Provider:  None    Education and Discussions with Family / Patient: Updated  (daughter) at bedside  Time Spent for Care: 30 minutes  More than 50% of total time spent on counseling and coordination of care as described above  Current Length of Stay: 5 day(s)  Current Patient Status: Inpatient   Certification Statement: The patient will continue to require additional inpatient hospital stay due to Awaiting rehab  Discharge Plan: Anticipate discharge in 24-48 hrs to rehab facility  Code Status: Level 1 - Full Code    Subjective:   Patient feels Marney Nam, denies acute concerns including lightheadedness, chest pain, dyspnea, nausea/vomiting  Objective:     Vitals:   Temp (24hrs), Av 5 °F (36 4 °C), Min:96 5 °F (35 8 °C), Max:98 5 °F (36 9 °C)    Temp:  [96 5 °F (35 8 °C)-98 5 °F (36 9 °C)] 98 5 °F (36 9 °C)  HR:  [63-80] 63  Resp:  [16-20] 16  BP: (101-141)/(58-82) 101/58  SpO2:  [94 %-97 %] 96 %  Body mass index is 34 95 kg/m²  Input and Output Summary (last 24 hours):   No intake or output data in the 24 hours ending 22 7864    Physical Exam:   Physical Exam  Vitals reviewed  Constitutional:       General: She is not in acute distress  Appearance: She is not toxic-appearing  HENT:      Mouth/Throat:      Mouth: Mucous membranes are moist    Eyes:      General: No scleral icterus  Cardiovascular:      Rate and Rhythm: Normal rate and regular rhythm  Pulmonary:      Effort: Pulmonary effort is normal       Breath sounds: Normal breath sounds  Abdominal:      General: Bowel sounds are normal       Palpations: Abdomen is soft  Tenderness: There is no abdominal tenderness  Musculoskeletal:      Right lower leg: No edema  Left lower leg: No edema  Skin:     General: Skin is warm and dry              Additional Data:     Labs:  Results from last 7 days   Lab Units 22  0937 22  1411 08/18/22  1405   WBC Thousand/uL 7 32  --  10 82*   HEMOGLOBIN g/dL 10 5*  --  10 8*   I STAT HEMOGLOBIN   --    < >  --    HEMATOCRIT % 32 3*  --  33 3*   HEMATOCRIT, ISTAT   --    < >  --    PLATELETS Thousands/uL 202  --  212   NEUTROS PCT %  --   --  67   LYMPHS PCT %  --   --  21   MONOS PCT %  --   --  10   EOS PCT %  --   --  1    < > = values in this interval not displayed  Results from last 7 days   Lab Units 08/20/22  1048 08/18/22  1411 08/18/22  1405   SODIUM mmol/L 139   < > 137  137   POTASSIUM mmol/L 3 9   < > 3 5  3 5   CHLORIDE mmol/L 103   < > 103  103   CO2 mmol/L 31   < > 31  31   CO2, I-STAT   --    < >  --    BUN mg/dL 38*   < > 47*  47*   CREATININE mg/dL 1 20   < > 1 40*  1 40*   ANION GAP mmol/L 5   < > 3*  3*   CALCIUM mg/dL 9 0   < > 9 5  9 5   ALBUMIN g/dL  --   --  3 2*   TOTAL BILIRUBIN mg/dL  --   --  0 52   ALK PHOS U/L  --   --  68   ALT U/L  --   --  19   AST U/L  --   --  31   GLUCOSE RANDOM mg/dL 190*   < > 128  128    < > = values in this interval not displayed  Results from last 7 days   Lab Units 08/16/22  0817   INR  1 10     Results from last 7 days   Lab Units 08/20/22  1110 08/20/22  0747 08/20/22  0600 08/19/22  2034 08/19/22  1618 08/19/22  1514 08/19/22  1138 08/19/22  0729 08/18/22  2020 08/18/22  1830 08/18/22  1338 08/18/22  1259   POC GLUCOSE mg/dl 227* 182* 221* 161* 88 104 218* 318* 228* 181* 137 106         Results from last 7 days   Lab Units 08/15/22  0819   LACTIC ACID mmol/L 1 4   PROCALCITONIN ng/ml 0 05       Lines/Drains:  Invasive Devices  Report    Peripheral Intravenous Line  Duration           Peripheral IV 08/18/22 Right Arm 2 days          Drain  Duration           External Urinary Catheter 5 days                      Imaging: Reviewed radiology reports from this admission including: CT head and No pertinent imaging reviewed      Recent Cultures (last 7 days):   Results from last 7 days   Lab Units 08/15/22  0819   BLOOD CULTURE  No Growth After 5 Days  No Growth After 5 Days  Last 24 Hours Medication List:   Current Facility-Administered Medications   Medication Dose Route Frequency Provider Last Rate    acetaminophen  975 mg Oral [facility] Susan Santos MD      apixaban  5 mg Oral BID Flaco Interiano MD      atorvastatin  40 mg Oral Daily With Taya Reyna MD      calcium carbonate-vitamin D  1 tablet Oral BID With Meals Susan Santos MD      insulin glargine  25 Units Subcutaneous HS Erica Bartlett MD      insulin lispro  1-6 Units Subcutaneous TID [facility] Erica Bartlett MD      insulin lispro  10 Units Subcutaneous TID With Meals Erica Bartlett MD      metoprolol tartrate  25 mg Oral Q12H 119 Eugene Mancuso MD      ondansetron  4 mg Intravenous Q6H PRN Susan Santos MD      senna-docusate sodium  1 tablet Oral HS Flaco Interiano MD          Today, Patient Was Seen By: Clemetine Closs, MD    **Please Note: This note may have been constructed using a voice recognition system  **

## 2022-08-20 NOTE — ASSESSMENT & PLAN NOTE
Wt Readings from Last 3 Encounters:   08/15/22 95 3 kg (210 lb)   08/01/22 95 3 kg (210 lb)   02/14/22 95 kg (209 lb 7 oz)   · Hold torsemide for OR  · Repeat echo done on 08/15/2022 shows normal EF and grade 2 diastolic dysfunction  · Cardio follow-up appreciated, restarted torsemide however then was held, no reasoning documented for this but suspect in setting of NEGRA    Will monitor patient for signs of volume overload, continue to hold torsemide

## 2022-08-20 NOTE — ASSESSMENT & PLAN NOTE
· With diaphoresis and bradycardia during ankle spliting  · Cardio consult for pre op clearance appreciated - cleared with mod to high risk, echo done  · No further chest pain

## 2022-08-20 NOTE — ASSESSMENT & PLAN NOTE
Lab Results   Component Value Date    HGBA1C 7 3 (H) 07/30/2022       Recent Labs     08/19/22  2034 08/20/22  0600 08/20/22  0747 08/20/22  1110   POCGLU 161* 221* 182* 227*       Blood Sugar Average: Last 72 hrs:  (P) 182 [id number]     · Uses VGO pump at home which will be d/c as pt not competent to use it and dtr on vacation  · Endo consult as BSs high  · Episode of hypoglycemia several days ago

## 2022-08-20 NOTE — ASSESSMENT & PLAN NOTE
Had rapid response on 8/18/22 for altered mental status  Patient was at her baseline in the morning however later nurse stated that patient states she felt like she was going to pass out and then became lethargic  Rapid response team stated could be possible seizure-like activity and Ativan was given during rapid response  Patient was then transported to CT brain stat no obvious acute CVA  Neurology was consulted  As per neurology EEG and if another episode of altered mental status to give Keppra and not  Ativan  Discussed with son Sylvia Garcia, informed about rapid response episode  Verified patient is still full code    Prior hospitalist's discussedwith daughter at bedside 8/18  She is aware that this type of episodes  have occurred in the past as well  They had concerns about side effect of one  of the medication that she was started for dementia leading to similar episode in the past and that was held  Noted neurology resident's note that 8/19 afternoon patient again had almost similar episode like 8/18  Instead of calling rapid response, Neurology resident  assised patient  It was not a seizure-like activity  There was concern about some behavioral problem related to being discharged to rehab as per Neurology

## 2022-08-20 NOTE — CASE MANAGEMENT
Case Management Discharge Planning Note    Patient name Maxi Sanchez  Location [facility] 818/[facility] 537-35 MRN [id number]  : 1935 Date 2022       Current Admission Date: 8/15/2022  Current Admission Diagnosis:Closed right ankle fracture   Patient Active Problem List    Diagnosis Date Noted    Altered mental status 2022    Closed right ankle fracture 08/15/2022    Chest pain 08/15/2022    Foul smelling urine 08/15/2022    Type 2 diabetes mellitus with hyperglycemia, with long-term current use of insulin ([facility] ) 2022    Obesity, morbid ([place] [address] ) 2021    Osteopenia 2021    Mild intermittent asthma without complication 8149    PAF ([facility]oxysmal atrial fibrillation) ([facility]) 2019    SSS (sick sinus syndrome) ([facility]) 2019    Dyspnea on minimal exertion 2019    Congestive heart failure with LV diastolic dysfunction, NYHA class 2 ([facility] ) 2018    Urine retention 2018    Chronic diastolic congestive heart failure ([facility] ) 2018    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 ([facility] ) 2018    Abnormal chest x-ray 2018    Ventricular tachycardia ([facility] ) 2017    Urine incontinence 2017    Gastroesophageal reflux disease with hiatal hernia 2014    Cerebral artery occlusion with cerebral infarction ([facility] ) 2014    Dementia without behavioral disturbance ([facility] ) 2013    3-vessel coronary artery disease 2012    Depression 2012    Diabetic retinopathy ([facility] ) 2012    DM (diabetes mellitus), type 2, uncontrolled w/ophthalmic complication     Glaucoma 2012    Hyperlipidemia 2012    Essential hypertension 2012    Obesity (BMI 30-39 9) 2012    Obstructive sleep apnea 2012    Osteoarthritis of knee 2012      LOS (days): 5  Geometric Mean LOS (GMLOS) (days): 6 20  Days to GMLOS:1 1     OBJECTIVE:  Risk of Unplanned Readmission Score: 16 01 Current admission status: Inpatient   Preferred Pharmacy:   [facility] [address], [address] [facility]  [address] [facility]  Phone: [phone] Fax: [phone]    [facility] [address] [facility] [address], [place] [address] [place] [address] [address] [address]  Phone: [phone] Fax: [phone]    Primary Care Provider: Kareem Lau MD    Primary Insurance: [facility] [employer] [facility]  Secondary Insurance:     DISCHARGE DETAILS:      Other Referral/Resources/Interventions Provided:  Referral Comments: Pt now medically stable for DC per SLIM report  Pt off restraints since yesteday  CM updated [address] via [facility] and requested re-evaluation for admission  PM liaision previously stated facility will reassess on Monday if medically cleared  Pt will likely need new insurance auth and PT/OT consulted for updated documentation  CM will follow

## 2022-08-20 NOTE — ASSESSMENT & PLAN NOTE
· Ortho consult for bimalleolar fracture s/p  ORIF POD#3  · Also cleared to restart Eliquis  · Pain management consult appreciated-recommended to DC Dilaudid and oxycodone 5 mg, continue oxycodone 2 5 mg q 6 hours p r n  and bowel regimen  · PT/OT after ortho clearance-recommended acute rehab  · DC held sec to rapid response 8/17- concerns for ??  Seizure vs behaviour problem vs s/e of anesthesia/meds  · Patient now stable and cleared for discharge

## 2022-08-21 LAB
GLUCOSE SERPL-MCNC: 127 MG/DL (ref 65–140)
GLUCOSE SERPL-MCNC: 133 MG/DL (ref 65–140)
GLUCOSE SERPL-MCNC: 228 MG/DL (ref 65–140)
GLUCOSE SERPL-MCNC: 88 MG/DL (ref 65–140)

## 2022-08-21 PROCEDURE — 97530 THERAPEUTIC ACTIVITIES: CPT

## 2022-08-21 PROCEDURE — 99232 SBSQ HOSP IP/OBS MODERATE 35: CPT | Performed by: STUDENT IN AN ORGANIZED HEALTH CARE EDUCATION/TRAINING PROGRAM

## 2022-08-21 PROCEDURE — 97535 SELF CARE MNGMENT TRAINING: CPT

## 2022-08-21 PROCEDURE — 97110 THERAPEUTIC EXERCISES: CPT

## 2022-08-21 PROCEDURE — 97112 NEUROMUSCULAR REEDUCATION: CPT

## 2022-08-21 PROCEDURE — 82948 REAGENT STRIP/BLOOD GLUCOSE: CPT

## 2022-08-21 PROCEDURE — 99232 SBSQ HOSP IP/OBS MODERATE 35: CPT | Performed by: INTERNAL MEDICINE

## 2022-08-21 RX ORDER — INSULIN LISPRO 100 [IU]/ML
10 INJECTION, SOLUTION INTRAVENOUS; SUBCUTANEOUS
Status: DISCONTINUED | OUTPATIENT
Start: 2022-08-21 | End: 2022-08-22 | Stop reason: HOSPADM

## 2022-08-21 RX ORDER — INSULIN LISPRO 100 [IU]/ML
10 INJECTION, SOLUTION INTRAVENOUS; SUBCUTANEOUS
Status: DISCONTINUED | OUTPATIENT
Start: 2022-08-22 | End: 2022-08-22 | Stop reason: HOSPADM

## 2022-08-21 RX ORDER — INSULIN LISPRO 100 [IU]/ML
8 INJECTION, SOLUTION INTRAVENOUS; SUBCUTANEOUS
Status: DISCONTINUED | OUTPATIENT
Start: 2022-08-21 | End: 2022-08-22 | Stop reason: HOSPADM

## 2022-08-21 RX ORDER — TORSEMIDE 20 MG/1
20 TABLET ORAL DAILY
Status: DISCONTINUED | OUTPATIENT
Start: 2022-08-22 | End: 2022-08-22 | Stop reason: HOSPADM

## 2022-08-21 RX ADMIN — INSULIN LISPRO 10 UNITS: 100 INJECTION, SOLUTION INTRAVENOUS; SUBCUTANEOUS at 17:04

## 2022-08-21 RX ADMIN — INSULIN LISPRO 10 UNITS: 100 INJECTION, SOLUTION INTRAVENOUS; SUBCUTANEOUS at 08:49

## 2022-08-21 RX ADMIN — INSULIN LISPRO 2 UNITS: 100 INJECTION, SOLUTION INTRAVENOUS; SUBCUTANEOUS at 12:17

## 2022-08-21 RX ADMIN — Medication 1 TABLET: at 17:03

## 2022-08-21 RX ADMIN — ACETAMINOPHEN 975 MG: 325 TABLET ORAL at 15:23

## 2022-08-21 RX ADMIN — SENNOSIDES AND DOCUSATE SODIUM 1 TABLET: 50; 8.6 TABLET ORAL at 21:42

## 2022-08-21 RX ADMIN — INSULIN LISPRO 8 UNITS: 100 INJECTION, SOLUTION INTRAVENOUS; SUBCUTANEOUS at 12:17

## 2022-08-21 RX ADMIN — ACETAMINOPHEN 975 MG: 325 TABLET ORAL at 21:42

## 2022-08-21 RX ADMIN — APIXABAN 5 MG: 5 TABLET, FILM COATED ORAL at 08:49

## 2022-08-21 RX ADMIN — INSULIN GLARGINE 25 UNITS: 100 INJECTION, SOLUTION SUBCUTANEOUS at 21:42

## 2022-08-21 RX ADMIN — METOPROLOL TARTRATE 25 MG: 25 TABLET, FILM COATED ORAL at 08:49

## 2022-08-21 RX ADMIN — APIXABAN 5 MG: 5 TABLET, FILM COATED ORAL at 17:04

## 2022-08-21 RX ADMIN — Medication 1 TABLET: at 08:49

## 2022-08-21 RX ADMIN — ATORVASTATIN CALCIUM 40 MG: 40 TABLET, FILM COATED ORAL at 17:04

## 2022-08-21 NOTE — PROGRESS NOTES
Progress Note - Maldonado Thrasher 82 y o  female MRN: [id number]    Unit/Bed#: [facility] 665-22 Encounter: [id number]    CC: diabetes f/u    Subjective:   Maldonado Thrasher is a 82y o  year old female with type 2 DM admitted with R ankle fracture s/p ORIF  She reports eating all of her food generally including lunch yesterday and breakfast today  Blood sugars were low normal at 74 yesterday before dinner  Objective:     Vitals: Blood pressure 161/66, pulse 64, temperature 98 1 °F (36 7 °C), resp  rate 16, height 5' 5" (1 651 m), weight 95 3 kg (210 lb), SpO2 96 %  ,Body mass index is 34 95 kg/m²  Intake/Output Summary (Last 24 hours) at 8/21/2022 2371  Last data filed at 8/21/2022 9229  Gross per 24 hour   Intake --   Output 750 ml   Net -750 ml       Physical Exam:  General Appearance: awake, appears stated age and cooperative  Head: Normocephalic, without obvious abnormality, atraumatic  Extremities: moves all extremities  Skin: Skin color and temperature normal    Pulm: no labored breathing    Lab, Imaging and other studies: I have personally reviewed pertinent reports  POC Glucose   Date Value   08/21/2022 127 mg/dl   08/20/2022 173 mg/dl (H)   08/20/2022 74 mg/dl   08/20/2022 227 mg/dl (H)   08/20/2022 182 mg/dl (H)   08/20/2022 221 mg/dl (H)   08/19/2022 161 mg/dl (H)   08/19/2022 88 mg/dl   08/19/2022 104 mg/dl   08/19/2022 218 mg/dl (H)   02/27/2015 264 mg/dL (H)   06/04/2014 280 mg/dL (H)   05/24/2014 308 mg/dL (H)   05/22/2014 278 mg/dL (H)       Assessment and plan:  Type 2 DM  a1c 7 3  Home regimen: vgo 20, 6 units with breakfast, 4 units with lunch and dinner  Inpt: 25 lantus, 10 humalog with meals  Decrease lunch humalog to 8 units, continue 10 units with breakfast and dinner, continue 25 units lantus      Portions of the record may have been created with voice recognition software

## 2022-08-21 NOTE — ASSESSMENT & PLAN NOTE
Wt Readings from Last 3 Encounters:   08/15/22 95 3 kg (210 lb)   08/01/22 95 3 kg (210 lb)   02/14/22 95 kg (209 lb 7 oz)   · Hold torsemide for OR  · Repeat echo done on 08/15/2022 shows normal EF and grade 2 diastolic dysfunction  · Cardio follow-up appreciated, restarted torsemide however then was held, no reasoning documented for this but suspect in setting of NEGRA    Patient euvolemic to perhaps slightly overloaded, home torsemide restart 8/22

## 2022-08-21 NOTE — PHYSICAL THERAPY NOTE
Physical Therapy Progress Note     08/21/22 0900   PT Last Visit   PT Visit Date 08/21/22   Note Type   Note Type Treatment for insurance authorization   Pain Assessment   Pain Assessment Tool 0-10   Pain Score No Pain   Wound 08/15/22 Skin Tear Arm Left;Posterior;Proximal   Date First Assessed/Time First Assessed: 08/15/22 2327   Primary Wound Type: Skin Tear  Location: Arm  Wound Location Orientation: Left;Posterior;Proximal  Dressing Status: Clean;Dry; Intact   Wound Description Dry;Beefy red   Rhonda-wound Assessment Fragile   Dressing Dry dressing;Vaseline gauze;Gauze   Wound 08/16/22 Ankle Right   Date First Assessed/Time First Assessed: 08/16/22 1836   Location: Ankle  Wound Location Orientation: Right  Wound Description (Comments): splint applied  Incision's 1st Dressing: DRESSING ACTICOAT AG 4 X 8 IN (x1), SPONGE GAUZE 4 X 4 16 PLY STRL PLAS    Dressing Dry dressing   Dressing Status Clean;Dry; Intact   Restrictions/Precautions   Other Precautions Cognitive; Chair Alarm; Bed Alarm;WBS;Fall Risk;Pain  (Alarm active post session )   Cognition   Orientation Level Disoriented to time;Disoriented to situation;Oriented to person;Oriented to place   Subjective   Subjective The patient denies any pain, and she is agreeable to work with therapy  She was often repeating herself multiple times during the session  Bed Mobility   Rolling R 4  Minimal assistance   Additional items Assist x 1; Increased time required;Verbal cues;LE management   Rolling L 4  Minimal assistance   Additional items Assist x 1; Increased time required;Verbal cues;LE management   Supine to Sit 3  Moderate assistance   Additional items Assist x 1; Increased time required;Verbal cues;LE management;HOB elevated   Sit to Supine 3  Moderate assistance   Additional items Assist x 1; Increased time required;Verbal cues;LE management   Transfers   Sit to Stand Unable to assess   Other 2  Maximal assistance   Additional items Assist x 2; Increased time required;Verbal cues   Additional Comments Three lateral scoots to the left along the edge of the bed  Balance   Static Sitting Poor +   Dynamic Sitting Poor -   Activity Tolerance   Activity Tolerance Patient tolerated treatment well;Patient limited by fatigue   Nurse [address], RN  Exercises   THR Supine;5 reps;AAROM; Right   TKR Sitting;Bilateral;AAROM;10 reps   Assessment   Prognosis Fair   Problem List Decreased strength;Decreased endurance; Impaired balance;Decreased mobility; Decreased safety awareness;Pain;Orthopedic restrictions;Decreased cognition   Assessment The patient continues to require significant assistance for all mobility as well as increased time to complete  She is unable to safely stand while maintaining her WBS, and she continues to struggle with performing lateral scoots along the edge of the bed  She will likely require assistance of three people to complete a slide-board out of bed as two people will need to support her, and another to maintain her WBS  She also requires continued instruction for new tasks as she is distractable and demonstrates limited carry-over  She was returned to bed and linens were changed as she had been incontinent  Barriers to Discharge Inaccessible home environment;Decreased caregiver support   Goals   Patient Goals To go home  STG Expiration Date 08/30/22   PT Treatment Day 2   Plan   Treatment/Interventions Functional transfer training;LE strengthening/ROM; Therapeutic exercise; Endurance training;Cognitive reorientation;Patient/family training;Bed mobility   Progress Slow progress, decreased activity tolerance   PT Frequency 3-5x/wk   Recommendation   PT Discharge Recommendation Post acute rehabilitation services   AM-PAC Basic Mobility Inpatient   Turning in Bed Without Bedrails 3   Lying on Back to Sitting on Edge of Flat Bed 2   Moving Bed to Chair 1   Standing Up From Chair 1   Walk in Room 1   Climb 3-5 Stairs 1   Basic Mobility Inpatient Raw Score 9   Turning Head Towards Sound 4   Follow Simple Instructions 3   Low Function Basic Mobility Raw Score 16   Low Function Basic Mobility Standardized Score 25 72   Highest Level Of Mobility   -HLM Goal 3: Sit at edge of bed   -HLM Achieved 3: Sit at edge of bed       An AM-PAC Basic Mobility standardized score less than 40 78 suggests the patient may benefit from discharge to post-acute rehab services      Celestine Agrawal, PTA

## 2022-08-21 NOTE — PROGRESS NOTES
[facility]  Progress Note - [place] 1935, 82 y o  female MRN: [id number]  Unit/Bed#: [facility] 278-69 Encounter: [id number]  Primary Care Provider: Steven Martinez MD   Date and time admitted to hospital: 8/15/2022  7:51 AM    Altered mental status  Assessment & Plan  Had rapid response on 8/18/22 for altered mental status  Patient was at her baseline in the morning however later nurse stated that patient states she felt like she was going to pass out and then became lethargic  Rapid response team stated could be possible seizure-like activity and Ativan was given during rapid response  Patient was then transported to CT brain stat no obvious acute CVA  Neurology was consulted  As per neurology EEG and if another episode of altered mental status to give Keppra and not  Ativan  Discussed with son Zelda Monsivais, informed about rapid response episode  Verified patient is still full code    Prior hospitalist's discussedwith daughter at bedside 8/18  She is aware that this type of episodes  have occurred in the past as well  They had concerns about side effect of one  of the medication that she was started for dementia leading to similar episode in the past and that was held  Noted neurology resident's note that 8/19 afternoon patient again had almost similar episode like 8/18  Instead of calling rapid response, Neurology resident  assised patient  It was not a seizure-like activity  There was concern about some behavioral problem related to being discharged to rehab as per Neurology      Chest pain  Assessment & Plan  · With diaphoresis and bradycardia during ankle spliting  · Cardio consult for pre op clearance appreciated - cleared with mod to high risk, echo done  · No further chest pain    Type 2 diabetes mellitus with hyperglycemia, with long-term current use of insulin [facility])  Assessment & Plan  Lab Results   Component Value Date    HGBA1C 7 3 (H) 07/30/2022 Recent Labs     08/20/22  2037 08/21/22  0733 08/21/22  1118 08/21/22  1552   POCGLU 173* 127 228* 133       Blood Sugar Average: Last 72 hrs:  (P) 157 1     · Uses VGO pump at home which will be d/c as pt not competent to use it and dtr on vacation  · Endo consult as BSs high  · Episode of hypoglycemia several days ago    PAF (paroxysmal atrial fibrillation) ([facility])  Assessment & Plan  · C/w home metoprolol  · Ortho cleared to restart home apixaban    Chronic diastolic congestive heart failure (HCC)  Assessment & Plan  Wt Readings from Last 3 Encounters:   08/15/22 95 3 kg (210 lb)   08/01/22 95 3 kg (210 lb)   02/14/22 95 kg (209 lb 7 oz)   · Hold torsemide for OR  · Repeat echo done on 08/15/2022 shows normal EF and grade 2 diastolic dysfunction  · Cardio follow-up appreciated, restarted torsemide however then was held, no reasoning documented for this but suspect in setting of NEGRA  Patient euvolemic to perhaps slightly overloaded, home torsemide restart 8/22           Obstructive sleep apnea  Assessment & Plan  Use cpap    Dementia without behavioral disturbance ([facility] [address] )  Assessment & Plan  · On going dementia- family making decisions  · Supportive care    * Closed right ankle fracture  Assessment & Plan  · Ortho consult for bimalleolar fracture s/p  ORIF POD#3  · Also cleared to restart Eliquis  · Pain management consult appreciated-recommended to DC Dilaudid and oxycodone 5 mg, continue oxycodone 2 5 mg q 6 hours p r n  and bowel regimen  · PT/OT after ortho clearance-recommended acute rehab  · DC held sec to rapid response 8/17- concerns for ?? Seizure vs behaviour problem vs s/e of anesthesia/meds  · Patient now stable and cleared for discharge           VTE Pharmacologic Prophylaxis: VTE Score: 9 High Risk (Score >/= 5) - Pharmacological DVT Prophylaxis Ordered: apixaban (Eliquis)  Sequential Compression Devices Ordered      Patient Centered Rounds: I performed bedside rounds with nursing staff today   Discussions with Specialists or Other Care Team Provider:  None    Education and Discussions with Family / Patient: Updated  (daughter) at bedside  Time Spent for Care: 20 minutes  More than 50% of total time spent on counseling and coordination of care as described above  Current Length of Stay: 6 day(s)  Current Patient Status: Inpatient   Certification Statement: The patient will continue to require additional inpatient hospital stay due to Awaiting rehab placement  Discharge Plan: Anticipate discharge in 24-48 hrs to rehab facility  Code Status: Level 1 - Full Code    Subjective:   Patient denies any acute concerns, although it is not clear that she is definitely reliable  of all of her symptoms  Denies lightheadedness, chest pain, dyspnea, nausea/vomiting, abdominal pain  Tolerating regular diet  Objective:     Vitals:   Temp (24hrs), Av 6 °F (37 °C), Min:98 1 °F (36 7 °C), Max:99 3 °F (37 4 °C)    Temp:  [98 1 °F (36 7 °C)-99 3 °F (37 4 °C)] 98 4 °F (36 9 °C)  HR:  [64-66] 64  Resp:  [16-18] 18  BP: (100-161)/(59-66) 100/59  SpO2:  [92 %-96 %] 92 %  Body mass index is 34 95 kg/m²  Input and Output Summary (last 24 hours): Intake/Output Summary (Last 24 hours) at 2022 1636  Last data filed at 2022 5306  Gross per 24 hour   Intake --   Output 750 ml   Net -750 ml       Physical Exam:   Physical Exam  Vitals reviewed  Constitutional:       General: She is not in acute distress  Appearance: She is not toxic-appearing  HENT:      Mouth/Throat:      Mouth: Mucous membranes are moist    Eyes:      General: No scleral icterus  Cardiovascular:      Rate and Rhythm: Normal rate and regular rhythm  Pulmonary:      Effort: Pulmonary effort is normal  No respiratory distress  Breath sounds: Normal breath sounds  Abdominal:      General: Bowel sounds are normal       Palpations: Abdomen is soft  Tenderness:  There is no abdominal tenderness  Skin:     General: Skin is warm and dry  Neurological:      Mental Status: She is alert  Psychiatric:         Mood and Affect: Mood normal          Behavior: Behavior normal             Additional Data:     Labs:  Results from last 7 days   Lab Units 08/19/22  0937 08/18/22  1411 08/18/22  1405   WBC Thousand/uL 7 32  --  10 82*   HEMOGLOBIN g/dL 10 5*  --  10 8*   I STAT HEMOGLOBIN   --    < >  --    HEMATOCRIT % 32 3*  --  33 3*   HEMATOCRIT, ISTAT   --    < >  --    PLATELETS Thousands/uL 202  --  212   NEUTROS PCT %  --   --  67   LYMPHS PCT %  --   --  21   MONOS PCT %  --   --  10   EOS PCT %  --   --  1    < > = values in this interval not displayed  Results from last 7 days   Lab Units 08/20/22  1048 08/18/22  1411 08/18/22  1405   SODIUM mmol/L 139   < > 137  137   POTASSIUM mmol/L 3 9   < > 3 5  3 5   CHLORIDE mmol/L 103   < > 103  103   CO2 mmol/L 31   < > 31  31   CO2, I-STAT   --    < >  --    BUN mg/dL 38*   < > 47*  47*   CREATININE mg/dL 1 20   < > 1 40*  1 40*   ANION GAP mmol/L 5   < > 3*  3*   CALCIUM mg/dL 9 0   < > 9 5  9 5   ALBUMIN g/dL  --   --  3 2*   TOTAL BILIRUBIN mg/dL  --   --  0 52   ALK PHOS U/L  --   --  68   ALT U/L  --   --  19   AST U/L  --   --  31   GLUCOSE RANDOM mg/dL 190*   < > 128  128    < > = values in this interval not displayed       Results from last 7 days   Lab Units 08/16/22  0817   INR  1 10     Results from last 7 days   Lab Units 08/21/22  1552 08/21/22  1118 08/21/22  0733 08/20/22  2037 08/20/22  1623 08/20/22  1110 08/20/22  0747 08/20/22  0600 08/19/22  2034 08/19/22  1618 08/19/22  1514 08/19/22  1138   POC GLUCOSE mg/dl 133 228* 127 173* 74 227* 182* 221* 161* 88 104 218*         Results from last 7 days   Lab Units 08/15/22  0819   LACTIC ACID mmol/L 1 4   PROCALCITONIN ng/ml 0 05       Lines/Drains:  Invasive Devices  Report    Peripheral Intravenous Line  Duration           Peripheral IV 08/18/22 Right Arm 3 days Drain  Duration           External Urinary Catheter 6 days                      Imaging: No pertinent imaging reviewed  Recent Cultures (last 7 days):   Results from last 7 days   Lab Units 08/15/22  0819   BLOOD CULTURE  No Growth After 5 Days  No Growth After 5 Days  Last 24 Hours Medication List:   Current Facility-Administered Medications   Medication Dose Route Frequency Provider Last Rate    acetaminophen  975 mg Oral [facility] Cristofer Byers MD      apixaban  5 mg Oral BID Colleen Putnam MD      atorvastatin  40 mg Oral Daily With Joleen Garcia MD      calcium carbonate-vitamin D  1 tablet Oral BID With Meals Cristofer Byers MD      insulin glargine  25 Units Subcutaneous HS Lashonda Cheung MD      insulin lispro  1-6 Units Subcutaneous TID [facility] Lashonda Cheung MD      [START ON 8/22/2022] insulin lispro  10 Units Subcutaneous Daily With Breakfast Patricia Noyola, DO      insulin lispro  10 Units Subcutaneous Daily With E  I  du Pont, DO      insulin lispro  8 Units Subcutaneous Daily With Lunch Patricia Jazmín, DO      metoprolol tartrate  25 mg Oral Q12H [address], MD      ondansetron  4 mg Intravenous Q6H PRN Cristofer Byers MD      senna-docusate sodium  1 tablet Oral HS MD Brittany [facility] Cathy [facility] ON 8/22/2022] torsemide  20 mg Oral Daily Farrukh Omalley MD          Today, Patient Was Seen By: Farrukh Omalley MD    **Please Note: This note may have been constructed using a voice recognition system  **

## 2022-08-21 NOTE — PLAN OF CARE
Problem: OCCUPATIONAL THERAPY ADULT  Goal: Performs self-care activities at highest level of function for planned discharge setting  See evaluation for individualized goals  Description: Treatment Interventions: ADL retraining, Functional transfer training, Endurance training, Cognitive reorientation, Patient/family training, Equipment evaluation/education, Activityengagement, Energy conservation          See flowsheet documentation for full assessment, interventions and recommendations  Outcome: Progressing  Note: Limitation: Decreased ADL status, Decreased Safe judgement during ADL, Decreased cognition, Decreased endurance, Decreased self-care trans, Decreased high-level ADLs  Prognosis: Fair  Assessment: Pt seen on this date for OT session focusing on ADL retraining, cognitive reorientation, body mechanics, transfer retraining, increasing activity tolerance/endurance and EOB sitting to increase ability to participate in ADL/functional tasks  Pt was found in bed and was left in bed w/ all needs within reach, bed alarm on  Pt completed bed mob w mod Ax1, sat eob about 25 mins w CGA while completing ADL tasks  UB ADL: mod A, LB bathe mod A, LB dress total A for doffing/donning socks  Please see above for further detail  Rolled to R and L w min A for bed linen/sarah pad changes  Pt w/ improvements in activity tolerance, EOB sitting, ADL task completion and remains limited 2* decreased ADL/High-level ADL status, decreased activity tolerance/endurance, decreased cognition, decreased self-care trans, decreased safety awareness and insight to condition  The patient's raw score on the AM-PAC Daily Activity inpatient short form is 14, standardized score is 33 39, less than 39 4  Patients at this level are likely to benefit from discharge to post-acute rehabilitation services  Please refer to the recommendation of the Occupational Therapist for safe discharge planning  Recommending pt D/C to STR when medically stable  Pt will continue to benefit from acute OT services to meet goals       OT Discharge Recommendation: Post acute rehabilitation services

## 2022-08-21 NOTE — PLAN OF CARE
Problem: PHYSICAL THERAPY ADULT  Goal: Performs mobility at highest level of function for planned discharge setting  See evaluation for individualized goals  Description: Treatment/Interventions: LE strengthening/ROM, Functional transfer training, Therapeutic exercise, Endurance training, Cognitive reorientation, Patient/family training, Equipment eval/education, Bed mobility          See flowsheet documentation for full assessment, interventions and recommendations  Outcome: Progressing  Note: Prognosis: Fair  Problem List: Decreased strength, Decreased endurance, Impaired balance, Decreased mobility, Decreased safety awareness, Pain, Orthopedic restrictions, Decreased cognition  Assessment: The patient continues to require significant assistance for all mobility as well as increased time to complete  She is unable to safely stand while maintaining her WBS, and she continues to struggle with performing lateral scoots along the edge of the bed  She will likely require assistance of three people to complete a slide-board out of bed as two people will need to support her, and another to maintain her WBS  She also requires continued instruction for new tasks as she is distractable and demonstrates limited carry-over  She was returned to bed and linens were changed as she had been incontinent  Barriers to Discharge: Inaccessible home environment, Decreased caregiver support     PT Discharge Recommendation: Post acute rehabilitation services    See flowsheet documentation for full assessment

## 2022-08-21 NOTE — ASSESSMENT & PLAN NOTE
Lab Results   Component Value Date    HGBA1C 7 3 (H) 07/30/2022       Recent Labs     08/20/22 2037 08/21/22  0733 08/21/22  1118 08/21/22  1552   POCGLU 173* 127 228* 133       Blood Sugar Average: Last 72 hrs:  (P) 157 1     · Uses VGO pump at home which will be d/c as pt not competent to use it and dtr on vacation  · Endo consult as BSs high  · Episode of hypoglycemia several days ago

## 2022-08-21 NOTE — OCCUPATIONAL THERAPY NOTE
Occupational Therapy Progress Note     Patient Name: Kori Kilgore  Today's Date: 8/21/2022  Problem List  Principal Problem:    Closed right ankle fracture  Active Problems:    Dementia without behavioral disturbance (HCC)    Depression    Obstructive sleep apnea    Chronic diastolic congestive heart failure (HCC)    PAF (paroxysmal atrial fibrillation) ([facility] [address] )    Type 2 diabetes mellitus with hyperglycemia, with long-term current use of insulin (HCC)    Chest pain    Altered mental status            08/21/22 1200   OT Last Visit   OT Visit Date 08/21/22   Note Type   Note Type Treatment for insurance authorization   Restrictions/Precautions   Weight Bearing Precautions Per Order Yes   RLE Weight Bearing Per Order NWB   Other Precautions Cognitive; Chair Alarm; Bed Alarm; Fall Risk   General   Response to Previous Treatment Patient with no complaints from previous session   Lifestyle   Autonomy Pt required assistance w/ ADLs/IADLs, including dressing/bathing, cooking, cleaning, and medication management  Reciprocal Relationships Pt lives with son and grandchildren who provide support   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  Intrinsic Gratification Pt enjoys coloring, word finds, and viewing photo albums  Pain Assessment   Pain Assessment Tool 0-10   Pain Score No Pain   ADL   Where Assessed Edge of bed   UB Bathing Assistance 4  Minimal Assistance   UB Bathing Deficit Setup; Increased time to complete;Supervision/safety; Chest;Right arm;Left arm; Abdomen   UB Bathing Comments min A for washing back, VC for sequencing tasks and attending to both sides of body   LB Bathing Assistance 2  Maximal Assistance   LB Bathing Deficit Perineal area; Buttocks; Left upper leg;Right lower leg including foot; Left lower leg including foot;Right upper leg; Increased time to complete;Setup;Supervision/safety   LB Bathing Comments max A for lower LE/perineal area- pt requires VC to sequence and attend to all parts of task     UB Dressing Assistance 4  Minimal Assistance   UB Dressing Deficit Setup;Supervision/safety;Verbal cueing; Thread LUE; Increased time to complete; Thread RUE   UB Dressing Comments min A for threading, VC for sequencing  LB Dressing Assistance 1  Total Assistance   LB Dressing Deficit Don/doff L sock; Don/doff R sock   Bed Mobility   Rolling R 4  Minimal assistance   Additional items Assist x 1; Increased time required;Verbal cues;LE management   Rolling L 4  Minimal assistance   Additional items Assist x 1; Increased time required;Verbal cues;LE management   Supine to Sit 3  Moderate assistance   Additional items Assist x 1; Increased time required;Verbal cues;LE management   Sit to Supine 3  Moderate assistance   Additional items Assist x 1; Increased time required;Verbal cues;LE management   Additional Comments found supine in bed, sat EOB about 25 mins while completing ADL tasks  improvements in trunk control as compared to previous session, as she was able to maintain midline w CGA while completing both static and dynamic sitting tasks  Returned to supine in bed and boosted with assist of 2 for repositioning  Cognition   Overall Cognitive Status Impaired   Arousal/Participation Alert; Responsive; Cooperative   Attention Attends with cues to redirect   Orientation Level Disoriented to time;Disoriented to situation;Oriented to person;Oriented to place   Memory Decreased recall of precautions;Decreased recall of recent events   Following Commands Follows one step commands with increased time or repetition   Comments pt requires consistent VC to maintain safety awareness/insight to condition  Vc for sequencing tasks and is a bit tangential at times     Activity Tolerance   Activity Tolerance Patient tolerated treatment well   Medical Staff Made Aware ok per RN, PCA   Assessment   Assessment Pt seen on this date for OT session focusing on ADL retraining, cognitive reorientation, body mechanics, transfer retraining, increasing activity tolerance/endurance and EOB sitting to increase ability to participate in ADL/functional tasks  Pt was found in bed and was left in bed w/ all needs within reach, bed alarm on  Pt completed bed mob w mod Ax1, sat eob about 25 mins w CGA while completing ADL tasks  UB ADL: mod A, LB bathe mod A, LB dress total A for doffing/donning socks  Please see above for further detail  Rolled to R and L w min A for bed linen/sarah pad changes  Pt w/ improvements in activity tolerance, EOB sitting, ADL task completion and remains limited 2* decreased ADL/High-level ADL status, decreased activity tolerance/endurance, decreased cognition, decreased self-care trans, decreased safety awareness and insight to condition  The patient's raw score on the AM-PAC Daily Activity inpatient short form is 14, standardized score is 33 39, less than 39 4  Patients at this level are likely to benefit from discharge to post-acute rehabilitation services  Please refer to the recommendation of the Occupational Therapist for safe discharge planning  Recommending pt D/C to STR when medically stable  Pt will continue to benefit from acute OT services to meet goals  Plan   Treatment Interventions ADL retraining;Functional transfer training;Cognitive reorientation; Activityengagement; Energy conservation   Goal Expiration Date 08/30/22   OT Treatment Day 2   OT Frequency 3-5x/wk   Recommendation   OT Discharge Recommendation [facility] acute rehabilitation services   [facility]   Lower Body Dressing 2   Bathing 2   Toileting 2   Upper Body Dressing 2   Grooming 3   Eating 3   Daily Activity Raw Score 14   Daily Activity Standardized Score (Calc for Raw Score >=11) 33 39   AM-[facility] Applied Cognition Inpatient   Following a Speech/Presentation 3   Understanding Ordinary Conversation 3   Taking Medications 2   Remembering Where Things Are Placed or Put Away 1   Remembering List of 4-5 Errands 1   Taking Care of Complicated Tasks 1 Applied Cognition Raw Score 11   Applied Cognition Standardized Score 27 03   Modified [place] Scale   Modified [place] Scale 4       [place], [place], OTR/L

## 2022-08-22 ENCOUNTER — TELEPHONE (OUTPATIENT)
Dept: OTHER | Facility: OTHER | Age: 87
End: 2022-08-22

## 2022-08-22 ENCOUNTER — TRANSITIONAL CARE MANAGEMENT (OUTPATIENT)
Dept: FAMILY MEDICINE CLINIC | Facility: CLINIC | Age: 87
End: 2022-08-22

## 2022-08-22 VITALS
BODY MASS INDEX: 34.99 KG/M2 | SYSTOLIC BLOOD PRESSURE: 156 MMHG | WEIGHT: 210 LBS | HEART RATE: 103 BPM | RESPIRATION RATE: 17 BRPM | HEIGHT: 65 IN | OXYGEN SATURATION: 91 % | TEMPERATURE: 98.2 F | DIASTOLIC BLOOD PRESSURE: 86 MMHG

## 2022-08-22 PROBLEM — R07.9 CHEST PAIN: Status: RESOLVED | Noted: 2022-08-15 | Resolved: 2022-08-22

## 2022-08-22 LAB
ANION GAP SERPL CALCULATED.3IONS-SCNC: 4 MMOL/L (ref 4–13)
BUN SERPL-MCNC: 25 MG/DL (ref 5–25)
CALCIUM SERPL-MCNC: 9.3 MG/DL (ref 8.3–10.1)
CHLORIDE SERPL-SCNC: 105 MMOL/L (ref 96–108)
CO2 SERPL-SCNC: 31 MMOL/L (ref 21–32)
CREAT SERPL-MCNC: 1.14 MG/DL (ref 0.6–1.3)
GFR SERPL CREATININE-BSD FRML MDRD: 44 ML/MIN/1.73SQ M
GLUCOSE SERPL-MCNC: 119 MG/DL (ref 65–140)
GLUCOSE SERPL-MCNC: 124 MG/DL (ref 65–140)
GLUCOSE SERPL-MCNC: 180 MG/DL (ref 65–140)
GLUCOSE SERPL-MCNC: 195 MG/DL (ref 65–140)
GLUCOSE SERPL-MCNC: 217 MG/DL (ref 65–140)
POTASSIUM SERPL-SCNC: 3.9 MMOL/L (ref 3.5–5.3)
SODIUM SERPL-SCNC: 140 MMOL/L (ref 135–147)

## 2022-08-22 PROCEDURE — 82948 REAGENT STRIP/BLOOD GLUCOSE: CPT

## 2022-08-22 PROCEDURE — 80048 BASIC METABOLIC PNL TOTAL CA: CPT | Performed by: STUDENT IN AN ORGANIZED HEALTH CARE EDUCATION/TRAINING PROGRAM

## 2022-08-22 PROCEDURE — 99239 HOSP IP/OBS DSCHRG MGMT >30: CPT | Performed by: STUDENT IN AN ORGANIZED HEALTH CARE EDUCATION/TRAINING PROGRAM

## 2022-08-22 RX ORDER — INSULIN LISPRO 100 [IU]/ML
1-6 INJECTION, SOLUTION INTRAVENOUS; SUBCUTANEOUS
Refills: 0
Start: 2022-08-22 | End: 2022-09-15 | Stop reason: ALTCHOICE

## 2022-08-22 RX ORDER — ACETAMINOPHEN 325 MG/1
975 TABLET ORAL EVERY 8 HOURS SCHEDULED
Refills: 0
Start: 2022-08-22

## 2022-08-22 RX ORDER — INSULIN LISPRO 100 [IU]/ML
10 INJECTION, SOLUTION INTRAVENOUS; SUBCUTANEOUS
Refills: 0
Start: 2022-08-22 | End: 2022-09-15 | Stop reason: ALTCHOICE

## 2022-08-22 RX ORDER — INSULIN LISPRO 100 [IU]/ML
10 INJECTION, SOLUTION INTRAVENOUS; SUBCUTANEOUS
Refills: 0
Start: 2022-08-23 | End: 2022-09-15 | Stop reason: ALTCHOICE

## 2022-08-22 RX ORDER — INSULIN GLARGINE 100 [IU]/ML
25 INJECTION, SOLUTION SUBCUTANEOUS
Qty: 10 ML | Refills: 0
Start: 2022-08-22

## 2022-08-22 RX ORDER — INSULIN LISPRO 100 [IU]/ML
8 INJECTION, SOLUTION INTRAVENOUS; SUBCUTANEOUS
Refills: 0
Start: 2022-08-23 | End: 2022-09-15 | Stop reason: ALTCHOICE

## 2022-08-22 RX ORDER — AMOXICILLIN 250 MG
1 CAPSULE ORAL
Refills: 0
Start: 2022-08-22

## 2022-08-22 RX ADMIN — INSULIN LISPRO 10 UNITS: 100 INJECTION, SOLUTION INTRAVENOUS; SUBCUTANEOUS at 08:47

## 2022-08-22 RX ADMIN — Medication 1 TABLET: at 16:57

## 2022-08-22 RX ADMIN — ATORVASTATIN CALCIUM 40 MG: 40 TABLET, FILM COATED ORAL at 16:57

## 2022-08-22 RX ADMIN — TORSEMIDE 20 MG: 20 TABLET ORAL at 08:47

## 2022-08-22 RX ADMIN — APIXABAN 5 MG: 5 TABLET, FILM COATED ORAL at 08:46

## 2022-08-22 RX ADMIN — Medication 1 TABLET: at 08:47

## 2022-08-22 RX ADMIN — METOPROLOL TARTRATE 25 MG: 25 TABLET, FILM COATED ORAL at 08:46

## 2022-08-22 RX ADMIN — APIXABAN 5 MG: 5 TABLET, FILM COATED ORAL at 16:57

## 2022-08-22 RX ADMIN — INSULIN LISPRO 1 UNITS: 100 INJECTION, SOLUTION INTRAVENOUS; SUBCUTANEOUS at 08:47

## 2022-08-22 NOTE — ASSESSMENT & PLAN NOTE
Had rapid response on 8/18/22 for altered mental status  Patient was at her baseline in the morning however later nurse stated that patient states she felt like she was going to pass out and then became lethargic  Rapid response team stated could be possible seizure-like activity and Ativan was given during rapid response  Patient was then transported to CT brain stat no obvious acute CVA  Neurology was consulted  As per neurology EEG and if another episode of altered mental status to give Keppra and not  Ativan  Discussed with son Jad Owens, informed about rapid response episode  Verified patient is still full code    Prior hospitalist's discussedwith daughter at bedside 8/18  She is aware that this type of episodes  have occurred in the past as well  They had concerns about side effect of one  of the medication that she was started for dementia leading to similar episode in the past and that was held  Noted neurology resident's note that 8/19 afternoon patient again had almost similar episode like 8/18  Instead of calling rapid response, Neurology resident  assised patient  It was not a seizure-like activity  There was concern about some behavioral problem related to being discharged to rehab as per Neurology    Patient had no further episodes throughout her time in the hospital

## 2022-08-22 NOTE — RESTORATIVE TECHNICIAN NOTE
Restorative Technician Note      Patient Name: Tyler Lima     Restorative Tech Visit Date: 8/22/2022  Note Type: Mobility  Patient Position Upon Consult: Supine  Activity Performed: Other (Comment); Repositioned (Attempted EOB, therex, ADLs - provided education on benefits but pt confused and became agitated, RN notified)  Patient Position at End of Consult: Supine;  All needs within reach; Bed/Chair alarm activated    Left in the care of RN    Ebonie Cedeño  DPT, Restorative Technician

## 2022-08-22 NOTE — ASSESSMENT & PLAN NOTE
Wt Readings from Last 3 Encounters:   08/15/22 95 3 kg (210 lb)   08/01/22 95 3 kg (210 lb)   02/14/22 95 kg (209 lb 7 oz)   · Hold torsemide for OR  · Repeat echo done on 08/15/2022 shows normal EF and grade 2 diastolic dysfunction  · Cardio follow-up appreciated, restarted torsemide however then was held, no reasoning documented for this but suspect in setting of NEGRA    Patient euvolemic on day of discharge, continue home torsemide on discharge

## 2022-08-22 NOTE — ASSESSMENT & PLAN NOTE
Lab Results   Component Value Date    HGBA1C 7 3 (H) 07/30/2022       Recent Labs     08/21/22  2035 08/22/22  0138 08/22/22  0759 08/22/22  1122   POCGLU 88 217* 180* 124       Blood Sugar Average: Last 72 hrs:  (P) 272 [id number]     · Uses VGO pump at home which will be discontinued as pt not competent to use it and dtr on vacation  · Endo consult as BSs high  · Episode of hypoglycemia several days ago  · Currently has been normoglycemic with glargine 25 units at night and lispro standing 10/8/10 with meals  · Would suggest continuing this at rehab and titrating as needed

## 2022-08-22 NOTE — DISCHARGE SUMMARY
[address] [facility] 1935, 82 y o  female MRN: [id number]  Unit/Bed#: [facility] 394-81 Encounter: [id number]  Primary Care Provider: Magdalena Rojo MD   Date and time admitted to hospital: 8/15/2022  7:51 AM    Altered mental status  Assessment & Plan  Had rapid response on 8/18/22 for altered mental status  Patient was at her baseline in the morning however later nurse stated that patient states she felt like she was going to pass out and then became lethargic  Rapid response team stated could be possible seizure-like activity and Ativan was given during rapid response  Patient was then transported to CT brain stat no obvious acute CVA  Neurology was consulted  As per neurology EEG and if another episode of altered mental status to give Keppra and not  Ativan  Discussed with son Moshe Chapman, informed about rapid response episode  Verified patient is still full code    Prior hospitalist's discussedwith daughter at bedside 8/18  She is aware that this type of episodes  have occurred in the past as well  They had concerns about side effect of one  of the medication that she was started for dementia leading to similar episode in the past and that was held  Noted neurology resident's note that 8/19 afternoon patient again had almost similar episode like 8/18  Instead of calling rapid response, Neurology resident  assised patient  It was not a seizure-like activity  There was concern about some behavioral problem related to being discharged to rehab as per Neurology    Patient had no further episodes throughout her time in the hospital     Type 2 diabetes mellitus with hyperglycemia, with long-term current use of insulin [facility])  Assessment & Plan  Lab Results   Component Value Date    HGBA1C 7 3 (H) 07/30/2022       Recent Labs     08/21/22  2035 08/22/22  0138 08/22/22  0759 08/22/22  1122   POCGLU 88 217* 180* 124       Blood Sugar Average: Last 72 hrs:  (P) 430 [id number]     · Uses VGO pump at home which will be discontinued as pt not competent to use it and dtr on vacation  · Endo consult as BSs high  · Episode of hypoglycemia several days ago  · Currently has been normoglycemic with glargine 25 units at night and lispro standing 10/8/10 with meals  · Would suggest continuing this at rehab and titrating as needed    PAF (paroxysmal atrial fibrillation) ([facility] )  Assessment & Plan  · C/w home metoprolol  · Ortho cleared to restart home apixaban  · Consider outpatient Cardiology follow-up for AFib as well as noted aortic stenosis per Cardiology preop clearance recommendations    Chronic diastolic congestive heart failure ([facility] )  Assessment & Plan  Wt Readings from Last 3 Encounters:   08/15/22 95 3 kg (210 lb)   08/01/22 95 3 kg (210 lb)   02/14/22 95 kg (209 lb 7 oz)   · Hold torsemide for OR  · Repeat echo done on 08/15/2022 shows normal EF and grade 2 diastolic dysfunction  · Cardio follow-up appreciated, restarted torsemide however then was held, no reasoning documented for this but suspect in setting of NEGRA  Patient euvolemic on day of discharge, continue home torsemide on discharge          Obstructive sleep apnea  Assessment & Plan  Use cpap    Dementia without behavioral disturbance ([facility] )  Assessment & Plan  · On going dementia- family making decisions  · Supportive care    * Closed right ankle fracture  Assessment & Plan  · Ortho consult for bimalleolar fracture s/p  ORIF POD#3  · Also cleared to restart Eliquis  · Pain management consult appreciated-recommended to DC Dilaudid and oxycodone 5 mg, continue oxycodone 2 5 mg q 6 hours p r n  and bowel regimen  · PT/OT after ortho clearance-recommended acute rehab  · DC held sec to rapid response 8/17- concerns for ??  Seizure vs behaviour problem vs s/e of anesthesia/meds  · Patient now stable and cleared for discharge         Medical Problems             Resolved Problems  Date Reviewed: 8/22/2022 Resolved    Chest pain 8/22/2022     Resolved by  Neil Amaral MD              Discharging Physician / Practitioner: Neil Amaral MD  PCP: Abraham Rodríguez MD  Admission Date:   Admission Orders (From admission, onward)     Ordered        08/15/22 1330  INPATIENT ADMISSION  Once                      Discharge Date: 08/22/22    Consultations During Hospital Stay:  · Orthopedic surgery, Endocrinology, Neurology, Cardiology    Procedures Performed:   · ORIF for closed right ankle fracture    Significant Findings / Test Results:   · Closed right ankle fracture    Incidental Findings:     Left Ventricle: Left ventricular cavity size is normal  There is severe concentric hypertrophy  The left ventricular ejection fraction is 70%  Systolic function is vigorous  Wall motion is normal  Diastolic function is moderately abnormal, consistent with grade II (pseudonormal) relaxation    Right Ventricle: Right ventricular cavity size is normal  Systolic function is normal  A pacer wire is present    Left Atrium: The atrium is mildly dilated    Right Atrium: The atrium is mildly dilated    Aortic Valve: There is trace regurgitation  There is moderate to severe stenosis    Tricuspid Valve: There is mild regurgitation  ·     Test Results Pending at Discharge (will require follow up): · None     Outpatient Tests Requested:  · Recommend CBC in 1 week to ensure anemia is improving  · Recommend consideration of referral to Cardiology for finding of moderate to severe aortic stenosis    Complications:  None    Reason for Admission:  Fall and ankle fracture    Hospital Course:   Kori Kilgore is a 82 y o  female patient who originally presented to the hospital on 8/15/2022 due to fall and ankle fracture  She was taken to the operating room by Orthopedic surgery after cardiac clearance and ankle fracture was stabilized    The following day she had an episode of altered mental status which was initially concerning for seizure, however after evaluation with Neurology it was determined this is most likely behavioral in setting of hearing that she would be discharged to rehab  See above for details  Please see above list of diagnoses and related plan for additional information  Condition at Discharge: good    Discharge Day Visit / Exam:   Subjective:  Denies any acute concerns including lightheadedness, chest pain, dyspnea, nausea/vomiting, abdominal pain, ankle pain  Vitals: Blood Pressure: 135/61 (08/22/22 1502)  Pulse: 70 (08/21/22 2133)  Temperature: 98 4 °F (36 9 °C) (08/22/22 1502)  Temp Source: Oral (08/21/22 2133)  Respirations: 20 (08/22/22 1502)  Height: 5' 5" (165 1 cm) (08/15/22 1547)  Weight - Scale: 95 3 kg (210 lb) (08/15/22 1547)  SpO2: 94 % (08/21/22 2133)  Exam:   Physical Exam  Vitals reviewed  Constitutional:       General: She is not in acute distress  Appearance: She is not toxic-appearing  HENT:      Mouth/Throat:      Mouth: Mucous membranes are moist    Eyes:      General: No scleral icterus  Cardiovascular:      Rate and Rhythm: Normal rate and regular rhythm  Pulmonary:      Effort: Pulmonary effort is normal       Breath sounds: Normal breath sounds  Abdominal:      General: Bowel sounds are normal       Palpations: Abdomen is soft  Tenderness: There is no abdominal tenderness  Musculoskeletal:      Left lower leg: No edema  Comments: Right lower extremity in cast, toes warm   Skin:     General: Skin is warm and dry  Neurological:      Mental Status: She is alert  Mental status is at baseline  She is disoriented  Discussion with Family: Updated  (son and daughter) at bedside  Discharge instructions/Information to patient and family:   See after visit summary for information provided to patient and family        Provisions for Follow-Up Care:  See after visit summary for information related to follow-up care and any pertinent home health orders  Disposition:   Acute Rehab at [place] [address] [address]    Planned Readmission:  None     Discharge Statement:  I spent 40 minutes discharging the patient  This time was spent on the day of discharge  I had direct contact with the patient on the day of discharge  Greater than 50% of the total time was spent examining patient, answering all patient questions, arranging and discussing plan of care with patient as well as directly providing post-discharge instructions  Additional time then spent on discharge activities  Discharge Medications:  See after visit summary for reconciled discharge medications provided to patient and/or family        **Please Note: This note may have been constructed using a voice recognition system**

## 2022-08-22 NOTE — ASSESSMENT & PLAN NOTE
· C/w home metoprolol  · Ortho cleared to restart home apixaban  · Consider outpatient Cardiology follow-up for AFib as well as noted aortic stenosis per Cardiology preop clearance recommendations

## 2022-08-22 NOTE — CASE MANAGEMENT
Case Management Discharge Planning Note    Patient name Amber Solorzano  Location [facility] 818/[facility] 412-34 MRN [id number]  : 1935 Date 2022       Current Admission Date: 8/15/2022  Current Admission Diagnosis:Closed right ankle fracture   Patient Active Problem List    Diagnosis Date Noted    Altered mental status 2022    Closed right ankle fracture 08/15/2022    Chest pain 08/15/2022    Foul smelling urine 08/15/2022    Type 2 diabetes mellitus with hyperglycemia, with long-term current use of insulin ([facility] ) 2022    Obesity, morbid ([place] [address] ) 2021    Osteopenia 2021    Mild intermittent asthma without complication     PAF (paroxysmal atrial fibrillation) ([facility]) 2019    SSS (sick sinus syndrome) ([facility]) 2019    Dyspnea on minimal exertion 2019    Congestive heart failure with LV diastolic dysfunction, NYHA class 2 ([place] [address] ) 2018    Urine retention 2018    Chronic diastolic congestive heart failure ([facility] ) 2018    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 ([facility] ) 2018    Abnormal chest x-ray 2018    Ventricular tachycardia ([facility] ) 2017    Urine incontinence 2017    Gastroesophageal reflux disease with hiatal hernia 2014    Cerebral artery occlusion with cerebral infarction ([facility] ) 2014    Dementia without behavioral disturbance ([facility] ) 2013    3-vessel coronary artery disease 2012    Depression 2012    Diabetic retinopathy ([facility] ) 2012    DM (diabetes mellitus), type 2, uncontrolled w/ophthalmic complication     Glaucoma 2012    Hyperlipidemia 2012    Essential hypertension 2012    Obesity (BMI 30-39 9) 2012    Obstructive sleep apnea 2012    Osteoarthritis of knee 2012      LOS (days): 7  Geometric Mean LOS (GMLOS) (days): 6 20  Days to GMLOS:-0 7     OBJECTIVE:  Risk of Unplanned Readmission Score: 15 05 Current admission status: Inpatient   Preferred Pharmacy:   [facility] [address], [address] [facility]  [facility]  Phone: [phone] Fax: [phone]    [address] [place], [place] [address]  Phone: [phone] Fax: [phone]    Primary Care Provider: Chasity Crowley MD    Primary Insurance: Valdo DIEGO  Secondary Insurance:     DISCHARGE DETAILS:    IMM reviewed with son    DCP after rehab-pt resides with son and 2 nephews, plan is for her to return home after rehab    Notified auth obtained by [facility] and pt can be transferred today    Dghter at bedside an notified    Transport requested via [address] request to [employer] for [address] for [employer] at [employer] via [facility], dghter notified                                                                                        IMM Given (Date):: 08/22/22  IMM Given to[de-identified] Family

## 2022-08-23 NOTE — UTILIZATION REVIEW
Notification of Discharge   This is a Notification of Discharge from our facility [address]  Please be advised that this patient has been discharge from our facility  Below you will find the admission and discharge date and time including the patients disposition  UTILIZATION REVIEW CONTACT:  Shagufta Avalos  Utilization   Network Utilization Review Department  Phone: [phone] x carefully listen to the prompts  All voicemails are confidential   Email: [email or web address]     PHYSICIAN ADVISORY SERVICES:  FOR DNZF-LD-XMII REVIEW - MEDICAL NECESSITY DENIAL  Phone: [phone]  Fax: [phone]  Email: [email or web address]     PRESENTATION DATE: 8/15/2022  7:51 AM  OBERVATION ADMISSION DATE:   INPATIENT ADMISSION DATE: 8/15/22  1:30 PM   DISCHARGE DATE: 8/22/2022  6:10 PM  DISPOSITION: Non [facility] SN[facility]/SNU Non [facility] SNF/TC[facility]/SNU      IMPORTANT INFORMATION:  Send all requests for admission clinical reviews, approved or denied determinations and any other requests to dedicated fax number below belonging to the campus where the patient is receiving treatment   List of dedicated fax numbers:  [address] [place] [address] DENIALS (Administrative/Medical Necessity) [phone]   [address] (Maternity/NICU/Pediatrics) [phone]   Amie Ramires [phone]   [address] [place] [address] [phone]   [address] [place] [address] [phone]   [address] [place] [address],[address] [phone]   [facility]  [phone]   [address] [place] [address], [facility]  [address] [facility] [address] [facility] [phone]

## 2022-08-24 ENCOUNTER — NURSING HOME VISIT (OUTPATIENT)
Dept: GERIATRICS | Facility: OTHER | Age: 87
End: 2022-08-24
Payer: COMMERCIAL

## 2022-08-24 VITALS
DIASTOLIC BLOOD PRESSURE: 80 MMHG | RESPIRATION RATE: 18 BRPM | HEART RATE: 78 BPM | TEMPERATURE: 98.4 F | OXYGEN SATURATION: 94 % | SYSTOLIC BLOOD PRESSURE: 140 MMHG | BODY MASS INDEX: 34.95 KG/M2 | WEIGHT: 210 LBS

## 2022-08-24 DIAGNOSIS — S82.891A CLOSED FRACTURE OF RIGHT ANKLE, INITIAL ENCOUNTER: Primary | ICD-10-CM

## 2022-08-24 DIAGNOSIS — I48.0 PAF (PAROXYSMAL ATRIAL FIBRILLATION) (HCC): ICD-10-CM

## 2022-08-24 DIAGNOSIS — F02.80 LATE ONSET ALZHEIMER'S DEMENTIA WITHOUT BEHAVIORAL DISTURBANCE (HCC): Chronic | ICD-10-CM

## 2022-08-24 DIAGNOSIS — Z79.4 TYPE 2 DIABETES MELLITUS WITH HYPERGLYCEMIA, WITH LONG-TERM CURRENT USE OF INSULIN (HCC): ICD-10-CM

## 2022-08-24 DIAGNOSIS — I10 ESSENTIAL HYPERTENSION: Chronic | ICD-10-CM

## 2022-08-24 DIAGNOSIS — I50.32 CHRONIC DIASTOLIC CONGESTIVE HEART FAILURE (HCC): ICD-10-CM

## 2022-08-24 DIAGNOSIS — G30.1 LATE ONSET ALZHEIMER'S DEMENTIA WITHOUT BEHAVIORAL DISTURBANCE (HCC): Chronic | ICD-10-CM

## 2022-08-24 DIAGNOSIS — E78.2 MIXED HYPERLIPIDEMIA: ICD-10-CM

## 2022-08-24 DIAGNOSIS — E11.65 TYPE 2 DIABETES MELLITUS WITH HYPERGLYCEMIA, WITH LONG-TERM CURRENT USE OF INSULIN (HCC): ICD-10-CM

## 2022-08-24 DIAGNOSIS — G47.33 OBSTRUCTIVE SLEEP APNEA: Chronic | ICD-10-CM

## 2022-08-24 PROCEDURE — 99306 1ST NF CARE HIGH MDM 50: CPT | Performed by: STUDENT IN AN ORGANIZED HEALTH CARE EDUCATION/TRAINING PROGRAM

## 2022-08-24 NOTE — ASSESSMENT & PLAN NOTE
Patient with a bimalleolar fracture s/p ORIF  Continue management of pain optimally  No neurovascular compromise on physical exam  Continue oxycodone prn  Recommend Tylenol scheduled  Consult PT for gait training, balance and strengthening, OT for ADL training  Maintain [place] precautions  Follow-up with orthopedics scheduled as an outpatient

## 2022-08-24 NOTE — ASSESSMENT & PLAN NOTE
History of moderate dementia  Reorientation and redirection as needed   Manage chronic conditions  Maintain [place] precautions   Encourage patient to remain active mentally, physically and socially  Participate in cognitively stimulating exercises as able

## 2022-08-24 NOTE — PROGRESS NOTES
[address]  [address] [facility], [address]  Billing Code [id number]  POS 31    History and Physical      NAME: Seun Graves  AGE: 82 y o   SEX: female    DATE OF ENCOUNTER: 8/24/2022    Code status:  CPR    Assessment and Plan     Problem List Items Addressed This Visit        Endocrine    Type 2 diabetes mellitus with hyperglycemia, with long-term current use of insulin ([address] )       Lab Results   Component Value Date    HGBA1C 7 3 (H) 07/30/2022   No recent episodes of hypoglycemia  Continue current antidiabetic regimen  Continue Accu-Chek monitoring  Comply with a diabetic, heart healthy            Respiratory    Obstructive sleep apnea (Chronic)     Recommend continued CPAP use            Cardiovascular and Mediastinum    Essential hypertension (Chronic)     /80  Patient maintained on metoprolol  Continue a diabetic, heart healthy diet         Chronic diastolic congestive heart failure (HCC)     Wt Readings from Last 3 Encounters:   08/15/22 95 3 kg (210 lb)   08/01/22 95 3 kg (210 lb)   02/14/22 95 kg (209 lb 7 oz)     Stable   No cardiorespiratory distress  Continues on torsemide  Echocardiogram with EF of 97%, grade 2 diastolic dysfunction, moderate AS  Follow-up with Cardiology as scheduled           PAF (paroxysmal atrial fibrillation) (HCC)     Heart rate remains stable  Continue metoprolol, Eliquis  Continue heart healthy, diabetic diet  Follow-up with Cardiology as scheduled            Nervous and Auditory    Dementia without behavioral disturbance (HCC) (Chronic)     History of moderate dementia  Reorientation and redirection as needed   Manage chronic conditions  Maintain Falls precautions   Encourage patient to remain active mentally, physically and socially  Participate in cognitively stimulating exercises as able            Musculoskeletal and Integument    Closed right ankle fracture - Primary     Patient with a bimalleolar fracture s/p ORIF  Continue management of pain optimally  No neurovascular compromise on physical exam  Continue oxycodone prn  Recommend Tylenol scheduled  Consult PT for gait training, balance and strengthening, OT for ADL training  Maintain Falls precautions  Follow-up with orthopedics scheduled as an outpatient            Other    Hyperlipidemia     Maintained on atorvastatin 40 mg daily  Continue a diabetic, heart healthy diet               All medications and routine orders were reviewed and updated as needed  Plan discussed with: Nurse    Chief Complaint     Seen for admission at [facility]    History of Present Illness     This is an 82year old female with type 2 diabetes, HTN, HLD, atrial fibrillation, CHF, BRANT and  Dementia amongst other medical concerns who presents for admission to [address] [place] [address]  The patient initially presented to hospital after falling and sustaining an acute right fibular fracture  Per HPI, the patient attempted to get into bed however has a history of chronic lower extremity weakness and did fall whilst getting into bed  She did require stabilization by Orthopedics and was taken  for ORIF to her right ankle  She required preoperative clearance from Cardiology as she was noted to be bradycardic, nauseous, diaphoretic and complained of chest pain with ankle splinting  EKG was negative, troponin negative and Echocardiogram was done with mild-to-moderate AS noted  The patient was assessed as moderate to high risk for surgery  She was also seen by endocrinology for regulation of her blood sugars and she had required an insulin infusion preoperatively  During her hospitalization, she did have an episode of altered mental status which was initially concerning for seizure  She was reviewed by Neurology who attributed events to a behavioral etiology  During the interview today, the patient was intermittently confused    She denied any acute pain was able to follow only some commands  The patient will continue on atorvastatin for a history of hyperlipidemia  She will also continue on her current insulin regimen given her history of type 2 diabetes  She will continue on Tylenol as needed for pain control s/p ORIF  HISTORY:  Past Medical History:   Diagnosis Date    Arthritis     Asthma     CAD (coronary artery disease) of artery bypass graft     Cardiac disease     CHF (congestive heart failure) (HCC)     Dementia (HCC)     Depression     Diabetes [facility]     Heart attack [facility]     Hyperlipidemia     Hypertension     [facility]     Neuropathy     BRANT (obstructive sleep apnea)     Peptic ulcer     was + for H pylori    Transient cerebral ischemia 11/2011    with neg CUS and ECHO     Family History   Problem Relation Age of Onset    Diabetes Mother     Cancer Sister     Heart disease Sister     Thyroid disease Sister     Cancer Brother     Cancer Father     Colon cancer Family     Diabetes Family     Mitral valve prolapse Family     Thyroid cancer Family      Social History     Socioeconomic History    Marital status:      Spouse name: None    Number of children: None    Years of education: None    Highest education level: None   Occupational History    None   Tobacco Use    Smoking status: Never Smoker    Smokeless tobacco: Never Used   Vaping Use    Vaping Use: Never used   Substance and Sexual Activity    Alcohol use: Not Currently    Drug use: Never    Sexual activity: Not Currently   Other Topics Concern    None   Social History Narrative    Caffeine use     Social Determinants of Health     Financial Resource Strain: Not on file   Food Insecurity: No Food Insecurity    Worried About Running Out of Food in the Last Year: Never true    [place] of Food in the Last Year: Never true   Transportation Needs: No Transportation Needs    Lack of Transportation (Medical):  No    Lack of Transportation (Non-Medical): No   Physical Activity: Not on file   Stress: Not on file   Social Connections: Not on file   Intimate Partner Violence: Not on file   Housing Stability: Low Risk     Unable to Pay for Housing in the Last Year: No    Number of Places Lived in the Last Year: 1    Unstable Housing in the Last Year: No       Allergies: Allergies   Allergen Reactions    Ace Inhibitors     Chlorpromazine     Latex     Lisinopril     Namenda [Memantine] Drowsiness    Penicillins Seizures    Propoxyphene     Stadol [Butorphanol]        Review of Systems     Review of Systems   Unable to perform ROS: Dementia       Medications and orders     All medications reviewed and updated in Nursing Home EMR  Objective     Vitals:    08/24/22 1800   BP: 140/80   Pulse: 78   Resp: 18   Temp: 98 4 °F (36 9 °C)   SpO2: 94%       Physical Exam  Vitals reviewed  Constitutional:       General: She is not in acute distress  Appearance: Normal appearance  She is well-developed  She is not ill-appearing or diaphoretic  HENT:      Head: Normocephalic and atraumatic  Right Ear: External ear normal       Left Ear: External ear normal       Nose: Nose normal       Mouth/Throat:      Mouth: Mucous membranes are moist       Pharynx: No oropharyngeal exudate  Eyes:      General: No scleral icterus  Right eye: No discharge  Left eye: No discharge  Conjunctiva/sclera: Conjunctivae normal    Neck:      Vascular: No JVD  Cardiovascular:      Rate and Rhythm: Normal rate and regular rhythm  Heart sounds: Murmur heard  No friction rub  No gallop  Comments: Pacemaker in situ  Pulmonary:      Effort: Pulmonary effort is normal  No respiratory distress  Breath sounds: Normal breath sounds  No wheezing or rales  Abdominal:      General: Bowel sounds are normal  There is no distension  Palpations: Abdomen is soft  Tenderness: There is no abdominal tenderness   There is no guarding  Musculoskeletal:         General: Swelling present  No tenderness or deformity  Cervical back: Normal range of motion and neck supple  Right lower leg: Edema present  Left lower leg: No edema  Comments: Right lower extremity bandaged  Pulses palpable  No visible erythema   Skin:     General: Skin is dry  Coloration: Skin is not pale  Findings: No erythema or rash  Neurological:      General: No focal deficit present  Mental Status: She is alert  Mental status is at baseline  Cranial Nerves: No cranial nerve deficit  Deep Tendon Reflexes: Reflexes are normal and symmetric  Comments: Oriented to self only  Follows few commands   Psychiatric:         Mood and Affect: Mood normal          Pertinent Laboratory/Diagnostic Studies: The following labs/studies were reviewed please see chart or hospital paperwork for details  Lab Results   Component Value Date    WBC 7 32 08/19/2022    HGB 10 5 (L) 08/19/2022    HCT 32 3 (L) 08/19/2022    MCV 96 08/19/2022     08/19/2022     Lab Results   Component Value Date    SODIUM 140 08/22/2022    K 3 9 08/22/2022     08/22/2022    CO2 31 08/22/2022    BUN 25 08/22/2022    CREATININE 1 14 08/22/2022    GLUC 195 (H) 08/22/2022    CALCIUM 9 3 08/22/2022       XR tibia fibula 2 views RIGHT    Status: Final result       PACS Images     Show images for XR tibia fibula 2 views RIGHT    Study Result    Narrative & Impression   RIGHT TIBIA AND FIBULA     INDICATION:  Fall, deformity to right ankle      COMPARISON:  Right knee 4/6/2010     VIEWS:  XR TIBIA FIBULA 2 VW RIGHT   Images: 4     FINDINGS:     There is fracture dislocation of the ankle identified  Comminuted, angulated and displaced fracture of the distal fibula  Cannot exclude distal tibial fracture      Acute, nondisplaced fracture proximal fibula neck      Tricompartmental osteoarthritis of the knee, most pronounced medial tibiofemoral joint  Small knee joint effusion suspected      No lytic or blastic osseous lesion  Plantar calcaneal spur      Periarticular soft tissue swelling about the ankle  Vascular calcifications         IMPRESSION:     Fracture dislocation right ankle, incompletely evaluated      Acute, nondisplaced fracture proximal fibula      Dedicated imaging of the right ankle recommended  CT head without contrast    Status: Final result       PACS Images     Show images for CT head without contrast    Study Result    Narrative & Impression   CT BRAIN - WITHOUT CONTRAST     INDICATION:   Mental status change, unknown cause  fall, altered mental status, on eliquis      COMPARISON:  CT head 2/14/2022     TECHNIQUE:  CT examination of the brain was performed  In addition to axial images, sagittal and coronal 2D reformatted images were created and submitted for interpretation      Radiation dose length product (DLP) for this visit:  866 45 mGy-cm   This examination, like all CT scans performed in the [facility], was performed utilizing techniques to minimize radiation dose exposure, including the use of iterative   reconstruction and automated exposure control        IMAGE QUALITY:  Diagnostic      FINDINGS:     PARENCHYMA:  No intracranial mass, mass effect or midline shift  No CT signs of acute infarction  No acute parenchymal hemorrhage  Periventricular, centrum semiovale, and subcortical white matter hypodensity is a nonspecific finding likely representing   chronic microangiopathy  Calcification bilateral cavernous internal carotid and distal vertebral arteries      VENTRICLES AND EXTRA-AXIAL SPACES:  No acute hydrocephalus  Mild prominence of CSF spaces diffusely attributed to generalized volume loss      VISUALIZED ORBITS AND PARANASAL SINUSES:  Changes of bilateral lens replacements noted  Paranasal sinuses are clear      CALVARIUM AND EXTRACRANIAL SOFT TISSUES:  Scalp unremarkable  No skull fracture  Bilateral  temporomandibular degenerative changes      IMPRESSION:     No acute intracranial process      No skull fracture      Chronic microangiopathy                        - Counseling Documentation: patient was counseled regarding: diagnostic results, instructions for management, risk factor reductions, patient and family education, impressions, risks and benefits of treatment options and importance of compliance with treatment    [facility]    Geriatric Medicine  2022 6:08 PM    Name: Tyler Lima  : 1935  MRN: [id number]  DOS: 2022

## 2022-08-24 NOTE — ASSESSMENT & PLAN NOTE
Lab Results   Component Value Date    HGBA1C 7 3 (H) 07/30/2022   No recent episodes of hypoglycemia  Continue current antidiabetic regimen  Continue Accu-Chek monitoring  Comply with a diabetic, heart healthy

## 2022-08-24 NOTE — ASSESSMENT & PLAN NOTE
Heart rate remains stable  Continue metoprolol, Eliquis  Continue heart healthy, diabetic diet  Follow-up with Cardiology as scheduled

## 2022-08-24 NOTE — ASSESSMENT & PLAN NOTE
Wt Readings from Last 3 Encounters:   08/15/22 95 3 kg (210 lb)   08/01/22 95 3 kg (210 lb)   02/14/22 95 kg (209 lb 7 oz)     Stable   No cardiorespiratory distress  Continues on torsemide  Echocardiogram with EF of 16%, grade 2 diastolic dysfunction, moderate AS  Follow-up with Cardiology as scheduled

## 2022-08-25 ENCOUNTER — NURSING HOME VISIT (OUTPATIENT)
Dept: GERIATRICS | Facility: OTHER | Age: 87
End: 2022-08-25
Payer: COMMERCIAL

## 2022-08-25 VITALS
HEART RATE: 78 BPM | BODY MASS INDEX: 34.95 KG/M2 | SYSTOLIC BLOOD PRESSURE: 140 MMHG | OXYGEN SATURATION: 96 % | WEIGHT: 210 LBS | DIASTOLIC BLOOD PRESSURE: 80 MMHG | RESPIRATION RATE: 18 BRPM | TEMPERATURE: 97.8 F

## 2022-08-25 DIAGNOSIS — G30.1 LATE ONSET ALZHEIMER'S DEMENTIA WITHOUT BEHAVIORAL DISTURBANCE (HCC): Chronic | ICD-10-CM

## 2022-08-25 DIAGNOSIS — G47.33 OBSTRUCTIVE SLEEP APNEA: Chronic | ICD-10-CM

## 2022-08-25 DIAGNOSIS — S82.891A CLOSED FRACTURE OF RIGHT ANKLE, INITIAL ENCOUNTER: Primary | ICD-10-CM

## 2022-08-25 DIAGNOSIS — I50.30 CONGESTIVE HEART FAILURE WITH LV DIASTOLIC DYSFUNCTION, NYHA CLASS 2 (HCC): ICD-10-CM

## 2022-08-25 DIAGNOSIS — S82.891D CLOSED FRACTURE OF RIGHT ANKLE WITH ROUTINE HEALING, SUBSEQUENT ENCOUNTER: Primary | ICD-10-CM

## 2022-08-25 DIAGNOSIS — F02.80 LATE ONSET ALZHEIMER'S DEMENTIA WITHOUT BEHAVIORAL DISTURBANCE (HCC): Chronic | ICD-10-CM

## 2022-08-25 DIAGNOSIS — I48.0 PAF (PAROXYSMAL ATRIAL FIBRILLATION) (HCC): ICD-10-CM

## 2022-08-25 PROCEDURE — 99309 SBSQ NF CARE MODERATE MDM 30: CPT | Performed by: NURSE PRACTITIONER

## 2022-08-25 NOTE — PROGRESS NOTES
[facility]  [address]  [phone]  [place]  Code 31 (STR)      NAME: Maldonado Thrasher  AGE: 82 y o  SEX: female CODE STATUS: CPR    DATE OF ENCOUNTER: 08/25/22      Assessment and Plan     1  Closed fracture of right ankle with routine healing, subsequent encounter  Assessment & Plan:  · S/p fall sustaining right ankle fracture   · S/p ORIF 8/16/2022  · Discharge with oxycodone 2 5 mg Q 6 hour p r n  · Continue bowel regimen   · WBAT  · PT/OT   · Fall precautions  · Outpatient follow-up with orthopedics scheduled 8/30/2022      2  Obstructive sleep apnea  Assessment & Plan:  · History of BRANT  · Apparently patient had CPAP at home however she threw it out  · PT/OT state patient is drowsy during exam   · Patient awake and alert, answers questions appropriately on exam  · Recommend consulting respiratory therapy at SNF for possibility of sleep study while at rehab however concerned that due to patient's dementia she may be continue to be non compliant      3  Late onset Alzheimer's dementia without behavioral disturbance ([facility] )  Assessment & Plan:  · History of moderate dementia   · Not on any medications at home   · Patient was evaluated by SNF psychiatrist and started on Lexapro 10 mg daily for depression and risperidone 0 25 mg q 12 hour for dementia with behaviors on 8/23/2022  · Nursing to continue to monitor neuro status and vital signs Q shift       Provide redirection, reorientation, distraction techniques   Fall Precautions   Assist with ADLs/IADLs   Avoid deliriogenic medications such as tramadol, benzodiazepines, anticholinergics, benadryl   Encourage Hydration/ Nutrition   Implement sleep hygiene, limit night time interuptions    Encourage participation in group activities when appropriate           4   PAF (paroxysmal atrial fibrillation) ([facility])  Assessment & Plan:  · Rate regular in controlled on exam   · S/p PPM  · Continue metoprolol 25 mg q 12 hour for rate control  · And Eliquis 5 mg b i d  for anticoagulation  · Outpatient follow-up with Cardiology    Will check CBC on 2022      5  Congestive heart failure with LV diastolic dysfunction, NYHA class 2 ([facility])  Assessment & Plan:  Wt Readings from Last 3 Encounters:   22 95 3 kg (210 lb)   22 95 3 kg (210 lb)   08/15/22 95 3 kg (210 lb)     · Weights are stable   · Continue daily weight and cardiac diet   · Continue beta-blocker   · Continue torsemide 20 mg daily with potassium supplement  · Outpatient follow-up with Cardiology    Will check BMP on 2022           All medications and routine orders were reviewed and updated as needed      Chief Complaint     STR follow up visit    Past Medical and Surgica History      Past Medical History:   Diagnosis Date    Arthritis     Asthma     CAD (coronary artery disease) of artery bypass graft     Cardiac disease     CHF (congestive heart failure) ([facility])     Dementia [facility]     Depression     Diabetes mellitus ([facility]     Heart attack ([facility] )     Hyperlipidemia     Hypertension     MI (myocardial infarction) ([facility] )     Neuropathy     BRANT (obstructive sleep apnea)     Peptic ulcer     was + for H pylori    Transient cerebral ischemia 2011    with neg CUS and ECHO     Past Surgical History:   Procedure Laterality Date    APPENDECTOMY      CATARACT EXTRACTION       SECTION      COLONOSCOPY  2004    CORONARY ANGIOPLASTY  2006    CORONARY ANGIOPLASTY WITH STENT PLACEMENT      CORONARY ARTERY BYPASS GRAFT      DENTAL SURGERY      EGD AND COLONOSCOPY      ELBOW SURGERY      resolved     ESOPHAGOGASTRODUODENOSCOPY      ORIF TIBIA & FIBULA FRACTURES Right 2022    Procedure: OPEN REDUCTION W/ INTERNAL FIXATION (ORIF) ANKLE;  Surgeon: Kimberley Morejon MD;  [facility]: [facility];  Service: Orthopedics     Allergies   Allergen Reactions    Ace Inhibitors     Chlorpromazine     Latex     Lisinopril  Namenda [Memantine] Drowsiness    Penicillins Seizures    Propoxyphene     Stadol [Butorphanol]           History of Present Illness     Tawanda Jon is a 82year old female admitted to [facility] on 8/22/22 for STR  Past Medical Hx including but not limited to GERD, DM, BRANT (non compliant with CPAP), HTN, CVA, Dementia, Afib on Eliquis, CHF seen in collaboration with nursing for medical mgmt and STR follow up  Hospital Course:   Presented to hospital after falling and sustaining a right fibular fracture  Orthopedics recommended ORIF of right ankle  During preop clearance from Cardiology she was found to be bradycardic, nauseous and diaphoretic she complained of chest pain with ankle splinting  EKG was within normal limits, troponin was negative and her echocardiogram was revealing for mild to moderate aortic stenosis  Patient was considered moderate to high risk for surgery  She was also seen by endocrinology to regulate her blood sugars and she did require an insulin infusion preoperatively  Patient was evaluated by Neurology for an episode of altered mental status with questionable seizure, Neurology attributed events to behavioral etiology  Rehab:   Seen and examined at bedside today  Patient is is a poor historian due to dementia  She is AAOx1  She is awake and answers questions appropriately however she is forgetful and confused, clear speech  PT/OT state she is drowsy today, falling asleep during therapy sessions  Patient stayed awake for exam  Patient denies any pain, she states she has an appetite  She denies CP/SOB/N/V/D  Denies lightheadedness, dizziness, headaches, vision changes  Per review of SNF records, Patient is eating 3 meals per day, consuming 25-75 %  Last documented BM was 8/23/22  Patient is actively participating in therapy  No concerns from nursing at this time                      The patient's allergies, past medical, surgical, social and family history were reviewed and unchanged  Review of Systems     Review of Systems   Unable to perform ROS: Dementia         Objective     Vitals:   Vitals:    08/25/22 1935   BP: 140/80   Pulse: 78   Resp: 18   Temp: 97 8 °F (36 6 °C)   SpO2: 96%         Physical Exam  Vitals and nursing note reviewed  Constitutional:       General: She is not in acute distress  Appearance: Normal appearance  HENT:      Head: Normocephalic and atraumatic  Nose: No congestion or rhinorrhea  Mouth/Throat:      Mouth: Mucous membranes are moist    Eyes:      General: No scleral icterus  Conjunctiva/sclera: Conjunctivae normal       Pupils: Pupils are equal, round, and reactive to light  Cardiovascular:      Rate and Rhythm: Normal rate and regular rhythm  Pulses: Normal pulses  Heart sounds: Murmur heard  Comments: PPM to LCW  Pulmonary:      Effort: Pulmonary effort is normal  No respiratory distress  Breath sounds: Normal breath sounds  No wheezing, rhonchi or rales  Abdominal:      General: Bowel sounds are normal  There is no distension  Palpations: Abdomen is soft  There is no mass  Tenderness: There is no abdominal tenderness  Hernia: No hernia is present  Musculoskeletal:         General: No swelling or tenderness  Right lower leg: Edema present  Left lower leg: Edema present  Lymphadenopathy:      Cervical: No cervical adenopathy  Skin:     General: Skin is warm and dry  Coloration: Skin is not pale  Findings: No rash  Neurological:      General: No focal deficit present  Mental Status: She is alert  Mental status is at baseline  She is disoriented  Motor: Weakness present        Gait: Gait abnormal    Psychiatric:         Mood and Affect: Mood normal          Behavior: Behavior normal          Pertinent Laboratory/Diagnostic Studies:   Reviewed in facility chart-stable      Current Medications   Medications reviewed and updated see facility [facility] for details        Current Outpatient Medications:     acetaminophen (TYLENOL) 325 mg tablet, Take 3 tablets (975 mg total) by mouth every 8 (eight) hours, Disp: , Rfl: 0    atorvastatin (LIPITOR) 40 mg tablet, Take 1 tablet (40 mg total) by mouth daily, Disp: 90 tablet, Rfl: 3    calcium carbonate-vitamin D (OSCAL-D) 500 mg-200 units per tablet, Take 1 tablet by mouth 2 (two) times a day with meals, Disp: 60 tablet, Rfl: 0    Continuous Blood Gluc  (Dexcom G6 ) SIVAKUMAR, Use daily as directed for CGM, Disp: , Rfl:     Continuous Blood Gluc Sensor (Dexcom G6 Sensor) MISC, Use daily as directed for CGM - Change every 10 days, Disp: , Rfl:     Continuous Blood Gluc Transmit (Dexcom G6 Transmitter) MISC, Use daily as directed for CGM - Change every 3 months, Disp: , Rfl:     Eliquis 5 MG, TAKE 1 TABLET BY MOUTH TWICE A DAY, Disp: 60 tablet, Rfl: 5    glucose blood test strip, USE AS DIRECTED 3 TIMES A DAY, Disp: 300 each, Rfl: 1    Insulin Disposable Pump (V-Go 20) KIT, Apply once a night, Disp: 30 kit, Rfl: 5    Insulin Disposable Pump (V-Go 20) KIT, Use daily Use one daily, Disp: 30 kit, Rfl: 1    insulin glargine (LANTUS) 100 units/mL subcutaneous injection, Inject 25 Units under the skin daily at bedtime, Disp: 10 mL, Rfl: 0    insulin lispro (HumaLOG) 100 units/mL injection, Inject 1-6 Units under the skin 3 (three) times a day before meals, Disp: , Rfl: 0    insulin lispro (HumaLOG) 100 units/mL injection, Inject 8 Units under the skin daily with lunch, Disp: , Rfl: 0    insulin lispro (HumaLOG) 100 units/mL injection, Inject 10 Units under the skin daily with breakfast, Disp: , Rfl: 0    insulin lispro (HumaLOG) 100 units/mL injection, Inject 10 Units under the skin daily with dinner, Disp: , Rfl: 0    metoprolol tartrate (LOPRESSOR) 25 mg tablet, TAKE 1 TABLET (25 MG TOTAL) BY MOUTH EVERY 12 (TWELVE) HOURS, Disp: 180 tablet, Rfl: 3    potassium chloride (Klor-Con M10) 10 mEq tablet, Take 1 tablet (10 mEq total) by mouth daily, Disp: 90 tablet, Rfl: 3    senna-docusate sodium (SENOKOT S) 8 6-50 mg per tablet, Take 1 tablet by mouth daily at bedtime, Disp: , Rfl: 0    torsemide (DEMADEX) 20 mg tablet, Take 1 tablet (20 mg total) by mouth daily, Disp: 90 tablet, Rfl: 3     Plan discussed with Dr Jessica Elizalde noted agreement with assessment and plan  Please note:  Voice-recognition software may have been used in the preparation of this document  Occasional wrong word or "sound-alike" substitutions may have occurred due to the inherent limitations of voice recognition software  Interpretation should be guided by context           EMELI Dyson  08/25/22

## 2022-08-28 ENCOUNTER — TELEPHONE (OUTPATIENT)
Dept: OTHER | Facility: OTHER | Age: 87
End: 2022-08-28

## 2022-08-29 NOTE — TELEPHONE ENCOUNTER
Kristina Mcdonough called, requesting a call back from on call provider, regarding pt complaining of cheat pain   Provider paged via [facility]

## 2022-08-30 ENCOUNTER — OFFICE VISIT (OUTPATIENT)
Dept: OBGYN CLINIC | Facility: HOSPITAL | Age: 87
End: 2022-08-30

## 2022-08-30 ENCOUNTER — NURSING HOME VISIT (OUTPATIENT)
Dept: GERIATRICS | Facility: OTHER | Age: 87
End: 2022-08-30
Payer: COMMERCIAL

## 2022-08-30 ENCOUNTER — HOSPITAL ENCOUNTER (OUTPATIENT)
Dept: RADIOLOGY | Facility: HOSPITAL | Age: 87
Discharge: HOME | End: 2022-08-30
Attending: ORTHOPAEDIC SURGERY
Payer: COMMERCIAL

## 2022-08-30 VITALS
HEART RATE: 70 BPM | OXYGEN SATURATION: 99 % | SYSTOLIC BLOOD PRESSURE: 114 MMHG | BODY MASS INDEX: 34.95 KG/M2 | HEIGHT: 65 IN | DIASTOLIC BLOOD PRESSURE: 60 MMHG

## 2022-08-30 DIAGNOSIS — R26.2 AMBULATORY DYSFUNCTION: ICD-10-CM

## 2022-08-30 DIAGNOSIS — E11.65 TYPE 2 DIABETES MELLITUS WITH HYPERGLYCEMIA, WITH LONG-TERM CURRENT USE OF INSULIN (HCC): ICD-10-CM

## 2022-08-30 DIAGNOSIS — S82.891D CLOSED FRACTURE OF RIGHT ANKLE WITH ROUTINE HEALING, SUBSEQUENT ENCOUNTER: Primary | ICD-10-CM

## 2022-08-30 DIAGNOSIS — I48.0 PAF (PAROXYSMAL ATRIAL FIBRILLATION) (HCC): ICD-10-CM

## 2022-08-30 DIAGNOSIS — F02.80 LATE ONSET ALZHEIMER'S DEMENTIA WITHOUT BEHAVIORAL DISTURBANCE (HCC): Chronic | ICD-10-CM

## 2022-08-30 DIAGNOSIS — S82.891A CLOSED FRACTURE OF RIGHT ANKLE, INITIAL ENCOUNTER: ICD-10-CM

## 2022-08-30 DIAGNOSIS — G30.1 LATE ONSET ALZHEIMER'S DEMENTIA WITHOUT BEHAVIORAL DISTURBANCE (HCC): Chronic | ICD-10-CM

## 2022-08-30 DIAGNOSIS — Z79.4 TYPE 2 DIABETES MELLITUS WITH HYPERGLYCEMIA, WITH LONG-TERM CURRENT USE OF INSULIN (HCC): ICD-10-CM

## 2022-08-30 DIAGNOSIS — I50.30 CONGESTIVE HEART FAILURE WITH LV DIASTOLIC DYSFUNCTION, NYHA CLASS 2 (HCC): ICD-10-CM

## 2022-08-30 PROCEDURE — 73600 X-RAY EXAM OF ANKLE: CPT

## 2022-08-30 PROCEDURE — 99309 SBSQ NF CARE MODERATE MDM 30: CPT | Performed by: NURSE PRACTITIONER

## 2022-08-30 PROCEDURE — 99024 POSTOP FOLLOW-UP VISIT: CPT | Performed by: ORTHOPAEDIC SURGERY

## 2022-08-30 NOTE — ASSESSMENT & PLAN NOTE
· Rate regular in controlled on exam   · S/p PPM  · Continue metoprolol 25 mg q 12 hour for rate control  · And Eliquis 5 mg b i d  for anticoagulation  · Outpatient follow-up with Cardiology    Will check CBC on 8/30/2022

## 2022-08-30 NOTE — PROGRESS NOTES
Assessment:  1  Closed fracture of right ankle with routine healing, subsequent encounter  Cam Boot         Surgery: Open Reduction W/ Internal Fixation (orif) Ankle - Right  Date of Surgery: 8/16/2022        Discussion and Plan:   · WBAT in Cam boot  · May shower  · Branden Hickey for ambulation which patient has at home  · Start PT gentle ROM     Follow Up:  Return in about 5 weeks (around 10/4/2022) for x-rays  CHIEF COMPLAINT:  Chief Complaint   Patient presents with    Right Ankle - Post-op         SUBJECTIVE:  Grant Baker is a 82y o  year old female who presents for follow up after Open Reduction W/ Internal Fixation (orif) Ankle - Right  Today patient's pain is tolerable  PHYSICAL EXAMINATION:  General: well developed and well nourished, alert, oriented times 3 and appears comfortable  Psychiatric: Normal    MUSCULOSKELETAL EXAMINATION:  Incision: Clean, dry, intact  Surgery Site: normal, no evidence of infection   Range of Motion: As expected  Neurovascular status: Neuro intact, good cap refill  Activity Restrictions: Cast/splint restrictions  Done today: Sutures out and Steri strips applied        I have personally reviewed pertinent films in PACS and my interpretation is as follows  Right ankle x-rays demonstrates hardware in acceptable alignment and position, stable fracture      Scribe Attestation    I,:  Nicholeitor am acting as a scribe while in the presence of the attending physician :       I,:  Jerri Moreira MD personally performed the services described in this documentation    as scribed in my presence :

## 2022-08-30 NOTE — PROGRESS NOTES
[address], [address] [place] [address], [address] [place] [address]  [phone]    NAME: Fred Prieto  AGE: 82 y o  SEX: female    Progress Note    Location: OO  POS: 32 (SNF)    Assessment/Plan:    Closed right ankle fracture  Patient denies any pain and this visit  Seen and evaluated by Orthopedic office today (8/30/2022):  = WBAT to RLE in CAM boot  = Use walker for ambulation with CAM boot  = Follow-up in 5 weeks (10/4/2022)  Continue pain assessment every shift  Continue Tylenol 975 mg every 8 hours/ PRN Oxycodone 5mg Q6 PRN  Review of  Oxycodone usage: 1 dose on 8/29/2022 since admission  Consider tapering off on Oxycodone to D/C prior to discharge to home  Type 2 diabetes mellitus with hyperglycemia, with long-term current use of insulin ([facility])    Lab Results   Component Value Date    HGBA1C 7 3 (H) 07/30/2022   Goal: < 8%  Stable    FBG range (8/24-30/2022) = 109 to 270  Pre-lunch BG range (8/23-30/2022) = 173 to 360  Pre-dinner BG range (8/23-30/2022) = 148 to 340  2100 BG range (8/22-30/2022) = 236 to 396  Currently on Insulin Lispro SSI for  and higher     PAF (paroxysmal atrial fibrillation) ([facility])  Labile BP readings  BP range (8/22-30/2022) = 112/61 to 183/78  HR range (8/22-30/2022) = 59 to 80/min  Continue to assess for pain every shift  Continue Eliquis 5mg BID/ Metoprolol tartrate 25mg BID    Congestive heart failure with LV diastolic dysfunction, NYHA class 2 ([facility] [address] )  Wt Readings from Last 3 Encounters:   08/25/22 95 3 kg (210 lb)   08/24/22 95 3 kg (210 lb)   08/15/22 95 3 kg (210 lb)   Admission weight: 210 0 lbs (8/22/2022)  Patient reports baseline cardiopulmonary symptoms including shortness of breath on ambulation  NO B/L LE edema  Labile BP readings  BP range (8/22-30/2022) = 112/61 to 183/78  HR range (8/22-30/2022) = 59 to 80/min  Continue Torsemide 20mg daily/ KCl 10mEQ daily  Renal function slightly elevated (8/30/2022)  Start CHF protocol and weight checks  Repeat weight on 8/31/2022  Repeat BMP on 9/5/2022    Dementia without behavioral disturbance (HCC)  Stable  Fair meal completion: 50%  Started on Escitalopram 10mg daily / Risperdal 0 25mg Q12 hours by SNF psychiatrist on 8/23/2022  Continue to monitor meal completion/nutritional/hydration/sleep and behavior while in STR  Continue to redirect/reorient as often as needed    Ambulatory dysfunction  Continue PT/OT as scheduled      Chief complaint / Reason for visit: Follow-up visit    History of Present Illness: This is an 53-year-old female patient currently admitted OO-STR (8/22/2022 to present) following discharge from acute care hospitalization [facility]) with Dx of Closed Right ankle Fracture - S/P ORIF (8/16/2022), Chest pain (resolved), Altered Mental status x 2 successive episode (8/18-19/2022)  Patient is seen and examined today to follow up acute and chronic medical conditions mentioned above and Chronic Diastolic CHF, PAF, DM Type 2, Dementia wo behavioral disturbance and ambulatory dysfunction  Patient is in bed for this visit just recently returned from an orthopedic office visit this morning - initially was asleep but was able to be awakened without difficulty and maintain full wakefulness during this visit  Patient with fatigue presentation  Patient is cooperative calm and not in distress  Patient is verbal with clear coherent speech - oriented to name and birthday  Patient acknowledge that she feels well - denies any acute medical concerns during ROS assessment including pain  Per nursing, no acute medical concerns in this visit  Review of Systems:  Per history of present illness, all other systems reviewed and negative      HISTORY:  Medical Hx: Reviewed, unchanged  Family Hx: Reviewed, unchanged  Soc Hx: Reviewed,  unchanged    ALLERGY: Reviewed, unchanged  Allergies   Allergen Reactions    Ace Inhibitors     Chlorpromazine     Latex     Lisinopril     Namenda [Memantine] Drowsiness    Penicillins Seizures    Propoxyphene     Stadol [Butorphanol]         PHYSICAL EXAM:  Vital Signs:  T97 6F -P68 -R16 BP: 114/66 SpO2: 98% RA  Weight:  256 6 lbs (8/29/2022) <= 210 0 lbs (8/22/2022)    General: NAD  Head: Atraumatic  Normocephalic  Eye Exam: anicteric sclera, no discharge, PERRLA, No injection  Wears glasses  Oral Exam: moist mucous membranes, no buccaloropharyngeal erythema, palatine tonsils WNL  Neck Exam: no anterior cervical lymphadenopathy noted, neck supple  Cardiovascular: regular rate, regular rhythm, (+) murmurs, rubs, or gallops  Pulmonary: no wheeze, no rhonchi, no rales  No chest tenderness  Abdominal: soft, non-tender, nondistended, bowel sounds audible x 4 quadrants  Ave meal completion: 50% with occasional 25 or 75%  : Non distended bladder  Incontinent  BM every 1-2 days  Last documented BM: 8/29/2022  Extremities and skin: no edema noted, no rashes  Intact skin  CAM boot to RLE  Neurological: alert, cooperative and responsive, Oriented x 2, moving all 4 extremities symmetrically    Ambulatory dysfunction    Laboratory results / Imaging reviewed: Hard copy/ies in medical chart:    * CBC wo diff (8/30/2022):   HEMOGLOBIN 11 9 g/dL 11 5-14 5  Final         HEMATOCRIT 34 6 % 35 0-43 0 L Final         WBC 6 9 thou/cmm 4 0-10 0  Final         RBC 3 79 mill/cmm 3 70-4 70  Final         PLATELET COUNT 806 thou/cmm 140-350  Final         MPV 10 4 fL 7 5-11 3  Final         MCV 91 fL   Final         MCH 31 3 pg 26 0-34 0  Final         MCHC 34 4 g/dL 32 0-37 0  Final         RDW 15 3 % 12 0-16 0  Final       * BMP (8/30/2022):   GLUCOSE 112 mg/dL 65-99 H Final         BUN 36 <= 25 (WNL: 8/22/2022) mg/dL 7-25 H Final         CREATININE 1 37 <= 1 14 (WNL: 8/22/2022) mg/dL 0 40-1 10 H Final         SODIUM 140 mmol/L 135-145  Final         POTASSIUM 4 1 mmol/L 3 5-5 2  Final         CHLORIDE 104 mmol/L 100-109  Final         CARBON DIOXIDE 31 mmol/L 23-31  Final         CALCIUM 9 4 mg/dL 8 5-10 1  Final         ANION GAP 5  3-11  Final         eGFRcr 38 <= 44 (L: 8/22/2022)  >59 L Final         eGFRcr COMMENT (Note)    Final       Current Medications: All medications reviewed and updated in Nursing Home Chart    Please note:  Voice-recognition software may have been used in the preparation of this document  Occasional wrong word or "sound-alike" substitutions may have occurred due to the inherent limitations of voice recognition software  Interpretation should be guided by context      EMELI Yin  8/30/2022

## 2022-08-30 NOTE — ASSESSMENT & PLAN NOTE
· S/p fall sustaining right ankle fracture   · S/p ORIF 8/16/2022  · Discharge with oxycodone 2 5 mg Q 6 hour p r n    · Continue bowel regimen   · WBAT  · PT/OT   · Fall precautions  · Outpatient follow-up with orthopedics scheduled 8/30/2022

## 2022-08-30 NOTE — ASSESSMENT & PLAN NOTE
Wt Readings from Last 3 Encounters:   08/25/22 95 3 kg (210 lb)   08/24/22 95 3 kg (210 lb)   08/15/22 95 3 kg (210 lb)     · Weights are stable   · Continue daily weight and cardiac diet   · Continue beta-blocker   · Continue torsemide 20 mg daily with potassium supplement  · Outpatient follow-up with Cardiology    Will check BMP on Tuesday 8/30/2022

## 2022-08-30 NOTE — ASSESSMENT & PLAN NOTE
· History of BRANT  · Apparently patient had CPAP at home however she threw it out  · PT/OT state patient is drowsy during exam   · Patient awake and alert, answers questions appropriately on exam  · Recommend consulting respiratory therapy at SNF for possibility of sleep study while at rehab however concerned that due to patient's dementia she may be continue to be non compliant

## 2022-08-30 NOTE — ASSESSMENT & PLAN NOTE
· History of moderate dementia   · Not on any medications at home   · Patient was evaluated by SNF psychiatrist and started on Lexapro 10 mg daily for depression and risperidone 0 25 mg q 12 hour for dementia with behaviors on 8/23/2022  · Nursing to continue to monitor neuro status and vital signs Q shift       Provide redirection, reorientation, distraction techniques   Fall Precautions   Assist with ADLs/IADLs   Avoid deliriogenic medications such as tramadol, benzodiazepines, anticholinergics, benadryl   Encourage Hydration/ Nutrition   Implement sleep hygiene, limit night time interuptions    Encourage participation in group activities when appropriate

## 2022-08-31 NOTE — ASSESSMENT & PLAN NOTE
Stable  Fair meal completion: 50%  Started on Escitalopram 10mg daily / Risperdal 0 25mg Q12 hours by [facility] psychiatrist on 8/23/2022  Continue to monitor meal completion/nutritional/hydration/sleep and behavior while in STR  Continue to redirect/reorient as often as needed

## 2022-08-31 NOTE — ASSESSMENT & PLAN NOTE
Patient denies any pain and this visit  Seen and evaluated by Orthopedic office today (8/30/2022):  = WBAT to RLE in CAM boot  = Use walker for ambulation with CAM boot  = Follow-up in 5 weeks (10/4/2022)  Continue pain assessment every shift  Continue Tylenol 975 mg every 8 hours/ PRN Oxycodone 5mg Q6 PRN  Review of  Oxycodone usage: 1 dose on 8/29/2022 since admission  Consider tapering off on Oxycodone to D/C prior to discharge to home

## 2022-08-31 NOTE — ASSESSMENT & PLAN NOTE
Wt Readings from Last 3 Encounters:   08/25/22 95 3 kg (210 lb)   08/24/22 95 3 kg (210 lb)   08/15/22 95 3 kg (210 lb) Maria Guadalupe Gatica PGY3

## 2022-08-31 NOTE — ASSESSMENT & PLAN NOTE
Lab Results   Component Value Date    HGBA1C 7 3 (H) 07/30/2022   Goal: < 8%  Stable    FBG range (8/24-30/2022) = 109 to 270  Pre-lunch BG range (8/23-30/2022) = 173 to 360  Pre-dinner BG range (8/23-30/2022) = 148 to 340  2100 BG range (8/22-30/2022) = 236 to 396  Currently on Insulin Lispro SSI for  and higher

## 2022-08-31 NOTE — ASSESSMENT & PLAN NOTE
Labile BP readings  BP range (8/22-30/2022) = 112/61 to 183/78  HR range (8/22-30/2022) = 59 to 80/min  Continue to assess for pain every shift  Continue Eliquis 5mg BID/ Metoprolol tartrate 25mg BID

## 2022-08-31 NOTE — ASSESSMENT & PLAN NOTE
Wt Readings from Last 3 Encounters:   08/25/22 95 3 kg (210 lb)   08/24/22 95 3 kg (210 lb)   08/15/22 95 3 kg (210 lb)   Admission weight: 210 0 lbs (8/22/2022)  Patient reports baseline cardiopulmonary symptoms including shortness of breath on ambulation  NO B/L LE edema  Labile BP readings  BP range (8/22-30/2022) = 112/61 to 183/78  HR range (8/22-30/2022) = 59 to 80/min  Continue Torsemide 20mg daily/ KCl 10mEQ daily  Renal function slightly elevated (8/30/2022)  Start CHF protocol and weight checks  Repeat weight on 8/31/2022    Repeat BMP on 9/5/2022

## 2022-09-06 ENCOUNTER — TRANSITIONAL CARE MANAGEMENT (OUTPATIENT)
Dept: FAMILY MEDICINE CLINIC | Facility: CLINIC | Age: 87
End: 2022-09-06

## 2022-09-06 DIAGNOSIS — G30.1 LATE ONSET ALZHEIMER'S DEMENTIA WITHOUT BEHAVIORAL DISTURBANCE (HCC): Primary | ICD-10-CM

## 2022-09-06 DIAGNOSIS — F32.9 REACTIVE DEPRESSION: ICD-10-CM

## 2022-09-06 DIAGNOSIS — F02.80 LATE ONSET ALZHEIMER'S DEMENTIA WITHOUT BEHAVIORAL DISTURBANCE (HCC): Primary | ICD-10-CM

## 2022-09-06 RX ORDER — RISPERIDONE 0.25 MG/1
0.25 TABLET, FILM COATED ORAL 2 TIMES DAILY
Qty: 60 TABLET | Refills: 0 | Status: SHIPPED | OUTPATIENT
Start: 2022-09-06 | End: 2022-10-27 | Stop reason: ALTCHOICE

## 2022-09-06 RX ORDER — ESCITALOPRAM OXALATE 10 MG/1
10 TABLET ORAL DAILY
Qty: 30 TABLET | Refills: 0 | Status: SHIPPED | OUTPATIENT
Start: 2022-09-06 | End: 2022-10-04 | Stop reason: SDUPTHER

## 2022-09-06 NOTE — PROGRESS NOTES
Notified by  that patient was set up to d/c home on Sat 9/13/22 from short term rehab, however daughter and patient insistent on going home today  Provider unable to see patient prior to going home  SNF Psychiatrist started patient on Lexapro and Risperdal for her depression and Alzheimer's dementia on 8/23/22  Script for new meds sent to home pharmacy  Discharging nurse to arrange follow up appt with PCP

## 2022-09-10 NOTE — ASSESSMENT & PLAN NOTE
· As per pulmonologist, there is a right lower lobe opacity that could represent pneumonia, given presentation  · IV Levaquin started on admission    · 4/24 - procalcitonin < 0 05 - stopped Levaquin Patient

## 2022-09-14 ENCOUNTER — RA CDI HCC (OUTPATIENT)
Dept: OTHER | Facility: HOSPITAL | Age: 87
End: 2022-09-14

## 2022-09-14 DIAGNOSIS — S82.891D CLOSED FRACTURE OF RIGHT ANKLE WITH ROUTINE HEALING, SUBSEQUENT ENCOUNTER: Primary | ICD-10-CM

## 2022-09-14 NOTE — PROGRESS NOTES
[facility]  coding opportunities     I13 0, E11 22, E11 69     Chart Reviewed number of suggestions sent to Provider: 3     Patients Insurance     Medicare Insurance: [employer]

## 2022-09-15 ENCOUNTER — OFFICE VISIT (OUTPATIENT)
Dept: FAMILY MEDICINE CLINIC | Facility: CLINIC | Age: 87
End: 2022-09-15
Payer: COMMERCIAL

## 2022-09-15 ENCOUNTER — OFFICE VISIT (OUTPATIENT)
Dept: ENDOCRINOLOGY | Facility: CLINIC | Age: 87
End: 2022-09-15
Payer: COMMERCIAL

## 2022-09-15 VITALS — DIASTOLIC BLOOD PRESSURE: 70 MMHG | HEART RATE: 62 BPM | SYSTOLIC BLOOD PRESSURE: 112 MMHG

## 2022-09-15 VITALS
DIASTOLIC BLOOD PRESSURE: 84 MMHG | RESPIRATION RATE: 20 BRPM | SYSTOLIC BLOOD PRESSURE: 122 MMHG | TEMPERATURE: 98.3 F | OXYGEN SATURATION: 98 % | HEART RATE: 56 BPM

## 2022-09-15 DIAGNOSIS — Z23 ENCOUNTER FOR IMMUNIZATION: ICD-10-CM

## 2022-09-15 DIAGNOSIS — F02.80 LATE ONSET ALZHEIMER'S DEMENTIA WITHOUT BEHAVIORAL DISTURBANCE (HCC): Chronic | ICD-10-CM

## 2022-09-15 DIAGNOSIS — G47.33 OBSTRUCTIVE SLEEP APNEA: ICD-10-CM

## 2022-09-15 DIAGNOSIS — G30.1 LATE ONSET ALZHEIMER'S DEMENTIA WITHOUT BEHAVIORAL DISTURBANCE (HCC): Chronic | ICD-10-CM

## 2022-09-15 DIAGNOSIS — M85.80 OSTEOPENIA, UNSPECIFIED LOCATION: ICD-10-CM

## 2022-09-15 DIAGNOSIS — S82.891D CLOSED FRACTURE OF RIGHT ANKLE WITH ROUTINE HEALING, SUBSEQUENT ENCOUNTER: ICD-10-CM

## 2022-09-15 DIAGNOSIS — E78.2 MIXED HYPERLIPIDEMIA: ICD-10-CM

## 2022-09-15 DIAGNOSIS — Z78.0 POSTMENOPAUSE: ICD-10-CM

## 2022-09-15 DIAGNOSIS — I50.30 CONGESTIVE HEART FAILURE WITH LV DIASTOLIC DYSFUNCTION, NYHA CLASS 2 (HCC): ICD-10-CM

## 2022-09-15 DIAGNOSIS — Z79.4 TYPE 2 DIABETES MELLITUS WITH HYPERGLYCEMIA, WITH LONG-TERM CURRENT USE OF INSULIN (HCC): Primary | ICD-10-CM

## 2022-09-15 DIAGNOSIS — I10 ESSENTIAL HYPERTENSION: Chronic | ICD-10-CM

## 2022-09-15 DIAGNOSIS — E11.65 TYPE 2 DIABETES MELLITUS WITH HYPERGLYCEMIA, WITH LONG-TERM CURRENT USE OF INSULIN (HCC): ICD-10-CM

## 2022-09-15 DIAGNOSIS — E11.65 TYPE 2 DIABETES MELLITUS WITH HYPERGLYCEMIA, WITH LONG-TERM CURRENT USE OF INSULIN (HCC): Primary | ICD-10-CM

## 2022-09-15 DIAGNOSIS — Z09 HOSPITAL DISCHARGE FOLLOW-UP: Primary | ICD-10-CM

## 2022-09-15 DIAGNOSIS — I48.0 PAF (PAROXYSMAL ATRIAL FIBRILLATION) (HCC): ICD-10-CM

## 2022-09-15 DIAGNOSIS — Z79.4 TYPE 2 DIABETES MELLITUS WITH HYPERGLYCEMIA, WITH LONG-TERM CURRENT USE OF INSULIN (HCC): ICD-10-CM

## 2022-09-15 PROBLEM — R82.90 FOUL SMELLING URINE: Status: RESOLVED | Noted: 2022-08-15 | Resolved: 2022-09-15

## 2022-09-15 PROBLEM — R41.82 ALTERED MENTAL STATUS: Status: RESOLVED | Noted: 2022-08-18 | Resolved: 2022-09-15

## 2022-09-15 PROCEDURE — 99495 TRANSJ CARE MGMT MOD F2F 14D: CPT | Performed by: FAMILY MEDICINE

## 2022-09-15 PROCEDURE — 99214 OFFICE O/P EST MOD 30 MIN: CPT

## 2022-09-15 PROCEDURE — G0008 ADMIN INFLUENZA VIRUS VAC: HCPCS

## 2022-09-15 PROCEDURE — 90662 IIV NO PRSV INCREASED AG IM: CPT

## 2022-09-15 PROCEDURE — 1111F DSCHRG MED/CURRENT MED MERGE: CPT

## 2022-09-15 PROCEDURE — 1160F RVW MEDS BY RX/DR IN RCRD: CPT

## 2022-09-15 RX ORDER — INSULIN ASPART 100 [IU]/ML
INJECTION, SOLUTION INTRAVENOUS; SUBCUTANEOUS
COMMUNITY
End: 2022-09-15 | Stop reason: SDUPTHER

## 2022-09-15 RX ORDER — INSULIN ASPART 100 [IU]/ML
INJECTION, SOLUTION INTRAVENOUS; SUBCUTANEOUS
Qty: 30 ML | Refills: 1 | Status: SHIPPED | OUTPATIENT
Start: 2022-09-15 | End: 2022-09-30 | Stop reason: SDUPTHER

## 2022-09-15 NOTE — PROGRESS NOTES
Chief Complaint   Patient presents with    Transition of Care Management     Discharged 09/06/22  Health Maintenance   Topic Date Due    SLP PLAN OF CARE  Never done    BMI: Followup Plan  12/18/2020    DM Eye Exam  10/01/2021    Diabetic Foot Exam  05/05/2022    DXA SCAN  05/16/2022    HEMOGLOBIN A1C  10/30/2022    COVID-19 Vaccine (4 - Booster for Moderna series) 12/17/2022    URINE MICROALBUMIN  07/30/2023    Fall Risk  08/01/2023    Urinary Incontinence Screening  08/01/2023    Medicare Annual Wellness Visit (AWV)  08/01/2023    Falls: Plan of Care  08/01/2023    BMI: Adult  08/25/2023    Osteoporosis Screening  Completed    Pneumococcal Vaccine: 65+ Years  Completed    Influenza Vaccine  Completed    HIB Vaccine  Aged Out    Hepatitis B Vaccine  Aged Out    IPV Vaccine  Aged Out    Hepatitis A Vaccine  Aged Out    Meningococcal ACWY Vaccine  Aged Out    HPV Vaccine  Aged Out     Assessment & Plan     1  Hospital discharge follow-up  Comments:  Reviewed hospital records  2  Closed fracture of right ankle with routine healing, subsequent encounter  Assessment & Plan:  Continue tylenol prn for pain  FU orthopedics  3  Type 2 diabetes mellitus with hyperglycemia, with long-term current use of insulin [facility])  Assessment & Plan:    Lab Results   Component Value Date    HGBA1C 7 3 (H) 07/30/2022     Controlled  FU endocrinology  4  Congestive heart failure with LV diastolic dysfunction, NYHA class 2 ([facility])  Assessment & Plan:  Wt Readings from Last 3 Encounters:   08/25/22 95 3 kg (210 lb)   08/24/22 95 3 kg (210 lb)   08/15/22 95 3 kg (210 lb)     Stable  Continue torsemide and potassium  5  PAF (paroxysmal atrial fibrillation) ([facility])  Assessment & Plan:  Continue eliquis 5mg bid  6  Essential hypertension  Assessment & Plan:  Controlled  DASH diet  Continue metoprolol 25mg bid  7  Mixed hyperlipidemia  Assessment & Plan:  Low fat diet   Continue atorvastatin 40mg qhs        8  Late onset Alzheimer's dementia without behavioral disturbance ([facility] [address] )  Assessment & Plan:  Stable at home  9  Obstructive sleep apnea  Assessment & Plan:  Refer to sleep specialist      Orders:  -     Ambulatory Referral to Sleep Medicine; Future    10  Postmenopause  -     DXA bone density spine hip and pelvis; Future; Expected date: 09/15/2022    11  Encounter for immunization  -     influenza vaccine, high-dose, PF 0 7 mL (FLUZONE HIGH-DOSE)       Subjective     Transitional Care Management Review:   Chema Hamilton is a 82 y o  female here for TCM follow up  During the TCM phone call patient stated:  TCM Call     Date and time call was made  9/6/2022  3:54 PM    Hospital care reviewed  Records reviewed    Patient was hospitialized at  Other (comment)    [address]    Date of Admission  08/22/22    Date of discharge  09/06/22    Diagnosis  Closed right ankle fracture    Disposition  Home    Were the patients medications reviewed and updated  No    Current Symptoms  None    Cough Severity  Mild    Shortness of breath severity  Mild      TCM Call     Clinical progress swelling  Improving    Post hospital issues  None    Should patient be enrolled in anticoag monitoring? No    Scheduled for follow up?   Yes    Not clinically warranted  following up with specialist    Patients specialists  Endocrinologist    Cardiologist name  Dr Maricarmen Bowen    Did you obtain your prescribed medications  Yes    Do you need help managing your prescriptions or medications  No    Why type of assitance do you need  Daughter assist    Is transportation to your appointment needed  No    I have advised the patient to call PCP with any new or worsening symptoms  [address] members    Support System  Family    The type of support provided  Emotional; Physical    Do you have social support  Yes, quite a bit    Are you recieving any outpatient services  No    Are you recieving home care services  No    Types of home care services  Nurse visit    Are you using any community resources  No    Current waiver services  No    Have you fallen in the last 12 months  No    Interperter language line needed  No    Counseling  Patient    Counseling topics  instructions for management        HPI     Pt is here with her daughter  Was in hospital 8/15-8/22/2022 due to fall/ankle fracture  Had surgery by orthopedics  Following day she had an episode of AMS  Neurology consulted  CT brain showed no acute CVA  Similar episode again  Not a seizure-like activity  Concern about behavioral problem  Discharged to rehab  Got lexapro 10mg QD and risperidone 0 25mg bid since rehab  Daughter would like to wean them off  Discharged home on 9/6/2022  Family members helped her with ADL's  Will see orthopedics in 10/2022  DM---7/2022 hgA1C 7 3 improved  [facility] endocrinology  She is on V-Go 20  Refused eye exam    FU podiatry regularly  No problem       HTN---She is on metoprolol 25mg bid now  BP is good today  Pt denies dizzy or lightheaded     CHF---She is on torsemide 20mg QD  Wt is stable per daughter  She is on potassium supplement     Afib---She is on eliquis 5mg bid  Denies bleeding signs    FU cardiology Dr Linda Garcia pacemaker 5/2014, CAD s/p bypass 1999       Hyperlipidemia---on atorvastatin 40mg qhs  Denies side effects       Dementia---Stable       Asthma----Controlled  Does not need albuterol for a while  Review of Systems   Constitutional: Negative for appetite change, chills and fever  HENT: Negative for congestion, ear pain, sinus pain and sore throat  Eyes: Negative for discharge and itching  Respiratory: Negative for apnea, cough, chest tightness, shortness of breath and wheezing  Cardiovascular: Negative for chest pain, palpitations and leg swelling     Gastrointestinal: Negative for abdominal pain, anal bleeding, constipation, diarrhea, nausea and vomiting  Endocrine: Negative for cold intolerance, heat intolerance and polyuria  Genitourinary: Negative for difficulty urinating and dysuria  Musculoskeletal: Positive for arthralgias  Negative for back pain and myalgias  Skin: Negative for rash  Neurological: Negative for dizziness and headaches  Psychiatric/Behavioral: Negative for agitation  Objective     /84 (BP Location: Left arm, Patient Position: Sitting, Cuff Size: Large)   Pulse 56   Temp 98 3 °F (36 8 °C) (Tympanic)   Resp 20   SpO2 98%      Physical Exam  Vitals reviewed  Constitutional:       Appearance: Normal appearance  HENT:      Head: Normocephalic and atraumatic  Eyes:      General:         Right eye: No discharge  Left eye: No discharge  Conjunctiva/sclera: Conjunctivae normal    Neck:      Vascular: No carotid bruit  Cardiovascular:      Rate and Rhythm: Normal rate and regular rhythm  Heart sounds: Normal heart sounds  No murmur heard  No friction rub  No gallop  Pulmonary:      Effort: Pulmonary effort is normal  No respiratory distress  Breath sounds: Normal breath sounds  No wheezing or rales  Abdominal:      General: Bowel sounds are normal  There is no distension  Palpations: Abdomen is soft  Tenderness: There is no abdominal tenderness  Musculoskeletal:         General: No swelling, tenderness or deformity  Cervical back: Normal range of motion and neck supple  No muscular tenderness  Comments: CAM boot on right leg  On wheelchair  Lymphadenopathy:      Cervical: No cervical adenopathy  Neurological:      Mental Status: She is alert     Psychiatric:         Mood and Affect: Mood normal        Steven Martinez MD

## 2022-09-15 NOTE — PROGRESS NOTES
Established Patient Progress Note      Chief Complaint   Patient presents with    Diabetes Type 2        History of Present Illness:   Yair Yates is a 82 y o  female with type 2 diabetes with long term use of insulin for many years  Her daughter assists her at her appointment today  She lives with her daughter and helps manage her diabetes  Last seen in the office 1/25/22  Reports complications of neuropathy  Last A1C was 7 3  Denies recent severe hypoglycemic or severe hyperglycemic episodes  Denies any issues with her current regimen  Home glucose monitoring: are performed regularly with CGM  She did not bring in Dexcom reader to appointment today  She fell about a month ago and broke her foot  She got her foot caught in her bed while up at night  She was in the hospital for one week and then rehab  She has been home for about 10 days  She is trying to get back into her routine  BGs are now improving and she is on regular schedule  She is having some increased confusion  Current regimen:   V-Go 20: 2 clicks (4 units) with breakfast and dinner, 1 clicks (2 units) with lunch, sometimes gives extra click if having big dinner   Insulin used: Novolog      Last Eye Exam: 10/1/2019, no retinopathy  Last Foot Exam: podiatry every 3 months, going in 2 months, Dr Bakari Pond     Has hypertension: Taking metoprolol  Has hyperlipidemia: Taking atorvastatin     Osteopenia: taking vitamin d and calcium supplements       Patient Active Problem List   Diagnosis    3-vessel coronary artery disease    Dementia without behavioral disturbance ([address] )    Depression    Diabetic retinopathy ([address] )    DM (diabetes mellitus), type 2, uncontrolled w/ophthalmic complication    Gastroesophageal reflux disease with hiatal hernia    Glaucoma    Hyperlipidemia    Essential hypertension    Cerebral artery occlusion with cerebral infarction ([address] )    Obesity (BMI 30-39  9)    Obstructive sleep apnea    Osteoarthritis of knee    Urine incontinence    Ventricular tachycardia (HCC)    Chronic diastolic congestive heart failure ([facility])    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 ([facility])    Abnormal chest x-ray    Urine retention    Congestive heart failure with LV diastolic dysfunction, NYHA class 2 ([facility])    Dyspnea on minimal exertion    PAF (paroxysmal atrial fibrillation) ([facility])    SSS (sick sinus syndrome) ([facility])    Mild intermittent asthma without complication    Osteopenia    Obesity, morbid ([facility])    Type 2 diabetes mellitus with hyperglycemia, with long-term current use of insulin ([facility])    Closed right ankle fracture    Foul smelling urine    Altered mental status    Ambulatory dysfunction      Past Medical History:   Diagnosis Date    Arthritis     Asthma     CAD (coronary artery disease) of artery bypass graft     Cardiac disease     CHF (congestive heart failure) ([facility])     Dementia ([facility])     Depression     [facility]     Heart attack [facility]     Hyperlipidemia     Hypertension     MI [facility]myocardial infarction) [facility]     Neuropathy     BRANT (obstructive sleep apnea)     Peptic ulcer     was + for H pylori    Transient cerebral ischemia 2011    with neg CUS and ECHO      Past Surgical History:   Procedure Laterality Date    APPENDECTOMY      CATARACT EXTRACTION       SECTION      COLONOSCOPY  2004    CORONARY ANGIOPLASTY  2006    CORONARY ANGIOPLASTY WITH STENT PLACEMENT      CORONARY ARTERY BYPASS GRAFT      DENTAL SURGERY      EGD AND COLONOSCOPY      ELBOW SURGERY      resolved     ESOPHAGOGASTRODUODENOSCOPY      ORIF TIBIA & FIBULA FRACTURES Right 2022    Procedure: OPEN REDUCTION W/ INTERNAL FIXATION (ORIF) ANKLE;  Surgeon: Poonam Rajan MD;  Location: [facility];  Service: Orthopedics      Family History   Problem Relation Age of Onset    Diabetes Mother     Cancer Sister     Heart disease Sister     Thyroid disease Sister     Cancer Brother     Cancer Father     Colon cancer Family     Diabetes Family     Mitral valve prolapse Family     Thyroid cancer Family      Social History     Tobacco Use    Smoking status: Never Smoker    Smokeless tobacco: Never Used   Substance Use Topics    Alcohol use: Not Currently     Allergies   Allergen Reactions    Ace Inhibitors     Chlorpromazine     Latex     Lisinopril     Namenda [Memantine] Drowsiness    Penicillins Seizures    Propoxyphene     Stadol [Butorphanol]          Current Outpatient Medications:     acetaminophen (TYLENOL) 325 mg tablet, Take 3 tablets (975 mg total) by mouth every 8 (eight) hours, Disp: , Rfl: 0    atorvastatin (LIPITOR) 40 mg tablet, Take 1 tablet (40 mg total) by mouth daily, Disp: 90 tablet, Rfl: 3    calcium carbonate-vitamin D (OSCAL-D) 500 mg-200 units per tablet, Take 1 tablet by mouth 2 (two) times a day with meals, Disp: 60 tablet, Rfl: 0    Continuous Blood Gluc  (Dexcom G6 ) SIVAKUMAR, Use daily as directed for CGM, Disp: , Rfl:     Continuous Blood Gluc Sensor (Dexcom G6 Sensor) MISC, Use daily as directed for CGM - Change every 10 days, Disp: , Rfl:     Continuous Blood Gluc Transmit (Dexcom G6 Transmitter) MISC, Use daily as directed for CGM - Change every 3 months, Disp: , Rfl:     Eliquis 5 MG, TAKE 1 TABLET BY MOUTH TWICE A DAY, Disp: 60 tablet, Rfl: 5    escitalopram (Lexapro) 10 mg tablet, Take 1 tablet (10 mg total) by mouth daily, Disp: 30 tablet, Rfl: 0    glucose blood test strip, USE AS DIRECTED 3 TIMES A DAY, Disp: 300 each, Rfl: 1    Insulin Aspart (NovoLOG) 100 units/mL injection, Use up to 15 units daily with V-go, Disp: 30 mL, Rfl: 1    Insulin Disposable Pump (V-Go 20) KIT, Apply once a night, Disp: 30 kit, Rfl: 5    Insulin Disposable Pump (V-Go 20) KIT, Use daily Use one daily, Disp: 30 kit, Rfl: 1    risperiDONE (RisperDAL) 0 25 mg tablet, Take 1 tablet (0 25 mg total) by mouth 2 (two) times a day, Disp: 60 tablet, Rfl: 0    insulin glargine (LANTUS) 100 units/mL subcutaneous injection, Inject 25 Units under the skin daily at bedtime (Patient not taking: Reported on 9/15/2022), Disp: 10 mL, Rfl: 0    metoprolol tartrate (LOPRESSOR) 25 mg tablet, TAKE 1 TABLET (25 MG TOTAL) BY MOUTH EVERY 12 (TWELVE) HOURS, Disp: 180 tablet, Rfl: 3    potassium chloride (Klor-Con M10) 10 mEq tablet, Take 1 tablet (10 mEq total) by mouth daily, Disp: 90 tablet, Rfl: 3    senna-docusate sodium (SENOKOT S) 8 6-50 mg per tablet, Take 1 tablet by mouth daily at bedtime, Disp: , Rfl: 0    torsemide (DEMADEX) 20 mg tablet, Take 1 tablet (20 mg total) by mouth daily, Disp: 90 tablet, Rfl: 3    Review of Systems   Constitutional: Negative for activity change, appetite change, chills, diaphoresis, fatigue, fever and unexpected weight change  Eyes: Negative for visual disturbance  Respiratory: Positive for shortness of breath (Chronic)  Negative for chest tightness  Cardiovascular: Negative for chest pain, palpitations and leg swelling  Gastrointestinal: Negative for abdominal pain, constipation, diarrhea, nausea and vomiting  Endocrine: Negative for polydipsia, polyphagia and polyuria  Genitourinary: Positive for frequency (On diuretic)  Musculoskeletal: Positive for gait problem (in wheelchair)  Broken right foot     Skin: Negative for color change, pallor, rash and wound  Neurological: Negative for dizziness, tremors, weakness, light-headedness and numbness  Psychiatric/Behavioral: Positive for confusion  All other systems reviewed and are negative  Physical Exam:  There is no height or weight on file to calculate BMI  /70   Pulse 62    Wt Readings from Last 3 Encounters:   08/25/22 95 3 kg (210 lb)   08/24/22 95 3 kg (210 lb)   08/15/22 95 3 kg (210 lb)       Physical Exam  Vitals reviewed  Constitutional:       Appearance: Normal appearance  She is obese     HENT: Head: Normocephalic  Cardiovascular:      Rate and Rhythm: Normal rate and regular rhythm  Pulses: Normal pulses  Heart sounds: Murmur heard  Pulmonary:      Effort: Pulmonary effort is normal  No respiratory distress  Breath sounds: Normal breath sounds  No wheezing, rhonchi or rales  Musculoskeletal:         General: Signs of injury (left foot in boot) present  Right lower leg: No edema  Left lower leg: No edema  Skin:     General: Skin is warm and dry  Neurological:      Mental Status: She is alert  Mental status is at baseline  Gait: Gait abnormal (Wheelchair)  Psychiatric:         Cognition and Memory: Cognition is impaired  Labs:   Lab Results   Component Value Date    HGBA1C 7 3 (H) 07/30/2022    HGBA1C 8 2 (A) 01/25/2022    HGBA1C 7 8 (H) 06/07/2021     Lab Results   Component Value Date    CREATININE 1 14 08/22/2022    CREATININE 1 20 08/20/2022    CREATININE 1 37 (H) 08/19/2022    BUN 25 08/22/2022     12/17/2015    K 3 9 08/22/2022     08/22/2022    CO2 31 08/22/2022     eGFR   Date Value Ref Range Status   08/22/2022 44 ml/min/1 73sq m Final     Lab Results   Component Value Date    CHOL 144 04/21/2015    HDL 55 07/30/2022    TRIG 44 07/30/2022     Lab Results   Component Value Date    ALT 19 08/18/2022    AST 31 08/18/2022    ALKPHOS 68 08/18/2022    BILITOT 0 53 12/17/2015     Lab Results   Component Value Date    GTZ5ZTMABIHZ 5 410 (H) 08/15/2022    KHH3FPKLMNNB 5 840 (H) 06/07/2021    YMB2WFMGHOPR 5 520 (H) 02/19/2020     Lab Results   Component Value Date    FREET4 1 07 06/07/2021       Impression & Plan:    Problem List Items Addressed This Visit        Endocrine    Type 2 diabetes mellitus with hyperglycemia, with long-term current use of insulin ([address] ) - Primary     HGA1C at goal for age  No BGs to review today - reports they have been well controlled on V-Go  Treatment regimen: Continue current medication regimen     Discussed risks/complications associated with uncontrolled diabetes  Advised to adhere to diabetic diet, and recommended staying active/exercising routinely  Keep carbohydrates consistent to limit blood glucose fluctuations  Advised to call if blood sugars less than 70 mg/dl or over 300 mg/dl  Discussed symptoms and treatment of hypoglycemia  Recommended routine follow-up with podiatry and ophthalmology  Ordered blood work to complete prior to next visit  Lab Results   Component Value Date    HGBA1C 7 3 (H) 07/30/2022            Relevant Medications    Insulin Aspart (NovoLOG) 100 units/mL injection    Other Relevant Orders    Comprehensive metabolic panel    Hemoglobin A1C       Cardiovascular and Mediastinum    Essential hypertension (Chronic)     BP at goal  Continue current medication regimen  Musculoskeletal and Integument    Osteopenia     Continue vitamin d and calcium supplementation  Would recommend she repeat DEXA especially since recent fracture  This was ordered and in system at previous appointment  Other    Hyperlipidemia     Lipid panel at goal  Continue statin  Orders Placed This Encounter   Procedures    Comprehensive metabolic panel     This is a patient instruction: Patient fasting for 8 hours or longer recommended  Standing Status:   Future     Standing Expiration Date:   9/15/2023    Hemoglobin A1C     Standing Status:   Future     Standing Expiration Date:   9/15/2023       There are no Patient Instructions on file for this visit  Discussed with the patient and all questioned fully answered  She will call me if any problems arise      EMELI Buenrostro

## 2022-09-15 NOTE — ASSESSMENT & PLAN NOTE
Wt Readings from Last 3 Encounters:   08/25/22 95 3 kg (210 lb)   08/24/22 95 3 kg (210 lb)   08/15/22 95 3 kg (210 lb)     Stable  Continue torsemide and potassium

## 2022-09-15 NOTE — ASSESSMENT & PLAN NOTE
Continue vitamin d and calcium supplementation  Would recommend she repeat DEXA especially since recent fracture  This was ordered and in system at previous appointment

## 2022-09-15 NOTE — ASSESSMENT & PLAN NOTE
HGA1C at goal for age  No BGs to review today - reports they have been well controlled on V-Go  Treatment regimen: Continue current medication regimen  Discussed risks/complications associated with uncontrolled diabetes  Advised to adhere to diabetic diet, and recommended staying active/exercising routinely  Keep carbohydrates consistent to limit blood glucose fluctuations  Advised to call if blood sugars less than 70 mg/dl or over 300 mg/dl  Discussed symptoms and treatment of hypoglycemia  Recommended routine follow-up with podiatry and ophthalmology  Ordered blood work to complete prior to next visit    Lab Results   Component Value Date    HGBA1C 7 3 (H) 07/30/2022

## 2022-09-30 DIAGNOSIS — E11.65 TYPE 2 DIABETES MELLITUS WITH HYPERGLYCEMIA, WITH LONG-TERM CURRENT USE OF INSULIN (HCC): ICD-10-CM

## 2022-09-30 DIAGNOSIS — Z79.4 TYPE 2 DIABETES MELLITUS WITH HYPERGLYCEMIA, WITH LONG-TERM CURRENT USE OF INSULIN (HCC): ICD-10-CM

## 2022-09-30 RX ORDER — INSULIN ASPART 100 [IU]/ML
INJECTION, SOLUTION INTRAVENOUS; SUBCUTANEOUS
Qty: 30 ML | Refills: 1 | Status: SHIPPED | OUTPATIENT
Start: 2022-09-30 | End: 2022-09-30

## 2022-09-30 RX ORDER — INSULIN ASPART 100 [IU]/ML
INJECTION, SOLUTION INTRAVENOUS; SUBCUTANEOUS
Qty: 30 ML | Refills: 1 | Status: SHIPPED | OUTPATIENT
Start: 2022-09-30 | End: 2022-10-04 | Stop reason: SDUPTHER

## 2022-10-03 ENCOUNTER — TELEPHONE (OUTPATIENT)
Dept: FAMILY MEDICINE CLINIC | Facility: CLINIC | Age: 87
End: 2022-10-03

## 2022-10-03 ENCOUNTER — TELEPHONE (OUTPATIENT)
Dept: ENDOCRINOLOGY | Facility: CLINIC | Age: 87
End: 2022-10-03

## 2022-10-03 DIAGNOSIS — Z79.4 TYPE 2 DIABETES MELLITUS WITH HYPERGLYCEMIA, WITH LONG-TERM CURRENT USE OF INSULIN (HCC): ICD-10-CM

## 2022-10-03 DIAGNOSIS — E11.65 TYPE 2 DIABETES MELLITUS WITH HYPERGLYCEMIA, WITH LONG-TERM CURRENT USE OF INSULIN (HCC): ICD-10-CM

## 2022-10-03 NOTE — TELEPHONE ENCOUNTER
Patient's daughter, Emily Doherty ([phone]) called to report patient has been having issues with frequent urination  States patient is unaware or need to urinate and dose not realize when she has gone and that her diapers are being soaked through which causes issues when being changed  States patient was previously aware when she would go and would communicate when change was needed but now she seems to lack awareness  Emily Doherty also reports an increase in anxiety and fear when patient's daughter is not present and that patient shows distrust of other care givers  Emily Doherty requests call back with doctors recommendations

## 2022-10-04 RX ORDER — INSULIN ASPART 100 [IU]/ML
INJECTION, SOLUTION INTRAVENOUS; SUBCUTANEOUS
Qty: 30 ML | Refills: 1 | Status: SHIPPED | OUTPATIENT
Start: 2022-10-04

## 2022-10-04 NOTE — TELEPHONE ENCOUNTER
Daughter called back  Pt does not seem like having UTI or constipation  Will restart lexapro 10mg QD  Daughter needs wider bedside commode for pt  Will order

## 2022-10-10 ENCOUNTER — APPOINTMENT (EMERGENCY)
Dept: RADIOLOGY | Facility: HOSPITAL | Age: 87
End: 2022-10-10
Payer: COMMERCIAL

## 2022-10-10 ENCOUNTER — HOSPITAL ENCOUNTER (EMERGENCY)
Facility: HOSPITAL | Age: 87
Discharge: HOME | End: 2022-10-10
Attending: EMERGENCY MEDICINE
Payer: COMMERCIAL

## 2022-10-10 ENCOUNTER — APPOINTMENT (OUTPATIENT)
Dept: RADIOLOGY | Facility: HOSPITAL | Age: 87
End: 2022-10-10
Payer: COMMERCIAL

## 2022-10-10 VITALS
TEMPERATURE: 97.9 F | DIASTOLIC BLOOD PRESSURE: 77 MMHG | OXYGEN SATURATION: 97 % | RESPIRATION RATE: 16 BRPM | HEART RATE: 63 BPM | SYSTOLIC BLOOD PRESSURE: 179 MMHG

## 2022-10-10 DIAGNOSIS — R11.2 NAUSEA & VOMITING: Primary | ICD-10-CM

## 2022-10-10 LAB
2HR DELTA HS TROPONIN: 1 NG/L
ANION GAP SERPL CALCULATED.3IONS-SCNC: 4 MMOL/L (ref 4–13)
BACTERIA UR QL AUTO: NORMAL /HPF
BASOPHILS # BLD AUTO: 0.04 THOUSANDS/ΜL (ref 0–0.1)
BASOPHILS NFR BLD AUTO: 1 % (ref 0–1)
BILIRUB UR QL STRIP: NEGATIVE
BUN SERPL-MCNC: 22 MG/DL (ref 5–25)
CALCIUM SERPL-MCNC: 9.5 MG/DL (ref 8.3–10.1)
CARDIAC TROPONIN I PNL SERPL HS: 7 NG/L
CARDIAC TROPONIN I PNL SERPL HS: 8 NG/L
CHLORIDE SERPL-SCNC: 103 MMOL/L (ref 96–108)
CLARITY UR: CLEAR
CO2 SERPL-SCNC: 30 MMOL/L (ref 21–32)
COLOR UR: YELLOW
CREAT SERPL-MCNC: 0.99 MG/DL (ref 0.6–1.3)
EOSINOPHIL # BLD AUTO: 0 THOUSAND/ΜL (ref 0–0.61)
EOSINOPHIL NFR BLD AUTO: 0 % (ref 0–6)
ERYTHROCYTE [DISTWIDTH] IN BLOOD BY AUTOMATED COUNT: 13.4 % (ref 11.6–15.1)
GFR SERPL CREATININE-BSD FRML MDRD: 52 ML/MIN/1.73SQ M
GLUCOSE SERPL-MCNC: 174 MG/DL (ref 65–140)
GLUCOSE UR STRIP-MCNC: ABNORMAL MG/DL
HCT VFR BLD AUTO: 41.1 % (ref 34.8–46.1)
HGB BLD-MCNC: 13.1 G/DL (ref 11.5–15.4)
HGB UR QL STRIP.AUTO: NEGATIVE
IMM GRANULOCYTES # BLD AUTO: 0.03 THOUSAND/UL (ref 0–0.2)
IMM GRANULOCYTES NFR BLD AUTO: 0 % (ref 0–2)
KETONES UR STRIP-MCNC: ABNORMAL MG/DL
LEUKOCYTE ESTERASE UR QL STRIP: NEGATIVE
LYMPHOCYTES # BLD AUTO: 0.5 THOUSANDS/ΜL (ref 0.6–4.47)
LYMPHOCYTES NFR BLD AUTO: 6 % (ref 14–44)
MCH RBC QN AUTO: 29.4 PG (ref 26.8–34.3)
MCHC RBC AUTO-ENTMCNC: 31.9 G/DL (ref 31.4–37.4)
MCV RBC AUTO: 92 FL (ref 82–98)
MONOCYTES # BLD AUTO: 0.1 THOUSAND/ΜL (ref 0.17–1.22)
MONOCYTES NFR BLD AUTO: 1 % (ref 4–12)
NEUTROPHILS # BLD AUTO: 7.97 THOUSANDS/ΜL (ref 1.85–7.62)
NEUTS SEG NFR BLD AUTO: 92 % (ref 43–75)
NITRITE UR QL STRIP: NEGATIVE
NON-SQ EPI CELLS URNS QL MICRO: NORMAL /HPF
NRBC BLD AUTO-RTO: 0 /100 WBCS
PH UR STRIP.AUTO: 7 [PH]
PLATELET # BLD AUTO: 295 THOUSANDS/UL (ref 149–390)
PMV BLD AUTO: 10.4 FL (ref 8.9–12.7)
POTASSIUM SERPL-SCNC: 4.2 MMOL/L (ref 3.5–5.3)
PROT UR STRIP-MCNC: ABNORMAL MG/DL
RBC # BLD AUTO: 4.46 MILLION/UL (ref 3.81–5.12)
RBC #/AREA URNS AUTO: NORMAL /HPF
SODIUM SERPL-SCNC: 137 MMOL/L (ref 135–147)
SP GR UR STRIP.AUTO: 1.02 (ref 1–1.03)
UROBILINOGEN UR STRIP-ACNC: <2 MG/DL
WBC # BLD AUTO: 8.64 THOUSAND/UL (ref 4.31–10.16)
WBC #/AREA URNS AUTO: NORMAL /HPF

## 2022-10-10 PROCEDURE — 99285 EMERGENCY DEPT VISIT HI MDM: CPT | Performed by: EMERGENCY MEDICINE

## 2022-10-10 PROCEDURE — 81001 URINALYSIS AUTO W/SCOPE: CPT | Performed by: STUDENT IN AN ORGANIZED HEALTH CARE EDUCATION/TRAINING PROGRAM

## 2022-10-10 PROCEDURE — 99284 EMERGENCY DEPT VISIT MOD MDM: CPT

## 2022-10-10 PROCEDURE — 96374 THER/PROPH/DIAG INJ IV PUSH: CPT

## 2022-10-10 PROCEDURE — 71045 X-RAY EXAM CHEST 1 VIEW: CPT

## 2022-10-10 PROCEDURE — 85025 COMPLETE CBC W/AUTO DIFF WBC: CPT | Performed by: STUDENT IN AN ORGANIZED HEALTH CARE EDUCATION/TRAINING PROGRAM

## 2022-10-10 PROCEDURE — 74177 CT ABD & PELVIS W/CONTRAST: CPT

## 2022-10-10 PROCEDURE — 36415 COLL VENOUS BLD VENIPUNCTURE: CPT | Performed by: STUDENT IN AN ORGANIZED HEALTH CARE EDUCATION/TRAINING PROGRAM

## 2022-10-10 PROCEDURE — 93005 ELECTROCARDIOGRAM TRACING: CPT

## 2022-10-10 PROCEDURE — 80048 BASIC METABOLIC PNL TOTAL CA: CPT | Performed by: STUDENT IN AN ORGANIZED HEALTH CARE EDUCATION/TRAINING PROGRAM

## 2022-10-10 PROCEDURE — 84484 ASSAY OF TROPONIN QUANT: CPT | Performed by: STUDENT IN AN ORGANIZED HEALTH CARE EDUCATION/TRAINING PROGRAM

## 2022-10-10 PROCEDURE — G1004 CDSM NDSC: HCPCS

## 2022-10-10 RX ORDER — SODIUM CHLORIDE 9 MG/ML
3 INJECTION INTRAVENOUS
Status: DISCONTINUED | OUTPATIENT
Start: 2022-10-10 | End: 2022-10-11 | Stop reason: HOSPADM

## 2022-10-10 RX ORDER — ONDANSETRON 2 MG/ML
4 INJECTION INTRAMUSCULAR; INTRAVENOUS ONCE
Status: COMPLETED | OUTPATIENT
Start: 2022-10-10 | End: 2022-10-10

## 2022-10-10 RX ORDER — ONDANSETRON 4 MG/1
4 TABLET, ORALLY DISINTEGRATING ORAL EVERY 6 HOURS PRN
Qty: 20 TABLET | Refills: 0 | Status: SHIPPED | OUTPATIENT
Start: 2022-10-10

## 2022-10-10 RX ADMIN — IOHEXOL 100 ML: 300 INJECTION, SOLUTION INTRAVENOUS at 18:56

## 2022-10-10 RX ADMIN — ONDANSETRON 4 MG: 2 INJECTION INTRAMUSCULAR; INTRAVENOUS at 17:24

## 2022-10-10 NOTE — ED ATTENDING ATTESTATION
10/10/2022  I, Louise Yuen DO, saw and evaluated the patient  I have discussed the patient with the resident/non-physician practitioner and agree with the resident's/non-physician practitioner's findings, Plan of Care, and MDM as documented in the resident's/non-physician practitioner's note, except where noted  All available labs and Radiology studies were reviewed  I was present for key portions of any procedure(s) performed by the resident/non-physician practitioner and I was immediately available to provide assistance  At this point I agree with the current assessment done in the Emergency Department  I have conducted an independent evaluation of this patient a history and physical is as follows:    82 yof with vomiting  3 times since lunch approx 3 hours ago  Demented so cannot provide hx  Daughter provides some hx  No abd pain  Was fine prior to lunch  On exam, looks well  Soft abd  Clear lungs  Normal vitals  Plan labs, ct for sbo, rule out metabolic derangement  Suspect viral or food related      ED Course         Critical Care Time  Procedures

## 2022-10-10 NOTE — ED NOTES
Patient transported to [address] [place] [address]  10/10/22 [facility]

## 2022-10-11 LAB
ATRIAL RATE: 61 BPM
ATRIAL RATE: 62 BPM
P AXIS: 200 DEGREES
P AXIS: 82 DEGREES
PR INTERVAL: 256 MS
PR INTERVAL: 280 MS
QRS AXIS: 249 DEGREES
QRS AXIS: 255 DEGREES
QRSD INTERVAL: 158 MS
QRSD INTERVAL: 164 MS
QT INTERVAL: 460 MS
QT INTERVAL: 470 MS
QTC INTERVAL: 463 MS
QTC INTERVAL: 477 MS
T WAVE AXIS: -26 DEGREES
T WAVE AXIS: 28 DEGREES
VENTRICULAR RATE: 61 BPM
VENTRICULAR RATE: 62 BPM

## 2022-10-11 PROCEDURE — 93010 ELECTROCARDIOGRAM REPORT: CPT | Performed by: INTERNAL MEDICINE

## 2022-10-11 NOTE — DISCHARGE INSTRUCTIONS
You have been evaluated in the Emergency Department today for your nausea and vomiting  Your evaluation suggests that your symptoms are most likely to do a viral infection which will improve on its own with rest and fluids  Please follow up with your primary care physician within two days  Remember to drink plenty of fluids at home      Return to the Emergency Department if you experience worsening or uncontrolled pain, inability to tolerate fluids by mouth, difficulty breathing, fevers 100 4°F or greater, recurrent vomiting, or any other concerning symptoms

## 2022-10-11 NOTE — ED PROVIDER NOTES
History  Chief Complaint   Patient presents with   • Vomiting     Pt has vomited 2-3x since lunch time  Per pt daughter, pt feels "generalized feeling of unwellness " Is unable to eat or drink without vomiting  Patient is an 61-year-old female, past medical history of arthritis, asthma, coronary artery disease, CHF, dementia, depression, diabetes, hyperlipidemia, hypertension, and BRANT, who presents to the emergency department after 3 episodes of vomiting  Per patient's daughter, who is at bedside, patient was acting fine earlier today  Several hours ago patient had 1 episode of vomiting while leg down in bed (on her side)  She then had 2 more episodes following this  Episodes have been nonbloody and nonbilious  Daughter states that patient has been unable to keep anything down (including her hypertensive medications)  There are no modifying factors  Associated symptoms include a runny nose  Daughter states that patient has been behaving normally and is currently at her baseline  Currently, patient has no complaints  Further history limited secondary to dementia  Prior to Admission Medications   Prescriptions Last Dose Informant Patient Reported? Taking?    Continuous Blood Gluc  (Dexcom G6 ) SIVAKUMAR  Child Yes No   Sig: Use daily as directed for CGM   Continuous Blood Gluc Sensor (Dexcom G6 Sensor) MISC  Child Yes No   Sig: Use daily as directed for CGM - Change every 10 days   Continuous Blood Gluc Transmit (Dexcom G6 Transmitter) MISC  Child Yes No   Sig: Use daily as directed for CGM - Change every 3 months   Eliquis 5 MG  Child No No   Sig: TAKE 1 TABLET BY MOUTH TWICE A DAY   Insulin Disposable Pump (V-Go 20) KIT  Child No No   Sig: Apply once a night   Insulin Disposable Pump (V-Go 20) KIT  Child No No   Sig: Use daily Use one daily   NovoLOG 100 UNIT/ML injection   No No   Sig: INJECT UP TO 15 UNITS DAILY WITH V-GO   acetaminophen (TYLENOL) 325 mg tablet   No No   Sig: Take 3 tablets (975 mg total) by mouth every 8 (eight) hours   atorvastatin (LIPITOR) 40 mg tablet  Child No No   Sig: Take 1 tablet (40 mg total) by mouth daily   calcium carbonate-vitamin D (OSCAL-D) 500 mg-200 units per tablet  Child No No   Sig: Take 1 tablet by mouth 2 (two) times a day with meals   escitalopram (Lexapro) 10 mg tablet   No No   Sig: Take 1 tablet (10 mg total) by mouth daily   glucose blood test strip  Child No No   Sig: USE AS DIRECTED 3 TIMES A DAY   insulin glargine (LANTUS) 100 units/mL subcutaneous injection   No No   Sig: Inject 25 Units under the skin daily at bedtime   Patient not taking: Reported on 9/15/2022   metoprolol tartrate (LOPRESSOR) 25 mg tablet  Child No No   Sig: TAKE 1 TABLET (25 MG TOTAL) BY MOUTH EVERY 12 (TWELVE) HOURS   potassium chloride (Klor-Con M10) 10 mEq tablet  Child No No   Sig: Take 1 tablet (10 mEq total) by mouth daily   risperiDONE (RisperDAL) 0 25 mg tablet   No No   Sig: Take 1 tablet (0 25 mg total) by mouth 2 (two) times a day   senna-docusate sodium (SENOKOT S) 8 6-50 mg per tablet   No No   Sig: Take 1 tablet by mouth daily at bedtime   torsemide (DEMADEX) 20 mg tablet  Child No No   Sig: Take 1 tablet (20 mg total) by mouth daily      Facility-Administered Medications: None       Past Medical History:   Diagnosis Date   • Arthritis    • Asthma    • CAD (coronary artery disease) of artery bypass graft    • Cardiac disease    • CHF (congestive heart failure) ([facility])    • Dementia ([facility])    • Depression    • Diabetes mellitus ([facility] )    • Heart attack ([facility] [address] )    • Hyperlipidemia    • Hypertension    • [facility]    • Neuropathy    • BRANT (obstructive sleep apnea)    • Peptic ulcer     was + for H pylori   • Transient cerebral ischemia 2011    with neg CUS and ECHO       Past Surgical History:   Procedure Laterality Date   • APPENDECTOMY     • CATARACT EXTRACTION     •  SECTION     • COLONOSCOPY  2004   • CORONARY ANGIOPLASTY 12/2006   • CORONARY ANGIOPLASTY WITH STENT PLACEMENT  2006   • CORONARY ARTERY BYPASS GRAFT  1999   • DENTAL SURGERY     • EGD AND COLONOSCOPY     • ELBOW SURGERY      resolved 2000   • ESOPHAGOGASTRODUODENOSCOPY  2004   • ORIF TIBIA & FIBULA FRACTURES Right 8/16/2022    Procedure: OPEN REDUCTION W/ INTERNAL FIXATION (ORIF) ANKLE;  Surgeon: Tadeo Chavez MD;  Location: [facility];  Service: Orthopedics       Family History   Problem Relation Age of Onset   • Diabetes Mother    • Cancer Sister    • Heart disease Sister    • Thyroid disease Sister    • Cancer Brother    • Cancer Father    • Colon cancer Family    • Diabetes Family    • Mitral valve prolapse Family    • Thyroid cancer Family      I have reviewed and agree with the history as documented  E-Cigarette/Vaping   • E-Cigarette Use Never User      E-Cigarette/Vaping Substances   • Nicotine No    • THC No    • CBD No    • Flavoring No      Social History     Tobacco Use   • Smoking status: Never Smoker   • Smokeless tobacco: Never Used   Vaping Use   • Vaping Use: Never used   Substance Use Topics   • Alcohol use: Not Currently   • Drug use: Never        Review of Systems   Unable to perform ROS: Dementia       Physical Exam  ED Triage Vitals [10/10/22 1647]   Temperature Pulse Respirations Blood Pressure SpO2   97 9 °F (36 6 °C) 61 16 (!) 235/102 99 %      Temp Source Heart Rate Source Patient Position - Orthostatic VS BP Location FiO2 (%)   Oral Monitor Lying Right arm --      Pain Score       --             Orthostatic Vital Signs  Vitals:    10/10/22 1647 10/10/22 1700 10/10/22 1800 10/10/22 2035   BP: (!) 235/102 (!) 192/84 (!) 177/71 (!) 179/77   Pulse: 61 60 58 63   Patient Position - Orthostatic VS: Lying Lying Lying Lying       Physical Exam  Vitals and nursing note reviewed  Constitutional:       General: She is not in acute distress  Appearance: She is well-developed  She is not diaphoretic     HENT:      Head: Normocephalic and atraumatic  Right Ear: External ear normal       Left Ear: External ear normal       Nose: Nose normal    Eyes:      General: Lids are normal  No scleral icterus  Cardiovascular:      Rate and Rhythm: Normal rate and regular rhythm  Heart sounds: Normal heart sounds  No murmur heard  No friction rub  No gallop  Pulmonary:      Effort: Pulmonary effort is normal  No respiratory distress  Breath sounds: Normal breath sounds  No wheezing or rales  Abdominal:      Palpations: Abdomen is soft  Tenderness: There is no abdominal tenderness  There is no guarding or rebound  Musculoskeletal:         General: No deformity  Normal range of motion  Cervical back: Normal range of motion and neck supple  Skin:     General: Skin is warm and dry  Neurological:      General: No focal deficit present  Mental Status: She is alert     Psychiatric:         Mood and Affect: Mood normal          Behavior: Behavior normal          ED Medications  Medications   ondansetron (ZOFRAN) injection 4 mg (4 mg Intravenous Given 10/10/22 1724)   iohexol (OMNIPAQUE) 300 mg/mL injection 100 mL (100 mL Intravenous Given 10/10/22 1856)       Diagnostic Studies  Results Reviewed     Procedure Component Value Units Date/Time    Urine Microscopic [[id number]]  (Normal) Collected: 10/10/22 2033    Lab Status: Final result Specimen: Urine, Clean Catch Updated: 10/10/22 2140     RBC, UA 1-2 /hpf      WBC, UA 1-2 /hpf      Epithelial Cells Occasional /hpf      Bacteria, UA None Seen /hpf     UA w Reflex to Microscopic w Reflex to Culture [[id number]]  (Abnormal) Collected: 10/10/22 2033    Lab Status: Final result Specimen: Urine, Clean Catch Updated: 10/10/22 2139     Color, UA Yellow     Clarity, UA Clear     Specific [place], UA 1 020     pH, UA 7 0     Leukocytes, UA Negative     Nitrite, UA Negative     Protein,  (2+) mg/dl      Glucose, UA 30 (3/100%) mg/dl      Ketones, UA 10 (1+) mg/dl      Urobilinogen, UA <2 0 mg/dl      Bilirubin, UA Negative     Occult Blood, UA Negative    HS Troponin I 2hr [[id number]]  (Normal) Collected: 10/10/22 1939    Lab Status: Final result Specimen: Blood from Arm, Right Updated: 10/10/22 2028     hs TnI 2hr 8 ng/L      Delta 2hr hsTnI 1 ng/L     CBC and differential [[id number]]  (Abnormal) Collected: 10/10/22 1720    Lab Status: Final result Specimen: Blood from Arm, Right Updated: 10/10/22 1828     WBC 8 64 Thousand/uL      RBC 4 46 Million/uL      Hemoglobin 13 1 g/dL      Hematocrit 41 1 %      MCV 92 fL      MCH 29 4 pg      MCHC 31 9 g/dL      RDW 13 4 %      MPV 10 4 fL      Platelets 620 Thousands/uL      nRBC 0 /100 WBCs      Neutrophils Relative 92 %      Immat GRANS % 0 %      Lymphocytes Relative 6 %      Monocytes Relative 1 %      Eosinophils Relative 0 %      Basophils Relative 1 %      Neutrophils Absolute 7 97 Thousands/µL      Immature Grans Absolute 0 03 Thousand/uL      Lymphocytes Absolute 0 50 Thousands/µL      Monocytes Absolute 0 10 Thousand/µL      Eosinophils Absolute 0 00 Thousand/µL      Basophils Absolute 0 04 Thousands/µL     Narrative: This is an appended report  These results have been appended to a previously verified report      HS Troponin 0hr (reflex protocol) [[id number]]  (Normal) Collected: 10/10/22 1720    Lab Status: Final result Specimen: Blood from Arm, Right Updated: 10/10/22 1804     hs TnI 0hr 7 ng/L     Basic metabolic panel [[id number]]  (Abnormal) Collected: 10/10/22 1720    Lab Status: Final result Specimen: Blood from Arm, Right Updated: 10/10/22 1753     Sodium 137 mmol/L      Potassium 4 2 mmol/L      Chloride 103 mmol/L      CO2 30 mmol/L      ANION GAP 4 mmol/L      BUN 22 mg/dL      Creatinine 0 99 mg/dL      Glucose 174 mg/dL      Calcium 9 5 mg/dL      eGFR 52 ml/min/1 73sq m     Narrative:      [place] guidelines for Chronic Kidney Disease (CKD):   •  Stage 1 with normal or high GFR (GFR > 90 mL/min/1 73 square meters)  •  Stage 2 Mild CKD (GFR = 60-89 mL/min/1 73 square meters)  •  Stage 3A Moderate CKD (GFR = 45-59 mL/min/1 73 square meters)  •  Stage 3B Moderate CKD (GFR = 30-44 mL/min/1 73 square meters)  •  Stage 4 Severe CKD (GFR = 15-29 mL/min/1 73 square meters)  •  Stage 5 End Stage CKD (GFR <15 mL/min/1 73 square meters)  Note: GFR calculation is accurate only with a steady state creatinine                 CT abdomen pelvis with contrast   Final Result by Kerri Villalobos DO (10/10 2038)   No CT explanation for patient's symptoms  Workstation performed: [id number]         X-ray chest 1 view portable    (Results Pending)         Procedures  ECG 12 Lead Documentation Only    Date/Time: 10/11/2022 12:37 AM  Performed by: Dread Galarza DO  Authorized by: Dread Galarza DO     ECG reviewed by me, the ED Provider: yes    Patient location:  ED  Interpretation:     Interpretation: normal    Rate:     ECG rate:  61    ECG rate assessment: normal    Rhythm:     Rhythm: sinus rhythm    Ectopy:     Ectopy: none    QRS:     QRS axis:  Right    QRS intervals: Wide  Conduction:     Conduction: abnormal      Abnormal conduction: complete RBBB    ST segments:     ST segments:  Normal  T waves:     T waves: normal            ED Course  ED Course as of 10/11/22 0043   Mon Oct 10, 2022   1738 CBC and differential   1802 Sodium: 137   1803 Potassium: 4 2   1803 Creatinine: 0 99   1804 hs TnI 0hr: 7   1831 Immat GRANS %: 0   2031 Delta 2hr hsTnI: 1   2041 CT abdomen pelvis with contrast  No CT explanation for patient's symptoms  2139 Leukocytes, UA: Negative   2139 Nitrite, UA: Negative   2145 Patient re-evaluated  Resting comfortably  Blood pressure somewhat improved  Tolerating p o  Without difficulty  Will discharge  Recommended to patient's daughter patient follow-up with her family doctor as soon as possible  Return precautions discussed    Patient's daughter verbalized understanding and agreed to plan of care  Identification of Seniors at [address] [place] [address] Most Recent Value   (ISAR) Identification of Seniors at Risk    Before the illness or injury that brought you to the Emergency, did you need someone to help you on a regular basis? 1 Filed at: 10/10/2022 1650   In the last 24 hours, have you needed more help than usual? 0 Filed at: 10/10/2022 1650   Have you been hospitalized for one or more nights during the past 6 months? 1 Filed at: 10/10/2022 1650   In general, do you see well? 0 Filed at: 10/10/2022 1650   In general, do you have serious problems with your memory? 1 Filed at: 10/10/2022 1650   Do you take more than three different medications every day? 1 Filed at: 10/10/2022 1650   ISAR Score 4 Filed at: 10/10/2022 1650                    SBIRT 20yo+    Flowsheet Row Most Recent Value   SBIRT (25 yo +)    In order to provide better care to our patients, we are screening all of our patients for alcohol and drug use  Would it be okay to ask you these screening questions? Yes Filed at: 10/10/2022 1654   Initial Alcohol Screen: US AUDIT-C     1  How often do you have a drink containing alcohol? 0 Filed at: 10/10/2022 1654   2  How many drinks containing alcohol do you have on a typical day you are drinking? 0 Filed at: 10/10/2022 1654   3b  FEMALE Any Age, or MALE 65+: How often do you have 4 or more drinks on one occassion? 0 Filed at: 10/10/2022 1654   Audit-C Score 0 Filed at: 10/10/2022 1654   NAPOLEON: How many times in the past year have you    Used an illegal drug or used a prescription medication for non-medical reasons? Never Filed at: 10/10/2022 1654                MDM  Number of Diagnoses or Management Options  Nausea & vomiting  Diagnosis management comments: Patient is a 82 y o  female who presents to the ED for vomiting  Patient nontoxic, well appearing  Patient hypertensive, but otherwise vitals stable  Physical exam unremarkable    Abdominal exam without peritoneal signs  No evidence of acute abdomen at this time  Unclear etiology of vomiting  Differential includes viral infection (enteritis), ACS, obstruction  Low suspicion for acute hepatobiliary disease (includng acute cholecystitis), acute pancreatitis, acute infectious processes (pneumonia, hepatitis, pyelonephritis)  Presentation not consistent with other acute, emergent causes of abdominal pain at this time  Plan: labs, UA, CT AP, zofran, PO challenge, serial reassessment                 Portions of the record may have been created with voice recognition software  Occasional wrong word or "sound a like" substitutions may have occurred due to the inherent limitations of voice recognition software  Read the chart carefully and recognize, using context, where substitutions have occurred  Amount and/or Complexity of Data Reviewed  Clinical lab tests: ordered  Tests in the radiology section of CPT®: ordered  Tests in the medicine section of CPT®: ordered  Independent visualization of images, tracings, or specimens: yes    Risk of Complications, Morbidity, and/or Mortality  Presenting problems: moderate  Diagnostic procedures: moderate  Management options: low    Patient Progress  Patient progress: stable      Disposition  Final diagnoses:   Nausea & vomiting     Time reflects when diagnosis was documented in both MDM as applicable and the Disposition within this note     Time User Action Codes Description Comment    10/10/2022  9:58 PM Daniela Levine Add [R11 2] Nausea & vomiting       ED Disposition     ED Disposition   Discharge    Condition   Stable    Date/Time   Mon Oct 10, 2022  9:39 PM    [address] discharge to home/self care                 Follow-up Information     Follow up With Specialties Details Why Contact Info Additional Kd Yeboah MD Family Medicine   [address] [facility]  [phone]       [facility] [address] Emergency Department Emergency Medicine  As needed [address] [phone]  [address] [facility] [address] Emergency Department, [address], [place], [place], [address]          Discharge Medication List as of 10/10/2022  9:59 PM      CONTINUE these medications which have NOT CHANGED    Details   escitalopram (Lexapro) 10 mg tablet Take 1 tablet (10 mg total) by mouth daily, Starting Tue 10/4/2022, Normal      acetaminophen (TYLENOL) 325 mg tablet Take 3 tablets (975 mg total) by mouth every 8 (eight) hours, Starting Mon 8/22/2022, No Print      atorvastatin (LIPITOR) 40 mg tablet Take 1 tablet (40 mg total) by mouth daily, Starting Mon 1/3/2022, Normal      calcium carbonate-vitamin D (OSCAL-D) 500 mg-200 units per tablet Take 1 tablet by mouth 2 (two) times a day with meals, Starting Sat 4/28/2018, No Print      Continuous Blood Gluc  (Dexcom G6 ) SIVAKUMAR Use daily as directed for CGM, Historical Med      Continuous Blood Gluc Sensor (Dexcom G6 Sensor) MISC Use daily as directed for CGM - Change every 10 days, Historical Med      Continuous Blood Gluc Transmit (Dexcom G6 Transmitter) MISC Use daily as directed for CGM - Change every 3 months, Historical Med      Eliquis 5 MG TAKE 1 TABLET BY MOUTH TWICE A DAY, Normal      glucose blood test strip USE AS DIRECTED 3 TIMES A DAY, Normal      !! Insulin Disposable Pump (V-Go 20) KIT Apply once a night, Normal      !!  Insulin Disposable Pump (V-Go 20) KIT Use daily Use one daily, Starting Fri 7/29/2022, Normal      insulin glargine (LANTUS) 100 units/mL subcutaneous injection Inject 25 Units under the skin daily at bedtime, Starting Mon 8/22/2022, No Print      metoprolol tartrate (LOPRESSOR) 25 mg tablet TAKE 1 TABLET (25 MG TOTAL) BY MOUTH EVERY 12 (TWELVE) HOURS, Starting Tue 6/14/2022, Normal      NovoLOG 100 UNIT/ML injection INJECT UP TO 15 UNITS DAILY WITH V-GO, Normal      potassium chloride (Klor-Con M10) 10 mEq tablet Take 1 tablet (10 mEq total) by mouth daily, Starting Mon 8/1/2022, Normal      risperiDONE (RisperDAL) 0 25 mg tablet Take 1 tablet (0 25 mg total) by mouth 2 (two) times a day, Starting Tue 9/6/2022, Normal      senna-docusate sodium (SENOKOT S) 8 6-50 mg per tablet Take 1 tablet by mouth daily at bedtime, Starting Mon 8/22/2022, No Print      torsemide (DEMADEX) 20 mg tablet Take 1 tablet (20 mg total) by mouth daily, Starting Mon 8/1/2022, Normal       !! - Potential duplicate medications found  Please discuss with provider  No discharge procedures on file  PDMP Review     None           ED Provider  Attending physically available and evaluated Chema Hamilton I managed the patient along with the ED Attending      Electronically Signed by         Catherine Morgan DO  10/11/22 1009

## 2022-10-17 ENCOUNTER — OFFICE VISIT (OUTPATIENT)
Dept: CARDIOLOGY CLINIC | Facility: CLINIC | Age: 87
End: 2022-10-17
Payer: COMMERCIAL

## 2022-10-17 VITALS — HEART RATE: 60 BPM | OXYGEN SATURATION: 95 % | DIASTOLIC BLOOD PRESSURE: 78 MMHG | SYSTOLIC BLOOD PRESSURE: 136 MMHG

## 2022-10-17 DIAGNOSIS — I48.0 PAF (PAROXYSMAL ATRIAL FIBRILLATION) (HCC): Primary | ICD-10-CM

## 2022-10-17 DIAGNOSIS — E78.5 HYPERLIPIDEMIA, UNSPECIFIED HYPERLIPIDEMIA TYPE: ICD-10-CM

## 2022-10-17 DIAGNOSIS — Z95.0 PRESENCE OF CARDIAC PACEMAKER: ICD-10-CM

## 2022-10-17 DIAGNOSIS — N18.30 STAGE 3 CHRONIC KIDNEY DISEASE, UNSPECIFIED WHETHER STAGE 3A OR 3B CKD (HCC): ICD-10-CM

## 2022-10-17 DIAGNOSIS — I50.30 CONGESTIVE HEART FAILURE WITH LV DIASTOLIC DYSFUNCTION, NYHA CLASS 2 (HCC): ICD-10-CM

## 2022-10-17 DIAGNOSIS — I25.10 3-VESSEL CORONARY ARTERY DISEASE: ICD-10-CM

## 2022-10-17 DIAGNOSIS — Z95.0 CARDIAC PACEMAKER IN SITU: ICD-10-CM

## 2022-10-17 DIAGNOSIS — R60.9 EDEMA, UNSPECIFIED TYPE: ICD-10-CM

## 2022-10-17 DIAGNOSIS — E78.2 MIXED HYPERLIPIDEMIA: ICD-10-CM

## 2022-10-17 DIAGNOSIS — E66.01 OBESITY, MORBID (HCC): ICD-10-CM

## 2022-10-17 DIAGNOSIS — I10 ESSENTIAL HYPERTENSION: ICD-10-CM

## 2022-10-17 PROCEDURE — 99214 OFFICE O/P EST MOD 30 MIN: CPT | Performed by: INTERNAL MEDICINE

## 2022-10-17 NOTE — PROGRESS NOTES
Follow-up - Cardiology   Juan Carlos Hall 82 y o  female MRN: [id number]        Problems    Problem List Items Addressed This Visit        Cardiovascular and Mediastinum    Essential hypertension (Chronic)    3-vessel coronary artery disease    Congestive heart failure with LV diastolic dysfunction, NYHA class 2 (HCC)    PAF (paroxysmal atrial fibrillation) ([facility] [address] ) - Primary       Other    Hyperlipidemia      Other Visit Diagnoses     Presence of cardiac pacemaker        Edema, unspecified type        Cardiac pacemaker in situ                Miguel plan and discussion  Patient seen October 17, 2022  Patient is following issues  Diabetes with A1c generally in the 7 range new   Bypass surgery 1999   Coronary stent 2006   Catheterization 2008 without intervention  No angina  Note congestive heart failure  Adequate left ventricular function  Pacemaker 2004  Treated for paroxysmal atrial fibrillation  On Eliquis   No aspirin  Chads Vasc score of 4  Tricuspid regurgitation has been as much is moderate to severe most recent echocardiogram in August of 2022 shows mild mitral tricuspid regurgitation  She is edema free without torsemide  Creatinine in the range of 1 with a GFR range of 50  No change in medication  HPI: Juan Carlos Hall is a 82y o  year old female   HPI: Juan Carlos Hall is a 82y o  year old female    HPI: Janeth Crawford is a 82 y  o  year old female HPI: Janeth Crawford is U 75 y  o  year old female    HPI: Janeth Crawford is I 73 y o  year old female HPI: Janeth Crawford is L 8 y o  year old female    Patient seen December 6, 2017  Her diagnosis includes bypass surgery 1999 stent in 2006, catheterization 2008, hyperlipidemia, pacemaker 2014, dementia, preserved left ventricular function, and diabetic neuropathy  She had short burst of ventricular tachycardia pacer interrogation   Metoprolol to be increased to 37 5 in the morning and 25 in the p m         Patient seen 2018  All scripts HPI noted above  First epic visit  Issues are as follows  Bypass surgery   Stent in 0 6  Catheterization 0 8 without intervention  Hyperlipidemia  Pacemaker   Diabetes  Left ventricular function well preserved  Diabetes  Past history ventricular tachycardia  Recent admission with edema and she was given a diagnosis of congestive heart failure   Appears to me when I review the record is primarily pulmonary issue with lot of bronchospasm   She is treated both diuretics and steroids as well as bronchodilators  Right bundle branch block pattern with blood relatively wide QRS  Echocardiogram shows the following-increased right ventricle-+3 tricuspid regurgitation-large right atrium-dilated inferior vena cava  Her NT was Sunny Yusuf  He is presently on 60 of Lasix which was an increase done by hand visiting nurse  We will check her labs carefully   She is edema free at this time and her lungs are clear        Patient seen 2018  Diagnosis above  LDL is 44  A1c is 8 3  She has no edema   She has no bronchospasm   From cardiac standpoint she is asymptomatic  No change in medication         Patient seen 2019  Diagnosis above  The issues are as follows  Her  has  in the past several months  She has short episode of atrial fibrillation pacemaker interrogation   This has to be followed  Kait Kasper is not on anticoagulation  Her edema is well controlled  Will check her hemoglobin A1c         Patient seen May 27, 2019  Refer to my plan above   Eliquis will be started because of pacemaker interrogation showing significant amount of atrial fibrillation and she has deteriorated with regard to increasing edema   She may need to be admitted on the next visit if my plan does not work        Patient seen 2019   New  Refer to plan above  Norvasc decreased to 5 daily  Family will keep track of her weight   This is her dry weight   Dry weight therefore the office is 225 lb   She had been up 20-25 lb when I last saw her  Sophia Kingston has diuresed in the      Plan patient seen March 11, 2020  Blood pressure is on the low side   On discontinue Isordil and the amlodipine  Bypass surgery 1999  Stent 2006  Catheterization 2008 without intervention  Pacemaker 2004  Paroxysmal atrial fibrillation on pacer interrogation  On Eliquis  Also on aspirin because of stent   She is not following   If she has any following issues may discontinue the aspirin as well  She has left bundle  She has diabetes last A1c 6 4  She tricuspid regurgitation fairly severe but she is edema free present medication  His other beta-blocker for ventricular tachycardia noted in the past   Hyperlipidemia is well controlled LDL 62  Fortunately her creatinine is only 1 point 1 4 with A1c is 68 4  She is on 20 of torsemide daily  The changes in medication are as noted above  Consideration the past possibly stopping the aspirin   However, she has stents that she will she has old bypasses and severe coronary disease        Review of Systems   Constitutional: Negative  Respiratory: Negative  Cardiovascular: Negative            Past Medical History:   Diagnosis Date   • Arthritis    • Asthma    • CAD (coronary artery disease) of artery bypass graft    • Cardiac disease    • CHF (congestive heart failure) ([facility])    • Dementia ([facility])    • Depression    • Diabetes mellitus ([facility] [address] )    • Heart attack ([facility] [address] )    • Hyperlipidemia    • Hypertension    • MI (myocardial infarction) ([facility] [address] )    • Neuropathy    • BRANT (obstructive sleep apnea)    • Peptic ulcer     was + for H pylori   • Transient cerebral ischemia 11/2011    with neg CUS and ECHO     Social History     Substance and Sexual Activity   Alcohol Use Not Currently     Social History     Substance and Sexual Activity   Drug Use Never     Social History     Tobacco Use   Smoking Status Never Smoker   Smokeless Tobacco Never Used Allergies:   Allergies   Allergen Reactions   • Ace Inhibitors    • Chlorpromazine    • Latex    • Lisinopril    • Namenda [Memantine] Drowsiness   • Penicillins Seizures   • Propoxyphene    • Stadol [Butorphanol]        Medications:     Current Outpatient Medications:   •  acetaminophen (TYLENOL) 325 mg tablet, Take 3 tablets (975 mg total) by mouth every 8 (eight) hours, Disp: , Rfl: 0  •  atorvastatin (LIPITOR) 40 mg tablet, Take 1 tablet (40 mg total) by mouth daily, Disp: 90 tablet, Rfl: 3  •  calcium carbonate-vitamin D (OSCAL-D) 500 mg-200 units per tablet, Take 1 tablet by mouth 2 (two) times a day with meals, Disp: 60 tablet, Rfl: 0  •  Continuous Blood Gluc  (Dexcom G6 ) SIVAKUMAR, Use daily as directed for CGM, Disp: , Rfl:   •  Continuous Blood Gluc Sensor (Dexcom G6 Sensor) MISC, Use daily as directed for CGM - Change every 10 days, Disp: , Rfl:   •  Continuous Blood Gluc Transmit (Dexcom G6 Transmitter) MISC, Use daily as directed for CGM - Change every 3 months, Disp: , Rfl:   •  Eliquis 5 MG, TAKE 1 TABLET BY MOUTH TWICE A DAY, Disp: 60 tablet, Rfl: 5  •  escitalopram (Lexapro) 10 mg tablet, Take 1 tablet (10 mg total) by mouth daily, Disp: 30 tablet, Rfl: 5  •  glucose blood test strip, USE AS DIRECTED 3 TIMES A DAY, Disp: 300 each, Rfl: 1  •  Insulin Disposable Pump (V-Go 20) KIT, Apply once a night, Disp: 30 kit, Rfl: 5  •  Insulin Disposable Pump (V-Go 20) KIT, Use daily Use one daily, Disp: 30 kit, Rfl: 1  •  metoprolol tartrate (LOPRESSOR) 25 mg tablet, TAKE 1 TABLET (25 MG TOTAL) BY MOUTH EVERY 12 (TWELVE) HOURS, Disp: 180 tablet, Rfl: 3  •  NovoLOG 100 UNIT/ML injection, INJECT UP TO 15 UNITS DAILY WITH V-GO, Disp: 30 mL, Rfl: 1  •  ondansetron (Zofran ODT) 4 mg disintegrating tablet, Take 1 tablet (4 mg total) by mouth every 6 (six) hours as needed for nausea or vomiting, Disp: 20 tablet, Rfl: 0  •  potassium chloride (Klor-Con M10) 10 mEq tablet, Take 1 tablet (10 mEq total) by mouth daily, Disp: 90 tablet, Rfl: 3  •  senna-docusate sodium (SENOKOT S) 8 6-50 mg per tablet, Take 1 tablet by mouth daily at bedtime, Disp: , Rfl: 0  •  torsemide (DEMADEX) 20 mg tablet, Take 1 tablet (20 mg total) by mouth daily, Disp: 90 tablet, Rfl: 3  •  insulin glargine (LANTUS) 100 units/mL subcutaneous injection, Inject 25 Units under the skin daily at bedtime (Patient not taking: No sig reported), Disp: 10 mL, Rfl: 0  •  risperiDONE (RisperDAL) 0 25 mg tablet, Take 1 tablet (0 25 mg total) by mouth 2 (two) times a day (Patient not taking: Reported on 10/17/2022), Disp: 60 tablet, Rfl: 0      Physical Exam  Constitutional:       Appearance: She is obese  Cardiovascular:      Rate and Rhythm: Normal rate and regular rhythm  Heart sounds: No murmur heard  Pulmonary:      Effort: Pulmonary effort is normal    Musculoskeletal:      Right lower leg: No edema  Left lower leg: No edema  Skin:     General: Skin is warm and dry  Neurological:      Mental Status: She is alert        Comments: Dementia           Laboratory Studies:  CMP:  Results from last 7 days   Lab Units 10/10/22  1720   POTASSIUM mmol/L 4 2   CHLORIDE mmol/L 103   CO2 mmol/L 30   BUN mg/dL 22   CREATININE mg/dL 0 99   CALCIUM mg/dL 9 5   EGFR ml/min/1 73sq m 52     NT-proBNP:   Lab Results   Component Value Date    NTBNP 623 (H) 08/15/2022      Coags:    Lipid Profile:   Lab Results   Component Value Date    CHOL 144 04/21/2015     Lab Results   Component Value Date    HDL 55 07/30/2022     Lab Results   Component Value Date    LDLCALC 41 07/30/2022     Lab Results   Component Value Date    TRIG 44 07/30/2022       Cardiac testing:     EKG reviewed personally:     Results for orders placed during the hospital encounter of 03/28/19    Echo complete with contrast if indicated    Narrative  [address]  [phone]    Transthoracic Echocardiogram  2D, M-mode, Doppler, and Color Doppler    Study date:  28-Mar-2019    Patient: Shereen Jensen  MR number: [id number]  Account number: [de-identified]  : 1935  Age: 82 years  Gender: Female  Status: Outpatient  Location: Bedside  Height: 68 in  Weight: 244 lb  BP: 128/ 66 mmHg    Indications: Heart Failure    Diagnoses: I50 9 - Heart failure, unspecified    Sonographer:  VANI Adkins  Interpreting Physician:  Radha Tamez MD  Primary Physician:  Bello Ames MD  Referring Physician:  Coleen Hunt MD  Group:  [facility]  Cardiology Fellow:  Emeterio Frye MD    SUMMARY    LEFT VENTRICLE:  Systolic function was normal  Ejection fraction was estimated to be 60 %  There were no regional wall motion abnormalities  There was no evidence of concentric hypertrophy  VENTRICULAR SEPTUM:  There was dyssynergic motion  These changes are consistent with RV volume overload  RIGHT VENTRICLE:  The ventricle was mildly to moderately dilated  Wall thickness was increased  LEFT ATRIUM:  The atrium was mildly dilated  RIGHT ATRIUM:  The atrium was moderately dilated  MITRAL VALVE:  There was mild regurgitation  AORTIC VALVE:  The valve was trileaflet  Leaflets exhibited moderately increased thickness, mild calcification, and normal cuspal separation  There was trace regurgitation  TRICUSPID VALVE:  There was severe regurgitation  IVC, HEPATIC VEINS:  The inferior vena cava was dilated  Respirophasic changes were blunted (less than 50% variation)  HISTORY: PRIOR HISTORY: HTN, HLD, SSS, CHF, CAD    PROCEDURE: The procedure was performed at the bedside  This was a routine study  The transthoracic approach was used  The study included complete 2D imaging, M-mode, complete spectral Doppler, and color Doppler  The heart rate was 60 bpm,  at the start of the study  Images were obtained from the parasternal, apical, subcostal, and suprasternal notch acoustic windows   This was a technically difficult study  LEFT VENTRICLE: Size was normal  Systolic function was normal  Ejection fraction was estimated to be 60 %  There were no regional wall motion abnormalities  Wall thickness was normal  There was no evidence of concentric hypertrophy  DOPPLER: Left ventricular diastolic function parameters were abnormal     VENTRICULAR SEPTUM: There was dyssynergic motion  These changes are consistent with RV volume overload  RIGHT VENTRICLE: The ventricle was mildly to moderately dilated  Systolic function was low normal  Wall thickness was increased  A pacing wire was present  LEFT ATRIUM: The atrium was mildly dilated  RIGHT ATRIUM: The atrium was moderately dilated  A pacing wire was present  MITRAL VALVE: Valve structure was normal  There was normal leaflet separation  DOPPLER: The transmitral velocity was within the normal range  There was no evidence for stenosis  There was mild regurgitation  AORTIC VALVE: The valve was trileaflet  Leaflets exhibited moderately increased thickness, mild calcification, and normal cuspal separation  DOPPLER: Transaortic velocity was minimally increased  There was no evidence for stenosis  There  was trace regurgitation  TRICUSPID VALVE: The valve structure was normal  There was normal leaflet separation  DOPPLER: The transtricuspid velocity was within the normal range  There was no evidence for stenosis  There was severe regurgitation  PULMONIC VALVE: Leaflets exhibited normal thickness, no calcification, and normal cuspal separation  DOPPLER: The transpulmonic velocity was within the normal range  There was no significant regurgitation  PERICARDIUM: There was no pericardial effusion  The pericardium was normal in appearance  AORTA: The root exhibited normal size  The ascending aorta was normal in size, when indexed for body surface area, measuring 3 8 cm (or 1 7 cm/m2 of body surface area)      SYSTEMIC VEINS: IVC: The inferior vena cava was dilated  Respirophasic changes were blunted (less than 50% variation)  SYSTEM MEASUREMENT TABLES    2D  %FS: 28 94 %  Ao Diam: 3 14 cm  EDV(Teich): 75 97 ml  EF(Teich): 56 07 %  ESV(Teich): 33 38 ml  IVSd: 1 01 cm  LA Area: 19 22 cm2  LA Diam: 3 06 cm  LVEDV MOD A4C: 50 04 ml  LVEF MOD A4C: 54 94 %  LVESV MOD A4C: 22 55 ml  LVIDd: 4 14 cm  LVIDs: 2 94 cm  LVLd A4C: 6 96 cm  LVLs A4C: 6 72 cm  LVOT Diam: 1 89 cm  LVPWd: 1 01 cm  RA Area: 26 01 cm2  RVIDd: 4 37 cm  SV MOD A4C: 27 49 ml  SV(Teich): 42 6 ml    CW  AV Env  Ti: 338 73 ms  AV VTI: 36 28 cm  AV Vmax: 1 57 m/s  AV Vmean: 1 07 m/s  AV maxP 84 mmHg  AV meanP 31 mmHg  TR Vmax: 1 9 m/s  TR maxP 5 mmHg    MM  TAPSE: 1 61 cm    PW  NESTOR (VTI): 1 12 cm2  NESTOR Vmax: 1 21 cm2  E': 0 06 m/s  E/E': 20 03  LVOT Env  Ti: 312 08 ms  LVOT VTI: 14 38 cm  LVOT Vmax: 0 67 m/s  LVOT Vmean: 0 46 m/s  LVOT maxP 81 mmHg  LVOT meanP 99 mmHg  LVSV Dopp: 40 46 ml  MV A Carroll: 0 29 m/s  MV Dec Glenn: 5 23 m/s2  MV DecT: 219 85 ms  MV E Carroll: 1 15 m/s  MV E/A Ratio: 3 9  MV PHT: 63 76 ms  MVA By PHT: 3 45 cm2    [employer] Accredited Echocardiography [facility]    Prepared and electronically signed by    Xavier Duffy MD  Signed 28-Mar-2019 18:13:19    No results found for this or any previous visit  No results found for this or any previous visit  No results found for this or any previous visit  Giovanna De León MD    Portions of the record may have been created with voice recognition software   Occasional wrong word or "sound a like" substitutions may have occurred due to the inherent limitations of voice recognition software   Read the chart carefully and recognize, using context, where substitutions have occurred

## 2022-10-20 ENCOUNTER — HOSPITAL ENCOUNTER (EMERGENCY)
Facility: HOSPITAL | Age: 87
Discharge: HOME | End: 2022-10-20
Attending: SURGERY
Payer: COMMERCIAL

## 2022-10-20 ENCOUNTER — APPOINTMENT (EMERGENCY)
Dept: RADIOLOGY | Facility: HOSPITAL | Age: 87
End: 2022-10-20
Payer: COMMERCIAL

## 2022-10-20 VITALS
DIASTOLIC BLOOD PRESSURE: 73 MMHG | SYSTOLIC BLOOD PRESSURE: 176 MMHG | WEIGHT: 207.23 LBS | TEMPERATURE: 97.5 F | OXYGEN SATURATION: 97 % | HEART RATE: 78 BPM | RESPIRATION RATE: 21 BRPM
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
## 2022-10-20 DIAGNOSIS — S01.01XA SCALP LACERATION: ICD-10-CM

## 2022-10-20 LAB
2HR DELTA HS TROPONIN: -2 NG/L
ANION GAP SERPL CALCULATED.3IONS-SCNC: 7 MMOL/L (ref 4–13)
APTT PPP: 34 SECONDS (ref 23–37)
ATRIAL RATE: 78 BPM
BACTERIA UR QL AUTO: NORMAL /HPF
BASOPHILS # BLD AUTO: 0.11 THOUSANDS/ÂΜL (ref 0–0.1)
BASOPHILS NFR BLD AUTO: 1 % (ref 0–1)
BILIRUB UR QL STRIP: NEGATIVE
BUN SERPL-MCNC: 24 MG/DL (ref 5–25)
CALCIUM SERPL-MCNC: 10.3 MG/DL (ref 8.3–10.1)
CARDIAC TROPONIN I PNL SERPL HS: 11 NG/L
CARDIAC TROPONIN I PNL SERPL HS: 9 NG/L
CHLORIDE SERPL-SCNC: 107 MMOL/L (ref 96–108)
CLARITY UR: CLEAR
CO2 SERPL-SCNC: 25 MMOL/L (ref 21–32)
COLOR UR: YELLOW
CREAT SERPL-MCNC: 1.14 MG/DL (ref 0.6–1.3)
EOSINOPHIL # BLD AUTO: 0.29 THOUSAND/ÂΜL (ref 0–0.61)
EOSINOPHIL NFR BLD AUTO: 3 % (ref 0–6)
ERYTHROCYTE [DISTWIDTH] IN BLOOD BY AUTOMATED COUNT: 13.8 % (ref 11.6–15.1)
GFR SERPL CREATININE-BSD FRML MDRD: 43 ML/MIN/1.73SQ M
GLUCOSE SERPL-MCNC: 194 MG/DL (ref 65–140)
GLUCOSE UR STRIP-MCNC: NEGATIVE MG/DL
HCT VFR BLD AUTO: 41.8 % (ref 34.8–46.1)
HGB BLD-MCNC: 13.1 G/DL (ref 11.5–15.4)
HGB UR QL STRIP.AUTO: ABNORMAL
IMM GRANULOCYTES # BLD AUTO: 0.03 THOUSAND/UL (ref 0–0.2)
IMM GRANULOCYTES NFR BLD AUTO: 0 % (ref 0–2)
INR PPP: 1.41 (ref 0.84–1.19)
KETONES UR STRIP-MCNC: ABNORMAL MG/DL
LEUKOCYTE ESTERASE UR QL STRIP: NEGATIVE
LYMPHOCYTES # BLD AUTO: 1.41 THOUSANDS/ÂΜL (ref 0.6–4.47)
LYMPHOCYTES NFR BLD AUTO: 16 % (ref 14–44)
MCH RBC QN AUTO: 29.4 PG (ref 26.8–34.3)
MCHC RBC AUTO-ENTMCNC: 31.3 G/DL (ref 31.4–37.4)
MCV RBC AUTO: 94 FL (ref 82–98)
MONOCYTES # BLD AUTO: 0.65 THOUSAND/ÂΜL (ref 0.17–1.22)
MONOCYTES NFR BLD AUTO: 7 % (ref 4–12)
NEUTROPHILS # BLD AUTO: 6.41 THOUSANDS/ÂΜL (ref 1.85–7.62)
NEUTS SEG NFR BLD AUTO: 73 % (ref 43–75)
NITRITE UR QL STRIP: NEGATIVE
NON-SQ EPI CELLS URNS QL MICRO: NORMAL /HPF
NRBC BLD AUTO-RTO: 0 /100 WBCS
P AXIS: 0 DEGREES
PH UR STRIP.AUTO: 5.5 [PH] (ref 4.5–8)
PLATELET # BLD AUTO: 276 THOUSANDS/UL (ref 149–390)
PMV BLD AUTO: 12.3 FL (ref 8.9–12.7)
POTASSIUM SERPL-SCNC: 4.6 MMOL/L (ref 3.5–5.3)
PR INTERVAL: 248 MS
PROT UR STRIP-MCNC: ABNORMAL MG/DL
PROTHROMBIN TIME: 17.5 SECONDS (ref 11.6–14.5)
QRS AXIS: 245 DEGREES
QRSD INTERVAL: 164 MS
QT INTERVAL: 432 MS
QTC INTERVAL: 492 MS
RBC # BLD AUTO: 4.45 MILLION/UL (ref 3.81–5.12)
RBC #/AREA URNS AUTO: NORMAL /HPF
SODIUM SERPL-SCNC: 139 MMOL/L (ref 135–147)
SP GR UR STRIP.AUTO: 1.01 (ref 1–1.03)
T WAVE AXIS: 37 DEGREES
UROBILINOGEN UR QL STRIP.AUTO: 0.2 E.U./DL
VENTRICULAR RATE: 78 BPM
WBC # BLD AUTO: 8.9 THOUSAND/UL (ref 4.31–10.16)
WBC #/AREA URNS AUTO: NORMAL /HPF

## 2022-10-20 PROCEDURE — 76705 ECHO EXAM OF ABDOMEN: CPT | Performed by: SURGERY

## 2022-10-20 PROCEDURE — 99285 EMERGENCY DEPT VISIT HI MDM: CPT

## 2022-10-20 PROCEDURE — 72125 CT NECK SPINE W/O DYE: CPT

## 2022-10-20 PROCEDURE — 71260 CT THORAX DX C+: CPT

## 2022-10-20 PROCEDURE — 93010 ELECTROCARDIOGRAM REPORT: CPT | Performed by: INTERNAL MEDICINE

## 2022-10-20 PROCEDURE — 36415 COLL VENOUS BLD VENIPUNCTURE: CPT | Performed by: EMERGENCY MEDICINE

## 2022-10-20 PROCEDURE — 90471 IMMUNIZATION ADMIN: CPT

## 2022-10-20 PROCEDURE — 74177 CT ABD & PELVIS W/CONTRAST: CPT

## 2022-10-20 PROCEDURE — NC001 PR NO CHARGE: Performed by: SURGERY

## 2022-10-20 PROCEDURE — 12002 RPR S/N/AX/GEN/TRNK2.6-7.5CM: CPT | Performed by: SURGERY

## 2022-10-20 PROCEDURE — 85025 COMPLETE CBC W/AUTO DIFF WBC: CPT | Performed by: SURGERY

## 2022-10-20 PROCEDURE — 93005 ELECTROCARDIOGRAM TRACING: CPT

## 2022-10-20 PROCEDURE — 80048 BASIC METABOLIC PNL TOTAL CA: CPT | Performed by: SURGERY

## 2022-10-20 PROCEDURE — 12004 RPR S/N/AX/GEN/TRK7.6-12.5CM: CPT | Performed by: SURGERY

## 2022-10-20 PROCEDURE — 85610 PROTHROMBIN TIME: CPT | Performed by: SURGERY

## 2022-10-20 PROCEDURE — 85730 THROMBOPLASTIN TIME PARTIAL: CPT | Performed by: SURGERY

## 2022-10-20 PROCEDURE — 84484 ASSAY OF TROPONIN QUANT: CPT | Performed by: SURGERY

## 2022-10-20 PROCEDURE — 93308 TTE F-UP OR LMTD: CPT | Performed by: SURGERY

## 2022-10-20 PROCEDURE — NC001 PR NO CHARGE: Performed by: EMERGENCY MEDICINE

## 2022-10-20 PROCEDURE — 99204 OFFICE O/P NEW MOD 45 MIN: CPT | Performed by: SURGERY

## 2022-10-20 PROCEDURE — 70450 CT HEAD/BRAIN W/O DYE: CPT

## 2022-10-20 PROCEDURE — 81001 URINALYSIS AUTO W/SCOPE: CPT

## 2022-10-20 PROCEDURE — 90715 TDAP VACCINE 7 YRS/> IM: CPT

## 2022-10-20 PROCEDURE — 70486 CT MAXILLOFACIAL W/O DYE: CPT

## 2022-10-20 RX ORDER — GINSENG 100 MG
1 CAPSULE ORAL 2 TIMES DAILY
Status: DISCONTINUED | OUTPATIENT
Start: 2022-10-20 | End: 2022-10-20 | Stop reason: HOSPADM

## 2022-10-20 RX ADMIN — IOHEXOL 100 ML: 350 INJECTION, SOLUTION INTRAVENOUS at 06:56

## 2022-10-20 RX ADMIN — TETANUS TOXOID, REDUCED DIPHTHERIA TOXOID AND ACELLULAR PERTUSSIS VACCINE, ADSORBED 0.5 ML: 5; 2.5; 8; 8; 2.5 SUSPENSION INTRAMUSCULAR at 10:07

## 2022-10-20 RX ADMIN — BACITRACIN 1 LARGE APPLICATION: 500 OINTMENT TOPICAL at 10:28

## 2022-10-20 NOTE — H&P
H&P - Trauma   Karen Underwood 82 y o  female MRN: [id number]  Unit/Bed#: TR 02 Encounter: [id number]    Trauma Alert: Level B   Model of Arrival: Ambulance    Trauma Team: Attending Elham Melton, Residents Bret Stone and Fellow Gisel  Consultants:     None     Assessment/Plan   Active Problems / Assessment:   Fall on Eliquis  R temporparietal laceration and hematoma  Confusion at baseline per daughter     Plan:   CT trauma imaging negative for acute findings  Laceration repair requiring 3 staples - to be removed in 7-10 days  UA negative  Per discussion with daughter, ok to discharge back to home    History of Present Illness     Chief Complaint: n/a AMS  Mechanism:Fall     HPI:    Karen Underwood is a 82 y o  female who presents as level B trauma after falling at home  Patient has baseline level of confusion/dementia, history limited  Per EMS, pt was heard falling out of bed at home by nephew  Positive head strike, unknown LOC  Pt takes Eliquis  In discussion with daughter, patient's current mental status is at baseline  Pt does not offer any complaints at this time  Review of Systems   Unable to perform ROS: Dementia     12-point, complete review of systems was reviewed and negative except as stated above  Historical Information     No past medical history on file  No past surgical history on file  Unable to obtain history due to Dementia         There is no immunization history on file for this patient  Last Tetanus: updated today  Family History: Non-contributory    1  Before the illness or injury that brought you to the Emergency, did you need someone to help you on a regular basis? 1=Yes   2  Since the illness or injury that brought you to the Emergency, have you needed more help than usual to take care of yourself? 1=Yes   3  Have you been hospitalized for one or more nights during the past 6 months (excluding a stay in the Emergency Department)? 1=Yes   4  In general, do you see well? 0=Yes   5   In general, do you have serious problems with your memory? 1=Yes   6  Do you take more than three different medications everyday? 1=Yes   TOTAL   5     Did you order a geriatric consult if the score was 2 or greater?: n/a     Meds/Allergies   all current active meds have been reviewed and current meds:   Current Facility-Administered Medications   Medication Dose Route Frequency   • tetanus-diphtheria-acellular pertussis (BOOSTRIX) IM injection 0 5 mL  0 5 mL Intramuscular Once     Allergies have not been reviewed; Not on File    Objective   Initial Vitals:   Temperature: 97 5 °F (36 4 °C) (10/20/22 0635)  Pulse: 61 (10/20/22 0635)  Respirations: 18 (10/20/22 8855)  Blood Pressure: (!) 215/83 (10/20/22 7210)    Primary Survey:   Airway:        Status: patent;        Pre-hospital Interventions: none        Hospital Interventions: none  Breathing:        Pre-hospital Interventions: none       Effort: normal       Right breath sounds: normal       Left breath sounds: normal  Circulation:        Rhythm: regular       Rate: regular   Right Pulses Left Pulses    R radial: 2+  R femoral: 2+  R pedal: 2+     L radial: 2+  L femoral: 2+  L pedal: 2+       Disability:        GCS: Eye: 4; Verbal: 4 Motor: 5 Total: 13       Right Pupil: 3 mm;  round;  reactive         Left Pupil:  3 mm;  round;  reactive      R Motor Strength L Motor Strength    R : 3/5   L : 3/5  L dorsiflex: 5/5  L plantarflex: 5/5          Exposure:       Completed: Yes      Secondary Survey:  Physical Exam  Vitals and nursing note reviewed  Constitutional:       Appearance: She is not toxic-appearing  HENT:      Head: Normocephalic  Comments: Small non-bleeding laceration to R tempoparietal scalp with associated hematoma     Right Ear: External ear normal       Left Ear: External ear normal       Mouth/Throat:      Pharynx: Oropharynx is clear  Eyes:      Extraocular Movements: Extraocular movements intact        Pupils: Pupils are equal, round, and reactive to light  Neck:      Comments: C collar in place  Cardiovascular:      Rate and Rhythm: Regular rhythm  Tachycardia present  Pulses: Normal pulses  Heart sounds: Normal heart sounds  No murmur heard  No friction rub  No gallop  Pulmonary:      Effort: Pulmonary effort is normal  No respiratory distress  Breath sounds: Normal breath sounds  No wheezing, rhonchi or rales  Chest:      Chest wall: No tenderness  Abdominal:      General: Abdomen is flat  There is no distension  Palpations: Abdomen is soft  Tenderness: There is no abdominal tenderness  There is no guarding or rebound  Musculoskeletal:         General: Signs of injury present  Cervical back: No tenderness  Comments: RLE in CAM walking boot   Skin:     General: Skin is warm and dry  Capillary Refill: Capillary refill takes less than 2 seconds  Neurological:      Mental Status: She is alert  Mental status is at baseline  Cranial Nerves: No cranial nerve deficit  Motor: No weakness           Invasive Devices  Report    Peripheral Intravenous Line  Duration           Peripheral IV 10/20/22 Left Antecubital <1 day    Peripheral IV 10/20/22 Left Hand <1 day              Lab Results:   Results: I have personally reviewed all pertinent laboratory/tests results, BMP/CMP:   Lab Results   Component Value Date    SODIUM 139 10/20/2022    K 4 6 10/20/2022     10/20/2022    CO2 25 10/20/2022    BUN 24 10/20/2022    CREATININE 1 14 10/20/2022    CALCIUM 10 3 (H) 10/20/2022    EGFR 43 10/20/2022   , CBC:   Lab Results   Component Value Date    WBC 8 90 10/20/2022    HGB 13 1 10/20/2022    HCT 41 8 10/20/2022    MCV 94 10/20/2022     10/20/2022    MCH 29 4 10/20/2022    MCHC 31 3 (L) 10/20/2022    RDW 13 8 10/20/2022    MPV 12 3 10/20/2022    NRBC 0 10/20/2022    and ISTAT: No components found for: VBG    Imaging Results: I have personally reviewed pertinent films in PACS  Chest Xray(s): negative for acute findings   FAST exam(s): negative for acute findings   CT Scan(s): negative for acute findings   Additional Xray(s): N/A     Other Studies: n/a    Code Status: No Order  Advance Directive and Living Will:      Power of :    POLST:    I have spent 35 minutes with Patient and family today in which greater than 50% of this time was spent in counseling/coordination of care regarding Diagnostic results and Impressions

## 2022-10-20 NOTE — ED PROVIDER NOTES
Emergency Department Airway Evaluation and Management Form    History  Obtained from: EMS and patient  Review of patient's allergies indicates no known allergies  Chief Complaint:  Trauma Alert    HPI: Pt is a 82 y o  female presents s/p fall; head strike; apixaban      I have reviewed and agree with the history as documented  ROS is unobtainable secondary to critical status of the patient as a result of the trauma  Physical Exam    There were no vitals filed for this visit  Supplemental Oxygen: RA    GCS: 15  Airway: patent  Breathing and Pulmonary exam: bilateral BS  Cardiac and Circulation: irr irr  Neurologic exam: moving all extremities  C spine and Neck exam: In collar      Monitor:  AF      ED Medications    No current facility-administered medications for this encounter  No current outpatient medications on file  Medical Decision Makin  Fall; head strike; apixaban      Portions of the record may have been created with voice recognition software   Occasional wrong word or "sound a like" substitutions may have occurred due to the inherent limitations of voice recognition software   ]     Emanuel Blanco MD  10/20/22 7707

## 2022-10-20 NOTE — ED NOTES
Pt left from ED, daughter wheeled pt out   Unable to discharge from system due to marked for merge     Ashley Saez, JORDEN  10/20/22 7259

## 2022-10-20 NOTE — PROGRESS NOTES
Cervical Collar Clearance: The patient had a CT scan of the cervical spine demonstrating no acute injury  On exam, the patient had no midline point tenderness or paresthesias/numbness/weakness in the extremities  The patient had full range of motion (was then able to flex, extend, and rotate head laterally) without pain  There were no distracting injuries and the patient was not intoxicated  The patient's cervical spine was cleared radiologically and clinically  Cervical collar removed at this time       Boby Sher  10/20/2022 8:45 AM

## 2022-10-20 NOTE — PROCEDURES
Laceration repair    Date/Time: 10/20/2022 10:53 AM  Performed by: Angie Ulloa DO  Authorized by: Angie Ulloa DO   Consent: Verbal consent obtained  Patient understanding: patient states understanding of the procedure being performed  Patient identity confirmed: verbally with patient and hospital-assigned identification number  Body area: head/neck  Location details: scalp  Laceration length: 8 cm  Foreign bodies: no foreign bodies  Tendon involvement: none  Nerve involvement: none  Vascular damage: no    Wound Dehiscence:  Superficial Wound Dehiscence: simple closure      Procedure Details:  Skin closure: staples  Number of sutures: 7  Approximation: close  Approximation difficulty: simple  Dressing: 4x4 sterile gauze  Patient tolerance: patient tolerated the procedure well with no immediate complications  Comments: Ongoing bleeding from scalp laceration  Prior staples were removed  Hair was trimmed in order to visualize entire length of scalp laceration  Skin was cleaned  Staples were applied to laceration  Bacitracin ointment and 4x4 sterile gauze applied

## 2022-10-20 NOTE — ED PROCEDURE NOTE
Procedure  POC FAST US    Date/Time: 10/20/2022 7:01 AM  Performed by: Kori Luna MD  Authorized by: Kori Luna MD     Patient location:  Trauma  Procedure details:     Exam Type:  Diagnostic    Indications: blunt abdominal trauma      Assess for:  Intra-abdominal fluid and pericardial effusion    Technique: FAST      Views obtained:  RUQ - Baird's Pouch, Suprapubic - Pouch of Misbah and LUQ - Splenorenal space    Image quality: limited diagnostic      Image availability:  Images available in PACS  FAST Findings:     RUQ (Hepatorenal) free fluid: absent      LUQ (Splenorenal) free fluid: absent      Suprapubic free fluid: absent      Cardiac wall motion: identified      Pericardial effusion: absent    Interpretation:     Impressions: negative                       Kori Luna MD  10/20/22 6482

## 2022-10-20 NOTE — PROCEDURES
Laceration repair    Date/Time: 10/20/2022 9:15 AM  Performed by: Meghan Pal MD  Authorized by: Meghan Pal MD   Consent: Verbal consent obtained  Risks and benefits: risks, benefits and alternatives were discussed  Consent given by: power of   Patient consent: the patient's understanding of the procedure matches consent given  Procedure consent: procedure consent matches procedure scheduled  Patient identity confirmed: arm band and hospital-assigned identification number  Time out: Immediately prior to procedure a "time out" was called to verify the correct patient, procedure, equipment, support staff and site/side marked as required    Body area: head/neck  Location details: scalp  Laceration length: 3 cm  Tendon involvement: none  Nerve involvement: none  Vascular damage: no    Wound Dehiscence:  Superficial Wound Dehiscence: simple closure      Procedure Details:  Irrigation solution: saline  Irrigation method: jet lavage  Amount of cleaning: standard  Debridement: none  Degree of undermining: none  Skin closure: staples  Patient tolerance: patient tolerated the procedure well with no immediate complications  Comments: 3 staples

## 2022-10-20 NOTE — PROGRESS NOTES
Responded to Trauma lev B call  Medical team with patient in the trauma bay  Patient's daughter Aakash Walker present at this time and she is POA for patient  Patient taken to the CT Scan for further exam  Escorted to family to the patient's room and provided supportive conversation with her daughter  Support to staff  10/20/22 0700   Clinical Encounter Type   Visited With Patient; Family; Health care provider   Routine Visit Introduction   Crisis Visit Trauma   Referral From Nurse

## 2022-10-20 NOTE — DISCHARGE INSTRUCTIONS
Three staples were placed to help close right scalp laceration, these need to be removed in 7-10 days  You can follow up with your PCP, the trauma clinic, or emergency department  If you develop new or worsening symptoms, please return to the Emergency Department for further evaluation

## 2022-10-25 ENCOUNTER — OFFICE VISIT (OUTPATIENT)
Dept: OBGYN CLINIC | Facility: HOSPITAL | Age: 87
End: 2022-10-25

## 2022-10-25 ENCOUNTER — HOSPITAL ENCOUNTER (OUTPATIENT)
Dept: RADIOLOGY | Facility: HOSPITAL | Age: 87
Discharge: HOME | End: 2022-10-25
Attending: ORTHOPAEDIC SURGERY
Payer: COMMERCIAL

## 2022-10-25 VITALS
DIASTOLIC BLOOD PRESSURE: 76 MMHG | HEART RATE: 60 BPM | BODY MASS INDEX: 34.53 KG/M2 | SYSTOLIC BLOOD PRESSURE: 127 MMHG | WEIGHT: 207.23 LBS | HEIGHT: 65 IN

## 2022-10-25 DIAGNOSIS — S82.891D CLOSED FRACTURE OF RIGHT ANKLE WITH ROUTINE HEALING, SUBSEQUENT ENCOUNTER: ICD-10-CM

## 2022-10-25 DIAGNOSIS — S82.891D CLOSED FRACTURE OF RIGHT ANKLE WITH ROUTINE HEALING, SUBSEQUENT ENCOUNTER: Primary | ICD-10-CM

## 2022-10-25 PROCEDURE — 99024 POSTOP FOLLOW-UP VISIT: CPT | Performed by: ORTHOPAEDIC SURGERY

## 2022-10-25 PROCEDURE — 73610 X-RAY EXAM OF ANKLE: CPT

## 2022-10-25 NOTE — PROGRESS NOTES
Assessment:  1  Closed fracture of right ankle with routine healing, subsequent encounter  Durable Medical Equipment    Ambulatory Referral to Physical Therapy         Surgery: Open Reduction W/ Internal Fixation (orif) Ankle - Right  Date of Surgery: 8/16/2022        Discussion and Plan:   · X-rays show a healed fracture  · Patient transitioned to a lace up ankle brace  · Continue WBAT  · Start formal therapy, care giver will call if she requires home PT  · X-rays next visit    Follow Up:  Return in about 2 months (around 12/25/2022)  CHIEF COMPLAINT:  Chief Complaint   Patient presents with   • Right Ankle - Post-op         SUBJECTIVE:  Magnolia Taylor is a 82y o  year old female who presents for follow up after Open Reduction W/ Internal Fixation (orif) Ankle - Right  Today patient reports no pain  Patient's caregiver reports a fall on 10/20/22      PHYSICAL EXAMINATION:  General: well developed and well nourished, alert, oriented times 3 and appears comfortable      MUSCULOSKELETAL EXAMINATION:  Incision: Clean, dry, intact and healed  Surgery Site: normal, no evidence of infection   Range of Motion: As expected  Neurovascular status: Neuro intact, good cap refill  Activity Restrictions: No restrictions  1 remaining suture removed        I have personally reviewed pertinent films in PACS and my interpretation is as follows    Right ankle x-rays demonstrates healed fracture, hardware in acceptable alignment and position    Scribe Attestation    I,:  Merlene Li am acting as a scribe while in the presence of the attending physician :       I,:  Drew Vásquez MD personally performed the services described in this documentation    as scribed in my presence :

## 2022-10-27 ENCOUNTER — OFFICE VISIT (OUTPATIENT)
Dept: FAMILY MEDICINE CLINIC | Facility: CLINIC | Age: 87
End: 2022-10-27
Payer: COMMERCIAL

## 2022-10-27 VITALS
WEIGHT: 207 LBS | RESPIRATION RATE: 14 BRPM | BODY MASS INDEX: 34.49 KG/M2 | HEART RATE: 63 BPM | OXYGEN SATURATION: 97 % | HEIGHT: 65 IN | TEMPERATURE: 97.7 F

## 2022-10-27 DIAGNOSIS — G30.1 LATE ONSET ALZHEIMER'S DEMENTIA WITHOUT BEHAVIORAL DISTURBANCE (HCC): Primary | ICD-10-CM

## 2022-10-27 DIAGNOSIS — F03.90 DEMENTIA WITHOUT BEHAVIORAL DISTURBANCE (HCC): ICD-10-CM

## 2022-10-27 DIAGNOSIS — F32.9 REACTIVE DEPRESSION: ICD-10-CM

## 2022-10-27 DIAGNOSIS — F02.80 LATE ONSET ALZHEIMER'S DEMENTIA WITHOUT BEHAVIORAL DISTURBANCE (HCC): Primary | ICD-10-CM

## 2022-10-27 PROCEDURE — 99214 OFFICE O/P EST MOD 30 MIN: CPT | Performed by: NURSE PRACTITIONER

## 2022-10-27 RX ORDER — ESCITALOPRAM OXALATE 20 MG/1
20 TABLET ORAL DAILY
Qty: 90 TABLET | Refills: 0 | Status: ON HOLD | OUTPATIENT
Start: 2022-10-27

## 2022-10-27 NOTE — PATIENT INSTRUCTIONS
Call orthopedic office to make them aware home PT is requested  They will order for the patient       Team Member Role and Specialty Contact Info Address Start End Comments   Janeen Ramirez MD (Orthopedic Surgery) Phone: [phone] Fax: [phone] [address] [address] 10/27/2022 - -

## 2022-10-27 NOTE — PROGRESS NOTES
[facility] - [facility]    NAME: Radha Contreras  AGE: 82 y o  SEX: female  : 1935     DATE: 10/27/2022     Assessment and Plan:     Problem List Items Addressed This Visit        Nervous and Auditory    Late onset Alzheimer's dementia without behavioral disturbance ([facility] ) - Primary     The patient presents to the office today accompanied by her caregiver  She was recently hospitalized after sustaining a fall at home at which time she fractured a bone requiring surgery  She was in inpatient rehab and is currently back at home  She has been having increased symptoms of anxiety and dementia mainly at night  This may be related to sundowning  She becomes manic and consistently attempts to get out of bed  She had been on risperidone while in rehab however the caregiver does not wish to give her this medication at home  She is on Lexapro 10 mg which the caregiver thinks helps somewhat and I will increase that dose today  The caregiver is looking for referral to Neurology and I will place that referral today  I believe the patient's symptoms are most probably related to progression of disease  Caregiver is questioning medications to help her sleep however I do not believe she would be a good candidate for these medications due to her Alzheimer's and ambulatory issues  Relevant Medications    escitalopram (Lexapro) 20 mg tablet       Other    Depression    Relevant Medications    escitalopram (Lexapro) 20 mg tablet              No follow-ups on file  Chief Complaint:     Chief Complaint   Patient presents with   Doris Harding 10/20/22 went to ED           History of Present Illness:   Patient presents to the office today accompanied by her caregiver  Her caregiver is providing information  The patient is nonverbal during this visit  The caregiver is concerned regarding increasing symptoms mainly at night of her dementia  This is discussed above    In addition the patient recently sustained a fall and had surgery performed by the orthopedic group  She did see them this week  They did recommend physical therapy and they are in the process of scheduling home PT  A referral to Neurology was placed per the caregivers request   The patient is also having episodes of occasional discharge from her bilateral eyes and sometimes wake up with crusted eyelids  In the office today I do not suspect any type of bacterial conjunctivitis  I did recommend to the caregiver that she wash the patient's eyes several times per day with a warm washcloth with warm water  If the symptoms worsen she will contact us  Review of Systems:     Review of Systems   Unable to perform ROS: Dementia        Problem List:     Patient Active Problem List   Diagnosis   • 3-vessel coronary artery disease   • Late onset Alzheimer's dementia without behavioral disturbance ([facility])   • Depression   • Diabetic retinopathy ([facility] )   • DM (diabetes mellitus), type 2, uncontrolled w/ophthalmic complication   • Gastroesophageal reflux disease with hiatal hernia   • Glaucoma   • Hyperlipidemia   • Essential hypertension   • Cerebral artery occlusion with cerebral infarction ([facility] )   • Obesity (BMI 30-39  9)   • Obstructive sleep apnea   • Osteoarthritis of knee   • Urine incontinence   • Ventricular tachycardia   • Chronic diastolic congestive heart failure ([facility] )   • 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 ([facility])   • Abnormal chest x-ray   • Urine retention   • Congestive heart failure with LV diastolic dysfunction, NYHA class 2 ([facility])   • Dyspnea on minimal exertion   • PAF (paroxysmal atrial fibrillation) ([facility])   • SSS (sick sinus syndrome) ([facility])   • Mild intermittent asthma without complication   • Osteopenia   • Obesity, morbid ([facility])   • Type 2 diabetes mellitus with hyperglycemia, with long-term current use of insulin ([facility])   • Closed right ankle fracture   • Ambulatory dysfunction   • Stage 3 chronic kidney disease, unspecified whether stage 3a or 3b CKD ([facility])   • Fall   • Scalp laceration        Objective:     Pulse 63   Temp 97 7 °F (36 5 °C) (Tympanic)   Resp 14   Ht 5' 5" (1 651 m)   Wt 93 9 kg (207 lb)   SpO2 97%   BMI 34 45 kg/m²     Current Outpatient Medications   Medication Sig Dispense Refill   • acetaminophen (TYLENOL) 325 mg tablet Take 3 tablets (975 mg total) by mouth every 8 (eight) hours  0   • atorvastatin (LIPITOR) 40 mg tablet Take 1 tablet (40 mg total) by mouth daily 90 tablet 3   • calcium carbonate-vitamin D (OSCAL-D) 500 mg-200 units per tablet Take 1 tablet by mouth 2 (two) times a day with meals 60 tablet 0   • Continuous Blood Gluc  (Dexcom G6 ) SIVAKUMAR Use daily as directed for CGM     • Continuous Blood Gluc Sensor (Dexcom G6 Sensor) MISC Use daily as directed for CGM - Change every 10 days     • Continuous Blood Gluc Transmit (Dexcom G6 Transmitter) MISC Use daily as directed for CGM - Change every 3 months     • Eliquis 5 MG TAKE 1 TABLET BY MOUTH TWICE A DAY 60 tablet 5   • escitalopram (Lexapro) 20 mg tablet Take 1 tablet (20 mg total) by mouth daily 90 tablet 0   • glucose blood test strip USE AS DIRECTED 3 TIMES A  each 1   • Insulin Disposable Pump (V-Go 20) KIT Apply once a night 30 kit 5   • Insulin Disposable Pump (V-Go 20) KIT Use daily Use one daily 30 kit 1   • insulin glargine (LANTUS) 100 units/mL subcutaneous injection Inject 25 Units under the skin daily at bedtime 10 mL 0   • metoprolol tartrate (LOPRESSOR) 25 mg tablet TAKE 1 TABLET (25 MG TOTAL) BY MOUTH EVERY 12 (TWELVE) HOURS 180 tablet 3   • NovoLOG 100 UNIT/ML injection INJECT UP TO 15 UNITS DAILY WITH V-GO 30 mL 1   • ondansetron (Zofran ODT) 4 mg disintegrating tablet Take 1 tablet (4 mg total) by mouth every 6 (six) hours as needed for nausea or vomiting 20 tablet 0   • potassium chloride (Klor-Con M10) 10 mEq tablet Take 1 tablet (10 mEq total) by mouth daily 90 tablet 3   • senna-docusate sodium (SENOKOT S) 8 6-50 mg per tablet Take 1 tablet by mouth daily at bedtime  0   • torsemide (DEMADEX) 20 mg tablet Take 1 tablet (20 mg total) by mouth daily 90 tablet 3     No current facility-administered medications for this visit  Physical Exam  Vitals reviewed  Constitutional:       Appearance: Normal appearance  She is obese  HENT:      Head: Normocephalic  Nose: Nose normal       Mouth/Throat:      Mouth: Mucous membranes are moist       Pharynx: Oropharynx is clear  Eyes:      General: Lids are normal  Lids are everted, no foreign bodies appreciated  Extraocular Movements: Extraocular movements intact  Pupils: Pupils are equal, round, and reactive to light  Comments: Crusting of lower lashes,  No concern for bacterial conjunctivitis   Cardiovascular:      Rate and Rhythm: Normal rate and regular rhythm  Pulmonary:      Effort: Pulmonary effort is normal       Breath sounds: Normal breath sounds  Musculoskeletal:         General: Normal range of motion  Skin:     General: Skin is warm and dry  Neurological:      General: No focal deficit present  Mental Status: She is alert and oriented to person, place, and time  Psychiatric:         Mood and Affect: Mood normal          Behavior: Behavior normal          Thought Content:  Thought content normal          Judgment: Judgment normal          Arline Blanca

## 2022-10-27 NOTE — ASSESSMENT & PLAN NOTE
Wt Readings from Last 3 Encounters:   10/27/22 93 9 kg (207 lb)   10/25/22 94 kg (207 lb 3 7 oz)   10/20/22 94 kg (207 lb 3 7 oz)

## 2022-10-27 NOTE — ASSESSMENT & PLAN NOTE
The patient presents to the office today accompanied by her caregiver  She was recently hospitalized after sustaining a fall at home at which time she fractured a bone requiring surgery  She was in inpatient rehab and is currently back at home  She has been having increased symptoms of anxiety and dementia mainly at night  This may be related to sundowning  She becomes manic and consistently attempts to get out of bed  She had been on risperidone while in rehab however the caregiver does not wish to give her this medication at home  She is on Lexapro 10 mg which the caregiver thinks helps somewhat and I will increase that dose today  The caregiver is looking for referral to Neurology and I will place that referral today  I believe the patient's symptoms are most probably related to progression of disease  Caregiver is questioning medications to help her sleep however I do not believe she would be a good candidate for these medications due to her Alzheimer's and ambulatory issues

## 2022-11-03 ENCOUNTER — APPOINTMENT (EMERGENCY)
Dept: RADIOLOGY | Facility: HOSPITAL | Age: 87
End: 2022-11-03

## 2022-11-03 ENCOUNTER — HOSPITAL ENCOUNTER (INPATIENT)
Facility: HOSPITAL | Age: 87
LOS: 7 days | Discharge: HOME HEALTH | End: 2022-11-10
Attending: EMERGENCY MEDICINE | Admitting: STUDENT IN AN ORGANIZED HEALTH CARE EDUCATION/TRAINING PROGRAM

## 2022-11-03 DIAGNOSIS — I63.50 CEREBRAL ARTERY OCCLUSION WITH CEREBRAL INFARCTION (HCC): ICD-10-CM

## 2022-11-03 DIAGNOSIS — N89.9 VAGINAL DISORDER: ICD-10-CM

## 2022-11-03 DIAGNOSIS — R11.2 NAUSEA AND VOMITING: ICD-10-CM

## 2022-11-03 DIAGNOSIS — S30.1XXA RECTUS SHEATH HEMATOMA, INITIAL ENCOUNTER: Primary | ICD-10-CM

## 2022-11-03 DIAGNOSIS — I65.21 RIGHT CAVERNOUS CAROTID STENOSIS: ICD-10-CM

## 2022-11-03 DIAGNOSIS — I65.29 STENOSIS OF CAVERNOUS PORTION OF INTERNAL CAROTID ARTERY: ICD-10-CM

## 2022-11-03 DIAGNOSIS — R10.9 ABDOMINAL PAIN: ICD-10-CM

## 2022-11-03 DIAGNOSIS — S82.891A CLOSED FRACTURE OF RIGHT ANKLE, INITIAL ENCOUNTER: ICD-10-CM

## 2022-11-03 PROBLEM — K59.00 CONSTIPATION: Status: ACTIVE | Noted: 2022-01-01

## 2022-11-03 PROBLEM — S82.409A TIBIA/FIBULA FRACTURE: Status: ACTIVE | Noted: 2022-11-03

## 2022-11-03 PROBLEM — S82.209A TIBIA/FIBULA FRACTURE: Status: ACTIVE | Noted: 2022-01-01

## 2022-11-03 PROBLEM — D72.829 LEUKOCYTOSIS: Status: ACTIVE | Noted: 2022-01-01

## 2022-11-03 LAB
ALBUMIN SERPL BCP-MCNC: 3.1 G/DL (ref 3.5–5)
ALP SERPL-CCNC: 98 U/L (ref 46–116)
ALT SERPL W P-5'-P-CCNC: 22 U/L (ref 12–78)
ANION GAP SERPL CALCULATED.3IONS-SCNC: 5 MMOL/L (ref 4–13)
APTT PPP: 30 SECONDS (ref 23–37)
AST SERPL W P-5'-P-CCNC: 22 U/L (ref 5–45)
BASOPHILS # BLD AUTO: 0.03 THOUSANDS/ÂΜL (ref 0–0.1)
BASOPHILS NFR BLD AUTO: 0 % (ref 0–1)
BILIRUB SERPL-MCNC: 1.01 MG/DL (ref 0.2–1)
BUN SERPL-MCNC: 29 MG/DL (ref 5–25)
CALCIUM ALBUM COR SERPL-MCNC: 10.3 MG/DL (ref 8.3–10.1)
CALCIUM SERPL-MCNC: 9.6 MG/DL (ref 8.3–10.1)
CHLORIDE SERPL-SCNC: 105 MMOL/L (ref 96–108)
CO2 SERPL-SCNC: 27 MMOL/L (ref 21–32)
CREAT SERPL-MCNC: 1.04 MG/DL (ref 0.6–1.3)
EOSINOPHIL # BLD AUTO: 0.01 THOUSAND/ÂΜL (ref 0–0.61)
EOSINOPHIL NFR BLD AUTO: 0 % (ref 0–6)
ERYTHROCYTE [DISTWIDTH] IN BLOOD BY AUTOMATED COUNT: 14.4 % (ref 11.6–15.1)
GFR SERPL CREATININE-BSD FRML MDRD: 49 ML/MIN/1.73SQ M
GLUCOSE SERPL-MCNC: 143 MG/DL (ref 65–140)
GLUCOSE SERPL-MCNC: 169 MG/DL (ref 65–140)
GLUCOSE SERPL-MCNC: 221 MG/DL (ref 65–140)
HCT VFR BLD AUTO: 35.8 % (ref 34.8–46.1)
HGB BLD-MCNC: 11.6 G/DL (ref 11.5–15.4)
IMM GRANULOCYTES # BLD AUTO: 0.04 THOUSAND/UL (ref 0–0.2)
IMM GRANULOCYTES NFR BLD AUTO: 0 % (ref 0–2)
INR PPP: 1.3 (ref 0.84–1.19)
LIPASE SERPL-CCNC: 52 U/L (ref 73–393)
LYMPHOCYTES # BLD AUTO: 0.82 THOUSANDS/ÂΜL (ref 0.6–4.47)
LYMPHOCYTES NFR BLD AUTO: 7 % (ref 14–44)
MCH RBC QN AUTO: 29.3 PG (ref 26.8–34.3)
MCHC RBC AUTO-ENTMCNC: 32.4 G/DL (ref 31.4–37.4)
MCV RBC AUTO: 90 FL (ref 82–98)
MONOCYTES # BLD AUTO: 0.72 THOUSAND/ÂΜL (ref 0.17–1.22)
MONOCYTES NFR BLD AUTO: 6 % (ref 4–12)
NEUTROPHILS # BLD AUTO: 10.95 THOUSANDS/ÂΜL (ref 1.85–7.62)
NEUTS SEG NFR BLD AUTO: 87 % (ref 43–75)
NRBC BLD AUTO-RTO: 0 /100 WBCS
PLATELET # BLD AUTO: 235 THOUSANDS/UL (ref 149–390)
PMV BLD AUTO: 12 FL (ref 8.9–12.7)
POTASSIUM SERPL-SCNC: 3.9 MMOL/L (ref 3.5–5.3)
PROT SERPL-MCNC: 6.7 G/DL (ref 6.4–8.4)
PROTHROMBIN TIME: 16.4 SECONDS (ref 11.6–14.5)
RBC # BLD AUTO: 3.96 MILLION/UL (ref 3.81–5.12)
SODIUM SERPL-SCNC: 137 MMOL/L (ref 135–147)
WBC # BLD AUTO: 12.57 THOUSAND/UL (ref 4.31–10.16)

## 2022-11-03 RX ORDER — ONDANSETRON 2 MG/ML
4 INJECTION INTRAMUSCULAR; INTRAVENOUS EVERY 6 HOURS PRN
Status: DISCONTINUED | OUTPATIENT
Start: 2022-11-03 | End: 2022-11-10 | Stop reason: HOSPADM

## 2022-11-03 RX ORDER — INSULIN LISPRO 100 [IU]/ML
1-5 INJECTION, SOLUTION INTRAVENOUS; SUBCUTANEOUS
Status: DISCONTINUED | OUTPATIENT
Start: 2022-11-03 | End: 2022-11-05

## 2022-11-03 RX ORDER — SENNOSIDES 8.6 MG
1 TABLET ORAL DAILY
Status: DISCONTINUED | OUTPATIENT
Start: 2022-11-04 | End: 2022-11-05

## 2022-11-03 RX ORDER — DOCUSATE SODIUM 100 MG/1
100 CAPSULE, LIQUID FILLED ORAL 2 TIMES DAILY
Status: DISCONTINUED | OUTPATIENT
Start: 2022-11-03 | End: 2022-11-04

## 2022-11-03 RX ORDER — ATORVASTATIN CALCIUM 40 MG/1
40 TABLET, FILM COATED ORAL DAILY
Status: DISCONTINUED | OUTPATIENT
Start: 2022-11-04 | End: 2022-11-10 | Stop reason: HOSPADM

## 2022-11-03 RX ORDER — ERYTHROMYCIN 5 MG/G
0.5 OINTMENT OPHTHALMIC ONCE
Status: COMPLETED | OUTPATIENT
Start: 2022-11-03 | End: 2022-11-03

## 2022-11-03 RX ORDER — TORSEMIDE 20 MG/1
20 TABLET ORAL DAILY
Status: DISCONTINUED | OUTPATIENT
Start: 2022-11-04 | End: 2022-11-06

## 2022-11-03 RX ORDER — MAGNESIUM HYDROXIDE/ALUMINUM HYDROXICE/SIMETHICONE 120; 1200; 1200 MG/30ML; MG/30ML; MG/30ML
30 SUSPENSION ORAL EVERY 6 HOURS PRN
Status: DISCONTINUED | OUTPATIENT
Start: 2022-11-03 | End: 2022-11-10 | Stop reason: HOSPADM

## 2022-11-03 RX ORDER — LACTULOSE 20 G/30ML
10 SOLUTION ORAL DAILY PRN
Status: DISCONTINUED | OUTPATIENT
Start: 2022-11-03 | End: 2022-11-10 | Stop reason: HOSPADM

## 2022-11-03 RX ORDER — ESCITALOPRAM OXALATE 20 MG/1
20 TABLET ORAL DAILY
Status: DISCONTINUED | OUTPATIENT
Start: 2022-11-04 | End: 2022-11-06

## 2022-11-03 RX ORDER — HYDRALAZINE HYDROCHLORIDE 20 MG/ML
5 INJECTION INTRAMUSCULAR; INTRAVENOUS EVERY 6 HOURS PRN
Status: DISCONTINUED | OUTPATIENT
Start: 2022-11-03 | End: 2022-11-10 | Stop reason: HOSPADM

## 2022-11-03 RX ORDER — INSULIN LISPRO 100 [IU]/ML
1-5 INJECTION, SOLUTION INTRAVENOUS; SUBCUTANEOUS
Status: DISCONTINUED | OUTPATIENT
Start: 2022-11-04 | End: 2022-11-05

## 2022-11-03 RX ORDER — LABETALOL HYDROCHLORIDE 5 MG/ML
10 INJECTION, SOLUTION INTRAVENOUS ONCE
Status: COMPLETED | OUTPATIENT
Start: 2022-11-03 | End: 2022-11-03

## 2022-11-03 RX ORDER — INSULIN GLARGINE 100 [IU]/ML
20 INJECTION, SOLUTION SUBCUTANEOUS
Status: DISCONTINUED | OUTPATIENT
Start: 2022-11-03 | End: 2022-11-05

## 2022-11-03 RX ORDER — NYSTATIN 100000 [USP'U]/G
POWDER TOPICAL ONCE
Status: DISCONTINUED | OUTPATIENT
Start: 2022-11-03 | End: 2022-11-10 | Stop reason: HOSPADM

## 2022-11-03 RX ORDER — ACETAMINOPHEN 325 MG/1
650 TABLET ORAL EVERY 6 HOURS PRN
Status: DISCONTINUED | OUTPATIENT
Start: 2022-11-03 | End: 2022-11-10 | Stop reason: HOSPADM

## 2022-11-03 RX ADMIN — LABETALOL HYDROCHLORIDE 10 MG: 5 INJECTION, SOLUTION INTRAVENOUS at 17:10

## 2022-11-03 RX ADMIN — Medication 2000 UNITS: at 20:56

## 2022-11-03 RX ADMIN — DOCUSATE SODIUM 100 MG: 100 CAPSULE, LIQUID FILLED ORAL at 21:00

## 2022-11-03 RX ADMIN — INSULIN LISPRO 2 UNITS: 100 INJECTION, SOLUTION INTRAVENOUS; SUBCUTANEOUS at 22:47

## 2022-11-03 RX ADMIN — METOPROLOL TARTRATE 25 MG: 25 TABLET, FILM COATED ORAL at 21:00

## 2022-11-03 RX ADMIN — INSULIN GLARGINE 20 UNITS: 100 INJECTION, SOLUTION SUBCUTANEOUS at 22:46

## 2022-11-03 RX ADMIN — ERYTHROMYCIN 0.5 INCH: 5 OINTMENT OPHTHALMIC at 18:54

## 2022-11-03 RX ADMIN — IOHEXOL 100 ML: 350 INJECTION, SOLUTION INTRAVENOUS at 16:51

## 2022-11-03 NOTE — ED NOTES
Per doctor Margarita Rg pt to be admitted to medicine, not under trauma      Marcos Sullivan, JORDEN  11/03/22 2718

## 2022-11-03 NOTE — ED ATTENDING ATTESTATION
11/3/2022  IJarod MD, saw and evaluated the patient  I have discussed the patient with the resident/non-physician practitioner and agree with the resident's/non-physician practitioner's findings, Plan of Care, and MDM as documented in the resident's/non-physician practitioner's note, except where noted  All available labs and Radiology studies were reviewed  I was present for key portions of any procedure(s) performed by the resident/non-physician practitioner and I was immediately available to provide assistance  At this point I agree with the current assessment done in the Emergency Department  I have conducted an independent evaluation of this patient a history and physical is as follows:    ED Course     Patient presents for evaluation due to abdominal pain, constipation, and vomiting  Last BM Saturday  Reports brown emesis  Recent tib-fib ORIF due to a fall  No additional complaints  Exam: Awake, alert, confused at baseline, abd diffusely TTP  A/P: Abdominal pain, vomiting, constipation  Will check labs and CT A/P      Critical Care Time  Procedures

## 2022-11-03 NOTE — ED PROVIDER NOTES
History  Chief Complaint   Patient presents with   • Constipation     No bowel movement since Saturday, have been trying over there counter stuff and it hasnt been helping, complaining of lower abdominal pain  Vomited x2 clear liquid      Pt is an 81yo F who presents for ***          Prior to Admission Medications   Prescriptions Last Dose Informant Patient Reported? Taking?    Continuous Blood Gluc  (Dexcom G6 ) SIVAKUMAR  Child Yes No   Sig: Use daily as directed for CGM   Continuous Blood Gluc Sensor (Dexcom G6 Sensor) MISC  Child Yes No   Sig: Use daily as directed for CGM - Change every 10 days   Continuous Blood Gluc Transmit (Dexcom G6 Transmitter) MISC  Child Yes No   Sig: Use daily as directed for CGM - Change every 3 months   Eliquis 5 MG  Child No No   Sig: TAKE 1 TABLET BY MOUTH TWICE A DAY   Insulin Disposable Pump (V-Go 20) KIT  Child No No   Sig: Apply once a night   Insulin Disposable Pump (V-Go 20) KIT  Child No No   Sig: Use daily Use one daily   NovoLOG 100 UNIT/ML injection  Child No No   Sig: INJECT UP TO 15 UNITS DAILY WITH V-GO   acetaminophen (TYLENOL) 325 mg tablet  Child No No   Sig: Take 3 tablets (975 mg total) by mouth every 8 (eight) hours   atorvastatin (LIPITOR) 40 mg tablet  Child No No   Sig: Take 1 tablet (40 mg total) by mouth daily   calcium carbonate-vitamin D (OSCAL-D) 500 mg-200 units per tablet  Child No No   Sig: Take 1 tablet by mouth 2 (two) times a day with meals   escitalopram (Lexapro) 20 mg tablet   No No   Sig: Take 1 tablet (20 mg total) by mouth daily   glucose blood test strip  Child No No   Sig: USE AS DIRECTED 3 TIMES A DAY   insulin glargine (LANTUS) 100 units/mL subcutaneous injection  Child No No   Sig: Inject 25 Units under the skin daily at bedtime   metoprolol tartrate (LOPRESSOR) 25 mg tablet  Child No No   Sig: TAKE 1 TABLET (25 MG TOTAL) BY MOUTH EVERY 12 (TWELVE) HOURS   ondansetron (Zofran ODT) 4 mg disintegrating tablet  Child No No   Sig: Take 1 tablet (4 mg total) by mouth every 6 (six) hours as needed for nausea or vomiting   potassium chloride (Klor-Con M10) 10 mEq tablet  Child No No   Sig: Take 1 tablet (10 mEq total) by mouth daily   senna-docusate sodium (SENOKOT S) 8 6-50 mg per tablet  Child No No   Sig: Take 1 tablet by mouth daily at bedtime   torsemide (DEMADEX) 20 mg tablet  Child No No   Sig: Take 1 tablet (20 mg total) by mouth daily      Facility-Administered Medications: None       Past Medical History:   Diagnosis Date   • Arthritis    • Asthma    • CAD (coronary artery disease) of artery bypass graft    • Cardiac disease    • CHF (congestive heart failure) ([facility])    • Dementia ([facility])    • Depression    • Diabetes mellitus ([facility] )    • Heart attack ([facility] )    • Hyperlipidemia    • Hypertension    • MI (myocardial infarction) ([facility] )    • Neuropathy    • BRANT (obstructive sleep apnea)    • Peptic ulcer     was + for H pylori   • Transient cerebral ischemia 2011    with neg CUS and ECHO       Past Surgical History:   Procedure Laterality Date   • APPENDECTOMY     • CATARACT EXTRACTION     •  SECTION     • COLONOSCOPY  2004   • CORONARY ANGIOPLASTY  2006   • CORONARY ANGIOPLASTY WITH STENT PLACEMENT     • CORONARY ARTERY BYPASS GRAFT     • DENTAL SURGERY     • EGD AND COLONOSCOPY     • ELBOW SURGERY      resolved    • ESOPHAGOGASTRODUODENOSCOPY     • ORIF TIBIA & FIBULA FRACTURES Right 2022    Procedure: OPEN REDUCTION W/ INTERNAL FIXATION (ORIF) ANKLE;  Surgeon: Koleen Gilford, MD;  Location: [facility];  Service: Orthopedics       Family History   Problem Relation Age of Onset   • Diabetes Mother    • Cancer Sister    • Heart disease Sister    • Thyroid disease Sister    • Cancer Brother    • Cancer Father    • Colon cancer Family    • Diabetes Family    • Mitral valve prolapse Family    • Thyroid cancer Family      I have reviewed and agree with the history as documented      E-Cigarette/Vaping   • E-Cigarette Use Never User      E-Cigarette/Vaping Substances   • Nicotine No    • THC No    • CBD No    • Flavoring No      Social History     Tobacco Use   • Smoking status: Never Smoker   • Smokeless tobacco: Never Used   Vaping Use   • Vaping Use: Never used   Substance Use Topics   • Alcohol use: Not Currently   • Drug use: Never        Review of Systems    Physical Exam  ED Triage Vitals [11/03/22 1226]   Temperature Pulse Respirations Blood Pressure SpO2   97 9 °F (36 6 °C) 82 18 (!) 224/90 96 %      Temp Source Heart Rate Source Patient Position - Orthostatic VS BP Location FiO2 (%)   Oral Monitor Lying Right arm --      Pain Score       No Pain             Orthostatic Vital Signs  Vitals:    11/03/22 1226   BP: (!) 224/90   Pulse: 82   Patient Position - Orthostatic VS: Lying       Physical Exam    ED Medications  Medications - No data to display    Diagnostic Studies  Results Reviewed     None                 No orders to display         Procedures  Procedures      ED Course  ED Course as of 11/13/22 1831   Thu Nov 03, 2022   1424 WBC(!): 12 57  Elevated  Non-specific  Awaiting further labs  1425 CBC and differential(!)  Reviewed and without actionable derangement  1502 Comprehensive metabolic panel(!)  Reviewed and without actionable derangement  1502 Lipase(!): 52  Negative  1612 Acute mental status change  Will get non-con head CT     1724 Pt returned to baseline without intervention  1725 Per rads, rectus sheath hematoma 10cm, active extravasation; New vaginal fluid (fistula vs stenosis)   1733 Trauma aware  Will discuss reversal after trauma evaluation  [phone] Pt and daughter made aware  1748 CTA head and neck with and without contrast  No evidence of acute intracranial hemorrhage  1748 Trauma to see  1812 CT abdomen pelvis with contrast  *  Streak artifact from the upper extremities lying adjacent to the abdomen, limiting evaluation of abdominal viscera    *  Heterogenous intramuscular 10 2 cm hematoma involving the left rectus muscle containing 2 hyperattenuating foci, concerning for active extravasation  The hematoma can be secondary to recent trauma, anticoagulation or presence of underlying   hemorrhagic neoplasm  CT abdomen pelvis in 3-6 months is recommended to assess for resolution  *   Moderate amount of fluid in the superior aspect of the vagina  Findings can be secondary to presence of obstructing neoplasm or presence of vesicovaginal fistula  Direct visualization and GYN consultation is recommended for further evaluation  6883 Per trauma, admit to medicine with red surg consult  Will reverse Eliquis and apply binder  6118 [facility] contacted for admission  MDM  Number of Diagnoses or Management Options  Diagnosis management comments: Pt is a 81yo F who presents with ***  Exam pertinent for ***  Differential diagnosis to include but not limited to ***  Plan to ***        Disposition  Final diagnoses:   None     ED Disposition     None      Follow-up Information    None         Patient's Medications   Discharge Prescriptions    No medications on file     No discharge procedures on file  PDMP Review     None           ED Provider  Attending physically available and evaluated [facility]  CONNIE managed the patient along with the ED Attending      Electronically Signed by concentrate (human) (Kcentra) 2,000 Units (2,000 Units Intravenous Given 11/3/22 2056)   lactulose oral solution 30 g (30 g Oral Given 11/4/22 2022)   labetalol (NORMODYNE) injection 10 mg (10 mg Intravenous Given 11/5/22 0056)   bisacodyl (DULCOLAX) rectal suppository 10 mg (10 mg Rectal Given 11/5/22 1424)   lactulose oral solution 30 g (30 g Oral Given 11/5/22 1423)   sodium phosphate-biphosphate (FLEET) enema 1 enema (1 enema Rectal Given 11/5/22 2111)   HYDROmorphone HCl (DILAUDID) injection 0 2 mg (0 2 mg Intravenous Given 11/9/22 1010)       Diagnostic Studies  Results Reviewed     Procedure Component Value Units Date/Time    Protime-INR [[id number]]  (Abnormal) Collected: 11/03/22 1825    Lab Status: Final result Specimen: Blood from Arm, Right Updated: 11/03/22 1914     Protime 16 4 seconds      INR 1 30    APTT [[id number]]  (Normal) Collected: 11/03/22 1825    Lab Status: Final result Specimen: Blood from Arm, Right Updated: 11/03/22 1914     PTT 30 seconds     Fingerstick Glucose (POCT) [[id number]]  (Abnormal) Collected: 11/03/22 1536    Lab Status: Final result Updated: 11/03/22 1542     POC Glucose 143 mg/dl     Comprehensive metabolic panel [[id number]]  (Abnormal) Collected: 11/03/22 1416    Lab Status: Final result Specimen: Blood from Arm, Right Updated: 11/03/22 1449     Sodium 137 mmol/L      Potassium 3 9 mmol/L      Chloride 105 mmol/L      CO2 27 mmol/L      ANION GAP 5 mmol/L      BUN 29 mg/dL      Creatinine 1 04 mg/dL      Glucose 169 mg/dL      Calcium 9 6 mg/dL      Corrected Calcium 10 3 mg/dL      AST 22 U/L      ALT 22 U/L      Alkaline Phosphatase 98 U/L      Total Protein 6 7 g/dL      Albumin 3 1 g/dL      Total Bilirubin 1 01 mg/dL      eGFR 49 ml/min/1 73sq m     Narrative:      [place] guidelines for Chronic Kidney Disease (CKD):   •  Stage 1 with normal or high GFR (GFR > 90 mL/min/1 73 square meters)  •  Stage 2 Mild CKD (GFR = 60-89 mL/min/1 73 square meters)  •  Stage 3A Moderate CKD (GFR = 45-59 mL/min/1 73 square meters)  •  Stage 3B Moderate CKD (GFR = 30-44 mL/min/1 73 square meters)  •  Stage 4 Severe CKD (GFR = 15-29 mL/min/1 73 square meters)  •  Stage 5 End Stage CKD (GFR <15 mL/min/1 73 square meters)  Note: GFR calculation is accurate only with a steady state creatinine    Lipase [[id number]]  (Abnormal) Collected: 11/03/22 1416    Lab Status: Final result Specimen: Blood from Arm, Right Updated: 11/03/22 1449     Lipase 52 u/L     CBC and differential [[id number]]  (Abnormal) Collected: 11/03/22 1416    Lab Status: Final result Specimen: Blood from Arm, Right Updated: 11/03/22 1423     WBC 12 57 Thousand/uL      RBC 3 96 Million/uL      Hemoglobin 11 6 g/dL      Hematocrit 35 8 %      MCV 90 fL      MCH 29 3 pg      MCHC 32 4 g/dL      RDW 14 4 %      MPV 12 0 fL      Platelets 186 Thousands/uL      nRBC 0 /100 WBCs      Neutrophils Relative 87 %      Immat GRANS % 0 %      Lymphocytes Relative 7 %      Monocytes Relative 6 %      Eosinophils Relative 0 %      Basophils Relative 0 %      Neutrophils Absolute 10 95 Thousands/µL      Immature Grans Absolute 0 04 Thousand/uL      Lymphocytes Absolute 0 82 Thousands/µL      Monocytes Absolute 0 72 Thousand/µL      Eosinophils Absolute 0 01 Thousand/µL      Basophils Absolute 0 03 Thousands/µL                  CT abdomen pelvis wo contrast   Final Result by Huong Downey MD (11/09 1333)      Stable rectus sheath hematoma  Workstation performed: [id number]         US pelvis complete w transvaginal   Final Result by Osmany Cali MD (11/04 1950)       Poorly evaluated uterus due to shadowing from uterine calcifications  Normal right ovary  Left ovary is not seen                 Workstation performed: [id number]         CT abdomen pelvis with contrast   Final Result by Araceli Scott MD (11/03 1802)   *  Streak artifact from the upper extremities lying adjacent to the abdomen, limiting evaluation of abdominal viscera  *  Heterogenous intramuscular 10 2 cm hematoma involving the left rectus muscle containing 2 hyperattenuating foci, concerning for active extravasation  The hematoma can be secondary to recent trauma, anticoagulation or presence of underlying    hemorrhagic neoplasm  CT abdomen pelvis in 3-6 months is recommended to assess for resolution  *   Moderate amount of fluid in the superior aspect of the vagina  Findings can be secondary to presence of obstructing neoplasm or presence of vesicovaginal fistula  Direct visualization and GYN consultation is recommended for further evaluation  Additional findings as above  I personally discussed this study with [facility] on 11/3/2022 at 5:25 PM                Workstation performed: [id number]         CTA head and neck with and without contrast   Final Result by Sol Harry MD (11/03 3915)      No evidence of acute intracranial hemorrhage  Chronic microangiopathy  Moderate to severe plaque stenosis right vertebral artery origin  Approximately 70-80% plaque stenosis right cervical ICA origin  Moderate to severe bilateral cavernous carotid artery plaque stenosis  Workstation performed: [id number]               Procedures  Procedures      ED Course  ED Course as of 11/14/22 1018   Thu Nov 03, 2022   1424 WBC(!): 12 57  Elevated  Non-specific  Awaiting further labs  1425 CBC and differential(!)  Reviewed and without actionable derangement  1502 Comprehensive metabolic panel(!)  Reviewed and without actionable derangement  1502 Lipase(!): 52  Negative  1612 Acute mental status change  Will get non-con head CT     1724 Pt returned to baseline without intervention  1725 Per rads, rectus sheath hematoma 10cm, active extravasation; New vaginal fluid (fistula vs stenosis)   1733 Trauma aware  Will discuss reversal after trauma evaluation  [phone] Pt and daughter made aware  1748 CTA head and neck with and without contrast  No evidence of acute intracranial hemorrhage  1748 Trauma to see  1812 CT abdomen pelvis with contrast  *  Streak artifact from the upper extremities lying adjacent to the abdomen, limiting evaluation of abdominal viscera  *  Heterogenous intramuscular 10 2 cm hematoma involving the left rectus muscle containing 2 hyperattenuating foci, concerning for active extravasation  The hematoma can be secondary to recent trauma, anticoagulation or presence of underlying   hemorrhagic neoplasm  CT abdomen pelvis in 3-6 months is recommended to assess for resolution  *   Moderate amount of fluid in the superior aspect of the vagina  Findings can be secondary to presence of obstructing neoplasm or presence of vesicovaginal fistula  Direct visualization and GYN consultation is recommended for further evaluation  7833 Per trauma, admit to medicine with red surg consult  Will reverse Eliquis and apply binder  6135 [facility] contacted for admission  MDM  Number of Diagnoses or Management Options  Abdominal pain  Nausea and vomiting  Rectus sheath hematoma, initial encounter  Diagnosis management comments: Pt is a 83yo F who presents with abdominal pain  Exam pertinent for diffuse abdominal tenderness  Will plan for labs and CT A/P  See ED course for results and details  Plan [facility] pt to [facility]  Pt discussed with admitting team and admission orders placed  Pt admitted without incident              Amount and/or Complexity of Data Reviewed  Clinical lab tests: ordered and reviewed  Tests in the radiology section of CPT®: ordered and reviewed        Disposition  Final diagnoses:   Rectus sheath hematoma, initial encounter   Abdominal pain   Nausea and vomiting     Time reflects when diagnosis was documented in both MDM as applicable and the Disposition within this note     Time User Action Codes Description Comment 11/3/2022  6:51 PM Marily Venegas Add [S30 1XXA] Rectus sheath hematoma, initial encounter     11/3/2022  6:53 PM Marily Venegas Add [R10 9] Abdominal pain     11/3/2022  6:53 PM Marily Venegas Add [R11 2] Nausea and vomiting     11/3/2022 10:01 PM Yael Loredo Add [N89 9] Vaginal disorder     11/6/2022  2:14 PM Camilla Code Add [I65 29] 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     11/6/2022 10:26 PM Camilla Code Add [I63 50] Cerebral artery occlusion with cerebral infarction ([facility] [address] [address] )     11/6/2022 10:28 PM Camilla Code Add [I65 21] Right cavernous carotid stenosis     11/10/2022 11:49 AM Denisha Valentine Add [S82 891A] Closed fracture of right ankle, initial encounter       ED Disposition     ED Disposition   Admit    Condition   Stable    Date/Time   Thu Nov 3, 2022  6:53 PM    Comment   Case was discussed with KOKI and the patient's admission status was agreed to be Admission Status: inpatient status to the service of Dr Tamera Ivy             Follow-up Information     Follow up With Specialties Details Why [address] [facility]  Follow up  [address] [facility]  Nir Huston MD Family Medicine Schedule an appointment as soon as possible for a visit in 1 week(s)  [facility] [address] [facility]  [phone]            Discharge Medication List as of 11/10/2022 12:38 PM      START taking these medications    Details   aspirin 81 mg chewable tablet Chew 1 tablet (81 mg total) daily Do not start before November 11, 2022 , Starting Fri 11/11/2022, No Print      lactulose 20 g/30 mL Take 15 mL (10 g total) by mouth 2 (two) times a day, Starting Thu 11/10/2022, Normal      metroNIDAZOLE (FLAGYL) 500 mg tablet Take 1 tablet (500 mg total) by mouth every 12 (twelve) hours for 5 doses, Starting Thu 11/10/2022, Until Sun 11/13/2022, Normal      polyethylene glycol (MIRALAX) 17 g packet Take 17 g by mouth daily for 7 days Do not start before November 11, 2022 , Starting Fri 11/11/2022, Until Fri 11/18/2022, Normal         CONTINUE these medications which have CHANGED    Details   acetaminophen (TYLENOL) 325 mg tablet Take 2 tablets (650 mg total) by mouth every 6 (six) hours as needed for mild pain, Starting Thu 11/10/2022, No Print         CONTINUE these medications which have NOT CHANGED    Details   atorvastatin (LIPITOR) 40 mg tablet Take 1 tablet (40 mg total) by mouth daily, Starting Mon 1/3/2022, Normal      calcium carbonate-vitamin D (OSCAL-D) 500 mg-200 units per tablet Take 1 tablet by mouth 2 (two) times a day with meals, Starting Sat 4/28/2018, No Print      Continuous Blood Gluc  (Dexcom G6 ) SIVAKUMAR Use daily as directed for CGM, Historical Med      Continuous Blood Gluc Sensor (Dexcom G6 Sensor) MISC Use daily as directed for CGM - Change every 10 days, Historical Med      Continuous Blood Gluc Transmit (Dexcom G6 Transmitter) MISC Use daily as directed for CGM - Change every 3 months, Historical Med      Eliquis 5 MG TAKE 1 TABLET BY MOUTH TWICE A DAY, Normal      escitalopram (Lexapro) 20 mg tablet Take 1 tablet (20 mg total) by mouth daily, Starting Thu 10/27/2022, Normal      glucose blood test strip USE AS DIRECTED 3 TIMES A DAY, Normal      !! Insulin Disposable Pump (V-Go 20) KIT Apply once a night, Normal      !!  Insulin Disposable Pump (V-Go 20) KIT Use daily Use one daily, Starting Fri 7/29/2022, Normal      metoprolol tartrate (LOPRESSOR) 25 mg tablet TAKE 1 TABLET (25 MG TOTAL) BY MOUTH EVERY 12 (TWELVE) HOURS, Starting Tue 6/14/2022, Normal      NovoLOG 100 UNIT/ML injection INJECT UP TO 15 UNITS DAILY WITH V-GO, Normal      ondansetron (Zofran ODT) 4 mg disintegrating tablet Take 1 tablet (4 mg total) by mouth every 6 (six) hours as needed for nausea or vomiting, Starting Mon 10/10/2022, Normal      senna-docusate sodium (SENOKOT S) 8 6-50 mg per tablet Take 1 tablet by mouth daily at bedtime, Starting Mon 8/22/2022, No Print       !! - Potential duplicate medications found  Please discuss with provider  STOP taking these medications       insulin glargine (LANTUS) 100 units/mL subcutaneous injection Comments:   Reason for Stopping:         potassium chloride (Klor-Con M10) 10 mEq tablet Comments:   Reason for Stopping:         torsemide (DEMADEX) 20 mg tablet Comments:   Reason for Stopping:             Outpatient Discharge Orders   EXTERNAL: Ambulatory Referral to Home Health   Standing Status: Future Standing Exp  Date: 11/10/23      Discharge Diet     Activity as tolerated     Call provider for:  persistent dizziness or light-headedness     Call provider for:  persistent nausea or vomiting     Call provider for:  difficulty breathing, headache or visual disturbances     Call provider for:  severe uncontrolled pain       PDMP Review     None           ED Provider  Attending physically available and evaluated Hernan Fernando I managed the patient along with the ED Attending      Electronically Signed by         Harsh Joya MD  11/14/22 1011

## 2022-11-03 NOTE — QUICK NOTE
IR quick note    Patient w/ abd pain    Found to have left recrus sheath hematoma  Small degree active bleeding - no expansion outside sheath on my review    Notable for assymetric rectus muscles and on prior imaging had a kink in left inferior epigastric (oct 20)    On eliqis    Recs    No IR intervention at this time    - abd binder  - trend Hgb  - consider reversal agent for DOAC    Contact IR for any changes questions concerns

## 2022-11-03 NOTE — ED NOTES
Patient transported to CT on monitor w/ Dr Andrés Egan, ED RN and tech Jetta Cogan, RN  11/03/22 7309

## 2022-11-04 ENCOUNTER — APPOINTMENT (INPATIENT)
Dept: RADIOLOGY | Facility: HOSPITAL | Age: 87
End: 2022-11-04

## 2022-11-04 LAB
ALBUMIN SERPL BCP-MCNC: 2.9 G/DL (ref 3.5–5)
ALP SERPL-CCNC: 93 U/L (ref 46–116)
ALT SERPL W P-5'-P-CCNC: 20 U/L (ref 12–78)
ANION GAP SERPL CALCULATED.3IONS-SCNC: 5 MMOL/L (ref 4–13)
AST SERPL W P-5'-P-CCNC: 14 U/L (ref 5–45)
ATRIAL RATE: 78 BPM
BILIRUB SERPL-MCNC: 0.86 MG/DL (ref 0.2–1)
BUN SERPL-MCNC: 27 MG/DL (ref 5–25)
CALCIUM ALBUM COR SERPL-MCNC: 9.9 MG/DL (ref 8.3–10.1)
CALCIUM SERPL-MCNC: 9 MG/DL (ref 8.3–10.1)
CHLORIDE SERPL-SCNC: 104 MMOL/L (ref 96–108)
CO2 SERPL-SCNC: 27 MMOL/L (ref 21–32)
CREAT SERPL-MCNC: 0.95 MG/DL (ref 0.6–1.3)
ERYTHROCYTE [DISTWIDTH] IN BLOOD BY AUTOMATED COUNT: 14.6 % (ref 11.6–15.1)
GFR SERPL CREATININE-BSD FRML MDRD: 54 ML/MIN/1.73SQ M
GLUCOSE SERPL-MCNC: 194 MG/DL (ref 65–140)
GLUCOSE SERPL-MCNC: 213 MG/DL (ref 65–140)
GLUCOSE SERPL-MCNC: 229 MG/DL (ref 65–140)
GLUCOSE SERPL-MCNC: 234 MG/DL (ref 65–140)
GLUCOSE SERPL-MCNC: 266 MG/DL (ref 65–140)
HCT VFR BLD AUTO: 29.8 % (ref 34.8–46.1)
HCT VFR BLD AUTO: 34.4 % (ref 34.8–46.1)
HGB BLD-MCNC: 10.8 G/DL (ref 11.5–15.4)
HGB BLD-MCNC: 9.4 G/DL (ref 11.5–15.4)
MCH RBC QN AUTO: 28.1 PG (ref 26.8–34.3)
MCHC RBC AUTO-ENTMCNC: 31.4 G/DL (ref 31.4–37.4)
MCV RBC AUTO: 90 FL (ref 82–98)
P AXIS: 0 DEGREES
PLATELET # BLD AUTO: 240 THOUSANDS/UL (ref 149–390)
PMV BLD AUTO: 12.1 FL (ref 8.9–12.7)
POTASSIUM SERPL-SCNC: 3.8 MMOL/L (ref 3.5–5.3)
PR INTERVAL: 248 MS
PROT SERPL-MCNC: 6.3 G/DL (ref 6.4–8.4)
QRS AXIS: 245 DEGREES
QRSD INTERVAL: 164 MS
QT INTERVAL: 432 MS
QTC INTERVAL: 492 MS
RBC # BLD AUTO: 3.84 MILLION/UL (ref 3.81–5.12)
SODIUM SERPL-SCNC: 136 MMOL/L (ref 135–147)
T WAVE AXIS: 37 DEGREES
VENTRICULAR RATE: 78 BPM
WBC # BLD AUTO: 11.12 THOUSAND/UL (ref 4.31–10.16)

## 2022-11-04 RX ORDER — POLYETHYLENE GLYCOL 3350 17 G/17G
17 POWDER, FOR SOLUTION ORAL DAILY
Status: DISCONTINUED | OUTPATIENT
Start: 2022-11-05 | End: 2022-11-10 | Stop reason: HOSPADM

## 2022-11-04 RX ORDER — AMOXICILLIN 250 MG
2 CAPSULE ORAL
Status: DISCONTINUED | OUTPATIENT
Start: 2022-11-04 | End: 2022-11-10 | Stop reason: HOSPADM

## 2022-11-04 RX ORDER — LACTULOSE 20 G/30ML
30 SOLUTION ORAL ONCE
Status: COMPLETED | OUTPATIENT
Start: 2022-11-04 | End: 2022-11-04

## 2022-11-04 RX ADMIN — SENNOSIDES 8.6 MG: 8.6 TABLET, FILM COATED ORAL at 08:19

## 2022-11-04 RX ADMIN — INSULIN LISPRO 2 UNITS: 100 INJECTION, SOLUTION INTRAVENOUS; SUBCUTANEOUS at 18:08

## 2022-11-04 RX ADMIN — LACTULOSE 30 G: 20 SOLUTION ORAL at 20:22

## 2022-11-04 RX ADMIN — METOPROLOL TARTRATE 25 MG: 25 TABLET, FILM COATED ORAL at 08:19

## 2022-11-04 RX ADMIN — HYDRALAZINE HYDROCHLORIDE 5 MG: 20 INJECTION, SOLUTION INTRAMUSCULAR; INTRAVENOUS at 00:17

## 2022-11-04 RX ADMIN — INSULIN GLARGINE 20 UNITS: 100 INJECTION, SOLUTION SUBCUTANEOUS at 22:30

## 2022-11-04 RX ADMIN — HYDRALAZINE HYDROCHLORIDE 5 MG: 20 INJECTION, SOLUTION INTRAMUSCULAR; INTRAVENOUS at 21:34

## 2022-11-04 RX ADMIN — DOCUSATE SODIUM 100 MG: 100 CAPSULE, LIQUID FILLED ORAL at 08:19

## 2022-11-04 RX ADMIN — INSULIN LISPRO 1 UNITS: 100 INJECTION, SOLUTION INTRAVENOUS; SUBCUTANEOUS at 11:53

## 2022-11-04 RX ADMIN — TORSEMIDE 20 MG: 20 TABLET ORAL at 08:19

## 2022-11-04 RX ADMIN — METOPROLOL TARTRATE 25 MG: 25 TABLET, FILM COATED ORAL at 20:22

## 2022-11-04 RX ADMIN — INSULIN LISPRO 1 UNITS: 100 INJECTION, SOLUTION INTRAVENOUS; SUBCUTANEOUS at 08:19

## 2022-11-04 RX ADMIN — ATORVASTATIN CALCIUM 40 MG: 40 TABLET, FILM COATED ORAL at 08:19

## 2022-11-04 RX ADMIN — ESCITALOPRAM OXALATE 20 MG: 20 TABLET ORAL at 08:19

## 2022-11-04 RX ADMIN — SENNOSIDES AND DOCUSATE SODIUM 2 TABLET: 8.6; 5 TABLET ORAL at 22:30

## 2022-11-04 RX ADMIN — INSULIN LISPRO 2 UNITS: 100 INJECTION, SOLUTION INTRAVENOUS; SUBCUTANEOUS at 22:31

## 2022-11-04 RX ADMIN — DOCUSATE SODIUM 100 MG: 100 CAPSULE, LIQUID FILLED ORAL at 18:08

## 2022-11-04 NOTE — ASSESSMENT & PLAN NOTE
· CT abdomen pelvis remarkable for fluid in vagina, likely due to obstructing mass versus with vesico-vaginal fistula  · As per patient's daughter no complaints of abnormal vaginal bleeding  · Will consult gyn

## 2022-11-04 NOTE — ASSESSMENT & PLAN NOTE
· Noted to have history of Alzheimer's with behavior disturbance  · Patient was noted to have change in mental status in ED however CT head unremarkable  · Continue Lexapro at home dosage

## 2022-11-04 NOTE — UTILIZATION REVIEW
NOTIFICATION OF INPATIENT ADMISSION   AUTHORIZATION REQUEST   SERVICING FACILITY:   [facility]  Address: [address] [place] [address]  Tax ID: [id number]  NPI: [id number] ATTENDING PROVIDER:  Attending Name and NPI#: Heaven Allen [[id number]]  Address: [address] [place] [address]  Phone: [phone]   ADMISSION INFORMATION:  Place of Service: Inpatient [address] [facility]  [address]  Place of Service Code: 21  Inpatient Admission Date/Time: 11/3/22  7:34 PM  Discharge Date/Time: No discharge date for patient encounter  Admitting Diagnosis Code/Description:  Abdominal pain [R10 9]  Nausea and vomiting [R11 2]  Constipation [K59 00]  Rectus sheath hematoma, initial encounter [S30 1XXA]     UTILIZATION REVIEW CONTACT:  Michel Becker Utilization   Network Utilization Review Department  Phone: [phone]  Fax: [phone]  Email: [email or web address]  Contact for approvals/pending authorizations, clinical reviews, and discharge  PHYSICIAN ADVISORY SERVICES:  Medical Necessity Denial & [employer] Review  Phone: [phone]  Fax: [phone]  Email: [email or web address]

## 2022-11-04 NOTE — OCCUPATIONAL THERAPY NOTE
Occupational Therapy Evaluation     Patient Name: Mary Ann Carrasquillo  Today's Date: 2022  Problem List  Principal Problem:    Constipation  Active Problems:    Late onset Alzheimer's dementia without behavioral disturbance ([facility] )    Gastroesophageal reflux disease with hiatal hernia    Hyperlipidemia    Essential hypertension    Obstructive sleep apnea    Type 2 diabetes mellitus with hyperglycemia, with long-term current use of insulin ([facility] )    Stage 3 chronic kidney disease, unspecified whether stage 3a or 3b CKD ([facility] [address] )    Scalp laceration    Rectus sheath hematoma    Tibia/fibula fracture    Vaginal disorder    Leukocytosis    Past Medical History  Past Medical History:   Diagnosis Date    Arthritis     Asthma     CAD (coronary artery disease) of artery bypass graft     Cardiac disease     CHF (congestive heart failure) ([facility])     Dementia ([facility])     Depression     Diabetes mellitus ([facility] )     Heart attack ([facility] )     Hyperlipidemia     Hypertension     MI (myocardial infarction) ([facility] )     Neuropathy     BRANT (obstructive sleep apnea)     Peptic ulcer     was + for H pylori    Transient cerebral ischemia 2011    with neg CUS and ECHO     Past Surgical History  Past Surgical History:   Procedure Laterality Date    APPENDECTOMY      CATARACT EXTRACTION       SECTION      COLONOSCOPY  2004    CORONARY ANGIOPLASTY  2006    CORONARY ANGIOPLASTY WITH STENT PLACEMENT      CORONARY ARTERY BYPASS GRAFT      DENTAL SURGERY      EGD AND COLONOSCOPY      ELBOW SURGERY      resolved     ESOPHAGOGASTRODUODENOSCOPY      ORIF TIBIA & FIBULA FRACTURES Right 2022    Procedure: OPEN REDUCTION W/ INTERNAL FIXATION (ORIF) ANKLE;  Surgeon: Kyra Israel MD;  Location: [facility];  Service: Orthopedics         22 1040   OT Last Visit   OT Visit Date 22   Note Type   Note type Evaluation   Pain Assessment   Pain Assessment Tool FLACC   Pain Rating: FLACC (Rest) - Face 0   Pain Rating: FLACC (Rest) - Legs 0   Pain Rating: FLACC (Rest) - Activity 0   Pain Rating: FLACC (Rest) - Cry 0   Pain Rating: FLACC (Rest) - Consolability 0   Score: FLACC (Rest) 0   Pain Rating: FLACC (Activity) - Face 1   Pain Rating: FLACC (Activity) - Legs 1   Pain Rating: FLACC (Activity) - Activity 1   Pain Rating: FLACC (Activity) - Cry 0   Pain Rating: FLACC (Activity) - Consolability 1   Score: FLACC (Activity) 4   Restrictions/Precautions   Weight Bearing Precautions Per Order No   Braces or Orthoses Splint  (ankle brace)   Other Precautions Cognitive; Chair Alarm; Bed Alarm; Fall Risk   Home Living   Type of [address] One level   Prior Function   Level of [place] Needs assistance with ADLs; Needs assistance with IADLS;Needs assistance with functional mobility   Lives With Son;Family   Receives Help From Family   IADLs Family/Friend/Other provides transportation; Family/Friend/Other provides meals; Family/Friend/Other provides medication management   Falls in the last 6 months 1 to 4   Vocational Retired   Lifestyle   Autonomy family assists with all adls, w/c bound - family manages all iadls   Reciprocal Relationships supportive family   Service to Others retired nurse   Intrinsic Gratification sedentary pta   General   Family/Caregiver Present No   Subjective   Subjective limited interaction t/o session   ADL   Eating Assistance 4  Minimal Assistance   Grooming Assistance 3  Moderate Assistance   [address] 2  Maximal Assistance   [facility] [address] 2  Maximal [address] 2  Maximal Assistance    [facility] 2  Maximal [address]  2  Maximal Assistance   Bed Mobility   Rolling R 3  Moderate assistance   Additional items Assist x 2   Rolling L 3  Moderate assistance   Additional items Assist x 2   Supine to Sit 3  Moderate assistance   Additional items Assist x 2   Sit to Supine 3  Moderate assistance   Additional items Assist x 2 Additional Comments pt resistive t/o to mobility- little effort provided to assist - pushing into extension while seated on eob - attempted sit to stand transfers however pt refused to participate stating no and forcibly pushing into extension - returned to supine for pt/therapist safety   Transfers   Sit to Stand Unable to assess   Balance   Static Sitting Fair -   Dynamic Sitting Poor +   Static Standing Zero   Activity Tolerance   Activity Tolerance Patient limited by fatigue; Other (Comment)  (cognitive status)   RUE Assessment   RUE Assessment WFL   LUE Assessment   LUE Assessment WFL   Cognition   Overall Cognitive Status Impaired   Arousal/Participation Arousable;Persistent stimuli required;Poorly responsive   Attention Attends with cues to redirect   Orientation Level Oriented to person;Oriented to place; Disoriented to time;Disoriented to situation  (hospital with choices)   Memory Decreased short term memory;Decreased recall of recent events;Decreased recall of precautions   Following Commands Follows one step commands with increased time or repetition   Assessment   Limitation Decreased ADL status; Decreased Safe judgement during ADL;Decreased cognition;Decreased endurance;Decreased self-care trans   Prognosis Fair   Assessment Pt is a 82 y o  female who was admitted to [facility] on 11/3/2022 with Constipation recent fall - found to have rectus sheath hematoma - no intervention indicated at this time   Pt's problem list also includes PMH of obesity, previous surgery and L distal tib/fib fx, CKD, dementia, BRANT, HTN, HLD  At baseline pt required assist with adls from family - 1*using w/c - transfers with assist  Pt lives with son/family in 1 story home   Currently pt requires max assist for overall ADLS and mod a x 2 for functional transfers however noted to be resistive to mobility and providing minimal effort to assist  Pt currently presents with impairments in the following categories -difficulty performing ADLS, limited insight into deficits, compliance, flat affect, decreased initiation and engagement , health management  and environment activity tolerance, endurance, sitting balance/tolerance, arousal, memory, insight, safety , judgement , attention , task initiation  and task termination   These impairments, as well as pt's fatigue, risk for falls and home environment  limit pt's ability to safely engage in all baseline areas of occupation, includingbathing, dressing, toileting, functional mobility/transfers, social participation  and leisure activities however family has been assisting pt with all aspects of care pta -  From OT standpoint, recommend home with family support upon D/C  No immediate OT needs indicated at this time given baseline need for assist with self care - anticipate return home with family support when medically cleared- d/c from caseload   Goals   Patient Goals none stated 2* cognition   Recommendation   OT Discharge Recommendation Home with home health rehabilitation   AM-PAC Daily Activity Inpatient   Lower Body Dressing 2   Bathing 2   Toileting 2   Upper Body Dressing 2   Grooming 2   Eating 3   Daily Activity Raw Score 13   Daily Activity Standardized Score (Calc for Raw Score >=11) 32 03   AM-PAC Applied Cognition Inpatient   Following a Speech/Presentation 2   Understanding Ordinary Conversation 3   Taking Medications 2   Remembering Where Things Are Placed or Put Away 2   Remembering List of 4-5 Errands 1   Taking Care of Complicated Tasks 1   Applied Cognition Raw Score 11   Applied Cognition Standardized Score 27 03   End of Consult   Patient Position at End of Consult Supine; All needs within reach;Bed/Chair alarm activated     [employer]

## 2022-11-04 NOTE — ASSESSMENT & PLAN NOTE
Lab Results   Component Value Date    HGBA1C 7 3 (H) 07/30/2022       Recent Labs     11/03/22  1536 11/03/22  2103   POCGLU 143* 221*       Blood Sugar Average: Last 72 hrs:  (P) 182     Patient is on Lantus 25 units q h s  Along with premeal 25 units  Will start on Lantus 20 units along with sliding scale insulin, adjust pending 24 hours requirement  Goal is to keep less than 180

## 2022-11-04 NOTE — ASSESSMENT & PLAN NOTE
Lab Results   Component Value Date    EGFR 54 11/04/2022    EGFR 49 11/03/2022    EGFR 43 10/20/2022    CREATININE 0 95 11/04/2022    CREATININE 1 04 11/03/2022    CREATININE 1 14 10/20/2022     History noted, creatinine within normal limits now

## 2022-11-04 NOTE — PROGRESS NOTES
[facility]  Progress Note - [place] 1935, 82 y o  female MRN: [id number]  Unit/Bed#: [facility] 803-01 Encounter: [id number]  Primary Care Provider: Chery Kim MD   Date and time admitted to hospital: 11/3/2022 12:21 PM    * Constipation  Assessment & Plan  · Patient presented with constipation, as per daughter last bowel movement was 7 days back  This morning was noted to have vomiting  · Will start on bowel regimen  · Would caution with fleets enema in setting of ct findings would wait for transvaginal ultrasound and maintain pt on   · Dulcolax / senna at hs , lactulose now and miralax   · Ct imaging does not demonstrated considerable stool in colon     Vaginal disorder  Assessment & Plan  · CT abdomen pelvis remarkable for fluid in vagina, likely due to obstructing mass versus with vesico-vaginal fistula  · As per patient's daughter no complaints of abnormal vaginal bleeding  · Appreciated gyn and discussed with gyn   · Prior ct imaging not noted to have said fluid collection in vagina  · No evidence of fistula formation per gyn on speculum exam  · Transvaginal ultrasound ordered   · Discussed with son and daughter at bedside   · Plan to obtain TVUS and then consider catheterization pending results family will then decide to proceed with further intervention or not  · Methylene blue tampon test was discussed not yet performed   · R/o vesicovaginal fistula    Rectus sheath hematoma  Assessment & Plan  · Patient had fall about 2 weeks back  · CT abdomen pelvis did showed 10 cm left rectus sheath hematoma  · Recommend repeat ct abdomen pelvis in 3 - 6 months   · Patient evaluated by surgery, no acute intervention needed at this time  · HH appears to be stable no considerable drop   · Recommended abdominal binder, reversal of Eliquis with Kcentra    · As per ED physician's discussion with Radiology does not seem actively bleeding however trend hemoglobin  · If hemoglobin trends down may need to consult IR for possible intervention  Essential hypertension  Assessment & Plan  · Patient is on torsemide and metoprolol at home, continue same  · Patient was noted to be hypertensive in ED needing labetalol 10 mg once  · Blood pressure at goal now  Late onset Alzheimer's dementia without behavioral disturbance ([address] )  Assessment & Plan  · Noted to have history of Alzheimer's with behavior disturbance  · Patient was noted to have change in mental status in ED however CT head unremarkable  · Continue Lexapro at home dosage  Leukocytosis  Assessment & Plan  · Leukocytosis noted on labs likely reactive  · No evidence of fever or signs of infection  · Monitor CBC in a m  Tibia/fibula fracture  Assessment & Plan  Patient had fall 2 weeks back leading to defer fracture status post or if   PT/OT eval     Scalp laceration  Assessment & Plan  · Patient had fall about 2 weeks back when she had laceration on scalp needing sutures  · As per daughter she was supposed to get sutures removed this week  · Discuss with acute surgery team tomorrow for possible removal of sutures  Stage 3 chronic kidney disease, unspecified whether stage 3a or 3b CKD [facility])  Assessment & Plan  Lab Results   Component Value Date    EGFR 54 11/04/2022    EGFR 49 11/03/2022    EGFR 43 10/20/2022    CREATININE 0 95 11/04/2022    CREATININE 1 04 11/03/2022    CREATININE 1 14 10/20/2022     History noted, creatinine within normal limits now  Type 2 diabetes mellitus with hyperglycemia, with long-term current use of insulin [facility])  Assessment & Plan  Lab Results   Component Value Date    HGBA1C 7 3 (H) 07/30/2022       Recent Labs     11/03/22  1536 11/03/22  2103 11/04/22  0714 11/04/22  1119   POCGLU 143* 221* 194* 213*       Blood Sugar Average: Last 72 hrs:  (P) 192 75     · Patient is on Lantus 25 units q h s  Along with premeal 25 units    · Started on Lantus 20 units along with sliding scale insulin, adjust pending 24 hours requirement  · Goal is to keep less than 180  Obstructive sleep apnea  Assessment & Plan  Patient has history of sleep apnea however is not compliant on CPAP  Hyperlipidemia  Assessment & Plan  Continue atorvastatin        VTE Pharmacologic Prophylaxis: VTE Score: 4 High Risk (Score >/= 5) - Pharmacological DVT Prophylaxis Contraindicated  Sequential Compression Devices Ordered  Patient Centered Rounds: I performed bedside rounds with nursing staff today  Discussions with Specialists or Other Care Team Provider: nursing / gyn     Education and Discussions with Family / Patient: Updated  (son) at bedside  and daughter via phone     Time Spent for Care: 45 minutes  More than 50% of total time spent on counseling and coordination of care as described above  Current Length of Stay: 1 day(s)  Current Patient Status: Inpatient   Certification Statement: The patient will continue to require additional inpatient hospital stay due to ongoing tapia due to ct findings   Discharge Plan: will depend on plan of care at this time and need for higher level care     Code Status: Level 1 - Full Code    Subjective:   Pt is slow to respond she is very forgetful doesn't know where she is at at this time  Denies pain no palpable abd pain had binder on   Poor historian  Objective:     Vitals:   Temp (24hrs), Av 9 °F (36 6 °C), Min:97 7 °F (36 5 °C), Max:98 1 °F (36 7 °C)    Temp:  [97 7 °F (36 5 °C)-98 1 °F (36 7 °C)] 97 9 °F (36 6 °C)  HR:  [65-72] 65  Resp:  [16-20] 20  BP: (152-200)/(70-90) 181/80  SpO2:  [95 %-98 %] 96 %  Body mass index is 34 45 kg/m²  Input and Output Summary (last 24 hours): Intake/Output Summary (Last 24 hours) at 2022 1840  Last data filed at 2022 1801  Gross per 24 hour   Intake --   Output 900 ml   Net -900 ml       Physical Exam:   Physical Exam  Constitutional:       General: She is not in acute distress  Appearance: She is obese  She is not ill-appearing, toxic-appearing or diaphoretic  HENT:      Head: Normocephalic  Mouth/Throat:      Pharynx: Oropharynx is clear  Eyes:      General:         Right eye: No discharge  Left eye: No discharge  Conjunctiva/sclera: Conjunctivae normal    Cardiovascular:      Rate and Rhythm: Normal rate  Heart sounds: No murmur heard  No friction rub  No gallop  Pulmonary:      Effort: No respiratory distress  Breath sounds: No stridor  Rhonchi present  No wheezing or rales  Chest:      Chest wall: No tenderness  Abdominal:      General: There is no distension  Palpations: There is no mass  Tenderness: There is no abdominal tenderness  There is no guarding or rebound  Hernia: No hernia is present  Musculoskeletal:         General: No swelling, tenderness, deformity or signs of injury  Right lower leg: No edema  Left lower leg: No edema  Skin:     Coloration: Skin is not jaundiced or pale  Findings: Bruising (right arm ) present  No erythema, lesion or rash  Neurological:      Mental Status: She is alert  She is disoriented  Psychiatric:      Comments: Slow to respond          Additional Data:     Labs:  Results from last 7 days   Lab Units 11/04/22  0942 11/03/22  2359 11/03/22  1416   WBC Thousand/uL 11 12*  --  12 57*   HEMOGLOBIN g/dL 10 8*   < > 11 6   HEMATOCRIT % 34 4*   < > 35 8   PLATELETS Thousands/uL 240  --  235   NEUTROS PCT %  --   --  87*   LYMPHS PCT %  --   --  7*   MONOS PCT %  --   --  6   EOS PCT %  --   --  0    < > = values in this interval not displayed       Results from last 7 days   Lab Units 11/04/22  0942   SODIUM mmol/L 136   POTASSIUM mmol/L 3 8   CHLORIDE mmol/L 104   CO2 mmol/L 27   BUN mg/dL 27*   CREATININE mg/dL 0 95   ANION GAP mmol/L 5   CALCIUM mg/dL 9 0   ALBUMIN g/dL 2 9*   TOTAL BILIRUBIN mg/dL 0 86   ALK PHOS U/L 93   ALT U/L 20   AST U/L 14   GLUCOSE RANDOM mg/dL 234* Results from last 7 days   Lab Units 11/03/22  1825   INR  1 30*     Results from last 7 days   Lab Units 11/04/22  1545 11/04/22  1119 11/04/22  0714 11/03/22  2103 11/03/22  1536   POC GLUCOSE mg/dl 229* 213* 194* 221* 143*               Lines/Drains:  Invasive Devices  Report    Peripheral Intravenous Line  Duration           Peripheral IV 11/03/22 Right Antecubital 1 day                      Imaging: Reviewed radiology reports from this admission including: abdominal/pelvic CT    Recent Cultures (last 7 days):         Last 24 Hours Medication List:   Current Facility-Administered Medications   Medication Dose Route Frequency Provider Last Rate   • acetaminophen  650 mg Oral Q6H PRN Eloise Villafuerte MD     • aluminum-magnesium hydroxide-simethicone  30 mL Oral Q6H PRN Eloise Villafuerte MD     • atorvastatin  40 mg Oral Daily Eloise Villafuerte MD     • escitalopram  20 mg Oral Daily Eloise Villafuerte MD     • hydrALAZINE  5 mg Intravenous Q6H PRN Antoinette Stahl PA-C     • insulin glargine  20 Units Subcutaneous HS Eloise Villafuerte MD     • insulin lispro  1-5 Units Subcutaneous TID AC Antoinette Stahl PA-C     • insulin lispro  1-5 Units Subcutaneous HS Antoinette Stahl PA-C     • lactulose  10 g Oral Daily PRN Eloise Villafuerte MD     • lactulose  30 g Oral Once Doretha Kayser, CRNP     • metoprolol tartrate  25 mg Oral Q12H Sophia Antoine MD     • nystatin   Topical Once Nick Hager MD     • ondansetron  4 mg Intravenous Q6H PRN Eloise Villafuerte MD     • [START ON 11/5/2022] polyethylene glycol  17 g Oral Daily Doretha Kayser, CRNP     • senna  1 tablet Oral Daily Eloise Villafuerte MD     • senna-docusate sodium  2 tablet Oral HS Doretha Kayser, CRNP     • torsemide  20 mg Oral Daily Eloise Villafuerte MD          Today, Patient Was Seen By: Doretha Kayser    **Please Note: This note may have been constructed using a voice recognition system  **

## 2022-11-04 NOTE — ASSESSMENT & PLAN NOTE
Lab Results   Component Value Date    HGBA1C 7 3 (H) 07/30/2022       Recent Labs     11/03/22  1536 11/03/22  2103 11/04/22  0714 11/04/22  1119   POCGLU 143* 221* 194* 213*       Blood Sugar Average: Last 72 hrs:  (P) 192 75     · Patient is on Lantus 25 units q h s  Along with premeal 25 units  · Started on Lantus 20 units along with sliding scale insulin, adjust pending 24 hours requirement  · Goal is to keep less than 180

## 2022-11-04 NOTE — H&P
[address] 1935, 82 y o  female MRN: [id number]  Unit/Bed#: [facility] 803-01 Encounter: [id number]  Primary Care Provider: Sergey Gonzáles MD   Date and time admitted to hospital: 11/3/2022 12:21 PM    Rectus sheath hematoma  Assessment & Plan  · Patient had fall about 2 weeks back  · CT abdomen pelvis did showed 10 cm left rectus sheath hematoma  · Patient evaluated by surgery, no acute intervention needed at this time  · Recommended abdominal binder, reversal of Eliquis with Kcentra  · As per ED physician's discussion with Radiology does not seem actively bleeding however trend hemoglobin  · If hemoglobin trends down may need to consult IR for possible intervention  * Constipation  Assessment & Plan  · Patient presented with constipation, as per daughter last bowel movement was 7 days back  This morning was noted to have vomiting  · Will start on bowel regimen  If no bowel movement in 24 hour may need enema  Leukocytosis  Assessment & Plan  · Leukocytosis noted on labs likely reactive  · No evidence of fever or signs of infection  · Monitor CBC in a m  Vaginal disorder  Assessment & Plan  · CT abdomen pelvis remarkable for fluid in vagina, likely due to obstructing mass versus with vesico-vaginal fistula  · As per patient's daughter no complaints of abnormal vaginal bleeding  · Will consult gyn  Tibia/fibula fracture  Assessment & Plan  Patient had fall 2 weeks back leading to defer fracture status post or if   PT/OT eval     Scalp laceration  Assessment & Plan  · Patient had fall about 2 weeks back when she had laceration on scalp needing sutures  · As per daughter she was supposed to get sutures removed this week  · Discuss with acute surgery team tomorrow for possible removal of sutures      Stage 3 chronic kidney disease, unspecified whether stage 3a or 3b CKD [facility])  Assessment & Plan  Lab Results   Component Value Date    EGFR 49 11/03/2022    EGFR 43 10/20/2022    EGFR 52 10/10/2022    CREATININE 1 04 11/03/2022    CREATININE 1 14 10/20/2022    CREATININE 0 99 10/10/2022     History noted, creatinine within normal limits now  Type 2 diabetes mellitus with hyperglycemia, with long-term current use of insulin [facility])  Assessment & Plan  Lab Results   Component Value Date    HGBA1C 7 3 (H) 07/30/2022       Recent Labs     11/03/22  1536 11/03/22  2103   POCGLU 143* 221*       Blood Sugar Average: Last 72 hrs:  (P) 182     Patient is on Lantus 25 units q h s  Along with premeal 25 units  Will start on Lantus 20 units along with sliding scale insulin, adjust pending 24 hours requirement  Goal is to keep less than 180  Obstructive sleep apnea  Assessment & Plan  Patient has history of sleep apnea however is not compliant on CPAP  Essential hypertension  Assessment & Plan  · Patient is on torsemide and metoprolol at home, continue same  · Patient was noted to be hypertensive in ED needing labetalol 10 mg once  · Blood pressure at goal now  Hyperlipidemia  Assessment & Plan  Continue atorvastatin    Late onset Alzheimer's dementia without behavioral disturbance ([facility] [address] )  Assessment & Plan  · Noted to have history of Alzheimer's with behavior disturbance  · Patient was noted to have change in mental status in ED however CT head unremarkable  · Continue Lexapro at home dosage  VTE Pharmacologic Prophylaxis: VTE Score: 4 High Risk (Score >/= 5) - Pharmacological DVT Prophylaxis Contraindicated  Sequential Compression Devices Ordered  Code Status: Level 1 - Full Code per pt daughter  Discussion with family: Updated  (daughter) at bedside  Anticipated Length of Stay: Patient will be admitted on an inpatient basis with an anticipated length of stay of greater than 2 midnights secondary to Rectus sheath hematoma      Total Time for Visit, including Counseling / Coordination of Care: 90 minutes Greater than 50% of this total time spent on direct patient counseling and coordination of care  Chief Complaint: Constipation  History of Present Illness:  Nicolas Mcgill is a 82 y o  female with a PMH of hyperlipidemia, hypertension and recent fracture status post or who presents with constipation  According to the patient's daughter loss bowel movement noted was 7 days back, this morning was noted to have brown colored emesis associated with abdominal pain  On presentation to ED patient was found to be hypertensive needing IV labetalol x1  CT abdomen pelvis was unremarkable for any obstruction however did showed left rectus sheath hematoma likely from the fall 2 weeks back  Patient was seen by surgery in ED who recommended abdominal binder, reversal of Eliquis with Kcentra and serial H&H monitoring  While in ED patient was noted to have change in behavior hence got CT head done that was unremarkable for any intracranial pathology  As per patient's daughter present at bedside she has history of Alzheimer's with behavioral issues and sometimes does have episodes of confusion  Review of Systems:  Review of Systems   Constitutional: Positive for fatigue  Negative for appetite change  Gastrointestinal: Positive for abdominal pain, constipation and vomiting  Psychiatric/Behavioral: Positive for behavioral problems  All other systems reviewed and are negative        Past Medical and Surgical History:   Past Medical History:   Diagnosis Date   • Arthritis    • Asthma    • CAD (coronary artery disease) of artery bypass graft    • Cardiac disease    • CHF (congestive heart failure) ([facility])    • Dementia ([facility])    • Depression    • Diabetes mellitus ([address] )    • Heart attack ([facility] [address] )    • Hyperlipidemia    • Hypertension    • MI (myocardial infarction) ([facility] [address] )    • Neuropathy    • BRANT (obstructive sleep apnea)    • Peptic ulcer     was + for H pylori   • Transient cerebral ischemia 11/2011    with neg CUS and ECHO       Past Surgical History:   Procedure Laterality Date   • APPENDECTOMY     • CATARACT EXTRACTION     •  SECTION     • COLONOSCOPY  2004   • CORONARY ANGIOPLASTY  2006   • CORONARY ANGIOPLASTY WITH STENT PLACEMENT     • CORONARY ARTERY BYPASS GRAFT     • DENTAL SURGERY     • EGD AND COLONOSCOPY     • ELBOW SURGERY      resolved    • ESOPHAGOGASTRODUODENOSCOPY     • ORIF TIBIA & FIBULA FRACTURES Right 2022    Procedure: OPEN REDUCTION W/ INTERNAL FIXATION (ORIF) ANKLE;  Surgeon: Flavia Chavez MD;  [facility]: [facility];  Service: Orthopedics       Meds/Allergies:  Prior to Admission medications    Medication Sig Start Date End Date Taking?  Authorizing Provider   acetaminophen (TYLENOL) 325 mg tablet Take 3 tablets (975 mg total) by mouth every 8 (eight) hours 22  Yes Lisandro Belle MD   atorvastatin (LIPITOR) 40 mg tablet Take 1 tablet (40 mg total) by mouth daily 1/3/22  Yes Marcos Christianson MD   calcium carbonate-vitamin D (OSCAL-D) 500 mg-200 units per tablet Take 1 tablet by mouth 2 (two) times a day with meals 18  Yes Lanie Ramsey,    Continuous Blood Gluc  (Dexcom G6 ) SIVAKUMAR Use daily as directed for CGM   Yes Historical Provider, MD   Continuous Blood Gluc Sensor (Dexcom G6 Sensor) MISC Use daily as directed for CGM - Change every 10 days   Yes Historical Provider, MD   Continuous Blood Gluc Transmit (Dexcom G6 Transmitter) MISC Use daily as directed for CGM - Change every 3 months   Yes Historical Provider, MD   Eliquis 5 MG TAKE 1 TABLET BY MOUTH TWICE A DAY 22  Yes Marcos Christianson MD   glucose blood test strip USE AS DIRECTED 3 TIMES A DAY 20  Yes EMELI Gallardo   Insulin Disposable Pump (V-Go 20) KIT Apply once a night 22  Yes EMELI Miller   insulin glargine (LANTUS) 100 units/mL subcutaneous injection Inject 25 Units under the skin daily at bedtime 22  Yes Lisandro Belle MD   metoprolol tartrate (LOPRESSOR) 25 mg tablet TAKE 1 TABLET (25 MG TOTAL) BY MOUTH EVERY 12 (TWELVE) HOURS 6/14/22  Yes Fabiola Arroyo MD   NovoLOG 100 UNIT/ML injection INJECT UP TO 15 UNITS DAILY WITH V-GO 10/4/22  Yes EMELI Berrios   ondansetron (Zofran ODT) 4 mg disintegrating tablet Take 1 tablet (4 mg total) by mouth every 6 (six) hours as needed for nausea or vomiting 10/10/22  Yes Sesar Azul DO   potassium chloride (Klor-Con M10) 10 mEq tablet Take 1 tablet (10 mEq total) by mouth daily 8/1/22  Yes Fabiola Arroyo MD   senna-docusate sodium (SENOKOT S) 8 6-50 mg per tablet Take 1 tablet by mouth daily at bedtime 8/22/22  Yes Ravi Flores MD   torsemide [facility]) 20 mg tablet Take 1 tablet (20 mg total) by mouth daily 8/1/22  Yes Fabiola Arroyo MD   escitalopram (Lexapro) 20 mg tablet Take 1 tablet (20 mg total) by mouth daily 10/27/22   EMELI Fernandez   Insulin Disposable Pump (V-Go 20) KIT Use daily Use one daily 7/29/22   Rati EMELI Franks     I have reviewed home medications with patient family member  Allergies: Allergies   Allergen Reactions   • Ace Inhibitors    • Chlorpromazine    • Latex    • Lisinopril    • Namenda [Memantine] Drowsiness   • Other Other (See Comments)     Tilapia, unsure of reaction   • Penicillins Seizures   • Propoxyphene    • Stadol [Butorphanol]        Social History:  Marital Status:     Occupation: Retired  Patient Pre-hospital Living Situation: Home  Patient Pre-hospital Level of Mobility: manual wheelchair  Patient Pre-hospital Diet Restrictions: None  Substance Use History:   Social History     Substance and Sexual Activity   Alcohol Use Not Currently     Social History     Tobacco Use   Smoking Status Never Smoker   Smokeless Tobacco Never Used     Social History     Substance and Sexual Activity   Drug Use Never       Family History:  Family History   Problem Relation Age of Onset   • Diabetes Mother    • Cancer Sister    • Heart disease Sister    • Thyroid disease Sister    • Cancer Brother    • Cancer Father    • Colon cancer Family    • Diabetes Family    • Mitral valve prolapse Family    • Thyroid cancer Family        Physical Exam:     Vitals:   Blood Pressure: 152/90 (11/03/22 2013)  Pulse: 72 (11/03/22 2013)  Temperature: 97 9 °F (36 6 °C) (11/03/22 2013)  Temp Source: Oral (11/03/22 1226)  Respirations: 19 (11/03/22 2013)  Height: 5' 5" (165 1 cm) (11/03/22 2013)  Weight - Scale: 93 9 kg (207 lb) (11/03/22 2013)  SpO2: 98 % (11/03/22 2013)    Physical Exam  Constitutional:       Appearance: Normal appearance  HENT:      Head: Normocephalic  Nose: Nose normal       Mouth/Throat:      Mouth: Mucous membranes are moist       Pharynx: Oropharynx is clear  Eyes:      Extraocular Movements: Extraocular movements intact  Conjunctiva/sclera: Conjunctivae normal    Cardiovascular:      Rate and Rhythm: Normal rate  Pulses: Normal pulses  Heart sounds: Murmur (Systolic murmur heard at left 2nd intercostal space) heard  Pulmonary:      Effort: Pulmonary effort is normal       Comments: Decreased breath sounds bilaterally  Abdominal:      General: Abdomen is flat  Bowel sounds are normal       Palpations: Abdomen is soft  Musculoskeletal:         General: Normal range of motion  Cervical back: Normal range of motion and neck supple  Skin:     General: Skin is warm  Comments: Multiple bruises on extensive surfaces   Neurological:      General: No focal deficit present  Mental Status: Mental status is at baseline          Additional Data:     Lab Results:  Results from last 7 days   Lab Units 11/03/22  1416   WBC Thousand/uL 12 57*   HEMOGLOBIN g/dL 11 6   HEMATOCRIT % 35 8   PLATELETS Thousands/uL 235   NEUTROS PCT % 87*   LYMPHS PCT % 7*   MONOS PCT % 6   EOS PCT % 0     Results from last 7 days   Lab Units 11/03/22  1416   SODIUM mmol/L 137   POTASSIUM mmol/L 3 9   CHLORIDE mmol/L 105   CO2 mmol/L 27   BUN mg/dL 29*   CREATININE mg/dL 1 04   ANION GAP mmol/L 5   CALCIUM mg/dL 9 6   ALBUMIN g/dL 3 1*   TOTAL BILIRUBIN mg/dL 1 01*   ALK PHOS U/L 98   ALT U/L 22   AST U/L 22   GLUCOSE RANDOM mg/dL 169*     Results from last 7 days   Lab Units 11/03/22  1825   INR  1 30*     Results from last 7 days   Lab Units 11/03/22  2103 11/03/22  1536   POC GLUCOSE mg/dl 221* 143*               Imaging: Reviewed radiology reports from this admission including: abdominal/pelvic CT  CT abdomen pelvis with contrast   Final Result by Clotilde Razo MD (11/03 1802)   *  Streak artifact from the upper extremities lying adjacent to the abdomen, limiting evaluation of abdominal viscera  *  Heterogenous intramuscular 10 2 cm hematoma involving the left rectus muscle containing 2 hyperattenuating foci, concerning for active extravasation  The hematoma can be secondary to recent trauma, anticoagulation or presence of underlying    hemorrhagic neoplasm  CT abdomen pelvis in 3-6 months is recommended to assess for resolution  *   Moderate amount of fluid in the superior aspect of the vagina  Findings can be secondary to presence of obstructing neoplasm or presence of vesicovaginal fistula  Direct visualization and GYN consultation is recommended for further evaluation  Additional findings as above  I personally discussed this study with [facility] on 11/3/2022 at 5:25 PM                Workstation performed: [id number]         CTA head and neck with and without contrast   Final Result by Chip Dacosta MD (11/03 3905)      No evidence of acute intracranial hemorrhage  Chronic microangiopathy  Moderate to severe plaque stenosis right vertebral artery origin  Approximately 70-80% plaque stenosis right cervical ICA origin  Moderate to severe bilateral cavernous carotid artery plaque stenosis                Workstation performed: [id number]             EKG and Other Studies Reviewed on Admission:   · EKG: No EKG obtained  ** Please Note: This note has been constructed using a voice recognition system   **

## 2022-11-04 NOTE — PHYSICAL THERAPY NOTE
Physical Therapy Evaluation    Patient's Name: Christa Victoria    Admitting Diagnosis  Abdominal pain [R10 9]  Nausea and vomiting [R11 2]  Constipation [K59 00]  Rectus sheath hematoma, initial encounter [S30 1XXA]    Problem List  Patient Active Problem List   Diagnosis    3-vessel coronary artery disease    Late onset Alzheimer's dementia without behavioral disturbance ([facility])    Depression    Diabetic retinopathy ([facility] )    DM (diabetes mellitus), type 2, uncontrolled w/ophthalmic complication    Gastroesophageal reflux disease with hiatal hernia    Glaucoma    Hyperlipidemia    Essential hypertension    Cerebral artery occlusion with cerebral infarction ([facility] )    Obesity (BMI 30-39  9)    Obstructive sleep apnea    Osteoarthritis of knee    Urine incontinence    Ventricular tachycardia    Chronic diastolic congestive heart failure ([facility] )    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 ([facility])    Abnormal chest x-ray    Urine retention    Congestive heart failure with LV diastolic dysfunction, NYHA class 2 ([facility])    Dyspnea on minimal exertion    PAF (paroxysmal atrial fibrillation) ([facility])    SSS (sick sinus syndrome) ([facility])    Mild intermittent asthma without complication    Osteopenia    Obesity, morbid ([facility])    Type 2 diabetes mellitus with hyperglycemia, with long-term current use of insulin ([facility])    Closed right ankle fracture    Ambulatory dysfunction    Stage 3 chronic kidney disease, unspecified whether stage 3a or 3b CKD ([facility] )    Fall    Scalp laceration    Constipation    Rectus sheath hematoma    Tibia/fibula fracture    Vaginal disorder    Leukocytosis       Past Medical History  Past Medical History:   Diagnosis Date    Arthritis     Asthma     CAD (coronary artery disease) of artery bypass graft     Cardiac disease     CHF (congestive heart failure) ([facility])     Dementia ([facility] )     Depression     Diabetes mellitus ([facility] )     Heart attack ([facility] )     Hyperlipidemia     Hypertension     MI (myocardial infarction) ([facility] [address] )     Neuropathy     BRANT (obstructive sleep apnea)     Peptic ulcer     was + for H pylori    Transient cerebral ischemia 2011    with neg CUS and ECHO       Past Surgical History  Past Surgical History:   Procedure Laterality Date    APPENDECTOMY      CATARACT EXTRACTION       SECTION      COLONOSCOPY  2004    CORONARY ANGIOPLASTY  2006    CORONARY ANGIOPLASTY WITH STENT PLACEMENT      CORONARY ARTERY BYPASS GRAFT      DENTAL SURGERY      EGD AND COLONOSCOPY      ELBOW SURGERY      resolved     ESOPHAGOGASTRODUODENOSCOPY      ORIF TIBIA & FIBULA FRACTURES Right 2022    Procedure: OPEN REDUCTION W/ INTERNAL FIXATION (ORIF) ANKLE;  Surgeon: Koleen Gilford, MD;  Location: [facility];  Service: Orthopedics        22 1039   PT Last Visit   PT Visit Date 22   Note Type   Note type Evaluation   Pain Assessment   Pain Assessment Tool FLACC   Pain Rating: FLACC (Rest) - Face 0   Pain Rating: FLACC (Rest) - Legs 0   Pain Rating: FLACC (Rest) - Activity 0   Pain Rating: FLACC (Rest) - Cry 0   Pain Rating: FLACC (Rest) - Consolability 0   Score: FLACC (Rest) 0   Pain Rating: FLACC (Activity) - Face 1   Pain Rating: FLACC (Activity) - Legs 1   Pain Rating: FLACC (Activity) - Activity 1   Pain Rating: FLACC (Activity) - Cry 0   Pain Rating: FLACC (Activity) - Consolability 1   Score: FLACC (Activity) 4   Restrictions/Precautions   Weight Bearing Precautions Per Order No   Braces or Orthoses Other (Comment)  (RLE lace up ankle brace)   Other Precautions Cognitive; Chair Alarm; Bed Alarm;Multiple lines; Fall Risk;Pain   Home Living   Type of [address] One level   Prior Function   Level of [place] Needs assistance with ADLs; Needs assistance with functional mobility; Needs assistance with IADLS   Lives With Son;Family   Receives Help From Family   IADLs Family/Friend/Other provides transportation; Family/Friend/Other provides meals; Family/Friend/Other provides medication management   Falls in the last 6 months 1 to 4   Comments Pt poor historian, PLOF and home setup obtained from EMR  Pt resides with son and grandkids in a [place] with ramped entrance  Anticipate pt was primarily using w/c since recent rehab stay  Cognition   Overall Cognitive Status Impaired   Attention Attends with cues to redirect   Orientation Level Oriented to person;Oriented to place; Disoriented to time;Disoriented to situation   Memory Decreased recall of precautions   Following Commands Follows one step commands with increased time or repetition   RLE Assessment   RLE Assessment   (~2+/5)   LLE Assessment   LLE Assessment   (~2+/5)   Bed Mobility   Rolling R 3  Moderate assistance   Additional items Assist x 2   Rolling L 3  Moderate assistance   Additional items Assist x 2   Supine to Sit 2  Maximal assistance   Additional items Assist x 2;HOB elevated; Increased time required;Verbal cues;LE management   Sit to Supine 2  Maximal assistance   Additional items Assist x 2;LE management; Increased time required   Additional Comments Pt requiring maxA for trunk control at EOB, pushing into extension, resisting therapist efforts to have her sit forward   Transfers   Sit to Stand Unable to assess   Stand to Sit Unable to assess   Additional Comments pt actively resisting- returned back to supine for safety   Ambulation/Elevation   Gait pattern Not appropriate   Balance   Static Sitting Fair -   Dynamic Sitting Poor +   Static Standing Zero   Activity Tolerance   Activity Tolerance Patient limited by fatigue; Other (Comment)  (cognitive status)   Nurse Made Aware ok to see per RN   Assessment   Prognosis Guarded   Problem List Decreased strength; Impaired balance;Decreased endurance;Decreased mobility; Decreased cognition   Assessment Pt is a 82 y o  female seen for PT evaluation s/p admit to [facility] on 11/3/2022  Pt was admitted with a primary dx of: constipation   Pt with recent fall 2 weeks ago, found to have L rectus sheath hematoma  PT now consulted for assessment of mobility and d/c needs  Pt with Up and OOB as tolerated , PT evaluation orders  Pts current comorbidities effecting treatment include: arthritis, asthma, CAD, recent R ankle ORIF, CHF, MI, heart attack, late onset Alzheimer's dementia  Pts current clinical presentation is Evolving (medium complexity) due to Ongoing medical management for primary dx, Increased reliance on more restrictive AD compared to baseline, Decreased activity tolerance compared to baseline, Fall risk, Increased assistance needed from caregiver at current time, Trending lab values  Prior to admission, pt was living with her family in a [facility]  Pt's family assists with ADLs  Pt poor historian, information obtained [facility] EMR  Upon evaluation, pt currently is requiring maxAx2 for bed mobility and maxA for trunk control at EOB, becoming resistive to therapist assistance, returned back to supine  Pt presents at PT eval functioning near her anticipated baseline mobility level, no acute PT needs identified  Anticipate that family can continue to provide appropriate assistance- if not, please re-consult as needed  At conclusion of PT session pt returned BTB with phone and call bell within reach  Pt denies any further questions at this time  The patient's AM-PAC Basic Mobility Inpatient Short Form Raw Score is 7  A Raw score of less than or equal to 16 suggests the patient may benefit from discharge to post-acute rehabilitation services  Please also refer to the recommendation of the Physical Therapist for safe discharge planning  Recommend home with HHPT + continued family assistance upon hospital D/C     Goals   Patient Goals none stated   Plan   PT Frequency Other (Comment)  (eval only, d/c PT)   Recommendation   PT Discharge Recommendation Home with home health rehabilitation  (+ continued assistance from family)   Nikos 8 in Bed Without Bedrails 2   Lying on Back to Sitting on Edge of Flat Bed 1   Moving Bed to Chair 1   Standing Up From Chair 1   Walk in Room 1   Climb 3-5 Stairs 1   Basic Mobility Inpatient Raw Score 7   Turning Head Towards Sound 3   Follow Simple Instructions 3   Low Function Basic Mobility Raw Score 13   Low Function Basic Mobility Standardized Score 20 14   Highest Level Of Mobility   -[facility] Goal 2: Bed activities/Dependent transfer   -HLM Achieved 3: Sit at edge of bed   Modified Bynum Scale   Modified Chicho Scale 5   End of Consult   Patient Position at End of Consult Supine;Bed/Chair alarm activated; All needs within reach       Karla Felton, PT, DPT

## 2022-11-04 NOTE — ASSESSMENT & PLAN NOTE
· Patient had fall about 2 weeks back when she had laceration on scalp needing sutures  · As per daughter she was supposed to get sutures removed this week  · Discuss with acute surgery team tomorrow for possible removal of sutures

## 2022-11-04 NOTE — ASSESSMENT & PLAN NOTE
· Patient had fall about 2 weeks back  · CT abdomen pelvis did showed 10 cm left rectus sheath hematoma  · Recommend repeat ct abdomen pelvis in 3 - 6 months   · Patient evaluated by surgery, no acute intervention needed at this time  · HH appears to be stable no considerable drop   · Recommended abdominal binder, reversal of Eliquis with Kcentra  · As per ED physician's discussion with Radiology does not seem actively bleeding however trend hemoglobin  · If hemoglobin trends down may need to consult IR for possible intervention

## 2022-11-04 NOTE — ASSESSMENT & PLAN NOTE
· Patient is on torsemide and metoprolol at home, continue same  · Patient was noted to be hypertensive in ED needing labetalol 10 mg once  · Blood pressure at goal now

## 2022-11-04 NOTE — PROGRESS NOTES
Progress Note - General Surgery   Abby Coulter 82 y o  female MRN: [id number]  Unit/Bed#: [facility] 803-01 Encounter: [id number]    Assessment:  81 yo F with history of Afib (on Eliquis) and recent fall (2 weeks ago) who presents with a left rectus sheath hematoma    's/70, other vitals normal on room air  UOP 1 unmeasured occurrence    WBC pending (from 12 57)  Hb pending (from 9 4 from 11 6)  Cr pending (from 1 04)    Plan:  Monitor Hb, transfuse PRN for Hb < 7  Eliquis reversed with [facility]  If patient has persistent Hb drop or requires transfusion would recommend IR embolization  Surgery remains last resort in the setting of instability or multiple failed attempts at IR embolization  Rest of care per primary    Subjective/Objective     Subjective: No acute events overnight, subjective limited by dementia    Objective:    Blood pressure (!) 174/77, pulse 66, temperature 97 7 °F (36 5 °C), resp  rate 18, height 5' 5" (1 651 m), weight 93 9 kg (207 lb), SpO2 95 %  ,Body mass index is 34 45 kg/m²  No intake or output data in the 24 hours ending 11/04/22 0518    Invasive Devices  Report    Peripheral Intravenous Line  Duration           Peripheral IV 11/03/22 Right Antecubital <1 day                Physical Exam:  Gen:    NAD  CV:      warm, well-perfused  Lungs: No respiratory distress  Abd:     soft, minimally Tender in L hemiabdomen/ND  Ext:      no CCE  Neuro:  At baseline

## 2022-11-04 NOTE — UTILIZATION REVIEW
Initial Clinical Review    Admission: Date/Time/Statement:   Admission Orders (From admission, onward)     Ordered        11/03/22 1934  INPATIENT ADMISSION  Once                      Orders Placed This Encounter   Procedures   • INPATIENT ADMISSION     Standing Status:   Standing     Number of Occurrences:   1     Order Specific Question:   Level of Care     Answer:   Med Surg [16]     Order Specific Question:   Estimated length of stay     Answer:   More than 2 Midnights     Order Specific Question:   Certification     Answer:   I certify that inpatient services are medically necessary for this patient for a duration of greater than two midnights  See H&P and MD Progress Notes for additional information about the patient's course of treatment  ED Arrival Information     Expected   11/3/2022     Arrival   11/3/2022 12:21    Acuity   Urgent            Means of arrival   Ambulance    Escorted by   [facility] ([address])    Service   Hospitalist    Admission type   Emergency            Arrival complaint   Vomiting           Chief Complaint   Patient presents with   • Constipation     No bowel movement since Saturday, have been trying over there counter stuff and it hasnt been helping, complaining of lower abdominal pain  Vomited x2 clear liquid        Initial Presentation: 82 y o  female from home to ED via ems admitted inpatient due to Rectus sheath hematoma/Constipation/Vaginal disorder  PMH of hyperlipidemia, hpt and recent tib fib fracture, CKD  Fall about 2 weeks ago  Presented due to constipation, with last BM about 7 days prior to arrival  And this am with episode of emesis and abdominal pain  On exam Hypertensive  Systolic murmur  Lungs decreased breath sounds  Multiple bruises  Wbc 12 57  H&H 11 6/35 8  INR 1 30 on Eliquis  Ct abdomen showed hematoma of left rectus muscle  Fluid in vagina  In the ED given IV labetalol  Plan is trend hgb  Abdominal binder    Reverse Eliquis with Kcentra  Start bowel regimen  Consult surgery and gyn  PT/OT  Decrease home Lantus from 25 to 20 and hold premeal insulin, add SSI with accuchecks  Continue home antihypertensives and monitor BP    11/3/22 per IR - Patient with left rectus sheath hematoma with small degree of active bleeding  On Eliquis  Recommend no IR intervention  Abdominal binder  Trend hgb  Consider reversal agent  11/3/22 per Surgery - Patient with rectus sheath hematoma and IR does not recommend embolization at this time  Will trend H&H, serial exama    Date: 11/4/22    Day 2:  Slow to respond  Very forgetful  On exam abdomen minimally tender in left malika abdomen  Lungs rhonchi  Disoriented  Wbc 11 12  Continue abdominal binder, trend hgb  Bowel regimen with dulcolax, senna, add lactulose and Miralax  For transvaginal US then will discuss results with family prior to further intervention  11/4/22 - per gyn - Patient with vaginal disorder  Speculum exam:  Mild amount of clear vaginal discharge in posterior fornix  For [address] [facility]  If vesicovaginal fistula, further assessment can be done with  injection of methylene blue into bladder via catheter and placement of vaginal tampon to assess blue staining on material indicating fistulization  Will discuss post Ultrasound with family        ED Triage Vitals [11/03/22 1226]   Temperature Pulse Respirations Blood Pressure SpO2   97 9 °F (36 6 °C) 82 18 (!) 224/90 96 %      Temp Source Heart Rate Source Patient Position - Orthostatic VS BP Location FiO2 (%)   Oral Monitor Lying Right arm --      Pain Score       No Pain          Wt Readings from Last 1 Encounters:   11/03/22 93 9 kg (207 lb)     Additional Vital Signs:   11/04/22 09:42:36 98 1 °F (36 7 °C) 65 16 174/78 Abnormal  110 96 % -- --   11/04/22 07:17:15 97 9 °F (36 6 °C) 69 18 176/77 Abnormal  110 96 % -- --   11/04/22 03:32:57 -- 66 18 174/77 Abnormal  109 95 % -- --   11/03/22 2226 -- -- -- 200/70 Abnormal  -- -- -- --   11/03/22 22:12:59 97 7 °F (36 5 °C) 66 20 199/83 Abnormal  122 96 % -- --   11/03/22 20:13:31 97 9 °F (36 6 °C) 72 19 152/90 111 98 % -- --   11/03/22 1754 -- 68 18 166/70 -- 98 % None (Room air) --   11/03/22 1620 -- 70 18 227/100 Abnormal  -- 96 % None (Room air) --   11/03/22 1615 -- 72 15 225/96 Abnormal  138 98 % None (Room air)        Pertinent Labs/Diagnostic Test Results:   CT abdomen pelvis with contrast   Final Result by Bib Molina MD (11/03 1802)   *  Streak artifact from the upper extremities lying adjacent to the abdomen, limiting evaluation of abdominal viscera  *  Heterogenous intramuscular 10 2 cm hematoma involving the left rectus muscle containing 2 hyperattenuating foci, concerning for active extravasation  The hematoma can be secondary to recent trauma, anticoagulation or presence of underlying    hemorrhagic neoplasm  CT abdomen pelvis in 3-6 months is recommended to assess for resolution  *   Moderate amount of fluid in the superior aspect of the vagina  Findings can be secondary to presence of obstructing neoplasm or presence of vesicovaginal fistula  Direct visualization and GYN consultation is recommended for further evaluation  Additional findings as above  I personally discussed this study with [facility] on 11/3/2022 at 5:25 PM                Workstation performed: [id number]         CTA head and neck with and without contrast   Final Result by Gualberto Horton MD (11/03 1745)      No evidence of acute intracranial hemorrhage  Chronic microangiopathy  Moderate to severe plaque stenosis right vertebral artery origin  Approximately 70-80% plaque stenosis right cervical ICA origin  Moderate to severe bilateral cavernous carotid artery plaque stenosis                Workstation performed: [id number]             Results from last 7 days   Lab Units 11/04/22  0942 11/03/22  2359 11/03/22  1416   WBC Thousand/uL 11 12*  --  12 57* HEMOGLOBIN g/dL 10 8* 9 4* 11 6   HEMATOCRIT % 34 4* 29 8* 35 8   PLATELETS Thousands/uL 240  --  235   NEUTROS ABS Thousands/µL  --   --  10 95*     Results from last 7 days   Lab Units 11/04/22  0942 11/03/22  1416   SODIUM mmol/L 136 137   POTASSIUM mmol/L 3 8 3 9   CHLORIDE mmol/L 104 105   CO2 mmol/L 27 27   ANION GAP mmol/L 5 5   BUN mg/dL 27* 29*   CREATININE mg/dL 0 95 1 04   EGFR ml/min/1 73sq m 54 49   CALCIUM mg/dL 9 0 9 6     Results from last 7 days   Lab Units 11/04/22  0942 11/03/22  1416   AST U/L 14 22   ALT U/L 20 22   ALK PHOS U/L 93 98   TOTAL PROTEIN g/dL 6 3* 6 7   ALBUMIN g/dL 2 9* 3 1*   TOTAL BILIRUBIN mg/dL 0 86 1 01*     Results from last 7 days   Lab Units 11/04/22  1545 11/04/22  1119 11/04/22  0714 11/03/22  2103 11/03/22  1536   POC GLUCOSE mg/dl 229* 213* 194* 221* 143*     Results from last 7 days   Lab Units 11/04/22  0942 11/03/22  1416   GLUCOSE RANDOM mg/dL 234* 169*     Results from last 7 days   Lab Units 11/03/22  1825   PROTIME seconds 16 4*   INR  1 30*   PTT seconds 30     Results from last 7 days   Lab Units 11/03/22  1416   LIPASE u/L 52*       ED Treatment:   Medication Administration from 11/03/2022 1221 to 11/03/2022 2007       Date/Time Order Dose Route Action Comments     11/03/2022 1702 nystatin (MYCOSTATIN) powder   Topical Not Given      11/03/2022 1854 erythromycin (ILOTYCIN) 0 5 % ophthalmic ointment 0 5 inch 0 5 inch Both Eyes Given      11/03/2022 1710 labetalol (NORMODYNE) injection 10 mg 10 mg Intravenous Given         Past Medical History:   Diagnosis Date   • Arthritis    • Asthma    • CAD (coronary artery disease) of artery bypass graft    • Cardiac disease    • CHF (congestive heart failure) ([facility])    • Dementia (HCC)    • Depression    • Diabetes mellitus ([facility] )    • Heart attack ([facility] )    • Hyperlipidemia    • Hypertension    • MI (myocardial infarction) ([facility] )    • Neuropathy    • BRANT (obstructive sleep apnea)    • Peptic ulcer     was + for H pylori • Transient cerebral ischemia 11/2011    with neg CUS and ECHO     Present on Admission:  • Essential hypertension  • Obstructive sleep apnea  • Scalp laceration  • Hyperlipidemia  • Late onset Alzheimer's dementia without behavioral disturbance (HCC)  • Gastroesophageal reflux disease with hiatal hernia  • Stage 3 chronic kidney disease, unspecified whether stage 3a or 3b CKD (HCC)      Admitting Diagnosis: Abdominal pain [R10 9]  Nausea and vomiting [R11 2]  Constipation [K59 00]  Rectus sheath hematoma, initial encounter [S30 1XXA]  Age/Sex: 82 y o  female  Admission Orders:  11/3/22 1934 inpatient   Scheduled Medications:  atorvastatin, 40 mg, Oral, Daily  docusate sodium, 100 mg, Oral, BID  escitalopram, 20 mg, Oral, Daily  insulin glargine, 20 Units, Subcutaneous, HS  insulin lispro, 1-5 Units, Subcutaneous, TID AC  insulin lispro, 1-5 Units, Subcutaneous, HS  metoprolol tartrate, 25 mg, Oral, Q12H REJI  nystatin, , Topical, Once  senna, 1 tablet, Oral, Daily  torsemide, 20 mg, Oral, Daily    prothrombin complex concentrate (human) (Kcentra) 2,000 Units  Dose: 2,000 Units  Freq: Once Route: IV  Start: 11/03/22 1828 End: 11/03/22 2056    Continuous IV Infusions: none      PRN Meds:  acetaminophen, 650 mg, Oral, Q6H PRN  aluminum-magnesium hydroxide-simethicone, 30 mL, Oral, Q6H PRN  hydrALAZINE, 5 mg, Intravenous, Q6H PRN - used x 1 11/4/22   lactulose, 10 g, Oral, Daily PRN  ondansetron, 4 mg, Intravenous, Q6H PRN    Bilateral SCDs  IP CONSULT TO ACUTE CARE SURGERY  IP CONSULT TO OB GYN    Network Utilization Review Department  ATTENTION: Please call with any questions or concerns to [phone] and carefully listen to the prompts so that you are directed to the right person   All voicemails are confidential   Isabel Mccollum all requests for admission clinical reviews, approved or denied determinations and any other requests to dedicated fax number below belonging to the campus where the patient is receiving treatment   List of dedicated fax numbers for the Facilities:  [address] [place] [address] DENIALS (Administrative/Medical Necessity) [phone]   [address]  (Maternity/NICU/Pediatrics) [phone]   [address] [phone]   [address] Silver [address] [phone]   [address] [place] [address] Bharati Yao 28 [phone]   [address] [facility] Gerri Kaur [facility] 134 [address] [facility] [phone]

## 2022-11-04 NOTE — ASSESSMENT & PLAN NOTE
· Patient presented with constipation, as per daughter last bowel movement was 7 days back  This morning was noted to have vomiting  · Will start on bowel regimen  If no bowel movement in 24 hour may need enema

## 2022-11-04 NOTE — ASSESSMENT & PLAN NOTE
· CT abdomen pelvis remarkable for fluid in vagina, likely due to obstructing mass versus with vesico-vaginal fistula  · As per patient's daughter no complaints of abnormal vaginal bleeding  · Appreciated gyn and discussed with gyn      · Prior ct imaging not noted to have said fluid collection in vagina  · No evidence of fistula formation per gyn on speculum exam  · Transvaginal ultrasound ordered   · Discussed with son and daughter at bedside   · Plan to obtain TVUS and then consider catheterization pending results family will then decide to proceed with further intervention or not  · Methylene blue tampon test was discussed not yet performed   · R/o vesicovaginal fistula

## 2022-11-04 NOTE — ASSESSMENT & PLAN NOTE
Lab Results   Component Value Date    EGFR 49 11/03/2022    EGFR 43 10/20/2022    EGFR 52 10/10/2022    CREATININE 1 04 11/03/2022    CREATININE 1 14 10/20/2022    CREATININE 0 99 10/10/2022     History noted, creatinine within normal limits now

## 2022-11-04 NOTE — ASSESSMENT & PLAN NOTE
· Leukocytosis noted on labs likely reactive  · No evidence of fever or signs of infection  · Monitor CBC in a m

## 2022-11-04 NOTE — QUICK NOTE
QUICK NOTE - Deterioration Index  Obey Arredondo 82 y o  female MRN: [id number]  Unit/Bed#: [facility] 803-01 Encounter: [id number]    Date Paged: 22  Time Paged:   Room #: 5  Arrival Time:   Deterioration index score at time of page: 60 54  %  Spoke with RN from primary team  Need to escalate level of care: no     PROBLEMS resulting in high DI score:   48% Drury coma scale 7   25 Age 82years old   20% Systolic 366   9% Respiratory rate 20       PLAN:    • On exam GCS 14- opens spont, follows commands, confused   • SBP elevated- primary team aware and ordered PRNs     Please contact critical care via [employer] with any questions or concerns       Vitals:   Vitals:    22 1754 22   BP: 166/70 152/90 (!) 199/83 (!) 200/70   BP Location:    Left arm   Pulse: 68 72 66    Resp: 18 19 20    Temp:  97 9 °F (36 6 °C) 97 7 °F (36 5 °C)    TempSrc:       SpO2: 98% 98% 96%    Weight:  93 9 kg (207 lb)     Height:  5' 5" (1 651 m)         Respiratory:  SpO2: SpO2: 96 %, SpO2 Activity: SpO2 Activity: At Rest, SpO2 Device: O2 Device: None (Room air)       Temperature: Temp (24hrs), Av 8 °F (36 6 °C), Min:97 7 °F (36 5 °C), Max:97 9 °F (36 6 °C)  Current: Temperature: 97 7 °F (36 5 °C)    Labs:   Results from last 7 days   Lab Units 22  1416   WBC Thousand/uL 12 57*   HEMOGLOBIN g/dL 11 6   HEMATOCRIT % 35 8   PLATELETS Thousands/uL 235   NEUTROS PCT % 87*   MONOS PCT % 6     Results from last 7 days   Lab Units 22  1416   SODIUM mmol/L 137   POTASSIUM mmol/L 3 9   CHLORIDE mmol/L 105   CO2 mmol/L 27   BUN mg/dL 29*   CREATININE mg/dL 1 04   CALCIUM mg/dL 9 6   ALK PHOS U/L 98   ALT U/L 22   AST U/L 22                       Code Status: Level 1 - Full Code

## 2022-11-04 NOTE — ASSESSMENT & PLAN NOTE
· Patient had fall about 2 weeks back  · CT abdomen pelvis did showed 10 cm left rectus sheath hematoma  · Patient evaluated by surgery, no acute intervention needed at this time  · Recommended abdominal binder, reversal of Eliquis with Kcentra  · As per ED physician's discussion with Radiology does not seem actively bleeding however trend hemoglobin  · If hemoglobin trends down may need to consult IR for possible intervention

## 2022-11-04 NOTE — ASSESSMENT & PLAN NOTE
· Patient presented with constipation, as per daughter last bowel movement was 7 days back  This morning was noted to have vomiting  · Will start on bowel regimen     · Would caution with fleets enema in setting of ct findings would wait for transvaginal ultrasound and maintain pt on   · Dulcolax / senna at hs , lactulose now and miralax   · Ct imaging does not demonstrated considerable stool in colon

## 2022-11-04 NOTE — CONSULTS
Consultation - [facility] [place] 82 y o  female MRN: [id number]  Unit/Bed#: [facility] 803-01 Encounter: [id number]    Assessment/Plan     Assessment:  81 yo F with history of Afib (on Eliquis) and recent fall (2 weeks ago) who presents with a left rectus sheath hematoma    Hypertensive on presentation, now normal vitals on room air    Hb 11 6  INR 1 30    Plan:  Recommend medical admission  Monitor Hb, transfuse PRN for Hb < 8 given cardiac history  Eliquis reversed with [facility]  If patient has persistent Hb drop or requires transfusion would recommend IR embolization  Surgery remains last resort in the setting of instability or multiple failed attempts at IR embolization  Rest of care per primary    History of Present Illness      HPI:  Rasheed Nunez is a 82 y o  female who presents with nausea, vomiting, constipation and abdominal pain  Subjective limited by dementia however patient's caregiver is at her bedside and collateral history was obtained from her  Caregiver reports that over the last week patient has not been having bowel movements though she thinks she has been passing some flatus  She also over the last 2 days has developed some vomiting  She reports that when she went to feel her abdomen she did report some pain on the left side  She had a recent fall 2 weeks ago which time workup for traumatic injuries was negative  On presentation CT scan revealed that she has a 10 cm left rectus sheath hematoma  General surgery consulted for assistance with management of left rectus sheath hematoma  Inpatient consult to Acute Care Surgery  Consult performed by: Xin Salcido MD  Consult ordered by:  Suzy Simmonds, MD         Review of Systems   Unable to perform ROS: Dementia          Historical Information   Past Medical History:   Diagnosis Date   • Arthritis    • Asthma    • CAD (coronary artery disease) of artery bypass graft    • Cardiac disease    • CH[facility]congestive [facility] failure) ([facility] )    • Dementia ([facility] )    • Depression    • Diabetes mellitus ([facility] )    • Heart attack ([facility] )    • Hyperlipidemia    • Hypertension    • MI (myocardial infarction) ([facility] )    • Neuropathy    • BRANT (obstructive sleep apnea)    • Peptic ulcer     was + for H pylori   • Transient cerebral ischemia 2011    with neg CUS and ECHO     Past Surgical History:   Procedure Laterality Date   • APPENDECTOMY     • CATARACT EXTRACTION     •  SECTION     • COLONOSCOPY  2004   • CORONARY ANGIOPLASTY  2006   • CORONARY ANGIOPLASTY WITH STENT PLACEMENT     • CORONARY ARTERY BYPASS GRAFT     • DENTAL SURGERY     • EGD AND COLONOSCOPY     • ELBOW SURGERY      resolved    • ESOPHAGOGASTRODUODENOSCOPY     • ORIF TIBIA & FIBULA FRACTURES Right 2022    Procedure: OPEN REDUCTION W/ INTERNAL FIXATION (ORIF) ANKLE;  Surgeon: Christiana Stephenson MD;  Location: [facility];  Service: Orthopedics     Social History   Social History     Substance and Sexual Activity   Alcohol Use Not Currently     Social History     Substance and Sexual Activity   Drug Use Never     E-Cigarette/Vaping   • E-Cigarette Use Never User      E-Cigarette/Vaping Substances   • Nicotine No    • THC No    • CBD No    • Flavoring No      Social History     Tobacco Use   Smoking Status Never Smoker   Smokeless Tobacco Never Used     Family History:   Family History   Problem Relation Age of Onset   • Diabetes Mother    • Cancer Sister    • Heart disease Sister    • Thyroid disease Sister    • Cancer Brother    • Cancer Father    • Colon cancer Family    • Diabetes Family    • Mitral valve prolapse Family    • Thyroid cancer Family        Meds/Allergies   current meds:   Current Facility-Administered Medications   Medication Dose Route Frequency   • nystatin (MYCOSTATIN) powder   Topical Once   • prothrombin complex concentrate (human) (Kcentra) 2,000 Units  2,000 Units Intravenous Once    and PTA meds:   Prior to Admission Medications Prescriptions Last Dose Informant Patient Reported? Taking?    Continuous Blood Gluc  (Dexcom G6 ) SIVAKUMAR  Child Yes No   Sig: Use daily as directed for CGM   Continuous Blood Gluc Sensor (Dexcom G6 Sensor) MISC  Child Yes No   Sig: Use daily as directed for CGM - Change every 10 days   Continuous Blood Gluc Transmit (Dexcom G6 Transmitter) MISC  Child Yes No   Sig: Use daily as directed for CGM - Change every 3 months   Eliquis 5 MG  Child No No   Sig: TAKE 1 TABLET BY MOUTH TWICE A DAY   Insulin Disposable Pump (V-Go 20) KIT  Child No No   Sig: Apply once a night   Insulin Disposable Pump (V-Go 20) KIT  Child No No   Sig: Use daily Use one daily   NovoLOG 100 UNIT/ML injection  Child No No   Sig: INJECT UP TO 15 UNITS DAILY WITH V-GO   acetaminophen (TYLENOL) 325 mg tablet  Child No No   Sig: Take 3 tablets (975 mg total) by mouth every 8 (eight) hours   atorvastatin (LIPITOR) 40 mg tablet  Child No No   Sig: Take 1 tablet (40 mg total) by mouth daily   calcium carbonate-vitamin D (OSCAL-D) 500 mg-200 units per tablet  Child No No   Sig: Take 1 tablet by mouth 2 (two) times a day with meals   escitalopram (Lexapro) 20 mg tablet   No No   Sig: Take 1 tablet (20 mg total) by mouth daily   glucose blood test strip  Child No No   Sig: USE AS DIRECTED 3 TIMES A DAY   insulin glargine (LANTUS) 100 units/mL subcutaneous injection  Child No No   Sig: Inject 25 Units under the skin daily at bedtime   metoprolol tartrate (LOPRESSOR) 25 mg tablet  Child No No   Sig: TAKE 1 TABLET (25 MG TOTAL) BY MOUTH EVERY 12 (TWELVE) HOURS   ondansetron (Zofran ODT) 4 mg disintegrating tablet  Child No No   Sig: Take 1 tablet (4 mg total) by mouth every 6 (six) hours as needed for nausea or vomiting   potassium chloride (Klor-Con M10) 10 mEq tablet  Child No No   Sig: Take 1 tablet (10 mEq total) by mouth daily   senna-docusate sodium (SENOKOT S) 8 6-50 mg per tablet  Child No No   Sig: Take 1 tablet by mouth daily at bedtime   torsemide (DEMADEX) 20 mg tablet  Child No No   Sig: Take 1 tablet (20 mg total) by mouth daily      Facility-Administered Medications: None     Allergies   Allergen Reactions   • Ace Inhibitors    • Chlorpromazine    • Latex    • Lisinopril    • Namenda [Memantine] Drowsiness   • Penicillins Seizures   • Propoxyphene    • Stadol [Butorphanol]        Objective   First Vitals:   Blood Pressure: (!) 224/90 (11/03/22 1226)  Pulse: 82 (11/03/22 1226)  Temperature: 97 9 °F (36 6 °C) (11/03/22 1226)  Temp Source: Oral (11/03/22 1226)  Respirations: 18 (11/03/22 1226)  SpO2: 96 % (11/03/22 1226)    Current Vitals:   Blood Pressure: 166/70 (11/03/22 1754)  Pulse: 68 (11/03/22 1754)  Temperature: 97 9 °F (36 6 °C) (11/03/22 1226)  Temp Source: Oral (11/03/22 1226)  Respirations: 18 (11/03/22 1754)  SpO2: 98 % (11/03/22 1754)    No intake or output data in the 24 hours ending 11/03/22 2008    Invasive Devices  Report    Peripheral Intravenous Line  Duration           Peripheral IV 11/03/22 Left Antecubital <1 day                Physical Exam  Constitutional:       Appearance: Normal appearance  HENT:      Head: Normocephalic and atraumatic  Right Ear: External ear normal       Left Ear: External ear normal       Nose: Nose normal       Mouth/Throat:      Mouth: Mucous membranes are moist       Pharynx: Oropharynx is clear  Eyes:      Extraocular Movements: Extraocular movements intact  Conjunctiva/sclera: Conjunctivae normal       Pupils: Pupils are equal, round, and reactive to light  Cardiovascular:      Rate and Rhythm: Normal rate and regular rhythm  Pulses: Normal pulses  Pulmonary:      Effort: Pulmonary effort is normal    Abdominal:      General: Abdomen is flat  Palpations: Abdomen is soft  Tenderness: There is abdominal tenderness (Mildly tender in left hemiabdomen)  There is no guarding or rebound  Musculoskeletal:         General: Normal range of motion        Cervical back: Normal range of motion  Skin:     General: Skin is warm and dry  Neurological:      Mental Status: She is alert  Mental status is at baseline  Psychiatric:         Mood and Affect: Mood normal          Behavior: Behavior normal             Lab Results: I have personally reviewed pertinent lab results  Imaging: I have personally reviewed pertinent reports  EKG, Pathology, and Other Studies: I have personally reviewed pertinent reports

## 2022-11-04 NOTE — ASSESSMENT & PLAN NOTE
Speculum exam benign with mild amount of clear vaginal discharge in posterior fornix  No recent or current PMB  Affirm swab obtained, follow-up results  TVUS ordered for further assessment  Discussed possibility of vesicovaginal fistula and most useful form of assessment being injection of methylene blue into bladder via catheter and placement of vaginal tampon to assess blue staining on material indicating fistulization  At this time, plan to assess with TVUS and consider catheterization pending results and family's decision to proceed with further intervention and/or if patient appears irritated by vaginal discharge  At this time, patient is comfortable and plan is to follow-up results and consider further assessment if necessary or desired

## 2022-11-04 NOTE — CONSULTS
[address] 82 y o  female MRN: [id number]  Unit/Bed#: [facility] 803-01 Encounter: [id number]    Assessment:   82 y o   postmenopausal female admitted in the setting of constipation and rectus sheath hematoma  PMHx significant for multiple comorbidities including Alzheimer's disease oriented x1 to self, non-verbal; uncontrolled HTN; T2DM; stage 3 CKD; and recent tibia/fibula fracture  Consulted for findings on CT abd/pelvis "Moderate amount of fluid in the superior aspect of the vagina  Findings can be secondary to presence of obstructing neoplasm or presence of vesicovaginal fistula  Direct visualization and GYN consultation is recommended for further evaluation "    Plan:   Vaginal disorder  Assessment & Plan  Speculum exam benign with mild amount of clear vaginal discharge in posterior fornix  No recent or current PMB  Affirm swab obtained, follow-up results  TVUS ordered for further assessment  Discussed possibility of vesicovaginal fistula and most useful form of assessment being injection of methylene blue into bladder via catheter and placement of vaginal tampon to assess blue staining on material indicating fistulization  At this time, plan to assess with TVUS and consider catheterization pending results and family's decision to proceed with further intervention and/or if patient appears irritated by vaginal discharge  At this time, patient is comfortable and plan is to follow-up results and consider further assessment if necessary or desired      Discussed case and plan w/ Dr Kiran Perez    HPI:  [place] is a pleasant 82 y o   postmenopausal female admitted in the setting of constipation and rectus sheath hematoma  Gynecology was consulted for new findings on CT concerning for moderate amount of fluid collecting within vagina   Patient has a history of progressing Alzheimer's dementia and is currently oriented to self and is non-verbal  [place] intermittently will nod her head yes or no to questions but does not respond otherwise  POA is her daughter Shanita Truong  I spoke with Shanita Truong and her son Ravinder Hull regarding [place]'s current presentation  On review of Gyn history, her family remarked that she had reached menopause at age 41  At age 69, [place] had "2 menstrual cycles" and a postmenopausal bleeding workup with her gynecologist Dr Jayla Mathews that was ultimately negative  Family denies any further episodes of bleeding since that time  Currently, patient has had mild vaginal discharge but otherwise has been asymptomatic  Denies other gynecological concerns or complaints  [place] lives in her home and her children will take turns caring for their mother  At this time, [place] requires 24h care due to the progression of her dementia and further debilitating ADLs  I discussed her gynecological care and plans to further assess for possible infection and uterine anatomy  Lydia and Ravinder Hull expressed understanding and all questions were answered  Active Problems:  Patient Active Problem List   Diagnosis   • 3-vessel coronary artery disease   • Late onset Alzheimer's dementia without behavioral disturbance ([facility])   • Depression   • Diabetic retinopathy ([facility] )   • DM (diabetes mellitus), type 2, uncontrolled w/ophthalmic complication   • Gastroesophageal reflux disease with hiatal hernia   • Glaucoma   • Hyperlipidemia   • Essential hypertension   • Cerebral artery occlusion with cerebral infarction ([facility] )   • Obesity (BMI 30-39  9)   • Obstructive sleep apnea   • Osteoarthritis of knee   • Urine incontinence   • Ventricular tachycardia   • Chronic diastolic congestive heart failure ([facility] )   • 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 ([facility])   • Abnormal chest x-ray   • Urine retention   • Congestive heart failure with LV diastolic dysfunction, NYHA class 2 ([facility])   • Dyspnea on minimal exertion   • PAF (paroxysmal atrial fibrillation) ([facility])   • SSS (sick sinus syndrome) ([facility])   • Mild intermittent asthma without complication   • Osteopenia   • Obesity, morbid ([facility])   • Type 2 diabetes mellitus with hyperglycemia, with long-term current use of insulin ([facility])   • Closed right ankle fracture   • Ambulatory dysfunction   • Stage 3 chronic kidney disease, unspecified whether stage 3a or 3b CKD ([facility] )   • Fall   • Scalp laceration   • Constipation   • Rectus sheath hematoma   • Tibia/fibula fracture   • Vaginal disorder   • Leukocytosis       PMH:  Past Medical History:   Diagnosis Date   • Arthritis    • Asthma    • CAD (coronary artery disease) of artery bypass graft    • Cardiac disease    • CHF (congestive heart failure) ([facility])    • Dementia ([facility])    • Depression    • Diabetes mellitus ([place] [address] )    • Heart attack ([place] [address] )    • Hyperlipidemia    • Hypertension    • MI (myocardial infarction) ([place] [address] )    • Neuropathy    • BRANT (obstructive sleep apnea)    • Peptic ulcer     was + for H pylori   • Transient cerebral ischemia 2011    with neg CUS and ECHO       PSH:  Past Surgical History:   Procedure Laterality Date   • APPENDECTOMY     • CATARACT EXTRACTION     •  SECTION     • COLONOSCOPY  2004   • CORONARY ANGIOPLASTY  2006   • CORONARY ANGIOPLASTY WITH STENT PLACEMENT     • CORONARY ARTERY BYPASS GRAFT     • DENTAL SURGERY     • EGD AND COLONOSCOPY     • ELBOW SURGERY      resolved    • ESOPHAGOGASTRODUODENOSCOPY     • ORIF TIBIA & FIBULA FRACTURES Right 2022    Procedure: OPEN REDUCTION W/ INTERNAL FIXATION (ORIF) ANKLE;  Surgeon: Ivania Sneed MD;  Location:  MAIN OR;  Service: Orthopedics       OB History   with 2 prior  sections    Meds:  No current facility-administered medications on file prior to encounter       Current Outpatient Medications on File Prior to Encounter   Medication Sig Dispense Refill   • acetaminophen (TYLENOL) 325 mg tablet Take 3 tablets (975 mg total) by mouth every 8 (eight) hours  0   • atorvastatin (LIPITOR) 40 mg tablet Take 1 tablet (40 mg total) by mouth daily 90 tablet 3   • calcium carbonate-vitamin D (OSCAL-D) 500 mg-200 units per tablet Take 1 tablet by mouth 2 (two) times a day with meals 60 tablet 0   • Continuous Blood Gluc  (Dexcom G6 ) SIVAKUMAR Use daily as directed for CGM     • Continuous Blood Gluc Sensor (Dexcom G6 Sensor) MISC Use daily as directed for CGM - Change every 10 days     • Continuous Blood Gluc Transmit (Dexcom G6 Transmitter) MISC Use daily as directed for CGM - Change every 3 months     • Eliquis 5 MG TAKE 1 TABLET BY MOUTH TWICE A DAY 60 tablet 5   • glucose blood test strip USE AS DIRECTED 3 TIMES A  each 1   • Insulin Disposable Pump (V-Go 20) KIT Apply once a night 30 kit 5   • insulin glargine (LANTUS) 100 units/mL subcutaneous injection Inject 25 Units under the skin daily at bedtime 10 mL 0   • metoprolol tartrate (LOPRESSOR) 25 mg tablet TAKE 1 TABLET (25 MG TOTAL) BY MOUTH EVERY 12 (TWELVE) HOURS 180 tablet 3   • NovoLOG 100 UNIT/ML injection INJECT UP TO 15 UNITS DAILY WITH V-GO 30 mL 1   • ondansetron (Zofran ODT) 4 mg disintegrating tablet Take 1 tablet (4 mg total) by mouth every 6 (six) hours as needed for nausea or vomiting 20 tablet 0   • potassium chloride (Klor-Con M10) 10 mEq tablet Take 1 tablet (10 mEq total) by mouth daily 90 tablet 3   • senna-docusate sodium (SENOKOT S) 8 6-50 mg per tablet Take 1 tablet by mouth daily at bedtime  0   • torsemide (DEMADEX) 20 mg tablet Take 1 tablet (20 mg total) by mouth daily 90 tablet 3   • escitalopram (Lexapro) 20 mg tablet Take 1 tablet (20 mg total) by mouth daily 90 tablet 0   • Insulin Disposable Pump (V-Go 20) KIT Use daily Use one daily 30 kit 1       Allergies:   Allergies   Allergen Reactions   • Ace Inhibitors    • Chlorpromazine    • Latex    • Lisinopril    • Namenda [Memantine] Drowsiness   • Other Other (See Comments)     Tilapia, unsure of reaction   • Penicillins Seizures   • Propoxyphene    • Stadol [Butorphanol] Physical Exam:  BP (!) 174/77   Pulse 66   Temp 97 7 °F (36 5 °C)   Resp 18   Ht 5' 5" (1 651 m)   Wt 93 9 kg (207 lb)   SpO2 95%   BMI 34 45 kg/m²     Physical Exam  Vitals and nursing note reviewed  Exam conducted with a chaperone present  Constitutional:       General: She is not in acute distress  Appearance: She is obese  HENT:      Head: Normocephalic  Right Ear: External ear normal       Left Ear: External ear normal    Eyes:      General: No scleral icterus  Right eye: No discharge  Left eye: No discharge  Conjunctiva/sclera: Conjunctivae normal    Cardiovascular:      Rate and Rhythm: Normal rate and regular rhythm  Pulses: Normal pulses  Heart sounds: Murmur (holosystolic) heard  Pulmonary:      Effort: Pulmonary effort is normal  No respiratory distress  Breath sounds: Normal breath sounds  Abdominal:      General: There is no distension  Palpations: Abdomen is soft  Tenderness: There is no abdominal tenderness  There is no guarding  Comments: Gravid uterus   Genitourinary:     Exam position: Supine  Yeison stage (genital): 5  Labia:         Right: No rash, tenderness, lesion or injury  Left: No rash, tenderness, lesion or injury  Vagina: No signs of injury  Vaginal discharge present  No erythema, tenderness, bleeding or lesions  Cervix: No cervical motion tenderness, discharge, friability, lesion, erythema or cervical bleeding  Uterus: Not enlarged and not tender  Adnexa:         Right: No mass, tenderness or fullness  Left: No mass, tenderness or fullness  Comments: PureWick in place  Speculum exam benign with mild amount of clear fluid in posterior fornix of vagina  No appreciation of vaginal fistulization on speculum exam, assessed circumferentially with rotation of speculum and on bimanual exam  Musculoskeletal:         General: No swelling or tenderness   Normal range of motion  Right lower leg: No edema  Left lower leg: No edema  Skin:     General: Skin is warm and dry  Capillary Refill: Capillary refill takes less than 2 seconds  Neurological:      Mental Status: She is alert  Mental status is at baseline        Comments: Oriented x1 to self  Non-verbal   Psychiatric:         Mood and Affect: Mood normal

## 2022-11-05 PROBLEM — I65.29: Status: ACTIVE | Noted: 2022-11-05

## 2022-11-05 PROBLEM — I65.21 RIGHT CAVERNOUS CAROTID STENOSIS: Status: ACTIVE | Noted: 2022-11-05

## 2022-11-05 LAB
ARTERIAL PATENCY WRIST A: YES
BASE EXCESS BLDA CALC-SCNC: 7.6 MMOL/L
BASOPHILS # BLD AUTO: 0.03 THOUSANDS/ÂΜL (ref 0–0.1)
BASOPHILS NFR BLD AUTO: 0 % (ref 0–1)
CANDIDA RRNA VAG QL PROBE: NEGATIVE
EOSINOPHIL # BLD AUTO: 0.02 THOUSAND/ÂΜL (ref 0–0.61)
EOSINOPHIL NFR BLD AUTO: 0 % (ref 0–6)
ERYTHROCYTE [DISTWIDTH] IN BLOOD BY AUTOMATED COUNT: 14.6 % (ref 11.6–15.1)
G VAGINALIS RRNA GENITAL QL PROBE: POSITIVE
GLUCOSE SERPL-MCNC: 239 MG/DL (ref 65–140)
GLUCOSE SERPL-MCNC: 250 MG/DL (ref 65–140)
GLUCOSE SERPL-MCNC: 282 MG/DL (ref 65–140)
GLUCOSE SERPL-MCNC: 322 MG/DL (ref 65–140)
HCO3 BLDA-SCNC: 32.8 MMOL/L (ref 22–28)
HCT VFR BLD AUTO: 34 % (ref 34.8–46.1)
HGB BLD-MCNC: 10.8 G/DL (ref 11.5–15.4)
IMM GRANULOCYTES # BLD AUTO: 0.02 THOUSAND/UL (ref 0–0.2)
IMM GRANULOCYTES NFR BLD AUTO: 0 % (ref 0–2)
INR PPP: 1.07 (ref 0.84–1.19)
LYMPHOCYTES # BLD AUTO: 0.78 THOUSANDS/ÂΜL (ref 0.6–4.47)
LYMPHOCYTES NFR BLD AUTO: 8 % (ref 14–44)
MCH RBC QN AUTO: 28.3 PG (ref 26.8–34.3)
MCHC RBC AUTO-ENTMCNC: 31.8 G/DL (ref 31.4–37.4)
MCV RBC AUTO: 89 FL (ref 82–98)
MONOCYTES # BLD AUTO: 0.64 THOUSAND/ÂΜL (ref 0.17–1.22)
MONOCYTES NFR BLD AUTO: 7 % (ref 4–12)
NASAL CANNULA: 1
NEUTROPHILS # BLD AUTO: 7.79 THOUSANDS/ÂΜL (ref 1.85–7.62)
NEUTS SEG NFR BLD AUTO: 85 % (ref 43–75)
NRBC BLD AUTO-RTO: 0 /100 WBCS
O2 CT BLDA-SCNC: 15.5 ML/DL (ref 16–23)
OXYHGB MFR BLDA: 96.9 % (ref 94–97)
PCO2 BLDA: 48.7 MM HG (ref 36–44)
PH BLDA: 7.45 [PH] (ref 7.35–7.45)
PLATELET # BLD AUTO: 229 THOUSANDS/UL (ref 149–390)
PMV BLD AUTO: 11.8 FL (ref 8.9–12.7)
PO2 BLDA: 98.6 MM HG (ref 75–129)
PROTHROMBIN TIME: 14.2 SECONDS (ref 11.6–14.5)
RBC # BLD AUTO: 3.82 MILLION/UL (ref 3.81–5.12)
T VAGINALIS RRNA GENITAL QL PROBE: NEGATIVE
WBC # BLD AUTO: 9.28 THOUSAND/UL (ref 4.31–10.16)

## 2022-11-05 RX ORDER — INSULIN LISPRO 100 [IU]/ML
1-6 INJECTION, SOLUTION INTRAVENOUS; SUBCUTANEOUS
Status: DISCONTINUED | OUTPATIENT
Start: 2022-11-05 | End: 2022-11-10 | Stop reason: HOSPADM

## 2022-11-05 RX ORDER — INSULIN GLARGINE 100 [IU]/ML
30 INJECTION, SOLUTION SUBCUTANEOUS
Status: DISCONTINUED | OUTPATIENT
Start: 2022-11-05 | End: 2022-11-08

## 2022-11-05 RX ORDER — BISACODYL 10 MG
10 SUPPOSITORY, RECTAL RECTAL ONCE
Status: COMPLETED | OUTPATIENT
Start: 2022-11-05 | End: 2022-11-05

## 2022-11-05 RX ORDER — SODIUM PHOSPHATE, DIBASIC AND SODIUM PHOSPHATE, MONOBASIC 7; 19 G/133ML; G/133ML
1 ENEMA RECTAL ONCE
Status: COMPLETED | OUTPATIENT
Start: 2022-11-05 | End: 2022-11-05

## 2022-11-05 RX ORDER — LABETALOL HYDROCHLORIDE 5 MG/ML
10 INJECTION, SOLUTION INTRAVENOUS ONCE
Status: COMPLETED | OUTPATIENT
Start: 2022-11-05 | End: 2022-11-05

## 2022-11-05 RX ORDER — LACTULOSE 20 G/30ML
30 SOLUTION ORAL ONCE
Status: COMPLETED | OUTPATIENT
Start: 2022-11-05 | End: 2022-11-05

## 2022-11-05 RX ADMIN — METOPROLOL TARTRATE 25 MG: 25 TABLET, FILM COATED ORAL at 08:10

## 2022-11-05 RX ADMIN — SODIUM PHOSPHATE, DIBASIC AND SODIUM PHOSPHATE, MONOBASIC 1 ENEMA: 7; 19 ENEMA RECTAL at 21:11

## 2022-11-05 RX ADMIN — INSULIN LISPRO 3 UNITS: 100 INJECTION, SOLUTION INTRAVENOUS; SUBCUTANEOUS at 12:35

## 2022-11-05 RX ADMIN — ONDANSETRON 4 MG: 2 INJECTION INTRAMUSCULAR; INTRAVENOUS at 00:59

## 2022-11-05 RX ADMIN — METOPROLOL TARTRATE 25 MG: 25 TABLET, FILM COATED ORAL at 21:08

## 2022-11-05 RX ADMIN — INSULIN LISPRO 5 UNITS: 100 INJECTION, SOLUTION INTRAVENOUS; SUBCUTANEOUS at 21:11

## 2022-11-05 RX ADMIN — SENNOSIDES AND DOCUSATE SODIUM 2 TABLET: 8.6; 5 TABLET ORAL at 21:10

## 2022-11-05 RX ADMIN — INSULIN LISPRO 4 UNITS: 100 INJECTION, SOLUTION INTRAVENOUS; SUBCUTANEOUS at 18:04

## 2022-11-05 RX ADMIN — BISACODYL 10 MG: 10 SUPPOSITORY RECTAL at 14:24

## 2022-11-05 RX ADMIN — INSULIN GLARGINE 30 UNITS: 100 INJECTION, SOLUTION SUBCUTANEOUS at 21:11

## 2022-11-05 RX ADMIN — LACTULOSE 30 G: 20 SOLUTION ORAL at 14:23

## 2022-11-05 RX ADMIN — HYDRALAZINE HYDROCHLORIDE 5 MG: 20 INJECTION, SOLUTION INTRAMUSCULAR; INTRAVENOUS at 12:51

## 2022-11-05 RX ADMIN — LABETALOL HYDROCHLORIDE 10 MG: 5 INJECTION, SOLUTION INTRAVENOUS at 00:56

## 2022-11-05 NOTE — UTILIZATION REVIEW
NOTIFICATION OF INPATIENT ADMISSION   AUTHORIZATION REQUEST   SERVICING FACILITY:   [facility]  Address: [address] [place] [address], [address] [place] [address]  Tax ID: [id number]  NPI: [id number] ATTENDING PROVIDER:  Attending Name and NPI#: Arlottie Beckford [[id number]]  Address: [address] [place] [address], [address] [place] [address]  Phone: [phone]   ADMISSION INFORMATION:  Place of Service: Inpatient [address] [facility]  [address]  Place of Service Code: 21  Inpatient Admission Date/Time: 11/3/22  7:34 PM  Discharge Date/Time: No discharge date for patient encounter  Admitting Diagnosis Code/Description:  Abdominal pain [R10 9]  Nausea and vomiting [R11 2]  Constipation [K59 00]  Rectus sheath hematoma, initial encounter [S30 1XXA]     UTILIZATION REVIEW CONTACT:  Tina Cha Utilization   Network Utilization Review Department  Phone: [phone]  Fax: [phone]  Email: [email or web address]  Contact for approvals/pending authorizations, clinical reviews, and discharge  PHYSICIAN ADVISORY SERVICES:  Medical Necessity Denial & Swnw-wa-Wgxp Review  Phone: [phone]  Fax: [phone]  Email: [email or web address]

## 2022-11-05 NOTE — ASSESSMENT & PLAN NOTE
· Patient presented with constipation, as per daughter last bowel movement was 7 days back  This morning was noted to have vomiting  · Will start on bowel regimen     · Transvaginal ultrasound  - negative for fistula exam per obyn no evidence   ·  will therefore order fleets enema now and add oral pills pt lethargic   · Dulcolax / senna at hs , lactulose now and miralax   · Ct imaging does not demonstrated considerable stool in colon

## 2022-11-05 NOTE — PROGRESS NOTES
[facility] [facility]  Progress Note - [place] 1935, 82 y o  female MRN: [id number]  Unit/Bed#: [facility] 803-01 Encounter: [id number]  Primary Care Provider: Mary Alcazar MD   Date and time admitted to hospital: 11/3/2022 12:21 PM    * Constipation  Assessment & Plan  · Patient presented with constipation, as per daughter last bowel movement was 7 days back  This morning was noted to have vomiting  · Will start on bowel regimen  · Transvaginal ultrasound  - negative for fistula exam per obyn no evidence   ·  will therefore order fleets enema now and add oral pills pt lethargic   · Dulcolax / senna at hs , lactulose now and miralax   · Ct imaging does not demonstrated considerable stool in colon     Vaginal disorder  Assessment & Plan  · CT abdomen pelvis remarkable for fluid in vagina, likely due to obstructing mass versus with vesico-vaginal fistula  · As per patient's daughter no complaints of abnormal vaginal bleeding  · Appreciated gyn and discussed with gyn   · Prior ct imaging not noted to have said fluid collection in vagina  · No evidence of fistula formation per gyn on speculum exam  · Transvaginal ultrasound completed with no acute findings   · Discussed with son and daughter at bedside - 11/5  · S/P TVUS and then consider catheterization pending results family will then decide to proceed with further intervention or not - further discussion Monday  · Methylene blue tampon test was discussed not yet performed , however pt with no noted fistula via transvaginal US  · R/o vesicovaginal fistula    Rectus sheath hematoma  Assessment & Plan  · Patient had fall about 2 weeks back  · CT abdomen pelvis did showed 10 cm left rectus sheath hematoma  · Recommend repeat ct abdomen pelvis in 3 - 6 months   · Patient evaluated by surgery, no acute intervention needed at this time    · HH appears to be stable no considerable drop   · Recommended abdominal binder, reversal of Eliquis with Kcentra  · As per ED physician's discussion with Radiology does not seem actively bleeding however trend hemoglobin  · If hemoglobin trends down may need to consult IR for possible intervention  Essential hypertension  Assessment & Plan  · Patient is on torsemide and metoprolol at home, continue same  · Patient was noted to be hypertensive in ED needing labetalol 10 mg once  · Blood pressure has been labile elevated this am   ·  pt lethargic today held po bb     Late onset Alzheimer's dementia without behavioral disturbance ([facility] [address] )  Assessment & Plan  · Noted to have history of Alzheimer's with behavior disturbance  · Patient was noted to have change in mental status in ED however CT head unremarkable  · Continue Lexapro at home dosage  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  Assessment & Plan  See CT imaging   · Moderate to severe bilateral cavernous carotid artery plaque stenosis  No evidence of acute intracranial hemorrhage  Chronic microangiopathy  Moderate to severe plaque stenosis right vertebral artery origin  Approximately 70-80% plaque stenosis right cervical ICA origin  · Discuss with family - goals of care     Leukocytosis  Assessment & Plan  · Leukocytosis noted on labs likely reactive  · No evidence of fever or signs of infection  · Monitor CBC in a m  [place]/fibula fracture  Assessment & Plan  Patient had fall 2 weeks back leading to defer fracture status post or if   PT/OT eval     Scalp laceration  Assessment & Plan  · Patient had fall about 2 weeks back when she had laceration on scalp needing sutures  · As per daughter she was supposed to get sutures removed this week  · Will discuss with trauma   · Discuss with acute surgery team tomorrow for possible removal of sutures      Stage 3 chronic kidney disease, unspecified whether stage 3a or 3b CKD [facility])  Assessment & Plan  Lab Results   Component Value Date    EGFR 54 11/04/2022    EGFR 49 11/03/2022    EGFR 43 10/20/2022    CREATININE 0 95 11/04/2022    CREATININE 1 04 11/03/2022    CREATININE 1 14 10/20/2022     History noted, creatinine within normal limits now  Type 2 diabetes mellitus with hyperglycemia, with long-term current use of insulin [facility])  Assessment & Plan  Lab Results   Component Value Date    HGBA1C 7 3 (H) 07/30/2022       Recent Labs     11/04/22  2039 11/05/22  0713 11/05/22  1047 11/05/22  1604   POCGLU 266* 250* 239* 282*       Blood Sugar Average: Last 72 hrs:  (P) 404 [id number]     · Patient is on Lantus 25 units q h s  Along with premeal 25 units  · Started on Lantus 20 units along with sliding scale insulin, increased to 30 units tonight   · Goal is to keep less than 180  Obstructive sleep apnea  Assessment & Plan  · Patient has history of sleep apnea however is not compliant on CPAP  Hyperlipidemia  Assessment & Plan  Continue atorvastatin        VTE Pharmacologic Prophylaxis: VTE Score: 4 High Risk (Score >/= 5) - Pharmacological DVT Prophylaxis Contraindicated  Sequential Compression Devices Ordered  Patient Centered Rounds: I performed bedside rounds with nursing staff today  Discussions with Specialists or Other Care Team Provider: nursing     Education and Discussions with Family / Patient: Updated  (son) at bedside  Time Spent for Care: 45 minutes  More than 50% of total time spent on counseling and coordination of care as described above      Current Length of Stay: 2 day(s)  Current Patient Status: Inpatient   Certification Statement: The patient will continue to require additional inpatient hospital stay due to ongoing need for tapia   Discharge Plan: Anticipate discharge in 48-72 hrs to final plan of care to be determined ? need for rehab and family plan     Code Status: Level 1 - Full Code    Subjective:   Pt reportedly just ate a banana and was awake now very lethargic and hardly able to arouse pt drifts off with any discussion  Objective:     Vitals:   Temp (24hrs), Av 3 °F (36 3 °C), Min:96 8 °F (36 °C), Max:97 7 °F (36 5 °C)    Temp:  [96 8 °F (36 °C)-97 7 °F (36 5 °C)] 97 5 °F (36 4 °C)  HR:  [58-70] 68  Resp:  [12-20] 16  BP: (102-218)/(48-95) 102/48  SpO2:  [96 %-99 %] 96 %  Body mass index is 34 45 kg/m²  Input and Output Summary (last 24 hours): Intake/Output Summary (Last 24 hours) at 2022  Last data filed at 2022 1815  Gross per 24 hour   Intake 360 ml   Output 350 ml   Net 10 ml       Physical Exam:   Physical Exam  Constitutional:       General: She is not in acute distress  Appearance: She is ill-appearing  She is not toxic-appearing or diaphoretic  HENT:      Head: Normocephalic  Comments: Head laceration     Mouth/Throat:      Pharynx: Oropharynx is clear  No oropharyngeal exudate  Eyes:      General:         Right eye: No discharge  Left eye: No discharge  Cardiovascular:      Rate and Rhythm: Normal rate  Heart sounds: No murmur heard  No friction rub  No gallop  Pulmonary:      Effort: No respiratory distress  Breath sounds: No stridor  No wheezing, rhonchi or rales  Comments: Poor effort now on room air   Chest:      Chest wall: No tenderness  Abdominal:      General: There is no distension  Palpations: There is no mass  Tenderness: There is no abdominal tenderness  There is no guarding or rebound  Hernia: No hernia is present  Comments: Abdominal binder in place    Skin:     Coloration: Skin is not jaundiced or pale  Findings: Bruising (right upper arm hematoma healing ) present  No erythema, lesion or rash  Neurological:      Mental Status: She is alert  She is disoriented        Comments: Lethargic          Additional Data:     Labs:  Results from last 7 days   Lab Units 22  0438   WBC Thousand/uL 9 28   HEMOGLOBIN g/dL 10 8*   HEMATOCRIT % 34 0*   PLATELETS Thousands/uL 229   NEUTROS PCT % 85* LYMPHS PCT % 8*   MONOS PCT % 7   EOS PCT % 0     Results from last 7 days   Lab Units 11/04/22  0942   SODIUM mmol/L 136   POTASSIUM mmol/L 3 8   CHLORIDE mmol/L 104   CO2 mmol/L 27   BUN mg/dL 27*   CREATININE mg/dL 0 95   ANION GAP mmol/L 5   CALCIUM mg/dL 9 0   ALBUMIN g/dL 2 9*   TOTAL BILIRUBIN mg/dL 0 86   ALK PHOS U/L 93   ALT U/L 20   AST U/L 14   GLUCOSE RANDOM mg/dL 234*     Results from last 7 days   Lab Units 11/05/22  0438   INR  1 07     Results from last 7 days   Lab Units 11/05/22  1604 11/05/22  1047 11/05/22  0713 11/04/22  2039 11/04/22  1545 11/04/22  1119 11/04/22  0714 11/03/22  2103 11/03/22  1536   POC GLUCOSE mg/dl 282* 239* 250* 266* 229* 213* 194* 221* 143*               Lines/Drains:  Invasive Devices  Report    Peripheral Intravenous Line  Duration           Peripheral IV 11/03/22 Right Antecubital 2 days                      Imaging: Reviewed radiology reports from this admission including: ultrasound(s)    Recent Cultures (last 7 days):         Last 24 Hours Medication List:   Current Facility-Administered Medications   Medication Dose Route Frequency Provider Last Rate   • acetaminophen  650 mg Oral Q6H PRN Svetlana Box MD     • aluminum-magnesium hydroxide-simethicone  30 mL Oral Q6H PRN Svetlana Box MD     • atorvastatin  40 mg Oral Daily Svetlana Box MD     • escitalopram  20 mg Oral Daily Svetlana Box MD     • hydrALAZINE  5 mg Intravenous Q6H PRN Afia Valdez PA-C     • insulin glargine  30 Units Subcutaneous HS EMELI Waller     • insulin lispro  1-6 Units Subcutaneous TID AC EMELI Waller     • insulin lispro  1-6 Units Subcutaneous HS EMELI Waller     • lactulose  10 g Oral Daily PRN Svetlana Box MD     • metoprolol tartrate  25 mg Oral Q12H [facility] Svetlana Box MD     • nystatin   Topical Once Irwin Carrington MD     • ondansetron  4 mg Intravenous Q6H PRN Svetlana Box MD     • polyethylene glycol  17 g Oral Daily EMELI Waller     • senna-docusate sodium  2 tablet Oral HS EMELI Avelar     • sodium phosphate-biphosphate  1 enema Rectal Once EMELI Avelar     • torsemide  20 mg Oral Daily Unruly Crane MD          Today, Patient Was Seen By: Erika Kendrick    **Please Note: This note may have been constructed using a voice recognition system  **

## 2022-11-05 NOTE — ASSESSMENT & PLAN NOTE
· Patient had fall about 2 weeks back when she had laceration on scalp needing sutures  · As per daughter she was supposed to get sutures removed this week  · Will discuss with trauma   · Discuss with acute surgery team tomorrow for possible removal of sutures

## 2022-11-05 NOTE — PLAN OF CARE
Problem: PAIN - ADULT  Goal: Verbalizes/displays adequate comfort level or baseline comfort level  Description: Interventions:  - Encourage patient to monitor pain and request assistance  - Assess pain using appropriate pain scale  - Administer analgesics based on type and severity of pain and evaluate response  - Implement non-pharmacological measures as appropriate and evaluate response  - Consider cultural and social influences on pain and pain management  - Notify physician/advanced practitioner if interventions unsuccessful or patient reports new pain  Outcome: Progressing     Problem: INFECTION - ADULT  Goal: Absence or prevention of progression during hospitalization  Description: INTERVENTIONS:  - Assess and monitor for signs and symptoms of infection  - Monitor lab/diagnostic results  - Monitor all insertion sites, i e  indwelling lines, tubes, and drains  - Monitor endotracheal if appropriate and nasal secretions for changes in amount and color  - [place] appropriate cooling/warming therapies per order  - Administer medications as ordered  - Instruct and encourage patient and family to use good hand hygiene technique  - Identify and instruct in appropriate isolation precautions for identified infection/condition  Outcome: Progressing  Goal: Absence of fever/infection during neutropenic period  Description: INTERVENTIONS:  - Monitor WBC    Outcome: Progressing     Problem: SAFETY ADULT  Goal: Patient will remain free of falls  Description: INTERVENTIONS:  - Educate patient/family on patient safety including physical limitations  - Instruct patient to call for assistance with activity   - Consult OT/PT to assist with strengthening/mobility   - Keep Call bell within reach  - Keep bed low and locked with side rails adjusted as appropriate  - Keep care items and personal belongings within reach  - Initiate and maintain comfort rounds  - Make Fall Risk Sign visible to staff  - Offer Toileting every 2 Hours, in advance of need  - Initiate/Maintain bed/chair alarm  - Obtain necessary fall risk management equipment: chair alarm  - Apply yellow socks and bracelet for high fall risk patients  - Consider moving patient to room near nurses station  Outcome: Progressing  Goal: Maintain or return to baseline ADL function  Description: INTERVENTIONS:  -  Assess patient's ability to carry out ADLs; assess patient's baseline for ADL function and identify physical deficits which impact ability to perform ADLs (bathing, care of mouth/teeth, toileting, grooming, dressing, etc )  - Assess/evaluate cause of self-care deficits   - Assess range of motion  - Assess patient's mobility; develop plan if impaired  - Assess patient's need for assistive devices and provide as appropriate  - Encourage maximum independence but intervene and supervise when necessary  - Involve family in performance of ADLs  - Assess for home care needs following discharge   - Consider OT consult to assist with ADL evaluation and planning for discharge  - Provide patient education as appropriate  Outcome: Progressing  Goal: Maintains/Returns to pre admission functional level  Description: INTERVENTIONS:  - Perform BMAT or MOVE assessment daily    - Set and communicate daily mobility goal to care team and patient/family/caregiver  - Collaborate with rehabilitation services on mobility goals if consulted  - Reposition patient every 2 hours    - Stand patient 3 times a day  - Ambulate patient 2 times a day  - Out of bed to chair 3 times a day   - Out of bed for meals 3 times a day  - Out of bed for toileting  - Record patient progress and toleration of activity level   Outcome: Progressing     Problem: DISCHARGE PLANNING  Goal: Discharge to home or other facility with appropriate resources  Description: INTERVENTIONS:  - Identify barriers to discharge w/patient and caregiver  - Arrange for needed discharge resources and transportation as appropriate  - Identify discharge learning needs (meds, wound care, etc )  - Arrange for interpretive services to assist at discharge as needed  - Refer to Case Management Department for coordinating discharge planning if the patient needs post-hospital services based on physician/advanced practitioner order or complex needs related to functional status, cognitive ability, or social support system  Outcome: Progressing

## 2022-11-05 NOTE — ASSESSMENT & PLAN NOTE
· CT abdomen pelvis remarkable for fluid in vagina, likely due to obstructing mass versus with vesico-vaginal fistula  · As per patient's daughter no complaints of abnormal vaginal bleeding  · Appreciated gyn and discussed with gyn   · Prior ct imaging not noted to have said fluid collection in vagina  · No evidence of fistula formation per gyn on speculum exam  · Transvaginal ultrasound completed with no acute findings   · Discussed with son and daughter at bedside - 11/5  · S/P TVUS and then consider catheterization pending results family will then decide to proceed with further intervention or not - further discussion Monday     · Methylene blue tampon test was discussed not yet performed , however pt with no noted fistula via transvaginal US  · R/o vesicovaginal fistula

## 2022-11-05 NOTE — ASSESSMENT & PLAN NOTE
Lab Results   Component Value Date    HGBA1C 7 3 (H) 07/30/2022       Recent Labs     11/04/22 2039 11/05/22  0713 11/05/22  1047 11/05/22  1604   POCGLU 266* 250* 239* 282*       Blood Sugar Average: Last 72 hrs:  (P) 866 [id number]     · Patient is on Lantus 25 units q h s  Along with premeal 25 units  · Started on Lantus 20 units along with sliding scale insulin, increased to 30 units tonight   · Goal is to keep less than 180

## 2022-11-05 NOTE — ASSESSMENT & PLAN NOTE
· Patient is on torsemide and metoprolol at home, continue same  · Patient was noted to be hypertensive in ED needing labetalol 10 mg once    · Blood pressure has been labile elevated this am   ·  pt lethargic today held po bb

## 2022-11-05 NOTE — ASSESSMENT & PLAN NOTE
See CT imaging   · Moderate to severe bilateral cavernous carotid artery plaque stenosis  No evidence of acute intracranial hemorrhage  Chronic microangiopathy  Moderate to severe plaque stenosis right vertebral artery origin  Approximately 70-80% plaque stenosis right cervical ICA origin    · Discuss with family - goals of care

## 2022-11-06 ENCOUNTER — APPOINTMENT (INPATIENT)
Dept: RADIOLOGY | Facility: HOSPITAL | Age: 87
End: 2022-11-06

## 2022-11-06 LAB
ANION GAP SERPL CALCULATED.3IONS-SCNC: 3 MMOL/L (ref 4–13)
BASOPHILS # BLD AUTO: 0.04 THOUSANDS/ÂΜL (ref 0–0.1)
BASOPHILS NFR BLD AUTO: 0 % (ref 0–1)
BUN SERPL-MCNC: 36 MG/DL (ref 5–25)
CALCIUM SERPL-MCNC: 8.8 MG/DL (ref 8.3–10.1)
CHLORIDE SERPL-SCNC: 103 MMOL/L (ref 96–108)
CO2 SERPL-SCNC: 31 MMOL/L (ref 21–32)
CREAT SERPL-MCNC: 1.32 MG/DL (ref 0.6–1.3)
EOSINOPHIL # BLD AUTO: 0.09 THOUSAND/ÂΜL (ref 0–0.61)
EOSINOPHIL NFR BLD AUTO: 1 % (ref 0–6)
ERYTHROCYTE [DISTWIDTH] IN BLOOD BY AUTOMATED COUNT: 14.5 % (ref 11.6–15.1)
GFR SERPL CREATININE-BSD FRML MDRD: 36 ML/MIN/1.73SQ M
GLUCOSE SERPL-MCNC: 113 MG/DL (ref 65–140)
GLUCOSE SERPL-MCNC: 135 MG/DL (ref 65–140)
GLUCOSE SERPL-MCNC: 168 MG/DL (ref 65–140)
GLUCOSE SERPL-MCNC: 170 MG/DL (ref 65–140)
GLUCOSE SERPL-MCNC: 179 MG/DL (ref 65–140)
HCT VFR BLD AUTO: 33 % (ref 34.8–46.1)
HGB BLD-MCNC: 10.4 G/DL (ref 11.5–15.4)
IMM GRANULOCYTES # BLD AUTO: 0.03 THOUSAND/UL (ref 0–0.2)
IMM GRANULOCYTES NFR BLD AUTO: 0 % (ref 0–2)
LYMPHOCYTES # BLD AUTO: 1.3 THOUSANDS/ÂΜL (ref 0.6–4.47)
LYMPHOCYTES NFR BLD AUTO: 14 % (ref 14–44)
MCH RBC QN AUTO: 28.9 PG (ref 26.8–34.3)
MCHC RBC AUTO-ENTMCNC: 31.5 G/DL (ref 31.4–37.4)
MCV RBC AUTO: 92 FL (ref 82–98)
MONOCYTES # BLD AUTO: 0.69 THOUSAND/ÂΜL (ref 0.17–1.22)
MONOCYTES NFR BLD AUTO: 8 % (ref 4–12)
NEUTROPHILS # BLD AUTO: 7.1 THOUSANDS/ÂΜL (ref 1.85–7.62)
NEUTS SEG NFR BLD AUTO: 77 % (ref 43–75)
NRBC BLD AUTO-RTO: 0 /100 WBCS
PLATELET # BLD AUTO: 218 THOUSANDS/UL (ref 149–390)
PMV BLD AUTO: 11.9 FL (ref 8.9–12.7)
POTASSIUM SERPL-SCNC: 3.7 MMOL/L (ref 3.5–5.3)
RBC # BLD AUTO: 3.6 MILLION/UL (ref 3.81–5.12)
SODIUM SERPL-SCNC: 137 MMOL/L (ref 135–147)
WBC # BLD AUTO: 9.25 THOUSAND/UL (ref 4.31–10.16)

## 2022-11-06 RX ORDER — BISACODYL 10 MG
10 SUPPOSITORY, RECTAL RECTAL ONCE
Status: DISCONTINUED | OUTPATIENT
Start: 2022-11-06 | End: 2022-11-10 | Stop reason: HOSPADM

## 2022-11-06 RX ORDER — ESCITALOPRAM OXALATE 10 MG/1
10 TABLET ORAL DAILY
Status: DISCONTINUED | OUTPATIENT
Start: 2022-11-07 | End: 2022-11-10 | Stop reason: HOSPADM

## 2022-11-06 RX ORDER — SODIUM CHLORIDE, SODIUM GLUCONATE, SODIUM ACETATE, POTASSIUM CHLORIDE, MAGNESIUM CHLORIDE, SODIUM PHOSPHATE, DIBASIC, AND POTASSIUM PHOSPHATE .53; .5; .37; .037; .03; .012; .00082 G/100ML; G/100ML; G/100ML; G/100ML; G/100ML; G/100ML; G/100ML
100 INJECTION, SOLUTION INTRAVENOUS CONTINUOUS
Status: DISCONTINUED | OUTPATIENT
Start: 2022-11-06 | End: 2022-11-10 | Stop reason: HOSPADM

## 2022-11-06 RX ORDER — METRONIDAZOLE 500 MG/1
500 TABLET ORAL EVERY 12 HOURS SCHEDULED
Status: DISCONTINUED | OUTPATIENT
Start: 2022-11-06 | End: 2022-11-10 | Stop reason: HOSPADM

## 2022-11-06 RX ORDER — LACTULOSE 20 G/30ML
30 SOLUTION ORAL ONCE
Status: DISCONTINUED | OUTPATIENT
Start: 2022-11-06 | End: 2022-11-07

## 2022-11-06 RX ADMIN — HYDRALAZINE HYDROCHLORIDE 5 MG: 20 INJECTION, SOLUTION INTRAMUSCULAR; INTRAVENOUS at 16:10

## 2022-11-06 RX ADMIN — HYDRALAZINE HYDROCHLORIDE 5 MG: 20 INJECTION, SOLUTION INTRAMUSCULAR; INTRAVENOUS at 22:19

## 2022-11-06 RX ADMIN — METRONIDAZOLE 500 MG: 500 TABLET ORAL at 22:19

## 2022-11-06 RX ADMIN — POLYETHYLENE GLYCOL 3350 17 G: 17 POWDER, FOR SOLUTION ORAL at 08:39

## 2022-11-06 RX ADMIN — METOPROLOL TARTRATE 25 MG: 25 TABLET, FILM COATED ORAL at 22:19

## 2022-11-06 RX ADMIN — INSULIN LISPRO 1 UNITS: 100 INJECTION, SOLUTION INTRAVENOUS; SUBCUTANEOUS at 16:13

## 2022-11-06 RX ADMIN — ATORVASTATIN CALCIUM 40 MG: 40 TABLET, FILM COATED ORAL at 08:37

## 2022-11-06 RX ADMIN — METOPROLOL TARTRATE 25 MG: 25 TABLET, FILM COATED ORAL at 08:37

## 2022-11-06 RX ADMIN — INSULIN GLARGINE 30 UNITS: 100 INJECTION, SOLUTION SUBCUTANEOUS at 22:18

## 2022-11-06 RX ADMIN — METRONIDAZOLE 500 MG: 500 TABLET ORAL at 14:08

## 2022-11-06 RX ADMIN — ONDANSETRON 4 MG: 2 INJECTION INTRAMUSCULAR; INTRAVENOUS at 12:47

## 2022-11-06 RX ADMIN — INSULIN LISPRO 1 UNITS: 100 INJECTION, SOLUTION INTRAVENOUS; SUBCUTANEOUS at 11:45

## 2022-11-06 RX ADMIN — INSULIN LISPRO 1 UNITS: 100 INJECTION, SOLUTION INTRAVENOUS; SUBCUTANEOUS at 22:19

## 2022-11-06 RX ADMIN — SODIUM CHLORIDE, SODIUM GLUCONATE, SODIUM ACETATE, POTASSIUM CHLORIDE AND MAGNESIUM CHLORIDE 75 ML/HR: 526; 502; 368; 37; 30 INJECTION, SOLUTION INTRAVENOUS at 12:47

## 2022-11-06 RX ADMIN — ESCITALOPRAM OXALATE 20 MG: 20 TABLET ORAL at 08:37

## 2022-11-06 RX ADMIN — SENNOSIDES AND DOCUSATE SODIUM 2 TABLET: 8.6; 5 TABLET ORAL at 22:19

## 2022-11-06 NOTE — ASSESSMENT & PLAN NOTE
Lab Results   Component Value Date    EGFR 36 11/06/2022    EGFR 54 11/04/2022    EGFR 49 11/03/2022    CREATININE 1 32 (H) 11/06/2022    CREATININE 0 95 11/04/2022    CREATININE 1 04 11/03/2022     · History noted, creatinine within normal limits now    · Today with NEGRA , in setting of chronic diuretic use and poor oral intake   · Although son feels pt is eating and drinking   · Stopped diuretic last night / started on low dose iv fluids for correction  · Caution in setting of HF   · Chk labs in am   · Avoid nephrotoxic meds   · Daily wts

## 2022-11-06 NOTE — PLAN OF CARE
Problem: PAIN - ADULT  Goal: Verbalizes/displays adequate comfort level or baseline comfort level  Description: Interventions:  - Encourage patient to monitor pain and request assistance  - Assess pain using appropriate pain scale  - Administer analgesics based on type and severity of pain and evaluate response  - Implement non-pharmacological measures as appropriate and evaluate response  - Consider cultural and social influences on pain and pain management  - Notify physician/advanced practitioner if interventions unsuccessful or patient reports new pain  Outcome: Progressing     Problem: INFECTION - ADULT  Goal: Absence or prevention of progression during hospitalization  Description: INTERVENTIONS:  - Assess and monitor for signs and symptoms of infection  - Monitor lab/diagnostic results  - Monitor all insertion sites, i e  indwelling lines, tubes, and drains  - Monitor endotracheal if appropriate and nasal secretions for changes in amount and color  - [place] appropriate cooling/warming therapies per order  - Administer medications as ordered  - Instruct and encourage patient and family to use good hand hygiene technique  - Identify and instruct in appropriate isolation precautions for identified infection/condition  Outcome: Progressing  Goal: Absence of fever/infection during neutropenic period  Description: INTERVENTIONS:  - Monitor WBC    Outcome: Progressing     Problem: DISCHARGE PLANNING  Goal: Discharge to home or other facility with appropriate resources  Description: INTERVENTIONS:  - Identify barriers to discharge w/patient and caregiver  - Arrange for needed discharge resources and transportation as appropriate  - Identify discharge learning needs (meds, wound care, etc )  - Arrange for interpretive services to assist at discharge as needed  - Refer to Case Management Department for coordinating discharge planning if the patient needs post-hospital services based on physician/advanced practitioner order or complex needs related to functional status, cognitive ability, or social support system  Outcome: Progressing     Problem: Knowledge Deficit  Goal: Patient/family/caregiver demonstrates understanding of disease process, treatment plan, medications, and discharge instructions  Description: Complete learning assessment and assess knowledge base    Interventions:  - Provide teaching at level of understanding  - Provide teaching via preferred learning methods  Outcome: Progressing     Problem: GASTROINTESTINAL - ADULT  Goal: Maintains or returns to baseline bowel function  Description: INTERVENTIONS:  - Assess bowel function  - Encourage oral fluids to ensure adequate hydration  - Administer IV fluids if ordered to ensure adequate hydration  - Administer ordered medications as needed  - Encourage mobilization and activity  - Consider nutritional services referral to assist patient with adequate nutrition and appropriate food choices  Outcome: Progressing     Problem: SKIN/TISSUE INTEGRITY - ADULT  Goal: Skin Integrity remains intact(Skin Breakdown Prevention)  Description: Assess:  -Perform [place]ate[place] assessment every shift  -Clean and moisturize skin with every incontinence  -Inspect skin when repositioning, toileting, and assisting with ADLS  -Assess extremities for adequate circulation and sensation     Bed Management:  -Have minimal linens on bed & keep smooth, unwrinkled  -Change linens as needed when moist or perspiring  -Avoid sitting or lying in one position for more than 2hours while in bed  -Keep HOB at 30 degrees     Toileting:  -Offer bedside commode  -Assess for incontinence every 2 hours  -Use incontinent care products after each incontinent episode such as calmazine cream    Activity:  -Mobilize patient 2 times a day  -Encourage activity and walks on unit  -Encourage or provide ROM exercises   -Turn and reposition patient every 2 Hours  -Use appropriate equipment to lift or move patient in bed  -Instruct/ Assist with weight shifting every 30 minutes when out of bed in chair  -Consider limitation of chair time 3 hour intervals    Skin Care:  -Avoid use of baby powder, tape, friction and shearing, hot water or constrictive clothing  -Relieve pressure over bony prominences using Allevyns  -Do not massage red bony areas    Next Steps:  -Teach patient strategies to minimize risks such as weight shifting  -Consider consults to  interdisciplinary teams such as PT/OT  Outcome: Progressing  Goal: Incision(s), wounds(s) or drain site(s) healing without S/S of infection  Description: INTERVENTIONS  - Assess and document dressing, incision, wound bed, drain sites and surrounding tissue  - Provide patient and family education  - Perform skin care/dressing changes every shift or as ordered  Outcome: Progressing     Problem: HEMATOLOGIC - ADULT  Goal: Maintains hematologic stability  Description: INTERVENTIONS  - Assess for signs and symptoms of bleeding or hemorrhage  - Monitor labs  - Administer supportive blood products/factors as ordered and appropriate  Outcome: Progressing     Problem: Nutrition/Hydration-ADULT  Goal: Nutrient/Hydration intake appropriate for improving, restoring or maintaining nutritional needs  Description: Monitor and assess patient's nutrition/hydration status for malnutrition  Collaborate with interdisciplinary team and initiate plan and interventions as ordered  Monitor patient's weight and dietary intake as ordered or per policy  Utilize nutrition screening tool and intervene as necessary  Determine patient's food preferences and provide high-protein, high-caloric foods as appropriate       INTERVENTIONS:  - Monitor oral intake, urinary output, labs, and treatment plans  - Assess nutrition and hydration status and recommend course of action  - Evaluate amount of meals eaten  - Assist patient with eating if necessary   - Allow adequate time for meals  - Recommend/ encourage appropriate diets, oral nutritional supplements, and vitamin/mineral supplements  - Order, calculate, and assess calorie counts as needed  - Recommend, monitor, and adjust tube feedings and TPN/PPN based on assessed needs  - Assess need for intravenous fluids  - Provide specific nutrition/hydration education as appropriate  - Include patient/family/caregiver in decisions related to nutrition  Outcome: Progressing

## 2022-11-06 NOTE — ASSESSMENT & PLAN NOTE
· Patient had fall about 2 weeks back when she had laceration on scalp needing Staples  · As per daughter she was supposed to get sutures removed this week    · Discussed with trauma they were up to remove the head staples pt tolerated removal 11/6

## 2022-11-06 NOTE — ASSESSMENT & PLAN NOTE
See CT imaging   · Moderate to severe bilateral cavernous carotid artery plaque stenosis  No evidence of acute intracranial hemorrhage  Chronic microangiopathy  Moderate to severe plaque stenosis right vertebral artery origin  Approximately 70-80% plaque stenosis right cervical ICA origin  · Discussed cta findings with Neurosurgery see telemed recommendations:   · Noting multiple areas of incidental stenosis  There is no indication for endovascular intervention for incidental stenosis     ·  Recommending neurology evaluation for AP and consider MRI   · Consult placed for neurology

## 2022-11-06 NOTE — ASSESSMENT & PLAN NOTE
· Patient had fall 2 weeks back leading to bimalleolar fractures status post orif    · PT/OT eval   · Recommend home with home health

## 2022-11-06 NOTE — ASSESSMENT & PLAN NOTE
Lab Results   Component Value Date    HGBA1C 7 3 (H) 07/30/2022       Recent Labs     11/05/22  1604 11/05/22  2100 11/06/22  0801 11/06/22  1059   POCGLU 282* 322* 113 168*       Blood Sugar Average: Last 72 hrs:  (P) 220     · Patient is on Lantus 25 units q h s  · Started on Lantus 20 units along with sliding scale insulin, increased to 30 units   · SSI   · Goal is to keep less than 180

## 2022-11-06 NOTE — TELEMEDICINE
e-Consult (IPC)     Inpatient consult to Neurosurgery  Consult performed by: Mary Ann Arceo PA-C  Consult ordered by: EMELI Espinosa           Contacted by KOKI Espinosa NP  Leftyellen Ocapmo 82 y o  female MRN: [id number]  Unit/Bed#: [facility] 803-01 Encounter: [id number]    Reason for Consult    Per provider report and chart review, patient presents with constipation  While in the ER patient had reported AMS for which a CTA head and neck was completed  Imaging demonstrated no acute intracranial abnormality  CTA revealed moderate to severe stenosis of the right vertebral artery origin, 70-80% stenosis of the right cervical ICA origin and moderate to severe bilateral cavernous carotid stenosis  For these reasons, neurosurgical/neurovascular review and recommendations were requested  Available past medical history,social history, surgical history, medication list, drug allergies and review of systems were reviewed  /67   Pulse 69   Temp 97 7 °F (36 5 °C)   Resp 22   Ht 5' 5" (1 651 m)   Wt 93 9 kg (207 lb)   SpO2 96%   BMI 34 45 kg/m²      Clinical exam per provider report/chart review, AAO x self, confused  Non verbal  LOU  Imaging personally reviewed  Assessment and Recommendations    This is a 83 y/o female with Alzheimer disease who presented with constipation found to have multiple areas of incidental stenosis  There is no indication for endovascular intervention for incidental stenosis  Would defer to PCP or neurology regarding consideration for AP therapy given her multiple medical comorbidities  If any concern for stroke, would recommend neurology evaluation and consider an MRI  Call with any additional questions  All questions answered  Provider is in agreement with the course of action  11-20 minutes, >50% of the total time devoted to medical consultative verbal/EMR discussion between providers  Written report will be generated in the EMR

## 2022-11-06 NOTE — ASSESSMENT & PLAN NOTE
· Noted to have history of Alzheimer's with behavior disturbance  · Patient was noted to have change in mental status in ED however CT head unremarkable    · Please see pcp note lexapro at last visit was increased ot 20 mg double the dose   · Will reduce to 10 mg daily since pts mental status is lethargic

## 2022-11-06 NOTE — ASSESSMENT & PLAN NOTE
· Patient is on torsemide and metoprolol at home, will hold torsemide 20 mg daily sp todays dose 11/6   · Will initiate isolyte 75ml /hr due to increase in creatinine and very poor oral intake   · Patient was noted to be hypertensive in ED needing labetalol 10 mg once    · Blood pressure has been labile elevated this am   ·  pt lethargic today held po bb

## 2022-11-06 NOTE — ASSESSMENT & PLAN NOTE
· CT abdomen pelvis remarkable for fluid in vagina, suspected to be due to obstructing mass versus with vesico-vaginal fistula  · As per patient's daughter no complaints of abnormal vaginal bleeding  · Appreciated gyn and discussed with gyn   · Prior ct imaging not noted to have said fluid collection in vagina  · No evidence of fistula formation per gyn on speculum exam  · Transvaginal ultrasound completed with no acute findings   · Discussed with son and daughter at bedside - 11/5  · S/P TVUS and then consider catheterization pending results family will then decide to proceed with further intervention or not - further discussion Monday 11/6  · Methylene blue tampon test was discussed not yet performed , however pt with no noted fistula via transvaginal US  · R/o vesicovaginal fistula now less likely    · Vaginal cultures positive for Gardnerella vaginella  · Discussed in detail with gyn  Pt with no obvious signs of symptoms and no profuse vaginal discharge however, son requests both systemic and local tx  Per discussion with gyn there is no evidence to support treatment with both   We have opted to treat with flagyl BID for 7 days

## 2022-11-06 NOTE — ASSESSMENT & PLAN NOTE
· Patient presented with constipation, as per daughter last bowel movement was 7 days back  This morning was noted to have vomiting  · Will start on bowel regimen     · Transvaginal ultrasound  - negative for fistula exam per obyn no evidence   · - Sp fleets enema and dulcolax - pt had a small hard bm and then later documented large stool episode   · - will repeat lactulose and dulcolax today for further bm    · Dulcolax / senna at hs , miralax   · Ct imaging does not demonstrated considerable stool in colon

## 2022-11-07 ENCOUNTER — HOME HEALTH ADMISSION (OUTPATIENT)
Dept: HOME HEALTH SERVICES | Facility: HOME HEALTHCARE | Age: 87
End: 2022-11-07

## 2022-11-07 PROBLEM — G93.41 METABOLIC ENCEPHALOPATHY: Status: ACTIVE | Noted: 2022-11-07

## 2022-11-07 LAB
ALBUMIN SERPL BCP-MCNC: 2.8 G/DL (ref 3.5–5)
ALP SERPL-CCNC: 85 U/L (ref 46–116)
ALT SERPL W P-5'-P-CCNC: 17 U/L (ref 12–78)
ANION GAP SERPL CALCULATED.3IONS-SCNC: 7 MMOL/L (ref 4–13)
ARTERIAL PATENCY WRIST A: YES
AST SERPL W P-5'-P-CCNC: 18 U/L (ref 5–45)
BASE EXCESS BLDA CALC-SCNC: 6.3 MMOL/L
BASOPHILS # BLD AUTO: 0.07 THOUSANDS/ÂΜL (ref 0–0.1)
BASOPHILS NFR BLD AUTO: 1 % (ref 0–1)
BILIRUB SERPL-MCNC: 0.89 MG/DL (ref 0.2–1)
BUN SERPL-MCNC: 38 MG/DL (ref 5–25)
CALCIUM ALBUM COR SERPL-MCNC: 10.2 MG/DL (ref 8.3–10.1)
CALCIUM SERPL-MCNC: 9.2 MG/DL (ref 8.3–10.1)
CHLORIDE SERPL-SCNC: 103 MMOL/L (ref 96–108)
CO2 SERPL-SCNC: 27 MMOL/L (ref 21–32)
CREAT SERPL-MCNC: 1.12 MG/DL (ref 0.6–1.3)
EOSINOPHIL # BLD AUTO: 0.16 THOUSAND/ÂΜL (ref 0–0.61)
EOSINOPHIL NFR BLD AUTO: 2 % (ref 0–6)
ERYTHROCYTE [DISTWIDTH] IN BLOOD BY AUTOMATED COUNT: 14.6 % (ref 11.6–15.1)
GFR SERPL CREATININE-BSD FRML MDRD: 44 ML/MIN/1.73SQ M
GLUCOSE SERPL-MCNC: 140 MG/DL (ref 65–140)
GLUCOSE SERPL-MCNC: 165 MG/DL (ref 65–140)
GLUCOSE SERPL-MCNC: 177 MG/DL (ref 65–140)
GLUCOSE SERPL-MCNC: 93 MG/DL (ref 65–140)
GLUCOSE SERPL-MCNC: 99 MG/DL (ref 65–140)
HCO3 BLDA-SCNC: 31.3 MMOL/L (ref 22–28)
HCT VFR BLD AUTO: 31.8 % (ref 34.8–46.1)
HGB BLD-MCNC: 10.1 G/DL (ref 11.5–15.4)
IMM GRANULOCYTES # BLD AUTO: 0.02 THOUSAND/UL (ref 0–0.2)
IMM GRANULOCYTES NFR BLD AUTO: 0 % (ref 0–2)
LYMPHOCYTES # BLD AUTO: 1.19 THOUSANDS/ÂΜL (ref 0.6–4.47)
LYMPHOCYTES NFR BLD AUTO: 14 % (ref 14–44)
MCH RBC QN AUTO: 28.8 PG (ref 26.8–34.3)
MCHC RBC AUTO-ENTMCNC: 31.8 G/DL (ref 31.4–37.4)
MCV RBC AUTO: 91 FL (ref 82–98)
MONOCYTES # BLD AUTO: 0.59 THOUSAND/ÂΜL (ref 0.17–1.22)
MONOCYTES NFR BLD AUTO: 7 % (ref 4–12)
NASAL CANNULA: 2
NEUTROPHILS # BLD AUTO: 6.77 THOUSANDS/ÂΜL (ref 1.85–7.62)
NEUTS SEG NFR BLD AUTO: 76 % (ref 43–75)
NRBC BLD AUTO-RTO: 0 /100 WBCS
O2 CT BLDA-SCNC: 15 ML/DL (ref 16–23)
OXYHGB MFR BLDA: 97.3 % (ref 94–97)
PCO2 BLDA: 47.1 MM HG (ref 36–44)
PH BLDA: 7.44 [PH] (ref 7.35–7.45)
PLATELET # BLD AUTO: 207 THOUSANDS/UL (ref 149–390)
PMV BLD AUTO: 11.8 FL (ref 8.9–12.7)
PO2 BLDA: 111 MM HG (ref 75–129)
POTASSIUM SERPL-SCNC: 4.1 MMOL/L (ref 3.5–5.3)
PROT SERPL-MCNC: 5.9 G/DL (ref 6.4–8.4)
RBC # BLD AUTO: 3.51 MILLION/UL (ref 3.81–5.12)
SODIUM SERPL-SCNC: 137 MMOL/L (ref 135–147)
SPECIMEN SOURCE: ABNORMAL
WBC # BLD AUTO: 8.8 THOUSAND/UL (ref 4.31–10.16)

## 2022-11-07 RX ORDER — LACTULOSE 20 G/30ML
10 SOLUTION ORAL 2 TIMES DAILY
Status: DISCONTINUED | OUTPATIENT
Start: 2022-11-07 | End: 2022-11-10 | Stop reason: HOSPADM

## 2022-11-07 RX ORDER — ASPIRIN 81 MG/1
81 TABLET, CHEWABLE ORAL DAILY
Status: DISCONTINUED | OUTPATIENT
Start: 2022-11-07 | End: 2022-11-10 | Stop reason: HOSPADM

## 2022-11-07 RX ORDER — HEPARIN SODIUM 5000 [USP'U]/ML
5000 INJECTION, SOLUTION INTRAVENOUS; SUBCUTANEOUS EVERY 8 HOURS SCHEDULED
Status: DISCONTINUED | OUTPATIENT
Start: 2022-11-07 | End: 2022-11-08

## 2022-11-07 RX ADMIN — ASPIRIN 81 MG CHEWABLE TABLET 81 MG: 81 TABLET CHEWABLE at 10:33

## 2022-11-07 RX ADMIN — METOPROLOL TARTRATE 25 MG: 25 TABLET, FILM COATED ORAL at 20:51

## 2022-11-07 RX ADMIN — ATORVASTATIN CALCIUM 40 MG: 40 TABLET, FILM COATED ORAL at 10:23

## 2022-11-07 RX ADMIN — INSULIN LISPRO 1 UNITS: 100 INJECTION, SOLUTION INTRAVENOUS; SUBCUTANEOUS at 21:01

## 2022-11-07 RX ADMIN — SODIUM CHLORIDE, SODIUM GLUCONATE, SODIUM ACETATE, POTASSIUM CHLORIDE AND MAGNESIUM CHLORIDE 75 ML/HR: 526; 502; 368; 37; 30 INJECTION, SOLUTION INTRAVENOUS at 05:42

## 2022-11-07 RX ADMIN — POLYETHYLENE GLYCOL 3350 17 G: 17 POWDER, FOR SOLUTION ORAL at 10:23

## 2022-11-07 RX ADMIN — HYDRALAZINE HYDROCHLORIDE 5 MG: 20 INJECTION, SOLUTION INTRAMUSCULAR; INTRAVENOUS at 17:31

## 2022-11-07 RX ADMIN — INSULIN GLARGINE 30 UNITS: 100 INJECTION, SOLUTION SUBCUTANEOUS at 21:01

## 2022-11-07 RX ADMIN — METRONIDAZOLE 500 MG: 500 TABLET ORAL at 10:23

## 2022-11-07 RX ADMIN — INSULIN LISPRO 1 UNITS: 100 INJECTION, SOLUTION INTRAVENOUS; SUBCUTANEOUS at 17:29

## 2022-11-07 RX ADMIN — ESCITALOPRAM OXALATE 10 MG: 10 TABLET ORAL at 10:23

## 2022-11-07 RX ADMIN — HEPARIN SODIUM 5000 UNITS: 5000 INJECTION INTRAVENOUS; SUBCUTANEOUS at 21:01

## 2022-11-07 RX ADMIN — METRONIDAZOLE 500 MG: 500 TABLET ORAL at 20:50

## 2022-11-07 RX ADMIN — SENNOSIDES AND DOCUSATE SODIUM 2 TABLET: 8.6; 5 TABLET ORAL at 21:02

## 2022-11-07 RX ADMIN — LACTULOSE 10 G: 20 SOLUTION ORAL at 17:28

## 2022-11-07 RX ADMIN — METOPROLOL TARTRATE 25 MG: 25 TABLET, FILM COATED ORAL at 10:23

## 2022-11-07 NOTE — ASSESSMENT & PLAN NOTE
Judie Pierson is a 82 y o  female with a PMH of hyperlipidemia, PAF on eliquis , CAD, asthma, CHF, dementia (Alzheimer's w/ behavioral issues w/ past episodes of confusion), HLD, past MI, hypertension, and fall 2 weeks ago  leading to scalp laceration and  bimalleolar fractures status post orif who presents with constipation  Patient had some altered mental status while in ED and ultimately got a CT head and CTA head and neck  The CTA did show severe atherosclerosis in the right ICA as well as an intracranially  Neurosurgery was initially consulted and they recommended Neurology be consulted for anti-platelet regimen  Neurology was consulted for additional recommendations in regards to anti-platelet regimen given the atherosclerosis  W/U:  - Lipids 07/2022: Chol: 105  LD: 41  - 07/2022 A1c: 7 3  - CTA H/N: Moderate to severe bilateral cavernous carotid artery plaque stenosis  No evidence of acute intracranial hemorrhage  Chronic microangiopathy  Moderate to severe plaque stenosis right vertebral artery origin  Approximately 70-80% plaque stenosis right cervical ICA origin    RIGHT EXTRACRANIAL CAROTID SEGMENT:  Severe atherosclerotic disease of the bifurcation  Approximately 70-80% plaque stenosis right cervical ICA origin  INTERNAL CAROTID ARTERIES:  Moderate to severe bilateral cavernous carotid artery plaque stenosis  Normal ophthalmic artery origins  Normal ICA terminus  RIGHT VERTEBRAL ARTERY CERVICAL SEGMENT:  Moderate to severe stenosis at the origin  The vessel is normal in caliber throughout the neck  Plan   - Added ASA 81mg for noted atherosclerosis   - get INR at therapeutic level  - given baseline, would consider goals of care given her dementia if intervention is in family's best interest for possible CEA/TCAR for the severe atherosclerosis in the R ICA and intracranial atherosclerosis  No other further recommendations from the inpatient Neurology perspective    Please contact Neurology any further questions

## 2022-11-07 NOTE — PROGRESS NOTES
[address] [facility]  Progress Note - [place] 1935, 82 y o  female MRN: [id number]  Unit/Bed#: [facility] 803-01 Encounter: [id number]  Primary Care Provider: Autumn Jiménez MD   Date and time admitted to hospital: 11/3/2022 18:52 PM    Metabolic encephalopathy  Assessment & Plan  Patient presenting with altered mental status, metabolic encephalopathy high, however I suspect this is secondary to chronic issue of dementia with progression rather than secondary to an infectious process or other etiology  Will monitor patient for improvement on exam currently she is alert to self and that she is in the hospital  Reports hallucination, concern for lewy body dementia? 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  Assessment & Plan  See CT imaging   · Moderate to severe bilateral cavernous carotid artery plaque stenosis  No evidence of acute intracranial hemorrhage  Chronic microangiopathy  Moderate to severe plaque stenosis right vertebral artery origin  Approximately 70-80% plaque stenosis right cervical ICA origin  · Discussed cta findings with Neurosurgery see telemed recommendations:   · Noting multiple areas of incidental stenosis  There is no indication for endovascular intervention for incidental stenosis  ·  Recommending neurology evaluation for AP and consider MRI   · Consult placed for neurology   · Recommend aspirin  · Unsure of any surgical intervention is plausible at this point but can discuss    Leukocytosis  Assessment & Plan  · Leukocytosis noted on labs likely reactive  · Leukocytosis resolved    Vaginal disorder  Assessment & Plan  · CT abdomen pelvis remarkable for fluid in vagina, suspected to be due to obstructing mass versus with vesico-vaginal fistula    · As per patient's daughter no complaints of abnormal vaginal bleeding  · Appreciated gyn and discussed with gyn   · Prior ct imaging not noted to have said fluid collection in vagina  · No evidence of fistula formation per gyn on speculum exam  · Transvaginal ultrasound completed with no acute findings   · Discussed with son and daughter at bedside - 11/5  · S/P TVUS and then consider catheterization pending results family will then decide to proceed with further intervention or not - further discussion Monday 11/6  · Methylene blue tampon test was discussed not yet performed , however pt with no noted fistula via transvaginal US  · R/o vesicovaginal fistula now less likely    · Vaginal cultures positive for Gardnerella vaginella  · Discussed in detail with gyn  Pt with no obvious signs of symptoms and no profuse vaginal discharge however, son requests both systemic and local tx  Per discussion with gyn there is no evidence to support treatment with both   We have opted to treat with flagyl BID for 7 days   Tibia/fibula fracture  Assessment & Plan  · Patient had fall 2 weeks back leading to bimalleolar fractures status post orif  · PT/OT eval   · Recommend home with home health     Rectus sheath hematoma  Assessment & Plan  · Patient had fall about 2 weeks back  · CT abdomen pelvis did showed 10 cm left rectus sheath hematoma  · Recommend repeat ct abdomen pelvis in 3 - 6 months   · Patient evaluated by surgery, no acute intervention needed at this time  · HH appears to be stable no considerable drop   · Recommended abdominal binder, reversal of Eliquis with Kcentra  · As per ED physician's discussion with Radiology does not seem actively bleeding however trend hemoglobin  · If hemoglobin trends down may need to consult IR for possible intervention    · If hemoglobin is stable will start on heparin drip and monitor for bleeding, if stable at that point will resume Eliquis    Scalp laceration  Assessment & Plan  · Patient had fall about 2 weeks back when she had laceration on scalp needing Staples  · As per daughter she was supposed to get sutures removed this week  · Discussed with trauma they were up to remove the head staples pt tolerated removal 11/6      Stage 3 chronic kidney disease, unspecified whether stage 3a or 3b CKD [facility])  Assessment & Plan  Lab Results   Component Value Date    EGFR 44 11/07/2022    EGFR 36 11/06/2022    EGFR 54 11/04/2022    CREATININE 1 12 11/07/2022    CREATININE 1 32 (H) 11/06/2022    CREATININE 0 95 11/04/2022     · Kidney function improved with IV hydration with down trending creatinine, will continue fluids patient is having poor and inconsistent oral intake    Type 2 diabetes mellitus with hyperglycemia, with long-term current use of insulin [facility])  Assessment & Plan  Lab Results   Component Value Date    HGBA1C 7 3 (H) 07/30/2022       Recent Labs     11/08/22  0714 11/08/22  0716 11/08/22  0736 11/08/22  0751   POCGLU 39* 41* 61* 85       Blood Sugar Average: Last 72 hrs:  (P) 157 75     · Patient is on Lantus 25 units q h s  · Started on Lantus 20 units along with sliding scale insulin, increased to 30 units   · SSI   · Goal is to keep less than 180  Obstructive sleep apnea  Assessment & Plan  · Patient has history of sleep apnea however is not compliant on CPAP  Essential hypertension  Assessment & Plan  · Patient is on torsemide and metoprolol at home, will hold torsemide 20 mg daily sp todays dose 11/6   · Will initiate isolyte 75ml /hr due to increase in creatinine and very poor oral intake -continue fluid hydration, creatinine improving  · Patient was noted to be hypertensive in ED needing labetalol 10 mg once  ·     Hyperlipidemia  Assessment & Plan  · Continue atorvastatin    Late onset Alzheimer's dementia without behavioral disturbance ([facility] [address] )  Assessment & Plan  · Noted to have history of Alzheimer's with behavior disturbance    · With hallucinations this may be frontal to poor dementia, as well as having parkinsonian component with recent falls, autonomic dysfunction  · Patient was noted to have change in mental status in ED however CT head unremarkable  · Please see pcp note lexapro at last visit was increased ot 20 mg double the dose   · Will reduce to 10 mg daily since pts mental status is lethargic     * Constipation  Assessment & Plan  · Patient presented with constipation, as per daughter last bowel movement was 7 days back  This morning was noted to have vomiting  · Will start on bowel regimen  · Transvaginal ultrasound  - negative for fistula exam per obyn no evidence   · - Sp fleets enema and dulcolax - pt had a small hard bm and then later documented large stool episode   · -    · Dulcolax / senna at hs , miralax   · Ct imaging does not demonstrated considerable stool in colon         VTE Pharmacologic Prophylaxis: VTE Score: 4 Moderate Risk (Score 3-4) - Pharmacological DVT Prophylaxis Ordered: heparin  Patient Centered Rounds: I performed bedside rounds with nursing staff today  Discussions with Specialists or Other Care Team Provider: neurology    Education and Discussions with Family / Patient: Updated  (daughter) at bedside  Time Spent for Care: 30 minutes  More than 50% of total time spent on counseling and coordination of care as described above  Current Length of Stay: 5 day(s)  Current Patient Status: Inpatient   Certification Statement: The patient will continue to require additional inpatient hospital stay due to Medical management  Discharge Plan: Anticipate discharge in 48-72 hrs to home with home services  Code Status: Level 1 - Full Code    Subjective:   Patient alert to self and place, waxing and waning mental status throughout the day with lethargy    She is able to follow simple commands    Objective:     Vitals:   Temp (24hrs), Av 6 °F (36 4 °C), Min:97 2 °F (36 2 °C), Max:98 1 °F (36 7 °C)    Temp:  [97 2 °F (36 2 °C)-98 1 °F (36 7 °C)] 97 2 °F (36 2 °C)  HR:  [60-64] 60  Resp:  [16-18] 17  BP: (109-176)/(45-82) 151/64  SpO2:  [96 %-100 %] 100 %  Body mass index is 34 45 kg/m²  Input and Output Summary (last 24 hours): Intake/Output Summary (Last 24 hours) at 11/8/2022 0934  Last data filed at 11/8/2022 0501  Gross per 24 hour   Intake 525 ml   Output 1000 ml   Net -475 ml       Physical Exam:   Physical Exam  Vitals and nursing note reviewed  Constitutional:       General: She is not in acute distress  Appearance: She is well-developed  She is ill-appearing  Comments: Chronically ill-appearing but does not appear toxic   HENT:      Head: Normocephalic and atraumatic  Eyes:      Conjunctiva/sclera: Conjunctivae normal    Cardiovascular:      Rate and Rhythm: Normal rate and regular rhythm  Heart sounds: No murmur heard  No friction rub  No gallop  Pulmonary:      Effort: Pulmonary effort is normal  No respiratory distress  Breath sounds: Normal breath sounds  No stridor  No wheezing or rhonchi  Comments: 2 L nasal cannula with decreased breath sounds bilaterally  Abdominal:      General: There is no distension  Palpations: Abdomen is soft  There is no mass  Tenderness: There is no abdominal tenderness  There is no guarding or rebound  Hernia: No hernia is present  Musculoskeletal:         General: No swelling or tenderness  Cervical back: Neck supple  Skin:     General: Skin is warm and dry  Neurological:      Mental Status: She is alert        Comments: Oriented to self and place, follow simple commands          Additional Data:     Labs:  Results from last 7 days   Lab Units 11/08/22  0905   WBC Thousand/uL 9 26   HEMOGLOBIN g/dL 11 0*   HEMATOCRIT % 35 8   PLATELETS Thousands/uL 221   NEUTROS PCT % 84*   LYMPHS PCT % 9*   MONOS PCT % 5   EOS PCT % 1     Results from last 7 days   Lab Units 11/07/22  0501   SODIUM mmol/L 137   POTASSIUM mmol/L 4 1   CHLORIDE mmol/L 103   CO2 mmol/L 27 BUN mg/dL 38*   CREATININE mg/dL 1 12   ANION GAP mmol/L 7   CALCIUM mg/dL 9 2   ALBUMIN g/dL 2 8*   TOTAL BILIRUBIN mg/dL 0 89   ALK PHOS U/L 85   ALT U/L 17   AST U/L 18   GLUCOSE RANDOM mg/dL 99     Results from last 7 days   Lab Units 11/05/22  0438   INR  1 07     Results from last 7 days   Lab Units 11/08/22  0751 11/08/22  0736 11/08/22  0716 11/08/22  0714 11/07/22  2039 11/07/22  1630 11/07/22  1118 11/07/22  0813 11/06/22  2037 11/06/22  1600 11/06/22  1059 11/06/22  0801   POC GLUCOSE mg/dl 85 61* 41* 39* 177* 165* 140 93 179* 170* 168* 113               Lines/Drains:  Invasive Devices  Report    Peripheral Intravenous Line  Duration           Peripheral IV 11/08/22 Dorsal (posterior); Right Forearm <1 day                      Imaging: No pertinent imaging reviewed      Recent Cultures (last 7 days):         Last 24 Hours Medication List:   Current Facility-Administered Medications   Medication Dose Route Frequency Provider Last Rate   • acetaminophen  650 mg Oral Q6H PRN Sole Aguilera MD     • aluminum-magnesium hydroxide-simethicone  30 mL Oral Q6H PRN Sole Aguilera MD     • aspirin  81 mg Oral Daily Vahe Whitlock DO     • atorvastatin  40 mg Oral Daily Sole Aguilera MD     • bisacodyl  10 mg Rectal Once EMELI Delcid     • dextrose  25 mL Intravenous Once PRN Ольга Smiley PA-C     • escitalopram  10 mg Oral Daily EMELI Delcid     • heparin (porcine)  5,000 Units Subcutaneous Q8H [facility] Laith Coleman DO     • hydrALAZINE  5 mg Intravenous Q6H PRN Olga Lidia Patterson PA-C     • insulin glargine  10 Units Subcutaneous HS Laith Coleman DO     • insulin lispro  1-6 Units Subcutaneous TID AC EMELI Kahn     • insulin lispro  1-6 Units Subcutaneous HS EMELI Delcid     • lactulose  10 g Oral Daily PRN Sole Aguilera MD     • lactulose  10 g Oral BID Laith Coleman DO     • metoprolol tartrate  25 mg Oral Q12H [address] Adalberto Ford MD     • metroNIDAZOLE  500 mg Oral Q12H [facility] Cecile Weston, RHEANP     • multi-electrolyte  75 mL/hr Intravenous Continuous EMELI Uribe 75 mL/hr (11/07/22 0542)   • nystatin   Topical Once Abril Flowers MD     • ondansetron  4 mg Intravenous Q6H PRN Kobi Cohen MD     • polyethylene glycol  17 g Oral Daily EMELI Uribe     • senna-docusate sodium  2 tablet Oral HS EMELI Uribe          Today, Patient Was Seen By: Barney Alicea    **Please Note: This note may have been constructed using a voice recognition system  ** no

## 2022-11-07 NOTE — ASSESSMENT & PLAN NOTE
· Noted to have history of Alzheimer's with behavior disturbance  · With hallucinations this may be frontal to poor dementia, as well as having parkinsonian component with recent falls, autonomic dysfunction  · Patient was noted to have change in mental status in ED however CT head unremarkable    · Please see pcp note lexapro at last visit was increased ot 20 mg double the dose   · Will reduce to 10 mg daily since pts mental status is lethargic

## 2022-11-07 NOTE — ASSESSMENT & PLAN NOTE
Patient presenting with altered mental status, metabolic encephalopathy high, however I suspect this is secondary to chronic issue of dementia with progression rather than secondary to an infectious process or other etiology  Will monitor patient for improvement on exam currently she is alert to self and that she is in the hospital  Reports hallucination, concern for frontotemporal dementia as well as parkinsonian process with some component on for ongoing dysfunction, change in writing, recent falls  Hallucinations include animals and children

## 2022-11-07 NOTE — WOUND OSTOMY CARE
Consult Note - Wound   Rhetta Memos 82 y o  female MRN: [id number]  Unit/Bed#: [facility] 803-01 Encounter: [id number]        History and Present Illness:  Patient is a 83 yo female that was admitted to the hospital for treatment of constipation  Patient has a PMH of hyperlipidemia, hypertension and recent fracture  Patient is a mod assist for turning and repositioning  Patient is incontinent of bowel and bladder  On assessment, patient is lying supine on regular mattress with oxygen tubing in place  Wound Care was consulted for MASD  Patient's b/l face and ears without any wounds or skin lose  B/L heels are dry, intact, and gentry  Assessment Findings:   1  Left Buttocks MASD: Area of partial thickness skin loss with a pink, blanchable wound bed  Wound likely friction, moisture in etiology  Rhonda-wound is dry, and fragile  Scant serosanguineous drainage noted  Recommend hydraguard  2  Left Foot second toe with well adhered intact scab  No drainage  Leave ERUM  No induration, fluctuance, odor, warmth/temperature differences, redness, or purulence noted to the above noted wounds and skin areas assessed  New dressings applied per orders listed below  Patient tolerated well- no s/s of non-verbal pain or discomfort observed during the encounter  Bedside nurse aware of plan of care  See flow sheets for more detailed assessment findings  Orders listed below and wound care will sign off, call or tiger text with questions  Skin Care Plan:  1-Cleanse sacro-buttocks with soap and water  Apply Hydragaurd BID and PRN  2-Turn/reposition q2h or when medically stable for pressure re-distribution on skin   3-Elevate heels to offload pressure  4-Moisturize skin daily with skin nourishing cream  5-Ehob cushion in chair when out of bed  6-Preventative Hydraguard to bilateral heels BID and PRN           Wounds:  Wound 11/03/22 MASD Perineum (Active)   Wound Image   11/07/22 0941   Wound Description Intact;Dry;Pink 11/07/22 1500   Rhonda-wound Assessment Intact 11/07/22 1500   Wound Length (cm) 1 cm 11/07/22 0941   Wound Width (cm) 1 cm 11/07/22 0941   Wound Depth (cm) 0 1 cm 11/07/22 0941   Wound Surface Area (cm^2) 1 cm^2 11/07/22 0941   Wound Volume (cm^3) 0 1 cm^3 11/07/22 0941   Calculated Wound Volume (cm^3) 0 1 cm^3 11/07/22 0941   Treatments Cleansed;Site care 11/07/22 1500   Dressing Other (Comment) 11/07/22 1500   Dressing Changed New 11/07/22 1500   Patient Tolerance Tolerated well 11/07/22 1500       Wound 11/03/22 Toe (Comment  which one) Anterior; Left (Active)   Wound Image   11/07/22 0941   Wound Description Intact; Brown 11/07/22 1500   Rhonda-wound Assessment Intact 11/07/22 1500   Dressing Open to air 11/07/22 [address], [place]

## 2022-11-07 NOTE — PROGRESS NOTES
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  Assessment & 1416 Juan R Nascimento is a 82 y o  female with a PMH of hyperlipidemia, PAF on eliquis , CAD, asthma, CHF, dementia (Alzheimer's w/ behavioral issues w/ past episodes of confusion), HLD, past MI, hypertension, and fall 2 weeks ago  leading to scalp laceration and  bimalleolar fractures status post orif who presents with constipation  Patient had some altered mental status while in ED and ultimately got a CT head and CTA head and neck  The CTA did show severe atherosclerosis in the right ICA as well as an intracranially  Neurosurgery was initially consulted and they recommended Neurology be consulted for anti-platelet regimen  Neurology was consulted for additional recommendations in regards to anti-platelet regimen given the atherosclerosis  W/U:  - Lipids 07/2022: Chol: 105  LD: 41  - 07/2022 A1c: 7 3  - CTA H/N: Moderate to severe bilateral cavernous carotid artery plaque stenosis  No evidence of acute intracranial hemorrhage  Chronic microangiopathy  Moderate to severe plaque stenosis right vertebral artery origin  Approximately 70-80% plaque stenosis right cervical ICA origin    RIGHT EXTRACRANIAL CAROTID SEGMENT:  Severe atherosclerotic disease of the bifurcation  Approximately 70-80% plaque stenosis right cervical ICA origin  INTERNAL CAROTID ARTERIES:  Moderate to severe bilateral cavernous carotid artery plaque stenosis  Normal ophthalmic artery origins  Normal ICA terminus  RIGHT VERTEBRAL ARTERY CERVICAL SEGMENT:  Moderate to severe stenosis at the origin  The vessel is normal in caliber throughout the neck        Plan   - Added ASA 81mg for noted atherosclerosis   - get INR at therapeutic level  - given baseline, would consider goals of care given her dementia if intervention is in family's best interest for possible CEA/TCAR for the severe atherosclerosis in the R ICA and intracranial atherosclerosis  No other further recommendations from the inpatient Neurology perspective  Please contact Neurology any further questions  Alex Lima will not need outpatient follow up with Neurology  SUBJECTIVE     Reason for Consult / Principal Problem: AP selection her noted atherosclerosis in the right ICA as well as intracranial atherosclerosis  Hx and PE limited by:  Dementia/minimally verbal    HPI: Alex Lima is a 82 y o  female with a PMH of hyperlipidemia, PAF on eliquis , CAD, asthma, CHF, dementia, HLD, past MI, dementia, hypertension, and fall 2 weeks ago  leading to scalp laceration and  bimalleolar fractures status post orif who presents with constipation  According to the patient's daughter loss bowel movement noted was 7 days back, this morning was noted to have brown colored emesis associated with abdominal pain  On presentation to ED patient was found to be hypertensive needing IV labetalol x1  CT abdomen pelvis was unremarkable for any obstruction however did showed left rectus sheath hematoma likely from the fall 2 weeks back  Patient was seen by surgery in ED who recommended abdominal binder, reversal of Eliquis with Kcentra and serial H&H monitoring  While in ED patient was noted to have change in behavior hence got CT head done that was unremarkable for any intracranial pathology  As per patient's daughter present at bedside she has history of Alzheimer's with behavioral issues and sometimes does have episodes of confusion  CTA head and neck was completed  Imaging demonstrated no acute intracranial abnormality    CTA revealed moderate to severe stenosis of the right vertebral artery origin, 70-80% stenosis of the right cervical ICA origin and moderate to severe bilateral cavernous carotid stenosis  Neurosurgery evaluated and said no indication for endovascular intervention for incidental stenosis  Would defer to PCP or neurology regarding consideration for AP therapy given her multiple medical comorbidities         Consults    Historical Information   Past Medical History:   Diagnosis Date   • Arthritis    • Asthma    • CAD (coronary artery disease) of artery bypass graft    • Cardiac disease    • CHF (congestive heart failure) ([facility])    • Dementia ([facility])    • Depression    • Diabetes mellitus ([facility] )    • Heart attack ([facility] )    • Hyperlipidemia    • Hypertension    • [facility]    • Neuropathy    • BRANT (obstructive sleep apnea)    • Peptic ulcer     was + for H pylori   • Transient cerebral ischemia 2011    with neg CUS and ECHO     Past Surgical History:   Procedure Laterality Date   • APPENDECTOMY     • CATARACT EXTRACTION     •  SECTION     • COLONOSCOPY  2004   • CORONARY ANGIOPLASTY  2006   • CORONARY ANGIOPLASTY WITH STENT PLACEMENT     • CORONARY ARTERY BYPASS GRAFT     • DENTAL SURGERY     • EGD AND COLONOSCOPY     • ELBOW SURGERY      resolved    • ESOPHAGOGASTRODUODENOSCOPY     • ORIF TIBIA & FIBULA FRACTURES Right 2022    Procedure: OPEN REDUCTION W/ INTERNAL FIXATION (ORIF) ANKLE;  Surgeon: Chan Askew MD;  Location: [facility];  Service: Orthopedics     Social History   Social History     Substance and Sexual Activity   Alcohol Use Not Currently     Social History     Substance and Sexual Activity   Drug Use Never     E-Cigarette/Vaping   • E-Cigarette Use Never User      E-Cigarette/Vaping Substances   • Nicotine No    • THC No    • CBD No    • Flavoring No      Social History     Tobacco Use   Smoking Status Never Smoker   Smokeless Tobacco Never Used     Family History:   Family History   Problem Relation Age of Onset   • Diabetes Mother    • Cancer Sister    • Heart disease Sister    • Thyroid disease Sister    • Cancer Brother    • Cancer Father    • Colon cancer Family    • Diabetes Family    • Mitral valve prolapse Family    • Thyroid cancer Family      Meds/Allergies   all current active meds have been reviewed, current meds:   Current Facility-Administered Medications   Medication Dose Route Frequency   • acetaminophen (TYLENOL) tablet 650 mg  650 mg Oral Q6H PRN   • aluminum-magnesium hydroxide-simethicone (MYLANTA) oral suspension 30 mL  30 mL Oral Q6H PRN   • aspirin chewable tablet 81 mg  81 mg Oral Daily   • atorvastatin (LIPITOR) tablet 40 mg  40 mg Oral Daily   • bisacodyl (DULCOLAX) rectal suppository 10 mg  10 mg Rectal Once   • escitalopram (LEXAPRO) tablet 10 mg  10 mg Oral Daily   • hydrALAZINE (APRESOLINE) injection 5 mg  5 mg Intravenous Q6H PRN   • insulin glargine (LANTUS) subcutaneous injection 30 Units 0 3 mL  30 Units Subcutaneous HS   • insulin lispro (HumaLOG) 100 units/mL subcutaneous injection 1-6 Units  1-6 Units Subcutaneous TID AC   • insulin lispro (HumaLOG) 100 units/mL subcutaneous injection 1-6 Units  1-6 Units Subcutaneous HS   • lactulose oral solution 10 g  10 g Oral Daily PRN   • lactulose oral solution 30 g  30 g Oral Once   • metoprolol tartrate (LOPRESSOR) tablet 25 mg  25 mg Oral Q12H REJI   • metroNIDAZOLE (FLAGYL) tablet 500 mg  500 mg Oral Q12H REJI   • multi-electrolyte (PLASMALYTE-A/ISOLYTE-S PH 7 4) IV solution  75 mL/hr Intravenous Continuous   • nystatin (MYCOSTATIN) powder   Topical Once   • ondansetron (ZOFRAN) injection 4 mg  4 mg Intravenous Q6H PRN   • polyethylene glycol (MIRALAX) packet 17 g  17 g Oral Daily   • senna-docusate sodium (SENOKOT S) 8 6-50 mg per tablet 2 tablet  2 tablet Oral HS    and PTA meds:   Prior to Admission Medications   Prescriptions Last Dose Informant Patient Reported? Taking?    Continuous Blood Gluc  (Dexcom G6 ) SIVAKUMAR 11/3/2022 at Unknown time Child Yes Yes   Sig: Use daily as directed for CGM   Continuous Blood Gluc Sensor (Dexcom G6 Sensor) MISC 11/3/2022 at Unknown time Child Yes Yes   Sig: Use daily as directed for CGM - Change every 10 days   Continuous Blood Gluc Transmit (Dexcom G6 Transmitter) MISC 11/3/2022 at Unknown time Child Yes Yes   Sig: Use daily as directed for CGM - Change every 3 months   Eliquis 5 MG 11/2/2022 at Unknown time Child No Yes   Sig: TAKE 1 TABLET BY MOUTH TWICE A DAY   Insulin Disposable Pump (V-Go 20) KIT 11/3/2022 at Unknown time Child No Yes   Sig: Apply once a night   Insulin Disposable Pump (V-Go 20) KIT  Child No No   Sig: Use daily Use one daily   NovoLOG 100 UNIT/ML injection 11/3/2022 at Unknown time Child No Yes   Sig: INJECT UP TO 15 UNITS DAILY WITH V-GO   acetaminophen (TYLENOL) 325 mg tablet 11/2/2022 at Unknown time Child No Yes   Sig: Take 3 tablets (975 mg total) by mouth every 8 (eight) hours   atorvastatin (LIPITOR) 40 mg tablet 11/2/2022 at Unknown time Child No Yes   Sig: Take 1 tablet (40 mg total) by mouth daily   calcium carbonate-vitamin D (OSCAL-D) 500 mg-200 units per tablet 11/2/2022 at Unknown time Child No Yes   Sig: Take 1 tablet by mouth 2 (two) times a day with meals   escitalopram (Lexapro) 20 mg tablet Unknown at Unknown time  No No   Sig: Take 1 tablet (20 mg total) by mouth daily   glucose blood test strip 11/3/2022 at Unknown time Child No Yes   Sig: USE AS DIRECTED 3 TIMES A DAY   insulin glargine (LANTUS) 100 units/mL subcutaneous injection Past Month at Unknown time Child No Yes   Sig: Inject 25 Units under the skin daily at bedtime   metoprolol tartrate (LOPRESSOR) 25 mg tablet 11/2/2022 at Unknown time Child No Yes   Sig: TAKE 1 TABLET (25 MG TOTAL) BY MOUTH EVERY 12 (TWELVE) HOURS   ondansetron (Zofran ODT) 4 mg disintegrating tablet 11/2/2022 at Unknown time Child No Yes   Sig: Take 1 tablet (4 mg total) by mouth every 6 (six) hours as needed for nausea or vomiting   potassium chloride (Klor-Con M10) 10 mEq tablet 11/2/2022 at Unknown time Child No Yes   Sig: Take 1 tablet (10 mEq total) by mouth daily   senna-docusate sodium (SENOKOT S) 8 6-50 mg per tablet 2022 at Unknown time Child No Yes   Sig: Take 1 tablet by mouth daily at bedtime   torsemide (DEMADEX) 20 mg tablet 2022 at Unknown time Child No Yes   Sig: Take 1 tablet (20 mg total) by mouth daily      Facility-Administered Medications: None     Allergies   Allergen Reactions   • Ace Inhibitors    • Chlorpromazine    • Latex    • Lisinopril    • Namenda [Memantine] Drowsiness   • Other Other (See Comments)     Tilapia, unsure of reaction   • Penicillins Seizures   • Propoxyphene    • Stadol [Butorphanol]        Review of previous medical records was completed  Review of Systems   Unable to perform ROS: Dementia       OBJECTIVE     Patient ID: Noe Rabago is a 82 y o  female  Vitals:   Vitals:    22 2151 22 0045 22 0814 22 1053   BP: (!) 182/77 126/64 152/67    Pulse: 66 68 66    Resp: 18  16    Temp: 97 5 °F (36 4 °C)  97 7 °F (36 5 °C)    TempSrc:       SpO2: 98% 96% 97% 99%   Weight:       Height:          Body mass index is 34 45 kg/m²  Intake/Output Summary (Last 24 hours) at 2022 1528  Last data filed at 2022 0634  Gross per 24 hour   Intake 1268 75 ml   Output 1000 ml   Net 268 75 ml       Temperature:   Temp (24hrs), Av 6 °F (36 4 °C), Min:97 5 °F (36 4 °C), Max:97 7 °F (36 5 °C)    Temperature: 97 7 °F (36 5 °C)    Invasive Devices: Invasive Devices  Report    Peripheral Intravenous Line  Duration           Peripheral IV 22 Right Antecubital 4 days                Physical Exam  Vitals and nursing note reviewed  Constitutional:       General: She is not in acute distress  Appearance: She is well-developed  HENT:      Head: Normocephalic and atraumatic  Eyes:      Extraocular Movements: EOM normal       Conjunctiva/sclera: Conjunctivae normal       Pupils: Pupils are equal, round, and reactive to light  Cardiovascular:      Rate and Rhythm: Normal rate  Pulmonary:      Effort: Pulmonary effort is normal    Abdominal:      Palpations: Abdomen is soft  Tenderness: There is no abdominal tenderness  Musculoskeletal:      Cervical back: Neck supple  Skin:     General: Skin is warm and dry  Neurological:      Mental Status: She is alert  Coordination: Finger-Nose-Finger Test abnormal (Noted some minor ataxia in the right upper extremity)  Heel to [facility] Test normal       Deep Tendon Reflexes:      Reflex Scores:       Tricep reflexes are 1+ on the right side and 1+ on the left side  Bicep reflexes are 1+ on the right side and 1+ on the left side  Brachioradialis reflexes are 1+ on the right side and 1+ on the left side  Patellar reflexes are 1+ on the right side and 1+ on the left side  Achilles reflexes are 1+ on the right side and 1+ on the left side  Neurologic Exam     Mental Status   Able to name object  Oriented to person only   Patient does not speak very much was able to name objects appropriately and follows some commands  Cranial Nerves     CN II   Visual fields full to confrontation  CN III, IV, VI   Pupils are equal, round, and reactive to light  Extraocular motions are normal    CN III: no CN III palsy  CN VI: no CN VI palsy  Nystagmus: none     CN V   Facial sensation intact  CN VII   Facial expression full, symmetric       CN VIII   CN VIII normal      CN IX, X   CN IX normal    CN X normal      CN XI   CN XI normal      CN XII   CN XII normal      Motor Exam   Right arm pronator drift: absent  Left arm pronator drift: absent  Noted antigravity in all 4 extremities     Sensory Exam   Light touch normal      Noted appropriate withdrawal response to noxious stimuli in all 4 extremities     Gait, Coordination, and Reflexes     Coordination   Finger to nose coordination: abnormal (Noted some minor ataxia in the right upper extremity)  Heel to shin coordination: normal    Reflexes   Right brachioradialis: 1+  Left brachioradialis: 1+  Right biceps: 1+  Left biceps: 1+  Right triceps: 1+  Left triceps: 1+  Right patellar: 1+  Left patellar: 1+  Right achilles: 1+  Left achilles: 1+  Right plantar: normal  Left plantar: normal         LABORATORY DATA     Labs: I have personally reviewed pertinent reports  and I have personally reviewed pertinent films in PACS  Results from last 7 days   Lab Units 11/07/22  1201 11/06/22  0451 11/05/22  0438   WBC Thousand/uL 8 80 9 25 9 28   HEMOGLOBIN g/dL 10 1* 10 4* 10 8*   HEMATOCRIT % 31 8* 33 0* 34 0*   PLATELETS Thousands/uL 207 218 229   NEUTROS PCT % 76* 77* 85*   MONOS PCT % 7 8 7      Results from last 7 days   Lab Units 11/07/22  0501 11/06/22  0451 11/04/22  0942 11/03/22  1416   POTASSIUM mmol/L 4 1 3 7 3 8 3 9   CHLORIDE mmol/L 103 103 104 105   CO2 mmol/L 27 31 27 27   BUN mg/dL 38* 36* 27* 29*   CREATININE mg/dL 1 12 1 32* 0 95 1 04   CALCIUM mg/dL 9 2 8 8 9 0 9 6   ALK PHOS U/L 85  --  93 98   ALT U/L 17  --  20 22   AST U/L 18  --  14 22              Results from last 7 days   Lab Units 11/05/22  0438 11/03/22  1825   INR  1 07 1 30*   PTT seconds  --  30               IMAGING & DIAGNOSTIC TESTING     Radiology Results: I have personally reviewed pertinent reports  and I have personally reviewed pertinent films in PACS  US pelvis complete w transvaginal   Final Result by Alexys Liang MD (11/04 1950)       Poorly evaluated uterus due to shadowing from uterine calcifications  Normal right ovary  Left ovary is not seen  Workstation performed: [id number]         CT abdomen pelvis with contrast   Final Result by Kourtney Macias MD (11/03 1802)   *  Streak artifact from the upper extremities lying adjacent to the abdomen, limiting evaluation of abdominal viscera     *  Heterogenous intramuscular 10 2 cm hematoma involving the left rectus muscle containing 2 hyperattenuating foci, concerning for active extravasation  The hematoma can be secondary to recent trauma, anticoagulation or presence of underlying    hemorrhagic neoplasm  CT abdomen pelvis in 3-6 months is recommended to assess for resolution  *   Moderate amount of fluid in the superior aspect of the vagina  Findings can be secondary to presence of obstructing neoplasm or presence of vesicovaginal fistula  Direct visualization and GYN consultation is recommended for further evaluation  Additional findings as above  I personally discussed this study with [facility] on 11/3/2022 at 5:25 PM                Workstation performed: [id number]         CTA head and neck with and without contrast   Final Result by Sharon Resendez MD (11/03 1745)      No evidence of acute intracranial hemorrhage  Chronic microangiopathy  Moderate to severe plaque stenosis right vertebral artery origin  Approximately 70-80% plaque stenosis right cervical ICA origin  Moderate to severe bilateral cavernous carotid artery plaque stenosis  Workstation performed: [id number]             Other Diagnostic Testing: I have personally reviewed pertinent reports     and I have personally reviewed pertinent films in PACS    ACTIVE MEDICATIONS     Current Facility-Administered Medications   Medication Dose Route Frequency   • acetaminophen (TYLENOL) tablet 650 mg  650 mg Oral Q6H PRN   • aluminum-magnesium hydroxide-simethicone (MYLANTA) oral suspension 30 mL  30 mL Oral Q6H PRN   • aspirin chewable tablet 81 mg  81 mg Oral Daily   • atorvastatin (LIPITOR) tablet 40 mg  40 mg Oral Daily   • bisacodyl (DULCOLAX) rectal suppository 10 mg  10 mg Rectal Once   • escitalopram (LEXAPRO) tablet 10 mg  10 mg Oral Daily   • hydrALAZINE (APRESOLINE) injection 5 mg  5 mg Intravenous Q6H PRN   • insulin glargine (LANTUS) subcutaneous injection 30 Units 0 3 mL  30 Units Subcutaneous HS   • insulin lispro (HumaLOG) 100 units/mL subcutaneous injection 1-6 Units  1-6 Units Subcutaneous TID AC   • insulin lispro (HumaLOG) 100 units/mL subcutaneous injection 1-6 Units  1-6 Units Subcutaneous HS   • lactulose oral solution 10 g  10 g Oral Daily PRN   • lactulose oral solution 30 g  30 g Oral Once   • metoprolol tartrate (LOPRESSOR) tablet 25 mg  25 mg Oral Q12H REJI   • metroNIDAZOLE (FLAGYL) tablet 500 mg  500 mg Oral Q12H [address]   • multi-electrolyte (PLASMALYTE-A/ISOLYTE-S PH 7 4) IV solution  75 mL/hr Intravenous Continuous   • nystatin (MYCOSTATIN) powder   Topical Once   • ondansetron (ZOFRAN) injection 4 mg  4 mg Intravenous Q6H PRN   • polyethylene glycol (MIRALAX) packet 17 g  17 g Oral Daily   • senna-docusate sodium (SENOKOT S) 8 6-50 mg per tablet 2 tablet  2 tablet Oral HS       Prior to Admission medications    Medication Sig Start Date End Date Taking?  Authorizing Provider   acetaminophen (TYLENOL) 325 mg tablet Take 3 tablets (975 mg total) by mouth every 8 (eight) hours 8/22/22  Yes Natalee Deleon MD   atorvastatin (LIPITOR) 40 mg tablet Take 1 tablet (40 mg total) by mouth daily 1/3/22  Yes Waylon Aggarwal MD   calcium carbonate-vitamin D (OSCAL-D) 500 mg-200 units per tablet Take 1 tablet by mouth 2 (two) times a day with meals 4/28/18  Yes Noel Roldan DO   Continuous Blood Gluc  (Dexcom G6 ) SIVAKUMAR Use daily as directed for CGM   Yes Historical Provider, MD   Continuous Blood Gluc Sensor (Dexcom G6 Sensor) MISC Use daily as directed for CGM - Change every 10 days   Yes Historical Provider, MD   Continuous Blood Gluc Transmit (Dexcom G6 Transmitter) MISC Use daily as directed for CGM - Change every 3 months   Yes Historical Provider, MD   Eliquis 5 MG TAKE 1 TABLET BY MOUTH TWICE A DAY 7/13/22  Yes Waylon Aggarwal MD   glucose blood test strip USE AS DIRECTED 3 TIMES A DAY 5/26/20  Yes EMELI Thayer Arm   Insulin Disposable Pump (V-Go 20) KIT Apply once a night 1/25/22  Yes [address] [place], [place] [address]EMELI insulin glargine (LANTUS) 100 units/mL subcutaneous injection Inject 25 Units under the skin daily at bedtime 8/22/22  Yes Ambika Gomez MD   metoprolol tartrate (LOPRESSOR) 25 mg tablet TAKE 1 TABLET (25 MG TOTAL) BY MOUTH EVERY 12 (TWELVE) HOURS 6/14/22  Yes Fernandez Oliveros MD   NovoLOG 100 UNIT/ML injection INJECT UP TO 15 UNITS DAILY WITH V-GO 10/4/22  Yes EMELI Peters   ondansetron (Zofran ODT) 4 mg disintegrating tablet Take 1 tablet (4 mg total) by mouth every 6 (six) hours as needed for nausea or vomiting 10/10/22  Yes Jim Goldstein DO   potassium chloride (Klor-Con M10) 10 mEq tablet Take 1 tablet (10 mEq total) by mouth daily 8/1/22  Yes Fernandez Oliveros MD   senna-docusate sodium (SENOKOT S) 8 6-50 mg per tablet Take 1 tablet by mouth daily at bedtime 8/22/22  Yes Ambika Gomez MD   torsemide [facility]) 20 mg tablet Take 1 tablet (20 mg total) by mouth daily 8/1/22  Yes Fernandez Oliveros MD   escitalopram (Lexapro) 20 mg tablet Take 1 tablet (20 mg total) by mouth daily 10/27/22   EMELI Sandoval   Insulin Disposable Pump (V-Go 20) KIT Use daily Use one daily 7/29/22   EMEIL Buchanan         CODE STATUS & ADVANCED DIRECTIVES     Code Status: Level 1 - Full Code  Advance Directive and Living Will:      Power of :    POLST: Yes      VTE Pharmacologic Prophylaxis: as per primary  VTE Mechanical Prophylaxis: sequential compression device    ==  [place] Neurology Residency, PGY-2

## 2022-11-07 NOTE — QUICK NOTE
BV noted on AFFIRM swab  Recommend Flagyl 500 mg PO for 7 days  GYN will sign off at this time, please reach out with any other concerns      Birdie Lefort  OB/GYN PGY-4  11/7/2022  6:44 AM

## 2022-11-07 NOTE — RESTORATIVE TECHNICIAN NOTE
Restorative Technician Note      Patient Name: Rasheed Nunez     Restorative Tech Visit Date: 11/7/2022  Note Type: Mobility  Patient Position Upon Consult: Supine  Activity Performed: Range of motion; Repositioned; Other (Comment) (BUE and core exercises in long sitting)  Patient Position at End of Consult: Supine;  All needs within reach    Bello Jung  DPT, Restorative Technician

## 2022-11-07 NOTE — ASSESSMENT & PLAN NOTE
Lab Results   Component Value Date    EGFR 44 11/07/2022    EGFR 36 11/06/2022    EGFR 54 11/04/2022    CREATININE 1 12 11/07/2022    CREATININE 1 32 (H) 11/06/2022    CREATININE 0 95 11/04/2022     · Kidney function improved with IV hydration with down trending creatinine, will continue fluids patient is having poor and inconsistent oral intake

## 2022-11-07 NOTE — ASSESSMENT & PLAN NOTE
· Patient had fall about 2 weeks back  · CT abdomen pelvis did showed 10 cm left rectus sheath hematoma  · Recommend repeat ct abdomen pelvis in 3 - 6 months   · Patient evaluated by surgery, no acute intervention needed at this time  · HH appears to be stable no considerable drop   · Recommended abdominal binder, reversal of Eliquis with Kcentra  · As per ED physician's discussion with Radiology does not seem actively bleeding however trend hemoglobin  · If hemoglobin trends down may need to consult IR for possible intervention    · If hemoglobin is stable will start on heparin drip and monitor for bleeding, if stable at that point will resume Eliquis

## 2022-11-07 NOTE — SPEECH THERAPY NOTE
Speech-Language Pathology Bedside Swallow Evaluation      Patient Name: Tere Rogers    Today's Date: 2022     Problem List  Principal Problem:    Constipation  Active Problems:    Late onset Alzheimer's dementia without behavioral disturbance ([facility] )    Gastroesophageal reflux disease with hiatal hernia    Hyperlipidemia    Essential hypertension    Obstructive sleep apnea    Type 2 diabetes mellitus with hyperglycemia, with long-term current use of insulin ([place] [address] )    Stage 3 chronic kidney disease, unspecified whether stage 3a or 3b CKD ([place] [address] )    Scalp laceration    Rectus sheath hematoma    Tibia/fibula fracture    Vaginal disorder    Leukocytosis    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      Past Medical History  Past Medical History:   Diagnosis Date   • Arthritis    • Asthma    • CAD (coronary artery disease) of artery bypass graft    • Cardiac disease    • CHF (congestive heart failure) ([facility])    • Dementia ([facility])    • Depression    • Diabetes mellitus ([place] [address] )    • Heart attack ([place] [address] )    • Hyperlipidemia    • Hypertension    • MI (myocardial infarction) ([place] [address] )    • Neuropathy    • BRANT (obstructive sleep apnea)    • Peptic ulcer     was + for H pylori   • Transient cerebral ischemia 2011    with neg CUS and ECHO       Past Surgical History  Past Surgical History:   Procedure Laterality Date   • APPENDECTOMY     • CATARACT EXTRACTION     •  SECTION     • COLONOSCOPY  2004   • CORONARY ANGIOPLASTY  2006   • CORONARY ANGIOPLASTY WITH STENT PLACEMENT     • CORONARY ARTERY BYPASS GRAFT     • DENTAL SURGERY     • EGD AND COLONOSCOPY     • ELBOW SURGERY      resolved    • ESOPHAGOGASTRODUODENOSCOPY     • ORIF TIBIA & FIBULA FRACTURES Right 2022    Procedure: OPEN REDUCTION W/ INTERNAL FIXATION (ORIF) ANKLE;  Surgeon: Ivania Sneed MD;  Location: [facility];  Service: Orthopedics       Summary   Pt presented with s/s suggestive of mild-moderate oral and suspected mild pharyngeal dysphagia  The patient is assessed with puree and soft solids with thin liquids  Symptoms or concerns included decreased bolus propulsion, decreased mastication and partial mastication with ankit cracker  the patient is observed spitting out cream of wheat and partially masticated ankit cracker  The patient has some oral holding with thin liquids and needs verbal cues to transfer bolus  Pharyngeal stage concerns include suspected pharyngeal swallow delay and audible swallows  Risk/s for Aspiration: mod      Recommended Diet: puree/level 1 diet and thin liquids   Recommended Form of Meds: crushed with puree   Aspiration precautions and swallowing strategies: upright posture, small bites/sips and alternating bites and sips  Other Recommendations: Continue frequent oral care    Current Medical Status  Chief Complaint: Constipation    History of Present Illness:  Dajuan Dempsey is a 82 y o  female with a PMH of hyperlipidemia, hypertension and recent fracture status post or who presents with constipation  According to the patient's daughter loss bowel movement noted was 7 days back, this morning was noted to have brown colored emesis associated with abdominal pain  On presentation to ED patient was found to be hypertensive needing IV labetalol x1  CT abdomen pelvis was unremarkable for any obstruction however did showed left rectus sheath hematoma likely from the fall 2 weeks back  Patient was seen by surgery in ED who recommended abdominal binder, reversal of Eliquis with Kcentra and serial H&H monitoring  While in ED patient was noted to have change in behavior hence got CT head done that was unremarkable for any intracranial pathology  As per patient's daughter present at bedside she has history of Alzheimer's with behavioral issues and sometimes does have episodes of confusion      Current Precautions:  Fall  Aspiration   Allergies:  No known food allergies  Past medical history:  Please see H&P for details    Special Studies:  CT head 11/6/2022:   No acute intracranial abnormality  Social/Education/Vocational Hx:  Pt lives with family    Swallow Information   Current Risks for Dysphagia & Aspiration: decreased alertness  Current Symptoms/Concerns: pocketing food  Current Diet: soft/level 3 diet and thin liquids   Baseline Diet: assume regular and thin liquids     Baseline Assessment   Behavior/Cognition: alert  Speech/Language Status: not able to to follow commands and no verbal output noted  Patient Positioning: upright in bed  Pain Status/Interventions/Response to Interventions: No report of or nonverbal indications of pain  Swallow Mechanism Exam  Unable to formally assess due to cognitive status and inability to follow commands  Patient is edentulous  Respiratory Status: on 2 L O2      Consistencies Assessed and Performance   Consistencies Administered: thin liquids, puree and soft solids  Materials administered included cream of wheat, pudding and ankit cracker with thin liquids     Oral Stage: mild-moderate  Retrieval of all via tsp and straw is adequate, with good bite strength  The patient spits out cream of wheat and partially masticated ankit cracker  She tolerates pudding with timely transfer and no oral residue  Oral holding observed with thin liquids and the patient needs verbal cues to transfer and swallow  Pharyngeal Stage: mild  Swallow Mechanics:  Swallowing initiation appeared prompt with pudding, but delayed with thin liquids  Laryngeal rise was palpated and judged to be within functional limits  No coughing, throat clearing, change in vocal quality or respiratory status noted today  Swallow is audible  Esophageal Concerns: none reported    Summary and Recommendations (see above)    Results Reviewed with: RN and MD     Treatment Recommended: dysphagia therapy      Frequency of treatment: 2-3x week, as able     Patient Stated Goal: none stated  Patient non verbal     Dysphagia LTG  -Patient will demonstrate safe and effective oral intake (without overt s/s significant oral/pharyngeal dysphagia including s/s penetration or aspiration) for the highest appropriate diet level       Short Term Goals:  -Pt will tolerate Dysphagia 1/pureed diet and thin liquid with no significant s/s oral or pharyngeal dysphagia across 1-3 diagnostic session/s    -Patient will tolerate trials of upgraded food and/or liquid texture with no significant s/s of oral or pharyngeal dysphagia including aspiration across 1-3 diagnostic sessions     Speech Therapy Prognosis   Prognosis: fair    Prognosis Considerations: age, medical status and cognitive status

## 2022-11-07 NOTE — ASSESSMENT & PLAN NOTE
Lab Results   Component Value Date    HGBA1C 7 3 (H) 07/30/2022       Recent Labs     11/06/22 2037 11/07/22  0813 11/07/22  1118 11/07/22  1630   POCGLU 179* 93 140 165*       Blood Sugar Average: Last 72 hrs:  (P) 333 [id number]     · Patient is on Lantus 25 units q h s  · Started on Lantus 20 units along with sliding scale insulin, increased to 30 units   · SSI   · Goal is to keep less than 180

## 2022-11-07 NOTE — PLAN OF CARE
Problem: PAIN - ADULT  Goal: Verbalizes/displays adequate comfort level or baseline comfort level  Description: Interventions:  - Encourage patient to monitor pain and request assistance  - Assess pain using appropriate pain scale  - Administer analgesics based on type and severity of pain and evaluate response  - Implement non-pharmacological measures as appropriate and evaluate response  - Consider cultural and social influences on pain and pain management  - Notify physician/advanced practitioner if interventions unsuccessful or patient reports new pain  Outcome: Progressing     Problem: INFECTION - ADULT  Goal: Absence or prevention of progression during hospitalization  Description: INTERVENTIONS:  - Assess and monitor for signs and symptoms of infection  - Monitor lab/diagnostic results  - Monitor all insertion sites, i e  indwelling lines, tubes, and drains  - Monitor endotracheal if appropriate and nasal secretions for changes in amount and color  - [place] appropriate cooling/warming therapies per order  - Administer medications as ordered  - Instruct and encourage patient and family to use good hand hygiene technique  - Identify and instruct in appropriate isolation precautions for identified infection/condition  Outcome: Progressing  Goal: Absence of fever/infection during neutropenic period  Description: INTERVENTIONS:  - Monitor WBC    Outcome: Progressing     Problem: SAFETY ADULT  Goal: Patient will remain free of falls  Description: INTERVENTIONS:  - Educate patient/family on patient safety including physical limitations  - Instruct patient to call for assistance with activity   - Consult OT/PT to assist with strengthening/mobility   - Keep Call bell within reach  - Keep bed low and locked with side rails adjusted as appropriate  - Keep care items and personal belongings within reach  - Initiate and maintain comfort rounds  - Make Fall Risk Sign visible to staff  - Offer Toileting every 2 Hours, in advance of need  - Initiate/Maintain bed/chair alarm  - Obtain necessary fall risk management equipment: chair alarm  - Apply yellow socks and bracelet for high fall risk patients  - Consider moving patient to room near nurses station  Outcome: Progressing  Goal: Maintain or return to baseline ADL function  Description: INTERVENTIONS:  -  Assess patient's ability to carry out ADLs; assess patient's baseline for ADL function and identify physical deficits which impact ability to perform ADLs (bathing, care of mouth/teeth, toileting, grooming, dressing, etc )  - Assess/evaluate cause of self-care deficits   - Assess range of motion  - Assess patient's mobility; develop plan if impaired  - Assess patient's need for assistive devices and provide as appropriate  - Encourage maximum independence but intervene and supervise when necessary  - Involve family in performance of ADLs  - Assess for home care needs following discharge   - Consider OT consult to assist with ADL evaluation and planning for discharge  - Provide patient education as appropriate  Outcome: Progressing  Goal: Maintains/Returns to pre admission functional level  Description: INTERVENTIONS:  - Perform BMAT or MOVE assessment daily    - Set and communicate daily mobility goal to care team and patient/family/caregiver  - Collaborate with rehabilitation services on mobility goals if consulted  - Reposition patient every 2 hours    - Stand patient 3 times a day  - Ambulate patient 2 times a day  - Out of bed to chair 3 times a day   - Out of bed for meals 3 times a day  - Out of bed for toileting  - Record patient progress and toleration of activity level   Outcome: Progressing

## 2022-11-07 NOTE — ASSESSMENT & PLAN NOTE
· Patient presented with constipation, as per daughter last bowel movement was 7 days back  This morning was noted to have vomiting  · Will start on bowel regimen     · Transvaginal ultrasound  - negative for fistula exam per obyn no evidence   · - Sp fleets enema and dulcolax - pt had a small hard bm and then later documented large stool episode   · -    · Dulcolax / senna at hs , miralax   · Ct imaging does not demonstrated considerable stool in colon

## 2022-11-07 NOTE — DISCHARGE INSTR - OTHER ORDERS
Skin Care Plan:  1-Cleanse sacro-buttocks with soap and water  Apply Hydragaurd BID and PRN  2-Turn/reposition q2h or when medically stable for pressure re-distribution on skin   3-Elevate heels to offload pressure  4-Moisturize skin daily with skin nourishing cream  5-Ehob cushion in chair when out of bed  6-Preventative Hydraguard to bilateral heels BID and PRN

## 2022-11-07 NOTE — PLAN OF CARE
Problem: Knowledge Deficit  Goal: Patient/family/caregiver demonstrates understanding of disease process, treatment plan, medications, and discharge instructions  Description: Complete learning assessment and assess knowledge base    Interventions:  - Provide teaching at level of understanding  - Provide teaching via preferred learning methods  Outcome: Progressing     Problem: SKIN/TISSUE INTEGRITY - ADULT  Goal: Skin Integrity remains intact(Skin Breakdown Prevention)  Description: Assess:  -Perform Prateek assessment every shift  -Clean and moisturize skin with every incontinence  -Inspect skin when repositioning, toileting, and assisting with ADLS  -Assess extremities for adequate circulation and sensation     Bed Management:  -Have minimal linens on bed & keep smooth, unwrinkled  -Change linens as needed when moist or perspiring  -Avoid sitting or lying in one position for more than 2hours while in bed  -Keep HOB at 30 degrees     Toileting:  -Offer bedside commode  -Assess for incontinence every 2 hours  -Use incontinent care products after each incontinent episode such as calmazine cream    Activity:  -Mobilize patient 2 times a day  -Encourage activity and walks on unit  -Encourage or provide ROM exercises   -Turn and reposition patient every 2 Hours  -Use appropriate equipment to lift or move patient in bed  -Instruct/ Assist with weight shifting every 30 minutes when out of bed in chair  -Consider limitation of chair time 3 hour intervals    Skin Care:  -Avoid use of baby powder, tape, friction and shearing, hot water or constrictive clothing  -Relieve pressure over bony prominences using Allevyns  -Do not massage red bony areas    Next Steps:  -Teach patient strategies to minimize risks such as weight shifting  -Consider consults to  interdisciplinary teams such as PT/OT  Outcome: Progressing

## 2022-11-07 NOTE — ASSESSMENT & PLAN NOTE
See CT imaging   · Moderate to severe bilateral cavernous carotid artery plaque stenosis  No evidence of acute intracranial hemorrhage  Chronic microangiopathy  Moderate to severe plaque stenosis right vertebral artery origin  Approximately 70-80% plaque stenosis right cervical ICA origin  · Discussed cta findings with Neurosurgery see telemed recommendations:   · Noting multiple areas of incidental stenosis  There is no indication for endovascular intervention for incidental stenosis     ·  Recommending neurology evaluation for AP and consider MRI   · Consult placed for neurology   · Recommend aspirin  · Unsure of any surgical intervention is plausible at this point but can discuss

## 2022-11-07 NOTE — CONSULTS
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  Assessment & [address] Juan R Nascimento is a 82 y o  female with a PMH of hyperlipidemia, PAF on eliquis , CAD, asthma, CHF, dementia (Alzheimer's w/ behavioral issues w/ past episodes of confusion), HLD, past MI, hypertension, and fall 2 weeks ago  leading to scalp laceration and  bimalleolar fractures status post orif who presents with constipation  Patient had some altered mental status while in ED and ultimately got a CT head and CTA head and neck  The CTA did show severe atherosclerosis in the right ICA as well as an intracranially  Neurosurgery was initially consulted and they recommended Neurology be consulted for anti-platelet regimen  Neurology was consulted for additional recommendations in regards to anti-platelet regimen given the atherosclerosis        W/U:  - Lipids 07/2022: Chol: 105  LD: 41  - 07/2022 A1c: 7 3  - CTA H/N: Moderate to severe bilateral cavernous carotid artery plaque stenosis  No evidence of acute intracranial hemorrhage  Chronic microangiopathy  Moderate to severe plaque stenosis right vertebral artery origin  Approximately 70-80% plaque stenosis right cervical ICA origin    RIGHT EXTRACRANIAL CAROTID SEGMENT:  Severe atherosclerotic disease of the bifurcation  Approximately 70-80% plaque stenosis right cervical ICA origin  INTERNAL CAROTID ARTERIES:  Moderate to severe bilateral cavernous carotid artery plaque stenosis  Normal ophthalmic artery origins  Normal ICA terminus  RIGHT VERTEBRAL ARTERY CERVICAL SEGMENT:  Moderate to severe stenosis at the origin   The vessel is normal in caliber throughout the neck         Plan   - Added ASA 81mg for noted atherosclerosis   - get INR at therapeutic level  - given baseline, would consider goals of care given her dementia if intervention is in family's best interest for possible CEA/TCAR for the severe atherosclerosis in the R ICA and intracranial atherosclerosis  No other further recommendations from the inpatient Neurology perspective  Please contact Neurology any further questions               [address] [place] [address] Gerri will not need outpatient follow up with Neurology        SUBJECTIVE      Reason for Consult / Principal Problem: AP selection her noted atherosclerosis in the right ICA as well as intracranial atherosclerosis  Hx and PE limited by:  Dementia/minimally verbal     HPI: [address] [place] [address] Gerri is a 82 y o  female with a PMH of hyperlipidemia, PAF on eliquis , CAD, asthma, CHF, dementia, HLD, past MI, dementia, hypertension, and fall 2 weeks ago  leading to scalp laceration and  bimalleolar fractures status post orif who presents with constipation  According to the patient's daughter loss bowel movement noted was 7 days back, this morning was noted to have brown colored emesis associated with abdominal pain  On presentation to ED patient was found to be hypertensive needing IV labetalol x1  CT abdomen pelvis was unremarkable for any obstruction however did showed left rectus sheath hematoma likely from the fall 2 weeks back  Patient was seen by surgery in ED who recommended abdominal binder, reversal of Eliquis with Kcentra and serial H&H monitoring  While in ED patient was noted to have change in behavior hence got CT head done that was unremarkable for any intracranial pathology  As per patient's daughter present at bedside she has history of Alzheimer's with behavioral issues and sometimes does have episodes of confusion      CTA head and neck was completed  Imaging demonstrated no acute intracranial abnormality    CTA revealed moderate to severe stenosis of the right vertebral artery origin, 70-80% stenosis of the right cervical ICA origin and moderate to severe bilateral cavernous carotid stenosis       Neurosurgery evaluated and said no indication for endovascular intervention for incidental stenosis    Would defer to PCP or neurology regarding consideration for AP therapy given her multiple medical comorbidities         Consults        Historical Information []Expand by Default     Medical History        Past Medical History:   Diagnosis Date   • Arthritis     • Asthma     • CAD (coronary artery disease) of artery bypass graft     • Cardiac disease     • CHF (congestive heart failure) ([facility])     • Dementia ([facility])     • Depression     • Diabetes mellitus ([facility] )     • [facility] )     • Hyperlipidemia     • Hypertension     • MI (myocardial infarction) ([facility] )     • Neuropathy     • BRANT (obstructive sleep apnea)     • Peptic ulcer       was + for H pylori   • Transient cerebral ischemia 2011     with neg CUS and ECHO         Surgical History         Past Surgical History:   Procedure Laterality Date   • APPENDECTOMY       • CATARACT EXTRACTION       •  SECTION       • COLONOSCOPY   2004   • CORONARY ANGIOPLASTY   2006   • CORONARY ANGIOPLASTY WITH STENT PLACEMENT      • CORONARY ARTERY BYPASS GRAFT      • DENTAL SURGERY       • EGD AND COLONOSCOPY       • ELBOW SURGERY         resolved    • ESOPHAGOGASTRODUODENOSCOPY      • ORIF TIBIA & FIBULA FRACTURES Right 2022     Procedure: OPEN REDUCTION W/ INTERNAL FIXATION (ORIF) ANKLE;  Surgeon: Juli Antoine MD;  Location: [facility];  Service: Orthopedics               Social History []Expand by Default     Social History          Substance and Sexual Activity   Alcohol Use Not Currently      Social History      Substance and Sexual Activity   Drug Use Never            E-Cigarette/Vaping   • E-Cigarette Use Never User              E-Cigarette/Vaping Substances   • Nicotine No     • THC No     • CBD No     • Flavoring No      Social History          Tobacco Use   Smoking Status Never Smoker   Smokeless Tobacco Never Used      Family History:   Family History         Family History   Problem Relation Age of Onset   • Diabetes Mother     • Cancer Sister     • Heart disease Sister     • Thyroid disease Sister     • Cancer Brother     • Cancer Father     • Colon cancer Family     • Diabetes Family     • Mitral valve prolapse Family     • Thyroid cancer Family                 Meds/Allergies      all current active meds have been reviewed, current meds:   Current Medications          Current Facility-Administered Medications   Medication Dose Route Frequency   • acetaminophen (TYLENOL) tablet 650 mg  650 mg Oral Q6H PRN   • aluminum-magnesium hydroxide-simethicone (MYLANTA) oral suspension 30 mL  30 mL Oral Q6H PRN   • aspirin chewable tablet 81 mg  81 mg Oral Daily   • atorvastatin (LIPITOR) tablet 40 mg  40 mg Oral Daily   • bisacodyl (DULCOLAX) rectal suppository 10 mg  10 mg Rectal Once   • escitalopram (LEXAPRO) tablet 10 mg  10 mg Oral Daily   • hydrALAZINE (APRESOLINE) injection 5 mg  5 mg Intravenous Q6H PRN   • insulin glargine (LANTUS) subcutaneous injection 30 Units 0 3 mL  30 Units Subcutaneous HS   • insulin lispro (HumaLOG) 100 units/mL subcutaneous injection 1-6 Units  1-6 Units Subcutaneous TID AC   • insulin lispro (HumaLOG) 100 units/mL subcutaneous injection 1-6 Units  1-6 Units Subcutaneous HS   • lactulose oral solution 10 g  10 g Oral Daily PRN   • lactulose oral solution 30 g  30 g Oral Once   • metoprolol tartrate (LOPRESSOR) tablet 25 mg  25 mg Oral Q12H REJI   • metroNIDAZOLE (FLAGYL) tablet 500 mg  500 mg Oral Q12H REJI   • multi-electrolyte (PLASMALYTE-A/ISOLYTE-S PH 7 4) IV solution  75 mL/hr Intravenous Continuous   • nystatin (MYCOSTATIN) powder   Topical Once   • ondansetron (ZOFRAN) injection 4 mg  4 mg Intravenous Q6H PRN   • polyethylene glycol (MIRALAX) packet 17 g  17 g Oral Daily   • senna-docusate sodium (SENOKOT S) 8 6-50 mg per tablet 2 tablet  2 tablet Oral HS       and PTA meds:   Prior to Admission Medications   Prescriptions Last Dose Informant Patient Reported? Taking?    Continuous Blood Gluc  (Dexcom G6 ) SIVAKUMAR 11/3/2022 at Unknown time Child Yes Yes   Sig: Use daily as directed for CGM   Continuous Blood Gluc Sensor (Dexcom G6 Sensor) MISC 11/3/2022 at Unknown time Child Yes Yes   Sig: Use daily as directed for CGM - Change every 10 days   Continuous Blood Gluc Transmit (Dexcom G6 Transmitter) MISC 11/3/2022 at Unknown time Child Yes Yes   Sig: Use daily as directed for CGM - Change every 3 months   Eliquis 5 MG 11/2/2022 at Unknown time Child No Yes   Sig: TAKE 1 TABLET BY MOUTH TWICE A DAY   Insulin Disposable Pump (V-Go 20) KIT 11/3/2022 at Unknown time Child No Yes   Sig: Apply once a night   Insulin Disposable Pump (V-Go 20) KIT   Child No No   Sig: Use daily Use one daily   NovoLOG 100 UNIT/ML injection 11/3/2022 at Unknown time Child No Yes   Sig: INJECT UP TO 15 UNITS DAILY WITH V-GO   acetaminophen (TYLENOL) 325 mg tablet 11/2/2022 at Unknown time Child No Yes   Sig: Take 3 tablets (975 mg total) by mouth every 8 (eight) hours   atorvastatin (LIPITOR) 40 mg tablet 11/2/2022 at Unknown time Child No Yes   Sig: Take 1 tablet (40 mg total) by mouth daily   calcium carbonate-vitamin D (OSCAL-D) 500 mg-200 units per tablet 11/2/2022 at Unknown time Child No Yes   Sig: Take 1 tablet by mouth 2 (two) times a day with meals   escitalopram (Lexapro) 20 mg tablet Unknown at Unknown time   No No   Sig: Take 1 tablet (20 mg total) by mouth daily   glucose blood test strip 11/3/2022 at Unknown time Child No Yes   Sig: USE AS DIRECTED 3 TIMES A DAY   insulin glargine (LANTUS) 100 units/mL subcutaneous injection Past Month at Unknown time Child No Yes   Sig: Inject 25 Units under the skin daily at bedtime   metoprolol tartrate (LOPRESSOR) 25 mg tablet 11/2/2022 at Unknown time Child No Yes Sig: TAKE 1 TABLET (25 MG TOTAL) BY MOUTH EVERY 12 (TWELVE) HOURS   ondansetron (Zofran ODT) 4 mg disintegrating tablet 2022 at Unknown time Child No Yes   Sig: Take 1 tablet (4 mg total) by mouth every 6 (six) hours as needed for nausea or vomiting   potassium chloride (Klor-Con M10) 10 mEq tablet 2022 at Unknown time Child No Yes   Sig: Take 1 tablet (10 mEq total) by mouth daily   senna-docusate sodium (SENOKOT S) 8 6-50 mg per tablet 2022 at Unknown time Child No Yes   Sig: Take 1 tablet by mouth daily at bedtime   torsemide (DEMADEX) 20 mg tablet 2022 at Unknown time Child No Yes   Sig: Take 1 tablet (20 mg total) by mouth daily      Facility-Administered Medications: None      Allergies   Allergen Reactions   • Ace Inhibitors     • Chlorpromazine     • Latex     • Lisinopril     • Namenda [Memantine] Drowsiness   • Other Other (See Comments)       Tilapia, unsure of reaction   • Penicillins Seizures   • Propoxyphene     • Stadol [Butorphanol]           Review of previous medical records was completed          Review of Systems   Unable to perform ROS: Dementia         OBJECTIVE      Patient ID: Obey Arredondo is a 82 y o  female      Vitals:   Vitals          Vitals:     22 2151 22 0045 22 0814 22 1053   BP: (!) 182/77 126/64 152/67     Pulse: 66 68 66     Resp: 18   16     Temp: 97 5 °F (36 4 °C)   97 7 °F (36 5 °C)     TempSrc:           SpO2: 98% 96% 97% 99%   Weight:           Height:                 Body mass index is 34 45 kg/m²       Intake/Output Summary (Last 24 hours) at 2022 1528  Last data filed at 2022 0634      Gross per 24 hour   Intake 1268 75 ml   Output 1000 ml   Net 268 75 ml         Temperature:   Temp (24hrs), Av 6 °F (36 4 °C), Min:97 5 °F (36 4 °C), Max:97 7 °F (36 5 °C)     Temperature: 97 7 °F (36 5 °C)     Invasive Devices:    Invasive Devices  Report             Peripheral Intravenous Line  Duration                     Peripheral IV 11/03/22 Right Antecubital 4 days                     Physical Exam  Vitals and nursing note reviewed  Constitutional:       General: She is not in acute distress  Appearance: She is well-developed  HENT:      Head: Normocephalic and atraumatic  Eyes:      Extraocular Movements: EOM normal       Conjunctiva/sclera: Conjunctivae normal       Pupils: Pupils are equal, round, and reactive to light  Cardiovascular:      Rate and Rhythm: Normal rate  Pulmonary:      Effort: Pulmonary effort is normal    Abdominal:      Palpations: Abdomen is soft  Tenderness: There is no abdominal tenderness  Musculoskeletal:      Cervical back: Neck supple  Skin:     General: Skin is warm and dry  Neurological:      Mental Status: She is alert  Coordination: Finger-Nose-Finger Test abnormal (Noted some minor ataxia in the right upper extremity)  Heel to [facility] Test normal       Deep Tendon Reflexes:      Reflex Scores:       Tricep reflexes are 1+ on the right side and 1+ on the left side  Bicep reflexes are 1+ on the right side and 1+ on the left side  Brachioradialis reflexes are 1+ on the right side and 1+ on the left side  Patellar reflexes are 1+ on the right side and 1+ on the left side  Achilles reflexes are 1+ on the right side and 1+ on the left side          Neurologic Exam      Mental Status   Able to name object  Oriented to person only   Patient does not speak very much was able to name objects appropriately and follows some commands       Cranial Nerves      CN II   Visual fields full to confrontation       CN III, IV, VI   Pupils are equal, round, and reactive to light    Extraocular motions are normal    CN III: no CN III palsy  CN VI: no CN VI palsy  Nystagmus: none      CN V   Facial sensation intact       CN VII   Facial expression full, symmetric       CN VIII   CN VIII normal       CN IX, X   CN IX normal    CN X normal       CN XI   CN XI normal     CN XII   CN XII normal       Motor Exam   Right arm pronator drift: absent  Left arm pronator drift: absent  Noted antigravity in all 4 extremities      Sensory Exam   Light touch normal      Noted appropriate withdrawal response to noxious stimuli in all 4 extremities      Gait, Coordination, and Reflexes      Coordination   Finger to nose coordination: abnormal (Noted some minor ataxia in the right upper extremity)  Heel to shin coordination: normal     Reflexes   Right brachioradialis: 1+  Left brachioradialis: 1+  Right biceps: 1+  Left biceps: 1+  Right triceps: 1+  Left triceps: 1+  Right patellar: 1+  Left patellar: 1+  Right achilles: 1+  Left achilles: 1+  Right plantar: normal  Left plantar: normal           LABORATORY DATA      Labs: I have personally reviewed pertinent reports  and I have personally reviewed pertinent films in PACS         Results from last 7 days   Lab Units 11/07/22  1201 11/06/22  0451 11/05/22  0438   WBC Thousand/uL 8 80 9 25 9 28   HEMOGLOBIN g/dL 10 1* 10 4* 10 8*   HEMATOCRIT % 31 8* 33 0* 34 0*   PLATELETS Thousands/uL 207 218 229   NEUTROS PCT % 76* 77* 85*   MONOS PCT % 7 8 7              Results from last 7 days   Lab Units 11/07/22  0501 11/06/22  0451 11/04/22  0942 11/03/22  1416   POTASSIUM mmol/L 4 1 3 7 3 8 3 9   CHLORIDE mmol/L 103 103 104 105   CO2 mmol/L 27 31 27 27   BUN mg/dL 38* 36* 27* 29*   CREATININE mg/dL 1 12 1 32* 0 95 1 04   CALCIUM mg/dL 9 2 8 8 9 0 9 6   ALK PHOS U/L 85  --  93 98   ALT U/L 17  --  20 22   AST U/L 18  --  14 22                     Results from last 7 days   Lab Units 11/05/22  0438 11/03/22  1825   INR   1 07 1 30*   PTT seconds  --  30                 IMAGING & DIAGNOSTIC TESTING      Radiology Results: I have personally reviewed pertinent reports     and I have personally reviewed pertinent films in PACS  US pelvis complete w transvaginal   Final Result by Emily Metcalf MD (11/04 1950)       Poorly evaluated uterus due to shadowing from uterine calcifications  Normal right ovary  Left ovary is not seen                    Workstation performed: [id number]           CT abdomen pelvis with contrast   Final Result by Jerzy Melo MD (11/03 1802)   *  Streak artifact from the upper extremities lying adjacent to the abdomen, limiting evaluation of abdominal viscera  *  Heterogenous intramuscular 10 2 cm hematoma involving the left rectus muscle containing 2 hyperattenuating foci, concerning for active extravasation  The hematoma can be secondary to recent trauma, anticoagulation or presence of underlying    hemorrhagic neoplasm  CT abdomen pelvis in 3-6 months is recommended to assess for resolution  *   Moderate amount of fluid in the superior aspect of the vagina  Findings can be secondary to presence of obstructing neoplasm or presence of vesicovaginal fistula  Direct visualization and GYN consultation is recommended for further evaluation  Additional findings as above                I personally discussed this study with [facility] on 11/3/2022 at 5:25 PM                    Workstation performed: [id number]           CTA head and neck with and without contrast   Final Result by Zofia Callahan MD (11/03 9495)       No evidence of acute intracranial hemorrhage  Chronic microangiopathy        Moderate to severe plaque stenosis right vertebral artery origin        Approximately 70-80% plaque stenosis right cervical ICA origin        Moderate to severe bilateral cavernous carotid artery plaque stenosis              Workstation performed: [id number]                 Other Diagnostic Testing: I have personally reviewed pertinent reports     and I have personally reviewed pertinent films in PACS     ACTIVE MEDICATIONS      Current Medications          Current Facility-Administered Medications   Medication Dose Route Frequency   • acetaminophen (TYLENOL) tablet 650 mg  650 mg Oral Q6H PRN   • aluminum-magnesium hydroxide-simethicone (MYLANTA) oral suspension 30 mL  30 mL Oral Q6H PRN   • aspirin chewable tablet 81 mg  81 mg Oral Daily   • atorvastatin (LIPITOR) tablet 40 mg  40 mg Oral Daily   • bisacodyl (DULCOLAX) rectal suppository 10 mg  10 mg Rectal Once   • escitalopram (LEXAPRO) tablet 10 mg  10 mg Oral Daily   • hydrALAZINE (APRESOLINE) injection 5 mg  5 mg Intravenous Q6H PRN   • insulin glargine (LANTUS) subcutaneous injection 30 Units 0 3 mL  30 Units Subcutaneous HS   • insulin lispro (HumaLOG) 100 units/mL subcutaneous injection 1-6 Units  1-6 Units Subcutaneous TID AC   • insulin lispro (HumaLOG) 100 units/mL subcutaneous injection 1-6 Units  1-6 Units Subcutaneous HS   • lactulose oral solution 10 g  10 g Oral Daily PRN   • lactulose oral solution 30 g  30 g Oral Once   • metoprolol tartrate (LOPRESSOR) tablet 25 mg  25 mg Oral Q12H REJI   • metroNIDAZOLE (FLAGYL) tablet 500 mg  500 mg Oral Q12H REJI   • multi-electrolyte (PLASMALYTE-A/ISOLYTE-S PH 7 4) IV solution  75 mL/hr Intravenous Continuous   • nystatin (MYCOSTATIN) powder   Topical Once   • ondansetron (ZOFRAN) injection 4 mg  4 mg Intravenous Q6H PRN   • polyethylene glycol (MIRALAX) packet 17 g  17 g Oral Daily   • senna-docusate sodium (SENOKOT S) 8 6-50 mg per tablet 2 tablet  2 tablet Oral HS                    Prior to Admission medications    Medication Sig Start Date End Date Taking?  Authorizing Provider   acetaminophen (TYLENOL) 325 mg tablet Take 3 tablets (975 mg total) by mouth every 8 (eight) hours 8/22/22   Yes Olga Garcia MD   atorvastatin (LIPITOR) 40 mg tablet Take 1 tablet (40 mg total) by mouth daily 1/3/22   Yes Pradeep Croft MD   calcium carbonate-vitamin D (OSCAL-D) 500 mg-200 units per tablet Take 1 tablet by mouth 2 (two) times a day with meals 4/28/18   Yes Evon Kehr,    Continuous Blood Gluc  (Dexcom G6 ) SIVAKUMAR Use daily as directed for CGM     Yes Historical Provider, MD   Continuous Blood Gluc Sensor (Dexcom G6 Sensor) MISC Use daily as directed for CGM - Change every 10 days     Yes Historical Provider, MD   Continuous Blood Gluc Transmit (Dexcom G6 Transmitter) MISC Use daily as directed for CGM - Change every 3 months     Yes Historical Provider, MD   Eliquis 5 MG TAKE 1 TABLET BY MOUTH TWICE A DAY 7/13/22   Yes Fernandez Oliveros MD   glucose blood test strip USE AS DIRECTED 3 TIMES A DAY 5/26/20   Yes EMELI Zayas   Insulin Disposable Pump (V-Go 20) KIT Apply once a night 1/25/22   Yes EMELI Peters   insulin glargine (LANTUS) 100 units/mL subcutaneous injection Inject 25 Units under the skin daily at bedtime 8/22/22   Yes Ambika Gomez MD   metoprolol tartrate (LOPRESSOR) 25 mg tablet TAKE 1 TABLET (25 MG TOTAL) BY MOUTH EVERY 12 (TWELVE) HOURS 6/14/22   Yes Fernandez Oliveros MD   NovoLOG 100 UNIT/ML injection INJECT UP TO 15 UNITS DAILY WITH V-GO 10/4/22   Yes EMELI Peters   ondansetron (Zofran ODT) 4 mg disintegrating tablet Take 1 tablet (4 mg total) by mouth every 6 (six) hours as needed for nausea or vomiting 10/10/22   Yes Jim Goldstein DO   potassium chloride (Klor-Con M10) 10 mEq tablet Take 1 tablet (10 mEq total) by mouth daily 8/1/22   Yes Fernandez Oliveros MD   senna-docusate sodium (SENOKOT S) 8 6-50 mg per tablet Take 1 tablet by mouth daily at bedtime 8/22/22   Yes Ambika Gomez MD   torsemide [facility]) 20 mg tablet Take 1 tablet (20 mg total) by mouth daily 8/1/22   Yes Fernandez Oliveros MD   escitalopram (Lexapro) 20 mg tablet Take 1 tablet (20 mg total) by mouth daily 10/27/22     EMELI Sandoval   Insulin Disposable Pump (V-Go 20) KIT Use daily Use one daily 7/29/22     EMELI Buchanan            CODE STATUS & ADVANCED DIRECTIVES      Code Status: Level 1 - Full Code  Advance Directive and Living Will:      Power of :    POLST: Yes        VTE Pharmacologic Prophylaxis: as per primary  VTE Mechanical Prophylaxis: sequential compression device     ==  [place] [address] Neurology Residency, PGY-2

## 2022-11-07 NOTE — ASSESSMENT & PLAN NOTE
· Patient is on torsemide and metoprolol at home, will hold torsemide 20 mg daily sp todays dose 11/6   · Will initiate isolyte 75ml /hr due to increase in creatinine and very poor oral intake -continue fluid hydration, creatinine improving  · Patient was noted to be hypertensive in ED needing labetalol 10 mg once    ·

## 2022-11-07 NOTE — PROGRESS NOTES
[facility]  Progress Note - [place] 1935, 82 y o  female MRN: [id number]  Unit/Bed#: [facility] 803-01 Encounter: [id number]  Primary Care Provider: Priscila Zuleta MD   Date and time admitted to hospital: 11/3/2022 12:21 PM    * Constipation  Assessment & Plan  · Patient presented with constipation, as per daughter last bowel movement was 7 days back  This morning was noted to have vomiting  · Will start on bowel regimen  · Transvaginal ultrasound  - negative for fistula exam per obyn no evidence   · - Sp fleets enema and dulcolax - pt had a small hard bm and then later documented large stool episode   · - will repeat lactulose and dulcolax today for further bm    · Dulcolax / senna at  , miralax   · Ct imaging does not demonstrated considerable stool in colon     Vaginal disorder  Assessment & Plan  · CT abdomen pelvis remarkable for fluid in vagina, suspected to be due to obstructing mass versus with vesico-vaginal fistula  · As per patient's daughter no complaints of abnormal vaginal bleeding  · Appreciated gyn and discussed with gyn   · Prior ct imaging not noted to have said fluid collection in vagina  · No evidence of fistula formation per gyn on speculum exam  · Transvaginal ultrasound completed with no acute findings   · Discussed with son and daughter at bedside - 11/5  · S/P TVUS and then consider catheterization pending results family will then decide to proceed with further intervention or not - further discussion Monday 11/6  · Methylene blue tampon test was discussed not yet performed , however pt with no noted fistula via transvaginal US  · R/o vesicovaginal fistula now less likely    · Vaginal cultures positive for Gardnerella vaginella  · Discussed in detail with gyn  Pt with no obvious signs of symptoms and no profuse vaginal discharge however, son requests both systemic and local tx   Per discussion with gyn there is no evidence to support treatment with both   We have opted to treat with flagyl BID for 7 days   Rectus sheath hematoma  Assessment & Plan  · Patient had fall about 2 weeks back  · CT abdomen pelvis did showed 10 cm left rectus sheath hematoma  · Recommend repeat ct abdomen pelvis in 3 - 6 months   · Patient evaluated by surgery, no acute intervention needed at this time  · HH appears to be stable no considerable drop   · Recommended abdominal binder, reversal of Eliquis with Kcentra  · As per ED physician's discussion with Radiology does not seem actively bleeding however trend hemoglobin  · If hemoglobin trends down may need to consult IR for possible intervention  Essential hypertension  Assessment & Plan  · Patient is on torsemide and metoprolol at home, will hold torsemide 20 mg daily sp todays dose 11/6   · Will initiate isolyte 75ml /hr due to increase in creatinine and very poor oral intake   · Patient was noted to be hypertensive in ED needing labetalol 10 mg once  · Blood pressure has been labile elevated this am   ·  pt lethargic today held po bb     Late onset Alzheimer's dementia without behavioral disturbance ([facility] [address] )  Assessment & Plan  · Noted to have history of Alzheimer's with behavior disturbance  · Patient was noted to have change in mental status in ED however CT head unremarkable  · Please see pcp note lexapro at last visit was increased ot 20 mg double the dose   · Will reduce to 10 mg daily since pts mental status is lethargic     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  Assessment & Plan  See CT imaging   · Moderate to severe bilateral cavernous carotid artery plaque stenosis  No evidence of acute intracranial hemorrhage  Chronic microangiopathy  Moderate to severe plaque stenosis right vertebral artery origin  Approximately 70-80% plaque stenosis right cervical ICA origin    · Discussed cta findings with Neurosurgery see telemed recommendations:   · Noting multiple areas of incidental stenosis  There is no indication for endovascular intervention for incidental stenosis  ·  Recommending neurology evaluation for AP and consider MRI   · Consult placed for neurology     Leukocytosis  Assessment & Plan  · Leukocytosis noted on labs likely reactive  · No evidence of fever or signs of infection  · Monitor CBC in a m  Tibia/fibula fracture  Assessment & Plan  · Patient had fall 2 weeks back leading to bimalleolar fractures status post orif  · PT/OT eval   · Recommend home with home health     Scalp laceration  Assessment & Plan  · Patient had fall about 2 weeks back when she had laceration on scalp needing Staples  · As per daughter she was supposed to get sutures removed this week  · Discussed with trauma they were up to remove the head staples pt tolerated removal 11/6      Stage 3 chronic kidney disease, unspecified whether stage 3a or 3b CKD [facility])  Assessment & Plan  Lab Results   Component Value Date    EGFR 36 11/06/2022    EGFR 54 11/04/2022    EGFR 49 11/03/2022    CREATININE 1 32 (H) 11/06/2022    CREATININE 0 95 11/04/2022    CREATININE 1 04 11/03/2022     · History noted, creatinine within normal limits now  · Today with NEGRA , in setting of chronic diuretic use and poor oral intake   · Although son feels pt is eating and drinking   · Stopped diuretic last night / started on low dose iv fluids for correction  · Caution in setting of HF   · Chk labs in am   · Avoid nephrotoxic meds   · Daily wts    Type 2 diabetes mellitus with hyperglycemia, with long-term current use of insulin [facility])  Assessment & Plan  Lab Results   Component Value Date    HGBA1C 7 3 (H) 07/30/2022       Recent Labs     11/05/22  1604 11/05/22  2100 11/06/22  0801 11/06/22  1059   POCGLU 282* 322* 113 168*       Blood Sugar Average: Last 72 hrs:  (P) 220     · Patient is on Lantus 25 units q h s     · Started on Lantus 20 units along with sliding scale insulin, increased to 30 units   · SSI   · Goal is to keep less than 180  Obstructive sleep apnea  Assessment & Plan  · Patient has history of sleep apnea however is not compliant on CPAP  Hyperlipidemia  Assessment & Plan  · Continue atorvastatin        VTE Pharmacologic Prophylaxis: VTE Score: 4 High Risk (Score >/= 5) - Pharmacological DVT Prophylaxis Contraindicated  Sequential Compression Devices Ordered  eliquis on hold due to hematomoa     Patient Centered Rounds: I performed bedside rounds with nursing staff today  Discussions with Specialists or Other Care Team Provider: nursing     Education and Discussions with Family / Patient: Updated  (son) at bedside  Time Spent for Care: 70 minutes  More than 50% of total time spent on counseling and coordination of care as described above  Current Length of Stay: 3 day(s)  Current Patient Status: Inpatient   Certification Statement: The patient will continue to require additional inpatient hospital stay due to poor mental status /oral intake now with joao   Discharge Plan: Anticipate discharge in >72 hrs to rehab facility  Code Status: Level 1 - Full Code    Subjective:   Pt is lethagic snoring when I see her   Objective:     Vitals:   Temp (24hrs), Av 5 °F (36 4 °C), Min:97 5 °F (36 4 °C), Max:97 5 °F (36 4 °C)    Temp:  [97 5 °F (36 4 °C)] 97 5 °F (36 4 °C)  HR:  [64-69] 66  Resp:  [18] 18  BP: (139-185)/(67-78) 182/77  SpO2:  [96 %-99 %] 98 %  Body mass index is 34 45 kg/m²  Input and Output Summary (last 24 hours): Intake/Output Summary (Last 24 hours) at 2022 7963  Last data filed at 2022 1606  Gross per 24 hour   Intake --   Output 800 ml   Net -800 ml       Physical Exam:   Physical Exam  Constitutional:       General: She is not in acute distress  Appearance: She is obese  She is ill-appearing  She is not toxic-appearing or diaphoretic  HENT:      Head: Normocephalic and atraumatic  Right Ear: There is no impacted cerumen        Nose: No congestion  Mouth/Throat:      Pharynx: No oropharyngeal exudate  Cardiovascular:      Rate and Rhythm: Normal rate  Heart sounds: No murmur heard  No friction rub  No gallop  Pulmonary:      Effort: No respiratory distress  Breath sounds: No stridor  No wheezing, rhonchi or rales  Chest:      Chest wall: No tenderness  Abdominal:      General: There is no distension  Palpations: There is no mass  Tenderness: There is no abdominal tenderness  There is no guarding or rebound  Hernia: No hernia is present  Skin:     Coloration: Skin is not jaundiced or pale  Findings: No bruising, erythema, lesion or rash  Neurological:      Mental Status: She is disoriented            Additional Data:     Labs:  Results from last 7 days   Lab Units 11/06/22  0451   WBC Thousand/uL 9 25   HEMOGLOBIN g/dL 10 4*   HEMATOCRIT % 33 0*   PLATELETS Thousands/uL 218   NEUTROS PCT % 77*   LYMPHS PCT % 14   MONOS PCT % 8   EOS PCT % 1     Results from last 7 days   Lab Units 11/06/22  0451 11/04/22  0942   SODIUM mmol/L 137 136   POTASSIUM mmol/L 3 7 3 8   CHLORIDE mmol/L 103 104   CO2 mmol/L 31 27   BUN mg/dL 36* 27*   CREATININE mg/dL 1 32* 0 95   ANION GAP mmol/L 3* 5   CALCIUM mg/dL 8 8 9 0   ALBUMIN g/dL  --  2 9*   TOTAL BILIRUBIN mg/dL  --  0 86   ALK PHOS U/L  --  93   ALT U/L  --  20   AST U/L  --  14   GLUCOSE RANDOM mg/dL 135 234*     Results from last 7 days   Lab Units 11/05/22  0438   INR  1 07     Results from last 7 days   Lab Units 11/06/22  2037 11/06/22  1600 11/06/22  1059 11/06/22  0801 11/05/22  2100 11/05/22  1604 11/05/22  1047 11/05/22  0713 11/04/22  2039 11/04/22  1545 11/04/22  1119 11/04/22  0714   POC GLUCOSE mg/dl 179* 170* 168* 113 322* 282* 239* 250* 266* 229* 213* 194*               Lines/Drains:  Invasive Devices  Report    Peripheral Intravenous Line  Duration           Peripheral IV 11/03/22 Right Antecubital 3 days                      Imaging: Reviewed radiology reports from this admission including: CT head    Recent Cultures (last 7 days):         Last 24 Hours Medication List:   Current Facility-Administered Medications   Medication Dose Route Frequency Provider Last Rate   • acetaminophen  650 mg Oral Q6H PRN Sole Aguilera MD     • aluminum-magnesium hydroxide-simethicone  30 mL Oral Q6H PRN Sole Aguilera MD     • atorvastatin  40 mg Oral Daily Sole Aguilera MD     • bisacodyl  10 mg Rectal Once EMELI Delcid     • [START ON 11/7/2022] escitalopram  10 mg Oral Daily EMELI Delcid     • hydrALAZINE  5 mg Intravenous Q6H PRN Olga Lidia Patterson PA-C     • insulin glargine  30 Units Subcutaneous HS EMELI Delcid     • insulin lispro  1-6 Units Subcutaneous TID AC EMELI Delcid     • insulin lispro  1-6 Units Subcutaneous HS EMELI Delcid     • lactulose  10 g Oral Daily PRN Sole Aguilera MD     • lactulose  30 g Oral Once EMELI Delcid     • metoprolol tartrate  25 mg Oral Q12H 830 Adalberto Ford MD     • metroNIDAZOLE  500 mg Oral Q12H [facility] HOME EMELI Delcid     • multi-electrolyte  75 mL/hr Intravenous Continuous EMELI Delcid 75 mL/hr (11/06/22 1247)   • nystatin   Topical Once Leif Escobar MD     • ondansetron  4 mg Intravenous Q6H PRN Sole Aguilera MD     • polyethylene glycol  17 g Oral Daily EMELI Delcid     • senna-docusate sodium  2 tablet Oral HS EMELI Delcid          Today, Patient Was Seen By: Cecile Weston    **Please Note: This note may have been constructed using a voice recognition system  **

## 2022-11-07 NOTE — CASE MANAGEMENT
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 2022   • Constipation 2022   • Rectus sheath hematoma 2022   • Tibia/fibula fracture 2022   • Vaginal disorder 2022   • Leukocytosis 2022   • Fall 10/20/2022   • Scalp laceration 10/20/2022   • Stage 3 chronic kidney disease, unspecified whether stage 3a or 3b CKD ([place] [address] ) 10/17/2022   • Ambulatory dysfunction 2022   • Closed right ankle fracture 08/15/2022   • Type 2 diabetes mellitus with hyperglycemia, with long-term current use of insulin ([place] [address] ) 2022   • Obesity, morbid ([place] [address] ) 2021   • Osteopenia 2021   • Mild intermittent asthma without complication    • PAF (paroxysmal atrial fibrillation) ([facility] ) 2019   • SSS (sick sinus syndrome) ([place] [address] ) 2019   • Dyspnea on minimal exertion 2019   • Congestive heart failure with LV diastolic dysfunction, NYHA class 2 ([place] [address] ) 2018   • Urine retention 2018   • Chronic diastolic congestive heart failure ([place] [address] ) 2018   • 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 ([facility] ) 2018   • Abnormal chest x-ray 2018   • Ventricular tachycardia 2017   • Urine incontinence 2017   • Gastroesophageal reflux disease with hiatal hernia 2014   • Cerebral artery occlusion with cerebral infarction ([facility] ) 2014   • Late onset Alzheimer's dementia without behavioral disturbance ([facility] ) 2013   • 3-vessel coronary artery disease 2012   • Depression 2012   • Diabetic retinopathy ([facility] ) 2012   • DM (diabetes mellitus), type 2, uncontrolled w/ophthalmic complication    • Glaucoma 08/23/2012   • Hyperlipidemia 08/23/2012   • Essential hypertension 08/23/2012   • Obesity (BMI 30-39 9) 08/23/2012   • Obstructive sleep apnea 08/23/2012   • Osteoarthritis of knee 08/23/2012      LOS (days): 4  Geometric Mean LOS (GMLOS) (days): 3 00  Days to GMLOS:-0 8     OBJECTIVE:    Risk of Unplanned Readmission Score: 23 83         Current admission status: Inpatient       Preferred Pharmacy:   [address], [address]  Phone: [phone] Fax: [phone]    PATIENT/FAMILY REPORTS NO PREFERRED PHARMACY  No address on file      Primary Care Provider: Nathaly Reyes MD    Primary Insurance: [facility]  Secondary Insurance:     ASSESSMENT:  Saritha Saleh Proxies    There are no active Health Care Proxies on file                   Readmission Root Cause  30 Day Readmission: No    Patient Information  Admitted from[de-identified] Home  Mental Status: Confused  During Assessment patient was accompanied by: Not accompanied during assessment  Assessment information provided by[de-identified] Daughter, Other - please comment (Chart review)  Primary Caregiver: Family  Caregiver's Name[de-identified] Nany Irizarry Relationship to Patient[de-identified] Family Member  Caregiver's Telephone Number[de-identified] [phone]  Support Systems: Family members  [place] of Residence: [address] [place] [address] do you live in?: [place]  Type of Current Residence: [facility]  In the last 12 months, was there a time when you were not able to pay the mortgage or rent on time?: No  In the last 12 months, how many places have you lived?: 1  Homeless/housing insecurity resource given?: N/A  Living Arrangements: Lives w/ Son    Activities of Daily Living Prior to Admission  Functional Status: Assistance  Completes ADLs independently?: No  Level of ADL dependence: Assistance  Ambulates independently?: No  Level of ambulatory dependence: Assistance  Does patient use assisted devices?: Yes  Assisted Devices (DME) used: [employer] Chair  Does patient currently own DME?: Yes  What DME does the patient currently own?: [employer] Bonilla  Does the patient have a history of Short-Term Rehab?: Yes ([facility])  Does patient have a history of HHC?: Yes [facility])         Patient Information Continued  Income Source: Pension/intermediate  Does patient have prescription coverage?: Yes  Within the past 12 months, you worried that your food would run out before you got the money to buy more : Never true  Within the past 12 months, the food you bought just didn't last and you didn't have money to get more : Never true  Food insecurity resource given?: N/A  Does patient receive dialysis treatments?: No  Does patient have a history of substance abuse?: No  Does patient have a history of Mental Health Diagnosis?: No    PHQ 2/9 Screening   Reviewed PHQ 2/9 Depression Screening Score?: No    Means of Transportation  Means of Transport to Appts[de-identified] Family transport  In the past 12 months, has lack of transportation kept you from medical appointments or from getting medications?: No  In the past 12 months, has lack of transportation kept you from meetings, work, or from getting things needed for daily living?: No  Was application for public transport provided?: N/A        DISCHARGE DETAILS:    Discharge planning discussed with[de-identified] Pt's daughter, Hebert Severs of Choice: Yes  Comments - Freedom of Choice: Pt's daughter agreeble to [facility] referral  CM contacted family/caregiver?: Yes  Were Treatment Team discharge recommendations reviewed with patient/caregiver?: Yes  Did patient/caregiver verbalize understanding of patient care needs?: Yes  Were patient/caregiver advised of the risks associated with not following Treatment Team discharge recommendations?: Yes    Contacts  Patient Contacts: Jesusita Richard (daughter)  Relationship to Patient[de-identified] Family  Contact Method: Phone  Phone Number: [phone]  Reason/Outcome: Continuity of Care, Discharge Planning              Other Referral/Resources/Interventions Provided:  Interventions: HHA  Referral Comments: Referral sent to [facility]           Treatment Team Recommendation: Home with [facility]  Discharge Destination Plan[de-identified] Home with [facility]

## 2022-11-08 LAB
ALBUMIN SERPL BCP-MCNC: 2.6 G/DL (ref 3.5–5)
ALP SERPL-CCNC: 85 U/L (ref 46–116)
ALT SERPL W P-5'-P-CCNC: 18 U/L (ref 12–78)
ANION GAP SERPL CALCULATED.3IONS-SCNC: 5 MMOL/L (ref 4–13)
AST SERPL W P-5'-P-CCNC: 14 U/L (ref 5–45)
BASOPHILS # BLD AUTO: 0.06 THOUSANDS/ÂΜL (ref 0–0.1)
BASOPHILS NFR BLD AUTO: 1 % (ref 0–1)
BILIRUB SERPL-MCNC: 0.92 MG/DL (ref 0.2–1)
BUN SERPL-MCNC: 26 MG/DL (ref 5–25)
CALCIUM ALBUM COR SERPL-MCNC: 9.4 MG/DL (ref 8.3–10.1)
CALCIUM SERPL-MCNC: 8.3 MG/DL (ref 8.3–10.1)
CHLORIDE SERPL-SCNC: 100 MMOL/L (ref 96–108)
CO2 SERPL-SCNC: 33 MMOL/L (ref 21–32)
CREAT SERPL-MCNC: 0.92 MG/DL (ref 0.6–1.3)
EOSINOPHIL # BLD AUTO: 0.08 THOUSAND/ÂΜL (ref 0–0.61)
EOSINOPHIL NFR BLD AUTO: 1 % (ref 0–6)
ERYTHROCYTE [DISTWIDTH] IN BLOOD BY AUTOMATED COUNT: 14.3 % (ref 11.6–15.1)
GFR SERPL CREATININE-BSD FRML MDRD: 56 ML/MIN/1.73SQ M
GLUCOSE SERPL-MCNC: 129 MG/DL (ref 65–140)
GLUCOSE SERPL-MCNC: 139 MG/DL (ref 65–140)
GLUCOSE SERPL-MCNC: 150 MG/DL (ref 65–140)
GLUCOSE SERPL-MCNC: 192 MG/DL (ref 65–140)
GLUCOSE SERPL-MCNC: 39 MG/DL (ref 65–140)
GLUCOSE SERPL-MCNC: 41 MG/DL (ref 65–140)
GLUCOSE SERPL-MCNC: 61 MG/DL (ref 65–140)
GLUCOSE SERPL-MCNC: 85 MG/DL (ref 65–140)
HCT VFR BLD AUTO: 35.8 % (ref 34.8–46.1)
HGB BLD-MCNC: 11 G/DL (ref 11.5–15.4)
IMM GRANULOCYTES # BLD AUTO: 0.04 THOUSAND/UL (ref 0–0.2)
IMM GRANULOCYTES NFR BLD AUTO: 0 % (ref 0–2)
LYMPHOCYTES # BLD AUTO: 0.79 THOUSANDS/ÂΜL (ref 0.6–4.47)
LYMPHOCYTES NFR BLD AUTO: 9 % (ref 14–44)
MCH RBC QN AUTO: 28.8 PG (ref 26.8–34.3)
MCHC RBC AUTO-ENTMCNC: 30.7 G/DL (ref 31.4–37.4)
MCV RBC AUTO: 94 FL (ref 82–98)
MONOCYTES # BLD AUTO: 0.48 THOUSAND/ÂΜL (ref 0.17–1.22)
MONOCYTES NFR BLD AUTO: 5 % (ref 4–12)
NEUTROPHILS # BLD AUTO: 7.81 THOUSANDS/ÂΜL (ref 1.85–7.62)
NEUTS SEG NFR BLD AUTO: 84 % (ref 43–75)
NRBC BLD AUTO-RTO: 0 /100 WBCS
PLATELET # BLD AUTO: 221 THOUSANDS/UL (ref 149–390)
PMV BLD AUTO: 11.5 FL (ref 8.9–12.7)
POTASSIUM SERPL-SCNC: 3.7 MMOL/L (ref 3.5–5.3)
PROT SERPL-MCNC: 5.7 G/DL (ref 6.4–8.4)
RBC # BLD AUTO: 3.82 MILLION/UL (ref 3.81–5.12)
SODIUM SERPL-SCNC: 138 MMOL/L (ref 135–147)
TSH SERPL DL<=0.05 MIU/L-ACNC: 3.09 UIU/ML (ref 0.45–4.5)
VIT B12 SERPL-MCNC: 829 PG/ML (ref 100–900)
WBC # BLD AUTO: 9.26 THOUSAND/UL (ref 4.31–10.16)

## 2022-11-08 RX ORDER — DEXTROSE MONOHYDRATE 25 G/50ML
INJECTION, SOLUTION INTRAVENOUS
Status: DISPENSED
Start: 2022-11-08 | End: 2022-11-08

## 2022-11-08 RX ORDER — DEXTROSE MONOHYDRATE 25 G/50ML
25 INJECTION, SOLUTION INTRAVENOUS ONCE AS NEEDED
Status: DISCONTINUED | OUTPATIENT
Start: 2022-11-08 | End: 2022-11-10 | Stop reason: HOSPADM

## 2022-11-08 RX ORDER — INSULIN GLARGINE 100 [IU]/ML
10 INJECTION, SOLUTION SUBCUTANEOUS
Status: DISCONTINUED | OUTPATIENT
Start: 2022-11-08 | End: 2022-11-09

## 2022-11-08 RX ADMIN — APIXABAN 5 MG: 5 TABLET, FILM COATED ORAL at 20:23

## 2022-11-08 RX ADMIN — POLYETHYLENE GLYCOL 3350 17 G: 17 POWDER, FOR SOLUTION ORAL at 08:15

## 2022-11-08 RX ADMIN — METOPROLOL TARTRATE 25 MG: 25 TABLET, FILM COATED ORAL at 20:23

## 2022-11-08 RX ADMIN — METRONIDAZOLE 500 MG: 500 TABLET ORAL at 08:15

## 2022-11-08 RX ADMIN — LACTULOSE 10 G: 20 SOLUTION ORAL at 17:22

## 2022-11-08 RX ADMIN — HEPARIN SODIUM 5000 UNITS: 5000 INJECTION INTRAVENOUS; SUBCUTANEOUS at 13:51

## 2022-11-08 RX ADMIN — HEPARIN SODIUM 5000 UNITS: 5000 INJECTION INTRAVENOUS; SUBCUTANEOUS at 05:45

## 2022-11-08 RX ADMIN — SENNOSIDES AND DOCUSATE SODIUM 2 TABLET: 8.6; 5 TABLET ORAL at 22:06

## 2022-11-08 RX ADMIN — METRONIDAZOLE 500 MG: 500 TABLET ORAL at 20:23

## 2022-11-08 RX ADMIN — ASPIRIN 81 MG CHEWABLE TABLET 81 MG: 81 TABLET CHEWABLE at 08:15

## 2022-11-08 RX ADMIN — ATORVASTATIN CALCIUM 40 MG: 40 TABLET, FILM COATED ORAL at 08:15

## 2022-11-08 RX ADMIN — INSULIN LISPRO 1 UNITS: 100 INJECTION, SOLUTION INTRAVENOUS; SUBCUTANEOUS at 17:22

## 2022-11-08 RX ADMIN — METOPROLOL TARTRATE 25 MG: 25 TABLET, FILM COATED ORAL at 08:15

## 2022-11-08 RX ADMIN — ESCITALOPRAM OXALATE 10 MG: 10 TABLET ORAL at 08:15

## 2022-11-08 RX ADMIN — INSULIN GLARGINE 10 UNITS: 100 INJECTION, SOLUTION SUBCUTANEOUS at 22:04

## 2022-11-08 RX ADMIN — LACTULOSE 10 G: 20 SOLUTION ORAL at 08:15

## 2022-11-08 RX ADMIN — INSULIN LISPRO 2 UNITS: 100 INJECTION, SOLUTION INTRAVENOUS; SUBCUTANEOUS at 22:04

## 2022-11-08 RX ADMIN — ONDANSETRON 4 MG: 2 INJECTION INTRAMUSCULAR; INTRAVENOUS at 13:51

## 2022-11-08 NOTE — RESTORATIVE TECHNICIAN NOTE
Restorative Technician Note      Patient Name: Jillian Calloway     Restorative Tech Visit Date: 11/8/2022  Note Type: Mobility (Pt not appropriate at this time to transfer or sit EOB due to cognitive status and cooperation; will continue to check in)    [facility]  DPT, Restorative Technician

## 2022-11-08 NOTE — ASSESSMENT & PLAN NOTE
· Patient is on torsemide and metoprolol at home, will hold torsemide 20 mg daily sp todays dose 11/6   · Patient with ongoing poor oral intake, creatinine improved with fluid hydration, will continue for another day  · Patient was noted to be hypertensive in ED needing labetalol 10 mg once    ·

## 2022-11-08 NOTE — PROGRESS NOTES
Pt is currently asleep, resting comfortably, will continue to monitor, IV site RAC remains intact and IVF infusing well

## 2022-11-08 NOTE — ASSESSMENT & PLAN NOTE
Lab Results   Component Value Date    EGFR 44 11/07/2022    EGFR 36 11/06/2022    EGFR 54 11/04/2022    CREATININE 1 12 11/07/2022    CREATININE 1 32 (H) 11/06/2022    CREATININE 0 95 11/04/2022     · Creatinine improving today to 0 9  · Will continue fluid hydration until tomorrow as patient is having poor oral intake

## 2022-11-08 NOTE — ASSESSMENT & PLAN NOTE
· Patient had fall about 2 weeks back  · CT abdomen pelvis did showed 10 cm left rectus sheath hematoma  · Recommend repeat ct abdomen pelvis in 3 - 6 months   · Patient evaluated by surgery, no acute intervention needed at this time  · HH appears to be stable no considerable drop   · Recommended abdominal binder, reversal of Eliquis with Kcentra  · As per ED physician's discussion with Radiology does not seem actively bleeding however trend hemoglobin      As hemoglobin continues to be stable will resume Eliquis

## 2022-11-08 NOTE — ASSESSMENT & PLAN NOTE
Lab Results   Component Value Date    HGBA1C 7 3 (H) 07/30/2022       Recent Labs     11/08/22  0714 11/08/22  0716 11/08/22  0736 11/08/22  0751   POCGLU 39* 41* 61* 85       Blood Sugar Average: Last 72 hrs:  (P) 157 75     · Decrease Lantus 30 units down to 15 units due to hypoglycemia this morning  · SSI   · Goal is to keep less than 180

## 2022-11-08 NOTE — ASSESSMENT & PLAN NOTE
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 , as well as having parkinsonian component with recent falls, autonomic dysfunction  · Patient was noted to have change in mental status in ED however CT head unremarkable    · Please see pcp note lexapro at last visit was increased ot 20 mg double the dose   · Will reduce to 10 mg daily since pts mental status is lethargic

## 2022-11-08 NOTE — PROGRESS NOTES
Looked for a new IV site as it appears her IV site is due for change, unable to find a new one, she seems to be a very difficult stick, will continue to monitor

## 2022-11-08 NOTE — ASSESSMENT & PLAN NOTE
Patient presenting with altered mental status, metabolic encephalopathy high, however I suspect this is secondary to chronic issue of dementia with progression rather than secondary to an infectious process or other etiology  Will monitor patient for improvement on exam currently she is alert to self and that she is in the hospital  Reports hallucination, concern for lewy body dementia? as well as parkinsonian process with automonic dysfunction, change in writing, recent falls  Hallucinations include animals and children

## 2022-11-08 NOTE — PLAN OF CARE
Problem: PAIN - ADULT  Goal: Verbalizes/displays adequate comfort level or baseline comfort level  Description: Interventions:  - Encourage patient to monitor pain and request assistance  - Assess pain using appropriate pain scale  - Administer analgesics based on type and severity of pain and evaluate response  - Implement non-pharmacological measures as appropriate and evaluate response  - Consider cultural and social influences on pain and pain management  - Notify physician/advanced practitioner if interventions unsuccessful or patient reports new pain  Outcome: Progressing     Problem: INFECTION - ADULT  Goal: Absence or prevention of progression during hospitalization  Description: INTERVENTIONS:  - Assess and monitor for signs and symptoms of infection  - Monitor lab/diagnostic results  - Monitor all insertion sites, i e  indwelling lines, tubes, and drains  - Monitor endotracheal if appropriate and nasal secretions for changes in amount and color  - [place] appropriate cooling/warming therapies per order  - Administer medications as ordered  - Instruct and encourage patient and family to use good hand hygiene technique  - Identify and instruct in appropriate isolation precautions for identified infection/condition  Outcome: Progressing  Goal: Absence of fever/infection during neutropenic period  Description: INTERVENTIONS:  - Monitor WBC    Outcome: Progressing     Problem: SAFETY ADULT  Goal: Patient will remain free of falls  Description: INTERVENTIONS:  - Educate patient/family on patient safety including physical limitations  - Instruct patient to call for assistance with activity   - Consult OT/PT to assist with strengthening/mobility   - Keep Call bell within reach  - Keep bed low and locked with side rails adjusted as appropriate  - Keep care items and personal belongings within reach  - Initiate and maintain comfort rounds  - Make Fall Risk Sign visible to staff  - Offer Toileting every 2 Hours, in advance of need  - Initiate/Maintain bed/chair alarm  - Obtain necessary fall risk management equipment: chair alarm  - Apply yellow socks and bracelet for high fall risk patients  - Consider moving patient to room near nurses station  Outcome: Progressing  Goal: Maintain or return to baseline ADL function  Description: INTERVENTIONS:  -  Assess patient's ability to carry out ADLs; assess patient's baseline for ADL function and identify physical deficits which impact ability to perform ADLs (bathing, care of mouth/teeth, toileting, grooming, dressing, etc )  - Assess/evaluate cause of self-care deficits   - Assess range of motion  - Assess patient's mobility; develop plan if impaired  - Assess patient's need for assistive devices and provide as appropriate  - Encourage maximum independence but intervene and supervise when necessary  - Involve family in performance of ADLs  - Assess for home care needs following discharge   - Consider OT consult to assist with ADL evaluation and planning for discharge  - Provide patient education as appropriate  Outcome: Progressing  Goal: Maintains/Returns to pre admission functional level  Description: INTERVENTIONS:  - Perform BMAT or MOVE assessment daily    - Set and communicate daily mobility goal to care team and patient/family/caregiver  - Collaborate with rehabilitation services on mobility goals if consulted  - Reposition patient every 2 hours    - Stand patient 3 times a day  - Ambulate patient 2 times a day  - Out of bed to chair 3 times a day   - Out of bed for meals 3 times a day  - Out of bed for toileting  - Record patient progress and toleration of activity level   Outcome: Progressing     Problem: DISCHARGE PLANNING  Goal: Discharge to home or other facility with appropriate resources  Description: INTERVENTIONS:  - Identify barriers to discharge w/patient and caregiver  - Arrange for needed discharge resources and transportation as appropriate  - Identify discharge learning needs (meds, wound care, etc )  - Arrange for interpretive services to assist at discharge as needed  - Refer to Case Management Department for coordinating discharge planning if the patient needs post-hospital services based on physician/advanced practitioner order or complex needs related to functional status, cognitive ability, or social support system  Outcome: Progressing     Problem: Knowledge Deficit  Goal: Patient/family/caregiver demonstrates understanding of disease process, treatment plan, medications, and discharge instructions  Description: Complete learning assessment and assess knowledge base    Interventions:  - Provide teaching at level of understanding  - Provide teaching via preferred learning methods  Outcome: Progressing     Problem: GASTROINTESTINAL - ADULT  Goal: Maintains or returns to baseline bowel function  Description: INTERVENTIONS:  - Assess bowel function  - Encourage oral fluids to ensure adequate hydration  - Administer IV fluids if ordered to ensure adequate hydration  - Administer ordered medications as needed  - Encourage mobilization and activity  - Consider nutritional services referral to assist patient with adequate nutrition and appropriate food choices  Outcome: Progressing     Problem: SKIN/TISSUE INTEGRITY - ADULT  Goal: Skin Integrity remains intact(Skin Breakdown Prevention)  Description: Assess:  -Perform Prateek assessment every shift  -Clean and moisturize skin with every incontinence  -Inspect skin when repositioning, toileting, and assisting with ADLS  -Assess extremities for adequate circulation and sensation     Bed Management:  -Have minimal linens on bed & keep smooth, unwrinkled  -Change linens as needed when moist or perspiring  -Avoid sitting or lying in one position for more than 2hours while in bed  -Keep HOB at 30 degrees     Toileting:  -Offer bedside commode  -Assess for incontinence every 2 hours  -Use incontinent care products after each incontinent episode such as calmazine cream    Activity:  -Mobilize patient 2 times a day  -Encourage activity and walks on unit  -Encourage or provide ROM exercises   -Turn and reposition patient every 2 Hours  -Use appropriate equipment to lift or move patient in bed  -Instruct/ Assist with weight shifting every 30 minutes when out of bed in chair  -Consider limitation of chair time 3 hour intervals    Skin Care:  -Avoid use of baby powder, tape, friction and shearing, hot water or constrictive clothing  -Relieve pressure over bony prominences using Allevyns  -Do not massage red bony areas    Next Steps:  -Teach patient strategies to minimize risks such as weight shifting  -Consider consults to  interdisciplinary teams such as PT/OT  Outcome: Progressing  Goal: Incision(s), wounds(s) or drain site(s) healing without S/S of infection  Description: INTERVENTIONS  - Assess and document dressing, incision, wound bed, drain sites and surrounding tissue  - Provide patient and family education  - Perform skin care/dressing changes every shift or as ordered  Outcome: Progressing     Problem: HEMATOLOGIC - ADULT  Goal: Maintains hematologic stability  Description: INTERVENTIONS  - Assess for signs and symptoms of bleeding or hemorrhage  - Monitor labs  - Administer supportive blood products/factors as ordered and appropriate  Outcome: Progressing     Problem: Nutrition/Hydration-ADULT  Goal: Nutrient/Hydration intake appropriate for improving, restoring or maintaining nutritional needs  Description: Monitor and assess patient's nutrition/hydration status for malnutrition  Collaborate with interdisciplinary team and initiate plan and interventions as ordered  Monitor patient's weight and dietary intake as ordered or per policy  Utilize nutrition screening tool and intervene as necessary  Determine patient's food preferences and provide high-protein, high-caloric foods as appropriate       INTERVENTIONS:  - Monitor oral intake, urinary output, labs, and treatment plans  - Assess nutrition and hydration status and recommend course of action  - Evaluate amount of meals eaten  - Assist patient with eating if necessary   - Allow adequate time for meals  - Recommend/ encourage appropriate diets, oral nutritional supplements, and vitamin/mineral supplements  - Order, calculate, and assess calorie counts as needed  - Recommend, monitor, and adjust tube feedings based on assessed needs  - Assess need for intravenous fluids  - Provide nutrition/hydration education as appropriate  - Include patient/family/caregiver in decisions related to nutrition  Outcome: Progressing     Problem: Potential for Falls  Goal: Patient will remain free of falls  Description: INTERVENTIONS:  - Educate patient/family on patient safety including physical limitations  - Instruct patient to call for assistance with activity   - Consult OT/PT to assist with strengthening/mobility   - Keep Call bell within reach  - Keep bed low and locked with side rails adjusted as appropriate  - Keep care items and personal belongings within reach  - Initiate and maintain comfort rounds  - Make Fall Risk Sign visible to staff  - Offer Toileting every 2 Hours, in advance of need  - Initiate/Maintain bed/chair alarm  - Obtain necessary fall risk management equipment: chair alarm  - Apply yellow socks and bracelet for high fall risk patients  - Consider moving patient to room near nurses station  Outcome: Progressing     Problem: MOBILITY - ADULT  Goal: Maintain or return to baseline ADL function  Description: INTERVENTIONS:  -  Assess patient's ability to carry out ADLs; assess patient's baseline for ADL function and identify physical deficits which impact ability to perform ADLs (bathing, care of mouth/teeth, toileting, grooming, dressing, etc )  - Assess/evaluate cause of self-care deficits   - Assess range of motion  - Assess patient's mobility; develop plan if impaired  - Assess patient's need for assistive devices and provide as appropriate  - Encourage maximum independence but intervene and supervise when necessary  - Involve family in performance of ADLs  - Assess for home care needs following discharge   - Consider OT consult to assist with ADL evaluation and planning for discharge  - Provide patient education as appropriate  Outcome: Progressing  Goal: Maintains/Returns to pre admission functional level  Description: INTERVENTIONS:  - Perform BMAT or MOVE assessment daily    - Set and communicate daily mobility goal to care team and patient/family/caregiver  - Collaborate with rehabilitation services on mobility goals if consulted  - Reposition patient every 2 hours    - Stand patient 3 times a day  - Ambulate patient 2 times a day  - Out of bed to chair 3 times a day   - Out of bed for meals 3 times a day  - Out of bed for toileting  - Record patient progress and toleration of activity level   Outcome: Progressing     Problem: Prexisting or High Potential for Compromised Skin Integrity  Goal: Skin integrity is maintained or improved  Description: INTERVENTIONS:  - Identify patients at risk for skin breakdown  - Assess and monitor skin integrity  - Assess and monitor nutrition and hydration status  - Monitor labs   - Assess for incontinence   - Turn and reposition patient  - Assist with mobility/ambulation  - Relieve pressure over bony prominences  - Avoid friction and shearing  - Provide appropriate hygiene as needed including keeping skin clean and dry  - Evaluate need for skin moisturizer/barrier cream  - Collaborate with interdisciplinary team   - Patient/family teaching  - Consider wound care consult   Outcome: Progressing

## 2022-11-08 NOTE — PROGRESS NOTES
[address] [facility]  Progress Note - [place] 1935, 82 y o  female MRN: [id number]  Unit/Bed#: [facility] 803-01 Encounter: [id number]  Primary Care Provider: Vidhya Thurston MD   Date and time admitted to hospital: 11/3/2022 20:73 PM    Metabolic encephalopathy  Assessment & Plan  Patient presenting with altered mental status, metabolic encephalopathy high, however I suspect this is secondary to chronic issue of dementia with progression rather than secondary to an infectious process or other etiology  Will monitor patient for improvement on exam currently she is alert to self and that she is in the hospital  Reports hallucination, concern for lewy body dementia? 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  Assessment & Plan  See CT imaging   · Moderate to severe bilateral cavernous carotid artery plaque stenosis  No evidence of acute intracranial hemorrhage  Chronic microangiopathy  Moderate to severe plaque stenosis right vertebral artery origin  Approximately 70-80% plaque stenosis right cervical ICA origin  · Discussed cta findings with Neurosurgery see telemed recommendations:   · Noting multiple areas of incidental stenosis  There is no indication for endovascular intervention for incidental stenosis  ·  Recommending neurology evaluation for AP and consider MRI   · Consult placed for neurology   · Recommend aspirin  · Will resume home Eliquis now that hemoglobin is stable    Leukocytosis  Assessment & Plan  · Leukocytosis noted on labs likely reactive  · Leukocytosis resolved    Vaginal disorder  Assessment & Plan  · CT abdomen pelvis remarkable for fluid in vagina, suspected to be due to obstructing mass versus with vesico-vaginal fistula    · As per patient's daughter no complaints of abnormal vaginal bleeding  · Appreciated gyn and discussed with gyn   · Prior ct imaging not noted to have said fluid collection in vagina  · No evidence of fistula formation per gyn on speculum exam  · Transvaginal ultrasound completed with no acute findings   · Discussed with son and daughter at bedside - 11/5  · S/P TVUS and then consider catheterization pending results family will then decide to proceed with further intervention or not - further discussion Monday 11/6  · Methylene blue tampon test was discussed not yet performed , however pt with no noted fistula via transvaginal US  · R/o vesicovaginal fistula now less likely    · Vaginal cultures positive for Gardnerella vaginella  · Discussed in detail with gyn  Pt with no obvious signs of symptoms and no profuse vaginal discharge however, son requests both systemic and local tx  Per discussion with gyn there is no evidence to support treatment with both   We have opted to treat with flagyl BID for 7 days   Tibia/fibula fracture  Assessment & Plan  · Patient had fall 2 weeks back leading to bimalleolar fractures status post orif  · PT/OT eval   · Recommend home with home health     Rectus sheath hematoma  Assessment & Plan  · Patient had fall about 2 weeks back  · CT abdomen pelvis did showed 10 cm left rectus sheath hematoma  · Recommend repeat ct abdomen pelvis in 3 - 6 months   · Patient evaluated by surgery, no acute intervention needed at this time  · HH appears to be stable no considerable drop   · Recommended abdominal binder, reversal of Eliquis with Kcentra  · As per ED physician's discussion with Radiology does not seem actively bleeding however trend hemoglobin  As hemoglobin continues to be stable will resume Eliquis    Scalp laceration  Assessment & Plan  · Patient had fall about 2 weeks back when she had laceration on scalp needing Staples  · As per daughter she was supposed to get sutures removed this week    · Discussed with trauma they were up to remove the head staples pt tolerated removal 11/6      Stage 3 chronic kidney disease, unspecified whether stage 3a or 3b CKD [facility])  Assessment & Plan  Lab Results   Component Value Date    EGFR 44 11/07/2022    EGFR 36 11/06/2022    EGFR 54 11/04/2022    CREATININE 1 12 11/07/2022    CREATININE 1 32 (H) 11/06/2022    CREATININE 0 95 11/04/2022     · Creatinine improving today to 0 9  · Will continue fluid hydration until tomorrow as patient is having poor oral intake    Type 2 diabetes mellitus with hyperglycemia, with long-term current use of insulin [facility])  Assessment & Plan  Lab Results   Component Value Date    HGBA1C 7 3 (H) 07/30/2022       Recent Labs     11/08/22  0714 11/08/22  0716 11/08/22  0736 11/08/22  0751   POCGLU 39* 41* 61* 85       Blood Sugar Average: Last 72 hrs:  (P) 157 75     · Decrease Lantus 30 units down to 15 units due to hypoglycemia this morning  · SSI   · Goal is to keep less than 180  Obstructive sleep apnea  Assessment & Plan  · Patient has history of sleep apnea however is not compliant on CPAP  Essential hypertension  Assessment & Plan  · Patient is on torsemide and metoprolol at home, will hold torsemide 20 mg daily sp todays dose 11/6   · Patient with ongoing poor oral intake, creatinine improved with fluid hydration, will continue for another day  · Patient was noted to be hypertensive in ED needing labetalol 10 mg once  ·     Hyperlipidemia  Assessment & Plan  · Continue atorvastatin    Late onset Alzheimer's dementia without behavioral disturbance ([facility] [address] )  Assessment & Plan  · Noted to have history of Alzheimer's with behavior disturbance  · Suspect LBD? , as well as having parkinsonian component with recent falls, autonomic dysfunction  · Patient was noted to have change in mental status in ED however CT head unremarkable    · Please see pcp note lexapro at last visit was increased ot 20 mg double the dose   · Will reduce to 10 mg daily since pts mental status is lethargic     * Constipation  Assessment & Plan  · Patient presented with constipation, as per daughter last bowel movement was 7 days back  This morning was noted to have vomiting  · Will start on bowel regimen  · Transvaginal ultrasound  - negative for fistula exam per obyn no evidence   · - Sp fleets enema and dulcolax - pt had a small hard bm and then later documented large stool episode   · -    · Dulcolax / senna at hs , miralax   · Ct imaging does not demonstrated considerable stool in colon           VTE Pharmacologic Prophylaxis: VTE Score: 4 High Risk (Score >/= 5) - Pharmacological DVT Prophylaxis Ordered: apixaban (Eliquis)  Sequential Compression Devices Ordered  Patient Centered Rounds: I performed bedside rounds with nursing staff today  Discussions with Specialists or Other Care Team Provider: n/a    Education and Discussions with Family / Patient: Updated  (daughter) via phone  Time Spent for Care: 30 minutes  More than 50% of total time spent on counseling and coordination of care as described above  Current Length of Stay: 5 day(s)  Current Patient Status: Inpatient   Certification Statement: The patient will continue to require additional inpatient hospital stay due to Resuming Eliquis, monitor hemoglobin, likely discharge in 24-48 hours with home health  Discharge Plan: Anticipate discharge in 48 hrs to home with home services  Code Status: Level 1 - Full Code    Subjective:   Patient is alert to self only, she denies any acute complaints    Objective:     Vitals:   Temp (24hrs), Av 7 °F (36 5 °C), Min:97 2 °F (36 2 °C), Max:98 1 °F (36 7 °C)    Temp:  [97 2 °F (36 2 °C)-98 1 °F (36 7 °C)] 97 7 °F (36 5 °C)  HR:  [60-64] 60  Resp:  [16-18] 18  BP: (109-169)/(45-77) 148/63  SpO2:  [96 %-100 %] 96 %  Body mass index is 34 45 kg/m²  Input and Output Summary (last 24 hours):      Intake/Output Summary (Last 24 hours) at 2022 1831  Last data filed at 11/8/2022 0501  Gross per 24 hour   Intake 525 ml   Output 1000 ml   Net -475 ml       Physical Exam:   Physical Exam  Vitals and nursing note reviewed  Constitutional:       General: She is not in acute distress  Appearance: She is well-developed  She is ill-appearing  Comments: Chronically ill-appearing but does not appear toxic   HENT:      Head: Normocephalic and atraumatic  Eyes:      Conjunctiva/sclera: Conjunctivae normal    Cardiovascular:      Rate and Rhythm: Normal rate and regular rhythm  Heart sounds: No murmur heard  No friction rub  No gallop  Pulmonary:      Effort: Pulmonary effort is normal  No respiratory distress  Breath sounds: Normal breath sounds  No stridor  No wheezing or rhonchi  Comments: Patient currently comfortable on room air  Abdominal:      General: There is no distension  Palpations: Abdomen is soft  There is no mass  Tenderness: There is no abdominal tenderness  There is no guarding or rebound  Hernia: No hernia is present  Musculoskeletal:         General: No swelling or tenderness  Cervical back: Neck supple  Skin:     General: Skin is warm and dry  Neurological:      Mental Status: She is alert        Comments: Oriented to self and place, follow simple commands          Additional Data:     Labs:  Results from last 7 days   Lab Units 11/08/22  0905   WBC Thousand/uL 9 26   HEMOGLOBIN g/dL 11 0*   HEMATOCRIT % 35 8   PLATELETS Thousands/uL 221   NEUTROS PCT % 84*   LYMPHS PCT % 9*   MONOS PCT % 5   EOS PCT % 1     Results from last 7 days   Lab Units 11/08/22  0905   SODIUM mmol/L 138   POTASSIUM mmol/L 3 7   CHLORIDE mmol/L 100   CO2 mmol/L 33*   BUN mg/dL 26*   CREATININE mg/dL 0 92   ANION GAP mmol/L 5   CALCIUM mg/dL 8 3   ALBUMIN g/dL 2 6*   TOTAL BILIRUBIN mg/dL 0 92   ALK PHOS U/L 85   ALT U/L 18   AST U/L 14   GLUCOSE RANDOM mg/dL 129     Results from last 7 days   Lab Units 11/05/22  0438   INR  1 07     Results from last 7 days   Lab Units 11/08/22  1559 11/08/22  1053 11/08/22  0751 11/08/22  0736 11/08/22  0716 11/08/22  0714 11/07/22  2039 11/07/22  1630 11/07/22  1118 11/07/22  0813 11/06/22  2037 11/06/22  1600   POC GLUCOSE mg/dl 150* 139 85 61* 41* 39* 177* 165* 140 93 179* 170*               Lines/Drains:  Invasive Devices  Report    Peripheral Intravenous Line  Duration           Peripheral IV 11/08/22 Dorsal (posterior); Right Forearm <1 day                      Imaging: No pertinent imaging reviewed      Recent Cultures (last 7 days):         Last 24 Hours Medication List:   Current Facility-Administered Medications   Medication Dose Route Frequency Provider Last Rate   • acetaminophen  650 mg Oral Q6H PRN Rosalino Mars MD     • aluminum-magnesium hydroxide-simethicone  30 mL Oral Q6H PRN Rosalino Mars MD     • apixaban  5 mg Oral BID Laith Coleman DO     • aspirin  81 mg Oral Daily Vahe Whitlock DO     • atorvastatin  40 mg Oral Daily Rosalino Mars MD     • bisacodyl  10 mg Rectal Once EMELI Rose     • dextrose  25 mL Intravenous Once PRN Suly Hector PA-C     • escitalopram  10 mg Oral Daily EMELI Rose     • hydrALAZINE  5 mg Intravenous Q6H PRN Michelet García PA-C     • insulin glargine  10 Units Subcutaneous HS Laith Coleman, DO     • insulin lispro  1-6 Units Subcutaneous TID  EMELI Rose     • insulin lispro  1-6 Units Subcutaneous HS EMELI Rose     • lactulose  10 g Oral Daily PRN Rosalino Mars MD     • lactulose  10 g Oral BID Laith Coleman, DO     • metoprolol tartrate  25 mg Oral Q12H Ally Bailey MD     • metroNIDAZOLE  500 mg Oral Q12H [facility] EMELI Rose     • multi-electrolyte  100 mL/hr Intravenous Continuous Laith Banai,  mL/hr (11/08/22 1147)   • nystatin   Topical Once Juanito Richter MD     • ondansetron  4 mg Intravenous Q6H PRN Rosalino Mars MD     • polyethylene glycol  17 g Oral Daily EMELI Rose     • senna-docusate sodium  2 tablet Oral HS EMELI Mcgregor          Today, Patient Was Seen By: Santa Cook    **Please Note: This note may have been constructed using a voice recognition system  **

## 2022-11-08 NOTE — ASSESSMENT & PLAN NOTE
See CT imaging   · Moderate to severe bilateral cavernous carotid artery plaque stenosis  No evidence of acute intracranial hemorrhage  Chronic microangiopathy  Moderate to severe plaque stenosis right vertebral artery origin  Approximately 70-80% plaque stenosis right cervical ICA origin  · Discussed cta findings with Neurosurgery see telemed recommendations:   · Noting multiple areas of incidental stenosis  There is no indication for endovascular intervention for incidental stenosis     ·  Recommending neurology evaluation for AP and consider MRI   · Consult placed for neurology   · Recommend aspirin  · Will resume home Eliquis now that hemoglobin is stable

## 2022-11-09 ENCOUNTER — APPOINTMENT (INPATIENT)
Dept: RADIOLOGY | Facility: HOSPITAL | Age: 87
End: 2022-11-09

## 2022-11-09 LAB
ALBUMIN SERPL BCP-MCNC: 2.7 G/DL (ref 3.5–5)
ALP SERPL-CCNC: 83 U/L (ref 46–116)
ALT SERPL W P-5'-P-CCNC: 18 U/L (ref 12–78)
ANION GAP SERPL CALCULATED.3IONS-SCNC: 3 MMOL/L (ref 4–13)
AST SERPL W P-5'-P-CCNC: 17 U/L (ref 5–45)
BASOPHILS # BLD AUTO: 0.06 THOUSANDS/ÂΜL (ref 0–0.1)
BASOPHILS NFR BLD AUTO: 1 % (ref 0–1)
BILIRUB DIRECT SERPL-MCNC: 0.32 MG/DL (ref 0–0.2)
BILIRUB SERPL-MCNC: 0.8 MG/DL (ref 0.2–1)
BUN SERPL-MCNC: 20 MG/DL (ref 5–25)
CALCIUM SERPL-MCNC: 8.5 MG/DL (ref 8.3–10.1)
CHLORIDE SERPL-SCNC: 100 MMOL/L (ref 96–108)
CO2 SERPL-SCNC: 34 MMOL/L (ref 21–32)
CREAT SERPL-MCNC: 0.82 MG/DL (ref 0.6–1.3)
EOSINOPHIL # BLD AUTO: 0.27 THOUSAND/ÂΜL (ref 0–0.61)
EOSINOPHIL NFR BLD AUTO: 3 % (ref 0–6)
ERYTHROCYTE [DISTWIDTH] IN BLOOD BY AUTOMATED COUNT: 14.4 % (ref 11.6–15.1)
GFR SERPL CREATININE-BSD FRML MDRD: 65 ML/MIN/1.73SQ M
GLUCOSE SERPL-MCNC: 124 MG/DL (ref 65–140)
GLUCOSE SERPL-MCNC: 131 MG/DL (ref 65–140)
GLUCOSE SERPL-MCNC: 151 MG/DL (ref 65–140)
GLUCOSE SERPL-MCNC: 48 MG/DL (ref 65–140)
GLUCOSE SERPL-MCNC: 54 MG/DL (ref 65–140)
GLUCOSE SERPL-MCNC: 79 MG/DL (ref 65–140)
HCT VFR BLD AUTO: 32.5 % (ref 34.8–46.1)
HGB BLD-MCNC: 10 G/DL (ref 11.5–15.4)
IMM GRANULOCYTES # BLD AUTO: 0.04 THOUSAND/UL (ref 0–0.2)
IMM GRANULOCYTES NFR BLD AUTO: 1 % (ref 0–2)
LYMPHOCYTES # BLD AUTO: 1.46 THOUSANDS/ÂΜL (ref 0.6–4.47)
LYMPHOCYTES NFR BLD AUTO: 17 % (ref 14–44)
MCH RBC QN AUTO: 28.6 PG (ref 26.8–34.3)
MCHC RBC AUTO-ENTMCNC: 30.8 G/DL (ref 31.4–37.4)
MCV RBC AUTO: 93 FL (ref 82–98)
MONOCYTES # BLD AUTO: 0.63 THOUSAND/ÂΜL (ref 0.17–1.22)
MONOCYTES NFR BLD AUTO: 7 % (ref 4–12)
NEUTROPHILS # BLD AUTO: 6.36 THOUSANDS/ÂΜL (ref 1.85–7.62)
NEUTS SEG NFR BLD AUTO: 71 % (ref 43–75)
NRBC BLD AUTO-RTO: 0 /100 WBCS
PLATELET # BLD AUTO: 225 THOUSANDS/UL (ref 149–390)
PMV BLD AUTO: 12.3 FL (ref 8.9–12.7)
POTASSIUM SERPL-SCNC: 3.6 MMOL/L (ref 3.5–5.3)
PROT SERPL-MCNC: 5.6 G/DL (ref 6.4–8.4)
RBC # BLD AUTO: 3.5 MILLION/UL (ref 3.81–5.12)
SODIUM SERPL-SCNC: 137 MMOL/L (ref 135–147)
WBC # BLD AUTO: 8.82 THOUSAND/UL (ref 4.31–10.16)

## 2022-11-09 RX ORDER — HYDROMORPHONE HCL IN WATER/PF 6 MG/30 ML
0.2 PATIENT CONTROLLED ANALGESIA SYRINGE INTRAVENOUS ONCE
Status: COMPLETED | OUTPATIENT
Start: 2022-11-09 | End: 2022-11-09

## 2022-11-09 RX ADMIN — METRONIDAZOLE 500 MG: 500 TABLET ORAL at 20:28

## 2022-11-09 RX ADMIN — HYDROMORPHONE HYDROCHLORIDE 0.2 MG: 0.2 INJECTION, SOLUTION INTRAMUSCULAR; INTRAVENOUS; SUBCUTANEOUS at 10:10

## 2022-11-09 RX ADMIN — METRONIDAZOLE 500 MG: 500 TABLET ORAL at 08:07

## 2022-11-09 RX ADMIN — SODIUM CHLORIDE, SODIUM GLUCONATE, SODIUM ACETATE, POTASSIUM CHLORIDE AND MAGNESIUM CHLORIDE 100 ML/HR: 526; 502; 368; 37; 30 INJECTION, SOLUTION INTRAVENOUS at 00:18

## 2022-11-09 RX ADMIN — SODIUM CHLORIDE, SODIUM GLUCONATE, SODIUM ACETATE, POTASSIUM CHLORIDE AND MAGNESIUM CHLORIDE 100 ML/HR: 526; 502; 368; 37; 30 INJECTION, SOLUTION INTRAVENOUS at 11:44

## 2022-11-09 RX ADMIN — METOPROLOL TARTRATE 25 MG: 25 TABLET, FILM COATED ORAL at 08:07

## 2022-11-09 RX ADMIN — INSULIN LISPRO 1 UNITS: 100 INJECTION, SOLUTION INTRAVENOUS; SUBCUTANEOUS at 17:04

## 2022-11-09 RX ADMIN — ASPIRIN 81 MG CHEWABLE TABLET 81 MG: 81 TABLET CHEWABLE at 08:07

## 2022-11-09 RX ADMIN — METOPROLOL TARTRATE 25 MG: 25 TABLET, FILM COATED ORAL at 20:29

## 2022-11-09 RX ADMIN — ATORVASTATIN CALCIUM 40 MG: 40 TABLET, FILM COATED ORAL at 08:07

## 2022-11-09 RX ADMIN — LACTULOSE 10 G: 20 SOLUTION ORAL at 17:04

## 2022-11-09 RX ADMIN — SODIUM CHLORIDE, SODIUM GLUCONATE, SODIUM ACETATE, POTASSIUM CHLORIDE AND MAGNESIUM CHLORIDE 100 ML/HR: 526; 502; 368; 37; 30 INJECTION, SOLUTION INTRAVENOUS at 20:37

## 2022-11-09 RX ADMIN — APIXABAN 5 MG: 5 TABLET, FILM COATED ORAL at 17:04

## 2022-11-09 RX ADMIN — APIXABAN 5 MG: 5 TABLET, FILM COATED ORAL at 08:07

## 2022-11-09 RX ADMIN — ALUMINUM HYDROXIDE, MAGNESIUM HYDROXIDE, AND SIMETHICONE 30 ML: 200; 200; 20 SUSPENSION ORAL at 12:23

## 2022-11-09 RX ADMIN — ESCITALOPRAM OXALATE 10 MG: 10 TABLET ORAL at 08:07

## 2022-11-09 RX ADMIN — SENNOSIDES AND DOCUSATE SODIUM 2 TABLET: 8.6; 5 TABLET ORAL at 21:32

## 2022-11-09 NOTE — SPEECH THERAPY NOTE
Speech Language/Pathology    Speech/Language Pathology Progress Note    Patient Name: Judie Pierson  Today's Date: 2022     Problem List  Principal Problem:    Constipation  Active Problems:    Late onset Alzheimer's dementia without behavioral disturbance ([facility] )    Gastroesophageal reflux disease with hiatal hernia    Hyperlipidemia    Essential hypertension    Obstructive sleep apnea    Type 2 diabetes mellitus with hyperglycemia, with long-term current use of insulin ([facility])    Stage 3 chronic kidney disease, unspecified whether stage 3a or 3b CKD ([facility])    Scalp laceration    Rectus sheath hematoma    Tibia/fibula fracture    Vaginal disorder    Leukocytosis    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 )    • Hyperlipidemia    • Hypertension    • MI (myocardial infarction) ([place] [id number] )    • Neuropathy    • BRANT (obstructive sleep apnea)    • Peptic ulcer     was + for H pylori   • Transient cerebral ischemia 2011    with neg CUS and ECHO        Past Surgical History  Past Surgical History:   Procedure Laterality Date   • APPENDECTOMY     • CATARACT EXTRACTION     •  SECTION     • COLONOSCOPY  2004   • CORONARY ANGIOPLASTY  2006   • CORONARY ANGIOPLASTY WITH STENT PLACEMENT     • CORONARY ARTERY BYPASS GRAFT     • DENTAL SURGERY     • EGD AND COLONOSCOPY     • ELBOW SURGERY      resolved    • ESOPHAGOGASTRODUODENOSCOPY     • ORIF TIBIA & FIBULA FRACTURES Right 2022    Procedure: OPEN REDUCTION W/ INTERNAL FIXATION (ORIF) ANKLE;  Surgeon: Juju Arroyo MD;  Location: [facility];  Service: Orthopedics         Subjective:  Patient lethargic  Does not open eyes to verbal or tactile cues  Objective:  Family is present for dysphagia therapy  They report the patient tolerated a regular diet at home, and is typically able to feed herself  The patient's favorite foods include bananas, peanut butter and lemon meringue pie  RN reports patient fed herself this am  SLP attempts puree solids  The patient is observed with lingual protrusion and groping, and is unable to take bolus from tsp  Multiple attempts made, but patient is too lethargic at this time  Family is educated on goals of therapy that least restrictive diet will be offered and trialed when appropriate  Assessment:  The patient is too lethargic for lunch meal      Plan/Recommendations:  Continue puree diet with thin liquids  Continue ST to further assess as able

## 2022-11-09 NOTE — QUICK NOTE
Call yesterday the afternoon to multiple family members but was unable to get hold of anyone to get patient's normal baseline but seems to be slowly improving after reviewing progress notes

## 2022-11-09 NOTE — PROGRESS NOTES
Pastoral Care Progress Note    2022  Patient: Suri Guillermo : 1935  Admission Date & Time: 11/3/2022 1221  MRN: [id number] CSN: [id number]         made intro visit w daughter present and gospel music playing  We talked about our shared [address] [facility], and daughter welcomed me praying for her mother, who seemed to be sleeping  Prayed for her, and during prayer, Pt woke, said Amen at the end of the prayer, and then gave a few words of spiritual encouragement to                Chaplaincy Interventions Utilized:     Exploration: Explored relational needs & resources and Explored spiritual needs & resources    Collaboration: Advocated for patient/family    Relationship Building: Cultivated a relationship of care and support    Ritual: Provided prayer    Chaplaincy Outcomes Achieved:  Debriefed/defused experience, Emotional resources utilized, Expressed intermediate hope, Expressed peace, and Expressed ultimate hope    Spiritual Coping Strategies Utilized:   Spiritual empowerment       22 1400   Clinical Encounter Type   Visited With Patient;Patient and family together   Routine Visit Introduction   Sabianism Encounters   Sabianism Needs Prayer

## 2022-11-09 NOTE — PLAN OF CARE
Problem: PAIN - ADULT  Goal: Verbalizes/displays adequate comfort level or baseline comfort level  Description: Interventions:  - Encourage patient to monitor pain and request assistance  - Assess pain using appropriate pain scale  - Administer analgesics based on type and severity of pain and evaluate response  - Implement non-pharmacological measures as appropriate and evaluate response  - Consider cultural and social influences on pain and pain management  - Notify physician/advanced practitioner if interventions unsuccessful or patient reports new pain  Outcome: Progressing     Problem: INFECTION - ADULT  Goal: Absence or prevention of progression during hospitalization  Description: INTERVENTIONS:  - Assess and monitor for signs and symptoms of infection  - Monitor lab/diagnostic results  - Monitor all insertion sites, i e  indwelling lines, tubes, and drains  - Monitor endotracheal if appropriate and nasal secretions for changes in amount and color  - [place] appropriate cooling/warming therapies per order  - Administer medications as ordered  - Instruct and encourage patient and family to use good hand hygiene technique  - Identify and instruct in appropriate isolation precautions for identified infection/condition  Outcome: Progressing  Goal: Absence of fever/infection during neutropenic period  Description: INTERVENTIONS:  - Monitor WBC    Outcome: Progressing     Problem: SAFETY ADULT  Goal: Patient will remain free of falls  Description: INTERVENTIONS:  - Educate patient/family on patient safety including physical limitations  - Instruct patient to call for assistance with activity   - Consult OT/PT to assist with strengthening/mobility   - Keep Call bell within reach  - Keep bed low and locked with side rails adjusted as appropriate  - Keep care items and personal belongings within reach  - Initiate and maintain comfort rounds  - Make Fall Risk Sign visible to staff  - Offer Toileting every 2 Hours, in advance of need  - Initiate/Maintain bed/chair alarm  - Obtain necessary fall risk management equipment: chair alarm  - Apply yellow socks and bracelet for high fall risk patients  - Consider moving patient to room near nurses station  Outcome: Progressing  Goal: Maintain or return to baseline ADL function  Description: INTERVENTIONS:  -  Assess patient's ability to carry out ADLs; assess patient's baseline for ADL function and identify physical deficits which impact ability to perform ADLs (bathing, care of mouth/teeth, toileting, grooming, dressing, etc )  - Assess/evaluate cause of self-care deficits   - Assess range of motion  - Assess patient's mobility; develop plan if impaired  - Assess patient's need for assistive devices and provide as appropriate  - Encourage maximum independence but intervene and supervise when necessary  - Involve family in performance of ADLs  - Assess for home care needs following discharge   - Consider OT consult to assist with ADL evaluation and planning for discharge  - Provide patient education as appropriate  Outcome: Progressing  Goal: Maintains/Returns to pre admission functional level  Description: INTERVENTIONS:  - Perform BMAT or MOVE assessment daily    - Set and communicate daily mobility goal to care team and patient/family/caregiver  - Collaborate with rehabilitation services on mobility goals if consulted  - Reposition patient every 2 hours    - Stand patient 3 times a day  - Ambulate patient 2 times a day  - Out of bed to chair 3 times a day   - Out of bed for meals 3 times a day  - Out of bed for toileting  - Record patient progress and toleration of activity level   Outcome: Progressing     Problem: DISCHARGE PLANNING  Goal: Discharge to home or other facility with appropriate resources  Description: INTERVENTIONS:  - Identify barriers to discharge w/patient and caregiver  - Arrange for needed discharge resources and transportation as appropriate  - Identify discharge learning needs (meds, wound care, etc )  - Arrange for interpretive services to assist at discharge as needed  - Refer to Case Management Department for coordinating discharge planning if the patient needs post-hospital services based on physician/advanced practitioner order or complex needs related to functional status, cognitive ability, or social support system  Outcome: Progressing     Problem: Knowledge Deficit  Goal: Patient/family/caregiver demonstrates understanding of disease process, treatment plan, medications, and discharge instructions  Description: Complete learning assessment and assess knowledge base    Interventions:  - Provide teaching at level of understanding  - Provide teaching via preferred learning methods  Outcome: Progressing     Problem: GASTROINTESTINAL - ADULT  Goal: Maintains or returns to baseline bowel function  Description: INTERVENTIONS:  - Assess bowel function  - Encourage oral fluids to ensure adequate hydration  - Administer IV fluids if ordered to ensure adequate hydration  - Administer ordered medications as needed  - Encourage mobilization and activity  - Consider nutritional services referral to assist patient with adequate nutrition and appropriate food choices  Outcome: Progressing     Problem: SKIN/TISSUE INTEGRITY - ADULT  Goal: Skin Integrity remains intact(Skin Breakdown Prevention)  Description: Assess:  -Perform Prateek assessment every shift  -Clean and moisturize skin with every incontinence  -Inspect skin when repositioning, toileting, and assisting with ADLS  -Assess extremities for adequate circulation and sensation     Bed Management:  -Have minimal linens on bed & keep smooth, unwrinkled  -Change linens as needed when moist or perspiring  -Avoid sitting or lying in one position for more than 2hours while in bed  -Keep HOB at 30 degrees     Toileting:  -Offer bedside commode  -Assess for incontinence every 2 hours  -Use incontinent care products after each incontinent episode such as calmazine cream    Activity:  -Mobilize patient 2 times a day  -Encourage activity and walks on unit  -Encourage or provide ROM exercises   -Turn and reposition patient every 2 Hours  -Use appropriate equipment to lift or move patient in bed  -Instruct/ Assist with weight shifting every 30 minutes when out of bed in chair  -Consider limitation of chair time 3 hour intervals    Skin Care:  -Avoid use of baby powder, tape, friction and shearing, hot water or constrictive clothing  -Relieve pressure over bony prominences using Allevyns  -Do not massage red bony areas    Next Steps:  -Teach patient strategies to minimize risks such as weight shifting  -Consider consults to  interdisciplinary teams such as PT/OT  Outcome: Progressing  Goal: Incision(s), wounds(s) or drain site(s) healing without S/S of infection  Description: INTERVENTIONS  - Assess and document dressing, incision, wound bed, drain sites and surrounding tissue  - Provide patient and family education  - Perform skin care/dressing changes every shift or as ordered  Outcome: Progressing     Problem: HEMATOLOGIC - ADULT  Goal: Maintains hematologic stability  Description: INTERVENTIONS  - Assess for signs and symptoms of bleeding or hemorrhage  - Monitor labs  - Administer supportive blood products/factors as ordered and appropriate  Outcome: Progressing     Problem: Nutrition/Hydration-ADULT  Goal: Nutrient/Hydration intake appropriate for improving, restoring or maintaining nutritional needs  Description: Monitor and assess patient's nutrition/hydration status for malnutrition  Collaborate with interdisciplinary team and initiate plan and interventions as ordered  Monitor patient's weight and dietary intake as ordered or per policy  Utilize nutrition screening tool and intervene as necessary  Determine patient's food preferences and provide high-protein, high-caloric foods as appropriate       INTERVENTIONS:  - Monitor oral intake, urinary output, labs, and treatment plans  - Assess nutrition and hydration status and recommend course of action  - Evaluate amount of meals eaten  - Assist patient with eating if necessary   - Allow adequate time for meals  - Recommend/ encourage appropriate diets, oral nutritional supplements, and vitamin/mineral supplements  - Order, calculate, and assess calorie counts as needed  - Recommend, monitor, and adjust tube feedings based on assessed needs  - Assess need for intravenous fluids  - Provide nutrition/hydration education as appropriate  - Include patient/family/caregiver in decisions related to nutrition  Outcome: Progressing     Problem: Potential for Falls  Goal: Patient will remain free of falls  Description: INTERVENTIONS:  - Educate patient/family on patient safety including physical limitations  - Instruct patient to call for assistance with activity   - Consult OT/PT to assist with strengthening/mobility   - Keep Call bell within reach  - Keep bed low and locked with side rails adjusted as appropriate  - Keep care items and personal belongings within reach  - Initiate and maintain comfort rounds  - Make Fall Risk Sign visible to staff  - Offer Toileting every 2 Hours, in advance of need  - Initiate/Maintain bed/chair alarm  - Obtain necessary fall risk management equipment: chair alarm  - Apply yellow socks and bracelet for high fall risk patients  - Consider moving patient to room near nurses station  Outcome: Progressing     Problem: MOBILITY - ADULT  Goal: Maintain or return to baseline ADL function  Description: INTERVENTIONS:  -  Assess patient's ability to carry out ADLs; assess patient's baseline for ADL function and identify physical deficits which impact ability to perform ADLs (bathing, care of mouth/teeth, toileting, grooming, dressing, etc )  - Assess/evaluate cause of self-care deficits   - Assess range of motion  - Assess patient's mobility; develop plan if impaired  - Assess patient's need for assistive devices and provide as appropriate  - Encourage maximum independence but intervene and supervise when necessary  - Involve family in performance of ADLs  - Assess for home care needs following discharge   - Consider OT consult to assist with ADL evaluation and planning for discharge  - Provide patient education as appropriate  Outcome: Progressing  Goal: Maintains/Returns to pre admission functional level  Description: INTERVENTIONS:  - Perform BMAT or MOVE assessment daily    - Set and communicate daily mobility goal to care team and patient/family/caregiver  - Collaborate with rehabilitation services on mobility goals if consulted  - Reposition patient every 2 hours    - Stand patient 3 times a day  - Ambulate patient 2 times a day  - Out of bed to chair 3 times a day   - Out of bed for meals 3 times a day  - Out of bed for toileting  - Record patient progress and toleration of activity level   Outcome: Progressing     Problem: Prexisting or High Potential for Compromised Skin Integrity  Goal: Skin integrity is maintained or improved  Description: INTERVENTIONS:  - Identify patients at risk for skin breakdown  - Assess and monitor skin integrity  - Assess and monitor nutrition and hydration status  - Monitor labs   - Assess for incontinence   - Turn and reposition patient  - Assist with mobility/ambulation  - Relieve pressure over bony prominences  - Avoid friction and shearing  - Provide appropriate hygiene as needed including keeping skin clean and dry  - Evaluate need for skin moisturizer/barrier cream  - Collaborate with interdisciplinary team   - Patient/family teaching  - Consider wound care consult   Outcome: Progressing

## 2022-11-10 VITALS
TEMPERATURE: 97.7 F | BODY MASS INDEX: 34.49 KG/M2 | HEIGHT: 65 IN | OXYGEN SATURATION: 98 % | SYSTOLIC BLOOD PRESSURE: 154 MMHG | DIASTOLIC BLOOD PRESSURE: 74 MMHG | RESPIRATION RATE: 14 BRPM | HEART RATE: 64 BPM | WEIGHT: 207 LBS

## 2022-11-10 LAB
GLUCOSE SERPL-MCNC: 127 MG/DL (ref 65–140)
GLUCOSE SERPL-MCNC: 175 MG/DL (ref 65–140)
GLUCOSE SERPL-MCNC: 69 MG/DL (ref 65–140)

## 2022-11-10 RX ORDER — ASPIRIN 81 MG/1
81 TABLET, CHEWABLE ORAL DAILY
Refills: 0
Start: 2022-11-11

## 2022-11-10 RX ORDER — ACETAMINOPHEN 325 MG/1
650 TABLET ORAL EVERY 6 HOURS PRN
Refills: 0
Start: 2022-11-10

## 2022-11-10 RX ORDER — POLYETHYLENE GLYCOL 3350 17 G/17G
17 POWDER, FOR SOLUTION ORAL DAILY
Qty: 119 G | Refills: 0 | Status: SHIPPED | OUTPATIENT
Start: 2022-11-11 | End: 2022-11-18

## 2022-11-10 RX ORDER — LACTULOSE 20 G/30ML
10 SOLUTION ORAL 2 TIMES DAILY
Qty: 237 ML | Refills: 0 | Status: SHIPPED | OUTPATIENT
Start: 2022-11-10

## 2022-11-10 RX ORDER — METRONIDAZOLE 500 MG/1
500 TABLET ORAL EVERY 12 HOURS SCHEDULED
Qty: 5 TABLET | Refills: 0 | Status: SHIPPED | OUTPATIENT
Start: 2022-11-10 | End: 2022-11-13

## 2022-11-10 RX ADMIN — METOPROLOL TARTRATE 25 MG: 25 TABLET, FILM COATED ORAL at 08:24

## 2022-11-10 RX ADMIN — LACTULOSE 10 G: 20 SOLUTION ORAL at 08:24

## 2022-11-10 RX ADMIN — ASPIRIN 81 MG CHEWABLE TABLET 81 MG: 81 TABLET CHEWABLE at 08:24

## 2022-11-10 RX ADMIN — SODIUM CHLORIDE, SODIUM GLUCONATE, SODIUM ACETATE, POTASSIUM CHLORIDE AND MAGNESIUM CHLORIDE 100 ML/HR: 526; 502; 368; 37; 30 INJECTION, SOLUTION INTRAVENOUS at 06:11

## 2022-11-10 RX ADMIN — METRONIDAZOLE 500 MG: 500 TABLET ORAL at 08:24

## 2022-11-10 RX ADMIN — ATORVASTATIN CALCIUM 40 MG: 40 TABLET, FILM COATED ORAL at 08:24

## 2022-11-10 RX ADMIN — ESCITALOPRAM OXALATE 10 MG: 10 TABLET ORAL at 08:24

## 2022-11-10 RX ADMIN — APIXABAN 5 MG: 5 TABLET, FILM COATED ORAL at 08:24

## 2022-11-10 NOTE — DISCHARGE SUMMARY
[address] [facility] 1935, 82 y o  female MRN: [id number]  Unit/Bed#: [facility] 803-01 Encounter: [id number]  Primary Care Provider: Fartun Barreto MD   Date and time admitted to hospital: 11/3/2022 47:26 PM    Metabolic encephalopathy  Assessment & Plan  Patient presenting with altered mental status, metabolic encephalopathy high, however I suspect this is secondary to chronic issue of dementia with progression rather than secondary to an infectious process or other etiology  Will monitor patient for improvement on exam currently she is alert to self and that she is in the hospital  Reports hallucination, concern for lewy body dementia? 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  Assessment & Plan  See CT imaging   · Moderate to severe bilateral cavernous carotid artery plaque stenosis  No evidence of acute intracranial hemorrhage  Chronic microangiopathy  Moderate to severe plaque stenosis right vertebral artery origin  Approximately 70-80% plaque stenosis right cervical ICA origin  · Discussed cta findings with Neurosurgery see telemed recommendations:   · Noting multiple areas of incidental stenosis  There is no indication for endovascular intervention for incidental stenosis  ·  Recommending neurology evaluation for AP and consider MRI   · Consult placed for neurology   · Recommend aspirin  · Will resume home Eliquis now that hemoglobin is stable    Leukocytosis  Assessment & Plan  · Leukocytosis noted on labs likely reactive  · Leukocytosis resolved    Vaginal disorder  Assessment & Plan  · CT abdomen pelvis remarkable for fluid in vagina, suspected to be due to obstructing mass versus with vesico-vaginal fistula    · As per patient's daughter no complaints of abnormal vaginal bleeding  · Appreciated gyn and discussed with gyn   · Prior ct imaging not noted to have said fluid collection in vagina  · No evidence of fistula formation per gyn on speculum exam  · Transvaginal ultrasound completed with no acute findings   · Discussed with son and daughter at bedside - 11/5  · S/P TVUS and then consider catheterization pending results family will then decide to proceed with further intervention or not - further discussion Monday 11/6  · Methylene blue tampon test was discussed not yet performed , however pt with no noted fistula via transvaginal US  · R/o vesicovaginal fistula now less likely    · Vaginal cultures positive for Gardnerella vaginella  · Discussed in detail with gyn  Pt with no obvious signs of symptoms and no profuse vaginal discharge however, son requests both systemic and local tx  Per discussion with gyn there is no evidence to support treatment with both   We have opted to treat with flagyl BID for 7 days   Tibia/fibula fracture  Assessment & Plan  · Patient had fall 2 weeks back leading to bimalleolar fractures status post orif  · PT/OT eval   · Recommend home with home health     Rectus sheath hematoma  Assessment & Plan  · Patient had fall about 2 weeks back  · CT abdomen pelvis did showed 10 cm left rectus sheath hematoma  · Recommend repeat ct abdomen pelvis in 3 - 6 months   · Patient evaluated by surgery, no acute intervention needed at this time  · HH appears to be stable no considerable drop   · Recommended abdominal binder, reversal of Eliquis with Kcentra  · As per ED physician's discussion with Radiology does not seem actively bleeding however trend hemoglobin      As hemoglobin continues to be stable will resume Eliquis -> but today 11/9 - pt c/o lot of left sided abdo pain, hence rechecked CT A/P - but is stable, apply Aqua K pad    Scalp laceration  Assessment & Plan  · Patient had fall about 2 weeks back when she had laceration on scalp needing Cierra  · As per daughter she was supposed to get sutures removed this week  · Discussed with trauma they were up to remove the head staples pt tolerated removal 11/6      Stage 3 chronic kidney disease, unspecified whether stage 3a or 3b CKD [facility])  Assessment & Plan  Lab Results   Component Value Date    EGFR 65 11/09/2022    EGFR 56 11/08/2022    EGFR 44 11/07/2022    CREATININE 0 82 11/09/2022    CREATININE 0 92 11/08/2022    CREATININE 1 12 11/07/2022     · Creatinine improving s/p IVF    Type 2 diabetes mellitus with hyperglycemia, with long-term current use of insulin [facility])  Assessment & Plan  Lab Results   Component Value Date    HGBA1C 7 3 (H) 07/30/2022       Recent Labs     11/09/22  2045 11/10/22  0711 11/10/22  1119 11/10/22  1604   POCGLU 131 69 127 175*       Blood Sugar Average: Last 72 hrs:  (P) 115 [id number]     · Decrease Lantus 30 units down to 15 units due to hypoglycemia -> at home she doesn't use lantus, uses V GO 20 pump, daughter monitor sugars  · SSI   · Goal is to keep less than 180  Obstructive sleep apnea  Assessment & Plan  · Patient has history of sleep apnea however is not compliant on CPAP  Essential hypertension  Assessment & Plan  · Patient is on torsemide and metoprolol at home, will hold torsemide 20 mg daily sp todays dose 11/6   · Patient with ongoing poor oral intake, creatinine improved with fluid hydration  · Now stopped IVF, d/w daughter, pt has been off diuretic at home since her last hospital stay & cardio also aware & ok with it    Hyperlipidemia  Assessment & Plan  · Continue atorvastatin    Late onset Alzheimer's dementia without behavioral disturbance ([facility] [address] )  Assessment & Plan  · Noted to have history of Alzheimer's with behavior disturbance  · Suspect LBD? , as well as having parkinsonian component with recent falls, autonomic dysfunction  · Patient was noted to have change in mental status in ED however CT head unremarkable    · Please see pcp note lexapro at last visit was increased ot 20 mg double the dose   · Will reduce to 10 mg daily since pts mental status was lethargic     * Constipation  Assessment & Plan  · Patient presented with constipation, as per daughter last bowel movement was 7 days back  This morning was noted to have vomiting  · Will start on bowel regimen  · Transvaginal ultrasound  - negative for fistula exam per obyn no evidence   · - Sp fleets enema and dulcolax - pt had a small hard bm and then later documented large stool episode   · Dulcolax / senna at hs , miralax   · Ct imaging does not demonstrate considerable stool in colon         Medical Problems             Resolved Problems  Date Reviewed: 11/10/2022   None               Discharging Physician / Practitioner: Nakia Ulrich  PCP: Autumn Jiménez MD  Admission Date:   Admission Orders (From admission, onward)     Ordered        11/03/22 1934  INPATIENT ADMISSION  Once                      Discharge Date: 11/10/22    Consultations During Hospital Stay:  · OB GYN  neruology    Procedures Performed:   · none    Significant Findings / Test Results:   CT abdomen pelvis wo contrast   Final Result by Yazmin Pool MD (11/09 1333)      Stable rectus sheath hematoma  Workstation performed: [id number]         US pelvis complete w transvaginal   Final Result by Glenna Torres MD (11/04 1950)       Poorly evaluated uterus due to shadowing from uterine calcifications  Normal right ovary  Left ovary is not seen  Workstation performed: [id number]         CT abdomen pelvis with contrast   Final Result by Echo Aranda MD (11/03 1802)   *  Streak artifact from the upper extremities lying adjacent to the abdomen, limiting evaluation of abdominal viscera  *  Heterogenous intramuscular 10 2 cm hematoma involving the left rectus muscle containing 2 hyperattenuating foci, concerning for active extravasation    The hematoma can be secondary to recent trauma, anticoagulation or presence of underlying    hemorrhagic neoplasm  CT abdomen pelvis in 3-6 months is recommended to assess for resolution  *   Moderate amount of fluid in the superior aspect of the vagina  Findings can be secondary to presence of obstructing neoplasm or presence of vesicovaginal fistula  Direct visualization and GYN consultation is recommended for further evaluation  Additional findings as above  I personally discussed this study with [facility] on 11/3/2022 at 5:25 PM                Workstation performed: [id number]         CTA head and neck with and without contrast   Final Result by Kasey Nguyen MD (11/03 1745)      No evidence of acute intracranial hemorrhage  Chronic microangiopathy  Moderate to severe plaque stenosis right vertebral artery origin  Approximately 70-80% plaque stenosis right cervical ICA origin  Moderate to severe bilateral cavernous carotid artery plaque stenosis  Workstation performed: [id number]               Reason for Admission: Constipation    Hospital Course:   Geovani Callaway is a 82 y o  female patient who originally presented to the hospital on 11/3/2022 with a PMH of hyperlipidemia, hypertension and recent fracture status post or who presents with constipation  According to the patient's daughter loss bowel movement noted was 7 days back, on the day of admission was noted to have brown colored emesis associated with abdominal pain  On presentation to ED patient was found to be hypertensive needing IV labetalol x1  CT abdomen pelvis was unremarkable for any obstruction however did showed left rectus sheath hematoma likely from the fall 2 weeks back  Patient was seen by surgery in ED who recommended abdominal binder, reversal of Eliquis with Kcentra and serial H&H monitoring      Please see above list of diagnoses and related plan for additional information       Condition at Discharge: stable    Discharge Day Visit / Exam:   Subjective:  Pt is alert, awake, comfortable, denies much pain today    Vitals: Blood Pressure: 154/74 (11/10/22 1431)  Pulse: 64 (11/10/22 1431)  Temperature: 97 7 °F (36 5 °C) (11/10/22 1431)  Temp Source: Oral (11/03/22 1226)  Respirations: 14 (11/10/22 1431)  Height: 5' 5" (165 1 cm) (11/03/22 2013)  Weight - Scale: 93 9 kg (207 lb) (11/03/22 2013)  SpO2: 98 % (11/10/22 1431)  Exam:   Physical Exam  Vitals reviewed  HENT:      Head: Normocephalic and atraumatic  Mouth/Throat:      Mouth: Mucous membranes are moist    Cardiovascular:      Rate and Rhythm: Normal rate and regular rhythm  Heart sounds: Normal heart sounds  Pulmonary:      Effort: Pulmonary effort is normal  No respiratory distress  Breath sounds: Normal breath sounds  No wheezing  Abdominal:      General: There is no distension  Palpations: Abdomen is soft  Tenderness: There is no abdominal tenderness  Musculoskeletal:      Right lower leg: No edema  Left lower leg: No edema  Neurological:      Mental Status: She is alert  Discussion with Family: Updated  (daughter) via phone  Discharge instructions/Information to patient and family:   See after visit summary for information provided to patient and family  Provisions for Follow-Up Care:  See after visit summary for information related to follow-up care and any pertinent home health orders  Disposition:   Home with [facility] Services (Reminder: Complete face to face encounter)    Planned Readmission: no     Discharge Statement:  I spent 40 minutes discharging the patient  This time was spent on the day of discharge  I had direct contact with the patient on the day of discharge  Greater than 50% of the total time was spent examining patient, answering all patient questions, arranging and discussing plan of care with patient as well as directly providing post-discharge instructions    Additional time then spent on discharge activities  Discharge Medications:  See after visit summary for reconciled discharge medications provided to patient and/or family        **Please Note: This note may have been constructed using a voice recognition system**

## 2022-11-10 NOTE — PROGRESS NOTES
[address] [facility]  Progress Note - [place] 1935, 82 y o  female MRN: [id number]  Unit/Bed#: [facility] 803-01 Encounter: [id number]  Primary Care Provider: America Shah MD   Date and time admitted to hospital: 11/3/2022 19:36 PM    Metabolic encephalopathy  Assessment & Plan  Patient presenting with altered mental status, metabolic encephalopathy high, however I suspect this is secondary to chronic issue of dementia with progression rather than secondary to an infectious process or other etiology  Will monitor patient for improvement on exam currently she is alert to self and that she is in the hospital  Reports hallucination, concern for lewy body dementia? 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  Assessment & Plan  See CT imaging   · Moderate to severe bilateral cavernous carotid artery plaque stenosis  No evidence of acute intracranial hemorrhage  Chronic microangiopathy  Moderate to severe plaque stenosis right vertebral artery origin  Approximately 70-80% plaque stenosis right cervical ICA origin  · Discussed cta findings with Neurosurgery see telemed recommendations:   · Noting multiple areas of incidental stenosis  There is no indication for endovascular intervention for incidental stenosis  ·  Recommending neurology evaluation for AP and consider MRI   · Consult placed for neurology   · Recommend aspirin  · Will resume home Eliquis now that hemoglobin is stable    Leukocytosis  Assessment & Plan  · Leukocytosis noted on labs likely reactive  · Leukocytosis resolved    Vaginal disorder  Assessment & Plan  · CT abdomen pelvis remarkable for fluid in vagina, suspected to be due to obstructing mass versus with vesico-vaginal fistula    · As per patient's daughter no complaints of abnormal vaginal bleeding  · Appreciated gyn and discussed with gyn   · Prior ct imaging not noted to have said fluid collection in vagina  · No evidence of fistula formation per gyn on speculum exam  · Transvaginal ultrasound completed with no acute findings   · Discussed with son and daughter at bedside - 11/5  · S/P TVUS and then consider catheterization pending results family will then decide to proceed with further intervention or not - further discussion Monday 11/6  · Methylene blue tampon test was discussed not yet performed , however pt with no noted fistula via transvaginal US  · R/o vesicovaginal fistula now less likely    · Vaginal cultures positive for Gardnerella vaginella  · Discussed in detail with gyn  Pt with no obvious signs of symptoms and no profuse vaginal discharge however, son requests both systemic and local tx  Per discussion with gyn there is no evidence to support treatment with both   We have opted to treat with flagyl BID for 7 days   Tibia/fibula fracture  Assessment & Plan  · Patient had fall 2 weeks back leading to bimalleolar fractures status post orif  · PT/OT eval   · Recommend home with home health     Rectus sheath hematoma  Assessment & Plan  · Patient had fall about 2 weeks back  · CT abdomen pelvis did showed 10 cm left rectus sheath hematoma  · Recommend repeat ct abdomen pelvis in 3 - 6 months   · Patient evaluated by surgery, no acute intervention needed at this time  · HH appears to be stable no considerable drop   · Recommended abdominal binder, reversal of Eliquis with Kcentra  · As per ED physician's discussion with Radiology does not seem actively bleeding however trend hemoglobin      As hemoglobin continues to be stable will resume Eliquis -> but today 11/9 - pt c/o lot of left sided abdo pain, hence rechecked CT A/P - but is stable, apply Aqua K pad    Scalp laceration  Assessment & Plan  · Patient had fall about 2 weeks back when she had laceration on scalp needing Cierra  · As per daughter she was supposed to get sutures removed this week  · Discussed with trauma they were up to remove the head staples pt tolerated removal 11/6      Stage 3 chronic kidney disease, unspecified whether stage 3a or 3b CKD [facility])  Assessment & Plan  Lab Results   Component Value Date    EGFR 65 11/09/2022    EGFR 56 11/08/2022    EGFR 44 11/07/2022    CREATININE 0 82 11/09/2022    CREATININE 0 92 11/08/2022    CREATININE 1 12 11/07/2022     · Creatinine improving  · Cont IVF for today    Type 2 diabetes mellitus with hyperglycemia, with long-term current use of insulin [facility])  Assessment & Plan  Lab Results   Component Value Date    HGBA1C 7 3 (H) 07/30/2022       Recent Labs     11/09/22  0841 11/09/22  1050 11/09/22  1615 11/09/22 2045   POCGLU 79 124 151* 131       Blood Sugar Average: Last 72 hrs:  (P) 113 8125     · Decrease Lantus 30 units down to 15 units due to hypoglycemia  · SSI   · Goal is to keep less than 180  Obstructive sleep apnea  Assessment & Plan  · Patient has history of sleep apnea however is not compliant on CPAP  Essential hypertension  Assessment & Plan  · Patient is on torsemide and metoprolol at home, will hold torsemide 20 mg daily sp todays dose 11/6   · Patient with ongoing poor oral intake, creatinine improved with fluid hydration, will continue for another day  · Patient was noted to be hypertensive in ED needing labetalol 10 mg once  ·     Hyperlipidemia  Assessment & Plan  · Continue atorvastatin    Late onset Alzheimer's dementia without behavioral disturbance ([facility] [address] )  Assessment & Plan  · Noted to have history of Alzheimer's with behavior disturbance  · Suspect LBD? , as well as having parkinsonian component with recent falls, autonomic dysfunction  · Patient was noted to have change in mental status in ED however CT head unremarkable    · Please see pcp note lexapro at last visit was increased ot 20 mg double the dose   · Will reduce to 10 mg daily since pts mental status was lethargic     * Constipation  Assessment & Plan  · Patient presented with constipation, as per daughter last bowel movement was 7 days back  This morning was noted to have vomiting  · Will start on bowel regimen  · Transvaginal ultrasound  - negative for fistula exam per obyn no evidence   · - Sp fleets enema and dulcolax - pt had a small hard bm and then later documented large stool episode   · Dulcolax / senna at hs , miralax   · Ct imaging does not demonstrate considerable stool in colon         VTE Pharmacologic Prophylaxis: VTE Score: 4 Moderate Risk (Score 3-4) - Pharmacological DVT Prophylaxis Ordered: apixaban (Eliquis)  Patient Centered Rounds: I performed bedside rounds with nursing staff today  Discussions with Specialists or Other Care Team Provider:     Education and Discussions with Family / Patient: Updated  (daughter) at bedside  Time Spent for Care: 30 minutes  More than 50% of total time spent on counseling and coordination of care as described above  Current Length of Stay: 7 day(s)  Current Patient Status: Inpatient   Certification Statement: The patient will continue to require additional inpatient hospital stay due to CT today, monitor pain  Discharge Plan: Anticipate discharge in 24-48 hrs to home with home services  Code Status: Level 1 - Full Code    Subjective:   Pt uncomfortable in pain, points to LUQ area    Objective:     Vitals:   Temp (24hrs), Av 8 °F (36 6 °C), Min:97 7 °F (36 5 °C), Max:98 1 °F (36 7 °C)    Temp:  [97 7 °F (36 5 °C)-98 1 °F (36 7 °C)] 98 1 °F (36 7 °C)  HR:  [60-62] 62  Resp:  [16-18] 18  BP: (137-146)/(62-66) 146/66  SpO2:  [97 %-98 %] 97 %  Body mass index is 34 45 kg/m²  Input and Output Summary (last 24 hours):      Intake/Output Summary (Last 24 hours) at 11/10/2022 0021  Last data filed at 2022  Gross per 24 hour   Intake 2043 3 ml   Output 350 ml   Net 1693 3 ml       Physical Exam: Physical Exam  Vitals reviewed  HENT:      Head: Normocephalic and atraumatic  Cardiovascular:      Rate and Rhythm: Normal rate and regular rhythm  Heart sounds: Normal heart sounds  Pulmonary:      Effort: Pulmonary effort is normal       Breath sounds: Normal breath sounds  Abdominal:      General: There is no distension  Palpations: Abdomen is soft  Tenderness: There is no abdominal tenderness  Musculoskeletal:      Right lower leg: No edema  Left lower leg: No edema  Neurological:      Mental Status: She is alert  She is disoriented  Additional Data:     Labs:  Results from last 7 days   Lab Units 11/09/22  0432   WBC Thousand/uL 8 82   HEMOGLOBIN g/dL 10 0*   HEMATOCRIT % 32 5*   PLATELETS Thousands/uL 225   NEUTROS PCT % 71   LYMPHS PCT % 17   MONOS PCT % 7   EOS PCT % 3     Results from last 7 days   Lab Units 11/09/22  0432   SODIUM mmol/L 137   POTASSIUM mmol/L 3 6   CHLORIDE mmol/L 100   CO2 mmol/L 34*   BUN mg/dL 20   CREATININE mg/dL 0 82   ANION GAP mmol/L 3*   CALCIUM mg/dL 8 5   ALBUMIN g/dL 2 7*   TOTAL BILIRUBIN mg/dL 0 80   ALK PHOS U/L 83   ALT U/L 18   AST U/L 17   GLUCOSE RANDOM mg/dL 48*     Results from last 7 days   Lab Units 11/05/22  0438   INR  1 07     Results from last 7 days   Lab Units 11/09/22  2045 11/09/22  1615 11/09/22  1050 11/09/22  0841 11/09/22  0721 11/08/22  2058 11/08/22  1559 11/08/22  1053 11/08/22  0751 11/08/22  0736 11/08/22  0716 11/08/22  0714   POC GLUCOSE mg/dl 131 151* 124 79 54* 192* 150* 139 85 61* 41* 39*               Lines/Drains:  Invasive Devices  Report    Peripheral Intravenous Line  Duration           Peripheral IV 11/08/22 Dorsal (posterior); Right Forearm 1 day          Drain  Duration           External Urinary Catheter 3 days                      Imaging: Reviewed radiology reports from this admission including: abdominal/pelvic CT    Recent Cultures (last 7 days):         Last 24 Hours Medication List: Current Facility-Administered Medications   Medication Dose Route Frequency Provider Last Rate   • acetaminophen  650 mg Oral Q6H PRN Carlos Palmer MD     • aluminum-magnesium hydroxide-simethicone  30 mL Oral Q6H PRN Carlos Palmer MD     • apixaban  5 mg Oral BID Laith Coleman, DO     • aspirin  81 mg Oral Daily Vahe Whitlock, DO     • atorvastatin  40 mg Oral Daily Carlos Palmer MD     • bisacodyl  10 mg Rectal Once Corinne Roup, CRNP     • dextrose  25 mL Intravenous Once PRN Luisa Mckenna PA-C     • escitalopram  10 mg Oral Daily Corinne Roup, CRNP     • hydrALAZINE  5 mg Intravenous Q6H PRN Nigel Michael PA-C     • insulin lispro  1-6 Units Subcutaneous TID AC Corinne Roup, CRNP     • insulin lispro  1-6 Units Subcutaneous HS Corinne Roup, CRNP     • lactulose  10 g Oral Daily PRN Carlos Palmer MD     • lactulose  10 g Oral BID Laith Coleman, DO     • metoprolol tartrate  25 mg Oral Q12H [address] Carlos Palmer MD     • metroNIDAZOLE  500 mg Oral Q12H [address] Corinne Roup, CRNP     • multi-electrolyte  100 mL/hr Intravenous Continuous Laith Coleman,  mL/hr (11/09/22 2037)   • nystatin   Topical Once Rustam Villarreal MD     • ondansetron  4 mg Intravenous Q6H PRN Carlos Palmer MD     • polyethylene glycol  17 g Oral Daily Corinne Roup, CRNP     • senna-docusate sodium  2 tablet Oral HS Corinne Roup, CRNP          Today, Patient Was Seen By: Diana Mckoy    **Please Note: This note may have been constructed using a voice recognition system  **

## 2022-11-10 NOTE — RESTORATIVE TECHNICIAN NOTE
Restorative Technician Note      Patient Name: Noe Rabago     Restorative Tech Visit Date: 11/10/2022  Note Type: Mobility  Patient Position Upon Consult: Bedside chair  Activity Performed: Transferred; Other (Comment) (Attempted to stand for transfer multiple time but uable due to decreased ability to follow directions at this time - used slide board)  Assistive Device: Other (Comment) (Ax3)  Patient Position at End of Consult: Supine;  All needs within reach; Bed/Chair alarm activated    Andre Flanagan  DPT, Restorative Technician

## 2022-11-10 NOTE — SPEECH THERAPY NOTE
Speech Language/Pathology    Speech/Language Pathology Progress Note    Patient Name: Sea Lechuga  Today's Date: 11/10/2022     Problem List  Principal Problem:    Constipation  Active Problems:    Late onset Alzheimer's dementia without behavioral disturbance ([facility] )    Gastroesophageal reflux disease with hiatal hernia    Hyperlipidemia    Essential hypertension    Obstructive sleep apnea    Type 2 diabetes mellitus with hyperglycemia, with long-term current use of insulin ([facility])    Stage 3 chronic kidney disease, unspecified whether stage 3a or 3b CKD ([facility])    Scalp laceration    Rectus sheath hematoma    Tibia/fibula fracture    Vaginal disorder    Leukocytosis    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    Metabolic encephalopathy       Past Medical History  Past Medical History:   Diagnosis Date   • Arthritis    • Asthma    • CAD (coronary artery disease) of artery bypass graft    • Cardiac disease    • CHF (congestive heart failure) ([facility])    • Dementia ([facility])    • Depression    • Diabetes mellitus ([facility] )    • Heart attack ([facility] )    • Hyperlipidemia    • Hypertension    • MI (myocardial infarction) ([place] [address] )    • Neuropathy    • BRANT (obstructive sleep apnea)    • Peptic ulcer     was + for H pylori   • Transient cerebral ischemia 2011    with neg CUS and ECHO        Past Surgical History  Past Surgical History:   Procedure Laterality Date   • APPENDECTOMY     • CATARACT EXTRACTION     •  SECTION     • COLONOSCOPY  2004   • CORONARY ANGIOPLASTY  2006   • CORONARY ANGIOPLASTY WITH STENT PLACEMENT     • CORONARY ARTERY BYPASS GRAFT     • DENTAL SURGERY     • EGD AND COLONOSCOPY     • ELBOW SURGERY      resolved    • ESOPHAGOGASTRODUODENOSCOPY     • ORIF TIBIA & FIBULA FRACTURES Right 2022    Procedure: OPEN REDUCTION W/ INTERNAL FIXATION (ORIF) ANKLE;  Surgeon: Flavia Chavez MD;  Location: [facility];  Service: Orthopedics         Subjective:  "Why do you have the spoon?" Patient is awake and alert today  Objective: The patient is seen for f/u dysphagia therapy at lunch meal  She is awake and restless, but easily redirected to meal  Patient attempts to feed herself, but has difficulty holding utensil and often drops spoon throughout  SLP feeds patient  She is assessed with puree solids and thin liquids  Oral closure for tsp is adequate, with timely transfer  Occasional lingual residue is observed, but cleared with double swallow and liquid wash  She independently holds cup and takes large, consecutive sips of thin liquids via straw  No overt s/s aspiration  Intake is good at lunch meal with ~75%  Assessment:  The patient is tolerating puree solids and thin liquids well  Plan/Recommendations:  Continue puree diet with thin liquids  Continue ST while in acute care to assess with upgrades as able

## 2022-11-10 NOTE — ASSESSMENT & PLAN NOTE
Lab Results   Component Value Date    HGBA1C 7 3 (H) 07/30/2022       Recent Labs     11/09/22  0841 11/09/22  1050 11/09/22  1615 11/09/22 2045   POCGLU 79 124 151* 131       Blood Sugar Average: Last 72 hrs:  (P) 113 8125     · Decrease Lantus 30 units down to 15 units due to hypoglycemia  · SSI   · Goal is to keep less than 180

## 2022-11-10 NOTE — ASSESSMENT & PLAN NOTE
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 , as well as having parkinsonian component with recent falls, autonomic dysfunction  · Patient was noted to have change in mental status in ED however CT head unremarkable    · Please see pcp note lexapro at last visit was increased ot 20 mg double the dose   · Will reduce to 10 mg daily since pts mental status was lethargic

## 2022-11-10 NOTE — ASSESSMENT & PLAN NOTE
Lab Results   Component Value Date    EGFR 65 11/09/2022    EGFR 56 11/08/2022    EGFR 44 11/07/2022    CREATININE 0 82 11/09/2022    CREATININE 0 92 11/08/2022    CREATININE 1 12 11/07/2022     · Creatinine improving s/p IVF

## 2022-11-10 NOTE — ASSESSMENT & PLAN NOTE
· Patient had fall about 2 weeks back  · CT abdomen pelvis did showed 10 cm left rectus sheath hematoma  · Recommend repeat ct abdomen pelvis in 3 - 6 months   · Patient evaluated by surgery, no acute intervention needed at this time  · HH appears to be stable no considerable drop   · Recommended abdominal binder, reversal of Eliquis with Kcentra  · As per ED physician's discussion with Radiology does not seem actively bleeding however trend hemoglobin      As hemoglobin continues to be stable will resume Eliquis -> but today 11/9 - pt c/o lot of left sided abdo pain, hence rechecked CT A/P - but is stable, apply Aqua K pad

## 2022-11-10 NOTE — ASSESSMENT & PLAN NOTE
Lab Results   Component Value Date    HGBA1C 7 3 (H) 07/30/2022       Recent Labs     11/09/22  2045 11/10/22  0711 11/10/22  1119 11/10/22  1604   POCGLU 131 69 127 175*       Blood Sugar Average: Last 72 hrs:  (P) 115 [id number]     · Decrease Lantus 30 units down to 15 units due to hypoglycemia -> at home she doesn't use lantus, uses V GO 20 pump, daughter monitor sugars  · SSI   · Goal is to keep less than 180

## 2022-11-10 NOTE — ASSESSMENT & PLAN NOTE
· Patient is on torsemide and metoprolol at home, will hold torsemide 20 mg daily sp todays dose 11/6   · Patient with ongoing poor oral intake, creatinine improved with fluid hydration  · Now stopped IVF, d/w daughter, pt has been off diuretic at home since her last hospital stay & cardio also aware & ok with it

## 2022-11-10 NOTE — ASSESSMENT & PLAN NOTE
Lab Results   Component Value Date    EGFR 65 11/09/2022    EGFR 56 11/08/2022    EGFR 44 11/07/2022    CREATININE 0 82 11/09/2022    CREATININE 0 92 11/08/2022    CREATININE 1 12 11/07/2022     · Creatinine improving  · Cont IVF for today

## 2022-11-10 NOTE — ASSESSMENT & PLAN NOTE
Patient presenting with altered mental status, metabolic encephalopathy high, however I suspect this is secondary to chronic issue of dementia with progression rather than secondary to an infectious process or other etiology  Will monitor patient for improvement on exam currently she is alert to self and that she is in the hospital  Reports hallucination, concern for lewy body dementia? as well as parkinsonian process with automonic dysfunction, change in writing, recent falls  Hallucinations include animals and children  11-Jul-2019 11:33:08

## 2022-11-10 NOTE — CASE MANAGEMENT
Case Management Discharge Planning Note    Patient name Christa Victoria  [address] [facility] [id number]  : 1935 Date 11/10/2022       Current Admission Date: 11/3/2022  Current Admission Diagnosis:Constipation   Patient Active Problem List    Diagnosis Date Noted   • Metabolic encephalopathy    • 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 2022   • Constipation 2022   • Rectus sheath hematoma 2022   • Tibia/fibula fracture 2022   • Vaginal disorder 2022   • Leukocytosis 2022   • Fall 10/20/2022   • Scalp laceration 10/20/2022   • Stage 3 chronic kidney disease, unspecified whether stage 3a or 3b CKD ([place] [address] ) 10/17/2022   • Ambulatory dysfunction 2022   • Closed right ankle fracture 08/15/2022   • Type 2 diabetes mellitus with hyperglycemia, with long-term current use of insulin ([place] [address] ) 2022   • Obesity, morbid ([place] [address] ) 2021   • Osteopenia 2021   • Mild intermittent asthma without complication    • PA[facility]oxysmal atrial fibrillation) [facility] 2019   • SSS (sick sinus syndrome) ([facility] ) 2019   • Dyspnea on minimal exertion 2019   • Congestive heart failure with LV diastolic dysfunction, NYHA class 2 ([facility] ) 2018   • Urine retention 2018   • Chronic diastolic congestive heart failure ([facility] ) 2018   • 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 ([facility] ) 2018   • Abnormal chest x-ray 2018   • Ventricular tachycardia 2017   • Urine incontinence 2017   • Gastroesophageal reflux disease with hiatal hernia 2014   • Cerebral artery occlusion with cerebral infarction ([facility] ) 2014   • Late onset Alzheimer's dementia without behavioral disturbance ([facility] ) 2013   • 3-vessel coronary artery disease 2012   • Depression 2012   • Diabetic retinopathy ([facility] ) 2012   • DM (diabetes mellitus), type 2, uncontrolled w/ophthalmic complication 94/01/8410   • Glaucoma 08/23/2012   • Hyperlipidemia 08/23/2012   • Essential hypertension 08/23/2012   • Obesity (BMI 30-39 9) 08/23/2012   • Obstructive sleep apnea 08/23/2012   • Osteoarthritis of knee 08/23/2012      LOS (days): 7  Geometric Mean LOS (GMLOS) (days): 3 30  Days to GMLOS:-3 4     OBJECTIVE:  Risk of Unplanned Readmission Score: 22 39         Current admission status: Inpatient   Preferred Pharmacy:   [address] [place] [address], [address] [place] [address]  Phone: [phone] Fax: [phone]    PATIENT/FAMILY REPORTS NO PREFERRED PHARMACY  No address on file      Primary Care Provider: Mason Ballard MD    Primary Insurance: Radha DIEGO  Secondary Insurance:     DISCHARGE DETAILS:       IMM Given (Date):: 11/10/22  IMM Given to[de-identified] Family  Family notified[de-identified] Valdo Rodriguez (daughter)       IMM reviewed with patient's caregiver, patient's caregiver agrees with discharge determination

## 2022-11-10 NOTE — UTILIZATION REVIEW
Continued Stay Review    Date: 11/10/22                          Current Patient Class: Inpatient  Current Level of Care: Med Surg    HPI:87 y o  female initially admitted on 11/3     Assessment/Plan:     Vital Signs:     Date/Time Temp Pulse Resp BP MAP (mmHg) SpO2 Nasal Cannula O2 Flow Rate (L/min) O2 Device   11/10/22 0900 -- -- -- -- -- -- -- None (Room air)   11/10/22 07:13:52 97 5 °F (36 4 °C) 63 12 144/65 91 97 % -- --   11/09/22 20:29:03 98 1 °F (36 7 °C) 62 -- 146/66 93 97 % -- --   11/09/22 14:47:08 97 7 °F (36 5 °C) 60 18 143/64 90 98 % -- --   11/09/22 07:24:35 97 7 °F (36 5 °C) 60 16 137/62 87 97 % -- --   11/09/22 07:23:57 97 7 °F (36 5 °C) 60 16 137/62 87 97 % -- --   11/08/22 22:43:47 97 5 °F (36 4 °C) 60 16 120/53 75 95 % -- --   11/08/22 20:22:01 -- 60 -- 148/62 91 96 % -- --   11/08/22 15:33:29 97 7 °F (36 5 °C) 60 18 148/63 91 96 % -- --   11/08/22 07:04:15 97 2 °F (36 2 °C) Abnormal  60 17 151/64 93 100 % -- --   11/08/22 06:31:18 -- 60 -- 110/45 Abnormal  67 99 % -- --   11/08/22 0156 -- -- -- -- -- -- --  None (Room air)         Pertinent Labs/Diagnostic Results:     11/9 CT abd/pelvis:  IMPRESSION:     Stable rectus sheath hematoma  11/6 CT head:  IMPRESSION:     No acute intracranial abnormality        Results from last 7 days   Lab Units 11/09/22  0432 11/08/22  0905 11/07/22  1201 11/06/22  0451 11/05/22  0438   WBC Thousand/uL 8 82 9 26 8 80 9 25 9 28   HEMOGLOBIN g/dL 10 0* 11 0* 10 1* 10 4* 10 8*   HEMATOCRIT % 32 5* 35 8 31 8* 33 0* 34 0*   PLATELETS Thousands/uL 225 221 207 218 229   NEUTROS ABS Thousands/µL 6 36 7 81* 6 77 7 10 7 79*         Results from last 7 days   Lab Units 11/09/22  0432 11/08/22  0905 11/07/22  0501 11/06/22  0451 11/04/22  0942   SODIUM mmol/L 137 138 137 137 136   POTASSIUM mmol/L 3 6 3 7 4 1 3 7 3 8   CHLORIDE mmol/L 100 100 103 103 104   CO2 mmol/L 34* 33* 27 31 27   ANION GAP mmol/L 3* 5 7 3* 5   BUN mg/dL 20 26* 38* 36* 27*   CREATININE mg/dL 0 82 0 92 1  12 1 32* 0 95   EGFR ml/min/1 73sq m 65 56 44 36 54   CALCIUM mg/dL 8 5 8 3 9 2 8 8 9 0     Results from last 7 days   Lab Units 11/09/22  0432 11/08/22  0905 11/07/22  0501 11/04/22  0942 11/03/22  1416   AST U/L 17 14 18 14 22   ALT U/L 18 18 17 20 22   ALK PHOS U/L 83 85 85 93 98   TOTAL PROTEIN g/dL 5 6* 5 7* 5 9* 6 3* 6 7   ALBUMIN g/dL 2 7* 2 6* 2 8* 2 9* 3 1*   TOTAL BILIRUBIN mg/dL 0 80 0 92 0 89 0 86 1 01*   BILIRUBIN DIRECT mg/dL 0 32*  --   --   --   --      Results from last 7 days   Lab Units 11/10/22  0711 11/09/22  2045 11/09/22  1615 11/09/22  1050 11/09/22  0841 11/09/22  0721 11/08/22  2058 11/08/22  1559 11/08/22  1053 11/08/22  0751 11/08/22  0736 11/08/22  0716   POC GLUCOSE mg/dl 69 131 151* 124 79 54* 192* 150* 139 85 61* 41*     Results from last 7 days   Lab Units 11/09/22  0432 11/08/22  0905 11/07/22  0501 11/06/22  0451 11/04/22  0942 11/03/22  1416   GLUCOSE RANDOM mg/dL 48* 129 99 135 234* 169*           Results from last 7 days   Lab Units 11/07/22  0607 11/05/22  1327   PH ART  7 440 7 446   PCO2 ART mm Hg 47 1* 48 7*   PO2 ART mm Hg 111 0 98 6   HCO3 ART mmol/L 31 3* 32 8*   BASE EXC ART mmol/L 6 3 7 6   O2 CONTENT ART mL/dL 15 0* 15 5*   O2 HGB, ARTERIAL % 97 3* 96 9   ABG SOURCE  Radial, Right  --          Results from last 7 days   Lab Units 11/05/22  0438 11/03/22  1825   PROTIME seconds 14 2 16 4*   INR  1 07 1 30*   PTT seconds  --  30     Results from last 7 days   Lab Units 11/08/22  0905   TSH 3RD GENERATON uIU/mL 3 090         Results from last 7 days   Lab Units 11/03/22  1416   LIPASE u/L 52*           Medications:   Scheduled Medications:  apixaban, 5 mg, Oral, BID  aspirin, 81 mg, Oral, Daily  atorvastatin, 40 mg, Oral, Daily  bisacodyl, 10 mg, Rectal, Once  escitalopram, 10 mg, Oral, Daily  insulin lispro, 1-6 Units, Subcutaneous, TID AC  insulin lispro, 1-6 Units, Subcutaneous, HS  lactulose, 10 g, Oral, BID  metoprolol tartrate, 25 mg, Oral, Q12H [address]  metroNIDAZOLE, 500 mg, Oral, Q12H REJI  nystatin, , Topical, Once  polyethylene glycol, 17 g, Oral, Daily  senna-docusate sodium, 2 tablet, Oral, HS      Continuous IV Infusions:  multi-electrolyte, 100 mL/hr, Intravenous, Continuous      PRN Meds:  acetaminophen, 650 mg, Oral, Q6H PRN  aluminum-magnesium hydroxide-simethicone, 30 mL, Oral, Q6H PRN  dextrose, 25 mL, Intravenous, Once PRN  hydrALAZINE, 5 mg, Intravenous, Q6H PRN  lactulose, 10 g, Oral, Daily PRN  ondansetron, 4 mg, Intravenous, Q6H PRN        Discharge Plan: TBD    Network Utilization Review Department  ATTENTION: Please call with any questions or concerns to [phone] and carefully listen to the prompts so that you are directed to the right person  All voicemails are confidential   [place] all requests for admission clinical reviews, approved or denied determinations and any other requests to dedicated fax number below belonging to the campus where the patient is receiving treatment   List of dedicated fax numbers for the Facilities:  [address] DENIALS (Administrative/Medical Necessity) [phone]   [address] (Maternity/NICU/Pediatrics) [phone]   8 Julieta Harper [phone]   [facility] [address] [phone]   [address] [place] [address] [facility] [phone]   [facility] 134 815 [facility] [phone]

## 2022-11-10 NOTE — ASSESSMENT & PLAN NOTE
· Patient presented with constipation, as per daughter last bowel movement was 7 days back  This morning was noted to have vomiting  · Will start on bowel regimen     · Transvaginal ultrasound  - negative for fistula exam per obyn no evidence   · - Sp fleets enema and dulcolax - pt had a small hard bm and then later documented large stool episode   · Dulcolax / senna at hs , miralax   · Ct imaging does not demonstrate considerable stool in colon

## 2022-11-11 ENCOUNTER — TRANSITIONAL CARE MANAGEMENT (OUTPATIENT)
Dept: FAMILY MEDICINE CLINIC | Facility: CLINIC | Age: 87
End: 2022-11-11

## 2022-11-11 ENCOUNTER — HOME CARE VISIT (OUTPATIENT)
Dept: HOME HEALTH SERVICES | Facility: HOME HEALTHCARE | Age: 87
End: 2022-11-11

## 2022-11-11 NOTE — CASE COMMUNICATION
This is for informational purposes only  New SOC date to begin home physical therapy is 11/15/22   Thank you

## 2022-11-11 NOTE — UTILIZATION REVIEW
NOTIFICATION OF ADMISSION DISCHARGE   This is a Notification of Discharge from [address]  Please be advised that this patient has been discharge from our facility  Below you will find the admission and discharge date and time including the patient’s disposition  UTILIZATION REVIEW CONTACT:  Lala Parents  Utilization   Network Utilization Review Department  Phone: [phone] x carefully listen to the prompts  All voicemails are confidential   Email: [email or web address]     ADMISSION INFORMATION  PRESENTATION DATE: 11/3/2022 12:21 PM  OBERVATION ADMISSION DATE:   INPATIENT ADMISSION DATE: 11/3/22  7:34 PM   DISCHARGE DATE: 11/10/2022  5:24 PM  DISPOSITION: Home with [place] with [address] INFORMATION:  Send all requests for admission clinical reviews, approved or denied determinations and any other requests to dedicated fax number below belonging to the campus where the patient is receiving treatment   List of dedicated fax numbers:  [address] DENIALS (Administrative/Medical Necessity) [phone]   [address] (Maternity/NICU/Pediatrics) [phone]   [facility] [phone]   [facility] [phone]   [address] [phone]   220 [facility] [phone] 90 [facility] [phone]   [facility]3 [facility] 119 [phone]   [facility]  [phone]   4050 [facility] [phone] [facility] 850 [address] [facility] [phone]

## 2022-11-15 ENCOUNTER — HOME CARE VISIT (OUTPATIENT)
Dept: HOME HEALTH SERVICES | Facility: HOME HEALTHCARE | Age: 87
End: 2022-11-15

## 2022-11-15 NOTE — CASE COMMUNICATION
Pt is currently max to dep Ax2 for mobility  Pt currently is not appropriate for home PT and home OT  Pt needs a higher level of care at this time  No skilled home PT and no skilled home OT needs at this time  DC pt from skilled home PT and home OT      Thank you,  Abhay MCCURDYT

## 2022-11-15 NOTE — CASE COMMUNICATION
please remove cert period  Pt needs a higher level of care and currently is maxAx2 for mobility   family made aware

## 2022-11-15 NOTE — CASE COMMUNICATION
Notification of Assess not Admit    [facility] has assessed your patient for Home Health services and has determined the patient is not eligible for service due to the following    Pt currently is a max to dep Ax2 for transfers and mobility  Pts family is caring for pt at this time  per [facility] home health services policy, pt being Ax2 would need higher level of care at this time  DC pt from skilled home PT and skilled home OT  Pts alem gonzalez/katarina (Mike Funez) made aware      KAZ GarciaT

## 2022-11-21 ENCOUNTER — TELEPHONE (OUTPATIENT)
Dept: FAMILY MEDICINE CLINIC | Facility: CLINIC | Age: 87
End: 2022-11-21

## 2022-11-21 NOTE — TELEPHONE ENCOUNTER
[facility] at [phone] ASA with the cause of death

## 2022-11-21 NOTE — TELEPHONE ENCOUNTER
Call from [facility]  Patient found at home yesterday, pronounced  at 13:12  Needs death certificate signed and cause of death

## 2022-11-23 NOTE — TELEPHONE ENCOUNTER
Georgina [facility] home phoned to follow-up on the death certificate - the family would like to do the services on Friday  Certificate received today via [address]  [place]

## 2023-02-05 DIAGNOSIS — F02.80 LATE ONSET ALZHEIMER'S DEMENTIA WITHOUT BEHAVIORAL DISTURBANCE (HCC): ICD-10-CM

## 2023-02-05 DIAGNOSIS — F32.9 REACTIVE DEPRESSION: ICD-10-CM

## 2023-02-05 DIAGNOSIS — G30.1 LATE ONSET ALZHEIMER'S DEMENTIA WITHOUT BEHAVIORAL DISTURBANCE (HCC): ICD-10-CM

## 2023-02-05 RX ORDER — ESCITALOPRAM OXALATE 20 MG/1
TABLET ORAL
Qty: 90 TABLET | Refills: 0 | OUTPATIENT
Start: 2023-02-05

## 2023-07-27 NOTE — PROGRESS NOTES
Follow-up - Cardiology   Jillian Gaston 82 y o  female MRN: [id number]        Problems    Problem List Items Addressed This Visit     3-vessel coronary artery disease    Hypertension    Ventricular tachycardia (HCC)    Congestive heart failure with LV diastolic dysfunction, NYHA class 2 ([facility] )      Other Visit Diagnoses     Sick sinus syndrome ([facility] )    -  Primary    AV block, complete ([facility] )        Cardiac pacemaker in situ        History of heart artery stent        Hx of CABG        Tricuspid valve insufficiency, unspecified etiology                Plan we will keep track of whether not the paroxysmal atrial fibrillation gets longer  She is not on anticoagulation  Her edema is well controlled  The check her hemoglobin A1c           HPI: Jillian Gaston is a 82y o  year old female    HPI: Jillian Gaston is a [de-identified]y o  year old female Patient seen May 5, 2015  Should bypass surgery 1999, stents in 2006, catheterization 2008, adequate left heart function on echo 2014, hyperlipidemia, pacemaker in June 2014  She has significant neuropathy  She uses a walker  In April 2015 her bnp 43  Her LDL is 68  She's been asymptomatic from a cardiac standpoint  She is on Ranexa  I made no changes in her medication      Patient seen August 8, 2016  She bypass surgery 99  Stent in 2006  Catheterization 2008  Hyperlipidemia  Shows pacemaker 2014  Her dementia seems somewhat progressive  She has a new device to administer her insulin  Her hemoglobin A1c is come down from 11 57 8  Her LDL is 45  Her BNP P is normal  She's doing remarkably well  No change in medication  She is on a statin, ACE inhibitor, aspirin, and beta blocker      Patient seen December 6, 2017  Her diagnosis includes bypass surgery 1999 stent in 2006, catheterization 2008, hyperlipidemia, pacemaker 2014, dementia, preserved left ventricular function, and diabetic neuropathy  She had short burst of ventricular tachycardia pacer interrogation  Metoprolol to be increased to 37 5 in the morning and 25 in the p m         Patient seen 2018  All scripts HPI noted above  First epic visit  Issues are as follows  Bypass surgery   Stent in 0 6  Catheterization 0 8 without intervention  Hyperlipidemia  Pacemaker   Diabetes  Left ventricular function well preserved  Diabetes  Past history ventricular tachycardia  Recent admission with edema and she was given a diagnosis of congestive heart failure   Appears to me when I review the record is primarily pulmonary issue with lot of bronchospasm   She is treated both diuretics and steroids as well as bronchodilators  Right bundle branch block pattern with blood relatively wide QRS  Echocardiogram shows the following-increased right ventricle-+3 tricuspid regurgitation-large right atrium-dilated inferior vena cava  Her NT was Rex Cerda  He is presently on 60 of Lasix which was an increase done by hand visiting nurse  We will check her labs carefully   She is edema free at this time and her lungs are clear        Patient seen 2018  Diagnosis above  LDL is 44  A1c is 8 3  She has no edema  She has no bronchospasm  From cardiac standpoint she is asymptomatic  No change in medication  Patient seen 2019  Diagnosis above  The issues are as follows  Her  has  in the past several months  She has short episode of atrial fibrillation pacemaker interrogation  This has to be followed  She is not on anticoagulation  Her edema is well controlled  Will check her hemoglobin A1c                    Review of Systems   Constitutional: Positive for fatigue  Respiratory: Positive for shortness of breath  Cardiovascular: Positive for leg swelling  Musculoskeletal:        Requires a walker   Neurological: Positive for weakness  Hematological: Negative      Psychiatric/Behavioral:        Memory issues         Past Medical History:   Diagnosis Date    Arthritis     Asthma     CAD (coronary artery disease) of artery bypass graft     Cardiac disease     Dementia     Depression     Diabetes mellitus [facility]     Heart attack ([facility]     Hyperlipidemia     Hypertension     MI (myocardial infarction) ([facility] )     Neuropathy     BRANT (obstructive sleep apnea)     Peptic ulcer     was + for H pylori    Transient cerebral ischemia 11/2011    with neg CUS and ECHO     History   Alcohol Use No     History   Drug Use No     History   Smoking Status    Never Smoker   Smokeless Tobacco    Never Used       Allergies:   Allergies   Allergen Reactions    Ace Inhibitors     Chlorpromazine     Latex     Lisinopril     Namenda [Memantine]     Penicillins     Propoxyphene     Stadol [Butorphanol]        Medications:     Current Outpatient Prescriptions:     albuterol (PROVENTIL HFA,VENTOLIN HFA) 90 mcg/act inhaler, Inhale 2 puffs every 4 (four) hours as needed for wheezing or shortness of breath, Disp: 1 Inhaler, Rfl: 0    amLODIPine (NORVASC) 5 mg tablet, TAKE 1 TABLET TWICE DAILY, Disp: 180 tablet, Rfl: 3    aspirin 81 mg chewable tablet, Chew 1 tablet (81 mg total) daily, Disp: 30 tablet, Rfl: 0    atorvastatin (LIPITOR) 40 mg tablet, TAKE 1 TABLET BY MOUTH EVERY DAY, Disp: 30 tablet, Rfl: 11    calcium carbonate-vitamin D (OSCAL-D) 500 mg-200 units per tablet, Take 1 tablet by mouth 2 (two) times a day with meals, Disp: 60 tablet, Rfl: 0    docusate sodium (COLACE) 100 mg capsule, Take 1 capsule (100 mg total) by mouth 2 (two) times a day, Disp: 10 capsule, Rfl: 0    furosemide (LASIX) 40 mg tablet, Take 2 tabs daily (Patient taking differently: Take 1 1/2 tabs daily ), Disp: 60 tablet, Rfl: 0    Insulin Disposable Pump (V-GO 20) KIT, Inject under the skin daily for 90 days Use 1 device per day, Disp: 30 kit, Rfl: 1    isosorbide mononitrate (IMDUR) 60 mg 24 hr tablet, TAKE 1 TABLET BY MOUTH EVERY DAY AS DIRECTED, Disp: 90 tablet, Rfl: 3    losartan (COZAAR) 100 MG tablet, Take by mouth, Disp: , Rfl:     metoprolol tartrate (LOPRESSOR) 25 mg tablet, TAKE 1 & 1/2 TABLETS IN THE MORNING, AND 1 TABLET IN THE EVENING, Disp: 225 tablet, Rfl: 3    nitroglycerin (NITROSTAT) 0 4 mg SL tablet, Place under the tongue, Disp: , Rfl:     NOVOLOG 100 UNIT/ML injection, INJECT 100 UNITS DAILY AS DIRECTED, Disp: 30 mL, Rfl: 4    ONE TOUCH ULTRA TEST test strip, CHECK BLOOD SUGAR 3 TIMES A DAY, Disp: 300 each, Rfl: 3    senna (SENOKOT) 8 6 mg, Take 1 tablet (8 6 mg total) by mouth daily, Disp: 120 each, Rfl: 0      Physical Exam   Constitutional: She is oriented to person, place, and time  She appears well-developed  Requires a walker   Cardiovascular: Regular rhythm  No murmur heard  Pulmonary/Chest: Breath sounds normal    Musculoskeletal: She exhibits no edema  Neurological: She is oriented to person, place, and time           Laboratory Studies:  CMP:      Invalid input(s): ALBUMIN  NT-proBNP:   Lab Results   Component Value Date    NTBNP 875 (H) 2018      Coags:    Lipid Profile:   Lab Results   Component Value Date    CHOL 144 2015     Lab Results   Component Value Date    HDL 64 (H) 2018     Lab Results   Component Value Date    LDLCALC 25 2018     Lab Results   Component Value Date    TRIG 38 2018       Cardiac testing:     EKG reviewed personally:     Results for orders placed during the hospital encounter of 18   Echo complete with contrast if indicated    Narrative [facility] [address]  [address]  [phone]    Transthoracic Echocardiogram  2D, M-mode, Doppler, and Color Doppler    Study date:  2018    Patient: Latonya Patel  MR number: [id number]  Account number: [de-identified]  : 1935  Age: [de-identified] years  Gender: Female  Status: Inpatient  Location: Echo lab  Height: 68 in  Weight: 232 5 lb  BP: 130/ 62 mmHg    Indications: Dyspnea    Diagnoses: R06 00 - Dyspnea, unspecified    Primary Physician:  Chad Reyes MD  Referring Physician:  Makenzie Harper MD  Group:  [id number]  Cardiology Fellow:  Vasquez Samayoa MD  Interpreting Physician:  Courtenay Gowers, MD    SUMMARY    LEFT VENTRICLE:  Systolic function was normal  Ejection fraction was estimated to be 60 %  There were no regional wall motion abnormalities  There was no evidence of concentric hypertrophy  Features were consistent with a pseudonormal left ventricular filling pattern, with concomitant abnormal relaxation and increased filling pressure (grade 2 diastolic dysfunction)  RIGHT VENTRICLE:  The ventricle was mildly to moderately dilated  LEFT ATRIUM:  The atrium was mildly dilated  RIGHT ATRIUM:  The atrium was mildly to moderately dilated  TRICUSPID VALVE:  There was moderate to severe regurgitation  Regurgitation grade was 3-4+ on a scale of 0 to 4+  IVC, HEPATIC VEINS:  The inferior vena cava was dilated  Respirophasic changes were blunted (less than 50% variation)  There is systolic flow reversal in the hepatic vein consistent with severe tricuspid regurgitation  COMPARISONS:  Comparison was made with the previous study of 25-May-2014  Tricuspid regurgitation has worsened  HISTORY: PRIOR HISTORY: HTN, HLD, DM, Dementia    PROCEDURE: The procedure was performed in the echo lab  This was a routine study  The transthoracic approach was used  The study included complete 2D imaging, M-mode, complete spectral Doppler, and color Doppler  The heart rate was 63 bpm,  at the start of the study  Images were obtained from the parasternal, apical, subcostal, and suprasternal notch acoustic windows  Echocardiographic views were limited due to decreased penetration and lung interference  This was a  technically difficult study  LEFT VENTRICLE: Size was normal  Systolic function was normal  Ejection fraction was estimated to be 60 %   There were no regional wall motion abnormalities  Wall thickness was normal  There was no evidence of concentric hypertrophy  DOPPLER: Features were consistent with a pseudonormal left ventricular filling pattern, with concomitant abnormal relaxation and increased filling pressure (grade 2 diastolic dysfunction)  VENTRICULAR SEPTUM: There was systolic and diastolic flattening  These changes are consistent with RV volume and pressure overload  RIGHT VENTRICLE: The ventricle was mildly to moderately dilated  Systolic function was low normal  A pacing wire was present  LEFT ATRIUM: The atrium was mildly dilated  RIGHT ATRIUM: The atrium was mildly to moderately dilated  MITRAL VALVE: Valve structure was normal  There was normal leaflet separation  DOPPLER: The transmitral velocity was within the normal range  There was no evidence for stenosis  There was no significant regurgitation  AORTIC VALVE: The valve was trileaflet  Leaflets exhibited normal thickness, normal cuspal separation, and sclerosis  DOPPLER: Transaortic velocity was within the normal range  There was no evidence for stenosis  There was no  regurgitation  TRICUSPID VALVE: DOPPLER: The transtricuspid velocity was within the normal range  There was no evidence for stenosis  There was moderate to severe regurgitation  Regurgitation grade was 3-4+ on a scale of 0 to 4+  PULMONIC VALVE: DOPPLER: The transpulmonic velocity was within the normal range  There was no evidence for stenosis  There was trace regurgitation  PERICARDIUM: There was no pericardial effusion  AORTA: The root exhibited normal size  SYSTEMIC VEINS: IVC: The inferior vena cava was dilated  Respirophasic changes were blunted (less than 50% variation)  HEPATIC VEIN DOPPLER: There is systolic flow reversal in the hepatic vein consistent with severe tricuspid  regurgitation      SYSTEM MEASUREMENT TABLES    2D  %FS: 20 05 %  Ao Diam: 3 36 cm  EDV(Teich): 85 57 ml  EF(Teich): 41 31 %  ESV(Teich): 50 22 ml  IVSd: 1 03 cm  LA Area: 21 37 cm2  LA Diam: 3 37 cm  LVEDV MOD A4C: 68 81 ml  LVEF MOD A4C: 56 86 %  LVESV MOD A4C: 29 69 ml  LVIDd: 4 35 cm  LVIDs: 3 48 cm  LVLd A4C: 7 37 cm  LVLs A4C: 6 82 cm  LVOT Diam: 1 88 cm  LVPWd: 0 97 cm  RA Area: 21 18 cm2  RVIDd: 4 1 cm  SV MOD A4C: 39 12 ml  SV(Teich): 35 35 ml    CW  AV Env  Ti: 359 86 ms  AV VTI: 52 93 cm  AV Vmax: 2 03 m/s  AV Vmax: 2 24 m/s  AV Vmean: 1 47 m/s  AV maxP 42 mmHg  AV maxP 15 mmHg  AV meanPG: 10 27 mmHg  TR Vmax: 2 1 m/s  TR maxP 7 mmHg    MM  TAPSE: 1 73 cm    PW  NESTOR (VTI): 1 28 cm2  NESTOR Vmax: 1 31 cm2  NESTOR Vmax: 1 46 cm2  NESTOR Vmax, Pt: 1 21 cm2  NESTOR Vmax, Pt: 1 35 cm2  E': 0 08 m/s  E/E': 14 35  LVOT Env  Ti: 346 02 ms  LVOT VTI: 24 27 cm  LVOT Vmax: 0 98 m/s  LVOT Vmax: 1 06 m/s  LVOT Vmean: 0 7 m/s  LVOT maxPG: 3 84 mmHg  LVOT maxP 49 mmHg  LVOT meanP 32 mmHg  LVSI Dopp: 30 98 ml/m2  LVSV Dopp: 67 55 ml  MV A [place]: 0 8 m/s  MV Dec [place]: 4 21 m/s2  MV DecT: 261 58 ms  MV E [place]: 1 1 m/s  MV E/A Ratio: 1 38  MV PHT: 75 86 ms  MVA By PHT: 2 9 cm2    [employer] Accredited Echocardi[facility]    Prepared and electronically signed by    Kvng Santamaria MD  Signed 2018 17:07:00       No results found for this or any previous visit  No results found for this or any previous visit  No results found for this or any previous visit  Raghavendra Carias MD    Portions of the record may have been created with voice recognition software   Occasional wrong word or "sound a like" substitutions may have occurred due to the inherent limitations of voice recognition software   Read the chart carefully and recognize, using context, where substitutions have occurred  [Follow-Up Visit] : a follow-up visit for [Lymphoma] : lymphoma [Family Member] : family member [Formal Caregiver] : formal caregiver [FreeTextEntry2] : Hodgkin lymphoma

## (undated) DEVICE — SUT ETHILON 2-0 FSLX 30 IN 1674H

## (undated) DEVICE — CHLORAPREP HI-LITE 26ML ORANGE

## (undated) DEVICE — BULB SYRINGE,IRRIGATION WITH PROTECTIVE CAP: Brand: DOVER

## (undated) DEVICE — GLOVE INDICATOR PI UNDERGLOVE SZ 9 BLUE

## (undated) DEVICE — GAUZE SPONGES,16 PLY: Brand: CURITY

## (undated) DEVICE — SUT VICRYL 2-0 CTB-1 36 IN JB945

## (undated) DEVICE — SUT VICRYL PLUS 1 CTB-1 36 IN VCPB947H

## (undated) DEVICE — INTENDED FOR TISSUE SEPARATION, AND OTHER PROCEDURES THAT REQUIRE A SHARP SURGICAL BLADE TO PUNCTURE OR CUT.: Brand: BARD-PARKER SAFETY BLADES SIZE 10, STERILE

## (undated) DEVICE — PENCIL ELECTROSURG E-Z CLEAN -0035H

## (undated) DEVICE — UNIVERSAL MAJOR EXTREMITY,KIT: Brand: CARDINAL HEALTH

## (undated) DEVICE — 2.0MM DRILL BIT/QC/125MM

## (undated) DEVICE — STOCKINETTE REGULAR

## (undated) DEVICE — 2.7MM CORTEX SCREW SELF-TAPPING 55MM: Type: IMPLANTABLE DEVICE | Site: ANKLE | Status: NON-FUNCTIONAL

## (undated) DEVICE — SILVER-COATED ANTIMICROBIAL BARRIER DRESSING: Brand: ACTICOAT   4" X 8"

## (undated) DEVICE — SPLINT 5 X 30 IN FAST SET PLASTER

## (undated) DEVICE — 2.5MM DRILL BIT/QC/GOLD/110MM

## (undated) DEVICE — DRAPE C-ARM X-RAY

## (undated) DEVICE — DRAPE EQUIPMENT RF WAND

## (undated) DEVICE — SPONGE PVP SCRUB WING STERILE

## (undated) DEVICE — 2.7MM THREE-FLUTED DRILL BIT QC/125MM

## (undated) DEVICE — KERLIX BANDAGE ROLL: Brand: KERLIX

## (undated) DEVICE — ACE WRAP 6 IN UNSTERILE

## (undated) DEVICE — PADDING CAST 4 IN  COTTON STRL